# Patient Record
Sex: MALE | Race: OTHER | NOT HISPANIC OR LATINO
[De-identification: names, ages, dates, MRNs, and addresses within clinical notes are randomized per-mention and may not be internally consistent; named-entity substitution may affect disease eponyms.]

---

## 2018-09-05 ENCOUNTER — APPOINTMENT (OUTPATIENT)
Dept: OTOLARYNGOLOGY | Facility: CLINIC | Age: 73
End: 2018-09-05
Payer: MEDICARE

## 2018-09-05 VITALS
WEIGHT: 160 LBS | HEIGHT: 66 IN | SYSTOLIC BLOOD PRESSURE: 129 MMHG | BODY MASS INDEX: 25.71 KG/M2 | DIASTOLIC BLOOD PRESSURE: 80 MMHG | HEART RATE: 82 BPM

## 2018-09-05 DIAGNOSIS — E66.3 OVERWEIGHT: ICD-10-CM

## 2018-09-05 DIAGNOSIS — H92.02 OTALGIA, LEFT EAR: ICD-10-CM

## 2018-09-05 DIAGNOSIS — E11.9 TYPE 2 DIABETES MELLITUS W/OUT COMPLICATIONS: ICD-10-CM

## 2018-09-05 DIAGNOSIS — K29.80 DUODENITIS W/OUT BLEEDING: ICD-10-CM

## 2018-09-05 PROCEDURE — 92567 TYMPANOMETRY: CPT

## 2018-09-05 PROCEDURE — 92557 COMPREHENSIVE HEARING TEST: CPT

## 2018-09-05 PROCEDURE — 31575 DIAGNOSTIC LARYNGOSCOPY: CPT

## 2018-09-05 PROCEDURE — 99204 OFFICE O/P NEW MOD 45 MIN: CPT | Mod: 25

## 2021-07-08 ENCOUNTER — INPATIENT (INPATIENT)
Facility: HOSPITAL | Age: 76
LOS: 5 days | Discharge: HOME | DRG: 270 | End: 2021-07-14
Attending: STUDENT IN AN ORGANIZED HEALTH CARE EDUCATION/TRAINING PROGRAM | Admitting: INTERNAL MEDICINE
Payer: MEDICARE

## 2021-07-08 VITALS
TEMPERATURE: 98 F | DIASTOLIC BLOOD PRESSURE: 80 MMHG | HEIGHT: 66 IN | RESPIRATION RATE: 20 BRPM | SYSTOLIC BLOOD PRESSURE: 122 MMHG | HEART RATE: 73 BPM | OXYGEN SATURATION: 100 % | WEIGHT: 154.1 LBS

## 2021-07-08 DIAGNOSIS — R07.9 CHEST PAIN, UNSPECIFIED: ICD-10-CM

## 2021-07-08 LAB
A1C WITH ESTIMATED AVERAGE GLUCOSE RESULT: 7.2 % — HIGH (ref 4–5.6)
ALBUMIN SERPL ELPH-MCNC: 4.1 G/DL — SIGNIFICANT CHANGE UP (ref 3.3–5)
ALP SERPL-CCNC: 68 U/L — SIGNIFICANT CHANGE UP (ref 40–120)
ALT FLD-CCNC: 19 U/L — SIGNIFICANT CHANGE UP (ref 10–45)
ANION GAP SERPL CALC-SCNC: 18 MMOL/L — HIGH (ref 5–17)
APTT BLD: 29.8 SEC — SIGNIFICANT CHANGE UP (ref 27.5–35.5)
AST SERPL-CCNC: 23 U/L — SIGNIFICANT CHANGE UP (ref 10–40)
BASE EXCESS BLDMV CALC-SCNC: -1.7 MMOL/L — SIGNIFICANT CHANGE UP (ref -3–3)
BASE EXCESS BLDV CALC-SCNC: 1.4 MMOL/L — SIGNIFICANT CHANGE UP (ref -2–2)
BASOPHILS # BLD AUTO: 0.06 K/UL — SIGNIFICANT CHANGE UP (ref 0–0.2)
BASOPHILS NFR BLD AUTO: 0.4 % — SIGNIFICANT CHANGE UP (ref 0–2)
BILIRUB SERPL-MCNC: 0.7 MG/DL — SIGNIFICANT CHANGE UP (ref 0.2–1.2)
BUN SERPL-MCNC: 15 MG/DL — SIGNIFICANT CHANGE UP (ref 7–23)
CA-I SERPL-SCNC: 1.13 MMOL/L — SIGNIFICANT CHANGE UP (ref 1.12–1.3)
CALCIUM SERPL-MCNC: 9.2 MG/DL — SIGNIFICANT CHANGE UP (ref 8.4–10.5)
CHLORIDE BLDV-SCNC: 109 MMOL/L — HIGH (ref 96–108)
CHLORIDE SERPL-SCNC: 103 MMOL/L — SIGNIFICANT CHANGE UP (ref 96–108)
CHOLEST SERPL-MCNC: 121 MG/DL — SIGNIFICANT CHANGE UP
CK MB BLD-MCNC: 7.5 % — HIGH (ref 0–3.5)
CK MB CFR SERPL CALC: 289.4 NG/ML — HIGH (ref 0–6.7)
CK SERPL-CCNC: 3863 U/L — HIGH (ref 30–200)
CO2 BLDMV-SCNC: 25 MMOL/L — SIGNIFICANT CHANGE UP (ref 21–29)
CO2 BLDV-SCNC: 25 MMOL/L — SIGNIFICANT CHANGE UP (ref 22–30)
CO2 SERPL-SCNC: 18 MMOL/L — LOW (ref 22–31)
CREAT SERPL-MCNC: 0.89 MG/DL — SIGNIFICANT CHANGE UP (ref 0.5–1.3)
EOSINOPHIL # BLD AUTO: 0.18 K/UL — SIGNIFICANT CHANGE UP (ref 0–0.5)
EOSINOPHIL NFR BLD AUTO: 1.3 % — SIGNIFICANT CHANGE UP (ref 0–6)
ESTIMATED AVERAGE GLUCOSE: 160 MG/DL — HIGH (ref 68–114)
GAS PNL BLDA: SIGNIFICANT CHANGE UP
GAS PNL BLDMV: SIGNIFICANT CHANGE UP
GAS PNL BLDV: 138 MMOL/L — SIGNIFICANT CHANGE UP (ref 135–145)
GAS PNL BLDV: SIGNIFICANT CHANGE UP
GAS PNL BLDV: SIGNIFICANT CHANGE UP
GLUCOSE BLDC GLUCOMTR-MCNC: 151 MG/DL — HIGH (ref 70–99)
GLUCOSE BLDV-MCNC: 146 MG/DL — HIGH (ref 70–99)
GLUCOSE SERPL-MCNC: 154 MG/DL — HIGH (ref 70–99)
HCO3 BLDMV-SCNC: 24 MMOL/L — SIGNIFICANT CHANGE UP (ref 20–28)
HCO3 BLDV-SCNC: 24 MMOL/L — SIGNIFICANT CHANGE UP (ref 21–29)
HCT VFR BLD CALC: 44.9 % — SIGNIFICANT CHANGE UP (ref 39–50)
HCT VFR BLDA CALC: 46 % — SIGNIFICANT CHANGE UP (ref 39–50)
HDLC SERPL-MCNC: 46 MG/DL — SIGNIFICANT CHANGE UP
HGB BLD CALC-MCNC: 15 G/DL — SIGNIFICANT CHANGE UP (ref 13–17)
HGB BLD-MCNC: 14.5 G/DL — SIGNIFICANT CHANGE UP (ref 13–17)
HOROWITZ INDEX BLDMV+IHG-RTO: 21 — SIGNIFICANT CHANGE UP
IMM GRANULOCYTES NFR BLD AUTO: 0.4 % — SIGNIFICANT CHANGE UP (ref 0–1.5)
INR BLD: 1.01 RATIO — SIGNIFICANT CHANGE UP (ref 0.88–1.16)
LACTATE BLDV-MCNC: 4.3 MMOL/L — CRITICAL HIGH (ref 0.7–2)
LIPID PNL WITH DIRECT LDL SERPL: 69 MG/DL — SIGNIFICANT CHANGE UP
LYMPHOCYTES # BLD AUTO: 29.2 % — SIGNIFICANT CHANGE UP (ref 13–44)
LYMPHOCYTES # BLD AUTO: 3.93 K/UL — HIGH (ref 1–3.3)
MCHC RBC-ENTMCNC: 28.3 PG — SIGNIFICANT CHANGE UP (ref 27–34)
MCHC RBC-ENTMCNC: 32.3 GM/DL — SIGNIFICANT CHANGE UP (ref 32–36)
MCV RBC AUTO: 87.7 FL — SIGNIFICANT CHANGE UP (ref 80–100)
MONOCYTES # BLD AUTO: 0.66 K/UL — SIGNIFICANT CHANGE UP (ref 0–0.9)
MONOCYTES NFR BLD AUTO: 4.9 % — SIGNIFICANT CHANGE UP (ref 2–14)
NEUTROPHILS # BLD AUTO: 8.59 K/UL — HIGH (ref 1.8–7.4)
NEUTROPHILS NFR BLD AUTO: 63.8 % — SIGNIFICANT CHANGE UP (ref 43–77)
NON HDL CHOLESTEROL: 75 MG/DL — SIGNIFICANT CHANGE UP
NRBC # BLD: 0 /100 WBCS — SIGNIFICANT CHANGE UP (ref 0–0)
O2 CT VFR BLD CALC: 39 MMHG — SIGNIFICANT CHANGE UP (ref 30–65)
PCO2 BLDMV: 44 MMHG — SIGNIFICANT CHANGE UP (ref 30–65)
PCO2 BLDV: 32 MMHG — LOW (ref 35–50)
PH BLDMV: 7.34 — SIGNIFICANT CHANGE UP (ref 7.32–7.45)
PH BLDV: 7.48 — HIGH (ref 7.35–7.45)
PLATELET # BLD AUTO: 224 K/UL — SIGNIFICANT CHANGE UP (ref 150–400)
PO2 BLDV: 20 MMHG — LOW (ref 25–45)
POTASSIUM BLDV-SCNC: 3.5 MMOL/L — SIGNIFICANT CHANGE UP (ref 3.5–5.3)
POTASSIUM SERPL-MCNC: 3.7 MMOL/L — SIGNIFICANT CHANGE UP (ref 3.5–5.3)
POTASSIUM SERPL-SCNC: 3.7 MMOL/L — SIGNIFICANT CHANGE UP (ref 3.5–5.3)
PROT SERPL-MCNC: 7.4 G/DL — SIGNIFICANT CHANGE UP (ref 6–8.3)
PROTHROM AB SERPL-ACNC: 12.1 SEC — SIGNIFICANT CHANGE UP (ref 10.6–13.6)
RBC # BLD: 5.12 M/UL — SIGNIFICANT CHANGE UP (ref 4.2–5.8)
RBC # FLD: 13.3 % — SIGNIFICANT CHANGE UP (ref 10.3–14.5)
SAO2 % BLDMV: 66 % — SIGNIFICANT CHANGE UP (ref 60–90)
SAO2 % BLDV: 34 % — LOW (ref 67–88)
SARS-COV-2 RNA SPEC QL NAA+PROBE: SIGNIFICANT CHANGE UP
SODIUM SERPL-SCNC: 139 MMOL/L — SIGNIFICANT CHANGE UP (ref 135–145)
TRIGL SERPL-MCNC: 32 MG/DL — SIGNIFICANT CHANGE UP
TROPONIN T, HIGH SENSITIVITY RESULT: 22 NG/L — SIGNIFICANT CHANGE UP (ref 0–51)
TROPONIN T, HIGH SENSITIVITY RESULT: HIGH NG/L (ref 0–51)
TSH SERPL-MCNC: 2.83 UIU/ML — SIGNIFICANT CHANGE UP (ref 0.27–4.2)
WBC # BLD: 13.47 K/UL — HIGH (ref 3.8–10.5)
WBC # FLD AUTO: 13.47 K/UL — HIGH (ref 3.8–10.5)

## 2021-07-08 PROCEDURE — 99292 CRITICAL CARE ADDL 30 MIN: CPT

## 2021-07-08 PROCEDURE — 33967 INSERT I-AORT PERCUT DEVICE: CPT

## 2021-07-08 PROCEDURE — 99291 CRITICAL CARE FIRST HOUR: CPT | Mod: 25

## 2021-07-08 PROCEDURE — 71045 X-RAY EXAM CHEST 1 VIEW: CPT | Mod: 26

## 2021-07-08 PROCEDURE — 93010 ELECTROCARDIOGRAM REPORT: CPT | Mod: 76

## 2021-07-08 PROCEDURE — 93460 R&L HRT ART/VENTRICLE ANGIO: CPT | Mod: 26,59

## 2021-07-08 PROCEDURE — 93010 ELECTROCARDIOGRAM REPORT: CPT

## 2021-07-08 PROCEDURE — 92978 ENDOLUMINL IVUS OCT C 1ST: CPT | Mod: 26,LD

## 2021-07-08 PROCEDURE — 92941 PRQ TRLML REVSC TOT OCCL AMI: CPT | Mod: LD

## 2021-07-08 PROCEDURE — 93308 TTE F-UP OR LMTD: CPT | Mod: 26

## 2021-07-08 PROCEDURE — 93010 ELECTROCARDIOGRAM REPORT: CPT | Mod: 77

## 2021-07-08 PROCEDURE — 99291 CRITICAL CARE FIRST HOUR: CPT | Mod: CS

## 2021-07-08 RX ORDER — DEXTROSE 50 % IN WATER 50 %
12.5 SYRINGE (ML) INTRAVENOUS ONCE
Refills: 0 | Status: DISCONTINUED | OUTPATIENT
Start: 2021-07-08 | End: 2021-07-14

## 2021-07-08 RX ORDER — HEPARIN SODIUM 5000 [USP'U]/ML
4000 INJECTION INTRAVENOUS; SUBCUTANEOUS EVERY 6 HOURS
Refills: 0 | Status: DISCONTINUED | OUTPATIENT
Start: 2021-07-08 | End: 2021-07-08

## 2021-07-08 RX ORDER — DEXTROSE 50 % IN WATER 50 %
15 SYRINGE (ML) INTRAVENOUS ONCE
Refills: 0 | Status: DISCONTINUED | OUTPATIENT
Start: 2021-07-08 | End: 2021-07-14

## 2021-07-08 RX ORDER — CLOPIDOGREL BISULFATE 75 MG/1
75 TABLET, FILM COATED ORAL DAILY
Refills: 0 | Status: DISCONTINUED | OUTPATIENT
Start: 2021-07-09 | End: 2021-07-14

## 2021-07-08 RX ORDER — METOPROLOL TARTRATE 50 MG
25 TABLET ORAL ONCE
Refills: 0 | Status: DISCONTINUED | OUTPATIENT
Start: 2021-07-08 | End: 2021-07-08

## 2021-07-08 RX ORDER — FINASTERIDE 5 MG/1
5 TABLET, FILM COATED ORAL DAILY
Refills: 0 | Status: DISCONTINUED | OUTPATIENT
Start: 2021-07-08 | End: 2021-07-14

## 2021-07-08 RX ORDER — DEXTROSE 50 % IN WATER 50 %
25 SYRINGE (ML) INTRAVENOUS ONCE
Refills: 0 | Status: DISCONTINUED | OUTPATIENT
Start: 2021-07-08 | End: 2021-07-14

## 2021-07-08 RX ORDER — ASPIRIN/CALCIUM CARB/MAGNESIUM 324 MG
81 TABLET ORAL DAILY
Refills: 0 | Status: DISCONTINUED | OUTPATIENT
Start: 2021-07-08 | End: 2021-07-14

## 2021-07-08 RX ORDER — ATORVASTATIN CALCIUM 80 MG/1
80 TABLET, FILM COATED ORAL AT BEDTIME
Refills: 0 | Status: DISCONTINUED | OUTPATIENT
Start: 2021-07-08 | End: 2021-07-14

## 2021-07-08 RX ORDER — INSULIN LISPRO 100/ML
VIAL (ML) SUBCUTANEOUS AT BEDTIME
Refills: 0 | Status: DISCONTINUED | OUTPATIENT
Start: 2021-07-08 | End: 2021-07-14

## 2021-07-08 RX ORDER — NITROGLYCERIN 6.5 MG
0.4 CAPSULE, EXTENDED RELEASE ORAL ONCE
Refills: 0 | Status: COMPLETED | OUTPATIENT
Start: 2021-07-08 | End: 2021-07-08

## 2021-07-08 RX ORDER — HEPARIN SODIUM 5000 [USP'U]/ML
800 INJECTION INTRAVENOUS; SUBCUTANEOUS
Qty: 25000 | Refills: 0 | Status: DISCONTINUED | OUTPATIENT
Start: 2021-07-08 | End: 2021-07-09

## 2021-07-08 RX ORDER — LANOLIN ALCOHOL/MO/W.PET/CERES
5 CREAM (GRAM) TOPICAL AT BEDTIME
Refills: 0 | Status: DISCONTINUED | OUTPATIENT
Start: 2021-07-08 | End: 2021-07-14

## 2021-07-08 RX ORDER — CLOPIDOGREL BISULFATE 75 MG/1
600 TABLET, FILM COATED ORAL ONCE
Refills: 0 | Status: COMPLETED | OUTPATIENT
Start: 2021-07-08 | End: 2021-07-08

## 2021-07-08 RX ORDER — METOPROLOL TARTRATE 50 MG
12.5 TABLET ORAL EVERY 12 HOURS
Refills: 0 | Status: DISCONTINUED | OUTPATIENT
Start: 2021-07-08 | End: 2021-07-12

## 2021-07-08 RX ORDER — GLUCAGON INJECTION, SOLUTION 0.5 MG/.1ML
1 INJECTION, SOLUTION SUBCUTANEOUS ONCE
Refills: 0 | Status: DISCONTINUED | OUTPATIENT
Start: 2021-07-08 | End: 2021-07-14

## 2021-07-08 RX ORDER — HEPARIN SODIUM 5000 [USP'U]/ML
INJECTION INTRAVENOUS; SUBCUTANEOUS
Qty: 25000 | Refills: 0 | Status: DISCONTINUED | OUTPATIENT
Start: 2021-07-08 | End: 2021-07-08

## 2021-07-08 RX ORDER — ACETAMINOPHEN 500 MG
650 TABLET ORAL ONCE
Refills: 0 | Status: COMPLETED | OUTPATIENT
Start: 2021-07-08 | End: 2021-07-09

## 2021-07-08 RX ORDER — INSULIN GLARGINE 100 [IU]/ML
38 INJECTION, SOLUTION SUBCUTANEOUS AT BEDTIME
Refills: 0 | Status: DISCONTINUED | OUTPATIENT
Start: 2021-07-08 | End: 2021-07-14

## 2021-07-08 RX ORDER — CLOPIDOGREL BISULFATE 75 MG/1
300 TABLET, FILM COATED ORAL ONCE
Refills: 0 | Status: DISCONTINUED | OUTPATIENT
Start: 2021-07-08 | End: 2021-07-08

## 2021-07-08 RX ORDER — SODIUM CHLORIDE 9 MG/ML
1000 INJECTION, SOLUTION INTRAVENOUS
Refills: 0 | Status: DISCONTINUED | OUTPATIENT
Start: 2021-07-08 | End: 2021-07-14

## 2021-07-08 RX ORDER — INSULIN LISPRO 100/ML
VIAL (ML) SUBCUTANEOUS
Refills: 0 | Status: DISCONTINUED | OUTPATIENT
Start: 2021-07-08 | End: 2021-07-14

## 2021-07-08 RX ORDER — ASPIRIN/CALCIUM CARB/MAGNESIUM 324 MG
324 TABLET ORAL ONCE
Refills: 0 | Status: COMPLETED | OUTPATIENT
Start: 2021-07-08 | End: 2021-07-08

## 2021-07-08 RX ORDER — MORPHINE SULFATE 50 MG/1
4 CAPSULE, EXTENDED RELEASE ORAL ONCE
Refills: 0 | Status: DISCONTINUED | OUTPATIENT
Start: 2021-07-08 | End: 2021-07-08

## 2021-07-08 RX ORDER — CHLORHEXIDINE GLUCONATE 213 G/1000ML
1 SOLUTION TOPICAL DAILY
Refills: 0 | Status: DISCONTINUED | OUTPATIENT
Start: 2021-07-08 | End: 2021-07-09

## 2021-07-08 RX ORDER — HEPARIN SODIUM 5000 [USP'U]/ML
4000 INJECTION INTRAVENOUS; SUBCUTANEOUS ONCE
Refills: 0 | Status: COMPLETED | OUTPATIENT
Start: 2021-07-08 | End: 2021-07-08

## 2021-07-08 RX ADMIN — Medication 1: at 17:03

## 2021-07-08 RX ADMIN — INSULIN GLARGINE 38 UNIT(S): 100 INJECTION, SOLUTION SUBCUTANEOUS at 21:25

## 2021-07-08 RX ADMIN — Medication 30 MILLILITER(S): at 18:45

## 2021-07-08 RX ADMIN — Medication 0.4 MILLIGRAM(S): at 07:41

## 2021-07-08 RX ADMIN — ATORVASTATIN CALCIUM 80 MILLIGRAM(S): 80 TABLET, FILM COATED ORAL at 21:26

## 2021-07-08 RX ADMIN — FINASTERIDE 5 MILLIGRAM(S): 5 TABLET, FILM COATED ORAL at 18:45

## 2021-07-08 RX ADMIN — HEPARIN SODIUM 4000 UNIT(S): 5000 INJECTION INTRAVENOUS; SUBCUTANEOUS at 08:10

## 2021-07-08 RX ADMIN — HEPARIN SODIUM 800 UNIT(S)/HR: 5000 INJECTION INTRAVENOUS; SUBCUTANEOUS at 08:32

## 2021-07-08 RX ADMIN — HEPARIN SODIUM 8 UNIT(S)/HR: 5000 INJECTION INTRAVENOUS; SUBCUTANEOUS at 18:04

## 2021-07-08 RX ADMIN — Medication 324 MILLIGRAM(S): at 07:40

## 2021-07-08 RX ADMIN — Medication 5 MILLIGRAM(S): at 23:43

## 2021-07-08 RX ADMIN — MORPHINE SULFATE 4 MILLIGRAM(S): 50 CAPSULE, EXTENDED RELEASE ORAL at 08:13

## 2021-07-08 RX ADMIN — Medication 12.5 MILLIGRAM(S): at 18:44

## 2021-07-08 RX ADMIN — CHLORHEXIDINE GLUCONATE 1 APPLICATION(S): 213 SOLUTION TOPICAL at 13:50

## 2021-07-08 RX ADMIN — CLOPIDOGREL BISULFATE 600 MILLIGRAM(S): 75 TABLET, FILM COATED ORAL at 08:10

## 2021-07-08 NOTE — ED PROVIDER NOTE - CLINICAL SUMMARY MEDICAL DECISION MAKING FREE TEXT BOX
77yo M hx of DM, HLD here w/ active cp. VSS. EKG concerning for STEMI. No prior available. STEMI consult, labs, ASA. Pt will likely need emergent cath. Reassess.

## 2021-07-08 NOTE — ED PROVIDER NOTE - ATTENDING CONTRIBUTION TO CARE
Attending Statement (GATO Moreno MD):    HPI: 77y/o M with h/o DM, HLD, presenting with sudden onset chest pain, pressure like, initially a/w diaphoresis, non-radiating. No SOB. +nausea. Indicates pain located in substernal to left pectoral region, "inside" no back pain. No cough, no fever.      Review of Systems:  -General: no fever  -ENT: no congestion, no difficulty swallowing  -Pulmonary: no cough, no shortness of breath  -Cardiac: +chest pain, no palpitations  -Gastrointestinal: no abdominal pain, +nausea, no vomiting, and no diarrhea.  -Genitourinary: no blood or pain with urination  -Musculoskeletal: no back or neck pain  -Skin: no rashes  -Endocrine: +h/o diabetes   -Neurologic: No focal weakness or numbness    All else negative unless otherwise specified elsewhere in this note.    PSH/PMH as noted above    On Physical Exam:  General: appears uncomfortable due to pain  HEENT: airway patent  Neck: no neck tenderness, no nuchal rigidity  Cardiac: normal s1, s2; RRR; no MGR  Lungs: CTABL  Abdomen: soft nontender/nondistended  : no bladder tenderness or distension  Skin: intact, no rash  Extremities: no peripheral edema    MDM: Pt presenting with chest pain, concerning for ACS - obtaining CXR, and labs (CBC/CMP/Cardiac Enzymes). ECG with LBBB, borderline st elevations in anterior leads; POCUS shows minimal movement of marcel-septal walls; cardiology called for emergent cath consult, ecg questionable stemi equivalent; and patient given aspirin, NTG, plavix, heparin; per further d/w cardiology fellow plan for emergent cath.  Patient's description of chest pain, including lack of pleuritic nature, lack of respiratory symptoms, minimal risk factors and overall clinical picture are not consistent with a pulmonary embolism. The patient's clinical presentation, including type/description of pain (no radiation to back), stable vitals and overall clinical picture are not consistent with an acute aortic dissection. No cough, no fever - seems unlikely to be infectious in nature; screening covid test sent in accordance with hospital policy.

## 2021-07-08 NOTE — H&P ADULT - ATTENDING COMMENTS
Admitted with anterior wall STEMI s/p PCI complicated by hypotension requiring IABP placement  DAPT with ASA, Ticagrelor   Lipitor 80  Hemodynamics acceptable, chest pain free, SvO2 66% - start beta blocker  Check formal TTE  Trend cardiac enzymes until peak  O2 sats mid to high 90s on nasal cannula  Normal renal function  H/H acceptable  COVID negative, no antibiotics   Sugars controlled, check Hgb A1c   IABP and femoral [place] 7/8

## 2021-07-08 NOTE — H&P ADULT - NSICDXFAMILYHX_GEN_ALL_CORE_FT
FAMILY HISTORY:  Sibling  Still living? No  FH: myocardial infarction, Age at diagnosis: Age Unknown

## 2021-07-08 NOTE — PROGRESS NOTE ADULT - SUBJECTIVE AND OBJECTIVE BOX
YANNA TRONCOSO  MRN-[id number]  Patient is a 76y old  Male who presents with a chief complaint of Chest pain (08 Jul 2021 17:53)    HPI:  76y M PMH of BPH, T2DM, HLD, p/w chest pain x 2-3 days. Yesterday had episode of exertional cp when he was walking, improved w/ 30 min of rest, happened again when he walked another 5 minutes. This morning went to brush his teeth and watch tv, had onset of crushing substernal cp at 6:30am. The pain was intense and was present in his chest/abdominal area. Has diaphoresis, nausea, and numbness of hands and feet during episode. Patient had 1 episode of emesis right before PCI. Patient denied any SOB, dizziness, syncope. Patient denied any radiation to L arm, L jaw, back.  After arrival to ED, patient had 2 stents placed in prox LAD, and was found to have L bundle branch block  (08 Jul 2021 14:45)    Today:    Physical Exam:  Vital Signs Last 24 Hrs  T(C): 37.9 (08 Jul 2021 19:00), Max: 37.9 (08 Jul 2021 19:00)  T(F): 100.2 (08 Jul 2021 19:00), Max: 100.2 (08 Jul 2021 19:00)  HR: 74 (08 Jul 2021 21:00) (68 - 90)  BP: 82/50 (08 Jul 2021 10:00) (82/50 - 122/80)  BP(mean): 62 (08 Jul 2021 10:00) (62 - 62)  RR: 21 (08 Jul 2021 21:00) (15 - 29)  SpO2: 95% (08 Jul 2021 21:00) (93% - 100%)    PHYSICAL EXAM:  Constitutional: Patient laying in bed, NAD  Head: Atraumatic, normocephalic  Eyes: No scleral icterus. PERRLA. EOMI  ENMT: Moist mucous membrane. Uvula midline  Neck: Supple, No JVD  Respiratory: CTA B/L. No wheezes, rales or rhonchi   Cardiovascular: S1/S2. No murmurs, rubs, or gallops.  Gastrointestinal: Nondistended, BSx4, soft, nontender.  Extremities: Moves all extremities. Warm, no edema, nontender  Vascular: 2+ DP/PT pulses B/L   Neurological: A&Ox3. Follows commands. No focal deficits.   Skin: No rashes on exposed skin     ============================I/O===========================   I&O's Detail    08 Jul 2021 07:01  -  08 Jul 2021 22:26  --------------------------------------------------------  IN:    Heparin: 32 mL    IV PiggyBack: 30 mL    Oral Fluid: 240 mL  Total IN: 302 mL    OUT:    Voided (mL): 150 mL  Total OUT: 150 mL    Total NET: 152 mL  ============================ LABS =========================                        14.5   13.47 )-----------( 224      ( 08 Jul 2021 08:13 )             44.9     07-08    139  |  103  |  15  ----------------------------<  154<H>  3.7   |  18<L>  |  0.89    Ca    9.2      08 Jul 2021 08:13    TPro  7.4  /  Alb  4.1  /  TBili  0.7  /  DBili  x   /  AST  23  /  ALT  19  /  AlkPhos  68  07-08    LIVER FUNCTIONS - ( 08 Jul 2021 08:13 )  Alb: 4.1 g/dL / Pro: 7.4 g/dL / ALK PHOS: 68 U/L / ALT: 19 U/L / AST: 23 U/L / GGT: x           PT/INR - ( 08 Jul 2021 08:13 )   PT: 12.1 sec;   INR: 1.01 ratio      PTT - ( 08 Jul 2021 08:13 )  PTT:29.8 sec  ABG - ( 08 Jul 2021 11:00 )  pH, Arterial: 7.38  pH, Blood: x     /  pCO2: 36    /  pO2: 73    / HCO3: 21    / Base Excess: -3.0  /  SaO2: 94        ======================Micro/Rad/Cardio=================  Culture: Reviewed   CXR: Reviewed  Echo:Reviewed  ======================================================  PAST MEDICAL & SURGICAL HISTORY:  HLD (hyperlipidemia)  T2DM (type 2 diabetes mellitus)  BPH (benign prostatic hyperplasia)    Plan:  ====================== NEUROLOGY=====================  -A&O4  -No active issues    ==================== RESPIRATORY======================  -Satting well on RA  ====================CARDIOVASCULAR==================  #STEMI  -s/p DESx2 in prox LAD  -[place] and balloon placed in L femoral; will monitor for hematoma   -C/w DAPT  -Possible repeat PCI in L Circ  -Hep gtt for balloon pump  -Will hold off on ACE/ARB for potential repeat PCI. If needed, will give Hydral for afterload reduction   -Start Atorva 80mg qd  -Start Lopressor 12.5 BID    ===================HEMATOLOGIC/ONC ===================  -Hep gtt for AC    ===================== RENAL =========================  Renal  -Cr wnl   --Will hold off on ACE/ARB for potential repeat PCI  ==================== GASTROINTESTINAL===================  GI  -Consistent Carb diet   -Will give bowel regimen if needed    =======================    ENDOCRINE  =====================  Endo  -On Basaglar 38 units qhs and Metformin at home   -Will start Lantus 38 units qhs   -ISS  -A1C 7.2  ========================INFECTIOUS DISEASE================  -Mild leukocytosis, likely reactive. Afebrile  -Monitor off abx    Patient requires continuous monitoring with bedside rhythm monitoring, arterial line, pulse oximetry, ventilator monitoring and intermittent blood gas analysis.  Care plan discussed with ICU care team.  Patient remained critical; required more than usual post op care; I have spent 35 minutes providing non-routine post op care, revaluated multiple times during the day.

## 2021-07-08 NOTE — H&P ADULT - ASSESSMENT
76y M PMH of BPH, T2DM, HLD, p/w chest pain, found to have STEMI, now s/p PCI w/ FARTUN x2 in pLAD.     Neuro  -A&O4  -No active issues    Pulm  -Satting well on RA    CV  #STEMI  -s/p DESx2 in prox LAD  -[place] and balloon placed in L femoral; will monitor for hematoma   -C/w DAPT  -Possible repeat PCI in L Circ  -Hep gtt for balloon pump  -Will hold off on ACE/ARB for potential repeat PCI. If needed, will give Hydral for     GI  -Consistent Carb diet   -Will give bowel regimen if needed    Renal  -Cr wnl   --Will hold off on ACE/ARB for potential repeat PCI    Endo  -On Basaglar 38 units qhs and Metformin at home   -Will start Lantus 38 units qhs   -ISS  -A1C in AM    Heme  -Hep gtt for AC    ID  -Mild leukocytosis, likely reactive. Afebrile  -Monitor off abx 76y M PMH of BPH, T2DM, HLD, p/w chest pain, found to have STEMI, now s/p PCI w/ FARTUN x2 in pLAD.     Neuro  -A&O4  -No active issues    Pulm  -Satting well on RA    CV  #STEMI  -s/p DESx2 in prox LAD  -[place] and balloon placed in L femoral; will monitor for hematoma   -C/w DAPT  -Possible repeat PCI in L Circ  -Hep gtt for balloon pump  -Will hold off on ACE/ARB for potential repeat PCI. If needed, will give Hydral for afterload reduction   -Start Atorva 80mg qd    GI  -Consistent Carb diet   -Will give bowel regimen if needed    Renal  -Cr wnl   --Will hold off on ACE/ARB for potential repeat PCI    Endo  -On Basaglar 38 units qhs and Metformin at home   -Will start Lantus 38 units qhs   -ISS  -A1C 7.2    Heme  -Hep gtt for AC    ID  -Mild leukocytosis, likely reactive. Afebrile  -Monitor off abx 76y M PMH of BPH, T2DM, HLD, p/w chest pain, found to have STEMI, now s/p PCI w/ FARTUN x2 in pLAD.     Neuro  -A&O4  -No active issues    Pulm  -Satting well on RA    CV  #STEMI  -s/p DESx2 in prox LAD  -[place] and balloon placed in L femoral; will monitor for hematoma   -C/w DAPT  -Possible repeat PCI in L Circ  -Hep gtt for balloon pump  -Will hold off on ACE/ARB for potential repeat PCI. If needed, will give Hydral for afterload reduction   -Start Atorva 80mg qd  -Start Lopressor 12.5 BID    GI  -Consistent Carb diet   -Will give bowel regimen if needed    Renal  -Cr wnl   --Will hold off on ACE/ARB for potential repeat PCI    Endo  -On Basaglar 38 units qhs and Metformin at home   -Will start Lantus 38 units qhs   -ISS  -A1C 7.2    Heme  -Hep gtt for AC    ID  -Mild leukocytosis, likely reactive. Afebrile  -Monitor off abx

## 2021-07-08 NOTE — H&P ADULT - HISTORY OF PRESENT ILLNESS
76y M PMH of BPH, T2DM, HLD, p/wchest pain x 2-3 days. Yesterday had episode of exertional cp when he was walking, improved w/ 30 min of rest, happened again when he walked another 5 minutes. This morning went to brush his teeth and watch tv, had onset of crushing substernal cp at 6:30am. The pain was intense and was present in his chest/abdominal area. Patient denied any radiation to L arm, L jaw, back. Has diaphoresis and nausea. Patient denied any SOB, dizziness, syncope.     Patient had 2 stents placed in prox LAD, and was found to have L bundle branch block  76y M PMH of BPH, T2DM, HLD, p/w chest pain x 2-3 days. Yesterday had episode of exertional cp when he was walking, improved w/ 30 min of rest, happened again when he walked another 5 minutes. This morning went to brush his teeth and watch tv, had onset of crushing substernal cp at 6:30am. The pain was intense and was present in his chest/abdominal area. Has diaphoresis, nausea, and numbness of hands and feet during episode. Patient had 1 episode of emesis right before PCI. Patient denied any SOB, dizziness, syncope. Patient denied any radiation to L arm, L jaw, back.    After arrival to ED, patient had 2 stents placed in prox LAD, and was found to have L bundle branch block

## 2021-07-08 NOTE — ED ADULT NURSE NOTE - OBJECTIVE STATEMENT
76 y M pmhx of DM presents to the ED with CP all over chest and epigastric since yesterday. reports that it began when he was out for a walk and he had to sit down for 30 minutes and when he began to walk home the pain came back. Pt reports that this morning he broke out into a sweat. Pt visibly in a lot of pain. On assessment, A&Ox4. Denies lightheadedness, dizziness, headaches, numbness and tingling. Breathing spontaneously and unlabored on Room air. Denies cough, SOB. No Peripheral edema. Cap refill 2s. No JVD. Peripheral pulses strong and equal bilaterally. On cardiac monitor, NSR. Denies SOB and palpitations. Abdomen soft, nontender, nondistended, negative CVA tenderness, positive bowel sounds in all four quadrants. Pt is continent. Denies n/v/d, dysuria, melena and hematuria. IV placed 18g in b/l ACs. Labs drawn and sent. Pt safety maintained. Call bell within reach. Side rails in upward position. Pt awaiting dispo.    0745: Cards at bedside to evaluate pt. MD Moreno at bedside US pt.   0809: MD Etienne states okay for pt to have 4000 units of heparin and without coags.   0813: Pt transported to cath lab with RN, MD Etienne, EDT.

## 2021-07-08 NOTE — CHART NOTE - NSCHARTNOTEFT_GEN_A_CORE
Removal of Femoral Sheath    Pulses in the R lower extremity are palpable. The patient was placed in the supine position. The insertion site was identified and the sutures were removed per protocol.  The 6 [place] femoral sheath was then removed. Direct pressure was applied for  19 minutes.     Monitoring of the R groin and both lower extremities including neuro-vascular checks and vital signs every 15 minutes x 4, then every 30 minutes x 2, then every 1 hour was ordered.    Complications: None/Other    Peggy Cee MD  Cardiology Fellow - PGY 4  Text or Call: [phone]

## 2021-07-08 NOTE — H&P ADULT - NSICDXPASTMEDICALHX_GEN_ALL_CORE_FT
PAST MEDICAL HISTORY:  BPH (benign prostatic hyperplasia)     HLD (hyperlipidemia)     T2DM (type 2 diabetes mellitus)

## 2021-07-08 NOTE — ED PROVIDER NOTE - PROGRESS NOTE DETAILS
LAUREANO Stephens (Resident) - cards STEMI consult called. LAUREANO Stephens (Resident) - Pt roomed, has active cp. EKG w/ new LBBB, anterolateral st elevation w/ reciprocal depressions cards STEMI consult called. LAUREANO Stephens (Resident) - cards at bedside, nitro given, heparin, Plavix, morphine administered. Cath lab activated. POCUS by Dr. Moreno showing akinesis of anterior wall. LAUREANO Stephens (Resident) - pt physically out of ED to cath lab on monitor.

## 2021-07-08 NOTE — ED PROVIDER NOTE - OBJECTIVE STATEMENT
76y M hld, dm2 here w/ cp. Yesterday had episode of exertional cp when he was walking, improved w/ 30 min of rest, happened again when he walked another 5 minutes. This morning went to brush his teeth and watch tv, had onset of crushing substernal cp at 6:30am. Came to ED. Has diaphoresis and nausea, denies sob, abdominal pain or other complaints. 76y M hld, dm2 here w/ cp. Yesterday had episode of exertional cp when he was walking, improved w/ 30 min of rest, happened again when he walked another 5 minutes. This morning went to brush his teeth and watch tv, had onset of crushing substernal cp at 6:30am. Came to ED. Has diaphoresis and nausea, denies sob, abdominal pain or other complaints.  Wife Martell : [phone]

## 2021-07-08 NOTE — CONSULT NOTE ADULT - ASSESSMENT
76y M PMH of BPH, T2DM, HLD, p/w dyspnea/exertional chest pain x 2-3 days and chest pain in the AM today.     Plan  -s/p ASA 325mg, plavix 600, hep gtt w/ bolus, morphine 4mg  -ECG w/ LBBB, POCXUS w/ anterior wall hypokinesis; patient in apparent distress; discussed w/ interventionist, plan for emergent cath     WENDY Etienne MD  Cardiology Fellow  Text or Call: [phone]  For all New Consults and Questions:  [email or web address]   Login: [place]za 76y M PMH of BPH, T2DM, HLD, p/w dyspnea/exertional chest pain x 2-3 days and chest pain in the AM today.     Plan  -s/p ASA 325mg, plavix 600, hep gtt w/ bolus, morphine 4mg  -ECG w/ LBBB, POCUS w/ anterior wall hypokinesis; patient in apparent distress; discussed w/ interventionist, plan for emergent cath     WENDY Etienne MD  Cardiology Fellow  Text or Call: [phone]  For all New Consults and Questions:  [email or web address]   Login: [employer]

## 2021-07-08 NOTE — ED PROVIDER NOTE - PHYSICAL EXAMINATION
General: +in pain  HEENT: NCAT  Cardiac: RRR, 2+ peripheral pulses  Chest: CTA  Abdomen: soft, non-distended, bowel sounds present, no ttp, no rebound or guarding  Extremities: no peripheral edema, calf tenderness, or leg size discrepancies  Skin: no rashes  Neuro: AAOx4, motor sensory grossly intact  Psych: mood and affect appropriate

## 2021-07-08 NOTE — H&P ADULT - NSHPREVIEWOFSYSTEMS_GEN_ALL_CORE
REVIEW OF SYSTEMS:    CONSTITUTIONAL: No weakness, fevers or chills  EYES/ENT: No visual changes;  No epistaxis   RESPIRATORY: No cough, wheezing, hemoptysis; No shortness of breath  CARDIOVASCULAR: No chest pain or palpitations  GASTROINTESTINAL: No abdominal or epigastric pain. No nausea, vomiting, or hematemesis; No diarrhea or constipation. No melena or hematochezia.  GENITOURINARY: No dysuria, frequency or hematuria  NEUROLOGICAL: No dizziness or LOC. No numbness  PSYCHIATRIC: No depression or anxiety   MUSCULOSKELETAL: NO weakness  SKIN: No itching, rashes REVIEW OF SYSTEMS:    CONSTITUTIONAL: No weakness, fevers or chills  EYES/ENT: No visual changes;  No epistaxis   RESPIRATORY: No cough, wheezing, hemoptysis; No shortness of breath  CARDIOVASCULAR: +CP. No palpitations  GASTROINTESTINAL: +Abd pain, N/V. No diarrhea or constipation. No melena or hematochezia.  GENITOURINARY: No dysuria, frequency or hematuria  NEUROLOGICAL: No dizziness or LOC. No numbness  PSYCHIATRIC: No depression or anxiety     MUSCULOSKELETAL: No weakness  SKIN: No itching, rashes REVIEW OF SYSTEMS:    CONSTITUTIONAL: No weakness, fevers or chills  EYES/ENT: No visual changes;  No epistaxis   RESPIRATORY: No cough, wheezing, hemoptysis; No shortness of breath  CARDIOVASCULAR: +CP. No palpitations  GASTROINTESTINAL: +Abd pain, N/V. No diarrhea or constipation. No melena or hematochezia.  GENITOURINARY: No dysuria, frequency or hematuria  NEUROLOGICAL: No dizziness or LOC. No numbness  PSYCHIATRIC: No depression or anxiety   MUSCULOSKELETAL: No weakness  SKIN: No itching, rashes

## 2021-07-08 NOTE — H&P ADULT - NSHPPHYSICALEXAM_GEN_ALL_CORE
Vital Signs Last 24 Hrs  T(C): 37.2 (08 Jul 2021 12:00), Max: 37.2 (08 Jul 2021 12:00)  T(F): 99 (08 Jul 2021 12:00), Max: 99 (08 Jul 2021 12:00)  HR: 80 (08 Jul 2021 14:30) (68 - 90)  BP: 82/50 (08 Jul 2021 10:00) (82/50 - 122/80)  BP(mean): 62 (08 Jul 2021 10:00) (62 - 62)  RR: 23 (08 Jul 2021 14:30) (15 - 29)  SpO2: 96% (08 Jul 2021 14:30) (93% - 100%)  PHYSICAL EXAM:  GENERAL: NAD, well appearing   HEENT: PERRL, +EOMI  NECK: soft, Supple   CHEST/LUNG: Clear to auscultation bilaterally; No wheezing  HEART: S1S2+, Regular rate and rhythm; No murmurs, rubs, or gallops  ABDOMEN: Soft, Nontender, Nondistended; Bowel sounds present  EXTREMITIES: No edema b/l  SKIN: No rashes or lesions  NEURO: AAOX3, no focal deficits, no motor or sensory loss  PSYCH: normal mood Vital Signs Last 24 Hrs  T(C): 37.2 (08 Jul 2021 12:00), Max: 37.2 (08 Jul 2021 12:00)  T(F): 99 (08 Jul 2021 12:00), Max: 99 (08 Jul 2021 12:00)  HR: 80 (08 Jul 2021 14:30) (68 - 90)  BP: 82/50 (08 Jul 2021 10:00) (82/50 - 122/80)  BP(mean): 62 (08 Jul 2021 10:00) (62 - 62)  RR: 23 (08 Jul 2021 14:30) (15 - 29)  SpO2: 96% (08 Jul 2021 14:30) (93% - 100%)  PHYSICAL EXAM:  GENERAL: NAD, well appearing   HEENT: PERRL, +EOMI  NECK: soft, Supple   CHEST/LUNG: Clear to auscultation bilaterally; No wheezing  HEART: S1S2+, Regular rate and rhythm; No murmurs, rubs, or gallops  ABDOMEN: L femoral w/ no hematoma. R femoral w/ no blood after sheath removal. Soft, Nontender, Nondistended; Bowel sounds present  EXTREMITIES: 2+ pulses, warm ext. No edema b/l  SKIN: No rashes or lesions  NEURO: AAOX3, no focal deficits, no motor or sensory loss  PSYCH: normal mood

## 2021-07-08 NOTE — H&P ADULT - NSHPSOCIALHISTORY_GEN_ALL_CORE
No history of smoking or drug use   Social alcohol use   Independent in ADLs and ambulates w/o assistance

## 2021-07-09 LAB
ALBUMIN SERPL ELPH-MCNC: 3.7 G/DL — SIGNIFICANT CHANGE UP (ref 3.3–5)
ALP SERPL-CCNC: 62 U/L — SIGNIFICANT CHANGE UP (ref 40–120)
ALT FLD-CCNC: 82 U/L — HIGH (ref 10–45)
ANION GAP SERPL CALC-SCNC: 13 MMOL/L — SIGNIFICANT CHANGE UP (ref 5–17)
APTT BLD: 79.3 SEC — HIGH (ref 27.5–35.5)
APTT BLD: 96 SEC — HIGH (ref 27.5–35.5)
AST SERPL-CCNC: 402 U/L — HIGH (ref 10–40)
BASE EXCESS BLDMV CALC-SCNC: 0.2 MMOL/L — SIGNIFICANT CHANGE UP (ref -3–3)
BASOPHILS # BLD AUTO: 0.04 K/UL — SIGNIFICANT CHANGE UP (ref 0–0.2)
BASOPHILS NFR BLD AUTO: 0.3 % — SIGNIFICANT CHANGE UP (ref 0–2)
BILIRUB SERPL-MCNC: 0.4 MG/DL — SIGNIFICANT CHANGE UP (ref 0.2–1.2)
BUN SERPL-MCNC: 16 MG/DL — SIGNIFICANT CHANGE UP (ref 7–23)
CALCIUM SERPL-MCNC: 8.8 MG/DL — SIGNIFICANT CHANGE UP (ref 8.4–10.5)
CHLORIDE SERPL-SCNC: 103 MMOL/L — SIGNIFICANT CHANGE UP (ref 96–108)
CK MB BLD-MCNC: 5 % — HIGH (ref 0–3.5)
CK MB BLD-MCNC: 5.7 % — HIGH (ref 0–3.5)
CK MB CFR SERPL CALC: 148.2 NG/ML — HIGH (ref 0–6.7)
CK MB CFR SERPL CALC: 84.4 NG/ML — HIGH (ref 0–6.7)
CK SERPL-CCNC: 1693 U/L — HIGH (ref 30–200)
CK SERPL-CCNC: 2596 U/L — HIGH (ref 30–200)
CO2 BLDMV-SCNC: 25 MMOL/L — SIGNIFICANT CHANGE UP (ref 21–29)
CO2 SERPL-SCNC: 20 MMOL/L — LOW (ref 22–31)
COVID-19 NUCLEOCAPSID GAM AB INTERP: NEGATIVE — SIGNIFICANT CHANGE UP
COVID-19 NUCLEOCAPSID TOTAL GAM ANTIBODY RESULT: 0.1 INDEX — SIGNIFICANT CHANGE UP
COVID-19 SPIKE DOMAIN AB INTERP: POSITIVE
COVID-19 SPIKE DOMAIN AB INTERP: POSITIVE
COVID-19 SPIKE DOMAIN ANTIBODY RESULT: >250 U/ML — HIGH
COVID-19 SPIKE DOMAIN ANTIBODY RESULT: >250 U/ML — HIGH
CREAT SERPL-MCNC: 0.73 MG/DL — SIGNIFICANT CHANGE UP (ref 0.5–1.3)
EOSINOPHIL # BLD AUTO: 0.1 K/UL — SIGNIFICANT CHANGE UP (ref 0–0.5)
EOSINOPHIL NFR BLD AUTO: 0.8 % — SIGNIFICANT CHANGE UP (ref 0–6)
GAS PNL BLDMV: SIGNIFICANT CHANGE UP
GLUCOSE BLDC GLUCOMTR-MCNC: 144 MG/DL — HIGH (ref 70–99)
GLUCOSE BLDC GLUCOMTR-MCNC: 150 MG/DL — HIGH (ref 70–99)
GLUCOSE BLDC GLUCOMTR-MCNC: 158 MG/DL — HIGH (ref 70–99)
GLUCOSE BLDC GLUCOMTR-MCNC: 166 MG/DL — HIGH (ref 70–99)
GLUCOSE BLDC GLUCOMTR-MCNC: 92 MG/DL — SIGNIFICANT CHANGE UP (ref 70–99)
GLUCOSE SERPL-MCNC: 217 MG/DL — HIGH (ref 70–99)
HCO3 BLDMV-SCNC: 24 MMOL/L — SIGNIFICANT CHANGE UP (ref 20–28)
HCT VFR BLD CALC: 39.9 % — SIGNIFICANT CHANGE UP (ref 39–50)
HCV AB S/CO SERPL IA: 0.14 S/CO — SIGNIFICANT CHANGE UP (ref 0–0.99)
HCV AB SERPL-IMP: SIGNIFICANT CHANGE UP
HGB BLD-MCNC: 13 G/DL — SIGNIFICANT CHANGE UP (ref 13–17)
HOROWITZ INDEX BLDMV+IHG-RTO: 21 — SIGNIFICANT CHANGE UP
IMM GRANULOCYTES NFR BLD AUTO: 0.3 % — SIGNIFICANT CHANGE UP (ref 0–1.5)
INR BLD: 1.04 RATIO — SIGNIFICANT CHANGE UP (ref 0.88–1.16)
INR BLD: 1.05 RATIO — SIGNIFICANT CHANGE UP (ref 0.88–1.16)
LACTATE SERPL-SCNC: 1.9 MMOL/L — SIGNIFICANT CHANGE UP (ref 0.7–2)
LYMPHOCYTES # BLD AUTO: 2.48 K/UL — SIGNIFICANT CHANGE UP (ref 1–3.3)
LYMPHOCYTES # BLD AUTO: 20 % — SIGNIFICANT CHANGE UP (ref 13–44)
MAGNESIUM SERPL-MCNC: 1.9 MG/DL — SIGNIFICANT CHANGE UP (ref 1.6–2.6)
MCHC RBC-ENTMCNC: 28.4 PG — SIGNIFICANT CHANGE UP (ref 27–34)
MCHC RBC-ENTMCNC: 32.6 GM/DL — SIGNIFICANT CHANGE UP (ref 32–36)
MCV RBC AUTO: 87.1 FL — SIGNIFICANT CHANGE UP (ref 80–100)
MONOCYTES # BLD AUTO: 0.77 K/UL — SIGNIFICANT CHANGE UP (ref 0–0.9)
MONOCYTES NFR BLD AUTO: 6.2 % — SIGNIFICANT CHANGE UP (ref 2–14)
NEUTROPHILS # BLD AUTO: 8.95 K/UL — HIGH (ref 1.8–7.4)
NEUTROPHILS NFR BLD AUTO: 72.4 % — SIGNIFICANT CHANGE UP (ref 43–77)
NRBC # BLD: 0 /100 WBCS — SIGNIFICANT CHANGE UP (ref 0–0)
O2 CT VFR BLD CALC: 38 MMHG — SIGNIFICANT CHANGE UP (ref 30–65)
PCO2 BLDMV: 38 MMHG — SIGNIFICANT CHANGE UP (ref 30–65)
PH BLDMV: 7.42 — SIGNIFICANT CHANGE UP (ref 7.32–7.45)
PHOSPHATE SERPL-MCNC: 2.5 MG/DL — SIGNIFICANT CHANGE UP (ref 2.5–4.5)
PLATELET # BLD AUTO: 178 K/UL — SIGNIFICANT CHANGE UP (ref 150–400)
POTASSIUM SERPL-MCNC: 4.1 MMOL/L — SIGNIFICANT CHANGE UP (ref 3.5–5.3)
POTASSIUM SERPL-SCNC: 4.1 MMOL/L — SIGNIFICANT CHANGE UP (ref 3.5–5.3)
PROT SERPL-MCNC: 6.8 G/DL — SIGNIFICANT CHANGE UP (ref 6–8.3)
PROTHROM AB SERPL-ACNC: 12.5 SEC — SIGNIFICANT CHANGE UP (ref 10.6–13.6)
PROTHROM AB SERPL-ACNC: 12.6 SEC — SIGNIFICANT CHANGE UP (ref 10.6–13.6)
RBC # BLD: 4.58 M/UL — SIGNIFICANT CHANGE UP (ref 4.2–5.8)
RBC # FLD: 13.5 % — SIGNIFICANT CHANGE UP (ref 10.3–14.5)
SAO2 % BLDMV: 66 % — SIGNIFICANT CHANGE UP (ref 60–90)
SARS-COV-2 IGG+IGM SERPL QL IA: 0.1 INDEX — SIGNIFICANT CHANGE UP
SARS-COV-2 IGG+IGM SERPL QL IA: >250 U/ML — HIGH
SARS-COV-2 IGG+IGM SERPL QL IA: >250 U/ML — HIGH
SARS-COV-2 IGG+IGM SERPL QL IA: NEGATIVE — SIGNIFICANT CHANGE UP
SARS-COV-2 IGG+IGM SERPL QL IA: POSITIVE
SARS-COV-2 IGG+IGM SERPL QL IA: POSITIVE
SODIUM SERPL-SCNC: 136 MMOL/L — SIGNIFICANT CHANGE UP (ref 135–145)
TROPONIN T, HIGH SENSITIVITY RESULT: 8107 NG/L — HIGH (ref 0–51)
TROPONIN T, HIGH SENSITIVITY RESULT: HIGH NG/L (ref 0–51)
WBC # BLD: 12.38 K/UL — HIGH (ref 3.8–10.5)
WBC # FLD AUTO: 12.38 K/UL — HIGH (ref 3.8–10.5)

## 2021-07-09 PROCEDURE — 71045 X-RAY EXAM CHEST 1 VIEW: CPT | Mod: 26

## 2021-07-09 PROCEDURE — 93306 TTE W/DOPPLER COMPLETE: CPT | Mod: 26

## 2021-07-09 PROCEDURE — 71045 X-RAY EXAM CHEST 1 VIEW: CPT | Mod: 26,77

## 2021-07-09 PROCEDURE — 93010 ELECTROCARDIOGRAM REPORT: CPT

## 2021-07-09 PROCEDURE — 99291 CRITICAL CARE FIRST HOUR: CPT | Mod: 25

## 2021-07-09 RX ORDER — MAGNESIUM SULFATE 500 MG/ML
1 VIAL (ML) INJECTION ONCE
Refills: 0 | Status: COMPLETED | OUTPATIENT
Start: 2021-07-09 | End: 2021-07-09

## 2021-07-09 RX ORDER — SENNA PLUS 8.6 MG/1
2 TABLET ORAL AT BEDTIME
Refills: 0 | Status: DISCONTINUED | OUTPATIENT
Start: 2021-07-09 | End: 2021-07-14

## 2021-07-09 RX ORDER — SIMETHICONE 80 MG/1
80 TABLET, CHEWABLE ORAL DAILY
Refills: 0 | Status: DISCONTINUED | OUTPATIENT
Start: 2021-07-09 | End: 2021-07-14

## 2021-07-09 RX ORDER — FENTANYL CITRATE 50 UG/ML
25 INJECTION INTRAVENOUS ONCE
Refills: 0 | Status: DISCONTINUED | OUTPATIENT
Start: 2021-07-09 | End: 2021-07-09

## 2021-07-09 RX ORDER — POLYETHYLENE GLYCOL 3350 17 G/17G
17 POWDER, FOR SOLUTION ORAL
Refills: 0 | Status: DISCONTINUED | OUTPATIENT
Start: 2021-07-09 | End: 2021-07-14

## 2021-07-09 RX ADMIN — SENNA PLUS 2 TABLET(S): 8.6 TABLET ORAL at 20:36

## 2021-07-09 RX ADMIN — Medication 12.5 MILLIGRAM(S): at 09:16

## 2021-07-09 RX ADMIN — FINASTERIDE 5 MILLIGRAM(S): 5 TABLET, FILM COATED ORAL at 20:27

## 2021-07-09 RX ADMIN — Medication 650 MILLIGRAM(S): at 01:42

## 2021-07-09 RX ADMIN — Medication 81 MILLIGRAM(S): at 12:40

## 2021-07-09 RX ADMIN — INSULIN GLARGINE 38 UNIT(S): 100 INJECTION, SOLUTION SUBCUTANEOUS at 21:10

## 2021-07-09 RX ADMIN — Medication 1: at 12:41

## 2021-07-09 RX ADMIN — Medication 100 GRAM(S): at 01:43

## 2021-07-09 RX ADMIN — CLOPIDOGREL BISULFATE 75 MILLIGRAM(S): 75 TABLET, FILM COATED ORAL at 12:40

## 2021-07-09 RX ADMIN — SIMETHICONE 80 MILLIGRAM(S): 80 TABLET, CHEWABLE ORAL at 09:16

## 2021-07-09 RX ADMIN — FENTANYL CITRATE 25 MICROGRAM(S): 50 INJECTION INTRAVENOUS at 12:15

## 2021-07-09 RX ADMIN — Medication 12.5 MILLIGRAM(S): at 19:41

## 2021-07-09 RX ADMIN — Medication 5 MILLIGRAM(S): at 20:38

## 2021-07-09 RX ADMIN — FENTANYL CITRATE 25 MICROGRAM(S): 50 INJECTION INTRAVENOUS at 11:55

## 2021-07-09 RX ADMIN — Medication 650 MILLIGRAM(S): at 02:12

## 2021-07-09 RX ADMIN — ATORVASTATIN CALCIUM 80 MILLIGRAM(S): 80 TABLET, FILM COATED ORAL at 20:35

## 2021-07-09 NOTE — CHART NOTE - NSCHARTNOTEFT_GEN_A_CORE
This patient is a 77y M with a hx significant for       REVIEW OF SYSTEMS:    CONSTITUTIONAL: No weakness, fevers or chills  EYES: No visual changes; no sclera icterics, no pain or drainage  ENT:  No vertigo or throat pain   NECK: No pain or stiffness  RESPIRATORY: No cough, wheezing, hemoptysis; No shortness of breath  CARDIOVASCULAR: No chest pain or palpitations  GASTROINTESTINAL: No abdominal or epigastric pain. No nausea, vomiting, or hematemesis; No diarrhea or constipation. No melena or hematochezia.  GENITOURINARY: No dysuria, frequency or hematuria  NEUROLOGICAL: No numbness or weakness  SKIN: No itching, rashes  Psych: No anxiety or depression      LABS:                        13.0   12.38 )-----------( 178      ( 09 Jul 2021 00:17 )             39.9     07-09    136  |  103  |  16  ----------------------------<  217<H>  4.1   |  20<L>  |  0.73    Ca    8.8      09 Jul 2021 00:17  Phos  2.5     07-09  Mg     1.9     07-09    TPro  6.8  /  Alb  3.7  /  TBili  0.4  /  DBili  x   /  AST  402<H>  /  ALT  82<H>  /  AlkPhos  62  07-09    PT/INR - ( 09 Jul 2021 06:20 )   PT: 12.5 sec;   INR: 1.04 ratio         PTT - ( 09 Jul 2021 06:20 )  PTT:96.0 sec  CARDIAC MARKERS ( 09 Jul 2021 06:13 )  x     / x     / 1693 U/L / x     / 84.4 ng/mL  CARDIAC MARKERS ( 09 Jul 2021 00:17 )  x     / x     / 2596 U/L / x     / 148.2 ng/mL  CARDIAC MARKERS ( 08 Jul 2021 17:15 )  x     / x     / 3863 U/L / x     / 289.4 ng/mL      IMAGING        VITALS:   T(C): 36.8 (07-09-21 @ 19:39), Max: 37.8 (07-08-21 @ 23:00)  HR: 90 (07-09-21 @ 19:39) (62 - 90)  BP: 114/71 (07-09-21 @ 19:39) (103/65 - 118/77)  RR: 18 (07-09-21 @ 19:39) (12 - 22)  SpO2: 97% (07-09-21 @ 19:39) (91% - 100%)    GENERAL: NAD, lying in bed comfortably  HEAD:  Atraumatic, normocephalic  EYES: EOMI, PERRLA, conjunctiva and sclera clear  ENT: Moist mucous membranes  NECK: Supple, no JVD  HEART: Regular rate and rhythm, no murmurs, rubs, or gallops  LUNGS: Unlabored respirations.  Clear to auscultation bilaterally, no crackles, wheezing, or rhonchi  ABDOMEN: Soft, nontender, nondistended, +BS  EXTREMITIES: 2+ peripheral pulses bilaterally. No clubbing, cyanosis, or edema  NERVOUS SYSTEM:  A&Ox3, no focal deficits   SKIN: No rashes or lesions      ASSESSMENT        PLAN This patient is a 77y M with a hx significant for       REVIEW OF SYSTEMS:    CONSTITUTIONAL: No weakness, fevers or chills  EYES: No visual changes; no sclera icterics, no pain or drainage  ENT:  No vertigo or throat pain   NECK: No pain or stiffness  RESPIRATORY: No cough, wheezing, hemoptysis; No shortness of breath  CARDIOVASCULAR: No chest pain or palpitations  GASTROINTESTINAL: No abdominal or epigastric pain. No nausea, vomiting, or hematemesis; No diarrhea or constipation. No melena or hematochezia.  GENITOURINARY: No dysuria, frequency or hematuria  NEUROLOGICAL: No numbness or weakness  SKIN: No itching, rashes  Psych: No anxiety or depression      LABS:                        13.0   12.38 )-----------( 178      ( 09 Jul 2021 00:17 )             39.9     07-09    136  |  103  |  16  ----------------------------<  217<H>  4.1   |  20<L>  |  0.73    Ca    8.8      09 Jul 2021 00:17  Phos  2.5     07-09  Mg     1.9     07-09    TPro  6.8  /  Alb  3.7  /  TBili  0.4  /  DBili  x   /  AST  402<H>  /  ALT  82<H>  /  AlkPhos  62  07-09    PT/INR - ( 09 Jul 2021 06:20 )   PT: 12.5 sec;   INR: 1.04 ratio         PTT - ( 09 Jul 2021 06:20 )  PTT:96.0 sec  CARDIAC MARKERS ( 09 Jul 2021 06:13 )  x     / x     / 1693 U/L / x     / 84.4 ng/mL  CARDIAC MARKERS ( 09 Jul 2021 00:17 )  x     / x     / 2596 U/L / x     / 148.2 ng/mL  CARDIAC MARKERS ( 08 Jul 2021 17:15 )  x     / x     / 3863 U/L / x     / 289.4 ng/mL      IMAGING        VITALS:   T(C): 36.8 (07-09-21 @ 19:39), Max: 37.8 (07-08-21 @ 23:00)  HR: 90 (07-09-21 @ 19:39) (62 - 90)  BP: 114/71 (07-09-21 @ 19:39) (103/65 - 118/77)  RR: 18 (07-09-21 @ 19:39) (12 - 22)  SpO2: 97% (07-09-21 @ 19:39) (91% - 100%)    GENERAL: NAD, lying in bed comfortably  HEAD:  Atraumatic, normocephalic  EYES: EOMI, PERRLA, conjunctiva and sclera clear  ENT: Moist mucous membranes  NECK: Supple, no JVD  HEART: Regular rate and rhythm, no murmurs, rubs, or gallops  LUNGS: Unlabored respirations.  Clear to auscultation bilaterally, no crackles, wheezing, or rhonchi  ABDOMEN: Soft, nontender, nondistended, +BS  EXTREMITIES: 2+ peripheral pulses bilaterally. No clubbing, cyanosis, or edema  NERVOUS SYSTEM:  A&Ox3, no focal deficits   SKIN: No rashes or lesions      ASSESSMENT    This pt is a 76y M with a hx     PLAN This patient is a 76y M with a hx significant for       REVIEW OF SYSTEMS:    CONSTITUTIONAL: No weakness, fevers or chills  EYES: No visual changes; no sclera icterics, no pain or drainage  ENT:  No vertigo or throat pain   NECK: No pain or stiffness  RESPIRATORY: No cough, wheezing, hemoptysis; No shortness of breath  CARDIOVASCULAR: No chest pain or palpitations  GASTROINTESTINAL: No abdominal or epigastric pain. No nausea, vomiting, or hematemesis; No diarrhea or constipation. No melena or hematochezia.  GENITOURINARY: No dysuria, frequency or hematuria  NEUROLOGICAL: No numbness or weakness  SKIN: No itching, rashes  Psych: No anxiety or depression      LABS:                        13.0   12.38 )-----------( 178      ( 09 Jul 2021 00:17 )             39.9     07-09    136  |  103  |  16  ----------------------------<  217<H>  4.1   |  20<L>  |  0.73    Ca    8.8      09 Jul 2021 00:17  Phos  2.5     07-09  Mg     1.9     07-09    TPro  6.8  /  Alb  3.7  /  TBili  0.4  /  DBili  x   /  AST  402<H>  /  ALT  82<H>  /  AlkPhos  62  07-09    PT/INR - ( 09 Jul 2021 06:20 )   PT: 12.5 sec;   INR: 1.04 ratio         PTT - ( 09 Jul 2021 06:20 )  PTT:96.0 sec  CARDIAC MARKERS ( 09 Jul 2021 06:13 )  x     / x     / 1693 U/L / x     / 84.4 ng/mL  CARDIAC MARKERS ( 09 Jul 2021 00:17 )  x     / x     / 2596 U/L / x     / 148.2 ng/mL  CARDIAC MARKERS ( 08 Jul 2021 17:15 )  x     / x     / 3863 U/L / x     / 289.4 ng/mL      IMAGING        VITALS:   T(C): 36.8 (07-09-21 @ 19:39), Max: 37.8 (07-08-21 @ 23:00)  HR: 90 (07-09-21 @ 19:39) (62 - 90)  BP: 114/71 (07-09-21 @ 19:39) (103/65 - 118/77)  RR: 18 (07-09-21 @ 19:39) (12 - 22)  SpO2: 97% (07-09-21 @ 19:39) (91% - 100%)    GENERAL: NAD, lying in bed comfortably  HEAD:  Atraumatic, normocephalic  EYES: EOMI, PERRLA, conjunctiva and sclera clear  ENT: Moist mucous membranes  NECK: Supple, no JVD  HEART: Regular rate and rhythm, no murmurs, rubs, or gallops  LUNGS: Unlabored respirations.  Clear to auscultation bilaterally, no crackles, wheezing, or rhonchi  ABDOMEN: Soft, nontender, nondistended, +BS  EXTREMITIES: 2+ peripheral pulses bilaterally. No clubbing, cyanosis, or edema  NERVOUS SYSTEM:  A&Ox3, no focal deficits   SKIN: No rashes or lesions      ASSESSMENT    This pt is a 76y M with a hx significant for BPH, T2DM, HLD, who presented to the ED w/ chest pain found to have STEMI s/p PCI with x2 FARTUN in pLAD.    PLAN This patient is a 76y M with a hx significant for BPH, T2DM, HLD, who presented to the ED on 7/8 following 2 days of chest pain. The patient reports on Wednesday 7/7 he began experiencing intense stomach and chest pain during his walk that improved after rest and then restarted as he continued his walk. As he returned home and was at rest watching tv he had another episode of intense pain with sweating, nausea, and numbness in his extremities.     Upon arrival to [facility] he was found to have a STEMI and occlusion in the LCX as well as L bundle branch block. After CCU stay, he is now s/p 2 stents in prox LAD and balloon pump. The LCX lesion will continue to be managed medically and did not require intervention. He was started on Atorva 80, DAPT, and lopressor BID.  His cardiac enzymes continued to downtrend and he was medically optimized for the floors.      REVIEW OF SYSTEMS:    CONSTITUTIONAL: No weakness, fevers or chills  HEENT: No blurry vision or headache  NECK: No pain or stiffness  RESPIRATORY: No cough, wheezing, hemoptysis; No shortness of breath  CARDIOVASCULAR: No chest pain or palpitations  GASTROINTESTINAL: No abdominal pain. No nausea, vomiting  GENITOURINARY: No dysuria or urinary frequency  NEUROLOGICAL: No numbness or tingling  MSK: no weakness or myalgias  Psych: No anxiety or depression    ALLERGIES:   no known allergies    MEDICATIONS:      LABS:                        13.0   12.38 )-----------( 178      ( 09 Jul 2021 00:17 )             39.9     07-09    136  |  103  |  16  ----------------------------<  217<H>  4.1   |  20<L>  |  0.73    Ca    8.8      09 Jul 2021 00:17  Phos  2.5     07-09  Mg     1.9     07-09    TPro  6.8  /  Alb  3.7  /  TBili  0.4  /  DBili  x   /  AST  402<H>  /  ALT  82<H>  /  AlkPhos  62  07-09    PT/INR - ( 09 Jul 2021 06:20 )   PT: 12.5 sec;   INR: 1.04 ratio         PTT - ( 09 Jul 2021 06:20 )  PTT:96.0 sec  CARDIAC MARKERS ( 09 Jul 2021 06:13 )  x     / x     / 1693 U/L / x     / 84.4 ng/mL  CARDIAC MARKERS ( 09 Jul 2021 00:17 )  x     / x     / 2596 U/L / x     / 148.2 ng/mL  CARDIAC MARKERS ( 08 Jul 2021 17:15 )  x     / x     / 3863 U/L / x     / 289.4 ng/mL      IMAGING        VITALS:   T(C): 36.8 (07-09-21 @ 19:39), Max: 37.8 (07-08-21 @ 23:00)  HR: 90 (07-09-21 @ 19:39) (62 - 90)  BP: 114/71 (07-09-21 @ 19:39) (103/65 - 118/77)  RR: 18 (07-09-21 @ 19:39) (12 - 22)  SpO2: 97% (07-09-21 @ 19:39) (91% - 100%)    GENERAL: NAD, lying in bed comfortably  HEAD:  Atraumatic, normocephalic  EYES: EOMI, PERRLA, conjunctiva and sclera clear  ENT: Moist mucous membranes  NECK: Supple, no JVD  HEART: Regular rate and rhythm, no murmurs, rubs, or gallops  LUNGS: Unlabored respirations.  Clear to auscultation bilaterally, no crackles, wheezing, or rhonchi  ABDOMEN: Soft, nontender, nondistended, +BS  EXTREMITIES: 2+ peripheral pulses bilaterally. No clubbing, cyanosis, or edema  NERVOUS SYSTEM:  A&Ox3, no focal deficits   SKIN: No rashes or lesions      ASSESSMENT    This pt is a 76y M with a hx significant for BPH, T2DM, HLD, who presented to the ED w/ chest pain found to have STEMI s/p PCI with x2 FARTUN in pLAD.    PLAN This patient is a 76y M with a hx significant for BPH, T2DM, HLD, who presented to the ED on 7/8 following 2 days of chest pain. The patient reports on Wednesday 7/7 he began experiencing intense stomach and chest pain during his walk that improved after rest and then restarted as he continued his walk. As he returned home and was at rest watching tv he had another episode of intense pain with sweating, nausea, and numbness in his extremities.     Upon arrival to [facility] he was found to have a STEMI and occlusion in the LCX as well as L bundle branch block. After CCU stay, he is now s/p 2 stents in prox LAD and balloon pump. The LCX lesion will continue to be managed medically and did not require intervention. He was started on Atorva 80, DAPT, and lopressor BID.  His cardiac enzymes continued to downtrend and he was medically optimized for the floors.      REVIEW OF SYSTEMS:    CONSTITUTIONAL: No weakness, fevers or chills  HEENT: No blurry vision or headache  NECK: No pain or stiffness  RESPIRATORY: No cough, wheezing, hemoptysis; No shortness of breath  CARDIOVASCULAR: No chest pain or palpitations  GASTROINTESTINAL: No abdominal pain. No nausea, vomiting  GENITOURINARY: No dysuria or urinary frequency  NEUROLOGICAL: No numbness or tingling  MSK: no weakness or myalgias  Psych: No anxiety or depression    ALLERGIES:   no known allergies    MEDICATIONS:  MEDICATIONS  (STANDING):  aspirin enteric coated 81 milliGRAM(s) Oral daily  atorvastatin 80 milliGRAM(s) Oral at bedtime  clopidogrel Tablet 75 milliGRAM(s) Oral daily  dextrose 40% Gel 15 Gram(s) Oral once  dextrose 5%. 1000 milliLiter(s) (50 mL/Hr) IV Continuous <Continuous>  dextrose 5%. 1000 milliLiter(s) (100 mL/Hr) IV Continuous <Continuous>  dextrose 50% Injectable 25 Gram(s) IV Push once  dextrose 50% Injectable 12.5 Gram(s) IV Push once  dextrose 50% Injectable 25 Gram(s) IV Push once  finasteride 5 milliGRAM(s) Oral daily  glucagon  Injectable 1 milliGRAM(s) IntraMuscular once  insulin glargine Injectable (LANTUS) 38 Unit(s) SubCutaneous at bedtime  insulin lispro (ADMELOG) corrective regimen sliding scale   SubCutaneous three times a day before meals  insulin lispro (ADMELOG) corrective regimen sliding scale   SubCutaneous at bedtime  melatonin 5 milliGRAM(s) Oral at bedtime  metoprolol tartrate 12.5 milliGRAM(s) Oral every 12 hours  simethicone 80 milliGRAM(s) Chew daily    MEDICATIONS  (PRN):  polyethylene glycol 3350 17 Gram(s) Oral two times a day PRN Constipation  senna 2 Tablet(s) Oral at bedtime PRN Constipation      LABS:                        13.0   12.38 )-----------( 178      ( 09 Jul 2021 00:17 )             39.9     07-09    136  |  103  |  16  ----------------------------<  217<H>  4.1   |  20<L>  |  0.73    Ca    8.8      09 Jul 2021 00:17  Phos  2.5     07-09  Mg     1.9     07-09    TPro  6.8  /  Alb  3.7  /  TBili  0.4  /  DBili  x   /  AST  402<H>  /  ALT  82<H>  /  AlkPhos  62  07-09    PT/INR - ( 09 Jul 2021 06:20 )   PT: 12.5 sec;   INR: 1.04 ratio         PTT - ( 09 Jul 2021 06:20 )  PTT:96.0 sec  CARDIAC MARKERS ( 09 Jul 2021 06:13 )  x     / x     / 1693 U/L / x     / 84.4 ng/mL  CARDIAC MARKERS ( 09 Jul 2021 00:17 )  x     / x     / 2596 U/L / x     / 148.2 ng/mL  CARDIAC MARKERS ( 08 Jul 2021 17:15 )  x     / x     / 3863 U/L / x     / 289.4 ng/mL      IMAGING        VITALS:   T(C): 36.8 (07-09-21 @ 19:39), Max: 37.8 (07-08-21 @ 23:00)  HR: 90 (07-09-21 @ 19:39) (62 - 90)  BP: 114/71 (07-09-21 @ 19:39) (103/65 - 118/77)  RR: 18 (07-09-21 @ 19:39) (12 - 22)  SpO2: 97% (07-09-21 @ 19:39) (91% - 100%)    GENERAL: NAD, lying in bed comfortably  HEAD:  Atraumatic, normocephalic  EYES: EOMI, conjunctiva and sclera clear  ENT: Moist mucous membranes  HEART: Regular rate and rhythm, S1 and S2 appreciated  LUNGS: Unlabored respirations.  Clear to auscultation bilaterally, no crackles, wheezing, or rhonchi  ABDOMEN: Soft, nontender, nondistended, +BS  EXTREMITIES: 2+ peripheral pulses bilaterally. No clubbing or edema, no calf tenderness  NERVOUS SYSTEM:  A&Ox3, no focal deficits, upper and lower extremity strength 5/5 b/l  SKIN: No rashes or lesions      ASSESSMENT    This pt is a 76y M with a hx significant for BPH, T2DM, HLD, who presented to the ED w/ chest pain found to have STEMI s/p PCI with x2 FARTUN in pLAD.    PLAN This patient is a 76y M with a hx significant for BPH, T2DM, HLD, who presented to the ED on 7/8 following 2 days of chest pain. The patient reports on Wednesday 7/7 he began experiencing intense stomach and chest pain during his walk that improved after rest and then restarted as he continued his walk. As he returned home and was at rest watching tv he had another episode of intense pain with sweating, nausea, and numbness in his extremities.     Upon arrival to [facility] he was found to have a STEMI and occlusion in the LCX as well as L bundle branch block. After CCU stay, he is now s/p 2 stents in prox LAD and balloon pump. The LCX lesion will continue to be managed medically and did not require intervention. He was started on Atorva 80, DAPT, and lopressor BID.  His cardiac enzymes continued to downtrend and he was medically optimized for the floors.      REVIEW OF SYSTEMS:    CONSTITUTIONAL: No weakness, fevers or chills  HEENT: No blurry vision or headache  NECK: No pain or stiffness  RESPIRATORY: No cough, wheezing, hemoptysis; No shortness of breath  CARDIOVASCULAR: No chest pain or palpitations  GASTROINTESTINAL: No abdominal pain. No nausea, vomiting  GENITOURINARY: No dysuria or urinary frequency  NEUROLOGICAL: No numbness or tingling  MSK: no weakness or myalgias  Psych: No anxiety or depression    ALLERGIES:   no known allergies    MEDICATIONS:  MEDICATIONS  (STANDING):  aspirin enteric coated 81 milliGRAM(s) Oral daily  atorvastatin 80 milliGRAM(s) Oral at bedtime  clopidogrel Tablet 75 milliGRAM(s) Oral daily  dextrose 40% Gel 15 Gram(s) Oral once  dextrose 5%. 1000 milliLiter(s) (50 mL/Hr) IV Continuous <Continuous>  dextrose 5%. 1000 milliLiter(s) (100 mL/Hr) IV Continuous <Continuous>  dextrose 50% Injectable 25 Gram(s) IV Push once  dextrose 50% Injectable 12.5 Gram(s) IV Push once  dextrose 50% Injectable 25 Gram(s) IV Push once  finasteride 5 milliGRAM(s) Oral daily  glucagon  Injectable 1 milliGRAM(s) IntraMuscular once  insulin glargine Injectable (LANTUS) 38 Unit(s) SubCutaneous at bedtime  insulin lispro (ADMELOG) corrective regimen sliding scale   SubCutaneous three times a day before meals  insulin lispro (ADMELOG) corrective regimen sliding scale   SubCutaneous at bedtime  melatonin 5 milliGRAM(s) Oral at bedtime  metoprolol tartrate 12.5 milliGRAM(s) Oral every 12 hours  simethicone 80 milliGRAM(s) Chew daily    MEDICATIONS  (PRN):  polyethylene glycol 3350 17 Gram(s) Oral two times a day PRN Constipation  senna 2 Tablet(s) Oral at bedtime PRN Constipation      LABS:                        13.0   12.38 )-----------( 178      ( 09 Jul 2021 00:17 )             39.9     07-09    136  |  103  |  16  ----------------------------<  217<H>  4.1   |  20<L>  |  0.73    Ca    8.8      09 Jul 2021 00:17  Phos  2.5     07-09  Mg     1.9     07-09    TPro  6.8  /  Alb  3.7  /  TBili  0.4  /  DBili  x   /  AST  402<H>  /  ALT  82<H>  /  AlkPhos  62  07-09    PT/INR - ( 09 Jul 2021 06:20 )   PT: 12.5 sec;   INR: 1.04 ratio         PTT - ( 09 Jul 2021 06:20 )  PTT:96.0 sec  CARDIAC MARKERS ( 09 Jul 2021 06:13 )  x     / x     / 1693 U/L / x     / 84.4 ng/mL  CARDIAC MARKERS ( 09 Jul 2021 00:17 )  x     / x     / 2596 U/L / x     / 148.2 ng/mL  CARDIAC MARKERS ( 08 Jul 2021 17:15 )  x     / x     / 3863 U/L / x     / 289.4 ng/mL      IMAGING        VITALS:   T(C): 36.8 (07-09-21 @ 19:39), Max: 37.8 (07-08-21 @ 23:00)  HR: 90 (07-09-21 @ 19:39) (62 - 90)  BP: 114/71 (07-09-21 @ 19:39) (103/65 - 118/77)  RR: 18 (07-09-21 @ 19:39) (12 - 22)  SpO2: 97% (07-09-21 @ 19:39) (91% - 100%)    GENERAL: NAD, lying in bed comfortably  HEAD:  Atraumatic, normocephalic  EYES: EOMI, conjunctiva and sclera clear  ENT: Moist mucous membranes  HEART: Regular rate and rhythm, S1 and S2 appreciated  LUNGS: Unlabored respirations.  Clear to auscultation bilaterally, no crackles, wheezing, or rhonchi  ABDOMEN: Soft, nontender, nondistended, +BS  EXTREMITIES: 2+ peripheral pulses bilaterally. No clubbing or edema, no calf tenderness  NERVOUS SYSTEM:  A&Ox3, no focal deficits, upper and lower extremity strength 5/5 b/l  SKIN: No rashes or lesions      ASSESSMENT    This pt is a 76y M with a hx significant for BPH, T2DM, HLD, who presented to the ED w/ chest pain found to have STEMI s/p PCI with x2 FARTUN in pLAD.    PLAN    1. STEMI  - s/p DESx2 pLAD  - swan and balloon placed in left femoral, pump taken out -- will continue to monitor for hematoma  - c/w DAPT  - c/w atorvastatin 80mg qd  - c/w lopressor 12.5 BID  - waiting TTE, ordered  - consider statin/arb if BP can tolerate    2. T2DM  - A1C 7.2  - at home basaglar 38u qhs and metformin  - c/w lantus 38u qhs and ISS    3. Leukocytosis  - likely reactive given afebrile  - will continue to monitor off antibx    4. Prophylactic Measures  - diet: consistent carb  - simethicone ordered for gas, will give bowel regimen if needed  - on heparin gtt for DVT prophylaxis  - no plan for staging  - dispo: d/c on DAPT, Atorva 80, and BBlocker, consider ACE/ARB if tolerates and monitor L groin for hematoma This patient is a 76y M with a hx significant for BPH, T2DM, HLD, who presented to the ED on 7/8 following 2 days of chest pain. The patient reports on Wednesday 7/7 he began experiencing intense stomach and chest pain during his walk that improved after rest and then restarted as he continued his walk. As he returned home and was at rest watching tv he had another episode of intense pain with sweating, nausea, and numbness in his extremities.     Upon arrival to [facility] he was found to have a STEMI and occlusion in the LCX as well as L bundle branch block. He received ASA 325mg, plavix 600, hep gtt w/ bolus, morphine 4mg and sent for emergent catch. After CCU stay, he is now s/p 2 stents in prox LAD and balloon pump. The LCX lesion will continue to be managed medically and did not require intervention. He was started on Atorva 80, DAPT, and lopressor BID.  His cardiac enzymes continued to downtrend and he was medically optimized for the floors.      REVIEW OF SYSTEMS:    CONSTITUTIONAL: No weakness, fevers or chills  HEENT: No blurry vision or headache  NECK: No pain or stiffness  RESPIRATORY: No cough, wheezing, hemoptysis; No shortness of breath  CARDIOVASCULAR: No chest pain or palpitations  GASTROINTESTINAL: No abdominal pain. No nausea, vomiting  GENITOURINARY: No dysuria or urinary frequency  NEUROLOGICAL: No numbness or tingling  MSK: no weakness or myalgias  Psych: No anxiety or depression    ALLERGIES:   no known allergies    MEDICATIONS:  MEDICATIONS  (STANDING):  aspirin enteric coated 81 milliGRAM(s) Oral daily  atorvastatin 80 milliGRAM(s) Oral at bedtime  clopidogrel Tablet 75 milliGRAM(s) Oral daily  dextrose 40% Gel 15 Gram(s) Oral once  dextrose 5%. 1000 milliLiter(s) (50 mL/Hr) IV Continuous <Continuous>  dextrose 5%. 1000 milliLiter(s) (100 mL/Hr) IV Continuous <Continuous>  dextrose 50% Injectable 25 Gram(s) IV Push once  dextrose 50% Injectable 12.5 Gram(s) IV Push once  dextrose 50% Injectable 25 Gram(s) IV Push once  finasteride 5 milliGRAM(s) Oral daily  glucagon  Injectable 1 milliGRAM(s) IntraMuscular once  insulin glargine Injectable (LANTUS) 38 Unit(s) SubCutaneous at bedtime  insulin lispro (ADMELOG) corrective regimen sliding scale   SubCutaneous three times a day before meals  insulin lispro (ADMELOG) corrective regimen sliding scale   SubCutaneous at bedtime  melatonin 5 milliGRAM(s) Oral at bedtime  metoprolol tartrate 12.5 milliGRAM(s) Oral every 12 hours  simethicone 80 milliGRAM(s) Chew daily    MEDICATIONS  (PRN):  polyethylene glycol 3350 17 Gram(s) Oral two times a day PRN Constipation  senna 2 Tablet(s) Oral at bedtime PRN Constipation      LABS:                        13.0   12.38 )-----------( 178      ( 09 Jul 2021 00:17 )             39.9     07-09    136  |  103  |  16  ----------------------------<  217<H>  4.1   |  20<L>  |  0.73    Ca    8.8      09 Jul 2021 00:17  Phos  2.5     07-09  Mg     1.9     07-09    TPro  6.8  /  Alb  3.7  /  TBili  0.4  /  DBili  x   /  AST  402<H>  /  ALT  82<H>  /  AlkPhos  62  07-09    PT/INR - ( 09 Jul 2021 06:20 )   PT: 12.5 sec;   INR: 1.04 ratio         PTT - ( 09 Jul 2021 06:20 )  PTT:96.0 sec  CARDIAC MARKERS ( 09 Jul 2021 06:13 )  x     / x     / 1693 U/L / x     / 84.4 ng/mL  CARDIAC MARKERS ( 09 Jul 2021 00:17 )  x     / x     / 2596 U/L / x     / 148.2 ng/mL  CARDIAC MARKERS ( 08 Jul 2021 17:15 )  x     / x     / 3863 U/L / x     / 289.4 ng/mL      IMAGING        VITALS:   T(C): 36.8 (07-09-21 @ 19:39), Max: 37.8 (07-08-21 @ 23:00)  HR: 90 (07-09-21 @ 19:39) (62 - 90)  BP: 114/71 (07-09-21 @ 19:39) (103/65 - 118/77)  RR: 18 (07-09-21 @ 19:39) (12 - 22)  SpO2: 97% (07-09-21 @ 19:39) (91% - 100%)    GENERAL: NAD, lying in bed comfortably  HEAD:  Atraumatic, normocephalic  EYES: EOMI, conjunctiva and sclera clear  ENT: Moist mucous membranes  HEART: Regular rate and rhythm, S1 and S2 appreciated  LUNGS: Unlabored respirations.  Clear to auscultation bilaterally, no crackles, wheezing, or rhonchi  ABDOMEN: Soft, nontender, nondistended, +BS  EXTREMITIES: 2+ peripheral pulses bilaterally. No clubbing or edema, no calf tenderness  NERVOUS SYSTEM:  A&Ox3, no focal deficits, upper and lower extremity strength 5/5 b/l  SKIN: No rashes or lesions      ASSESSMENT    This pt is a 76y M with a hx significant for BPH, T2DM, HLD, who presented to the ED w/ chest pain found to have STEMI s/p PCI with x2 FARTUN in pLAD.    PLAN    1. STEMI  - s/p DESx2 pLAD  - swan and balloon placed in left femoral, pump taken out -- will continue to monitor for hematoma  - c/w DAPT  - c/w atorvastatin 80mg qd  - c/w lopressor 12.5 BID  - waiting TTE, ordered  - consider statin/arb if BP can tolerate    2. T2DM  - A1C 7.2  - at home basaglar 38u qhs and metformin  - c/w lantus 38u qhs and ISS    3. Leukocytosis  - likely reactive given afebrile  - will continue to monitor off antibx    4. Prophylactic Measures  - diet: consistent carb  - simethicone ordered for gas, will give bowel regimen if needed  - on heparin gtt for DVT prophylaxis  - no plan for staging  - dispo: d/c on DAPT, Atorva 80, and BBlocker, consider ACE/ARB if tolerates and monitor L groin for hematoma This patient is a 76y M with a hx significant for BPH, T2DM, HLD, who presented to the ED on 7/8 following 2 days of chest pain. The patient reports on Wednesday 7/7 he began experiencing intense stomach and chest pain during his walk that improved after rest and then restarted as he continued his walk. As he returned home and was at rest watching tv he had another episode of intense pain with sweating, nausea, and numbness in his extremities.     Upon arrival to [facility] he was found to have a STEMI and occlusion in the LCX as well as L bundle branch block. He received ASA 325mg, plavix 600, hep gtt w/ bolus, morphine 4mg and sent for emergent catch. After CCU stay, he is now s/p 2 stents in prox LAD and balloon pump. The LCX lesion will continue to be managed medically and did not require intervention. He was started on Atorva 80, DAPT, and lopressor BID.  His cardiac enzymes continued to downtrend and he was medically optimized for the floors.      REVIEW OF SYSTEMS:    CONSTITUTIONAL: No weakness, fevers or chills  HEENT: No blurry vision or headache  NECK: No pain or stiffness  RESPIRATORY: No cough, wheezing, hemoptysis; No shortness of breath  CARDIOVASCULAR: No chest pain or palpitations  GASTROINTESTINAL: No abdominal pain. No nausea, vomiting  GENITOURINARY: No dysuria or urinary frequency  NEUROLOGICAL: No numbness or tingling  MSK: no weakness or myalgias  Psych: No anxiety or depression    ALLERGIES:   no known allergies    MEDICATIONS:  MEDICATIONS  (STANDING):  aspirin enteric coated 81 milliGRAM(s) Oral daily  atorvastatin 80 milliGRAM(s) Oral at bedtime  clopidogrel Tablet 75 milliGRAM(s) Oral daily  dextrose 40% Gel 15 Gram(s) Oral once  dextrose 5%. 1000 milliLiter(s) (50 mL/Hr) IV Continuous <Continuous>  dextrose 5%. 1000 milliLiter(s) (100 mL/Hr) IV Continuous <Continuous>  dextrose 50% Injectable 25 Gram(s) IV Push once  dextrose 50% Injectable 12.5 Gram(s) IV Push once  dextrose 50% Injectable 25 Gram(s) IV Push once  finasteride 5 milliGRAM(s) Oral daily  glucagon  Injectable 1 milliGRAM(s) IntraMuscular once  insulin glargine Injectable (LANTUS) 38 Unit(s) SubCutaneous at bedtime  insulin lispro (ADMELOG) corrective regimen sliding scale   SubCutaneous three times a day before meals  insulin lispro (ADMELOG) corrective regimen sliding scale   SubCutaneous at bedtime  melatonin 5 milliGRAM(s) Oral at bedtime  metoprolol tartrate 12.5 milliGRAM(s) Oral every 12 hours  simethicone 80 milliGRAM(s) Chew daily    MEDICATIONS  (PRN):  polyethylene glycol 3350 17 Gram(s) Oral two times a day PRN Constipation  senna 2 Tablet(s) Oral at bedtime PRN Constipation      LABS:                        13.0   12.38 )-----------( 178      ( 09 Jul 2021 00:17 )             39.9     07-09    136  |  103  |  16  ----------------------------<  217<H>  4.1   |  20<L>  |  0.73    Ca    8.8      09 Jul 2021 00:17  Phos  2.5     07-09  Mg     1.9     07-09    TPro  6.8  /  Alb  3.7  /  TBili  0.4  /  DBili  x   /  AST  402<H>  /  ALT  82<H>  /  AlkPhos  62  07-09    PT/INR - ( 09 Jul 2021 06:20 )   PT: 12.5 sec;   INR: 1.04 ratio         PTT - ( 09 Jul 2021 06:20 )  PTT:96.0 sec  CARDIAC MARKERS ( 09 Jul 2021 06:13 )  x     / x     / 1693 U/L / x     / 84.4 ng/mL  CARDIAC MARKERS ( 09 Jul 2021 00:17 )  x     / x     / 2596 U/L / x     / 148.2 ng/mL  CARDIAC MARKERS ( 08 Jul 2021 17:15 )  x     / x     / 3863 U/L / x     / 289.4 ng/mL      IMAGING        VITALS:   T(C): 36.8 (07-09-21 @ 19:39), Max: 37.8 (07-08-21 @ 23:00)  HR: 90 (07-09-21 @ 19:39) (62 - 90)  BP: 114/71 (07-09-21 @ 19:39) (103/65 - 118/77)  RR: 18 (07-09-21 @ 19:39) (12 - 22)  SpO2: 97% (07-09-21 @ 19:39) (91% - 100%)    GENERAL: NAD, lying in bed comfortably  HEAD:  Atraumatic, normocephalic  EYES: EOMI, conjunctiva and sclera clear  ENT: Moist mucous membranes  HEART: Regular rate and rhythm, S1 and S2 appreciated  LUNGS: Unlabored respirations.  Clear to auscultation bilaterally, no crackles, wheezing, or rhonchi  ABDOMEN: Soft, nontender, nondistended, +BS  EXTREMITIES: 2+ peripheral pulses bilaterally. No clubbing or edema, no calf tenderness  NERVOUS SYSTEM:  A&Ox3, no focal deficits, upper and lower extremity strength 5/5 b/l  SKIN: No rashes or lesions      ASSESSMENT    This pt is a 76y M with a hx significant for BPH, T2DM, HLD, who presented to the ED w/ chest pain found to have STEMI s/p PCI with x2 FARTUN in pLAD.    PLAN    1. STEMI  - s/p DESx2 pLAD  - swan and balloon placed in left femoral, pump taken out -- will continue to monitor for hematoma  - c/w DAPT  - c/w atorvastatin 80mg qd  - c/w lopressor 12.5 BID  - TTE ordered, f/u with results  - consider statin/arb if BP can tolerate  - monitor strict Is/Os, consider diuresis if overloaded    2. T2DM  - A1C 7.2  - at home basaglar 38u qhs and metformin  - c/w lantus 38u qhs and ISS    3. Leukocytosis  - likely reactive given afebrile  - will continue to monitor off antibx    4. Elevated LFTs  - AST and ALT are elevated, f/u CMP in AM    5. Prophylactic Measures  - diet: consistent carb  - simethicone ordered for gas, will give bowel regimen if needed  - restart heparin gtt for DVT prophylaxis  - no plan for staging  - dispo: d/c on DAPT, Atorva 80, and BBlocker, consider ACE/ARB if tolerates and monitor L groin for hematoma This patient is a 76y M with a hx significant for BPH, T2DM, HLD, who presented to the ED on 7/8 following 2 days of chest pain. The patient reports on Wednesday 7/7 he began experiencing intense stomach and chest pain during his walk that improved after rest and then restarted as he continued his walk. As he returned home and was at rest watching tv he had another episode of intense pain with sweating, nausea, and numbness in his extremities.     Upon arrival to [facility] he was found to have a STEMI and occlusion in the LCX as well as L bundle branch block. He received ASA 325mg, plavix 600, hep gtt w/ bolus, morphine 4mg and sent for emergent catch. After CCU stay, he is now s/p 2 stents in prox LAD and balloon pump. The LCX lesion will continue to be managed medically and did not require intervention. He was started on Atorva 80, DAPT, and lopressor BID.  His cardiac enzymes continued to downtrend and he was medically optimized for the floors.      REVIEW OF SYSTEMS:    CONSTITUTIONAL: No weakness, fevers or chills  HEENT: No blurry vision or headache  NECK: No pain or stiffness  RESPIRATORY: No cough, wheezing, hemoptysis; No shortness of breath  CARDIOVASCULAR: No chest pain or palpitations  GASTROINTESTINAL: No abdominal pain. No nausea, vomiting  GENITOURINARY: No dysuria or urinary frequency  NEUROLOGICAL: No numbness or tingling  MSK: no weakness or myalgias  Psych: No anxiety or depression    ALLERGIES:   no known allergies    MEDICATIONS:  MEDICATIONS  (STANDING):  aspirin enteric coated 81 milliGRAM(s) Oral daily  atorvastatin 80 milliGRAM(s) Oral at bedtime  clopidogrel Tablet 75 milliGRAM(s) Oral daily  dextrose 40% Gel 15 Gram(s) Oral once  dextrose 5%. 1000 milliLiter(s) (50 mL/Hr) IV Continuous <Continuous>  dextrose 5%. 1000 milliLiter(s) (100 mL/Hr) IV Continuous <Continuous>  dextrose 50% Injectable 25 Gram(s) IV Push once  dextrose 50% Injectable 12.5 Gram(s) IV Push once  dextrose 50% Injectable 25 Gram(s) IV Push once  finasteride 5 milliGRAM(s) Oral daily  glucagon  Injectable 1 milliGRAM(s) IntraMuscular once  insulin glargine Injectable (LANTUS) 38 Unit(s) SubCutaneous at bedtime  insulin lispro (ADMELOG) corrective regimen sliding scale   SubCutaneous three times a day before meals  insulin lispro (ADMELOG) corrective regimen sliding scale   SubCutaneous at bedtime  melatonin 5 milliGRAM(s) Oral at bedtime  metoprolol tartrate 12.5 milliGRAM(s) Oral every 12 hours  simethicone 80 milliGRAM(s) Chew daily    MEDICATIONS  (PRN):  polyethylene glycol 3350 17 Gram(s) Oral two times a day PRN Constipation  senna 2 Tablet(s) Oral at bedtime PRN Constipation      LABS:                        13.0   12.38 )-----------( 178      ( 09 Jul 2021 00:17 )             39.9     07-09    136  |  103  |  16  ----------------------------<  217<H>  4.1   |  20<L>  |  0.73    Ca    8.8      09 Jul 2021 00:17  Phos  2.5     07-09  Mg     1.9     07-09    TPro  6.8  /  Alb  3.7  /  TBili  0.4  /  DBili  x   /  AST  402<H>  /  ALT  82<H>  /  AlkPhos  62  07-09    PT/INR - ( 09 Jul 2021 06:20 )   PT: 12.5 sec;   INR: 1.04 ratio         PTT - ( 09 Jul 2021 06:20 )  PTT:96.0 sec  CARDIAC MARKERS ( 09 Jul 2021 06:13 )  x     / x     / 1693 U/L / x     / 84.4 ng/mL  CARDIAC MARKERS ( 09 Jul 2021 00:17 )  x     / x     / 2596 U/L / x     / 148.2 ng/mL  CARDIAC MARKERS ( 08 Jul 2021 17:15 )  x     / x     / 3863 U/L / x     / 289.4 ng/mL      IMAGING        VITALS:   T(C): 36.8 (07-09-21 @ 19:39), Max: 37.8 (07-08-21 @ 23:00)  HR: 90 (07-09-21 @ 19:39) (62 - 90)  BP: 114/71 (07-09-21 @ 19:39) (103/65 - 118/77)  RR: 18 (07-09-21 @ 19:39) (12 - 22)  SpO2: 97% (07-09-21 @ 19:39) (91% - 100%)    GENERAL: NAD, lying in bed comfortably  HEAD:  Atraumatic, normocephalic  EYES: EOMI, conjunctiva and sclera clear  ENT: Moist mucous membranes  HEART: Regular rate and rhythm, S1 and S2 appreciated  LUNGS: Unlabored respirations.  Clear to auscultation bilaterally, no crackles, wheezing, or rhonchi  ABDOMEN: Soft, nontender, nondistended, +BS  EXTREMITIES: 2+ peripheral pulses bilaterally. No clubbing or edema, no calf tenderness  NERVOUS SYSTEM:  A&Ox3, no focal deficits, upper and lower extremity strength 5/5 b/l  SKIN: No rashes or lesions      ASSESSMENT    This pt is a 76y M with a hx significant for BPH, T2DM, HLD, who presented to the ED w/ chest pain found to have STEMI s/p PCI with x2 FARTUN in pLAD.    PLAN    1. STEMI  - s/p DESx2 pLAD  - swan and balloon placed in left femoral, pump taken out -- will continue to monitor for hematoma  - c/w DAPT  - c/w atorvastatin 80mg qd  - c/w lopressor 12.5 BID  - TTE ordered, f/u with results: grossly moderate segmental left ventricular  systolic dysfunction. The mid to distal septum and the apex  are akinetic.The anterior and inferior walls are poorly  visualized. The mid to distal anterior and mid to distal  inferior segments are likely abnormal. There is swirling of  intravenous Definity at the apex suggestive of low flow  state and limiting assessment of the apex. No definitive  left ventricular thrombus seen. Mild diastolic dysfunction (Stage I).  - consider statin/arb if BP can tolerate  - monitor strict Is/Os, consider diuresis if overloaded    2. T2DM  - A1C 7.2  - at home basaglar 38u qhs and metformin  - c/w lantus 38u qhs and ISS    3. Leukocytosis  - likely reactive given afebrile  - will continue to monitor off antibx    4. Elevated LFTs  - AST and ALT are elevated, f/u CMP in AM    5. Prophylactic Measures  - diet: consistent carb  - simethicone ordered for gas, will give bowel regimen if needed  - restart heparin gtt for DVT prophylaxis  - no plan for staging  - dispo: d/c on DAPT, Atorva 80, and BBlocker, consider ACE/ARB if tolerates and monitor L groin for hematoma This patient is a 76y M with a hx significant for BPH, T2DM, HLD, who presented to the ED on 7/8 following 2 days of chest pain. The patient reports on Wednesday 7/7 he began experiencing intense stomach and chest pain during his walk that improved after rest and then restarted as he continued his walk. As he returned home and was at rest watching tv he had another episode of intense pain with sweating, nausea, and numbness in his extremities.     Upon arrival to [facility] he was found to have a STEMI and occlusion in the LCX as well as L bundle branch block. He received ASA 325mg, plavix 600, hep gtt w/ bolus, morphine 4mg and sent for emergent catch. After CCU stay, he is now s/p 2 stents in prox LAD and balloon pump. The LCX lesion will continue to be managed medically and did not require intervention. He was started on Atorva 80, DAPT, and lopressor BID.  His cardiac enzymes continued to downtrend and he was medically optimized for the floors.      REVIEW OF SYSTEMS:    CONSTITUTIONAL: No weakness, fevers or chills  HEENT: No blurry vision or headache  NECK: No pain or stiffness  RESPIRATORY: No cough, wheezing, hemoptysis; No shortness of breath  CARDIOVASCULAR: No chest pain or palpitations  GASTROINTESTINAL: No abdominal pain. No nausea, vomiting  GENITOURINARY: No dysuria or urinary frequency  NEUROLOGICAL: No numbness or tingling  MSK: no weakness or myalgias  Psych: No anxiety or depression    ALLERGIES:   no known allergies    MEDICATIONS:  MEDICATIONS  (STANDING):  aspirin enteric coated 81 milliGRAM(s) Oral daily  atorvastatin 80 milliGRAM(s) Oral at bedtime  clopidogrel Tablet 75 milliGRAM(s) Oral daily  dextrose 40% Gel 15 Gram(s) Oral once  dextrose 5%. 1000 milliLiter(s) (50 mL/Hr) IV Continuous <Continuous>  dextrose 5%. 1000 milliLiter(s) (100 mL/Hr) IV Continuous <Continuous>  dextrose 50% Injectable 25 Gram(s) IV Push once  dextrose 50% Injectable 12.5 Gram(s) IV Push once  dextrose 50% Injectable 25 Gram(s) IV Push once  finasteride 5 milliGRAM(s) Oral daily  glucagon  Injectable 1 milliGRAM(s) IntraMuscular once  insulin glargine Injectable (LANTUS) 38 Unit(s) SubCutaneous at bedtime  insulin lispro (ADMELOG) corrective regimen sliding scale   SubCutaneous three times a day before meals  insulin lispro (ADMELOG) corrective regimen sliding scale   SubCutaneous at bedtime  melatonin 5 milliGRAM(s) Oral at bedtime  metoprolol tartrate 12.5 milliGRAM(s) Oral every 12 hours  simethicone 80 milliGRAM(s) Chew daily    MEDICATIONS  (PRN):  polyethylene glycol 3350 17 Gram(s) Oral two times a day PRN Constipation  senna 2 Tablet(s) Oral at bedtime PRN Constipation      LABS:                        13.0   12.38 )-----------( 178      ( 09 Jul 2021 00:17 )             39.9     07-09    136  |  103  |  16  ----------------------------<  217<H>  4.1   |  20<L>  |  0.73    Ca    8.8      09 Jul 2021 00:17  Phos  2.5     07-09  Mg     1.9     07-09    TPro  6.8  /  Alb  3.7  /  TBili  0.4  /  DBili  x   /  AST  402<H>  /  ALT  82<H>  /  AlkPhos  62  07-09    PT/INR - ( 09 Jul 2021 06:20 )   PT: 12.5 sec;   INR: 1.04 ratio         PTT - ( 09 Jul 2021 06:20 )  PTT:96.0 sec  CARDIAC MARKERS ( 09 Jul 2021 06:13 )  x     / x     / 1693 U/L / x     / 84.4 ng/mL  CARDIAC MARKERS ( 09 Jul 2021 00:17 )  x     / x     / 2596 U/L / x     / 148.2 ng/mL  CARDIAC MARKERS ( 08 Jul 2021 17:15 )  x     / x     / 3863 U/L / x     / 289.4 ng/mL      IMAGING        VITALS:   T(C): 36.8 (07-09-21 @ 19:39), Max: 37.8 (07-08-21 @ 23:00)  HR: 90 (07-09-21 @ 19:39) (62 - 90)  BP: 114/71 (07-09-21 @ 19:39) (103/65 - 118/77)  RR: 18 (07-09-21 @ 19:39) (12 - 22)  SpO2: 97% (07-09-21 @ 19:39) (91% - 100%)    GENERAL: NAD, lying in bed comfortably  HEAD:  Atraumatic, normocephalic  EYES: EOMI, conjunctiva and sclera clear  ENT: Moist mucous membranes  HEART: Regular rate and rhythm, S1 and S2 appreciated  LUNGS: Unlabored respirations.  Clear to auscultation bilaterally, no crackles, wheezing, or rhonchi  ABDOMEN: Soft, nontender, nondistended, +BS, no flank pain or bruising  GROIN: soft at site of cath entry, no bruising or tenderness appreciated  EXTREMITIES: 2+ peripheral pulses bilaterally. No clubbing or edema, no calf tenderness  NERVOUS SYSTEM:  A&Ox3, no focal deficits, upper and lower extremity strength 5/5 b/l  SKIN: No rashes or lesions      ASSESSMENT    This pt is a 76y M with a hx significant for BPH, T2DM, HLD, who presented to the ED w/ chest pain found to have STEMI s/p PCI with x2 FARTUN in pLAD.    PLAN    1. STEMI  - s/p DESx2 pLAD  - swan and balloon placed in left femoral, pump taken out -- will continue to monitor for hematoma  - c/w DAPT  - c/w atorvastatin 80mg qd  - c/w lopressor 12.5 BID  - TTE ordered, f/u with results: grossly moderate segmental left ventricular  systolic dysfunction. The mid to distal septum and the apex  are akinetic.The anterior and inferior walls are poorly  visualized. The mid to distal anterior and mid to distal  inferior segments are likely abnormal. There is swirling of  intravenous Definity at the apex suggestive of low flow  state and limiting assessment of the apex. No definitive  left ventricular thrombus seen. Mild diastolic dysfunction (Stage I).  - consider statin/arb if BP can tolerate  - monitor strict Is/Os, consider diuresis if overloaded    2. T2DM  - A1C 7.2  - at home basaglar 38u qhs and metformin  - c/w lantus 38u qhs and ISS    3. Leukocytosis  - likely reactive given afebrile  - will continue to monitor off antibx    4. Elevated LFTs  - AST and ALT are elevated, f/u CMP in AM    5. Prophylactic Measures  - diet: consistent carb  - simethicone ordered for gas, will give bowel regimen if needed  - restart heparin gtt for DVT prophylaxis  - no plan for staging  - dispo: d/c on DAPT, Atorva 80, and BBlocker, consider ACE/ARB if tolerates and monitor L groin for hematoma This patient is a 76y M with a hx significant for BPH, T2DM, HLD, who presented to the ED on 7/8 following 2 days of chest pain. The patient reports on Wednesday 7/7 he began experiencing intense stomach and chest pain during his walk that improved after rest and then restarted as he continued his walk. As he returned home and was at rest watching tv he had another episode of intense pain with sweating, nausea, and numbness in his extremities.     Upon arrival to [facility] he was found to have a STEMI and occlusion in the LCX as well as L bundle branch block. He received ASA 325mg, plavix 600, hep gtt w/ bolus, morphine 4mg and sent for emergent catch. After CCU stay, he is now s/p 2 stents in prox LAD and balloon pump. The LCX lesion will continue to be managed medically and did not require intervention. He was started on Atorva 80, DAPT, and lopressor BID.  His cardiac enzymes continued to downtrend and he was medically optimized for the floors.      REVIEW OF SYSTEMS:    CONSTITUTIONAL: No weakness, fevers or chills  HEENT: No blurry vision or headache  NECK: No pain or stiffness  RESPIRATORY: No cough, wheezing, hemoptysis; No shortness of breath  CARDIOVASCULAR: No chest pain or palpitations  GASTROINTESTINAL: No abdominal pain. No nausea, vomiting  GENITOURINARY: No dysuria or urinary frequency  NEUROLOGICAL: No numbness or tingling  MSK: no weakness or myalgias  Psych: No anxiety or depression    ALLERGIES:   no known allergies    MEDICATIONS:  MEDICATIONS  (STANDING):  aspirin enteric coated 81 milliGRAM(s) Oral daily  atorvastatin 80 milliGRAM(s) Oral at bedtime  clopidogrel Tablet 75 milliGRAM(s) Oral daily  dextrose 40% Gel 15 Gram(s) Oral once  dextrose 5%. 1000 milliLiter(s) (50 mL/Hr) IV Continuous <Continuous>  dextrose 5%. 1000 milliLiter(s) (100 mL/Hr) IV Continuous <Continuous>  dextrose 50% Injectable 25 Gram(s) IV Push once  dextrose 50% Injectable 12.5 Gram(s) IV Push once  dextrose 50% Injectable 25 Gram(s) IV Push once  finasteride 5 milliGRAM(s) Oral daily  glucagon  Injectable 1 milliGRAM(s) IntraMuscular once  insulin glargine Injectable (LANTUS) 38 Unit(s) SubCutaneous at bedtime  insulin lispro (ADMELOG) corrective regimen sliding scale   SubCutaneous three times a day before meals  insulin lispro (ADMELOG) corrective regimen sliding scale   SubCutaneous at bedtime  melatonin 5 milliGRAM(s) Oral at bedtime  metoprolol tartrate 12.5 milliGRAM(s) Oral every 12 hours  simethicone 80 milliGRAM(s) Chew daily    MEDICATIONS  (PRN):  polyethylene glycol 3350 17 Gram(s) Oral two times a day PRN Constipation  senna 2 Tablet(s) Oral at bedtime PRN Constipation      LABS:                        13.0   12.38 )-----------( 178      ( 09 Jul 2021 00:17 )             39.9     07-09    136  |  103  |  16  ----------------------------<  217<H>  4.1   |  20<L>  |  0.73    Ca    8.8      09 Jul 2021 00:17  Phos  2.5     07-09  Mg     1.9     07-09    TPro  6.8  /  Alb  3.7  /  TBili  0.4  /  DBili  x   /  AST  402<H>  /  ALT  82<H>  /  AlkPhos  62  07-09    PT/INR - ( 09 Jul 2021 06:20 )   PT: 12.5 sec;   INR: 1.04 ratio         PTT - ( 09 Jul 2021 06:20 )  PTT:96.0 sec  CARDIAC MARKERS ( 09 Jul 2021 06:13 )  x     / x     / 1693 U/L / x     / 84.4 ng/mL  CARDIAC MARKERS ( 09 Jul 2021 00:17 )  x     / x     / 2596 U/L / x     / 148.2 ng/mL  CARDIAC MARKERS ( 08 Jul 2021 17:15 )  x     / x     / 3863 U/L / x     / 289.4 ng/mL      IMAGING        VITALS:   T(C): 36.8 (07-09-21 @ 19:39), Max: 37.8 (07-08-21 @ 23:00)  HR: 90 (07-09-21 @ 19:39) (62 - 90)  BP: 114/71 (07-09-21 @ 19:39) (103/65 - 118/77)  RR: 18 (07-09-21 @ 19:39) (12 - 22)  SpO2: 97% (07-09-21 @ 19:39) (91% - 100%)    Physical Exam:   GENERAL: NAD, lying in bed comfortably  HEAD:  Atraumatic, normocephalic  EYES: EOMI, conjunctiva and sclera clear  ENT: Moist mucous membranes  HEART: Regular rate and rhythm, S1 and S2 appreciated  LUNGS: Unlabored respirations.  Clear to auscultation bilaterally, no crackles, wheezing, or rhonchi  ABDOMEN: Soft, nontender, nondistended, +BS, no flank pain or bruising  GROIN: soft at site of cath entry, no bruising or tenderness appreciated  EXTREMITIES: 2+ peripheral pulses bilaterally. No clubbing or edema, no calf tenderness  NERVOUS SYSTEM:  A&Ox3, no focal deficits, upper and lower extremity strength 5/5 b/l  SKIN: No rashes or lesions      ASSESSMENT    This pt is a 76y M with a hx significant for BPH, T2DM, HLD, who presented to the ED w/ chest pain found to have STEMI s/p PCI with x2 FARTUN in pLAD.    PLAN    1. STEMI  - s/p DESx2 pLAD  - swan and balloon placed in left femoral, pump taken out -- will continue to monitor for hematoma  - c/w DAPT  - c/w atorvastatin 80mg qd  - c/w lopressor 12.5 BID  - TTE ordered, f/u with results: grossly moderate segmental left ventricular  systolic dysfunction. The mid to distal septum and the apex  are akinetic.The anterior and inferior walls are poorly  visualized. The mid to distal anterior and mid to distal  inferior segments are likely abnormal. There is swirling of  intravenous Definity at the apex suggestive of low flow  state and limiting assessment of the apex. No definitive  left ventricular thrombus seen. Mild diastolic dysfunction (Stage I).  - consider statin/arb if BP can tolerate  - monitor strict Is/Os, consider diuresis if overloaded    2. T2DM  - A1C 7.2  - at home basaglar 38u qhs and metformin  - c/w lantus 38u qhs and ISS    3. Leukocytosis  - likely reactive given afebrile  - will continue to monitor off antibx    4. Elevated LFTs  - AST and ALT are elevated, f/u CMP in AM    5. Prophylactic Measures  - diet: consistent carb  - simethicone ordered for gas, will give bowel regimen if needed  - restart heparin gtt for DVT prophylaxis  - no plan for staging  - dispo: d/c on DAPT, Atorva 80, and BBlocker, consider ACE/ARB if tolerates and monitor L groin for hematoma      Attending Addendum:   This patient is a 77yo gentleman with PMH of hLd, T2DM who initially presented with exertional CP, diaphoresis and nausea on 7/8/2021, found to have new LBBB with STEMI. Cardiology team was consulted. The patient was treated with DAPT, heparin gtt, nitro, and morphine. He underwent PCI with 2 stents to proximal LAD, c/b hypotension s/p IABP. IABP was removed on 7/9/2021. He was started on statin, lopressor.   TTE found grossly moderate segmental LV systolic dysfunction, mid to  distal spetum and apex akinesis, and mid to distal anterior and mid to distal inferior segments likely abnormal. No definitive LV thrombus was seen.  Pt this morning denies any CP, palpitations, dyspnea, dizziness, lightheadedness.   On exam, pt is A&O x3, EOMI, MMM, lungs CTAB, cardiac exam RRR no murmurs,abd soft and nontender, 5/5 BUE and BLE strength, no pedal edema.  Labs ordered for this AM.    Agree with plan noted above.  - follow up final cardiac catheterization report  - c/w DAPT, statin, BB. If no plan for staged PCI, will add ACEI/ARB during daytime if BP tolerates. Titrate up and switch metoprolol tartrate to succinate if tolerates.   - Monitor volume status over following days for signs of heart failure.   - C/w lantus, insulin for T2DM management. Optimize glycemic control.  - Leukocytosis is mild, will continue to monitor off antibiotics.    Rach Maldonado [phone]

## 2021-07-09 NOTE — PROGRESS NOTE ADULT - ASSESSMENT
76y M PMH of BPH, T2DM, HLD, p/w chest pain, found to have STEMI, now s/p PCI w/ FARTUN x2 in pLAD.     Neuro  -A&O4  -No active issues    Pulm  -Satting well on RA    CV  #STEMI  -s/p DESx2 in prox LAD  -[place] and balloon placed in L femoral; will monitor for hematoma   -C/w DAPT  -Possible repeat PCI in L Circ  -Hep gtt for balloon pump  -Will hold off on ACE/ARB for potential repeat PCI. If needed, will give Hydral for afterload reduction   -Start Atorva 80mg qd  -Start Lopressor 12.5 BID    GI  -Consistent Carb diet   -Will give bowel regimen if needed    Renal  -Cr wnl   --Will hold off on ACE/ARB for potential repeat PCI    Endo  -On Basaglar 38 units qhs and Metformin at home   -Will start Lantus 38 units qhs   -ISS  -A1C 7.2    Heme  -Hep gtt for AC    ID  -Mild leukocytosis, likely reactive. Afebrile  -Monitor off abx 76y M PMH of BPH, T2DM, HLD, p/w chest pain, found to have STEMI, now s/p PCI w/ FARTUN x2 in pLAD.     Neuro  -A&O4  -No active issues    Pulm  -Satting well on RA    CV  #STEMI  -s/p DESx2 in prox LAD  -[place] and balloon placed in L femoral; will monitor for hematoma   -C/w DAPT  -No plan for staging   -Hep gtt for balloon pump  -Will consider ACE/ARB based on BP after balloon pump removal today  -C/w Atorva 80mg qd  -C/w Lopressor 12.5 BID  -TTE today    GI  -Consistent Carb diet   -Will give bowel regimen if needed  -Simethicone for gassy CP    Renal  -Cr wnl   --Will hold off on ACE/ARB for potential repeat PCI    Endo  -On Basaglar 38 units qhs and Metformin at home   -C/w Lantus 38 units qhs   -ISS  -A1C 7.2    Heme  -Hep gtt for AC    ID  -Mild leukocytosis, likely reactive. Afebrile  -Monitor off abx

## 2021-07-09 NOTE — CHART NOTE - NSCHARTNOTEFT_GEN_A_CORE
MAR Accept Note  Transfer to:  Telemetry  Accepting Attending Physician:  Dr. Laura Best  Assigned Room:  6TOW [id number]    Patient seen and examined.   Labs and data reviewed.   No findings precluding transfer of service.       HPI/CICU COURSE:   Please refer to CICU transfer note for full details. Briefly, this is a 77 yo M w/ hx of BPH, T2DM, HLD, p/w exertional chest pain x 2-3 days. On morning of presentation, had onset of crushing substernal cp at 6:30am. The pain was intense and was present in his chest/abdominal area, a/w diaphoresis, nausea, and numbness of hands and feet during episode. Patient had 1 episode of emesis right before PCI. Patient denied any SOB, dizziness, syncope. Patient denied any radiation to L arm, L jaw, back. Pt found to have STEMI and went for cath. Now s/p DESx2 in prox LAD. Also found to have occlusion in LCX to be medically managed. Balloon pump was placed and now removed. Patient started on Atorva 80, DAPT, B-blocker. No plans for staging. Hep gtt was stopped. Residual LCX lesion that will be medically managed. Cardiac enzymes downtrended. Patient is now medically optimized for transfer to floors.      FOR FOLLOW-UP:  [ ] Discharge on DAPT, Atorva 80, and B-blocker  [ ] Consider starting ACE/ARB if BP tolerates for post-MI GDMT  [ ] Monitor L groin site for hematoma as IABP was removed today    Marco Antonio Meyers, PGY-3  MAR [id number]

## 2021-07-09 NOTE — CHART NOTE - NSCHARTNOTEFT_GEN_A_CORE
CCU Transfer Note    Transfer from: CCU  Transfer to:  (  ) Medicine    (  ) Telemetry    (  ) RCU    (  ) Palliative    (  ) Stroke Unit    (  ) _______________  Accepting physician:    MEDICATIONS:  STANDING MEDICATIONS  aspirin enteric coated 81 milliGRAM(s) Oral daily  atorvastatin 80 milliGRAM(s) Oral at bedtime  chlorhexidine 2% Cloths 1 Application(s) Topical daily  clopidogrel Tablet 75 milliGRAM(s) Oral daily  dextrose 40% Gel 15 Gram(s) Oral once  dextrose 5%. 1000 milliLiter(s) IV Continuous <Continuous>  dextrose 5%. 1000 milliLiter(s) IV Continuous <Continuous>  dextrose 50% Injectable 25 Gram(s) IV Push once  dextrose 50% Injectable 12.5 Gram(s) IV Push once  dextrose 50% Injectable 25 Gram(s) IV Push once  finasteride 5 milliGRAM(s) Oral daily  glucagon  Injectable 1 milliGRAM(s) IntraMuscular once  insulin glargine Injectable (LANTUS) 38 Unit(s) SubCutaneous at bedtime  insulin lispro (ADMELOG) corrective regimen sliding scale   SubCutaneous three times a day before meals  insulin lispro (ADMELOG) corrective regimen sliding scale   SubCutaneous at bedtime  melatonin 5 milliGRAM(s) Oral at bedtime  metoprolol tartrate 12.5 milliGRAM(s) Oral every 12 hours  simethicone 80 milliGRAM(s) Chew daily    PRN MEDICATIONS      VITAL SIGNS: Last 24 Hours  T(C): 37.1 (09 Jul 2021 14:00), Max: 37.9 (08 Jul 2021 19:00)  T(F): 98.8 (09 Jul 2021 14:00), Max: 100.2 (08 Jul 2021 19:00)  HR: 76 (09 Jul 2021 14:00) (62 - 84)  BP: 103/65 (09 Jul 2021 14:00) (103/65 - 107/73)  BP(mean): 77 (09 Jul 2021 14:00) (77 - 85)  RR: 22 (09 Jul 2021 14:00) (12 - 27)  SpO2: 100% (09 Jul 2021 14:00) (91% - 100%)    LABS:                        13.0   12.38 )-----------( 178      ( 09 Jul 2021 00:17 )             39.9     07-09    136  |  103  |  16  ----------------------------<  217<H>  4.1   |  20<L>  |  0.73    Ca    8.8      09 Jul 2021 00:17  Phos  2.5     07-09  Mg     1.9     07-09    TPro  6.8  /  Alb  3.7  /  TBili  0.4  /  DBili  x   /  AST  402<H>  /  ALT  82<H>  /  AlkPhos  62  07-09    PT/INR - ( 09 Jul 2021 06:20 )   PT: 12.5 sec;   INR: 1.04 ratio         PTT - ( 09 Jul 2021 06:20 )  PTT:96.0 sec    ABG - ( 08 Jul 2021 11:00 )  pH, Arterial: 7.38  pH, Blood: x     /  pCO2: 36    /  pO2: 73    / HCO3: 21    / Base Excess: -3.0  /  SaO2: 94                  CARDIAC MARKERS ( 09 Jul 2021 06:13 )  x     / x     / 1693 U/L / x     / 84.4 ng/mL  CARDIAC MARKERS ( 09 Jul 2021 00:17 )  x     / x     / 2596 U/L / x     / 148.2 ng/mL  CARDIAC MARKERS ( 08 Jul 2021 17:15 )  x     / x     / 3863 U/L / x     / 289.4 ng/mL      RADIOLOGY:    CCU COURSE:    76y M PMH of BPH, T2DM, HLD, p/w chest pain x 2-3 days. Yesterday had episode of exertional cp when he was walking, improved w/ 30 min of rest, happened again when he walked another 5 minutes. This morning went to brush his teeth and watch tv, had onset of crushing substernal cp at 6:30am. The pain was intense and was present in his chest/abdominal area. Has diaphoresis, nausea, and numbness of hands and feet during episode. Patient had 1 episode of emesis right before PCI. Patient denied any SOB, dizziness, syncope. Patient denied any radiation to L arm, L jaw, back.    After arrival to ED, patient had 2 stents placed in prox LAD, found to have occlusion in LCX w/ no intervention at the time, and patient was found to have L bundle branch block. Balloon pump was placed. Patient started on Atorva 80, DAPT, B blocker. On 7/9, there were no plans for staging. Hep gtt was stopped. Residual LCX lesion that will be medically managed. Patient is now medically optimized for transfer to floors           ASSESSMENT & PLAN:     76y M PMH of BPH, T2DM, HLD, p/w chest pain, found to have STEMI, now s/p PCI w/ FARTUN x2 in pLAD.     Neuro  -A&O4  -No active issues    Pulm  -Satting well on RA    CV  #STEMI  -s/p DESx2 in prox LAD  -[place] and balloon placed in L femoral; will monitor for hematoma   -C/w DAPT  -No plan for staging   -Hep gtt for balloon pump  -Will consider ACE/ARB based on BP after balloon pump removal today  -C/w Atorva 80mg qd  -C/w Lopressor 12.5 BID  -TTE today    GI  -Consistent Carb diet   -Will give bowel regimen if needed  -Simethicone for gassy CP    Renal  -Cr wnl   --Will hold off on ACE/ARB for potential repeat PCI    Endo  -On Basaglar 38 units qhs and Metformin at home   -C/w Lantus 38 units qhs   -ISS  -A1C 7.2    Heme  -Hep gtt for AC    ID  -Mild leukocytosis, likely reactive. Afebrile  -Monitor off abx      For Follow-Up:    [] Discharge on DAPT, Atorva 80, B BLocker   [] Consider ACE/ARB if tolerates   [] F/u TTE CCU Transfer Note    Transfer from: CCU  Transfer to:  (  ) Medicine    (  ) Telemetry    (  ) RCU    (  ) Palliative    (  ) Stroke Unit    (  ) _______________  Accepting physician:    MEDICATIONS:  STANDING MEDICATIONS  aspirin enteric coated 81 milliGRAM(s) Oral daily  atorvastatin 80 milliGRAM(s) Oral at bedtime  chlorhexidine 2% Cloths 1 Application(s) Topical daily  clopidogrel Tablet 75 milliGRAM(s) Oral daily  dextrose 40% Gel 15 Gram(s) Oral once  dextrose 5%. 1000 milliLiter(s) IV Continuous <Continuous>  dextrose 5%. 1000 milliLiter(s) IV Continuous <Continuous>  dextrose 50% Injectable 25 Gram(s) IV Push once  dextrose 50% Injectable 12.5 Gram(s) IV Push once  dextrose 50% Injectable 25 Gram(s) IV Push once  finasteride 5 milliGRAM(s) Oral daily  glucagon  Injectable 1 milliGRAM(s) IntraMuscular once  insulin glargine Injectable (LANTUS) 38 Unit(s) SubCutaneous at bedtime  insulin lispro (ADMELOG) corrective regimen sliding scale   SubCutaneous three times a day before meals  insulin lispro (ADMELOG) corrective regimen sliding scale   SubCutaneous at bedtime  melatonin 5 milliGRAM(s) Oral at bedtime  metoprolol tartrate 12.5 milliGRAM(s) Oral every 12 hours  simethicone 80 milliGRAM(s) Chew daily    PRN MEDICATIONS      VITAL SIGNS: Last 24 Hours  T(C): 37.1 (09 Jul 2021 14:00), Max: 37.9 (08 Jul 2021 19:00)  T(F): 98.8 (09 Jul 2021 14:00), Max: 100.2 (08 Jul 2021 19:00)  HR: 76 (09 Jul 2021 14:00) (62 - 84)  BP: 103/65 (09 Jul 2021 14:00) (103/65 - 107/73)  BP(mean): 77 (09 Jul 2021 14:00) (77 - 85)  RR: 22 (09 Jul 2021 14:00) (12 - 27)  SpO2: 100% (09 Jul 2021 14:00) (91% - 100%)    LABS:                        13.0   12.38 )-----------( 178      ( 09 Jul 2021 00:17 )             39.9     07-09    136  |  103  |  16  ----------------------------<  217<H>  4.1   |  20<L>  |  0.73    Ca    8.8      09 Jul 2021 00:17  Phos  2.5     07-09  Mg     1.9     07-09    TPro  6.8  /  Alb  3.7  /  TBili  0.4  /  DBili  x   /  AST  402<H>  /  ALT  82<H>  /  AlkPhos  62  07-09    PT/INR - ( 09 Jul 2021 06:20 )   PT: 12.5 sec;   INR: 1.04 ratio         PTT - ( 09 Jul 2021 06:20 )  PTT:96.0 sec    ABG - ( 08 Jul 2021 11:00 )  pH, Arterial: 7.38  pH, Blood: x     /  pCO2: 36    /  pO2: 73    / HCO3: 21    / Base Excess: -3.0  /  SaO2: 94                  CARDIAC MARKERS ( 09 Jul 2021 06:13 )  x     / x     / 1693 U/L / x     / 84.4 ng/mL  CARDIAC MARKERS ( 09 Jul 2021 00:17 )  x     / x     / 2596 U/L / x     / 148.2 ng/mL  CARDIAC MARKERS ( 08 Jul 2021 17:15 )  x     / x     / 3863 U/L / x     / 289.4 ng/mL      RADIOLOGY:    CCU COURSE:    76y M PMH of BPH, T2DM, HLD, p/w chest pain x 2-3 days. Yesterday had episode of exertional cp when he was walking, improved w/ 30 min of rest, happened again when he walked another 5 minutes. This morning went to brush his teeth and watch tv, had onset of crushing substernal cp at 6:30am. The pain was intense and was present in his chest/abdominal area. Has diaphoresis, nausea, and numbness of hands and feet during episode. Patient had 1 episode of emesis right before PCI. Patient denied any SOB, dizziness, syncope. Patient denied any radiation to L arm, L jaw, back.    After arrival to ED, patient had 2 stents placed in prox LAD, found to have occlusion in LCX w/ no intervention at the time, and patient was found to have L bundle branch block. Balloon pump was placed. Patient started on Atorva 80, DAPT, B blocker. On 7/9, there were no plans for staging. Hep gtt was stopped. Residual LCX lesion that will be medically managed. Cardiac enzymes downtrended. Patient is now medically optimized for transfer to floors           ASSESSMENT & PLAN:     76y M PMH of BPH, T2DM, HLD, p/w chest pain, found to have STEMI, now s/p PCI w/ FARTUN x2 in pLAD.     Neuro  -A&O4  -No active issues    Pulm  -Satting well on RA    CV  #STEMI  -s/p DESx2 in prox LAD  -[place] and balloon placed in L femoral; will monitor for hematoma   -C/w DAPT  -No plan for staging   -Hep gtt for balloon pump  -Will consider ACE/ARB based on BP after balloon pump removal today  -C/w Atorva 80mg qd  -C/w Lopressor 12.5 BID  -TTE today    GI  -Consistent Carb diet   -Will give bowel regimen if needed  -Simethicone for gassy CP    Renal  -Cr wnl   --Will hold off on ACE/ARB for potential repeat PCI    Endo  -On Basaglar 38 units qhs and Metformin at home   -C/w Lantus 38 units qhs   -ISS  -A1C 7.2    Heme  -Hep gtt for AC    ID  -Mild leukocytosis, likely reactive. Afebrile  -Monitor off abx      For Follow-Up:    [] Discharge on DAPT, Atorva 80, B BLocker   [] Consider ACE/ARB if tolerates   [] F/u TTE CCU Transfer Note    Transfer from: CCU  Transfer to:  (  ) Medicine    (  ) Telemetry    (  ) RCU    (  ) Palliative    (  ) Stroke Unit    (  ) _______________  Accepting physician:    MEDICATIONS:  STANDING MEDICATIONS  aspirin enteric coated 81 milliGRAM(s) Oral daily  atorvastatin 80 milliGRAM(s) Oral at bedtime  chlorhexidine 2% Cloths 1 Application(s) Topical daily  clopidogrel Tablet 75 milliGRAM(s) Oral daily  dextrose 40% Gel 15 Gram(s) Oral once  dextrose 5%. 1000 milliLiter(s) IV Continuous <Continuous>  dextrose 5%. 1000 milliLiter(s) IV Continuous <Continuous>  dextrose 50% Injectable 25 Gram(s) IV Push once  dextrose 50% Injectable 12.5 Gram(s) IV Push once  dextrose 50% Injectable 25 Gram(s) IV Push once  finasteride 5 milliGRAM(s) Oral daily  glucagon  Injectable 1 milliGRAM(s) IntraMuscular once  insulin glargine Injectable (LANTUS) 38 Unit(s) SubCutaneous at bedtime  insulin lispro (ADMELOG) corrective regimen sliding scale   SubCutaneous three times a day before meals  insulin lispro (ADMELOG) corrective regimen sliding scale   SubCutaneous at bedtime  melatonin 5 milliGRAM(s) Oral at bedtime  metoprolol tartrate 12.5 milliGRAM(s) Oral every 12 hours  simethicone 80 milliGRAM(s) Chew daily    PRN MEDICATIONS      VITAL SIGNS: Last 24 Hours  T(C): 37.1 (09 Jul 2021 14:00), Max: 37.9 (08 Jul 2021 19:00)  T(F): 98.8 (09 Jul 2021 14:00), Max: 100.2 (08 Jul 2021 19:00)  HR: 76 (09 Jul 2021 14:00) (62 - 84)  BP: 103/65 (09 Jul 2021 14:00) (103/65 - 107/73)  BP(mean): 77 (09 Jul 2021 14:00) (77 - 85)  RR: 22 (09 Jul 2021 14:00) (12 - 27)  SpO2: 100% (09 Jul 2021 14:00) (91% - 100%)    LABS:                        13.0   12.38 )-----------( 178      ( 09 Jul 2021 00:17 )             39.9     07-09    136  |  103  |  16  ----------------------------<  217<H>  4.1   |  20<L>  |  0.73    Ca    8.8      09 Jul 2021 00:17  Phos  2.5     07-09  Mg     1.9     07-09    TPro  6.8  /  Alb  3.7  /  TBili  0.4  /  DBili  x   /  AST  402<H>  /  ALT  82<H>  /  AlkPhos  62  07-09    PT/INR - ( 09 Jul 2021 06:20 )   PT: 12.5 sec;   INR: 1.04 ratio         PTT - ( 09 Jul 2021 06:20 )  PTT:96.0 sec    ABG - ( 08 Jul 2021 11:00 )  pH, Arterial: 7.38  pH, Blood: x     /  pCO2: 36    /  pO2: 73    / HCO3: 21    / Base Excess: -3.0  /  SaO2: 94                  CARDIAC MARKERS ( 09 Jul 2021 06:13 )  x     / x     / 1693 U/L / x     / 84.4 ng/mL  CARDIAC MARKERS ( 09 Jul 2021 00:17 )  x     / x     / 2596 U/L / x     / 148.2 ng/mL  CARDIAC MARKERS ( 08 Jul 2021 17:15 )  x     / x     / 3863 U/L / x     / 289.4 ng/mL      RADIOLOGY:    CCU COURSE:    76y M PMH of BPH, T2DM, HLD, p/w chest pain x 2-3 days. Yesterday had episode of exertional cp when he was walking, improved w/ 30 min of rest, happened again when he walked another 5 minutes. This morning went to brush his teeth and watch tv, had onset of crushing substernal cp at 6:30am. The pain was intense and was present in his chest/abdominal area. Has diaphoresis, nausea, and numbness of hands and feet during episode. Patient had 1 episode of emesis right before PCI. Patient denied any SOB, dizziness, syncope. Patient denied any radiation to L arm, L jaw, back.    After arrival to ED, patient had 2 stents placed in prox LAD, found to have occlusion in LCX w/ no intervention at the time, and patient was found to have L bundle branch block. Balloon pump was placed. Patient started on Atorva 80, DAPT, B blocker. On 7/9, there were no plans for staging. Hep gtt was stopped. Residual LCX lesion that will be medically managed. Cardiac enzymes downtrended. Patient is now medically optimized for transfer to floors           ASSESSMENT & PLAN:     76y M PMH of BPH, T2DM, HLD, p/w chest pain, found to have STEMI, now s/p PCI w/ FARTUN x2 in pLAD.     Neuro  -A&O4  -No active issues    Pulm  -Satting well on RA    CV  #STEMI  -s/p DESx2 in prox LAD  -[place] and balloon placed in L femoral; will monitor for hematoma   -C/w DAPT  -No plan for staging   -balloon pump taken out  -Will consider ACE/ARB based on BP after balloon pump removal today  -C/w Atorva 80mg qd  -C/w Lopressor 12.5 BID  -TTE ordered    GI  -Consistent Carb diet   -Will give bowel regimen if needed  -Simethicone for gassy CP    Renal  -Cr wnl   - Will consider ACE/ARB if BP tolerates    Endo  -On Basaglar 38 units qhs and Metformin at home   -C/w Lantus 38 units qhs   -ISS  -A1C 7.2    Heme  -Hep gtt for AC    ID  -Mild leukocytosis, likely reactive. Afebrile  -Monitor off abx      For Follow-Up:    [] Discharge on DAPT, Atorva 80, B BLocker   [] Consider ACE/ARB if tolerates   [] F/u TTE CCU Transfer Note    Transfer from: CCU  Transfer to:  (  ) Medicine    (-) Telemetry    (  ) RCU    (  ) Palliative    (  ) Stroke Unit    (  ) _______________  Accepting physician: Dr. Best    MEDICATIONS:  STANDING MEDICATIONS  aspirin enteric coated 81 milliGRAM(s) Oral daily  atorvastatin 80 milliGRAM(s) Oral at bedtime  chlorhexidine 2% Cloths 1 Application(s) Topical daily  clopidogrel Tablet 75 milliGRAM(s) Oral daily  dextrose 40% Gel 15 Gram(s) Oral once  dextrose 5%. 1000 milliLiter(s) IV Continuous <Continuous>  dextrose 5%. 1000 milliLiter(s) IV Continuous <Continuous>  dextrose 50% Injectable 25 Gram(s) IV Push once  dextrose 50% Injectable 12.5 Gram(s) IV Push once  dextrose 50% Injectable 25 Gram(s) IV Push once  finasteride 5 milliGRAM(s) Oral daily  glucagon  Injectable 1 milliGRAM(s) IntraMuscular once  insulin glargine Injectable (LANTUS) 38 Unit(s) SubCutaneous at bedtime  insulin lispro (ADMELOG) corrective regimen sliding scale   SubCutaneous three times a day before meals  insulin lispro (ADMELOG) corrective regimen sliding scale   SubCutaneous at bedtime  melatonin 5 milliGRAM(s) Oral at bedtime  metoprolol tartrate 12.5 milliGRAM(s) Oral every 12 hours  simethicone 80 milliGRAM(s) Chew daily    PRN MEDICATIONS      VITAL SIGNS: Last 24 Hours  T(C): 37.1 (09 Jul 2021 14:00), Max: 37.9 (08 Jul 2021 19:00)  T(F): 98.8 (09 Jul 2021 14:00), Max: 100.2 (08 Jul 2021 19:00)  HR: 76 (09 Jul 2021 14:00) (62 - 84)  BP: 103/65 (09 Jul 2021 14:00) (103/65 - 107/73)  BP(mean): 77 (09 Jul 2021 14:00) (77 - 85)  RR: 22 (09 Jul 2021 14:00) (12 - 27)  SpO2: 100% (09 Jul 2021 14:00) (91% - 100%)    LABS:                        13.0   12.38 )-----------( 178      ( 09 Jul 2021 00:17 )             39.9     07-09    136  |  103  |  16  ----------------------------<  217<H>  4.1   |  20<L>  |  0.73    Ca    8.8      09 Jul 2021 00:17  Phos  2.5     07-09  Mg     1.9     07-09    TPro  6.8  /  Alb  3.7  /  TBili  0.4  /  DBili  x   /  AST  402<H>  /  ALT  82<H>  /  AlkPhos  62  07-09    PT/INR - ( 09 Jul 2021 06:20 )   PT: 12.5 sec;   INR: 1.04 ratio         PTT - ( 09 Jul 2021 06:20 )  PTT:96.0 sec    ABG - ( 08 Jul 2021 11:00 )  pH, Arterial: 7.38  pH, Blood: x     /  pCO2: 36    /  pO2: 73    / HCO3: 21    / Base Excess: -3.0  /  SaO2: 94                  CARDIAC MARKERS ( 09 Jul 2021 06:13 )  x     / x     / 1693 U/L / x     / 84.4 ng/mL  CARDIAC MARKERS ( 09 Jul 2021 00:17 )  x     / x     / 2596 U/L / x     / 148.2 ng/mL  CARDIAC MARKERS ( 08 Jul 2021 17:15 )  x     / x     / 3863 U/L / x     / 289.4 ng/mL      RADIOLOGY:    CCU COURSE:    76y M PMH of BPH, T2DM, HLD, p/w chest pain x 2-3 days. Yesterday had episode of exertional cp when he was walking, improved w/ 30 min of rest, happened again when he walked another 5 minutes. This morning went to brush his teeth and watch tv, had onset of crushing substernal cp at 6:30am. The pain was intense and was present in his chest/abdominal area. Has diaphoresis, nausea, and numbness of hands and feet during episode. Patient had 1 episode of emesis right before PCI. Patient denied any SOB, dizziness, syncope. Patient denied any radiation to L arm, L jaw, back.    After arrival to ED, patient had 2 stents placed in prox LAD, found to have occlusion in LCX w/ no intervention at the time, and patient was found to have L bundle branch block. Balloon pump was placed. Patient started on Atorva 80, DAPT, B blocker. On 7/9, there were no plans for staging. Hep gtt was stopped. Residual LCX lesion that will be medically managed. Cardiac enzymes downtrended. Patient is now medically optimized for transfer to floors           ASSESSMENT & PLAN:     76y M PMH of BPH, T2DM, HLD, p/w chest pain, found to have STEMI, now s/p PCI w/ FARTUN x2 in pLAD.     Neuro  -A&O4  -No active issues    Pulm  -Satting well on RA    CV  #STEMI  -s/p DESx2 in prox LAD  -[place] and balloon placed in L femoral; will monitor for hematoma   -C/w DAPT  -No plan for staging   -balloon pump taken out  -Will consider ACE/ARB based on BP after balloon pump removal today  -C/w Atorva 80mg qd  -C/w Lopressor 12.5 BID  -TTE ordered    GI  -Consistent Carb diet   -Will give bowel regimen if needed  -Simethicone for gassy CP    Renal  -Cr wnl   - Will consider ACE/ARB if BP tolerates    Endo  -On Basaglar 38 units qhs and Metformin at home   -C/w Lantus 38 units qhs   -ISS  -A1C 7.2    Heme  -Hep gtt for AC    ID  -Mild leukocytosis, likely reactive. Afebrile  -Monitor off abx      For Follow-Up:    [] Discharge on DAPT, Atorva 80, B BLocker   [] Consider ACE/ARB if tolerates CCU Transfer Note    Transfer from: CCU  Transfer to:  (  ) Medicine    (-) Telemetry    (  ) RCU    (  ) Palliative    (  ) Stroke Unit    (  ) _______________  Accepting physician: Dr. Best    MEDICATIONS:  STANDING MEDICATIONS  aspirin enteric coated 81 milliGRAM(s) Oral daily  atorvastatin 80 milliGRAM(s) Oral at bedtime  chlorhexidine 2% Cloths 1 Application(s) Topical daily  clopidogrel Tablet 75 milliGRAM(s) Oral daily  dextrose 40% Gel 15 Gram(s) Oral once  dextrose 5%. 1000 milliLiter(s) IV Continuous <Continuous>  dextrose 5%. 1000 milliLiter(s) IV Continuous <Continuous>  dextrose 50% Injectable 25 Gram(s) IV Push once  dextrose 50% Injectable 12.5 Gram(s) IV Push once  dextrose 50% Injectable 25 Gram(s) IV Push once  finasteride 5 milliGRAM(s) Oral daily  glucagon  Injectable 1 milliGRAM(s) IntraMuscular once  insulin glargine Injectable (LANTUS) 38 Unit(s) SubCutaneous at bedtime  insulin lispro (ADMELOG) corrective regimen sliding scale   SubCutaneous three times a day before meals  insulin lispro (ADMELOG) corrective regimen sliding scale   SubCutaneous at bedtime  melatonin 5 milliGRAM(s) Oral at bedtime  metoprolol tartrate 12.5 milliGRAM(s) Oral every 12 hours  simethicone 80 milliGRAM(s) Chew daily    PRN MEDICATIONS      VITAL SIGNS: Last 24 Hours  T(C): 37.1 (09 Jul 2021 14:00), Max: 37.9 (08 Jul 2021 19:00)  T(F): 98.8 (09 Jul 2021 14:00), Max: 100.2 (08 Jul 2021 19:00)  HR: 76 (09 Jul 2021 14:00) (62 - 84)  BP: 103/65 (09 Jul 2021 14:00) (103/65 - 107/73)  BP(mean): 77 (09 Jul 2021 14:00) (77 - 85)  RR: 22 (09 Jul 2021 14:00) (12 - 27)  SpO2: 100% (09 Jul 2021 14:00) (91% - 100%)    LABS:                        13.0   12.38 )-----------( 178      ( 09 Jul 2021 00:17 )             39.9     07-09    136  |  103  |  16  ----------------------------<  217<H>  4.1   |  20<L>  |  0.73    Ca    8.8      09 Jul 2021 00:17  Phos  2.5     07-09  Mg     1.9     07-09    TPro  6.8  /  Alb  3.7  /  TBili  0.4  /  DBili  x   /  AST  402<H>  /  ALT  82<H>  /  AlkPhos  62  07-09    PT/INR - ( 09 Jul 2021 06:20 )   PT: 12.5 sec;   INR: 1.04 ratio         PTT - ( 09 Jul 2021 06:20 )  PTT:96.0 sec    ABG - ( 08 Jul 2021 11:00 )  pH, Arterial: 7.38  pH, Blood: x     /  pCO2: 36    /  pO2: 73    / HCO3: 21    / Base Excess: -3.0  /  SaO2: 94                  CARDIAC MARKERS ( 09 Jul 2021 06:13 )  x     / x     / 1693 U/L / x     / 84.4 ng/mL  CARDIAC MARKERS ( 09 Jul 2021 00:17 )  x     / x     / 2596 U/L / x     / 148.2 ng/mL  CARDIAC MARKERS ( 08 Jul 2021 17:15 )  x     / x     / 3863 U/L / x     / 289.4 ng/mL      RADIOLOGY:    CCU COURSE:    76y M PMH of BPH, T2DM, HLD, p/w chest pain x 2-3 days. Yesterday had episode of exertional cp when he was walking, improved w/ 30 min of rest, happened again when he walked another 5 minutes. This morning went to brush his teeth and watch tv, had onset of crushing substernal cp at 6:30am. The pain was intense and was present in his chest/abdominal area. Has diaphoresis, nausea, and numbness of hands and feet during episode. Patient had 1 episode of emesis right before PCI. Patient denied any SOB, dizziness, syncope. Patient denied any radiation to L arm, L jaw, back.    After arrival to ED, patient had 2 stents placed in prox LAD, found to have occlusion in LCX w/ no intervention at the time, and patient was found to have L bundle branch block. Balloon pump was placed. Patient started on Atorva 80, DAPT, B blocker. On 7/9, there were no plans for staging. Hep gtt was stopped. Residual LCX lesion that will be medically managed. Cardiac enzymes downtrended. Patient is now medically optimized for transfer to floors           ASSESSMENT & PLAN:     76y M PMH of BPH, T2DM, HLD, p/w chest pain, found to have STEMI, now s/p PCI w/ FARTUN x2 in pLAD.     Neuro  -A&O4  -No active issues    Pulm  -Satting well on RA    CV  #STEMI  -s/p DESx2 in prox LAD  -[place] and balloon placed in L femoral; will monitor for hematoma   -C/w DAPT  -No plan for staging   -balloon pump taken out  -Will consider ACE/ARB based on BP after balloon pump removal today  -C/w Atorva 80mg qd  -C/w Lopressor 12.5 BID  -TTE ordered    GI  -Consistent Carb diet   -Will give bowel regimen if needed  -Simethicone for gassy CP    Renal  -Cr wnl   - Will consider ACE/ARB if BP tolerates    Endo  -On Basaglar 38 units qhs and Metformin at home   -C/w Lantus 38 units qhs   -ISS  -A1C 7.2    Heme  -Hep gtt for AC    ID  -Mild leukocytosis, likely reactive. Afebrile  -Monitor off abx      For Follow-Up:    [] Discharge on DAPT, Atorva 80, B BLocker   [] Consider ACE/ARB if tolerates  [] Monitor L groin, as IABP was removed CCU Transfer Note    Transfer from: CCU  Transfer to:  (  ) Medicine    (-) Telemetry    (  ) RCU    (  ) Palliative    (  ) Stroke Unit    (  ) _______________  Accepting physician: Dr. Estephania BABB/NP on [id number] given signout     MEDICATIONS:  STANDING MEDICATIONS  aspirin enteric coated 81 milliGRAM(s) Oral daily  atorvastatin 80 milliGRAM(s) Oral at bedtime  chlorhexidine 2% Cloths 1 Application(s) Topical daily  clopidogrel Tablet 75 milliGRAM(s) Oral daily  dextrose 40% Gel 15 Gram(s) Oral once  dextrose 5%. 1000 milliLiter(s) IV Continuous <Continuous>  dextrose 5%. 1000 milliLiter(s) IV Continuous <Continuous>  dextrose 50% Injectable 25 Gram(s) IV Push once  dextrose 50% Injectable 12.5 Gram(s) IV Push once  dextrose 50% Injectable 25 Gram(s) IV Push once  finasteride 5 milliGRAM(s) Oral daily  glucagon  Injectable 1 milliGRAM(s) IntraMuscular once  insulin glargine Injectable (LANTUS) 38 Unit(s) SubCutaneous at bedtime  insulin lispro (ADMELOG) corrective regimen sliding scale   SubCutaneous three times a day before meals  insulin lispro (ADMELOG) corrective regimen sliding scale   SubCutaneous at bedtime  melatonin 5 milliGRAM(s) Oral at bedtime  metoprolol tartrate 12.5 milliGRAM(s) Oral every 12 hours  simethicone 80 milliGRAM(s) Chew daily    PRN MEDICATIONS      VITAL SIGNS: Last 24 Hours  T(C): 37.1 (09 Jul 2021 14:00), Max: 37.9 (08 Jul 2021 19:00)  T(F): 98.8 (09 Jul 2021 14:00), Max: 100.2 (08 Jul 2021 19:00)  HR: 76 (09 Jul 2021 14:00) (62 - 84)  BP: 103/65 (09 Jul 2021 14:00) (103/65 - 107/73)  BP(mean): 77 (09 Jul 2021 14:00) (77 - 85)  RR: 22 (09 Jul 2021 14:00) (12 - 27)  SpO2: 100% (09 Jul 2021 14:00) (91% - 100%)    LABS:                        13.0   12.38 )-----------( 178      ( 09 Jul 2021 00:17 )             39.9     07-09    136  |  103  |  16  ----------------------------<  217<H>  4.1   |  20<L>  |  0.73    Ca    8.8      09 Jul 2021 00:17  Phos  2.5     07-09  Mg     1.9     07-09    TPro  6.8  /  Alb  3.7  /  TBili  0.4  /  DBili  x   /  AST  402<H>  /  ALT  82<H>  /  AlkPhos  62  07-09    PT/INR - ( 09 Jul 2021 06:20 )   PT: 12.5 sec;   INR: 1.04 ratio         PTT - ( 09 Jul 2021 06:20 )  PTT:96.0 sec    ABG - ( 08 Jul 2021 11:00 )  pH, Arterial: 7.38  pH, Blood: x     /  pCO2: 36    /  pO2: 73    / HCO3: 21    / Base Excess: -3.0  /  SaO2: 94                  CARDIAC MARKERS ( 09 Jul 2021 06:13 )  x     / x     / 1693 U/L / x     / 84.4 ng/mL  CARDIAC MARKERS ( 09 Jul 2021 00:17 )  x     / x     / 2596 U/L / x     / 148.2 ng/mL  CARDIAC MARKERS ( 08 Jul 2021 17:15 )  x     / x     / 3863 U/L / x     / 289.4 ng/mL      RADIOLOGY:    CCU COURSE:    76y M PMH of BPH, T2DM, HLD, p/w chest pain x 2-3 days. Yesterday had episode of exertional cp when he was walking, improved w/ 30 min of rest, happened again when he walked another 5 minutes. This morning went to brush his teeth and watch tv, had onset of crushing substernal cp at 6:30am. The pain was intense and was present in his chest/abdominal area. Has diaphoresis, nausea, and numbness of hands and feet during episode. Patient had 1 episode of emesis right before PCI. Patient denied any SOB, dizziness, syncope. Patient denied any radiation to L arm, L jaw, back.    After arrival to ED, patient had 2 stents placed in prox LAD, found to have occlusion in LCX w/ no intervention at the time, and patient was found to have L bundle branch block. Balloon pump was placed. Patient started on Atorva 80, DAPT, B blocker. On 7/9, there were no plans for staging. Hep gtt was stopped. Residual LCX lesion that will be medically managed. Cardiac enzymes downtrended. Patient is now medically optimized for transfer to floors           ASSESSMENT & PLAN:     76y M PMH of BPH, T2DM, HLD, p/w chest pain, found to have STEMI, now s/p PCI w/ FARTUN x2 in pLAD.     Neuro  -A&O4  -No active issues    Pulm  -Satting well on RA    CV  #STEMI  -s/p DESx2 in prox LAD  -[place] and balloon placed in L femoral; will monitor for hematoma   -C/w DAPT  -No plan for staging   -balloon pump taken out  -Will consider ACE/ARB based on BP after balloon pump removal today  -C/w Atorva 80mg qd  -C/w Lopressor 12.5 BID  -TTE ordered    GI  -Consistent Carb diet   -Will give bowel regimen if needed  -Simethicone for gassy CP    Renal  -Cr wnl   - Will consider ACE/ARB if BP tolerates    Endo  -On Basaglar 38 units qhs and Metformin at home   -C/w Lantus 38 units qhs   -ISS  -A1C 7.2    Heme  -Hep gtt for AC    ID  -Mild leukocytosis, likely reactive. Afebrile  -Monitor off abx      For Follow-Up:    [] Discharge on DAPT, Atorva 80, B BLocker   [] Consider ACE/ARB if tolerates  [] Monitor L groin, as IABP was removed

## 2021-07-09 NOTE — CHART NOTE - NSCHARTNOTEFT_GEN_A_CORE
PROCEDURE NOTE  REMOVAL OF INTRA AORTIC BALLOON PUMP    The IABP (intra-aortic balloon pump) was weaned according to protocol.  Hemodynamics remained stable.  Pulses in the left lower extremity are palpable/audible by doppler.  The patient was placed in the supine position.  The insertion site was identified and the sutures were removed.  The IABP was turned off and the balloon deflated.  The IABP was then removed.  Direct pressure was applied for 30 minutes.  Monitoring of the left groin and both lower extremities including neuro-vascular checks and vital signs every 15 minutes  x4, then every 30 minutes x 2, then every 1 hr x 4 was ordered. Patient tolerated procedure well. No evidence of hematoma and active bleeding noted.

## 2021-07-09 NOTE — PROGRESS NOTE ADULT - SUBJECTIVE AND OBJECTIVE BOX
Zafar Schneider, PGY2  CCU Service  Internal Medicine  Pager: [phone] (NS)    PATIENT: YANNA TRONCOSO, MRN: [id number]    CHIEF COMPLAINT: Patient is a 76y old  Male who presents with a chief complaint of Chest pain (2021 22:26)      INTERVAL HISTORY/OVERNIGHT EVENTS: No overnight events. At bedside, patient has been sleeping and eating well. Denies N/V/D/C. Last BM x1 regular yesterday. Denies abdominal pain. Denies chest pain or SOB, cough. Oriented to person, place, and time. Breathing comfortably on room air.    REVIEW OF SYSTEMS:    Constitutional:     [ ] negative [ ] fevers [ ] chills [ ] weight loss [ ] weight gain  HEENT:                  [ ] negative [ ] dry eyes [ ] eye irritation [ ] postnasal drip [ ] nasal congestion  CV:                         [ ] negative  [ ] chest pain [ ] orthopnea [ ] palpitations [ ] murmur  Resp:                     [ ] negative [ ] cough [ ] shortness of breath [ ] dyspnea [ ] wheezing [ ] sputum [ ] hemoptysis  GI:                          [ ] negative [ ] nausea [ ] vomiting [ ] diarrhea [ ] constipation [ ] abd pain [ ] dysphagia   :                        [ ] negative [ ] dysuria [ ] nocturia [ ] hematuria [ ] increased urinary frequency  Musculoskeletal: [ ] negative [ ] back pain [ ] myalgias [ ] arthralgias [ ] fracture  Skin:                       [ ] negative [ ] rash [ ] itch  Neurological:        [ ] negative [ ] headache [ ] dizziness [ ] syncope [ ] weakness [ ] numbness  Psychiatric:           [ ] negative [ ] anxiety [ ] depression  Endocrine:            [ ] negative [ ] diabetes [ ] thyroid problem  Heme/Lymph:      [ ] negative [ ] anemia [ ] bleeding problem  Allergic/Immune: [ ] negative [ ] itchy eyes [ ] nasal discharge [ ] hives [ ] angioedema    [ ] All other systems negative  [ ] Unable to assess ROS because ________.    MEDICATIONS:  MEDICATIONS  (STANDING):  aspirin enteric coated 81 milliGRAM(s) Oral daily  atorvastatin 80 milliGRAM(s) Oral at bedtime  chlorhexidine 2% Cloths 1 Application(s) Topical daily  clopidogrel Tablet 75 milliGRAM(s) Oral daily  dextrose 40% Gel 15 Gram(s) Oral once  dextrose 5%. 1000 milliLiter(s) (50 mL/Hr) IV Continuous <Continuous>  dextrose 5%. 1000 milliLiter(s) (100 mL/Hr) IV Continuous <Continuous>  dextrose 50% Injectable 25 Gram(s) IV Push once  dextrose 50% Injectable 12.5 Gram(s) IV Push once  dextrose 50% Injectable 25 Gram(s) IV Push once  finasteride 5 milliGRAM(s) Oral daily  glucagon  Injectable 1 milliGRAM(s) IntraMuscular once  heparin  Infusion 800 Unit(s)/Hr (8 mL/Hr) IV Continuous <Continuous>  insulin glargine Injectable (LANTUS) 38 Unit(s) SubCutaneous at bedtime  insulin lispro (ADMELOG) corrective regimen sliding scale   SubCutaneous three times a day before meals  insulin lispro (ADMELOG) corrective regimen sliding scale   SubCutaneous at bedtime  melatonin 5 milliGRAM(s) Oral at bedtime  metoprolol tartrate 12.5 milliGRAM(s) Oral every 12 hours  simethicone 80 milliGRAM(s) Chew daily    MEDICATIONS  (PRN):      ALLERGIES: Allergies    No Known Allergies    Intolerances        OBJECTIVE:  ICU Vital Signs Last 24 Hrs  T(C): 37.2 (2021 07:00), Max: 37.9 (2021 19:00)  T(F): 99 (2021 07:00), Max: 100.2 (2021 19:00)  HR: 68 (2021 07:00) (62 - 90)  BP: 82/50 (2021 10:00) (82/50 - 82/50)  BP(mean): 62 (2021 10:00) (62 - 62)  ABP: --  ABP(mean): --  RR: 17 (2021 07:00) (12 - 29)  SpO2: 91% (2021 07:00) (91% - 98%)      Adult Advanced Hemodynamics Last 24 Hrs  CVP(mm Hg): 2 (2021 07:00) (-1 - 13)  CVP(cm H2O): --  CO: --  CI: --  PA: 17/8 (2021 07:00) (12/5 - 29/)  PA(mean): 11 (2021 07:00) (8 - 183)  PCWP: --  SVR: --  SVRI: --  PVR: --  PVRI: --  CAPILLARY BLOOD GLUCOSE      POCT Blood Glucose.: 92 mg/dL (2021 08:28)  POCT Blood Glucose.: 151 mg/dL (2021 21:19)    CAPILLARY BLOOD GLUCOSE      POCT Blood Glucose.: 92 mg/dL (2021 08:28)    I&O's Summary    2021 07:01  -  2021 07:00  --------------------------------------------------------  IN: 556 mL / OUT: 650 mL / NET: -94 mL      Daily     Daily Weight in k.1 (2021 05:00)    PHYSICAL EXAMINATION:  General: Comfortable, no acute distress, cooperative with exam.  HEENT: PERRLA, EOMI, moist mucous membranes.  Respiratory: CTAB, normal respiratory effort, no coughing, wheezes, crackles, or rales.  CV: RRR, S1S2, no murmurs, rubs or gallops. No JVD. Distal pulses intact.  Abdominal: Soft, nontender, nondistended, no rebound or guarding, normal bowel sounds.  Neurology: AOx3, no focal neuro defects, VILLALOBOS x 4.  Extremities: No pitting edema, + Peripheral pulses.  Incisions:   Tubes:    LABS:  ABG - ( 2021 11:00 )  pH, Arterial: 7.38  pH, Blood: x     /  pCO2: 36    /  pO2: 73    / HCO3: 21    / Base Excess: -3.0  /  SaO2: 94                                      13.0   12.38 )-----------( 178      ( 2021 00:17 )             39.9     07-09    136  |  103  |  16  ----------------------------<  217<H>  4.1   |  20<L>  |  0.73    Ca    8.8      2021 00:17  Phos  2.5     07-09  Mg     1.9     07-09    TPro  6.8  /  Alb  3.7  /  TBili  0.4  /  DBili  x   /  AST  402<H>  /  ALT  82<H>  /  AlkPhos  62      LIVER FUNCTIONS - ( 2021 00:17 )  Alb: 3.7 g/dL / Pro: 6.8 g/dL / ALK PHOS: 62 U/L / ALT: 82 U/L / AST: 402 U/L / GGT: x           PT/INR - ( 2021 06:20 )   PT: 12.5 sec;   INR: 1.04 ratio         PTT - ( 2021 06:20 )  PTT:96.0 sec  CKMB Units: 84.4 ng/mL ( @ 06:13)  Creatine Kinase, Serum: 1693 U/L ( @ 06:13)  CKMB Units: 148.2 ng/mL ( @ 00:17)  Creatine Kinase, Serum: 2596 U/L ( @ 00:17)  Creatine Kinase, Serum: 3863 U/L ( @ 17:15)  CKMB Units: 289.4 ng/mL ( @ 17:15)    CARDIAC MARKERS ( 2021 06:13 )  x     / x     / 1693 U/L / x     / 84.4 ng/mL  CARDIAC MARKERS ( 2021 00:17 )  x     / x     / 2596 U/L / x     / 148.2 ng/mL  CARDIAC MARKERS ( 2021 17:15 )  x     / x     / 3863 U/L / x     / 289.4 ng/mL          TELEMETRY:     EKG:     IMAGING:  Zafar Schneider, PGY2  CCU Service  Internal Medicine  Pager: [phone] (NS)    PATIENT: YANNA TRONCOSO, MRN: [id number]    CHIEF COMPLAINT: Patient is a 76y old  Male who presents with a chief complaint of Chest pain (2021 22:26)      INTERVAL HISTORY/OVERNIGHT EVENTS: No overnight events. At bedside, patient has been sleeping and eating well. Denies N/V/D/C. Last BM x1 regular yesterday. Denies abdominal pain. Denies chest pain or SOB, cough. Oriented to person, place, and time. Breathing comfortably on room air.    REVIEW OF SYSTEMS:    Constitutional:     [ ] negative [ ] fevers [ ] chills [ ] weight loss [ ] weight gain  HEENT:                  [ ] negative [ ] dry eyes [ ] eye irritation [ ] postnasal drip [ ] nasal congestion  CV:                         [ ] negative  [-] chest pain [ ] orthopnea [ ] palpitations [ ] murmur  Resp:                     [ ] negative [ ] cough [ ] shortness of breath [ ] dyspnea [ ] wheezing [ ] sputum [ ] hemoptysis  GI:                          [ ] negative [ ] nausea [ ] vomiting [ ] diarrhea [ ] constipation [ ] abd pain [ ] dysphagia   :                        [ ] negative [ ] dysuria [ ] nocturia [ ] hematuria [ ] increased urinary frequency  Musculoskeletal: [ ] negative [ ] back pain [ ] myalgias [ ] arthralgias [ ] fracture  Skin:                       [ ] negative [ ] rash [ ] itch  Neurological:        [ ] negative [ ] headache [ ] dizziness [ ] syncope [ ] weakness [ ] numbness  Psychiatric:           [ ] negative [ ] anxiety [ ] depression  Endocrine:            [ ] negative [ ] diabetes [ ] thyroid problem  Heme/Lymph:      [ ] negative [ ] anemia [ ] bleeding problem  Allergic/Immune: [ ] negative [ ] itchy eyes [ ] nasal discharge [ ] hives [ ] angioedema    [-] All other systems negative  [ ] Unable to assess ROS because ________.    MEDICATIONS:  MEDICATIONS  (STANDING):  aspirin enteric coated 81 milliGRAM(s) Oral daily  atorvastatin 80 milliGRAM(s) Oral at bedtime  chlorhexidine 2% Cloths 1 Application(s) Topical daily  clopidogrel Tablet 75 milliGRAM(s) Oral daily  dextrose 40% Gel 15 Gram(s) Oral once  dextrose 5%. 1000 milliLiter(s) (50 mL/Hr) IV Continuous <Continuous>  dextrose 5%. 1000 milliLiter(s) (100 mL/Hr) IV Continuous <Continuous>  dextrose 50% Injectable 25 Gram(s) IV Push once  dextrose 50% Injectable 12.5 Gram(s) IV Push once  dextrose 50% Injectable 25 Gram(s) IV Push once  finasteride 5 milliGRAM(s) Oral daily  glucagon  Injectable 1 milliGRAM(s) IntraMuscular once  heparin  Infusion 800 Unit(s)/Hr (8 mL/Hr) IV Continuous <Continuous>  insulin glargine Injectable (LANTUS) 38 Unit(s) SubCutaneous at bedtime  insulin lispro (ADMELOG) corrective regimen sliding scale   SubCutaneous three times a day before meals  insulin lispro (ADMELOG) corrective regimen sliding scale   SubCutaneous at bedtime  melatonin 5 milliGRAM(s) Oral at bedtime  metoprolol tartrate 12.5 milliGRAM(s) Oral every 12 hours  simethicone 80 milliGRAM(s) Chew daily    MEDICATIONS  (PRN):      ALLERGIES: Allergies    No Known Allergies    Intolerances        OBJECTIVE:  ICU Vital Signs Last 24 Hrs  T(C): 37.2 (2021 07:00), Max: 37.9 (2021 19:00)  T(F): 99 (2021 07:00), Max: 100.2 (2021 19:00)  HR: 68 (2021 07:00) (62 - 90)  BP: 82/50 (2021 10:00) (82/50 - 82/50)  BP(mean): 62 (2021 10:00) (62 - 62)  ABP: --  ABP(mean): --  RR: 17 (2021 07:00) (12 - 29)  SpO2: 91% (2021 07:00) (91% - 98%)      Adult Advanced Hemodynamics Last 24 Hrs  CVP(mm Hg): 2 (2021 07:00) (-1 - 13)  CVP(cm H2O): --  CO: --  CI: --  PA: 17/8 (2021 07:00) (12/5 - 29/17)  PA(mean): 11 (2021 07:00) (8 - 183)  PCWP: --  SVR: --  SVRI: --  PVR: --  PVRI: --  CAPILLARY BLOOD GLUCOSE      POCT Blood Glucose.: 92 mg/dL (2021 08:28)  POCT Blood Glucose.: 151 mg/dL (2021 21:19)    CAPILLARY BLOOD GLUCOSE      POCT Blood Glucose.: 92 mg/dL (2021 08:28)    I&O's Summary    2021 07:01  -  2021 07:00  --------------------------------------------------------  IN: 556 mL / OUT: 650 mL / NET: -94 mL      Daily     Daily Weight in k.1 (2021 05:00)    PHYSICAL EXAMINATION:  GENERAL: NAD, well appearing   HEENT: PERRL, +EOMI  NECK: soft, Supple   CHEST/LUNG: Clear to auscultation bilaterally; No wheezing  HEART: S1S2+, Regular rate and rhythm; No murmurs, rubs, or gallops  ABDOMEN: L femoral w/ no hematoma. R femoral w/ no blood after sheath removal. Soft, Nontender, Nondistended; Bowel sounds present  EXTREMITIES: Pulses 1+ on L ext. No edema b/l  SKIN: No rashes or lesions  NEURO: AAOX3, no focal deficits, no motor or sensory loss  PSYCH: normal mood      LABS:  ABG - ( 2021 11:00 )  pH, Arterial: 7.38  pH, Blood: x     /  pCO2: 36    /  pO2: 73    / HCO3: 21    / Base Excess: -3.0  /  SaO2: 94                                      13.0   12.38 )-----------( 178      ( 2021 00:17 )             39.9     07-09    136  |  103  |  16  ----------------------------<  217<H>  4.1   |  20<L>  |  0.73    Ca    8.8      2021 00:17  Phos  2.5     07-09  Mg     1.9     07-09    TPro  6.8  /  Alb  3.7  /  TBili  0.4  /  DBili  x   /  AST  402<H>  /  ALT  82<H>  /  AlkPhos  62  -    LIVER FUNCTIONS - ( 2021 00:17 )  Alb: 3.7 g/dL / Pro: 6.8 g/dL / ALK PHOS: 62 U/L / ALT: 82 U/L / AST: 402 U/L / GGT: x           PT/INR - ( 2021 06:20 )   PT: 12.5 sec;   INR: 1.04 ratio         PTT - ( 2021 06:20 )  PTT:96.0 sec  CKMB Units: 84.4 ng/mL ( @ 06:13)  Creatine Kinase, Serum: 1693 U/L ( @ 06:13)  CKMB Units: 148.2 ng/mL ( @ 00:17)  Creatine Kinase, Serum: 2596 U/L ( @ 00:17)  Creatine Kinase, Serum: 3863 U/L ( @ 17:15)  CKMB Units: 289.4 ng/mL ( @ 17:15)    CARDIAC MARKERS ( 2021 06:13 )  x     / x     / 1693 U/L / x     / 84.4 ng/mL  CARDIAC MARKERS ( 2021 00:17 )  x     / x     / 2596 U/L / x     / 148.2 ng/mL  CARDIAC MARKERS ( 2021 17:15 )  x     / x     / 3863 U/L / x     / 289.4 ng/mL          TELEMETRY:     EKG:     IMAGING:

## 2021-07-10 DIAGNOSIS — Z29.9 ENCOUNTER FOR PROPHYLACTIC MEASURES, UNSPECIFIED: ICD-10-CM

## 2021-07-10 DIAGNOSIS — E78.5 HYPERLIPIDEMIA, UNSPECIFIED: ICD-10-CM

## 2021-07-10 DIAGNOSIS — I21.02 ST ELEVATION (STEMI) MYOCARDIAL INFARCTION INVOLVING LEFT ANTERIOR DESCENDING CORONARY ARTERY: ICD-10-CM

## 2021-07-10 DIAGNOSIS — E11.59 TYPE 2 DIABETES MELLITUS WITH OTHER CIRCULATORY COMPLICATIONS: ICD-10-CM

## 2021-07-10 DIAGNOSIS — N40.0 BENIGN PROSTATIC HYPERPLASIA WITHOUT LOWER URINARY TRACT SYMPTOMS: ICD-10-CM

## 2021-07-10 LAB
ALBUMIN SERPL ELPH-MCNC: 3.6 G/DL — SIGNIFICANT CHANGE UP (ref 3.3–5)
ALP SERPL-CCNC: 58 U/L — SIGNIFICANT CHANGE UP (ref 40–120)
ALT FLD-CCNC: 55 U/L — HIGH (ref 10–45)
ANION GAP SERPL CALC-SCNC: 12 MMOL/L — SIGNIFICANT CHANGE UP (ref 5–17)
APTT BLD: 27.5 SEC — SIGNIFICANT CHANGE UP (ref 27.5–35.5)
AST SERPL-CCNC: 163 U/L — HIGH (ref 10–40)
BASOPHILS # BLD AUTO: 0.04 K/UL — SIGNIFICANT CHANGE UP (ref 0–0.2)
BASOPHILS NFR BLD AUTO: 0.4 % — SIGNIFICANT CHANGE UP (ref 0–2)
BILIRUB SERPL-MCNC: 0.6 MG/DL — SIGNIFICANT CHANGE UP (ref 0.2–1.2)
BUN SERPL-MCNC: 13 MG/DL — SIGNIFICANT CHANGE UP (ref 7–23)
CALCIUM SERPL-MCNC: 9 MG/DL — SIGNIFICANT CHANGE UP (ref 8.4–10.5)
CHLORIDE SERPL-SCNC: 102 MMOL/L — SIGNIFICANT CHANGE UP (ref 96–108)
CHOLEST SERPL-MCNC: 119 MG/DL — SIGNIFICANT CHANGE UP
CO2 SERPL-SCNC: 21 MMOL/L — LOW (ref 22–31)
CREAT SERPL-MCNC: 0.86 MG/DL — SIGNIFICANT CHANGE UP (ref 0.5–1.3)
EOSINOPHIL # BLD AUTO: 0.26 K/UL — SIGNIFICANT CHANGE UP (ref 0–0.5)
EOSINOPHIL NFR BLD AUTO: 2.4 % — SIGNIFICANT CHANGE UP (ref 0–6)
GLUCOSE BLDC GLUCOMTR-MCNC: 124 MG/DL — HIGH (ref 70–99)
GLUCOSE BLDC GLUCOMTR-MCNC: 156 MG/DL — HIGH (ref 70–99)
GLUCOSE BLDC GLUCOMTR-MCNC: 158 MG/DL — HIGH (ref 70–99)
GLUCOSE BLDC GLUCOMTR-MCNC: 189 MG/DL — HIGH (ref 70–99)
GLUCOSE SERPL-MCNC: 162 MG/DL — HIGH (ref 70–99)
HCT VFR BLD CALC: 41.4 % — SIGNIFICANT CHANGE UP (ref 39–50)
HDLC SERPL-MCNC: 48 MG/DL — SIGNIFICANT CHANGE UP
HGB BLD-MCNC: 13.7 G/DL — SIGNIFICANT CHANGE UP (ref 13–17)
IMM GRANULOCYTES NFR BLD AUTO: 0.2 % — SIGNIFICANT CHANGE UP (ref 0–1.5)
LIPID PNL WITH DIRECT LDL SERPL: 57 MG/DL — SIGNIFICANT CHANGE UP
LYMPHOCYTES # BLD AUTO: 2.66 K/UL — SIGNIFICANT CHANGE UP (ref 1–3.3)
LYMPHOCYTES # BLD AUTO: 24.5 % — SIGNIFICANT CHANGE UP (ref 13–44)
MAGNESIUM SERPL-MCNC: 2 MG/DL — SIGNIFICANT CHANGE UP (ref 1.6–2.6)
MCHC RBC-ENTMCNC: 28.7 PG — SIGNIFICANT CHANGE UP (ref 27–34)
MCHC RBC-ENTMCNC: 33.1 GM/DL — SIGNIFICANT CHANGE UP (ref 32–36)
MCV RBC AUTO: 86.6 FL — SIGNIFICANT CHANGE UP (ref 80–100)
MONOCYTES # BLD AUTO: 0.86 K/UL — SIGNIFICANT CHANGE UP (ref 0–0.9)
MONOCYTES NFR BLD AUTO: 7.9 % — SIGNIFICANT CHANGE UP (ref 2–14)
NEUTROPHILS # BLD AUTO: 7 K/UL — SIGNIFICANT CHANGE UP (ref 1.8–7.4)
NEUTROPHILS NFR BLD AUTO: 64.6 % — SIGNIFICANT CHANGE UP (ref 43–77)
NON HDL CHOLESTEROL: 70 MG/DL — SIGNIFICANT CHANGE UP
NRBC # BLD: 0 /100 WBCS — SIGNIFICANT CHANGE UP (ref 0–0)
PHOSPHATE SERPL-MCNC: 2.9 MG/DL — SIGNIFICANT CHANGE UP (ref 2.5–4.5)
PLATELET # BLD AUTO: 157 K/UL — SIGNIFICANT CHANGE UP (ref 150–400)
POTASSIUM SERPL-MCNC: 3.9 MMOL/L — SIGNIFICANT CHANGE UP (ref 3.5–5.3)
POTASSIUM SERPL-SCNC: 3.9 MMOL/L — SIGNIFICANT CHANGE UP (ref 3.5–5.3)
PROT SERPL-MCNC: 6.8 G/DL — SIGNIFICANT CHANGE UP (ref 6–8.3)
RBC # BLD: 4.78 M/UL — SIGNIFICANT CHANGE UP (ref 4.2–5.8)
RBC # FLD: 13.4 % — SIGNIFICANT CHANGE UP (ref 10.3–14.5)
SODIUM SERPL-SCNC: 135 MMOL/L — SIGNIFICANT CHANGE UP (ref 135–145)
TRIGL SERPL-MCNC: 64 MG/DL — SIGNIFICANT CHANGE UP
WBC # BLD: 10.84 K/UL — HIGH (ref 3.8–10.5)
WBC # FLD AUTO: 10.84 K/UL — HIGH (ref 3.8–10.5)

## 2021-07-10 PROCEDURE — 99223 1ST HOSP IP/OBS HIGH 75: CPT | Mod: GC

## 2021-07-10 PROCEDURE — 12345: CPT | Mod: NC

## 2021-07-10 PROCEDURE — 99232 SBSQ HOSP IP/OBS MODERATE 35: CPT | Mod: GC

## 2021-07-10 RX ORDER — HEPARIN SODIUM 5000 [USP'U]/ML
4000 INJECTION INTRAVENOUS; SUBCUTANEOUS EVERY 6 HOURS
Refills: 0 | Status: DISCONTINUED | OUTPATIENT
Start: 2021-07-10 | End: 2021-07-10

## 2021-07-10 RX ORDER — ENOXAPARIN SODIUM 100 MG/ML
40 INJECTION SUBCUTANEOUS DAILY
Refills: 0 | Status: DISCONTINUED | OUTPATIENT
Start: 2021-07-10 | End: 2021-07-14

## 2021-07-10 RX ORDER — LOSARTAN POTASSIUM 100 MG/1
25 TABLET, FILM COATED ORAL DAILY
Refills: 0 | Status: DISCONTINUED | OUTPATIENT
Start: 2021-07-10 | End: 2021-07-14

## 2021-07-10 RX ORDER — HEPARIN SODIUM 5000 [USP'U]/ML
INJECTION INTRAVENOUS; SUBCUTANEOUS
Qty: 25000 | Refills: 0 | Status: DISCONTINUED | OUTPATIENT
Start: 2021-07-10 | End: 2021-07-10

## 2021-07-10 RX ADMIN — Medication 1: at 16:46

## 2021-07-10 RX ADMIN — Medication 81 MILLIGRAM(S): at 12:25

## 2021-07-10 RX ADMIN — Medication 12.5 MILLIGRAM(S): at 09:04

## 2021-07-10 RX ADMIN — FINASTERIDE 5 MILLIGRAM(S): 5 TABLET, FILM COATED ORAL at 12:25

## 2021-07-10 RX ADMIN — Medication 5 MILLIGRAM(S): at 20:17

## 2021-07-10 RX ADMIN — Medication 12.5 MILLIGRAM(S): at 20:16

## 2021-07-10 RX ADMIN — CLOPIDOGREL BISULFATE 75 MILLIGRAM(S): 75 TABLET, FILM COATED ORAL at 12:25

## 2021-07-10 RX ADMIN — Medication 1: at 08:14

## 2021-07-10 RX ADMIN — INSULIN GLARGINE 38 UNIT(S): 100 INJECTION, SOLUTION SUBCUTANEOUS at 21:15

## 2021-07-10 RX ADMIN — ENOXAPARIN SODIUM 40 MILLIGRAM(S): 100 INJECTION SUBCUTANEOUS at 12:25

## 2021-07-10 RX ADMIN — ATORVASTATIN CALCIUM 80 MILLIGRAM(S): 80 TABLET, FILM COATED ORAL at 20:17

## 2021-07-10 NOTE — PROGRESS NOTE ADULT - SUBJECTIVE AND OBJECTIVE BOX
[facility] Division of Hospital Medicine  Chandrakant Alonso MD  Pager (M-F, 1J-8K): [phone]  Other Times:  [phone]    Patient is a 76y old  Male who presents with a chief complaint of Chest pain (10 Jul 2021 08:23)      SUBJECTIVE / OVERNIGHT EVENTS: No acute events. Ambulating w/o cp or shortness of breath.  ADDITIONAL REVIEW OF SYSTEMS:  no fever, no bleeding    MEDICATIONS  (STANDING):  aspirin enteric coated 81 milliGRAM(s) Oral daily  atorvastatin 80 milliGRAM(s) Oral at bedtime  clopidogrel Tablet 75 milliGRAM(s) Oral daily  dextrose 40% Gel 15 Gram(s) Oral once  dextrose 5%. 1000 milliLiter(s) (50 mL/Hr) IV Continuous <Continuous>  dextrose 5%. 1000 milliLiter(s) (100 mL/Hr) IV Continuous <Continuous>  dextrose 50% Injectable 25 Gram(s) IV Push once  dextrose 50% Injectable 12.5 Gram(s) IV Push once  dextrose 50% Injectable 25 Gram(s) IV Push once  enoxaparin Injectable 40 milliGRAM(s) SubCutaneous daily  finasteride 5 milliGRAM(s) Oral daily  glucagon  Injectable 1 milliGRAM(s) IntraMuscular once  insulin glargine Injectable (LANTUS) 38 Unit(s) SubCutaneous at bedtime  insulin lispro (ADMELOG) corrective regimen sliding scale   SubCutaneous three times a day before meals  insulin lispro (ADMELOG) corrective regimen sliding scale   SubCutaneous at bedtime  losartan 25 milliGRAM(s) Oral daily  melatonin 5 milliGRAM(s) Oral at bedtime  metoprolol tartrate 12.5 milliGRAM(s) Oral every 12 hours  simethicone 80 milliGRAM(s) Chew daily    MEDICATIONS  (PRN):  polyethylene glycol 3350 17 Gram(s) Oral two times a day PRN Constipation  senna 2 Tablet(s) Oral at bedtime PRN Constipation      CAPILLARY BLOOD GLUCOSE      POCT Blood Glucose.: 124 mg/dL (10 Jul 2021 11:34)  POCT Blood Glucose.: 158 mg/dL (10 Jul 2021 07:19)  POCT Blood Glucose.: 144 mg/dL (09 Jul 2021 21:00)  POCT Blood Glucose.: 150 mg/dL (09 Jul 2021 17:02)    I&O's Summary    09 Jul 2021 07:01  -  10 Jul 2021 07:00  --------------------------------------------------------  IN: 530 mL / OUT: 850 mL / NET: -320 mL    10 Jul 2021 07:01  -  10 Jul 2021 15:26  --------------------------------------------------------  IN: 480 mL / OUT: 0 mL / NET: 480 mL        PHYSICAL EXAM:  Vital Signs Last 24 Hrs  T(C): 37.6 (10 Jul 2021 11:44), Max: 37.6 (09 Jul 2021 17:41)  T(F): 99.7 (10 Jul 2021 11:44), Max: 99.7 (10 Jul 2021 11:44)  HR: 70 (10 Jul 2021 11:44) (70 - 90)  BP: 104/67 (10 Jul 2021 11:44) (102/67 - 118/77)  BP(mean): --  RR: 18 (10 Jul 2021 11:44) (16 - 18)  SpO2: 96% (10 Jul 2021 11:44) (95% - 100%)  CONSTITUTIONAL: NAD, well-developed, well-groomed  EYES: EOMI; conjunctiva and sclera clear  ENMT: Moist oral mucosa, no pharyngeal injection or exudates  RESPIRATORY: Normal respiratory effort; lungs are clear to auscultation bilaterally  CARDIOVASCULAR: Regular rate and rhythm, normal S1 and S2, no murmur/rub/gallop; No lower extremity edema  ABDOMEN: Nontender to palpation, normoactive bowel sounds, no rebound/guarding; No hepatosplenomegaly  PSYCH: A+O to person, place, and time; affect appropriate  NEUROLOGY: moving all extremities; no gross sensory deficits   SKIN: No rashes; no palpable lesions    LABS:                        13.7   10.84 )-----------( 157      ( 10 Jul 2021 06:28 )             41.4     07-10    135  |  102  |  13  ----------------------------<  162<H>  3.9   |  21<L>  |  0.86    Ca    9.0      10 Jul 2021 06:28  Phos  2.9     07-10  Mg     2.0     07-10    TPro  6.8  /  Alb  3.6  /  TBili  0.6  /  DBili  x   /  AST  163<H>  /  ALT  55<H>  /  AlkPhos  58  07-10    PT/INR - ( 09 Jul 2021 06:20 )   PT: 12.5 sec;   INR: 1.04 ratio         PTT - ( 10 Jul 2021 06:28 )  PTT:27.5 sec  CARDIAC MARKERS ( 09 Jul 2021 06:13 )  x     / x     / 1693 U/L / x     / 84.4 ng/mL  CARDIAC MARKERS ( 09 Jul 2021 00:17 )  x     / x     / 2596 U/L / x     / 148.2 ng/mL  CARDIAC MARKERS ( 08 Jul 2021 17:15 )  x     / x     / 3863 U/L / x     / 289.4 ng/mL            RADIOLOGY & ADDITIONAL TESTS:  Results Reviewed:   Imaging Personally Reviewed:  Electrocardiogram Personally Reviewed:    COORDINATION OF CARE:  Care Discussed with Consultants/Other Providers [Y/N]:  Prior or Outpatient Records Reviewed [Y/N]:

## 2021-07-10 NOTE — PROGRESS NOTE ADULT - ASSESSMENT
76y M with a hx significant for BPH, T2DM, HLD, who presented to the ED w/ chest pain found to have STEMI s/p PCI with x2 FARTUN in pLAD.

## 2021-07-10 NOTE — PROGRESS NOTE ADULT - ASSESSMENT
Mr Jung is a76yo gentleman with PMH of HLD, DM2 who initially presented with exertional CP, diaphoresis and nausea on 7/8/2021, found to have new LBBB with AWSTEMI.  He underwent PCI with 2 stents to proximal LAD, c/b hypotension s/p IABP. IABP was removed on 7/9/2021. He was started on statin, lopressor.     TTE: very limited despite definity, grossly moderate dysfunctio, akinetic distal septum and apex, no thrombus  [facility] official report pending, s/p 2 FARTUN to pLAD    Problems  1. AWSTEMI  2. LBBB  3. Acute Systolic Heart Failure    Plan:  1. Will need repeat TTE (with definity) on 7/12 to re-evaluate anterior wall thrombus  2. Continue asa, plavix, statin, metoprolol Mr Jung is a76yo gentleman with PMH of HLD, DM2 who initially presented with exertional CP, diaphoresis and nausea on 7/8/2021, found to have new LBBB with AWSTEMI.  He underwent PCI with 2 stents to proximal LAD, c/b hypotension s/p IABP. IABP was removed on 7/9/2021. He was started on statin, lopressor.     TTE: very limited despite definity, grossly moderate dysfunctio, akinetic distal septum and apex, no thrombus  [facility] official report pending, s/p 2 FARTUN to pLAD    Problems  1. AWSTEMI  2. LBBB  3. Acute Systolic Heart Failure    Plan:  1. Will need repeat TTE (with definity) on 7/12 to re-evaluate anterior wall thrombus  2. Continue asa, plavix, statin, metoprolol  3. Would add on losartan 25 qd today, if remains stable tomorrow would add spironolactone 25 daily Mr Jung is a76yo gentleman with PMH of HLD, DM2 who initially presented with exertional CP, diaphoresis and nausea on 7/8/2021, found to have new LBBB with AWSTEMI.  He underwent PCI with 2 stents to proximal LAD, c/b hypotension s/p IABP. IABP was removed on 7/9/2021. He was started on statin, lopressor.     TTE: very limited despite definity, grossly moderate dysfunctio, akinetic distal septum and apex, no thrombus  [facility] official report pending, s/p 2 FARTUN to pLAD    Problems  1. AWSTEMI  2. LBBB  3. Acute Systolic Heart Failure    Plan:  1. Will need repeat TTE (with definity) on 7/12 to re-evaluate anterior wall thrombus  2. Continue asa, plavix, statin, metoprolol  3. Would add on losartan 25 qd today, if remains stable tomorrow would add spironolactone 25 daily  4. Would be a good candidate for SGLT2i in the future

## 2021-07-10 NOTE — PROGRESS NOTE ADULT - ATTENDING COMMENTS
Admitted with anterior wall STEMI s/p PCI complicated by hypotension requiring IABP placement  DAPT with ASA, Ticagrelor; Lipitor 80  Residual LCX lesion that will be medically managed  Hemodynamics acceptable, chest pain free, SvO2 66% - tolerating beta blocker, wean IABP  Check formal TTE  O2 sats mid to high 90s on nasal cannula - wean to room air  Normal renal function  H/H acceptable  Afebrile, no antibiotics   Sugars controlled on Lantus  IABP and femoral [place] 7/8 - remove both
Mr. Jung reports feeling well today without any significant symptoms.  He presented with ACS s/p PCI/stents to LAD.  Continue medical management as noted above.

## 2021-07-10 NOTE — PROGRESS NOTE ADULT - SUBJECTIVE AND OBJECTIVE BOX
All Cardiology service information can be found 24/7 on [email or web address], password: [email or web address]    No events overnight    Meds:  aspirin enteric coated 81 milliGRAM(s) Oral daily  atorvastatin 80 milliGRAM(s) Oral at bedtime  clopidogrel Tablet 75 milliGRAM(s) Oral daily  dextrose 40% Gel 15 Gram(s) Oral once  dextrose 5%. 1000 milliLiter(s) IV Continuous <Continuous>  dextrose 5%. 1000 milliLiter(s) IV Continuous <Continuous>  dextrose 50% Injectable 25 Gram(s) IV Push once  dextrose 50% Injectable 12.5 Gram(s) IV Push once  dextrose 50% Injectable 25 Gram(s) IV Push once  enoxaparin Injectable 40 milliGRAM(s) SubCutaneous daily  finasteride 5 milliGRAM(s) Oral daily  glucagon  Injectable 1 milliGRAM(s) IntraMuscular once  insulin glargine Injectable (LANTUS) 38 Unit(s) SubCutaneous at bedtime  insulin lispro (ADMELOG) corrective regimen sliding scale   SubCutaneous three times a day before meals  insulin lispro (ADMELOG) corrective regimen sliding scale   SubCutaneous at bedtime  melatonin 5 milliGRAM(s) Oral at bedtime  metoprolol tartrate 12.5 milliGRAM(s) Oral every 12 hours  simethicone 80 milliGRAM(s) Chew daily      T(C): 36.8 (07-10-21 @ 04:00), Max: 37.6 (07-09-21 @ 17:41)  HR: 78 (07-10-21 @ 04:00) (66 - 90)  BP: 102/67 (07-10-21 @ 04:00) (102/67 - 118/77)  RR: 18 (07-10-21 @ 04:00) (16 - 22)  SpO2: 95% (07-10-21 @ 04:00) (95% - 100%)    07-09-21 @ 07:01  -  07-10-21 @ 07:00  --------------------------------------------------------  IN: 530 mL / OUT: 850 mL / NET: -320 mL        Exam:  Appearance: No Acute Distress  HEENT: No JVD  Cardiovascular: Normal S1 S2, No murmurs/rubs/gallops  Respiratory: Clear to auscultation bilaterally  Gastrointestinal: Soft, Non-tender	  Skin: No cyanosis	  Neurologic: Non-focal    07-10    135  |  102  |  13  ----------------------------<  162<H>  3.9   |  21<L>  |  0.86    Ca    9.0      10 Jul 2021 06:28  Phos  2.9     07-10  Mg     2.0     07-10    TPro  6.8  /  Alb  3.6  /  TBili  0.6  /  DBili  x   /  AST  163<H>  /  ALT  55<H>  /  AlkPhos  58  07-10                          13.7   10.84 )-----------( 157      ( 10 Jul 2021 06:28 )             41.4     PT/INR - ( 09 Jul 2021 06:20 )   PT: 12.5 sec;   INR: 1.04 ratio         PTT - ( 10 Jul 2021 06:28 )  PTT:27.5 sec

## 2021-07-11 LAB
ALBUMIN SERPL ELPH-MCNC: 3.5 G/DL — SIGNIFICANT CHANGE UP (ref 3.3–5)
ALP SERPL-CCNC: 62 U/L — SIGNIFICANT CHANGE UP (ref 40–120)
ALT FLD-CCNC: 50 U/L — HIGH (ref 10–45)
ANION GAP SERPL CALC-SCNC: 13 MMOL/L — SIGNIFICANT CHANGE UP (ref 5–17)
AST SERPL-CCNC: 91 U/L — HIGH (ref 10–40)
BILIRUB SERPL-MCNC: 0.6 MG/DL — SIGNIFICANT CHANGE UP (ref 0.2–1.2)
BUN SERPL-MCNC: 13 MG/DL — SIGNIFICANT CHANGE UP (ref 7–23)
CALCIUM SERPL-MCNC: 9 MG/DL — SIGNIFICANT CHANGE UP (ref 8.4–10.5)
CHLORIDE SERPL-SCNC: 104 MMOL/L — SIGNIFICANT CHANGE UP (ref 96–108)
CO2 SERPL-SCNC: 23 MMOL/L — SIGNIFICANT CHANGE UP (ref 22–31)
CREAT SERPL-MCNC: 0.88 MG/DL — SIGNIFICANT CHANGE UP (ref 0.5–1.3)
GLUCOSE BLDC GLUCOMTR-MCNC: 128 MG/DL — HIGH (ref 70–99)
GLUCOSE BLDC GLUCOMTR-MCNC: 150 MG/DL — HIGH (ref 70–99)
GLUCOSE BLDC GLUCOMTR-MCNC: 161 MG/DL — HIGH (ref 70–99)
GLUCOSE BLDC GLUCOMTR-MCNC: 171 MG/DL — HIGH (ref 70–99)
GLUCOSE SERPL-MCNC: 136 MG/DL — HIGH (ref 70–99)
HCT VFR BLD CALC: 40.8 % — SIGNIFICANT CHANGE UP (ref 39–50)
HGB BLD-MCNC: 13.3 G/DL — SIGNIFICANT CHANGE UP (ref 13–17)
MCHC RBC-ENTMCNC: 28.3 PG — SIGNIFICANT CHANGE UP (ref 27–34)
MCHC RBC-ENTMCNC: 32.6 GM/DL — SIGNIFICANT CHANGE UP (ref 32–36)
MCV RBC AUTO: 86.8 FL — SIGNIFICANT CHANGE UP (ref 80–100)
NRBC # BLD: 0 /100 WBCS — SIGNIFICANT CHANGE UP (ref 0–0)
PLATELET # BLD AUTO: 171 K/UL — SIGNIFICANT CHANGE UP (ref 150–400)
POTASSIUM SERPL-MCNC: 3.8 MMOL/L — SIGNIFICANT CHANGE UP (ref 3.5–5.3)
POTASSIUM SERPL-SCNC: 3.8 MMOL/L — SIGNIFICANT CHANGE UP (ref 3.5–5.3)
PROT SERPL-MCNC: 6.9 G/DL — SIGNIFICANT CHANGE UP (ref 6–8.3)
RBC # BLD: 4.7 M/UL — SIGNIFICANT CHANGE UP (ref 4.2–5.8)
RBC # FLD: 13.5 % — SIGNIFICANT CHANGE UP (ref 10.3–14.5)
SODIUM SERPL-SCNC: 140 MMOL/L — SIGNIFICANT CHANGE UP (ref 135–145)
WBC # BLD: 9.71 K/UL — SIGNIFICANT CHANGE UP (ref 3.8–10.5)
WBC # FLD AUTO: 9.71 K/UL — SIGNIFICANT CHANGE UP (ref 3.8–10.5)

## 2021-07-11 PROCEDURE — 99233 SBSQ HOSP IP/OBS HIGH 50: CPT

## 2021-07-11 RX ORDER — SPIRONOLACTONE 25 MG/1
25 TABLET, FILM COATED ORAL DAILY
Refills: 0 | Status: DISCONTINUED | OUTPATIENT
Start: 2021-07-11 | End: 2021-07-14

## 2021-07-11 RX ADMIN — INSULIN GLARGINE 38 UNIT(S): 100 INJECTION, SOLUTION SUBCUTANEOUS at 22:17

## 2021-07-11 RX ADMIN — ENOXAPARIN SODIUM 40 MILLIGRAM(S): 100 INJECTION SUBCUTANEOUS at 11:54

## 2021-07-11 RX ADMIN — SENNA PLUS 2 TABLET(S): 8.6 TABLET ORAL at 22:33

## 2021-07-11 RX ADMIN — Medication 1: at 11:54

## 2021-07-11 RX ADMIN — Medication 12.5 MILLIGRAM(S): at 22:18

## 2021-07-11 RX ADMIN — ATORVASTATIN CALCIUM 80 MILLIGRAM(S): 80 TABLET, FILM COATED ORAL at 22:18

## 2021-07-11 RX ADMIN — Medication 12.5 MILLIGRAM(S): at 08:59

## 2021-07-11 RX ADMIN — CLOPIDOGREL BISULFATE 75 MILLIGRAM(S): 75 TABLET, FILM COATED ORAL at 11:54

## 2021-07-11 RX ADMIN — LOSARTAN POTASSIUM 25 MILLIGRAM(S): 100 TABLET, FILM COATED ORAL at 05:22

## 2021-07-11 RX ADMIN — FINASTERIDE 5 MILLIGRAM(S): 5 TABLET, FILM COATED ORAL at 11:53

## 2021-07-11 RX ADMIN — Medication 81 MILLIGRAM(S): at 11:53

## 2021-07-11 RX ADMIN — Medication 5 MILLIGRAM(S): at 22:18

## 2021-07-11 NOTE — PROGRESS NOTE ADULT - SUBJECTIVE AND OBJECTIVE BOX
[facility] Division of Hospital Medicine  Chandrakant Alonso MD  Pager (M-F, 5S-3P): [phone]  Other Times:  [phone]    Patient is a 76y old  Male who presents with a chief complaint of Chest pain (10 Jul 2021 15:26)      SUBJECTIVE / OVERNIGHT EVENTS: No acute events, feels well.  ADDITIONAL REVIEW OF SYSTEMS: no chest pain    MEDICATIONS  (STANDING):  aspirin enteric coated 81 milliGRAM(s) Oral daily  atorvastatin 80 milliGRAM(s) Oral at bedtime  clopidogrel Tablet 75 milliGRAM(s) Oral daily  dextrose 40% Gel 15 Gram(s) Oral once  dextrose 5%. 1000 milliLiter(s) (50 mL/Hr) IV Continuous <Continuous>  dextrose 5%. 1000 milliLiter(s) (100 mL/Hr) IV Continuous <Continuous>  dextrose 50% Injectable 25 Gram(s) IV Push once  dextrose 50% Injectable 12.5 Gram(s) IV Push once  dextrose 50% Injectable 25 Gram(s) IV Push once  enoxaparin Injectable 40 milliGRAM(s) SubCutaneous daily  finasteride 5 milliGRAM(s) Oral daily  glucagon  Injectable 1 milliGRAM(s) IntraMuscular once  insulin glargine Injectable (LANTUS) 38 Unit(s) SubCutaneous at bedtime  insulin lispro (ADMELOG) corrective regimen sliding scale   SubCutaneous three times a day before meals  insulin lispro (ADMELOG) corrective regimen sliding scale   SubCutaneous at bedtime  losartan 25 milliGRAM(s) Oral daily  melatonin 5 milliGRAM(s) Oral at bedtime  metoprolol tartrate 12.5 milliGRAM(s) Oral every 12 hours  simethicone 80 milliGRAM(s) Chew daily  spironolactone 25 milliGRAM(s) Oral daily    MEDICATIONS  (PRN):  polyethylene glycol 3350 17 Gram(s) Oral two times a day PRN Constipation  senna 2 Tablet(s) Oral at bedtime PRN Constipation      CAPILLARY BLOOD GLUCOSE      POCT Blood Glucose.: 171 mg/dL (11 Jul 2021 11:34)  POCT Blood Glucose.: 150 mg/dL (11 Jul 2021 07:29)  POCT Blood Glucose.: 189 mg/dL (10 Jul 2021 21:08)  POCT Blood Glucose.: 156 mg/dL (10 Jul 2021 16:19)    I&O's Summary    10 Jul 2021 07:01  -  11 Jul 2021 07:00  --------------------------------------------------------  IN: 720 mL / OUT: 400 mL / NET: 320 mL    11 Jul 2021 07:01  -  11 Jul 2021 15:47  --------------------------------------------------------  IN: 240 mL / OUT: 0 mL / NET: 240 mL        PHYSICAL EXAM:  Vital Signs Last 24 Hrs  T(C): 36.9 (11 Jul 2021 11:44), Max: 37.3 (10 Jul 2021 20:00)  T(F): 98.4 (11 Jul 2021 11:44), Max: 99.2 (10 Jul 2021 20:00)  HR: 72 (11 Jul 2021 11:44) (72 - 88)  BP: 105/70 (11 Jul 2021 11:44) (105/70 - 122/77)  BP(mean): --  RR: 18 (11 Jul 2021 11:44) (18 - 18)  SpO2: 96% (11 Jul 2021 11:44) (96% - 97%)  CONSTITUTIONAL: NAD, well-developed, well-groomed  EYES: EOMI; conjunctiva and sclera clear  ENMT: Moist oral mucosa, no pharyngeal injection or exudates  RESPIRATORY: Normal respiratory effort; lungs are clear to auscultation bilaterally  CARDIOVASCULAR: Regular rate and rhythm, normal S1 and S2, no murmur/rub/gallop; No lower extremity edema  ABDOMEN: Nontender to palpation, normoactive bowel sounds, no rebound/guarding; No hepatosplenomegaly  PSYCH: A+O to person, place, and time; affect appropriate  NEUROLOGY: moving all extremities; no gross sensory deficits   SKIN: No rashes; no palpable lesions    LABS:                        13.3   9.71  )-----------( 171      ( 11 Jul 2021 07:01 )             40.8     07-11    140  |  104  |  13  ----------------------------<  136<H>  3.8   |  23  |  0.88    Ca    9.0      11 Jul 2021 07:00  Phos  2.9     07-10  Mg     2.0     07-10    TPro  6.9  /  Alb  3.5  /  TBili  0.6  /  DBili  x   /  AST  91<H>  /  ALT  50<H>  /  AlkPhos  62  07-11    PTT - ( 10 Jul 2021 06:28 )  PTT:27.5 sec            RADIOLOGY & ADDITIONAL TESTS:  Results Reviewed:   Imaging Personally Reviewed:  Electrocardiogram Personally Reviewed:    COORDINATION OF CARE:  Care Discussed with Consultants/Other Providers [Y/N]:  Prior or Outpatient Records Reviewed [Y/N]:

## 2021-07-12 ENCOUNTER — TRANSCRIPTION ENCOUNTER (OUTPATIENT)
Age: 76
End: 2021-07-12

## 2021-07-12 LAB
GLUCOSE BLDC GLUCOMTR-MCNC: 139 MG/DL — HIGH (ref 70–99)
GLUCOSE BLDC GLUCOMTR-MCNC: 151 MG/DL — HIGH (ref 70–99)
GLUCOSE BLDC GLUCOMTR-MCNC: 224 MG/DL — HIGH (ref 70–99)
GLUCOSE BLDC GLUCOMTR-MCNC: 255 MG/DL — HIGH (ref 70–99)

## 2021-07-12 PROCEDURE — 93306 TTE W/DOPPLER COMPLETE: CPT | Mod: 26

## 2021-07-12 PROCEDURE — 99233 SBSQ HOSP IP/OBS HIGH 50: CPT

## 2021-07-12 PROCEDURE — 99233 SBSQ HOSP IP/OBS HIGH 50: CPT | Mod: GC

## 2021-07-12 RX ORDER — METOPROLOL TARTRATE 50 MG
25 TABLET ORAL DAILY
Refills: 0 | Status: DISCONTINUED | OUTPATIENT
Start: 2021-07-12 | End: 2021-07-14

## 2021-07-12 RX ADMIN — ENOXAPARIN SODIUM 40 MILLIGRAM(S): 100 INJECTION SUBCUTANEOUS at 11:57

## 2021-07-12 RX ADMIN — ATORVASTATIN CALCIUM 80 MILLIGRAM(S): 80 TABLET, FILM COATED ORAL at 21:58

## 2021-07-12 RX ADMIN — Medication 5 MILLIGRAM(S): at 21:58

## 2021-07-12 RX ADMIN — Medication 12.5 MILLIGRAM(S): at 09:10

## 2021-07-12 RX ADMIN — Medication 3: at 11:52

## 2021-07-12 RX ADMIN — CLOPIDOGREL BISULFATE 75 MILLIGRAM(S): 75 TABLET, FILM COATED ORAL at 11:57

## 2021-07-12 RX ADMIN — FINASTERIDE 5 MILLIGRAM(S): 5 TABLET, FILM COATED ORAL at 11:57

## 2021-07-12 RX ADMIN — SPIRONOLACTONE 25 MILLIGRAM(S): 25 TABLET, FILM COATED ORAL at 05:22

## 2021-07-12 RX ADMIN — INSULIN GLARGINE 38 UNIT(S): 100 INJECTION, SOLUTION SUBCUTANEOUS at 21:58

## 2021-07-12 RX ADMIN — SIMETHICONE 80 MILLIGRAM(S): 80 TABLET, CHEWABLE ORAL at 11:57

## 2021-07-12 RX ADMIN — Medication 0: at 16:39

## 2021-07-12 RX ADMIN — Medication 81 MILLIGRAM(S): at 11:57

## 2021-07-12 RX ADMIN — Medication 1: at 07:45

## 2021-07-12 RX ADMIN — LOSARTAN POTASSIUM 25 MILLIGRAM(S): 100 TABLET, FILM COATED ORAL at 05:22

## 2021-07-12 NOTE — PROGRESS NOTE ADULT - ASSESSMENT
76y M with a hx significant for BPH, T2DM, HLD, who presented to the ED w/ chest pain found to have STEMI s/p PCI with x2 FARTUN in pLAD, pending 72hr repeat TTE to rule out LV thrombus

## 2021-07-12 NOTE — DISCHARGE NOTE PROVIDER - NSDCMRMEDTOKEN_GEN_ALL_CORE_FT
finasteride 5 mg oral tablet: 1 tab(s) orally once a day  metFORMIN 850 mg oral tablet: 1 tab(s) orally 2 times a day  pravastatin 20 mg oral tablet: 1 tab(s) orally once a day  Tougovindo SoloStar 300 units/mL subcutaneous solution: 38 unit(s) subcutaneous once a day (at bedtime)   aspirin 81 mg oral delayed release tablet: 1 tab(s) orally once a day  atorvastatin 80 mg oral tablet: 1 tab(s) orally once a day (at bedtime)  clopidogrel 75 mg oral tablet: 1 tab(s) orally once a day  finasteride 5 mg oral tablet: 1 tab(s) orally once a day  losartan 25 mg oral tablet: 1 tab(s) orally once a day  metFORMIN 850 mg oral tablet: 1 tab(s) orally 2 times a day  Metoprolol Succinate ER 50 mg oral tablet, extended release: 1 tab(s) orally once a day  spironolactone 25 mg oral tablet: 1 tab(s) orally once a day  Toujeo SoloStar 300 units/mL subcutaneous solution: 38 unit(s) subcutaneous once a day (at bedtime)

## 2021-07-12 NOTE — PROGRESS NOTE ADULT - ASSESSMENT
Mr Jung is a76yo gentleman with PMH of HLD, DM2 who initially presented with exertional CP, diaphoresis and nausea on 7/8/2021, found to have new LBBB with AWSTEMI.  He underwent PCI with 2 stents to proximal LAD, c/b hypotension s/p IABP. IABP was removed on 7/9/2021. He was started on statin, lopressor.     TTE: very limited despite definity, grossly moderate dysfunction, akinetic distal septum and apex, no thrombus  [facility] official report pending, s/p 2 FARTUN to pLAD    Problems  1. AWSTEMI  2. LBBB  3. Acute Systolic Heart Failure    Plan:  1. Will need repeat TTE (with definity) on 7/12 to re-evaluate anterior wall thrombus  2. Continue asa, plavix, statin, losartan 25 qd, spironolactone 25 qd  3. Change BB to toprol 25mg qd  4. Would be a good candidate for SGLT2i in the future    WENDY Etienne MD  Cardiology Fellow  Text or Call: [phone]  For all New Consults and Questions:  [email or web address]   Login: [employer] Mr. Jung is a 75 yo man with PMH of HLD & DM2.    He initially presented with exertional CP, diaphoresis and nausea on 7/8/2021, and was found to have new LBBB & AW-STEMI.  He underwent PCI with 2 stents to proximal LAD, c/b hypotension s/p IABP. IABP was removed on 7/9/2021. He was started on statin, Lopressor.     TTE: very limited visualization despite Definity, grossly moderate dysfunction, akinetic distal septum and apex, no thrombus  [facility] official report pending, s/p 2 FARTUN to pLAD        Problems  1. AWSTEMI  2. LBBB  3. Acute Systolic Heart Failure    Plan:  1. Will need repeat TTE (with Definity) on 7/12 to re-evaluate for anterior wall thrombus  2. Continue asa, Plavix, statin, losartan 25 qd, spironolactone 25 qd  3. Change BB to Toprol LX 25 mg qd  4. Would be a good candidate for SGLT2i in the future      WENDY Etienne MD  Cardiology Fellow  Text or Call: [phone]    Plan discussed with cardiology fellow; patient seen and examined.       I was physically present for the key portions of the evaluation and management (E/M) service provided.    I agree with the above history, physical, and plan which I have reviewed and edited where appropriate.   Ronald Cano M.D.  Cardiology Attending, Consult Service    For Cardiology consults and questions, all Cardiology service information can be found 24/7 on [email or web address], password: [employer]

## 2021-07-12 NOTE — CHART NOTE - NSCHARTNOTEFT_GEN_A_CORE
Followed up below echo results from 7/12 with cardiology fellow, Dr. Peggy Cee.    Echo results: "1. Endocardial visualization enhanced with intravenous  injection of Ultrasonic Enhancing Agent (Definity).  There  is a false tendon in the LV cavity (normal variant).  The  mid to distal anteroseptum, distal inferoseptum, and apex  are severely hypokinetic to akinetic.  There is no obvious  evidence of left ventricular thrombus; however, incomplete  opacification of the tip of the apical wall may be  suggestive of a small, mural apical thrombus. Consider  cardiac MRI or CTA if clinically indicated."    As per cardiology fellow, will hold off on ordering CTA or MRI. Hold off on anticoagulation for now. Cardiology attending to reread echo in AM. Will continue to monitor signs and symptoms. Continue to monitor on telemetry. Will endorse to day team.    -Priyanka Ashby PA-C  #[id number]

## 2021-07-12 NOTE — DISCHARGE NOTE PROVIDER - NSFOLLOWUPCLINICS_GEN_ALL_ED_FT
[facility]  Cardiology  [address] [place] [id number]  Phone: [phone]  Fax:

## 2021-07-12 NOTE — DISCHARGE NOTE PROVIDER - NSDCCPCAREPLAN_GEN_ALL_CORE_FT
PRINCIPAL DISCHARGE DIAGNOSIS  Diagnosis: STEMI (ST elevation myocardial infarction)  Assessment and Plan of Treatment: Follow up with cardiologist in 1 week   Take all medications as prescribed  Post cardiac angiography, 2 stents placed  Call your doctor if you have unusual chest pain, pressure, or discomfort, shortness of breath, nausea, vomiting, burping, heartburn, tingling upper body parts, sweating, cold, clammy sking, racing heartbeat  Call 911 if you think you are having a heart attack  Take all cardiac medications as prescribed - notify your doctor if you have any side effects  Follow cardiac diet - avoid fatty & fried foods, don't eat too much red meat, eat lots of fruits & vegetables, dairy products should be low fat  Lose weight if you are overweight  Become more active with walking, gardening, or any other activity that gets you to move        SECONDARY DISCHARGE DIAGNOSES  Diagnosis: S/P coronary artery stent placement  Assessment and Plan of Treatment: Angioplasty or coronary stenting are procedures to open up narrowed or blocked coronary arteries in the heart.  A stent is a tiny metal tube that helps to prop open an artery in the heart muscle.    Your doctor will instruct you when you can drive or resume usual physical activities  You MUST take aspirin & another agent Plavix, Brilinta) to help prevent clots inside the stent.  It is VERY important to take these medications as directed unless your cardiologist says it is OK to stop.  If another physican advises you to stop them, call cardiologist to discuss this advise since there is a risk of a heart attack or even death stopping these medications earlier than they should be.  Do NOT take more than 81 mg aspirin with Brilinta  The most common problems after coronary stenting is bleeding, bruising, & soreness at the tube insertion site - you can use tylenol for discomfort if not contraindicated  Call your doctor if you have chest pain, fever, pain, swelling, or redness where the tube went in       PRINCIPAL DISCHARGE DIAGNOSIS  Diagnosis: STEMI (ST elevation myocardial infarction)  Assessment and Plan of Treatment: Follow up with cardiologist in 1 week   Take all medications as prescribed  Post cardiac angiography, 2 stents placed  Call your doctor if you have unusual chest pain, pressure, or discomfort, shortness of breath, nausea, vomiting, burping, heartburn, tingling upper body parts, sweating, cold, clammy sking, racing heartbeat  Call 911 if you think you are having a heart attack  Take all cardiac medications as prescribed - notify your doctor if you have any side effects  Follow cardiac diet - avoid fatty & fried foods, don't eat too much red meat, eat lots of fruits & vegetables, dairy products should be low fat  Lose weight if you are overweight  Become more active with walking, gardening, or any other activity that gets you to move        SECONDARY DISCHARGE DIAGNOSES  Diagnosis: S/P coronary artery stent placement  Assessment and Plan of Treatment: Angioplasty or coronary stenting are procedures to open up narrowed or blocked coronary arteries in the heart.  A stent is a tiny metal tube that helps to prop open an artery in the heart muscle.    Your doctor will instruct you when you can drive or resume usual physical activities  You MUST take aspirin & another agent Plavix, Brilinta) to help prevent clots inside the stent.  It is VERY important to take these medications as directed unless your cardiologist says it is OK to stop.  If another physican advises you to stop them, call cardiologist to discuss this advise since there is a risk of a heart attack or even death stopping these medications earlier than they should be.  Do NOT take more than 81 mg aspirin with Brilinta  The most common problems after coronary stenting is bleeding, bruising, & soreness at the tube insertion site - you can use tylenol for discomfort if not contraindicated  Call your doctor if you have chest pain, fever, pain, swelling, or redness where the tube went in      Diagnosis: Type 2 diabetes mellitus  Assessment and Plan of Treatment: HgA1C this admission was 7.2.  Make sure you get your HgA1c checked every three months.  If you take oral diabetes medications, check your blood glucose two times a day.  If you take insulin, check your blood glucose before meals and at bedtime.  It's important not to skip any meals.  Keep a log of your blood glucose results and always take it with you to your doctor appointments.  Keep a list of your current medications including injectables and over the counter medications and bring this medication list with you to all your doctor appointments.  If you have not seen your ophthalmologist this year call for appointment.  Check your feet daily for redness, sores, or openings. Do not self treat. If no improvement in two days call your primary care physician for an appointment.  Low blood sugar (hypoglycemia) is a blood sugar below 70mg/dl. Check your blood sugar if you feel signs/symptoms of hypoglycemia. If your blood sugar is below 70 take 15 grams of carbohydrates (ex 4 oz of apple juice, 3-4 glucose tablets, or 4-6 oz of regular soda) wait 15 minutes and repeat blood sugar to make sure it comes up above 70.  If your blood sugar is above 70 and you are due for a meal, have a meal.  If you are not due for a meal have a snack.  This snack helps keeps your blood sugar at a safe range.       PRINCIPAL DISCHARGE DIAGNOSIS  Diagnosis: STEMI (ST elevation myocardial infarction)  Assessment and Plan of Treatment: You have an appointment with Dr Wright on 7/20/21 at 9:45 am.  Take all medications as prescribed  Post cardiac angiography, 2 stents placed  Call your doctor if you have unusual chest pain, pressure, or discomfort, shortness of breath, nausea, vomiting, burping, heartburn, tingling upper body parts, sweating, cold, clammy sking, racing heartbeat  Call 911 if you think you are having a heart attack  Take all cardiac medications as prescribed - notify your doctor if you have any side effects  Follow cardiac diet - avoid fatty & fried foods, don't eat too much red meat, eat lots of fruits & vegetables, dairy products should be low fat  Lose weight if you are overweight  Become more active with walking, gardening, or any other activity that gets you to move        SECONDARY DISCHARGE DIAGNOSES  Diagnosis: S/P coronary artery stent placement  Assessment and Plan of Treatment: Angioplasty or coronary stenting are procedures to open up narrowed or blocked coronary arteries in the heart.  A stent is a tiny metal tube that helps to prop open an artery in the heart muscle.    Your doctor will instruct you when you can drive or resume usual physical activities  You MUST take aspirin & another agent Plavix, Brilinta) to help prevent clots inside the stent.  It is VERY important to take these medications as directed unless your cardiologist says it is OK to stop.  If another physican advises you to stop them, call cardiologist to discuss this advise since there is a risk of a heart attack or even death stopping these medications earlier than they should be.  Do NOT take more than 81 mg aspirin with Brilinta  The most common problems after coronary stenting is bleeding, bruising, & soreness at the tube insertion site - you can use tylenol for discomfort if not contraindicated  Call your doctor if you have chest pain, fever, pain, swelling, or redness where the tube went in      Diagnosis: Type 2 diabetes mellitus  Assessment and Plan of Treatment: HgA1C this admission was 7.2.  Make sure you get your HgA1c checked every three months.  If you take oral diabetes medications, check your blood glucose two times a day.  If you take insulin, check your blood glucose before meals and at bedtime.  It's important not to skip any meals.  Keep a log of your blood glucose results and always take it with you to your doctor appointments.  Keep a list of your current medications including injectables and over the counter medications and bring this medication list with you to all your doctor appointments.  If you have not seen your ophthalmologist this year call for appointment.  Check your feet daily for redness, sores, or openings. Do not self treat. If no improvement in two days call your primary care physician for an appointment.  Low blood sugar (hypoglycemia) is a blood sugar below 70mg/dl. Check your blood sugar if you feel signs/symptoms of hypoglycemia. If your blood sugar is below 70 take 15 grams of carbohydrates (ex 4 oz of apple juice, 3-4 glucose tablets, or 4-6 oz of regular soda) wait 15 minutes and repeat blood sugar to make sure it comes up above 70.  If your blood sugar is above 70 and you are due for a meal, have a meal.  If you are not due for a meal have a snack.  This snack helps keeps your blood sugar at a safe range.

## 2021-07-12 NOTE — PROGRESS NOTE ADULT - SUBJECTIVE AND OBJECTIVE BOX
Patient is a 76y old  Male who presents with a chief complaint of Chest pain (12 Jul 2021 10:16)      SUBJECTIVE / OVERNIGHT EVENTS:  no acute events overnight, vss, afebrile  pt feels well this morning - "new life!@!!"  waiting for repeat TTE to rule out LV thrombus    tele reviewed: sinus 60-80s    ROS:  14 point ROS negative in detail except stated as above    MEDICATIONS  (STANDING):  aspirin enteric coated 81 milliGRAM(s) Oral daily  atorvastatin 80 milliGRAM(s) Oral at bedtime  clopidogrel Tablet 75 milliGRAM(s) Oral daily  dextrose 40% Gel 15 Gram(s) Oral once  dextrose 5%. 1000 milliLiter(s) (50 mL/Hr) IV Continuous <Continuous>  dextrose 5%. 1000 milliLiter(s) (100 mL/Hr) IV Continuous <Continuous>  dextrose 50% Injectable 25 Gram(s) IV Push once  dextrose 50% Injectable 12.5 Gram(s) IV Push once  dextrose 50% Injectable 25 Gram(s) IV Push once  enoxaparin Injectable 40 milliGRAM(s) SubCutaneous daily  finasteride 5 milliGRAM(s) Oral daily  glucagon  Injectable 1 milliGRAM(s) IntraMuscular once  insulin glargine Injectable (LANTUS) 38 Unit(s) SubCutaneous at bedtime  insulin lispro (ADMELOG) corrective regimen sliding scale   SubCutaneous three times a day before meals  insulin lispro (ADMELOG) corrective regimen sliding scale   SubCutaneous at bedtime  losartan 25 milliGRAM(s) Oral daily  melatonin 5 milliGRAM(s) Oral at bedtime  metoprolol tartrate 12.5 milliGRAM(s) Oral every 12 hours  simethicone 80 milliGRAM(s) Chew daily  spironolactone 25 milliGRAM(s) Oral daily    MEDICATIONS  (PRN):  polyethylene glycol 3350 17 Gram(s) Oral two times a day PRN Constipation  senna 2 Tablet(s) Oral at bedtime PRN Constipation      CAPILLARY BLOOD GLUCOSE      POCT Blood Glucose.: 255 mg/dL (12 Jul 2021 11:36)  POCT Blood Glucose.: 151 mg/dL (12 Jul 2021 07:39)  POCT Blood Glucose.: 161 mg/dL (11 Jul 2021 21:26)  POCT Blood Glucose.: 128 mg/dL (11 Jul 2021 16:30)    I&O's Summary    11 Jul 2021 07:01  -  12 Jul 2021 07:00  --------------------------------------------------------  IN: 540 mL / OUT: 400 mL / NET: 140 mL    12 Jul 2021 07:01  -  12 Jul 2021 12:13  --------------------------------------------------------  IN: 350 mL / OUT: 300 mL / NET: 50 mL        PHYSICAL EXAM:  Vital Signs Last 24 Hrs  T(C): 36.8 (12 Jul 2021 04:00), Max: 37.3 (11 Jul 2021 20:28)  T(F): 98.2 (12 Jul 2021 04:00), Max: 99.1 (11 Jul 2021 20:28)  HR: 73 (12 Jul 2021 04:00) (73 - 81)  BP: 103/67 (12 Jul 2021 04:00) (103/67 - 108/70)  BP(mean): --  RR: 17 (12 Jul 2021 04:00) (17 - 18)  SpO2: 97% (12 Jul 2021 04:00) (96% - 97%)    GENERAL: NAD, well-developed  HEAD:  Atraumatic, Normocephalic  EYES: EOMI, PERRLA, conjunctiva and sclera clear  NECK: Supple, No JVD  CHEST/LUNG: Clear to auscultation bilaterally; No wheeze  HEART: Regular rate and rhythm; No murmurs, rubs, or gallops  ABDOMEN: Soft, Nontender, Nondistended; Bowel sounds present  EXTREMITIES:  2+ Peripheral Pulses, No clubbing, cyanosis, or edema  NEUROLOGY: AAOx3; non-focal  SKIN: No rashes or lesions    LABS:  personally reviewed                        13.3   9.71  )-----------( 171      ( 11 Jul 2021 07:01 )             40.8     07-11    140  |  104  |  13  ----------------------------<  136<H>  3.8   |  23  |  0.88    Ca    9.0      11 Jul 2021 07:00    TPro  6.9  /  Alb  3.5  /  TBili  0.6  /  DBili  x   /  AST  91<H>  /  ALT  50<H>  /  AlkPhos  62  07-11              RADIOLOGY & ADDITIONAL TESTS:    Imaging Personally Reviewed:    Consultant(s) Notes Reviewed:  cardiology    Care Discussed with Consultants/Other Providers: Dr. Etienne (Cards)

## 2021-07-12 NOTE — DISCHARGE NOTE PROVIDER - NSDCFUADDAPPT_GEN_ALL_CORE_FT
Please follow up with your cardiologist in 1 week from discharge.  You have an appointment with Dr Wright on 7/20/21 at 9:45 am.

## 2021-07-12 NOTE — DISCHARGE NOTE PROVIDER - CARE PROVIDER_API CALL
ALDO WYMAN  Internal Medicine  [address] [place] [address]  Phone: [phone]  Fax: [phone]  Follow Up Time:

## 2021-07-12 NOTE — CHART NOTE - NSCHARTNOTEFT_GEN_A_CORE
Pt a/w AWSTEMI, s/p 2 stent to pLAD, Repeat echo to r/o LV thrombus done today with results below. d/w with cardiology fellow Dr. Cee. s/o to night shift to f/u recs.    1. Endocardial visualization enhanced with intravenous  injection of Ultrasonic Enhancing Agent (Definity).  There  is a false tendon in the LV cavity (normal variant).  The  mid to distal anteroseptum, distal inferoseptum, and apex  are severely hypokinetic to akinetic.  There is no obvious  evidence of left ventricular thrombus; however, incomplete  opacification of the tip of the apical wall may be  suggestive of a small, mural apical thrombus. Consider  cardiac MRI or CTA if clinically indicated.    Gifty Singh NP  [id number]

## 2021-07-12 NOTE — DISCHARGE NOTE NURSING/CASE MANAGEMENT/SOCIAL WORK - PATIENT PORTAL LINK FT
You can access the FollowMyHealth Patient Portal offered by [facility] by registering at the following website: [email or web address]. By joining [facility]’s FollowMyHealth portal, you will also be able to view your health information using other applications (apps) compatible with our system.

## 2021-07-12 NOTE — DISCHARGE NOTE PROVIDER - HOSPITAL COURSE
75 yo man with PMH of HLD & DM2 presented with exertional CP, diaphoresis and nausea on 7/8/2021, and was found to have new LBBB & AW-STEMI.  He underwent PCI with 2 stents to proximal LAD, c/b hypotension s/p IABP. IABP was removed on 7/9/2021. Started ASA, Plavix, statin, losartan, spironolactone, and Toprol. TTE: very limited visualization despite Definity, grossly moderate dysfunction, akinetic distal septum and apex, no thrombus  Repeat echo________________________________________________________               76 year old male PMH of HLD & DM2 presented with exertional CP, diaphoresis and nausea on 7/8/2021, and was found to have new LBBB & AW-STEMI.  He underwent PCI with 2 stents to proximal LAD, c/b hypotension s/p IABP. IABP was removed on 7/9/2021. Started ASA, Plavix, statin, losartan, spironolactone, and Toprol. TTE: very limited visualization despite Definity, grossly moderate dysfunction, akinetic distal septum and apex, no thrombus. Repeat echocardiogram showed there was no obvious evidence of left ventricular thrombus; however, incomplete opacification of the tip of the apical wall may be suggestive of a small, mural apical thrombus. Patient underwent a cardiac MRI was negative for thrombus. Patient stable for discharge home.  Follow up with PMD and cardiology clinic.

## 2021-07-12 NOTE — PROGRESS NOTE ADULT - SUBJECTIVE AND OBJECTIVE BOX
INTERVAL EVENTS: No o/n events. Denies CP, dyspnea, palpitations, presyncope, syncope, f/c/n/v.     REVIEW OF SYSTEMS:  Constitutional:     [X] negative [ ] fevers [ ] chills [ ] weight loss [ ] weight gain  HEENT:                  [X] negative [ ] dry eyes [ ] eye irritation [ ] postnasal drip [ ] nasal congestion  CV:                         [X] negative  [ ] chest pain [ ] orthopnea [ ] palpitations [ ] murmur  Resp:                     [X] negative [ ] cough [ ] shortness of breath [ ] wheezing [ ] sputum [ ] hemoptysis  GI:                          [X] negative [ ] nausea [ ] vomiting [ ] diarrhea [ ] constipation [ ] abd pain [ ] dysphagia   :                        [X] negative [ ] dysuria [ ] nocturia [ ] hematuria [ ] increased urinary frequency  MSK:                      [X] negative [ ] back pain [ ] myalgias [ ] arthralgias [ ] fracture  Skin:                       [X] negative [ ] rash [ ] itch  Neuro:                   [X] negative [ ] headache [ ] dizziness [ ] syncope [ ] weakness [ ] numbness  Psych:                    [X] negative [ ] anxiety [ ] depression  Endo:                     [X] negative [ ] diabetes [ ] thyroid problem  Heme/Lymph:      [X] negative [ ] anemia [ ] bleeding problem  Allergic/Immune: [X] negative [ ] itchy eyes [ ] nasal discharge [ ] hives [ ] angioedema    [X] All other systems negative or otherwise described above.  [ ] Unable to assess ROS because ________.    PAST MEDICAL & SURGICAL HISTORY:  HLD (hyperlipidemia)    T2DM (type 2 diabetes mellitus)    BPH (benign prostatic hyperplasia)      MEDICATIONS  (STANDING):  aspirin enteric coated 81 milliGRAM(s) Oral daily  atorvastatin 80 milliGRAM(s) Oral at bedtime  clopidogrel Tablet 75 milliGRAM(s) Oral daily  dextrose 40% Gel 15 Gram(s) Oral once  dextrose 5%. 1000 milliLiter(s) (50 mL/Hr) IV Continuous <Continuous>  dextrose 5%. 1000 milliLiter(s) (100 mL/Hr) IV Continuous <Continuous>  dextrose 50% Injectable 25 Gram(s) IV Push once  dextrose 50% Injectable 12.5 Gram(s) IV Push once  dextrose 50% Injectable 25 Gram(s) IV Push once  enoxaparin Injectable 40 milliGRAM(s) SubCutaneous daily  finasteride 5 milliGRAM(s) Oral daily  glucagon  Injectable 1 milliGRAM(s) IntraMuscular once  insulin glargine Injectable (LANTUS) 38 Unit(s) SubCutaneous at bedtime  insulin lispro (ADMELOG) corrective regimen sliding scale   SubCutaneous three times a day before meals  insulin lispro (ADMELOG) corrective regimen sliding scale   SubCutaneous at bedtime  losartan 25 milliGRAM(s) Oral daily  melatonin 5 milliGRAM(s) Oral at bedtime  metoprolol tartrate 12.5 milliGRAM(s) Oral every 12 hours  simethicone 80 milliGRAM(s) Chew daily  spironolactone 25 milliGRAM(s) Oral daily    MEDICATIONS  (PRN):  polyethylene glycol 3350 17 Gram(s) Oral two times a day PRN Constipation  senna 2 Tablet(s) Oral at bedtime PRN Constipation    ICU Vital Signs Last 24 Hrs  T(C): 36.8 (12 Jul 2021 04:00), Max: 37.3 (11 Jul 2021 20:28)  T(F): 98.2 (12 Jul 2021 04:00), Max: 99.1 (11 Jul 2021 20:28)  HR: 73 (12 Jul 2021 04:00) (72 - 81)  BP: 103/67 (12 Jul 2021 04:00) (103/67 - 108/70)  BP(mean): --  ABP: --  ABP(mean): --  RR: 17 (12 Jul 2021 04:00) (17 - 18)  SpO2: 97% (12 Jul 2021 04:00) (96% - 97%)    Daily     Daily     PHYSICAL EXAM:  GEN: Awake, alert. NAD.   HEENT: NCAT, PERRL, EOMI. Mucosa moist. No JVD.  RESP: CTA b/l  CV: RRR. Normal S1/S2. No m/r/g.  ABD: Soft. NT/ND. BS+  EXT: Warm. No edema, clubbing, or cyanosis.   NEURO: AAOx3. No focal deficits.     LABS:                        13.3   9.71  )-----------( 171      ( 11 Jul 2021 07:01 )             40.8     07-11    140  |  104  |  13  ----------------------------<  136<H>  3.8   |  23  |  0.88    Ca    9.0      11 Jul 2021 07:00    TPro  6.9  /  Alb  3.5  /  TBili  0.6  /  DBili  x   /  AST  91<H>  /  ALT  50<H>  /  AlkPhos  62  07-11            I&O's Summary    11 Jul 2021 07:01  -  12 Jul 2021 07:00  --------------------------------------------------------  IN: 540 mL / OUT: 400 mL / NET: 140 mL      BNP    RADIOLOGY & ADDITIONAL STUDIES:    TELEMETRY: reviewed    EKG: reviewed    < from: TTE with Doppler (w/Cont) (07.09.21 @ 12:18) >  ------------------------------------------------------------------------  Conclusions:  Technically difficultstudy.  1. Normal mitral valve. Minimal mitral regurgitation.  2. Aortic valve not well visualized; appears to be a  calcified trileaflet valve with normal opening. No aortic  valve regurgitation seen.  3. Endocardial visualization enhanced with intravenous  injection of Ultrasonic Enhancing Agent (Definity). Left  ventricle suboptimally visualized despite intravenous  Definity; grossly moderate segmental left ventricular  systolic dysfunction. The mid to distal septum and the apex  are akinetic.The anterior and inferior walls are poorly  visualized. The mid to distal anterior and mid to distal  inferior segments are likely abnormal. There is swirling of  intravenous Definity at the apex suggestive of low flow  state and limiting assessment of the apex. No definitive  left ventricular thrombus seen.  4. Mild diastolic dysfunction (Stage I).  5. The right ventricle is not well visualized; grossly  normal right ventricular size and systolic function.  *** No previous Echo exam.  ------------------------------------------------------------------------  Confirmed on  7/9/2021 - 14:06:54 by Juan Jose Cox M.D.  ------------------------------------------------------------------------    < end of copied text >         INTERVAL EVENTS: No o/n events. Denies CP, dyspnea, palpitations, presyncope, syncope, f/c/n/v.       REVIEW OF SYSTEMS:  Constitutional:     [X] negative [ ] fevers [ ] chills [ ] weight loss [ ] weight gain  HEENT:                  [X] negative [ ] dry eyes [ ] eye irritation [ ] postnasal drip [ ] nasal congestion  CV:                         [X] negative  [ ] chest pain [ ] orthopnea [ ] palpitations [ ] murmur  Resp:                     [X] negative [ ] cough [ ] shortness of breath [ ] wheezing [ ] sputum [ ] hemoptysis  GI:                          [X] negative [ ] nausea [ ] vomiting [ ] diarrhea [ ] constipation [ ] abd pain [ ] dysphagia   :                        [X] negative [ ] dysuria [ ] nocturia [ ] hematuria [ ] increased urinary frequency  MSK:                      [X] negative [ ] back pain [ ] myalgias [ ] arthralgias [ ] fracture  Skin:                       [X] negative [ ] rash [ ] itch  Neuro:                   [X] negative [ ] headache [ ] dizziness [ ] syncope [ ] weakness [ ] numbness  Psych:                    [X] negative [ ] anxiety [ ] depression  Endo:                     [X] negative [ ] diabetes [ ] thyroid problem  Heme/Lymph:      [X] negative [ ] anemia [ ] bleeding problem  Allergic/Immune: [X] negative [ ] itchy eyes [ ] nasal discharge [ ] hives [ ] angioedema  [X] All other systems negative or otherwise described above.      PAST MEDICAL & SURGICAL HISTORY:  HLD (hyperlipidemia)  T2DM (type 2 diabetes mellitus)  BPH (benign prostatic hyperplasia)      MEDICATIONS  (STANDING):  aspirin enteric coated 81 milliGRAM(s) Oral daily  atorvastatin 80 milliGRAM(s) Oral at bedtime  clopidogrel Tablet 75 milliGRAM(s) Oral daily  dextrose 40% Gel 15 Gram(s) Oral once  dextrose 5%. 1000 milliLiter(s) (50 mL/Hr) IV Continuous <Continuous>  dextrose 5%. 1000 milliLiter(s) (100 mL/Hr) IV Continuous <Continuous>  dextrose 50% Injectable 25 Gram(s) IV Push once  dextrose 50% Injectable 12.5 Gram(s) IV Push once  dextrose 50% Injectable 25 Gram(s) IV Push once  enoxaparin Injectable 40 milliGRAM(s) SubCutaneous daily  finasteride 5 milliGRAM(s) Oral daily  glucagon  Injectable 1 milliGRAM(s) IntraMuscular once  insulin glargine Injectable (LANTUS) 38 Unit(s) SubCutaneous at bedtime  insulin lispro (ADMELOG) corrective regimen sliding scale   SubCutaneous three times a day before meals  insulin lispro (ADMELOG) corrective regimen sliding scale   SubCutaneous at bedtime  losartan 25 milliGRAM(s) Oral daily  melatonin 5 milliGRAM(s) Oral at bedtime  metoprolol tartrate 12.5 milliGRAM(s) Oral every 12 hours  simethicone 80 milliGRAM(s) Chew daily  spironolactone 25 milliGRAM(s) Oral daily      ICU Vital Signs Last 24 Hrs  T(C): 36.8 (12 Jul 2021 04:00), Max: 37.3 (11 Jul 2021 20:28)  T(F): 98.2 (12 Jul 2021 04:00), Max: 99.1 (11 Jul 2021 20:28)  HR: 73 (12 Jul 2021 04:00) (72 - 81)  BP: 103/67 (12 Jul 2021 04:00) (103/67 - 108/70)  RR: 17 (12 Jul 2021 04:00) (17 - 18)  SpO2: 97% (12 Jul 2021 04:00) (96% - 97%)      PHYSICAL EXAM:  GEN: Awake, alert. NAD.   HEENT: NCAT, PERRL, EOMI. Mucosa moist. No JVD.  RESP: CTA b/l  CV: RRR. Normal S1/S2. No m/r/g.  ABD: Soft. NT/ND. BS+  EXT: Warm. No edema, clubbing, or cyanosis.   NEURO: AAOx3. No focal deficits.       LABS:                      13.3   9.71  )-----------( 171      ( 11 Jul 2021 07:01 )             40.8     07-11  140  |  104  |  13  ----------------------------<  136<H>  3.8   |  23  |  0.88    Ca    9.0      11 Jul 2021 07:00    TPro  6.9  /  Alb  3.5  /  TBili  0.6  /  DBili  x   /  AST  91<H>  /  ALT  50<H>  /  AlkPhos  62  07-11      I&O's Summary  11 Jul 2021 07:01  -  12 Jul 2021 07:00  --------------------------------------------------------  IN: 540 mL / OUT: 400 mL / NET: 140 mL      TELEMETRY:  NSR      TTE with Doppler (w/Cont) (07.09.21 @ 12:18)   ------------------------------------------------------------------------  Conclusions:  Technically difficult study.  1. Normal mitral valve. Minimal mitral regurgitation.  2. Aortic valve not well visualized; appears to be a calcified trileaflet valve with normal opening. No aortic valve regurgitation seen.  3. Endocardial visualization enhanced with intravenous injection of Ultrasonic Enhancing Agent (Definity). Left ventricle suboptimally visualized despite intravenous Definity; grossly moderate segmental left ventricular systolic dysfunction. The mid to distal septum and the apex are akinetic. The anterior and inferior walls are poorly visualized. The mid to distal anterior and mid to distal inferior segments are likely abnormal. There is swirling of intravenous Definity at the apex suggestive of low flow state and limiting assessment of the apex. No definitive left ventricular thrombus seen.  4. Mild diastolic dysfunction (Stage I).  5. The right ventricle is not well visualized; grossly normal right ventricular size and systolic function.  *** No previous Echo exam.  ------------------------------------------------------------------------  Confirmed on  7/9/2021 - 14:06:54 by Juan Jose Cox M.D.  ------------------------------------------------------------------------

## 2021-07-13 LAB
GLUCOSE BLDC GLUCOMTR-MCNC: 132 MG/DL — HIGH (ref 70–99)
GLUCOSE BLDC GLUCOMTR-MCNC: 149 MG/DL — HIGH (ref 70–99)
GLUCOSE BLDC GLUCOMTR-MCNC: 176 MG/DL — HIGH (ref 70–99)
GLUCOSE BLDC GLUCOMTR-MCNC: 249 MG/DL — HIGH (ref 70–99)

## 2021-07-13 PROCEDURE — 99232 SBSQ HOSP IP/OBS MODERATE 35: CPT | Mod: GC

## 2021-07-13 PROCEDURE — 99233 SBSQ HOSP IP/OBS HIGH 50: CPT

## 2021-07-13 RX ADMIN — Medication 2: at 11:54

## 2021-07-13 RX ADMIN — ATORVASTATIN CALCIUM 80 MILLIGRAM(S): 80 TABLET, FILM COATED ORAL at 21:34

## 2021-07-13 RX ADMIN — Medication 81 MILLIGRAM(S): at 11:53

## 2021-07-13 RX ADMIN — Medication 5 MILLIGRAM(S): at 21:34

## 2021-07-13 RX ADMIN — Medication 0: at 07:38

## 2021-07-13 RX ADMIN — LOSARTAN POTASSIUM 25 MILLIGRAM(S): 100 TABLET, FILM COATED ORAL at 05:56

## 2021-07-13 RX ADMIN — Medication 0: at 16:26

## 2021-07-13 RX ADMIN — INSULIN GLARGINE 38 UNIT(S): 100 INJECTION, SOLUTION SUBCUTANEOUS at 21:34

## 2021-07-13 RX ADMIN — ENOXAPARIN SODIUM 40 MILLIGRAM(S): 100 INJECTION SUBCUTANEOUS at 11:52

## 2021-07-13 RX ADMIN — CLOPIDOGREL BISULFATE 75 MILLIGRAM(S): 75 TABLET, FILM COATED ORAL at 11:53

## 2021-07-13 RX ADMIN — Medication 25 MILLIGRAM(S): at 05:56

## 2021-07-13 RX ADMIN — SPIRONOLACTONE 25 MILLIGRAM(S): 25 TABLET, FILM COATED ORAL at 05:56

## 2021-07-13 RX ADMIN — SIMETHICONE 80 MILLIGRAM(S): 80 TABLET, CHEWABLE ORAL at 11:53

## 2021-07-13 RX ADMIN — FINASTERIDE 5 MILLIGRAM(S): 5 TABLET, FILM COATED ORAL at 11:53

## 2021-07-13 NOTE — CHART NOTE - NSCHARTNOTEFT_GEN_A_CORE
Cardiology consult called for concern of apical thrombus on TTE.     Would discuss with daytime attending re possibility of apical thrombus and will call back with further recs.    Hold off CTA or MRI overnight.

## 2021-07-13 NOTE — PROGRESS NOTE ADULT - SUBJECTIVE AND OBJECTIVE BOX
Patient is a 76y old  Male who presents with a chief complaint of Chest pain (12 Jul 2021 17:34)      SUBJECTIVE / OVERNIGHT EVENTS:  no acute events overnight, vss, afebrile  pt feels well - disappointed that he needs to stay for MRI  but agreeable to MRI    tele reviewed: sinus 70-100s, PVCs    ROS:  14 point ROS negative in detail except stated as above    MEDICATIONS  (STANDING):  aspirin enteric coated 81 milliGRAM(s) Oral daily  atorvastatin 80 milliGRAM(s) Oral at bedtime  clopidogrel Tablet 75 milliGRAM(s) Oral daily  dextrose 40% Gel 15 Gram(s) Oral once  dextrose 5%. 1000 milliLiter(s) (50 mL/Hr) IV Continuous <Continuous>  dextrose 5%. 1000 milliLiter(s) (100 mL/Hr) IV Continuous <Continuous>  dextrose 50% Injectable 25 Gram(s) IV Push once  dextrose 50% Injectable 12.5 Gram(s) IV Push once  dextrose 50% Injectable 25 Gram(s) IV Push once  enoxaparin Injectable 40 milliGRAM(s) SubCutaneous daily  finasteride 5 milliGRAM(s) Oral daily  glucagon  Injectable 1 milliGRAM(s) IntraMuscular once  insulin glargine Injectable (LANTUS) 38 Unit(s) SubCutaneous at bedtime  insulin lispro (ADMELOG) corrective regimen sliding scale   SubCutaneous three times a day before meals  insulin lispro (ADMELOG) corrective regimen sliding scale   SubCutaneous at bedtime  LORazepam     Tablet 1 milliGRAM(s) Oral once  losartan 25 milliGRAM(s) Oral daily  melatonin 5 milliGRAM(s) Oral at bedtime  metoprolol succinate ER 25 milliGRAM(s) Oral daily  simethicone 80 milliGRAM(s) Chew daily  spironolactone 25 milliGRAM(s) Oral daily    MEDICATIONS  (PRN):  polyethylene glycol 3350 17 Gram(s) Oral two times a day PRN Constipation  senna 2 Tablet(s) Oral at bedtime PRN Constipation      CAPILLARY BLOOD GLUCOSE      POCT Blood Glucose.: 249 mg/dL (13 Jul 2021 11:31)  POCT Blood Glucose.: 132 mg/dL (13 Jul 2021 07:23)  POCT Blood Glucose.: 224 mg/dL (12 Jul 2021 21:04)  POCT Blood Glucose.: 139 mg/dL (12 Jul 2021 16:35)    I&O's Summary    12 Jul 2021 07:01  -  13 Jul 2021 07:00  --------------------------------------------------------  IN: 1010 mL / OUT: 550 mL / NET: 460 mL    13 Jul 2021 07:01  -  13 Jul 2021 12:54  --------------------------------------------------------  IN: 600 mL / OUT: 0 mL / NET: 600 mL        PHYSICAL EXAM:  Vital Signs Last 24 Hrs  T(C): 36.6 (13 Jul 2021 11:49), Max: 37.2 (13 Jul 2021 04:11)  T(F): 97.9 (13 Jul 2021 11:49), Max: 99 (13 Jul 2021 04:11)  HR: 82 (13 Jul 2021 11:49) (78 - 82)  BP: 109/70 (13 Jul 2021 11:49) (107/70 - 114/75)  BP(mean): --  RR: 18 (13 Jul 2021 11:49) (18 - 18)  SpO2: 98% (13 Jul 2021 11:49) (97% - 98%)    GENERAL: NAD, well-developed  HEAD:  Atraumatic, Normocephalic  EYES: EOMI, PERRLA, conjunctiva and sclera clear  NECK: Supple, No JVD  CHEST/LUNG: Clear to auscultation bilaterally; No wheeze  HEART: Regular rate and rhythm; No murmurs, rubs, or gallops  ABDOMEN: Soft, Nontender, Nondistended; Bowel sounds present  EXTREMITIES:  2+ Peripheral Pulses, No clubbing, cyanosis, or edema  NEUROLOGY: AAOx3; non-focal  SKIN: No rashes or lesions    LABS:                    RADIOLOGY & ADDITIONAL TESTS:    Imaging Personally Reviewed:  -TTE  < from: TTE with Doppler (w/Cont) (07.12.21 @ 10:14) >  Dimensions:    Normal Values:  LA:            2.0 - 4.0 cm  Ao:            2.0 - 3.8 cm  SEPTUM:        0.6 - 1.2 cm  PWT:           0.6 - 1.1 cm  LVIDd:         3.0 - 5.6 cm  LVIDs:         1.8 - 4.0 cm  EF (Visual Estimate): 45 %  ------------------------------------------------------------------------  Observations:  Aortic Valve/Aorta: Normal trileaflet aortic valve.  Normal aorticroot.  Left Ventricle: Endocardial visualization enhanced with  intravenous injection of Ultrasonic Enhancing Agent  (Definity).  There is a false tendon in the LV cavity  (normal variant).  The mid to distal anteroseptum, distal  inferoseptum, and apex are severely hypokinetic to  akinetic.  There is no obvious evidence of left ventricular  thrombus; however, incomplete opacification of the tip of  the apical wall may be suggestive of a small, mural apical  thrombus. Consider cardiac MRI or CTA if clinically  indicated.  Normal left ventricular internal dimensions and  wall thicknesses.  Pericardium/Pleura: Normal pericardium with trace  pericardial effusion.  Hemodynamic: Estimated right atrial pressure is 8 mm Hg.  ------------------------------------------------------------------------  Conclusions:  1. Endocardial visualization enhanced with intravenous  injection of Ultrasonic Enhancing Agent (Definity).  There  is a false tendon in the LV cavity (normal variant).  The  mid to distal anteroseptum, distal inferoseptum, and apex  are severely hypokinetic to akinetic.  There is no obvious  evidence of left ventricular thrombus; however, incomplete  opacification of the tip of the apical wall may be  suggestive of a small, mural apical thrombus. Consider  cardiac MRI or CTA if clinically indicated.    Consultant(s) Notes Reviewed:  cards    Care Discussed with Consultants/Other Providers: Dr. Etienne (cards)

## 2021-07-13 NOTE — PROGRESS NOTE ADULT - ASSESSMENT
Mr. Jung is a 77 yo man with PMH of HLD & DM2.    He initially presented with exertional CP, diaphoresis and nausea on 7/8/2021, and was found to have new LBBB & AW-STEMI.  He underwent PCI with 2 stents to proximal LAD, c/b hypotension s/p IABP. IABP was removed on 7/9/2021. He was started on statin, Lopressor.     Problems  1. AWSTEMI  2. LBBB  3. Acute Systolic Heart Failure    Plan:  1. obtain cMRI  2. Continue asa, Plavix, statin, losartan 25 qd, spironolactone 25 qd  3. c/w Toprol XL 25 mg qd  4. Would be a good candidate for SGLT2i in the future      WENDY Etienne MD  Cardiology Fellow  Text or Call: [phone] Mr. Jung is a 77 yo man with PMH of HLD & DM2.    Presented with exertional CP, diaphoresis and nausea on 7/8/2021 and was found to have new LBBB & AW-STEMI.  He underwent PCI with 2 stents to proximal LAD, c/b hypotension s/p IABP. IABP was removed on 7/9/2021. He was started on statin, Lopressor.       Problems  1. AWSTEMI  2. LBBB  3. Acute Systolic Heart Failure    Plan:  1. obtain cMRI to further evaluate for LV thrombus  2. Continue ASA, Plavix, statin, losartan 25 qd, spironolactone 25 qd  3. c/w Toprol XL 25 mg qd  4. Would be a good candidate for SGLT2i in the future      WENDY Etienne MD  Cardiology Fellow  Text or Call: [phone]    Plan discussed with cardiology fellow; patient seen and examined.       I was physically present for the key portions of the evaluation and management (E/M) service provided.    I agree with the above history, physical, and plan which I have reviewed and edited where appropriate.   Ronald Cano M.D.  Cardiology Attending, Consult Service    For Cardiology consults and questions, all Cardiology service information can be found 24/7 on [email or web address], password: [email or web address]

## 2021-07-13 NOTE — PROGRESS NOTE ADULT - SUBJECTIVE AND OBJECTIVE BOX
INTERVAL EVENTS: No o/n events. Denies CP, dyspnea, palpitations, presyncope, syncope, f/c/n/v.     REVIEW OF SYSTEMS:  Constitutional:     [X] negative [ ] fevers [ ] chills [ ] weight loss [ ] weight gain  HEENT:                  [X] negative [ ] dry eyes [ ] eye irritation [ ] postnasal drip [ ] nasal congestion  CV:                         [X] negative  [ ] chest pain [ ] orthopnea [ ] palpitations [ ] murmur  Resp:                     [X] negative [ ] cough [ ] shortness of breath [ ] wheezing [ ] sputum [ ] hemoptysis  GI:                          [X] negative [ ] nausea [ ] vomiting [ ] diarrhea [ ] constipation [ ] abd pain [ ] dysphagia   :                        [X] negative [ ] dysuria [ ] nocturia [ ] hematuria [ ] increased urinary frequency  MSK:                      [X] negative [ ] back pain [ ] myalgias [ ] arthralgias [ ] fracture  Skin:                       [X] negative [ ] rash [ ] itch  Neuro:                   [X] negative [ ] headache [ ] dizziness [ ] syncope [ ] weakness [ ] numbness  Psych:                    [X] negative [ ] anxiety [ ] depression  Endo:                     [X] negative [ ] diabetes [ ] thyroid problem  Heme/Lymph:      [X] negative [ ] anemia [ ] bleeding problem  Allergic/Immune: [X] negative [ ] itchy eyes [ ] nasal discharge [ ] hives [ ] angioedema    [X] All other systems negative or otherwise described above.  [ ] Unable to assess ROS because ________.    PAST MEDICAL & SURGICAL HISTORY:  HLD (hyperlipidemia)    T2DM (type 2 diabetes mellitus)    BPH (benign prostatic hyperplasia)      MEDICATIONS  (STANDING):  aspirin enteric coated 81 milliGRAM(s) Oral daily  atorvastatin 80 milliGRAM(s) Oral at bedtime  clopidogrel Tablet 75 milliGRAM(s) Oral daily  dextrose 40% Gel 15 Gram(s) Oral once  dextrose 5%. 1000 milliLiter(s) (50 mL/Hr) IV Continuous <Continuous>  dextrose 5%. 1000 milliLiter(s) (100 mL/Hr) IV Continuous <Continuous>  dextrose 50% Injectable 25 Gram(s) IV Push once  dextrose 50% Injectable 12.5 Gram(s) IV Push once  dextrose 50% Injectable 25 Gram(s) IV Push once  enoxaparin Injectable 40 milliGRAM(s) SubCutaneous daily  finasteride 5 milliGRAM(s) Oral daily  glucagon  Injectable 1 milliGRAM(s) IntraMuscular once  insulin glargine Injectable (LANTUS) 38 Unit(s) SubCutaneous at bedtime  insulin lispro (ADMELOG) corrective regimen sliding scale   SubCutaneous three times a day before meals  insulin lispro (ADMELOG) corrective regimen sliding scale   SubCutaneous at bedtime  losartan 25 milliGRAM(s) Oral daily  melatonin 5 milliGRAM(s) Oral at bedtime  metoprolol succinate ER 25 milliGRAM(s) Oral daily  simethicone 80 milliGRAM(s) Chew daily  spironolactone 25 milliGRAM(s) Oral daily    MEDICATIONS  (PRN):  polyethylene glycol 3350 17 Gram(s) Oral two times a day PRN Constipation  senna 2 Tablet(s) Oral at bedtime PRN Constipation    ICU Vital Signs Last 24 Hrs  T(C): 37.2 (13 Jul 2021 04:11), Max: 37.2 (13 Jul 2021 04:11)  T(F): 99 (13 Jul 2021 04:11), Max: 99 (13 Jul 2021 04:11)  HR: 78 (13 Jul 2021 04:11) (78 - 82)  BP: 114/75 (13 Jul 2021 04:11) (102/71 - 114/75)  BP(mean): --  ABP: --  ABP(mean): --  RR: 18 (13 Jul 2021 04:11) (18 - 18)  SpO2: 97% (13 Jul 2021 04:11) (96% - 98%)    Daily     Daily     PHYSICAL EXAM:  GEN: Awake, alert. NAD.   HEENT: NCAT, PERRL, EOMI. Mucosa moist. No JVD.  RESP: CTA b/l  CV: RRR. Normal S1/S2. No m/r/g.  ABD: Soft. NT/ND. BS+  EXT: Warm. No edema, clubbing, or cyanosis.   NEURO: AAOx3. No focal deficits.     LABS:                  I&O's Summary    12 Jul 2021 07:01  -  13 Jul 2021 07:00  --------------------------------------------------------  IN: 1010 mL / OUT: 550 mL / NET: 460 mL      BNP    RADIOLOGY & ADDITIONAL STUDIES:    TELEMETRY: reviewed    EKG: reviewed    < from: TTE with Doppler (w/Cont) (07.12.21 @ 10:14) >  Conclusions:  1. Endocardial visualization enhanced with intravenous  injection of Ultrasonic Enhancing Agent (Definity).  There  is a false tendon in the LV cavity (normal variant).  The  mid to distal anteroseptum, distal inferoseptum, and apex  are severely hypokinetic to akinetic.  There is no obvious  evidence of left ventricular thrombus; however, incomplete  opacification of the tip of the apical wall may be  suggestive of a small, mural apical thrombus. Consider  cardiac MRI or CTA if clinically indicated.  ------------------------------------------------------------------------  Confirmed on  7/12/2021 - 18:21:22 by Wander Pritchard M.D.    < end of copied text >       INTERVAL EVENTS: No o/n events. Denies CP, dyspnea, palpitations, presyncope, syncope, f/c/n/v.     REVIEW OF SYSTEMS:  Constitutional:     [X] negative [ ] fevers [ ] chills [ ] weight loss [ ] weight gain  HEENT:                  [X] negative [ ] dry eyes [ ] eye irritation [ ] postnasal drip [ ] nasal congestion  CV:                         [X] negative  [ ] chest pain [ ] orthopnea [ ] palpitations [ ] murmur  Resp:                     [X] negative [ ] cough [ ] shortness of breath [ ] wheezing [ ] sputum [ ] hemoptysis  GI:                          [X] negative [ ] nausea [ ] vomiting [ ] diarrhea [ ] constipation [ ] abd pain [ ] dysphagia   :                        [X] negative [ ] dysuria [ ] nocturia [ ] hematuria [ ] increased urinary frequency  MSK:                      [X] negative [ ] back pain [ ] myalgias [ ] arthralgias [ ] fracture  Skin:                       [X] negative [ ] rash [ ] itch  Neuro:                   [X] negative [ ] headache [ ] dizziness [ ] syncope [ ] weakness [ ] numbness  Psych:                    [X] negative [ ] anxiety [ ] depression  Endo:                     [X] negative [ ] diabetes [ ] thyroid problem  Heme/Lymph:      [X] negative [ ] anemia [ ] bleeding problem  Allergic/Immune: [X] negative [ ] itchy eyes [ ] nasal discharge [ ] hives [ ] angioedema  [X] All other systems negative or otherwise described above.      PAST MEDICAL & SURGICAL HISTORY:  HLD (hyperlipidemia)  T2DM (type 2 diabetes mellitus)  BPH (benign prostatic hyperplasia)      MEDICATIONS  (STANDING):  aspirin enteric coated 81 milliGRAM(s) Oral daily  atorvastatin 80 milliGRAM(s) Oral at bedtime  clopidogrel Tablet 75 milliGRAM(s) Oral daily  dextrose 40% Gel 15 Gram(s) Oral once  dextrose 5%. 1000 milliLiter(s) (50 mL/Hr) IV Continuous <Continuous>  dextrose 5%. 1000 milliLiter(s) (100 mL/Hr) IV Continuous <Continuous>  dextrose 50% Injectable 25 Gram(s) IV Push once  dextrose 50% Injectable 12.5 Gram(s) IV Push once  dextrose 50% Injectable 25 Gram(s) IV Push once  enoxaparin Injectable 40 milliGRAM(s) SubCutaneous daily  finasteride 5 milliGRAM(s) Oral daily  glucagon  Injectable 1 milliGRAM(s) IntraMuscular once  insulin glargine Injectable (LANTUS) 38 Unit(s) SubCutaneous at bedtime  insulin lispro (ADMELOG) corrective regimen sliding scale   SubCutaneous three times a day before meals  insulin lispro (ADMELOG) corrective regimen sliding scale   SubCutaneous at bedtime  losartan 25 milliGRAM(s) Oral daily  melatonin 5 milliGRAM(s) Oral at bedtime  metoprolol succinate ER 25 milliGRAM(s) Oral daily  simethicone 80 milliGRAM(s) Chew daily  spironolactone 25 milliGRAM(s) Oral daily    MEDICATIONS  (PRN):  polyethylene glycol 3350 17 Gram(s) Oral two times a day PRN Constipation  senna 2 Tablet(s) Oral at bedtime PRN Constipation    ICU Vital Signs Last 24 Hrs  T(C): 37.2 (13 Jul 2021 04:11), Max: 37.2 (13 Jul 2021 04:11)  T(F): 99 (13 Jul 2021 04:11), Max: 99 (13 Jul 2021 04:11)  HR: 78 (13 Jul 2021 04:11) (78 - 82)  BP: 114/75 (13 Jul 2021 04:11) (102/71 - 114/75)  RR: 18 (13 Jul 2021 04:11) (18 - 18)  SpO2: 97% (13 Jul 2021 04:11) (96% - 98%)    PHYSICAL EXAM:  GEN: Awake, alert. NAD.   HEENT: NCAT, PERRL, EOMI. Mucosa moist. No JVD.  RESP: CTA b/l  CV: RRR. Normal S1/S2. No m/r/g.  ABD: Soft. NT/ND. BS+  EXT: Warm. No edema, clubbing, or cyanosis.   NEURO: AAOx3. No focal deficits.       I&O's Summary  12 Jul 2021 07:01  -  13 Jul 2021 07:00  --------------------------------------------------------  IN: 1010 mL / OUT: 550 mL / NET: 460 mL      TELEMETRY:  NSR 70s - 80s      TTE with Doppler (w/Cont) (07.12.21 @ 10:14)   Conclusions:  1. Endocardial visualization enhanced with intravenous injection of Ultrasonic Enhancing Agent (Definity).  There is a false tendon in the LV cavity (normal variant).  The mid to distal marcel-septum, distal infero-septum, and apex are severely hypokinetic to akinetic.  There is no obvious evidence of left ventricular thrombus; however, incomplete opacification of the tip of the apical wall may be suggestive of a small, mural apical thrombus. Consider cardiac MRI or CTA if clinically indicated.  ------------------------------------------------------------------------  Confirmed on  7/12/2021 - 18:21:22 by Wander Pritchard M.D.

## 2021-07-13 NOTE — PROGRESS NOTE ADULT - ASSESSMENT
76y M with a hx significant for BPH, T2DM, HLD, who presented to the ED w/ chest pain found to have STEMI s/p PCI with x2 FARTUN in pLAD, pending 72hr repeat TTE to rule out LV thrombus but indeterminate, so waiting for cardiac MRI

## 2021-07-13 NOTE — CHART NOTE - NSCHARTNOTESELECT_GEN_ALL_CORE
IABP removal/Event Note
Transfer Note
sheath removal/Event Note
Echo results/Event Note
Event Note
Event Note
MAR Accept Note
Transfer Note

## 2021-07-14 ENCOUNTER — TRANSCRIPTION ENCOUNTER (OUTPATIENT)
Age: 76
End: 2021-07-14

## 2021-07-14 VITALS
RESPIRATION RATE: 19 BRPM | OXYGEN SATURATION: 98 % | DIASTOLIC BLOOD PRESSURE: 79 MMHG | HEART RATE: 83 BPM | SYSTOLIC BLOOD PRESSURE: 115 MMHG | TEMPERATURE: 97 F

## 2021-07-14 PROBLEM — E11.9 TYPE 2 DIABETES MELLITUS WITHOUT COMPLICATIONS: Chronic | Status: ACTIVE | Noted: 2021-07-08

## 2021-07-14 PROBLEM — E78.5 HYPERLIPIDEMIA, UNSPECIFIED: Chronic | Status: ACTIVE | Noted: 2021-07-08

## 2021-07-14 PROBLEM — N40.0 BENIGN PROSTATIC HYPERPLASIA WITHOUT LOWER URINARY TRACT SYMPTOMS: Chronic | Status: ACTIVE | Noted: 2021-07-08

## 2021-07-14 LAB
GLUCOSE BLDC GLUCOMTR-MCNC: 116 MG/DL — HIGH (ref 70–99)
GLUCOSE BLDC GLUCOMTR-MCNC: 207 MG/DL — HIGH (ref 70–99)

## 2021-07-14 PROCEDURE — 99285 EMERGENCY DEPT VISIT HI MDM: CPT

## 2021-07-14 PROCEDURE — 83036 HEMOGLOBIN GLYCOSYLATED A1C: CPT

## 2021-07-14 PROCEDURE — 75561 CARDIAC MRI FOR MORPH W/DYE: CPT | Mod: 26

## 2021-07-14 PROCEDURE — 93005 ELECTROCARDIOGRAM TRACING: CPT

## 2021-07-14 PROCEDURE — 93308 TTE F-UP OR LMTD: CPT

## 2021-07-14 PROCEDURE — 99239 HOSP IP/OBS DSCHRG MGMT >30: CPT

## 2021-07-14 PROCEDURE — 84443 ASSAY THYROID STIM HORMONE: CPT

## 2021-07-14 PROCEDURE — 86901 BLOOD TYPING SEROLOGIC RH(D): CPT

## 2021-07-14 PROCEDURE — 85014 HEMATOCRIT: CPT

## 2021-07-14 PROCEDURE — 83735 ASSAY OF MAGNESIUM: CPT

## 2021-07-14 PROCEDURE — 82553 CREATINE MB FRACTION: CPT

## 2021-07-14 PROCEDURE — U0003: CPT

## 2021-07-14 PROCEDURE — C8929: CPT

## 2021-07-14 PROCEDURE — 82947 ASSAY GLUCOSE BLOOD QUANT: CPT

## 2021-07-14 PROCEDURE — 86769 SARS-COV-2 COVID-19 ANTIBODY: CPT

## 2021-07-14 PROCEDURE — C1751: CPT

## 2021-07-14 PROCEDURE — C1725: CPT

## 2021-07-14 PROCEDURE — 82550 ASSAY OF CK (CPK): CPT

## 2021-07-14 PROCEDURE — A9585: CPT

## 2021-07-14 PROCEDURE — 33967 INSERT I-AORT PERCUT DEVICE: CPT

## 2021-07-14 PROCEDURE — 85018 HEMOGLOBIN: CPT

## 2021-07-14 PROCEDURE — 86900 BLOOD TYPING SEROLOGIC ABO: CPT

## 2021-07-14 PROCEDURE — U0005: CPT

## 2021-07-14 PROCEDURE — 85025 COMPLETE CBC W/AUTO DIFF WBC: CPT

## 2021-07-14 PROCEDURE — C1887: CPT

## 2021-07-14 PROCEDURE — 83605 ASSAY OF LACTIC ACID: CPT

## 2021-07-14 PROCEDURE — C1874: CPT

## 2021-07-14 PROCEDURE — C1894: CPT

## 2021-07-14 PROCEDURE — 82330 ASSAY OF CALCIUM: CPT

## 2021-07-14 PROCEDURE — 80061 LIPID PANEL: CPT

## 2021-07-14 PROCEDURE — 71045 X-RAY EXAM CHEST 1 VIEW: CPT

## 2021-07-14 PROCEDURE — 85027 COMPLETE CBC AUTOMATED: CPT

## 2021-07-14 PROCEDURE — 82435 ASSAY OF BLOOD CHLORIDE: CPT

## 2021-07-14 PROCEDURE — 84100 ASSAY OF PHOSPHORUS: CPT

## 2021-07-14 PROCEDURE — 75561 CARDIAC MRI FOR MORPH W/DYE: CPT

## 2021-07-14 PROCEDURE — 80053 COMPREHEN METABOLIC PANEL: CPT

## 2021-07-14 PROCEDURE — 84132 ASSAY OF SERUM POTASSIUM: CPT

## 2021-07-14 PROCEDURE — 84484 ASSAY OF TROPONIN QUANT: CPT

## 2021-07-14 PROCEDURE — 82803 BLOOD GASES ANY COMBINATION: CPT

## 2021-07-14 PROCEDURE — 84295 ASSAY OF SERUM SODIUM: CPT

## 2021-07-14 PROCEDURE — C1889: CPT

## 2021-07-14 PROCEDURE — C1753: CPT

## 2021-07-14 PROCEDURE — 86850 RBC ANTIBODY SCREEN: CPT

## 2021-07-14 PROCEDURE — 92978 ENDOLUMINL IVUS OCT C 1ST: CPT | Mod: LD

## 2021-07-14 PROCEDURE — 86803 HEPATITIS C AB TEST: CPT

## 2021-07-14 PROCEDURE — 82962 GLUCOSE BLOOD TEST: CPT

## 2021-07-14 PROCEDURE — 85610 PROTHROMBIN TIME: CPT

## 2021-07-14 PROCEDURE — 85730 THROMBOPLASTIN TIME PARTIAL: CPT

## 2021-07-14 PROCEDURE — 93460 R&L HRT ART/VENTRICLE ANGIO: CPT | Mod: 59

## 2021-07-14 PROCEDURE — C1769: CPT

## 2021-07-14 PROCEDURE — C9606: CPT | Mod: LD

## 2021-07-14 RX ORDER — METOPROLOL TARTRATE 50 MG
1 TABLET ORAL
Qty: 30 | Refills: 0 | DISCHARGE
Start: 2021-07-14 | End: 2021-08-12

## 2021-07-14 RX ORDER — SPIRONOLACTONE 25 MG/1
1 TABLET, FILM COATED ORAL
Qty: 30 | Refills: 0
Start: 2021-07-14 | End: 2021-08-12

## 2021-07-14 RX ORDER — METOPROLOL TARTRATE 50 MG
1 TABLET ORAL
Qty: 30 | Refills: 0
Start: 2021-07-14 | End: 2021-08-12

## 2021-07-14 RX ORDER — ASPIRIN/CALCIUM CARB/MAGNESIUM 324 MG
1 TABLET ORAL
Qty: 30 | Refills: 0 | DISCHARGE
Start: 2021-07-14 | End: 2021-08-12

## 2021-07-14 RX ORDER — CLOPIDOGREL BISULFATE 75 MG/1
1 TABLET, FILM COATED ORAL
Qty: 30 | Refills: 0
Start: 2021-07-14 | End: 2021-08-12

## 2021-07-14 RX ORDER — ASPIRIN/CALCIUM CARB/MAGNESIUM 324 MG
1 TABLET ORAL
Qty: 30 | Refills: 0
Start: 2021-07-14 | End: 2021-08-12

## 2021-07-14 RX ORDER — ATORVASTATIN CALCIUM 80 MG/1
1 TABLET, FILM COATED ORAL
Qty: 30 | Refills: 0 | DISCHARGE
Start: 2021-07-14 | End: 2021-08-12

## 2021-07-14 RX ORDER — LOSARTAN POTASSIUM 100 MG/1
1 TABLET, FILM COATED ORAL
Qty: 30 | Refills: 0 | DISCHARGE
Start: 2021-07-14 | End: 2021-08-12

## 2021-07-14 RX ORDER — ATORVASTATIN CALCIUM 80 MG/1
1 TABLET, FILM COATED ORAL
Qty: 30 | Refills: 0
Start: 2021-07-14 | End: 2021-08-12

## 2021-07-14 RX ORDER — LOSARTAN POTASSIUM 100 MG/1
1 TABLET, FILM COATED ORAL
Qty: 30 | Refills: 0
Start: 2021-07-14 | End: 2021-08-12

## 2021-07-14 RX ADMIN — Medication 1 MILLIGRAM(S): at 09:42

## 2021-07-14 RX ADMIN — SIMETHICONE 80 MILLIGRAM(S): 80 TABLET, CHEWABLE ORAL at 11:54

## 2021-07-14 RX ADMIN — Medication 25 MILLIGRAM(S): at 05:42

## 2021-07-14 RX ADMIN — LOSARTAN POTASSIUM 25 MILLIGRAM(S): 100 TABLET, FILM COATED ORAL at 05:46

## 2021-07-14 RX ADMIN — SPIRONOLACTONE 25 MILLIGRAM(S): 25 TABLET, FILM COATED ORAL at 05:42

## 2021-07-14 RX ADMIN — CLOPIDOGREL BISULFATE 75 MILLIGRAM(S): 75 TABLET, FILM COATED ORAL at 11:54

## 2021-07-14 RX ADMIN — Medication 81 MILLIGRAM(S): at 11:55

## 2021-07-14 RX ADMIN — FINASTERIDE 5 MILLIGRAM(S): 5 TABLET, FILM COATED ORAL at 11:54

## 2021-07-14 RX ADMIN — Medication 2: at 11:55

## 2021-07-14 RX ADMIN — ENOXAPARIN SODIUM 40 MILLIGRAM(S): 100 INJECTION SUBCUTANEOUS at 11:55

## 2021-07-14 NOTE — PROGRESS NOTE ADULT - PROBLEM SELECTOR PROBLEM 1
ST elevation myocardial infarction involving left anterior descending (LAD) coronary artery

## 2021-07-14 NOTE — PROGRESS NOTE ADULT - SUBJECTIVE AND OBJECTIVE BOX
Patient is a 76y old  Male who presents with a chief complaint of Chest pain (13 Jul 2021 12:53)      SUBJECTIVE / OVERNIGHT EVENTS:  no acute events overnight, vss, afebrile  pt feels well, waiting for cMRI  has no complaints    tele reviewed: sinus 70-100s    ROS:  14 point ROS negative in detail except stated as above    MEDICATIONS  (STANDING):  aspirin enteric coated 81 milliGRAM(s) Oral daily  atorvastatin 80 milliGRAM(s) Oral at bedtime  clopidogrel Tablet 75 milliGRAM(s) Oral daily  dextrose 40% Gel 15 Gram(s) Oral once  dextrose 5%. 1000 milliLiter(s) (50 mL/Hr) IV Continuous <Continuous>  dextrose 5%. 1000 milliLiter(s) (100 mL/Hr) IV Continuous <Continuous>  dextrose 50% Injectable 25 Gram(s) IV Push once  dextrose 50% Injectable 12.5 Gram(s) IV Push once  dextrose 50% Injectable 25 Gram(s) IV Push once  enoxaparin Injectable 40 milliGRAM(s) SubCutaneous daily  finasteride 5 milliGRAM(s) Oral daily  glucagon  Injectable 1 milliGRAM(s) IntraMuscular once  insulin glargine Injectable (LANTUS) 38 Unit(s) SubCutaneous at bedtime  insulin lispro (ADMELOG) corrective regimen sliding scale   SubCutaneous three times a day before meals  insulin lispro (ADMELOG) corrective regimen sliding scale   SubCutaneous at bedtime  losartan 25 milliGRAM(s) Oral daily  melatonin 5 milliGRAM(s) Oral at bedtime  metoprolol succinate ER 25 milliGRAM(s) Oral daily  simethicone 80 milliGRAM(s) Chew daily  spironolactone 25 milliGRAM(s) Oral daily    MEDICATIONS  (PRN):  polyethylene glycol 3350 17 Gram(s) Oral two times a day PRN Constipation  senna 2 Tablet(s) Oral at bedtime PRN Constipation      CAPILLARY BLOOD GLUCOSE      POCT Blood Glucose.: 116 mg/dL (14 Jul 2021 07:26)  POCT Blood Glucose.: 176 mg/dL (13 Jul 2021 21:22)  POCT Blood Glucose.: 149 mg/dL (13 Jul 2021 16:25)  POCT Blood Glucose.: 249 mg/dL (13 Jul 2021 11:31)    I&O's Summary    13 Jul 2021 07:01  -  14 Jul 2021 07:00  --------------------------------------------------------  IN: 950 mL / OUT: 500 mL / NET: 450 mL        PHYSICAL EXAM:  Vital Signs Last 24 Hrs  T(C): 37.1 (14 Jul 2021 09:43), Max: 37.1 (14 Jul 2021 09:43)  T(F): 98.7 (14 Jul 2021 09:43), Max: 98.7 (14 Jul 2021 09:43)  HR: 91 (14 Jul 2021 09:43) (77 - 91)  BP: 116/72 (14 Jul 2021 09:43) (103/65 - 116/72)  BP(mean): --  RR: 18 (14 Jul 2021 09:43) (17 - 18)  SpO2: 99% (14 Jul 2021 09:43) (98% - 99%)    GENERAL: NAD, well-developed  HEAD:  Atraumatic, Normocephalic  EYES: EOMI, PERRLA, conjunctiva and sclera clear  NECK: Supple, No JVD  CHEST/LUNG: Clear to auscultation bilaterally; No wheeze  HEART: Regular rate and rhythm; No murmurs, rubs, or gallops  ABDOMEN: Soft, Nontender, Nondistended; Bowel sounds present  EXTREMITIES:  2+ Peripheral Pulses, No clubbing, cyanosis, or edema  NEUROLOGY: AAOx3; non-focal  SKIN: No rashes or lesions    LABS:                    RADIOLOGY & ADDITIONAL TESTS:    Imaging Personally Reviewed:    Consultant(s) Notes Reviewed:  cards    Care Discussed with Consultants/Other Providers: Dr. Etienne ([facility] [place])

## 2021-07-14 NOTE — PROGRESS NOTE ADULT - PROVIDER SPECIALTY LIST ADULT
Hospitalist
CCU
Cardiology
Cardiology
TAZ
Cardiology
Hospitalist

## 2021-07-14 NOTE — PROGRESS NOTE ADULT - PROBLEM SELECTOR PLAN 2
-c/w lantus 38u daily  -insulin sliding scale

## 2021-07-14 NOTE — PROGRESS NOTE ADULT - PROBLEM SELECTOR PROBLEM 2
Type 2 diabetes mellitus with other circulatory complication, with long-term current use of insulin

## 2021-07-14 NOTE — PROGRESS NOTE ADULT - PROBLEM SELECTOR PROBLEM 4
Benign prostatic hyperplasia, unspecified whether lower urinary tract symptoms present

## 2021-07-14 NOTE — PROGRESS NOTE ADULT - PROBLEM SELECTOR PLAN 1
-s/p FARTUN x2 to LAD  -c/w ASA and plavix daily  -c/w atorva 80 daily  -switch lopressor to 25mg daily succinate upon discharge  -continue losartan 25mg daily  -pending repeat TTE w/ definity to r/o thrombus
-s/p FARTUN x2 to LAD  -c/w ASA and plavix daily  -c/w atorva 80 daily  -metop 12.5 bid, uptitrate as tolerated  -add losartan 25 today per cardiology  -will need repeat TTE w/ definity to r/o thrombus
-s/p FARTUN x2 to LAD  -c/w ASA and plavix daily  -c/w atorva 80 daily  -metop 12.5 bid, uptitrate as tolerated  -add losartan 25 today per cardiology  -will need repeat TTE w/ definity to r/o thrombus
-s/p FARTUN x2 to LAD  -c/w ASA and plavix daily  -c/w atorvastatin 80 daily  -switch lopressor to 25mg daily succinate upon discharge  -continue losartan 25mg daily  -repeat TTE w/ definity indeterminate for apical thrombus  -pending cardiac MRI
-s/p FARTUN x2 to LAD  -c/w ASA and plavix daily  -c/w atorvastatin 80 daily  -switch lopressor to 25mg daily succinate upon discharge  -continue losartan 25mg daily  -repeat TTE w/ definity indeterminate for apical thrombus  -pending cardiac MRI

## 2021-07-14 NOTE — PROGRESS NOTE ADULT - PROBLEM SELECTOR PLAN 5
DVT ppx: lovenox sq  Dispo; no skilled PT needs    Above plans discussed with NP Beryl Dominique MD  Division of Hospital Medicine  Cell: [phone]  Office: [phone]
-lovenox
-lovenox
DVT ppx: lovenox sq  Dispo; no skilled PT needs    Above plans discussed with NP Beryl Dominique MD  Division of Hospital Medicine  Cell: [phone]  Office: [phone]
DVT ppx: lovenox sq  Dispo; no skilled PT needs    Above plans discussed with NP Gifty Dominique MD  Division of Hospital Medicine  Cell: [phone]  Office: [phone]

## 2021-07-19 ENCOUNTER — NON-APPOINTMENT (OUTPATIENT)
Age: 76
End: 2021-07-19

## 2021-07-19 RX ORDER — ASPIRIN ENTERIC COATED TABLETS 81 MG 81 MG/1
81 TABLET, DELAYED RELEASE ORAL
Refills: 6 | Status: ACTIVE | COMMUNITY

## 2021-07-19 RX ORDER — EMPAGLIFLOZIN 25 MG/1
TABLET, FILM COATED ORAL
Refills: 0 | Status: DISCONTINUED | COMMUNITY
End: 2021-07-19

## 2021-07-19 RX ORDER — VITAMIN B COMPLEX
TABLET ORAL
Refills: 0 | Status: DISCONTINUED | COMMUNITY
End: 2021-07-19

## 2021-07-19 RX ORDER — INSULIN DEGLUDEC INJECTION 200 U/ML
INJECTION, SOLUTION SUBCUTANEOUS
Refills: 0 | Status: DISCONTINUED | COMMUNITY
End: 2021-07-19

## 2021-07-19 RX ORDER — ATORVASTATIN CALCIUM 80 MG/1
80 TABLET, FILM COATED ORAL
Qty: 90 | Refills: 0 | Status: ACTIVE | COMMUNITY

## 2021-07-19 RX ORDER — PRAVASTATIN SODIUM 80 MG/1
TABLET ORAL
Refills: 0 | Status: DISCONTINUED | COMMUNITY
End: 2021-07-19

## 2021-07-19 RX ORDER — FINASTERIDE 5 MG/1
5 TABLET, FILM COATED ORAL DAILY
Qty: 90 | Refills: 3 | Status: ACTIVE | COMMUNITY

## 2021-07-20 ENCOUNTER — NON-APPOINTMENT (OUTPATIENT)
Age: 76
End: 2021-07-20

## 2021-07-20 ENCOUNTER — APPOINTMENT (OUTPATIENT)
Dept: CARDIOLOGY | Facility: CLINIC | Age: 76
End: 2021-07-20
Payer: MEDICARE

## 2021-07-20 VITALS
OXYGEN SATURATION: 99 % | SYSTOLIC BLOOD PRESSURE: 113 MMHG | HEIGHT: 66 IN | HEART RATE: 65 BPM | BODY MASS INDEX: 24.11 KG/M2 | DIASTOLIC BLOOD PRESSURE: 75 MMHG | WEIGHT: 150 LBS

## 2021-07-20 DIAGNOSIS — R07.9 CHEST PAIN, UNSPECIFIED: ICD-10-CM

## 2021-07-20 PROCEDURE — 93000 ELECTROCARDIOGRAM COMPLETE: CPT

## 2021-07-20 PROCEDURE — 99214 OFFICE O/P EST MOD 30 MIN: CPT

## 2021-07-21 PROBLEM — R07.9 CHEST PAIN: Status: ACTIVE | Noted: 2021-07-19

## 2021-07-21 NOTE — DISCUSSION/SUMMARY
[___ Month(s)] : in [unfilled] month(s) [FreeTextEntry1] : The patient is a 76-year-old gentleman hyperlipidemia s/p AWMI who is doing well. \par #1 CV- no further chest pain, ECG evolving, continue DAPT for LAD stent\par #2 Lipids- continue atorvastatin\par #3 Htn- continue losartan, toprol and spironolactone\par #4 General- He may start going for his daily walks again. NO heavy lifting.

## 2021-08-12 ENCOUNTER — NON-APPOINTMENT (OUTPATIENT)
Age: 76
End: 2021-08-12

## 2021-08-13 ENCOUNTER — EMERGENCY (EMERGENCY)
Facility: HOSPITAL | Age: 76
LOS: 1 days | Discharge: HOME | End: 2021-08-13
Attending: EMERGENCY MEDICINE
Payer: MEDICARE

## 2021-08-13 VITALS
DIASTOLIC BLOOD PRESSURE: 94 MMHG | HEIGHT: 66 IN | WEIGHT: 160.06 LBS | HEART RATE: 77 BPM | SYSTOLIC BLOOD PRESSURE: 152 MMHG | OXYGEN SATURATION: 98 % | TEMPERATURE: 98 F | RESPIRATION RATE: 19 BRPM

## 2021-08-13 VITALS
DIASTOLIC BLOOD PRESSURE: 77 MMHG | SYSTOLIC BLOOD PRESSURE: 119 MMHG | OXYGEN SATURATION: 100 % | RESPIRATION RATE: 20 BRPM | TEMPERATURE: 98 F | HEART RATE: 64 BPM

## 2021-08-13 LAB
ALBUMIN SERPL ELPH-MCNC: 3.9 G/DL — SIGNIFICANT CHANGE UP (ref 3.3–5)
ALP SERPL-CCNC: 117 U/L — SIGNIFICANT CHANGE UP (ref 40–120)
ALT FLD-CCNC: 32 U/L — SIGNIFICANT CHANGE UP (ref 10–45)
ANION GAP SERPL CALC-SCNC: 12 MMOL/L — SIGNIFICANT CHANGE UP (ref 5–17)
AST SERPL-CCNC: 25 U/L — SIGNIFICANT CHANGE UP (ref 10–40)
BASE EXCESS BLDV CALC-SCNC: 2.7 MMOL/L — HIGH (ref -2–2)
BASOPHILS # BLD AUTO: 0.05 K/UL — SIGNIFICANT CHANGE UP (ref 0–0.2)
BASOPHILS NFR BLD AUTO: 0.6 % — SIGNIFICANT CHANGE UP (ref 0–2)
BILIRUB SERPL-MCNC: 0.3 MG/DL — SIGNIFICANT CHANGE UP (ref 0.2–1.2)
BUN SERPL-MCNC: 19 MG/DL — SIGNIFICANT CHANGE UP (ref 7–23)
CA-I SERPL-SCNC: 1.18 MMOL/L — SIGNIFICANT CHANGE UP (ref 1.12–1.3)
CALCIUM SERPL-MCNC: 9.6 MG/DL — SIGNIFICANT CHANGE UP (ref 8.4–10.5)
CHLORIDE BLDV-SCNC: 101 MMOL/L — SIGNIFICANT CHANGE UP (ref 96–108)
CHLORIDE SERPL-SCNC: 100 MMOL/L — SIGNIFICANT CHANGE UP (ref 96–108)
CO2 BLDV-SCNC: 30 MMOL/L — SIGNIFICANT CHANGE UP (ref 22–30)
CO2 SERPL-SCNC: 24 MMOL/L — SIGNIFICANT CHANGE UP (ref 22–31)
CREAT SERPL-MCNC: 0.83 MG/DL — SIGNIFICANT CHANGE UP (ref 0.5–1.3)
EOSINOPHIL # BLD AUTO: 0.37 K/UL — SIGNIFICANT CHANGE UP (ref 0–0.5)
EOSINOPHIL NFR BLD AUTO: 4.4 % — SIGNIFICANT CHANGE UP (ref 0–6)
GAS PNL BLDV: 134 MMOL/L — LOW (ref 135–145)
GAS PNL BLDV: SIGNIFICANT CHANGE UP
GLUCOSE BLDV-MCNC: 229 MG/DL — HIGH (ref 70–99)
GLUCOSE SERPL-MCNC: 230 MG/DL — HIGH (ref 70–99)
HCO3 BLDV-SCNC: 29 MMOL/L — SIGNIFICANT CHANGE UP (ref 21–29)
HCT VFR BLD CALC: 42.3 % — SIGNIFICANT CHANGE UP (ref 39–50)
HCT VFR BLDA CALC: 44 % — SIGNIFICANT CHANGE UP (ref 39–50)
HGB BLD CALC-MCNC: 14.3 G/DL — SIGNIFICANT CHANGE UP (ref 13–17)
HGB BLD-MCNC: 13.6 G/DL — SIGNIFICANT CHANGE UP (ref 13–17)
IMM GRANULOCYTES NFR BLD AUTO: 0.1 % — SIGNIFICANT CHANGE UP (ref 0–1.5)
LACTATE BLDV-MCNC: 1.5 MMOL/L — SIGNIFICANT CHANGE UP (ref 0.7–2)
LIDOCAIN IGE QN: 47 U/L — SIGNIFICANT CHANGE UP (ref 7–60)
LYMPHOCYTES # BLD AUTO: 2.99 K/UL — SIGNIFICANT CHANGE UP (ref 1–3.3)
LYMPHOCYTES # BLD AUTO: 35.9 % — SIGNIFICANT CHANGE UP (ref 13–44)
MAGNESIUM SERPL-MCNC: 1.8 MG/DL — SIGNIFICANT CHANGE UP (ref 1.6–2.6)
MCHC RBC-ENTMCNC: 28.2 PG — SIGNIFICANT CHANGE UP (ref 27–34)
MCHC RBC-ENTMCNC: 32.2 GM/DL — SIGNIFICANT CHANGE UP (ref 32–36)
MCV RBC AUTO: 87.6 FL — SIGNIFICANT CHANGE UP (ref 80–100)
MONOCYTES # BLD AUTO: 0.62 K/UL — SIGNIFICANT CHANGE UP (ref 0–0.9)
MONOCYTES NFR BLD AUTO: 7.4 % — SIGNIFICANT CHANGE UP (ref 2–14)
NEUTROPHILS # BLD AUTO: 4.29 K/UL — SIGNIFICANT CHANGE UP (ref 1.8–7.4)
NEUTROPHILS NFR BLD AUTO: 51.6 % — SIGNIFICANT CHANGE UP (ref 43–77)
NRBC # BLD: 0 /100 WBCS — SIGNIFICANT CHANGE UP (ref 0–0)
NT-PROBNP SERPL-SCNC: 1669 PG/ML — HIGH (ref 0–300)
PCO2 BLDV: 52 MMHG — HIGH (ref 35–50)
PH BLDV: 7.36 — SIGNIFICANT CHANGE UP (ref 7.35–7.45)
PLATELET # BLD AUTO: 185 K/UL — SIGNIFICANT CHANGE UP (ref 150–400)
PO2 BLDV: 32 MMHG — SIGNIFICANT CHANGE UP (ref 25–45)
POTASSIUM BLDV-SCNC: 4.7 MMOL/L — SIGNIFICANT CHANGE UP (ref 3.5–5.3)
POTASSIUM SERPL-MCNC: 4.4 MMOL/L — SIGNIFICANT CHANGE UP (ref 3.5–5.3)
POTASSIUM SERPL-SCNC: 4.4 MMOL/L — SIGNIFICANT CHANGE UP (ref 3.5–5.3)
PROT SERPL-MCNC: 7.3 G/DL — SIGNIFICANT CHANGE UP (ref 6–8.3)
RBC # BLD: 4.83 M/UL — SIGNIFICANT CHANGE UP (ref 4.2–5.8)
RBC # FLD: 13.2 % — SIGNIFICANT CHANGE UP (ref 10.3–14.5)
SAO2 % BLDV: 51 % — LOW (ref 67–88)
SODIUM SERPL-SCNC: 136 MMOL/L — SIGNIFICANT CHANGE UP (ref 135–145)
TROPONIN T, HIGH SENSITIVITY RESULT: 19 NG/L — SIGNIFICANT CHANGE UP (ref 0–51)
TROPONIN T, HIGH SENSITIVITY RESULT: 20 NG/L — SIGNIFICANT CHANGE UP (ref 0–51)
WBC # BLD: 8.33 K/UL — SIGNIFICANT CHANGE UP (ref 3.8–10.5)
WBC # FLD AUTO: 8.33 K/UL — SIGNIFICANT CHANGE UP (ref 3.8–10.5)

## 2021-08-13 PROCEDURE — 84295 ASSAY OF SERUM SODIUM: CPT

## 2021-08-13 PROCEDURE — 83735 ASSAY OF MAGNESIUM: CPT

## 2021-08-13 PROCEDURE — 85014 HEMATOCRIT: CPT

## 2021-08-13 PROCEDURE — 99284 EMERGENCY DEPT VISIT MOD MDM: CPT | Mod: 25

## 2021-08-13 PROCEDURE — 82435 ASSAY OF BLOOD CHLORIDE: CPT

## 2021-08-13 PROCEDURE — 80053 COMPREHEN METABOLIC PANEL: CPT

## 2021-08-13 PROCEDURE — 71046 X-RAY EXAM CHEST 2 VIEWS: CPT | Mod: 26

## 2021-08-13 PROCEDURE — 93010 ELECTROCARDIOGRAM REPORT: CPT

## 2021-08-13 PROCEDURE — 83605 ASSAY OF LACTIC ACID: CPT

## 2021-08-13 PROCEDURE — 83690 ASSAY OF LIPASE: CPT

## 2021-08-13 PROCEDURE — 84484 ASSAY OF TROPONIN QUANT: CPT

## 2021-08-13 PROCEDURE — 93005 ELECTROCARDIOGRAM TRACING: CPT

## 2021-08-13 PROCEDURE — 82803 BLOOD GASES ANY COMBINATION: CPT

## 2021-08-13 PROCEDURE — 71046 X-RAY EXAM CHEST 2 VIEWS: CPT

## 2021-08-13 PROCEDURE — 85025 COMPLETE CBC W/AUTO DIFF WBC: CPT

## 2021-08-13 PROCEDURE — 82947 ASSAY GLUCOSE BLOOD QUANT: CPT

## 2021-08-13 PROCEDURE — 99284 EMERGENCY DEPT VISIT MOD MDM: CPT | Mod: CS

## 2021-08-13 PROCEDURE — 84132 ASSAY OF SERUM POTASSIUM: CPT

## 2021-08-13 PROCEDURE — 82330 ASSAY OF CALCIUM: CPT

## 2021-08-13 PROCEDURE — 83880 ASSAY OF NATRIURETIC PEPTIDE: CPT

## 2021-08-13 PROCEDURE — 85018 HEMOGLOBIN: CPT

## 2021-08-13 RX ORDER — ASPIRIN/CALCIUM CARB/MAGNESIUM 324 MG
162 TABLET ORAL ONCE
Refills: 0 | Status: COMPLETED | OUTPATIENT
Start: 2021-08-13 | End: 2021-08-13

## 2021-08-13 RX ADMIN — Medication 162 MILLIGRAM(S): at 21:32

## 2021-08-13 NOTE — ED PROVIDER NOTE - CLINICAL SUMMARY MEDICAL DECISION MAKING FREE TEXT BOX
Hansel: Patient episodes of waking up gasping for air.  symptoms occur at specific geni when laying flat after lunch on couch.  not at night wehne sleeping. has not had to increase number of pillows. well appearing. has close f/u with cards. will get labs, ekg, cardiac enzymes, cxr, reassess

## 2021-08-13 NOTE — ED PROVIDER NOTE - OBJECTIVE STATEMENT
75 y/o male hx CAD, DMII, HTN, HLD presenting to the ED for evaluation of waking gasping for air. states symptoms occur at only a very specific time when laying flat after lunch on the couch. he feels well and then as he is falling asleep he wakes breathless. symptoms do not occur at night when trying to sleep. no increase in number of pillows needed to sleep. no chest pain or sob with exertion. no LE edema. no fever or chills. tried to see PMD but out of town so went to  who referred him here.

## 2021-08-13 NOTE — ED PROVIDER NOTE - PROGRESS NOTE DETAILS
Patient feels well. no complaints here. will d/c home. has f/u outpatient. serial troponin with no delta.

## 2021-08-13 NOTE — ED PROVIDER NOTE - PATIENT PORTAL LINK FT
You can access the FollowMyHealth Patient Portal offered by [facility] by registering at the following website: [email or web address]. By joining [facility]’s FollowMyHealth portal, you will also be able to view your health information using other applications (apps) compatible with our system.

## 2021-08-13 NOTE — ED ADULT NURSE NOTE - OBJECTIVE STATEMENT
76 y male presents with back discomfort and SOB when laying down to go to bed. Hx of STEMI in July- denies CP, SOB, lightheadedness, dizziness, N/V/D. A&Ox3. Breathing comfortably in bed- no distress. Abdomen soft nondistended. safety maintained- bed locked in lowest position. placed on cardiac monitor- sinus rhythm in 60s noted.

## 2021-08-13 NOTE — ED PROVIDER NOTE - MUSCULOSKELETAL, MLM
Spine appears normal, grossly moving all 4 extremitites Spine appears normal, grossly moving all 4 extremities

## 2021-08-26 ENCOUNTER — APPOINTMENT (OUTPATIENT)
Dept: CARDIOLOGY | Facility: CLINIC | Age: 76
End: 2021-08-26
Payer: MEDICARE

## 2021-08-26 ENCOUNTER — NON-APPOINTMENT (OUTPATIENT)
Age: 76
End: 2021-08-26

## 2021-08-26 VITALS
HEIGHT: 66 IN | OXYGEN SATURATION: 99 % | DIASTOLIC BLOOD PRESSURE: 74 MMHG | SYSTOLIC BLOOD PRESSURE: 112 MMHG | WEIGHT: 150 LBS | BODY MASS INDEX: 24.11 KG/M2 | HEART RATE: 78 BPM

## 2021-08-26 PROCEDURE — 99214 OFFICE O/P EST MOD 30 MIN: CPT

## 2021-08-26 PROCEDURE — 93000 ELECTROCARDIOGRAM COMPLETE: CPT

## 2021-08-26 RX ORDER — FUROSEMIDE 40 MG/1
40 TABLET ORAL DAILY
Qty: 30 | Refills: 3 | Status: DISCONTINUED | COMMUNITY
End: 2021-08-26

## 2021-08-26 RX ORDER — LOSARTAN POTASSIUM 25 MG/1
25 TABLET, FILM COATED ORAL DAILY
Qty: 90 | Refills: 1 | Status: DISCONTINUED | COMMUNITY
End: 2021-08-26

## 2021-08-27 NOTE — CARDIOLOGY SUMMARY
[de-identified] : 8/26/21 sinus rhythm AWMI
[de-identified] : 56 yr old female s/p CT PNS (WNL), strobe and FEES (+GERD).  Results of all reviewed w patient.  \par Still c/o lots of mucous and PND despite antihistamines.

## 2021-08-27 NOTE — HISTORY OF PRESENT ILLNESS
[FreeTextEntry1] : Sew gets short of breath when laying down. No chest pain, dizziness or palpitations.

## 2021-08-27 NOTE — DISCUSSION/SUMMARY
[___ Week(s)] : in [unfilled] week(s) [FreeTextEntry1] : The patient is a 76-year-old gentleman hyperlipidemia s/p AWMI with mild heart failure exacerbation. \par #1 CV- no further chest pain, ECG evolving, continue DAPT for LAD stent\par #2 HFrEF- change losartan to entresto 24/26 after 36 hours and BMP/BNP two weeks, start lasix 40mg for one week and then prn\par #3 Lipids- continue atorvastatin\par #4 Htn- continue entresto, toprol and spironolactone\par #4 General- He may start going for his daily walks again. NO heavy lifting and no jogging.

## 2021-09-09 ENCOUNTER — APPOINTMENT (OUTPATIENT)
Dept: CARDIOLOGY | Facility: CLINIC | Age: 76
End: 2021-09-09

## 2021-09-09 NOTE — HISTORY OF PRESENT ILLNESS
[Type II Diabetes] : Type II Diabetes [Yes ___ How many times per day?] : Yes, [unfilled] times per day [Is Patient aware of signs and symptoms of hypoglycemia and hyperglycemia?] : patient is aware of signs and symptoms of hypoglycemia and hyperglycemia [Does Patient follow with other health care specialists for comprehensive diabetes care?] : Does Patient follow with other health care specialists for comprehensive diabetes care? Yes [Continuance of physician/healthcare provider(s) follow-up for diabetes] : Continuance of physician/healthcare provider(s) follow-up for diabetes [Individualized exercise program as developed at cardiac rehabilitation] : individualized exercise program as developed at cardiac rehabilitation [Overview of diabetes and cardiovascular disease] : overview of diabetes and cardiovascular disease [Hypoglycemia and Hyperglycemia: sign and symptoms] : Hypoglycemia and Hyperglycemia: sign and symptoms [Medications to treat diabetes] : medications to treat diabetes [Provide [facility] Diabetes [facility] brochure or website link to patient] : Provide [facility] brochure or website link to patient [Yes, continue with Diabetes plan] : Yes, continue with Diabetes plan [Height: ___] : Height: [unfilled] [Weight: ___ lbs] : Weight: [unfilled] lbs [Does patient monitor weight at home?] : Does patient monitor weight at home? Yes [Frequency Checked: ____] : Frequency checked: [unfilled] [Recent history of weight gain or weight loss?] : Recent history of weight gain or weight loss? yes [Patient will weigh self daily] : patient will weigh self daily [Weight gain and heart failure implications] : weight gain and heart failure implications [None] : none [Myocardial infraction] : myocardial infraction [PCI/Stent] : PCI/stent [Moderate] : moderate [Cardiac ] : Cardiac  [___ Days per week] : [unfilled] days per week [>= 31 minutes per session] : >= 31 minutes per session [Target RPE: ___] : Target RPE: [unfilled] [Treadmill] : treadmill [Recumbent bike] : recumbent bike [Upright exercise bike] : upright exercise bike [BioStep] : BioStep [Upper body ergometer] : upper body ergometer [Stair Climber] : stair climber [Walking] : walking [Assess in ___ weeks] : assess in [unfilled] weeks [8 - 15 repetitions] : 8 - 15 repetitions [1 - 3 sets] : 1 - 3 sets [40] : 40 [50] : 50 [60] : 60 [% 1 RM] : % 1 RM [Stretching/ROM exercises and balance exercises daily] : Stretching/ROM exercises and balance exercises daily [Will assess for musculoskeletal and other restrictions] : will assess for musculoskeletal and other restrictions [Perform aerobic activity for ___ consecutive minutes] : perform aerobic activity for [unfilled] consecutive minutes [To start resistance training] : to start resistance training [To return to my previous hobbies and activities] : to return to my previous hobbies and activities  [Exercise Benefits/Guidelines education] : exercise benefits/guidelines education [Individualized Exercise Rx] : individualized exercise Rx [Signs/Symptoms to report] : signs/symptoms to report [Welcome packet provided] : welcome packet provided [Yes, per exercise prescription, policy] : Yes, per exercise prescription, policy [Breathing exercises] : breathing exercises [Visual imagery] : visual imagery [Discuss overview of emotional health supportive modalities] : Discuss overview of emotional health supportive modalities [Meditation] : meditation [Action] : Stage of change: Action [Heart Failure] : Heart Failure [Other: ___] : [unfilled] [Atorvastatin] : atorvastatin [Is Patient adherent with medication?] : patient is adherent with medication [Patient will include healthy diet pattern approaches to healthy eating] : Patient will include healthy diet pattern approaches to healthy eating [MyPlate.gov] : MyPlate.gov [Teach back utilized] : teach back utilized [Yes, continue with nutrition plan] : Yes, continue with nutrition plan [Constipation] : constipation [Diarrhea] : diarrhea [] : yes [Sugar] : sugar [Sodium] : sodium [FreeTextEntry2] : 90s; was 65 early this am; pt awoken and felt hungry->ate [FreeTextEntry3] : Pt reports healthy weight; will again start weighing himself daily [FreeTextEntry1] : Dr. Wright  [FreeTextEntry5] : Dr. 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 DASH

## 2021-09-09 NOTE — REASON FOR VISIT
[Home] : at home, [unfilled] , at the time of the visit. [Medical Office: ([facility])___] : at the medical office located in  [Verbal consent obtained from patient] : the patient, [unfilled] [Initial Visit] : an initial visit [FreeTextEntry1] : Initial ITP to be finalized at 1st in-facility session; pt s/p AWSTEMI, s/p LAD stent x 2

## 2021-09-23 ENCOUNTER — APPOINTMENT (OUTPATIENT)
Dept: CARDIOLOGY | Facility: CLINIC | Age: 76
End: 2021-09-23
Payer: MEDICARE

## 2021-09-23 ENCOUNTER — NON-APPOINTMENT (OUTPATIENT)
Age: 76
End: 2021-09-23

## 2021-09-23 VITALS
OXYGEN SATURATION: 99 % | SYSTOLIC BLOOD PRESSURE: 109 MMHG | HEART RATE: 71 BPM | DIASTOLIC BLOOD PRESSURE: 66 MMHG | BODY MASS INDEX: 23.89 KG/M2 | WEIGHT: 148 LBS

## 2021-09-23 PROCEDURE — 93000 ELECTROCARDIOGRAM COMPLETE: CPT

## 2021-09-23 PROCEDURE — 99214 OFFICE O/P EST MOD 30 MIN: CPT

## 2021-09-24 NOTE — DISCUSSION/SUMMARY
[FreeTextEntry1] : The patient is a 76-year-old gentleman hyperlipidemia s/p AWMI with mild heart failure exacerbation who is euvolemic. \par #1 CV- no further chest pain, ECG evolving, continue DAPT for LAD stent\par #2 HFrEF- continue entresto 24/26, lasix twice a week, ECHO at three months on MMT with next visit\par #3 Lipids- continue atorvastatin\par #4 Htn- continue entresto, toprol and spironolactone\par #4 General- Continue daily walks.  NO heavy lifting and no jogging.

## 2021-09-24 NOTE — HISTORY OF PRESENT ILLNESS
[FreeTextEntry1] : Angel is feeling well. His wife is worried about low blood pressure. No lightheadedness, dizziness, palpitations or shortness of breath. He started to walk a little.

## 2021-10-14 ENCOUNTER — APPOINTMENT (OUTPATIENT)
Dept: CV DIAGNOSITCS | Facility: HOSPITAL | Age: 76
End: 2021-10-14

## 2021-10-14 ENCOUNTER — APPOINTMENT (OUTPATIENT)
Dept: CARDIOLOGY | Facility: CLINIC | Age: 76
End: 2021-10-14
Payer: MEDICARE

## 2021-10-14 ENCOUNTER — OUTPATIENT (OUTPATIENT)
Dept: OUTPATIENT SERVICES | Facility: HOSPITAL | Age: 76
LOS: 1 days | End: 2021-10-14
Payer: MEDICARE

## 2021-10-14 ENCOUNTER — NON-APPOINTMENT (OUTPATIENT)
Age: 76
End: 2021-10-14

## 2021-10-14 VITALS
BODY MASS INDEX: 23.78 KG/M2 | DIASTOLIC BLOOD PRESSURE: 72 MMHG | WEIGHT: 148 LBS | HEIGHT: 66 IN | HEART RATE: 64 BPM | OXYGEN SATURATION: 98 % | SYSTOLIC BLOOD PRESSURE: 110 MMHG

## 2021-10-14 DIAGNOSIS — I50.20 UNSPECIFIED SYSTOLIC (CONGESTIVE) HEART FAILURE: ICD-10-CM

## 2021-10-14 PROCEDURE — C8929: CPT

## 2021-10-14 PROCEDURE — 99214 OFFICE O/P EST MOD 30 MIN: CPT

## 2021-10-14 PROCEDURE — 93306 TTE W/DOPPLER COMPLETE: CPT | Mod: 26

## 2021-10-14 PROCEDURE — 93000 ELECTROCARDIOGRAM COMPLETE: CPT

## 2021-10-16 NOTE — DISCUSSION/SUMMARY
[FreeTextEntry1] : The patient is a 76-year-old gentleman hyperlipidemia s/p AWMI, HFrEF who is improving.\par #1 CV- no further chest pain, ECG evolving, continue DAPT for LAD stent\par #2 HFrEF- continue entresto 24/26, lasix twice a week, ECHO today with improvement in LV function and no evidence of thrombus with definity.\par #3 Lipids- continue atorvastatin\par #4 Htn- continue entresto, toprol and spironolactone\par #4 General- Continue daily walks and may increase pace.  NO heavy lifting and no jogging. Can go for dental cleaning.

## 2021-10-16 NOTE — HISTORY OF PRESENT ILLNESS
[FreeTextEntry1] : Sew has been walking more and feels more energetic. No CP, palpitations, dizziness or SOB. Anxious about ECHO results.

## 2021-10-16 NOTE — REVIEW OF SYSTEMS
[Negative] : Heme/Lymph [SOB] : no shortness of breath [Dyspnea on exertion] : not dyspnea during exertion [Chest Discomfort] : no chest discomfort [Leg Claudication] : no intermittent leg claudication [Lower Ext Edema] : no extremity edema [Palpitations] : no palpitations [Orthopnea] : no orthopnea

## 2021-10-25 ENCOUNTER — APPOINTMENT (OUTPATIENT)
Dept: CARDIOLOGY | Facility: CLINIC | Age: 76
End: 2021-10-25
Payer: MEDICARE

## 2021-10-25 PROCEDURE — 93798 PHYS/QHP OP CAR RHAB W/ECG: CPT

## 2021-10-25 NOTE — HISTORY OF PRESENT ILLNESS
[Type II Diabetes] : Type II Diabetes [Yes ___ How many times per day?] : Yes, [unfilled] times per day [Is Patient aware of signs and symptoms of hypoglycemia and hyperglycemia?] : patient is aware of signs and symptoms of hypoglycemia and hyperglycemia [Does Patient follow with other health care specialists for comprehensive diabetes care?] : Does Patient follow with other health care specialists for comprehensive diabetes care? Yes [Continuance of physician/healthcare provider(s) follow-up for diabetes] : Continuance of physician/healthcare provider(s) follow-up for diabetes [Individualized exercise program as developed at cardiac rehabilitation] : individualized exercise program as developed at cardiac rehabilitation [Overview of diabetes and cardiovascular disease] : overview of diabetes and cardiovascular disease [Hypoglycemia and Hyperglycemia: sign and symptoms] : Hypoglycemia and Hyperglycemia: sign and symptoms [Medications to treat diabetes] : medications to treat diabetes [Provide [facility] Diabetes Management brochure or website link to patient] : Provide [facility] [employer] Diabetes Management brochure or website link to patient [Yes, continue with Diabetes plan] : Yes, continue with Diabetes plan [FreeTextEntry2] : 100 as per patient this am [FreeTextEntry4] : today's pre-exercise blood glucose 141; post exercise 184 [Height: ___] : Height: [unfilled] [Weight: ___ lbs] : Weight: [unfilled] lbs [Body Mass Index (BMI): ___] : BMI: [unfilled] [Does patient monitor weight at home?] : Does patient monitor weight at home? Yes [Frequency Checked: ____] : Frequency checked: [unfilled] [Recent history of weight gain or weight loss?] : Recent history of weight gain or weight loss? yes [Patient will weigh self daily] : patient will weigh self daily [Weight gain and heart failure implications] : weight gain and heart failure implications [Yes, continue with Weight Management plan] : Yes, continue with weight management plan [FreeTextEntry3] : Pt reports healthy weight; will again start weighing himself daily [None] : none [Myocardial infraction] : myocardial infraction [PCI/Stent] : PCI/stent [Moderate] : moderate [Age] : age [HDL] : HDL [Hypertension] : hypertension [Overweight/Obesity] : overweight/obesity [Angina with exercise] : no angina with exercise [Fall Risk] : no fall risk [BP: ____ mmHg] : BP: [unfilled] mmHg [HR: ____ bpm] : BP: [unfilled] bpm [O2sat: ____ %] : O2sat: [unfilled] % [RPE: ____] : RPE: [unfilled]  [METS: ____] : METS: [unfilled]  [Cardiac ] : Cardiac  [___ Days per week] : [unfilled] days per week [>= 31 minutes per session] : >= 31 minutes per session [Target RPE: ___] : Target RPE: [unfilled] [Treadmill] : treadmill [Recumbent bike] : recumbent bike [Upright exercise bike] : upright exercise bike [BioStep] : BioStep [Upper body ergometer] : upper body ergometer [Stair Climber] : stair climber [Walking] : walking [Assess in ___ weeks] : assess in [unfilled] weeks [8 - 15 repetitions] : 8 - 15 repetitions [1 - 3 sets] : 1 - 3 sets [40] : 40 [50] : 50 [60] : 60 [% 1 RM] : % 1 RM [Stretching/ROM exercises and balance exercises daily] : Stretching/ROM exercises and balance exercises daily [Will assess for musculoskeletal and other restrictions] : will assess for musculoskeletal and other restrictions [Perform aerobic activity for ___ consecutive minutes] : perform aerobic activity for [unfilled] consecutive minutes [To start resistance training] : to start resistance training [To return to my previous hobbies and activities] : to return to my previous hobbies and activities  [Equipment Orientation/Safety] : equipment orientation/safety [Exercise Benefits/Guidelines education] : exercise benefits/guidelines education [Individualized Exercise Rx] : individualized exercise Rx [Signs/Symptoms to report] : signs/symptoms to report [Hemodynamic responses] : hemodynamic responses [Home exercise] : home exercise [Patient verbalizes understanding of exercise education all questions answered] : Patient verbalizes understanding of exercise education all questions answered [Return demonstration] : return demonstration [Welcome packet provided] : welcome packet provided [Yes, per exercise prescription, policy] : Yes, per exercise prescription, policy [FreeTextEntry1] : Dr. Wright  [FreeTextEntry5] : Dr. Keanu Farooq [de-identified] : Patient invited to attend weekly virtual cardiac rehab session  [de-identified] : has stationary bike and elliptical at home, has not used lately [PHQ-9: ___] : PHQ-9: [unfilled] [Feelings Score: ___] : Feelings Score: [unfilled] [Physical Fitness Score: ____] : Physical Fitness Score: [unfilled] [Daily Activities Score: ___] : Daily Activities Score: [unfilled] [Social Activities Score: ___] : Social Activities Score: [unfilled] [Pain Score: ___] : Pain Score: [unfilled] [Overall Health Score: ___] : Overall Health Score: [unfilled] [Change in Health Score: ___] : Change in Health Score: [unfilled] [Quality of Life Score: ___] : Quality of Life Score: [unfilled] [Social Support Score: ___] : Social Support Score: [unfilled] [Has the patient given CR team permission to speak with the emergency  about the patient?] : Has the patient given CR team permission to speak with the emergency  about the patient? Yes [Does patient have advance directive?] : Does patient have advance directive? Yes [Does patient see mental health provider?] : Does patient see mental health provider? 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 [Action] : Stage of change: Action [Rate your plate score: ____] : Rate your plate score: [unfilled] [Heart Failure] : Heart Failure [Other: ___] : [unfilled] [Constipation] : constipation [Diarrhea] : diarrhea [] : yes [Sugar] : sugar [Sodium] : sodium [Atorvastatin] : atorvastatin [Is Patient adherent with medication?] : patient is adherent with medication [Patient will include healthy diet pattern approaches to healthy eating] : Patient will include healthy diet pattern approaches to healthy eating [MyPlate.gov] : MyPlate.gov [Teach back utilized] : teach back utilized [Yes, continue with nutrition plan] : Yes, continue with nutrition plan [FreeTextEntry6] : he reports he eats overall healthfully; salt restriction is a challenge, but he is using Mrs. DASH [FreeTextEntry7] : Patient states that he will aim to decrease salt in his diet; use Mrs. Goodwin; take away the salt shaker

## 2021-10-25 NOTE — REASON FOR VISIT
[Initial Visit] : an initial visit [FreeTextEntry1] : 1st in-facility session; pt s/p AWSTEMI, s/p LAD stent x 2

## 2021-10-27 ENCOUNTER — APPOINTMENT (OUTPATIENT)
Dept: CARDIOLOGY | Facility: CLINIC | Age: 76
End: 2021-10-27
Payer: MEDICARE

## 2021-10-27 PROCEDURE — 93798 PHYS/QHP OP CAR RHAB W/ECG: CPT

## 2021-11-01 ENCOUNTER — APPOINTMENT (OUTPATIENT)
Dept: CARDIOLOGY | Facility: CLINIC | Age: 76
End: 2021-11-01
Payer: MEDICARE

## 2021-11-01 PROCEDURE — 93798 PHYS/QHP OP CAR RHAB W/ECG: CPT

## 2021-11-03 ENCOUNTER — APPOINTMENT (OUTPATIENT)
Dept: CARDIOLOGY | Facility: CLINIC | Age: 76
End: 2021-11-03
Payer: MEDICARE

## 2021-11-03 PROCEDURE — 93798 PHYS/QHP OP CAR RHAB W/ECG: CPT

## 2021-11-08 ENCOUNTER — APPOINTMENT (OUTPATIENT)
Dept: CARDIOLOGY | Facility: CLINIC | Age: 76
End: 2021-11-08
Payer: MEDICARE

## 2021-11-08 PROCEDURE — 93798 PHYS/QHP OP CAR RHAB W/ECG: CPT

## 2021-11-10 ENCOUNTER — APPOINTMENT (OUTPATIENT)
Dept: CARDIOLOGY | Facility: CLINIC | Age: 76
End: 2021-11-10
Payer: MEDICARE

## 2021-11-10 PROCEDURE — 93798 PHYS/QHP OP CAR RHAB W/ECG: CPT

## 2021-11-15 ENCOUNTER — APPOINTMENT (OUTPATIENT)
Dept: CARDIOLOGY | Facility: CLINIC | Age: 76
End: 2021-11-15
Payer: MEDICARE

## 2021-11-15 PROCEDURE — 93798 PHYS/QHP OP CAR RHAB W/ECG: CPT

## 2021-11-17 ENCOUNTER — APPOINTMENT (OUTPATIENT)
Dept: CARDIOLOGY | Facility: CLINIC | Age: 76
End: 2021-11-17
Payer: MEDICARE

## 2021-11-17 PROCEDURE — 93798 PHYS/QHP OP CAR RHAB W/ECG: CPT

## 2021-11-22 ENCOUNTER — APPOINTMENT (OUTPATIENT)
Dept: CARDIOLOGY | Facility: CLINIC | Age: 76
End: 2021-11-22
Payer: MEDICARE

## 2021-11-22 PROCEDURE — 93798 PHYS/QHP OP CAR RHAB W/ECG: CPT

## 2021-11-24 ENCOUNTER — NON-APPOINTMENT (OUTPATIENT)
Age: 76
End: 2021-11-24

## 2021-11-24 ENCOUNTER — APPOINTMENT (OUTPATIENT)
Dept: CARDIOLOGY | Facility: CLINIC | Age: 76
End: 2021-11-24
Payer: MEDICARE

## 2021-11-24 PROCEDURE — 93798 PHYS/QHP OP CAR RHAB W/ECG: CPT

## 2021-11-29 ENCOUNTER — APPOINTMENT (OUTPATIENT)
Dept: CARDIOLOGY | Facility: CLINIC | Age: 76
End: 2021-11-29
Payer: MEDICARE

## 2021-11-29 PROCEDURE — 93798 PHYS/QHP OP CAR RHAB W/ECG: CPT

## 2021-12-01 ENCOUNTER — APPOINTMENT (OUTPATIENT)
Dept: CARDIOLOGY | Facility: CLINIC | Age: 76
End: 2021-12-01
Payer: MEDICARE

## 2021-12-01 PROCEDURE — 93798 PHYS/QHP OP CAR RHAB W/ECG: CPT

## 2021-12-06 ENCOUNTER — APPOINTMENT (OUTPATIENT)
Dept: CARDIOLOGY | Facility: CLINIC | Age: 76
End: 2021-12-06
Payer: MEDICARE

## 2021-12-06 PROCEDURE — 93798 PHYS/QHP OP CAR RHAB W/ECG: CPT

## 2021-12-08 ENCOUNTER — APPOINTMENT (OUTPATIENT)
Dept: CARDIOLOGY | Facility: CLINIC | Age: 76
End: 2021-12-08
Payer: MEDICARE

## 2021-12-08 PROCEDURE — 93798 PHYS/QHP OP CAR RHAB W/ECG: CPT

## 2021-12-13 ENCOUNTER — APPOINTMENT (OUTPATIENT)
Dept: CARDIOLOGY | Facility: CLINIC | Age: 76
End: 2021-12-13
Payer: MEDICARE

## 2021-12-13 PROCEDURE — 93798 PHYS/QHP OP CAR RHAB W/ECG: CPT

## 2021-12-15 ENCOUNTER — APPOINTMENT (OUTPATIENT)
Dept: CARDIOLOGY | Facility: CLINIC | Age: 76
End: 2021-12-15
Payer: MEDICARE

## 2021-12-15 PROCEDURE — 93798 PHYS/QHP OP CAR RHAB W/ECG: CPT

## 2021-12-20 ENCOUNTER — APPOINTMENT (OUTPATIENT)
Dept: CARDIOLOGY | Facility: CLINIC | Age: 76
End: 2021-12-20
Payer: MEDICARE

## 2021-12-20 PROCEDURE — 93798 PHYS/QHP OP CAR RHAB W/ECG: CPT

## 2021-12-22 ENCOUNTER — APPOINTMENT (OUTPATIENT)
Dept: CARDIOLOGY | Facility: CLINIC | Age: 76
End: 2021-12-22
Payer: MEDICARE

## 2021-12-22 PROCEDURE — 93798 PHYS/QHP OP CAR RHAB W/ECG: CPT

## 2021-12-24 ENCOUNTER — NON-APPOINTMENT (OUTPATIENT)
Age: 76
End: 2021-12-24

## 2021-12-27 ENCOUNTER — APPOINTMENT (OUTPATIENT)
Dept: CARDIOLOGY | Facility: CLINIC | Age: 76
End: 2021-12-27
Payer: MEDICARE

## 2021-12-27 PROCEDURE — 93798 PHYS/QHP OP CAR RHAB W/ECG: CPT

## 2021-12-29 ENCOUNTER — APPOINTMENT (OUTPATIENT)
Dept: CARDIOLOGY | Facility: CLINIC | Age: 76
End: 2021-12-29
Payer: MEDICARE

## 2021-12-29 PROCEDURE — 93798 PHYS/QHP OP CAR RHAB W/ECG: CPT

## 2022-01-03 ENCOUNTER — APPOINTMENT (OUTPATIENT)
Dept: CARDIOLOGY | Facility: CLINIC | Age: 77
End: 2022-01-03
Payer: MEDICARE

## 2022-01-03 PROCEDURE — 93798 PHYS/QHP OP CAR RHAB W/ECG: CPT

## 2022-01-05 ENCOUNTER — APPOINTMENT (OUTPATIENT)
Dept: CARDIOLOGY | Facility: CLINIC | Age: 77
End: 2022-01-05
Payer: MEDICARE

## 2022-01-05 PROCEDURE — 93798 PHYS/QHP OP CAR RHAB W/ECG: CPT

## 2022-01-10 ENCOUNTER — APPOINTMENT (OUTPATIENT)
Dept: CARDIOLOGY | Facility: CLINIC | Age: 77
End: 2022-01-10
Payer: MEDICARE

## 2022-01-10 PROCEDURE — 93798 PHYS/QHP OP CAR RHAB W/ECG: CPT

## 2022-01-12 ENCOUNTER — APPOINTMENT (OUTPATIENT)
Dept: CARDIOLOGY | Facility: CLINIC | Age: 77
End: 2022-01-12
Payer: MEDICARE

## 2022-01-12 PROCEDURE — 93798 PHYS/QHP OP CAR RHAB W/ECG: CPT

## 2022-01-12 NOTE — HISTORY OF PRESENT ILLNESS
[Type II Diabetes] : Type II Diabetes [Yes ___ How many times per day?] : Yes, [unfilled] times per day [Is Patient aware of signs and symptoms of hypoglycemia and hyperglycemia?] : patient is aware of signs and symptoms of hypoglycemia and hyperglycemia [Does Patient follow with other health care specialists for comprehensive diabetes care?] : Does Patient follow with other health care specialists for comprehensive diabetes care? Yes [Medication Adherence] : medication adherence [Individualized exercise program as developed at cardiac rehabilitation] : Individualized exercise program as developed at cardiac rehabilitation [Continuance of physician/healthcare provider(s) follow-up for diabetes] : Continuance of physician/healthcare provider(s) follow-up for diabetes [Overview of diabetes and cardiovascular disease] : overview of diabetes and cardiovascular disease [Hypoglycemia and Hyperglycemia: sign and symptoms] : Hypoglycemia and Hyperglycemia: sign and symptoms [Role of lifestyle behaviors in diabetes management] : role of lifestyle behaviors in diabetes management [Medications to treat diabetes] : medications to treat diabetes [FreeTextEntry2] : 100 as per patient this am [FreeTextEntry3] : FBS 80s-120 [FreeTextEntry4] : Patient's blood glucose pre/post exercise stable x 3 sessions (on Metformin only for diabetes); moving forward, will check PRN [Height: ___] : Height: [unfilled] [Weight: ___ lbs] : Weight: [unfilled] lbs [Body Mass Index (BMI): ___] : BMI: [unfilled] [Does patient monitor weight at home?] : Does patient monitor weight at home? Yes [Frequency Checked: ____] : Frequency checked: [unfilled] [Recent history of weight gain or weight loss?] : Recent history of weight gain or weight loss? yes [Patient will weigh self daily] : patient will weigh self daily [Exercise and diet] : exercise and diet [Weight gain and heart failure implications] : weight gain and heart failure implications [None] : none [Myocardial infraction] : myocardial infraction [PCI/Stent] : PCI/stent [Age] : age [Hypertension] : hypertension [Overweight/Obesity] : overweight/obesity [Angina with exercise] : no angina with exercise [Fall Risk] : no fall risk [BP: ____ mmHg] : BP: [unfilled] mmHg [HR: ____ bpm] : BP: [unfilled] bpm [O2sat: ____ %] : O2sat: [unfilled] % [RPE: ____] : RPE: [unfilled]  [METS: ____] : METS: [unfilled]  [___ Days per week] : [unfilled] days per week [>= 31 minutes per session] : >= 31 minutes per session [Target RPE: ___] : Target RPE: [unfilled] [Treadmill] : treadmill [Recumbent bike] : recumbent bike [Upright exercise bike] : upright exercise bike [BioStep] : BioStep [Upper body ergometer] : upper body ergometer [Stair Climber] : stair climber [Walking] : walking [Assess in ___ weeks] : assess in [unfilled] weeks [8 - 15 repetitions] : 8 - 15 repetitions [1 - 3 sets] : 1 - 3 sets [40] : 40 [50] : 50 [60] : 60 [% 1 RM] : % 1 RM [Stretching/ROM exercises and balance exercises daily] : Stretching/ROM exercises and balance exercises daily [Will assess for musculoskeletal and other restrictions] : will assess for musculoskeletal and other restrictions [Perform aerobic activity for ___ consecutive minutes] : perform aerobic activity for [unfilled] consecutive minutes [To start resistance training] : to start resistance training [To return to my previous hobbies and activities] : to return to my previous hobbies and activities  [Exercise Benefits/Guidelines education] : exercise benefits/guidelines education [Hemodynamic responses] : hemodynamic responses [Home exercise] : home exercise [Patient verbalizes understanding of exercise education all questions answered] : Patient verbalizes understanding of exercise education all questions answered [Teach back utilized] : teach back utilized [Return demonstration] : return demonstration [No progression, Specify: ___] : No progression, [unfilled] [FreeTextEntry1] : Dr. Wright  [FreeTextEntry5] : Dr. Keanu Fraooq [de-identified] : Pt. tolerated increased levels on all modalities.  \par Pt. plans to continue exercising 3 times per week at home on is stationary bike and elliptical.\par Discharge instruction reviewed, pt. verbalized understanding.  [PHQ-9: ___] : PHQ-9: [unfilled] [[facility]: ___] : [facility] total: [unfilled] [Feelings Score: ___] : Feelings Score: [unfilled] [Physical Fitness Score: ____] : Physical Fitness Score: [unfilled] [Daily Activities Score: ___] : Daily Activities Score: [unfilled] [Social Activities Score: ___] : Social Activities Score: [unfilled] [Pain Score: ___] : Pain Score: [unfilled] [Overall Health Score: ___] : Overall Health Score: [unfilled] [Change in Health Score: ___] : Change in Health Score: [unfilled] [Quality of Life Score: ___] : Quality of Life Score: [unfilled] [Social Support Score: ___] : Social Support Score: [unfilled] [Has the patient given CR team permission to speak with the emergency  about the patient?] : Has the patient given CR team permission to speak with the emergency  about the patient? Yes [Does patient have advance directive?] : Does patient have advance directive? Yes [Does patient see mental health provider?] : Does patient see mental health provider? No [Patient will include healthy emotional coping practices in everyday life, such as: ____] : Patient will include healthy emotional coping practices in everyday life, such as [unfilled] [Breathing exercises] : breathing exercises [Visual imagery] : visual imagery [Self-reports of improved psychosocial well-being] : Self-reports of improved psychosocial well-being [Discuss overview of emotional health supportive modalities] : Discuss overview of emotional health supportive modalities [Mindfulness] : mindfulness [Meditation] : meditation [FreeTextEntry6] : Pt reports none, Pt is a retired ; used to do relaxation/meditation exercises [FreeTextEntry9] : Pt. reports feeling much better since attending CR, states CR helped him  gained confidence.\par Stated he enjoyed attending CR each week.  \par  [Maintenance] : Stage of change: Maintenance [Rate your plate score: ____] : Rate your plate score: [unfilled] [Heart Failure] : Heart Failure [Other: ___] : [unfilled] [Constipation] : constipation [Diarrhea] : diarrhea [] : yes [Sugar] : sugar [Sodium] : sodium [Atorvastatin] : atorvastatin [Is Patient adherent with medication?] : patient is adherent with medication [Significant other] : significant other [Patient will include healthy diet pattern approaches to healthy eating] : Patient will include healthy diet pattern approaches to healthy eating [MyPlate.gov] : MyPlate.gov [CareNotes written material provided] : CareNotes written material provided [Discharge instructions as per guidelines] : discharge instructions as per guidelines [No, Specify: ___] : No, [unfilled] [Met] : met [FreeTextEntry8] : Reports eating heart healthy.\par Pt. will continue to adhere to a low sodium diet. [FreeTextEntry7] : Patient states that he will continue to aim to decrease salt in his diet; use Mrs. Dash; take away the salt shaker

## 2022-01-20 ENCOUNTER — NON-APPOINTMENT (OUTPATIENT)
Age: 77
End: 2022-01-20

## 2022-01-20 ENCOUNTER — APPOINTMENT (OUTPATIENT)
Dept: CARDIOLOGY | Facility: CLINIC | Age: 77
End: 2022-01-20
Payer: MEDICARE

## 2022-01-20 VITALS
HEART RATE: 67 BPM | DIASTOLIC BLOOD PRESSURE: 73 MMHG | BODY MASS INDEX: 24.05 KG/M2 | SYSTOLIC BLOOD PRESSURE: 120 MMHG | WEIGHT: 149 LBS

## 2022-01-20 PROCEDURE — 93000 ELECTROCARDIOGRAM COMPLETE: CPT

## 2022-01-20 PROCEDURE — 99214 OFFICE O/P EST MOD 30 MIN: CPT

## 2022-01-20 RX ORDER — FUROSEMIDE 40 MG/1
40 TABLET ORAL DAILY
Qty: 30 | Refills: 1 | Status: DISCONTINUED | COMMUNITY
Start: 2021-08-26 | End: 2022-01-20

## 2022-01-20 RX ORDER — SACUBITRIL AND VALSARTAN 24; 26 MG/1; MG/1
24-26 TABLET, FILM COATED ORAL TWICE DAILY
Qty: 180 | Refills: 3 | Status: DISCONTINUED | COMMUNITY
End: 2022-01-20

## 2022-01-20 RX ORDER — LOSARTAN POTASSIUM 25 MG/1
25 TABLET, FILM COATED ORAL DAILY
Qty: 90 | Refills: 1 | Status: ACTIVE | COMMUNITY
Start: 2022-01-20 | End: 1900-01-01

## 2022-01-20 RX ORDER — SPIRONOLACTONE 25 MG/1
25 TABLET ORAL DAILY
Qty: 30 | Refills: 3 | Status: DISCONTINUED | COMMUNITY
End: 2022-01-20

## 2022-01-21 NOTE — HISTORY OF PRESENT ILLNESS
[FreeTextEntry1] : Angel continues to walk when weather allows. Occasional fleeting feeling once a month in his midsternal area that is sharp and unrelated to activity. No palpitations, lightheadedness or dizziness. He cannot afford the entresto but does not qualify for assistance.

## 2022-01-21 NOTE — REVIEW OF SYSTEMS
[Negative] : Heme/Lymph [SOB] : no shortness of breath [Dyspnea on exertion] : not dyspnea during exertion [Chest Discomfort] : no chest discomfort [Lower Ext Edema] : no extremity edema [Leg Claudication] : no intermittent leg claudication [Palpitations] : no palpitations [Orthopnea] : no orthopnea

## 2022-01-21 NOTE — DISCUSSION/SUMMARY
[FreeTextEntry1] : The patient is a 76-year-old gentleman hyperlipidemia s/p AWMI, HFrEF who is doing well. \par #1 CV- no further chest pain, ECG evolving, continue DAPT for LAD stent\par #2 HFrEF- off lasix and spironolactone, cannot afford entresto and does not qualify for assistance\par #3 Lipids- continue atorvastatin\par #4 Htn- continue losartan and titrate as tolerated\par #4 General- Continue daily walks and may increase pace.

## 2022-02-24 NOTE — ED PROVIDER NOTE - QRS
134 SSKI Counseling:  I discussed with the patient the risks of SSKI including but not limited to thyroid abnormalities, metallic taste, GI upset, fever, headache, acne, arthralgias, paraesthesias, lymphadenopathy, easy bleeding, arrhythmias, and allergic reaction.

## 2022-05-26 ENCOUNTER — NON-APPOINTMENT (OUTPATIENT)
Age: 77
End: 2022-05-26

## 2022-05-26 ENCOUNTER — APPOINTMENT (OUTPATIENT)
Dept: CARDIOLOGY | Facility: CLINIC | Age: 77
End: 2022-05-26
Payer: MEDICARE

## 2022-05-26 VITALS
WEIGHT: 153 LBS | BODY MASS INDEX: 24.7 KG/M2 | DIASTOLIC BLOOD PRESSURE: 70 MMHG | HEART RATE: 56 BPM | SYSTOLIC BLOOD PRESSURE: 120 MMHG

## 2022-05-26 PROCEDURE — 93000 ELECTROCARDIOGRAM COMPLETE: CPT

## 2022-05-26 PROCEDURE — 99214 OFFICE O/P EST MOD 30 MIN: CPT

## 2022-05-26 NOTE — DISCUSSION/SUMMARY
[FreeTextEntry1] : The patient is a 77-year-old gentleman hyperlipidemia s/p AWMI, HFrEF with atypical chest pain.\par #1 CV- no further chest pain, ECG evolving, continue DAPT for LAD stent, neg nuclear?\par #2 HFrEF- cannot afford entresto and does not qualify for assistance, c/w losartan and metoprolol\par #3 Lipids- continue atorvastatin\par #4 Htn- continue losartan and titrate as tolerated\par #5 DM- on metformin and toujeo\par #6 BPH- on finasteride\par #7 General- Continue daily walks and may increase pace.

## 2022-05-26 NOTE — HISTORY OF PRESENT ILLNESS
[FreeTextEntry1] : Angel had an episode of sharp chest pain radiating to the right chest. He went to PCP and nuclear stress was done. No further chest pain, palpitations or SOB. He walks daily fo rat least one hour.

## 2022-11-29 ENCOUNTER — APPOINTMENT (OUTPATIENT)
Dept: CARDIOLOGY | Facility: CLINIC | Age: 77
End: 2022-11-29

## 2022-11-29 ENCOUNTER — NON-APPOINTMENT (OUTPATIENT)
Age: 77
End: 2022-11-29

## 2022-11-29 VITALS
WEIGHT: 150 LBS | HEART RATE: 62 BPM | SYSTOLIC BLOOD PRESSURE: 123 MMHG | HEIGHT: 66 IN | DIASTOLIC BLOOD PRESSURE: 75 MMHG | OXYGEN SATURATION: 100 % | BODY MASS INDEX: 24.11 KG/M2

## 2022-11-29 DIAGNOSIS — K21.9 GASTRO-ESOPHAGEAL REFLUX DISEASE W/OUT ESOPHAGITIS: ICD-10-CM

## 2022-11-29 PROCEDURE — 93000 ELECTROCARDIOGRAM COMPLETE: CPT

## 2022-11-29 PROCEDURE — 99214 OFFICE O/P EST MOD 30 MIN: CPT

## 2022-11-29 RX ORDER — NIRMATRELVIR AND RITONAVIR 300-100 MG
20 X 150 MG & KIT ORAL
Qty: 30 | Refills: 0 | Status: DISCONTINUED | COMMUNITY
Start: 2022-09-26

## 2022-11-29 RX ORDER — INSULIN GLARGINE 300 U/ML
300 INJECTION, SOLUTION SUBCUTANEOUS
Qty: 6 | Refills: 0 | Status: DISCONTINUED | COMMUNITY
Start: 2022-08-30

## 2022-11-29 RX ORDER — METFORMIN HYDROCHLORIDE 850 MG/1
850 TABLET, COATED ORAL DAILY
Refills: 3 | Status: ACTIVE | COMMUNITY

## 2022-11-29 RX ORDER — INSULIN GLARGINE 300 U/ML
300 INJECTION, SOLUTION SUBCUTANEOUS
Refills: 0 | Status: ACTIVE | COMMUNITY

## 2022-11-29 RX ORDER — BLOOD SUGAR DIAGNOSTIC
STRIP MISCELLANEOUS
Qty: 200 | Refills: 0 | Status: DISCONTINUED | COMMUNITY
Start: 2022-04-06

## 2022-11-29 NOTE — REASON FOR VISIT
[Symptom and Test Evaluation] : symptom and test evaluation [Coronary Artery Disease] : coronary artery disease [Spouse] : spouse

## 2022-11-29 NOTE — HISTORY OF PRESENT ILLNESS
[FreeTextEntry1] : Angel does walk for exercise regularly. No CP, palpitations or SOB. Asking why on current meds. REcently added farxiga. Recent labs unavailable

## 2022-11-29 NOTE — DISCUSSION/SUMMARY
[FreeTextEntry1] : The patient is a 77-year-old gentleman CAD, HLD, HFrEF, DM whose glucose is still elevated\par #1 CV- asymptomatic, continue DAPT for LAD sten until glucose under better control\par #2 HFrEF- cannot afford entresto and does not qualify for assistance, c/w losartan and metoprolol\par #3 Lipids- continue atorvastatin and may decrease pending recent lab results\par #4 Htn- continue losartan \par #5 DM- on metformin and toujeo and now farxiga added, will fax recent labs\par #6 BPH- on finasteride\par #7 General- Continue daily walks and  increase pace.   [EKG obtained to assist in diagnosis and management of assessed problem(s)] : EKG obtained to assist in diagnosis and management of assessed problem(s)

## 2023-02-14 NOTE — ED ADULT TRIAGE NOTE - LOCATION:
Pt involved in plan of care and communicating needs throughout shift. C2AD4 mini HyperCVAD. Afebrile throughout shift; Zosyn administered as ordered. 1 u RBCs transfused today. IV KCl and IV/PO Mag replaced. Continuous fluids maintained. 40 mg IV lasix given this afternoon; external catheter placed with output as charted. Up to bathroom with assistance; no c/o pain. Bilary drain care completed with output as charted. Tolerating diet, voiding without difficulty. Accuchecks ACHS; SSI administered as ordered. All VSS; no acute events so far this shift.  Pt remaining free from falls or injury throughout shift; bed locked and in lowest position; call light within reach.  Pt instructed to call for assistance as needed.  Q1H rounding done on pt.      Right arm;

## 2023-05-22 NOTE — CONSULT NOTE ADULT - SUBJECTIVE AND OBJECTIVE BOX
Registration Time: 07:14  Initial EC:18  Called by ED: 07:51  Saw patient at bedside: 07:57  Called Cath Attendin:00  Activated cath lab: 08:15  Time of device (balloon or MCS): n/a  Delays: none    Patient seen and evaluated at bedside    Chief Complaint: STEMI    HPI:  76y M PMH of BPH, T2DM, HLD, p/w dyspnea/exertional chest pain x 2-3 days and chest pain in the AM today. Yesterday had episode of exertional cp when he was walking, improved w/ 30 min of rest, happened again when he walked another 5 minutes. This morning, while watching TV, had onset of crushing substernal cp at 6:30am. The pain was intense and was present in his chest/abdominal area. Has diaphoresis, nausea, and numbness of hands and feet during episode.     PMHx:   HLD (hyperlipidemia)    T2DM (type 2 diabetes mellitus)    BPH (benign prostatic hyperplasia)      PSHx:     FAMILY HISTORY:  FH: myocardial infarction (Sibling)      Home Medications:  Home Medications:  finasteride 5 mg oral tablet: 1 tab(s) orally once a day (2021 14:45)  metFORMIN 850 mg oral tablet: 1 tab(s) orally 2 times a day (2021 14:45)  pravastatin 20 mg oral tablet: 1 tab(s) orally once a day (2021 14:45)  Toujeo SoloStar 300 units/mL subcutaneous solution: 38 unit(s) subcutaneous once a day (at bedtime) (2021 14:45)    Current Meds:   aspirin enteric coated 81 milliGRAM(s) Oral daily  atorvastatin 80 milliGRAM(s) Oral at bedtime  chlorhexidine 2% Cloths 1 Application(s) Topical daily  dextrose 40% Gel 15 Gram(s) Oral once  dextrose 5%. 1000 milliLiter(s) IV Continuous <Continuous>  dextrose 5%. 1000 milliLiter(s) IV Continuous <Continuous>  dextrose 50% Injectable 25 Gram(s) IV Push once  dextrose 50% Injectable 12.5 Gram(s) IV Push once  dextrose 50% Injectable 25 Gram(s) IV Push once  finasteride 5 milliGRAM(s) Oral daily  glucagon  Injectable 1 milliGRAM(s) IntraMuscular once  heparin  Infusion 800 Unit(s)/Hr IV Continuous <Continuous>  insulin glargine Injectable (LANTUS) 38 Unit(s) SubCutaneous at bedtime  insulin lispro (ADMELOG) corrective regimen sliding scale   SubCutaneous three times a day before meals  insulin lispro (ADMELOG) corrective regimen sliding scale   SubCutaneous at bedtime  metoprolol tartrate 12.5 milliGRAM(s) Oral every 12 hours    Allergies:  No Known Allergies    Social History  Smoking History: denies  Alcohol Use: denies  Drug Use: denies    REVIEW OF SYSTEMS:  Constitutional:     [X] negative [ ] fevers [ ] chills [ ] weight loss [ ] weight gain  HEENT:                  [X] negative [ ] dry eyes [ ] eye irritation [ ] postnasal drip [ ] nasal congestion  CV:                         [ ] negative  [X] chest pain [ ] orthopnea [ ] palpitations [ ] murmur  Resp:                     [ ] negative [ ] cough [X] shortness of breath [ ] dyspnea [ ] wheezing [ ] sputum [ ] hemoptysis  GI:                          [ ] negative [X] nausea [X] vomiting [ ] diarrhea [ ] constipation [ ] abd pain [ ] dysphagia   :                        [X] negative [ ] dysuria [ ] nocturia [ ] hematuria [ ] increased urinary frequency  MSK:                      [X] negative [ ] back pain [ ] myalgias [ ] arthralgias [ ] fracture  Skin:                       [X] negative [ ] rash [ ] itch  Neuro:                   [X] negative [ ] headache [ ] dizziness [ ] syncope [ ] weakness [ ] numbness  Psych:                    [X] negative [ ] anxiety [ ] depression  Endo:                     [X] negative [ ] diabetes [ ] thyroid problem  Heme/Lymph:      [X] negative [ ] anemia [ ] bleeding problem  Allergic/Immune: [X] negative [ ] itchy eyes [ ] nasal discharge [ ] hives [ ] angioedema    [X] All other systems negative or otherwise described above.  [ ] Unable to assess ROS because ________.    ICU Vital Signs Last 24 Hrs  T(C): 37.6 (2021 16:00), Max: 37.6 (2021 16:00)  T(F): 99.7 (2021 16:00), Max: 99.7 (2021 16:00)  HR: 82 (2021 16:00) (68 - 90)  BP: 82/50 (2021 10:00) (82/50 - 122/80)  BP(mean): 62 (2021 10:00) (62 - 62)  ABP: --  ABP(mean): --  RR: 21 (2021 16:00) (15 - 29)  SpO2: 96% (2021 16:00) (93% - 100%)      Physical Exam:  GENERAL: in apparent distress, well-developed  HEAD:  Atraumatic, Normocephalic  ENT: EOMI, conjunctiva and sclera clear, Neck supple, No JVD, moist mucosa  CHEST/LUNG: Clear to auscultation bilaterally  BACK: No spinal tenderness  HEART: Regular rate and rhythm; No murmurs, rubs, or gallops; radial and DP 2+ b/l  ABDOMEN: Soft, Nontender, Nondistended  EXTREMITIES:  No clubbing, cyanosis, or edema  PSYCH: Nl behavior, nl affect  NEUROLOGY: AAOx3, non-focal  SKIN: Normal color, No rashes or lesions  LINES: no central lines present    Cardiovascular Diagnostic Testing    ECG: Personally reviewed    Echo: bedside POCUS w/ anterior hypokinesis    Stress Testing: none    Cath: none    Imaging: reviewed    Labs: Personally reviewed                        14.5   13.47 )-----------( 224      ( 2021 08:13 )             44.9     07-08    139  |  103  |  15  ----------------------------<  154<H>  3.7   |  18<L>  |  0.89    Ca    9.2      2021 08:13    TPro  7.4  /  Alb  4.1  /  TBili  0.7  /  DBili  x   /  AST  23  /  ALT  19  /  AlkPhos  68  07-08    PT/INR - ( 2021 08:13 )   PT: 12.1 sec;   INR: 1.01 ratio         PTT - ( 2021 08:13 )  PTT:29.8 sec        Total Cholesterol: 121  LDL: --  HDL: 46  T             Show Applicator Variable?: Yes Detail Level: Detailed Consent: The patient's consent was obtained including but not limited to risks of pain, swelling, and crusting. Duration Of Freeze Thaw-Cycle (Seconds): 0 Post-Care Instructions: Pt advised to call if not healed with normal skin in 1 month. Render Post-Care Instructions In Note?: no Number Of Freeze-Thaw Cycles: 2 freeze-thaw cycles

## 2023-05-30 ENCOUNTER — APPOINTMENT (OUTPATIENT)
Dept: CARDIOLOGY | Facility: CLINIC | Age: 78
End: 2023-05-30
Payer: MEDICARE

## 2023-05-30 ENCOUNTER — NON-APPOINTMENT (OUTPATIENT)
Age: 78
End: 2023-05-30

## 2023-05-30 VITALS
SYSTOLIC BLOOD PRESSURE: 127 MMHG | HEART RATE: 59 BPM | BODY MASS INDEX: 24.21 KG/M2 | DIASTOLIC BLOOD PRESSURE: 73 MMHG | WEIGHT: 150 LBS | OXYGEN SATURATION: 99 %

## 2023-05-30 DIAGNOSIS — I25.10 ATHEROSCLEROTIC HEART DISEASE OF NATIVE CORONARY ARTERY W/OUT ANGINA PECTORIS: ICD-10-CM

## 2023-05-30 PROCEDURE — 93000 ELECTROCARDIOGRAM COMPLETE: CPT

## 2023-05-30 PROCEDURE — 99214 OFFICE O/P EST MOD 30 MIN: CPT

## 2023-05-30 RX ORDER — CLOPIDOGREL BISULFATE 75 MG/1
75 TABLET, FILM COATED ORAL DAILY
Qty: 1 | Refills: 1 | Status: DISCONTINUED | COMMUNITY
End: 2023-05-30

## 2023-05-30 NOTE — HISTORY OF PRESENT ILLNESS
[FreeTextEntry1] : Angel has been walking for exercise. No CP, palpitations or SOB. Saturday ate at 9AM and was going to party at 2:30 no food in between. When he got there was dizzy. Felt better after eating.

## 2023-05-30 NOTE — DISCUSSION/SUMMARY
[FreeTextEntry1] : The patient is a 77-year-old gentleman CAD, HLD, HFrEF, DM with episode of near syncope. \par #1 CV- asymptomatic, c/w aspirin alone\par #2 HFrEF- cannot afford entresto and does not qualify for assistance, c/w losartan and metoprolol\par #3 HLD- c/w atorvastatin and may decrease pending recent lab results\par #4 HTN- c/w losartan \par #5 DM- on metformin, toujeo and farxiga but glucose elevated, ask if can increase farxiga to 10mg, episode related to hypoglycemia not cardiac etiology\par #6 BPH- on finasteride\par #7 General- Continue daily walks and  increase pace.   [EKG obtained to assist in diagnosis and management of assessed problem(s)] : EKG obtained to assist in diagnosis and management of assessed problem(s)

## 2023-07-08 NOTE — ED ADULT TRIAGE NOTE - DATE OF LAST VACCINATION
10-Mar-2021
activities including direct patient care, counseling and/or coordinating care, reviewing notes/lab data/imaging, and discussion with multidisciplinary team (excluding time spent on resident teaching)

## 2023-10-22 ENCOUNTER — NON-APPOINTMENT (OUTPATIENT)
Age: 78
End: 2023-10-22

## 2023-10-23 ENCOUNTER — NON-APPOINTMENT (OUTPATIENT)
Age: 78
End: 2023-10-23

## 2023-10-23 ENCOUNTER — APPOINTMENT (OUTPATIENT)
Dept: OPHTHALMOLOGY | Facility: CLINIC | Age: 78
End: 2023-10-23
Payer: MEDICARE

## 2023-10-23 PROCEDURE — 76512 OPH US DX B-SCAN: CPT | Mod: LT

## 2023-10-23 PROCEDURE — 92004 COMPRE OPH EXAM NEW PT 1/>: CPT

## 2023-10-24 RX ORDER — CYCLOPENTOLATE HYDROCHLORIDE 10 MG/ML
1 SOLUTION/ DROPS OPHTHALMIC
Refills: 0 | Status: COMPLETED | OUTPATIENT
Start: 2023-10-26 | End: 2023-10-26

## 2023-10-24 RX ORDER — PHENYLEPHRINE HCL 2.5 %
1 DROPS OPHTHALMIC (EYE)
Refills: 0 | Status: COMPLETED | OUTPATIENT
Start: 2023-10-26 | End: 2023-10-26

## 2023-10-24 RX ORDER — TROPICAMIDE 1 %
1 DROPS OPHTHALMIC (EYE)
Refills: 0 | Status: COMPLETED | OUTPATIENT
Start: 2023-10-26 | End: 2023-10-26

## 2023-10-24 RX ORDER — OFLOXACIN 0.3 %
1 DROPS OPHTHALMIC (EYE)
Refills: 0 | Status: COMPLETED | OUTPATIENT
Start: 2023-10-26 | End: 2023-10-26

## 2023-10-26 ENCOUNTER — NON-APPOINTMENT (OUTPATIENT)
Age: 78
End: 2023-10-26

## 2023-10-26 ENCOUNTER — APPOINTMENT (OUTPATIENT)
Dept: OPHTHALMOLOGY | Facility: AMBULATORY SURGERY CENTER | Age: 78
End: 2023-10-26

## 2023-10-26 ENCOUNTER — APPOINTMENT (OUTPATIENT)
Dept: OPHTHALMOLOGY | Facility: CLINIC | Age: 78
End: 2023-10-26

## 2023-10-26 ENCOUNTER — OUTPATIENT (OUTPATIENT)
Dept: OUTPATIENT SERVICES | Facility: HOSPITAL | Age: 78
LOS: 1 days | Discharge: HOME | End: 2023-10-26
Payer: MEDICARE

## 2023-10-26 VITALS
DIASTOLIC BLOOD PRESSURE: 65 MMHG | SYSTOLIC BLOOD PRESSURE: 129 MMHG | RESPIRATION RATE: 16 BRPM | HEART RATE: 68 BPM | TEMPERATURE: 97 F | OXYGEN SATURATION: 95 %

## 2023-10-26 VITALS
HEIGHT: 66 IN | DIASTOLIC BLOOD PRESSURE: 74 MMHG | HEART RATE: 68 BPM | SYSTOLIC BLOOD PRESSURE: 138 MMHG | TEMPERATURE: 98 F | WEIGHT: 137.79 LBS | RESPIRATION RATE: 16 BRPM | OXYGEN SATURATION: 99 %

## 2023-10-26 DIAGNOSIS — Z98.890 OTHER SPECIFIED POSTPROCEDURAL STATES: Chronic | ICD-10-CM

## 2023-10-26 LAB
GLUCOSE BLDC GLUCOMTR-MCNC: 130 MG/DL — HIGH (ref 70–99)
GLUCOSE BLDC GLUCOMTR-MCNC: 130 MG/DL — HIGH (ref 70–99)

## 2023-10-26 PROCEDURE — 66825 REPOSITION INTRAOCULAR LENS: CPT | Mod: 80,LT

## 2023-10-26 PROCEDURE — 67108 REPAIR DETACHED RETINA: CPT | Mod: LT

## 2023-10-26 DEVICE — LASER PROBE 25G CONSTELLATION: Type: IMPLANTABLE DEVICE | Status: FUNCTIONAL

## 2023-10-26 RX ORDER — SODIUM CHLORIDE 9 MG/ML
1000 INJECTION, SOLUTION INTRAVENOUS
Refills: 0 | Status: DISCONTINUED | OUTPATIENT
Start: 2023-10-26 | End: 2023-10-26

## 2023-10-26 RX ORDER — ACETAMINOPHEN 500 MG
650 TABLET ORAL ONCE
Refills: 0 | Status: DISCONTINUED | OUTPATIENT
Start: 2023-10-26 | End: 2023-10-26

## 2023-10-26 RX ADMIN — CYCLOPENTOLATE HYDROCHLORIDE 1 DROP(S): 10 SOLUTION/ DROPS OPHTHALMIC at 13:41

## 2023-10-26 RX ADMIN — Medication 1 DROP(S): at 13:41

## 2023-10-26 RX ADMIN — Medication 1 DROP(S): at 13:54

## 2023-10-26 RX ADMIN — Medication 1 DROP(S): at 14:06

## 2023-10-26 RX ADMIN — CYCLOPENTOLATE HYDROCHLORIDE 1 DROP(S): 10 SOLUTION/ DROPS OPHTHALMIC at 14:05

## 2023-10-26 RX ADMIN — CYCLOPENTOLATE HYDROCHLORIDE 1 DROP(S): 10 SOLUTION/ DROPS OPHTHALMIC at 13:55

## 2023-10-26 RX ADMIN — Medication 1 DROP(S): at 13:42

## 2023-10-26 RX ADMIN — Medication 1 DROP(S): at 14:05

## 2023-10-26 RX ADMIN — Medication 1 DROP(S): at 13:55

## 2023-10-26 NOTE — PRE-ANESTHESIA EVALUATION ADULT - NSANTHDISPORD_GEN_ALL_CORE
DISCHARGE SUMMARY  DATEDA; 07/29/2017 09:54        Primary Diagnosis: chronic pancreatitis  Additional Diagnoses: Afib with RVR  Consultants: Cardiology  Discharge Medications:   New, discontinued, or changed meds: Norco, cipro, reglan and protonix  See medication reconciliation form for complete list    Follow-up Plan: RTO on 8/3  Patient Instructions: given to pt.  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        
PACU

## 2023-10-26 NOTE — PACU DISCHARGE NOTE - NAUSEA/VOMITING:
None Thalidomide Counseling: I discussed with the patient the risks of thalidomide including but not limited to birth defects, anxiety, weakness, chest pain, dizziness, cough and severe allergy.

## 2023-10-26 NOTE — OPERATIVE REPORT - OPERATIVE RPOSRT DETAILS
PATIENT NAME: YANNA TRONCOSO    ATTENDING: Sabrina Fonseca MD    PREOPERATIVE DIAGNOSIS(ES):  Retinal tear, Vitreous Hemorrhage, left eye     POSTOPERATIVE DIAGNOSIS(ES):  Retinal tear, Vitreous Hemorrhage, left eye     PROCEDURE: Pars plana vitrectomy - all of the left eye     INDICATIONS:       The patient is a 78y year old Male with a history of a vitreous hemorrhage in the operative eye. After a detailed review of the risks, benefits and alternatives of the procedure, including but not limited to bleeding, infection, inflammation, retinal detachment, loss of vision, loss of eye, double vision, unclear visual potential, need for further surgery, and systemic risks of anesthesia and surgery, informed consent was obtained and the patient elected to proceed with the surgery.      PROCEDURE:       On the day of surgery, after clearance by medicine and anesthesia, the patient was brought to the operating room in stable condition. After verifying the correct surgical site, the patient was placed in the supine position. Intravenous sedation was provided by the anesthesia team.  After a brief time-out, a 1:1 mixture of 2% lidocaine and 0.75% Marcaine was injected in a retrobulbar fashion behind the operative eye. The patient was then prepped and draped in the usual sterile fashion.  Eyelashes were draped out of the operative field and a stainless steel eyelid speculum was placed in the operative eye.  A time-out was performed verifying correct patient, procedure, site, positioning and special equipment prior to starting the case.      A 25-gauge microcannula was placed in the inferotemporal quadrant in a beveled fashion 3.5 mm posterior to the limbus. The infusion cannula was cleared of air, inserted into the microcannula, confirmed to be in the correct position within the vitreous cavity by direct visualization through the dilated pupil with the light pipe and then turned on under direct visualization. Two additional microcannulas were placed superonasally and superotemporally in a similar fashion. The vitrectomy hand piece and light were then inserted into the eye and the anterior vitreous was cleared under direct visualization.       Then, under indirect wide field visualization, a core and peripheral vitrectomy was performed. The posterior hyaloid was confirmed to be . The vitreous and associated hemorrhage was excised 360 degrees to the posterior vitreous base and the inciting retinal breaks was noted.  The flap of the tear was removed using the vitrectomy handpiece and endophotocoagulation was placed around the break.  360 degree scleral indentation was performed and no additional holes, tears, or detachments were identified.       An air-fluid exchange was performed and the retina remained flat.  The microcannulas were removed from the eye. All sclerotomies remained airtight and the eye remained at a physiologic pressure by palpation.     The eyelid speculum was removed from the eye. Betadine was cleaned from around the eye. A topical steroid/antibiotic cream was placed on the eye, followed by a patch and a clear plastic shield. The patient tolerated the procedure well and left the operating room in stable condition.          PATIENT NAME: YANNA TRONCOSO    ATTENDING: Sabrina Fonseca MD    PREOPERATIVE DIAGNOSIS(ES):  Retinal tear, Vitreous Hemorrhage, left eye     POSTOPERATIVE DIAGNOSIS(ES):  Retinal tear, Vitreous Hemorrhage, retinal tears, retained lens material, retinal detachment left eye     PROCEDURE: Pars plana vitrectomy, lensectomy endolaser, gas, IOL repositioning - all of the left eye     INDICATIONS:       The patient is a 78y year old Male with a history of a vitreous hemorrhage in the operative eye. After a detailed review of the risks, benefits and alternatives of the procedure, including but not limited to bleeding, infection, inflammation, retinal detachment, loss of vision, loss of eye, double vision, unclear visual potential, need for further surgery, and systemic risks of anesthesia and surgery, informed consent was obtained and the patient elected to proceed with the surgery.      PROCEDURE:       On the day of surgery, after clearance by medicine and anesthesia, the patient was brought to the operating room in stable condition. After verifying the correct surgical site, the patient was placed in the supine position. Intravenous sedation was provided by the anesthesia team.  After a brief time-out, a 1:1 mixture of 2% lidocaine and 0.75% Marcaine was injected in a retrobulbar fashion behind the operative eye. The patient was then prepped and draped in the usual sterile fashion.  Eyelashes were draped out of the operative field and a stainless steel eyelid speculum was placed in the operative eye.  A time-out was performed verifying correct patient, procedure, site, positioning and special equipment prior to starting the case.      A 25-gauge microcannula was placed in the inferotemporal quadrant in a beveled fashion 3.5 mm posterior to the limbus. The infusion cannula was cleared of air, inserted into the microcannula, confirmed to be in the correct position within the vitreous cavity by direct visualization through the dilated pupil with the light pipe and then turned on under direct visualization. Two additional microcannulas were placed superonasally and superotemporally in a similar fashion. The vitrectomy hand piece and light were then inserted into the eye and the anterior vitreous was cleared under direct visualization.  The patient was noted to have a dense vitreous hemorrhage and retained lens material in the AC and posteriorly.     Then, under indirect wide field visualization, a core and peripheral vitrectomy was performed. The posterior hyaloid was confirmed to be . The vitreous and associated hemorrhage was excised 360 degrees to the posterior vitreous base and a nubmer of retinal breaks werer noted, including a shallow, localized detachment superotemporally.  The flap of the tears were removed using the vitrectomy handpiece and endophotocoagulation was placed around the break.  360 degree scleral indentation was performed and no additional holes, tears, or detachments were identified.      The anterior chamber was cleared of lens material and the IOL was shifted horizontally given the lack of iris superiorly.     An air-fluid exchange was performed and 20% SF6 gas was drawn up by the attending physician. A complete air-gas exchange was performed.  The microcannulas were removed from the eye and sutured closed with 8-0 vicryl. All sclerotomies remained airtight and the eye remained at a physiologic pressure by palpation.     The eyelid speculum was removed from the eye. Betadine was cleaned from around the eye. A topical steroid/antibiotic cream was placed on the eye, followed by a patch and a clear plastic shield. The patient tolerated the procedure well and left the operating room in stable condition.

## 2023-10-27 ENCOUNTER — NON-APPOINTMENT (OUTPATIENT)
Age: 78
End: 2023-10-27

## 2023-10-27 ENCOUNTER — APPOINTMENT (OUTPATIENT)
Dept: OPHTHALMOLOGY | Facility: CLINIC | Age: 78
End: 2023-10-27
Payer: MEDICARE

## 2023-10-27 PROCEDURE — 99024 POSTOP FOLLOW-UP VISIT: CPT

## 2023-10-30 ENCOUNTER — APPOINTMENT (OUTPATIENT)
Dept: OPHTHALMOLOGY | Facility: CLINIC | Age: 78
End: 2023-10-30
Payer: MEDICARE

## 2023-10-30 ENCOUNTER — NON-APPOINTMENT (OUTPATIENT)
Age: 78
End: 2023-10-30

## 2023-10-30 PROCEDURE — 99024 POSTOP FOLLOW-UP VISIT: CPT

## 2023-11-02 ENCOUNTER — APPOINTMENT (OUTPATIENT)
Dept: OPHTHALMOLOGY | Facility: CLINIC | Age: 78
End: 2023-11-02
Payer: MEDICARE

## 2023-11-02 ENCOUNTER — NON-APPOINTMENT (OUTPATIENT)
Age: 78
End: 2023-11-02

## 2023-11-02 PROCEDURE — 99024 POSTOP FOLLOW-UP VISIT: CPT

## 2023-11-10 ENCOUNTER — NON-APPOINTMENT (OUTPATIENT)
Age: 78
End: 2023-11-10

## 2023-11-10 ENCOUNTER — APPOINTMENT (OUTPATIENT)
Dept: OPHTHALMOLOGY | Facility: CLINIC | Age: 78
End: 2023-11-10
Payer: MEDICARE

## 2023-11-10 PROCEDURE — 99024 POSTOP FOLLOW-UP VISIT: CPT

## 2023-11-17 ENCOUNTER — APPOINTMENT (OUTPATIENT)
Dept: OPHTHALMOLOGY | Facility: CLINIC | Age: 78
End: 2023-11-17
Payer: MEDICARE

## 2023-11-17 ENCOUNTER — NON-APPOINTMENT (OUTPATIENT)
Age: 78
End: 2023-11-17

## 2023-11-17 PROCEDURE — 99024 POSTOP FOLLOW-UP VISIT: CPT

## 2023-11-21 ENCOUNTER — APPOINTMENT (OUTPATIENT)
Dept: CARDIOLOGY | Facility: CLINIC | Age: 78
End: 2023-11-21
Payer: MEDICARE

## 2023-11-21 ENCOUNTER — NON-APPOINTMENT (OUTPATIENT)
Age: 78
End: 2023-11-21

## 2023-11-21 VITALS
BODY MASS INDEX: 22.82 KG/M2 | HEART RATE: 60 BPM | WEIGHT: 142 LBS | HEIGHT: 66 IN | SYSTOLIC BLOOD PRESSURE: 127 MMHG | OXYGEN SATURATION: 100 % | DIASTOLIC BLOOD PRESSURE: 75 MMHG

## 2023-11-21 DIAGNOSIS — I25.10 ATHEROSCLEROTIC HEART DISEASE OF NATIVE CORONARY ARTERY W/OUT ANGINA PECTORIS: ICD-10-CM

## 2023-11-21 DIAGNOSIS — E11.9 TYPE 2 DIABETES MELLITUS W/OUT COMPLICATIONS: ICD-10-CM

## 2023-11-21 DIAGNOSIS — E78.5 HYPERLIPIDEMIA, UNSPECIFIED: ICD-10-CM

## 2023-11-21 DIAGNOSIS — I50.20 UNSPECIFIED SYSTOLIC (CONGESTIVE) HEART FAILURE: ICD-10-CM

## 2023-11-21 PROCEDURE — 93000 ELECTROCARDIOGRAM COMPLETE: CPT

## 2023-11-21 PROCEDURE — 99214 OFFICE O/P EST MOD 30 MIN: CPT

## 2023-11-21 RX ORDER — METOPROLOL SUCCINATE 25 MG/1
25 TABLET, EXTENDED RELEASE ORAL DAILY
Qty: 45 | Refills: 3 | Status: ACTIVE | COMMUNITY

## 2023-11-21 RX ORDER — DAPAGLIFLOZIN 10 MG/1
10 TABLET, FILM COATED ORAL DAILY
Qty: 90 | Refills: 3 | Status: ACTIVE | COMMUNITY
Start: 2022-05-31

## 2023-12-01 ENCOUNTER — NON-APPOINTMENT (OUTPATIENT)
Age: 78
End: 2023-12-01

## 2023-12-01 ENCOUNTER — APPOINTMENT (OUTPATIENT)
Dept: OPHTHALMOLOGY | Facility: CLINIC | Age: 78
End: 2023-12-01
Payer: MEDICARE

## 2023-12-01 PROCEDURE — 92134 CPTRZ OPH DX IMG PST SGM RTA: CPT

## 2023-12-01 PROCEDURE — 99024 POSTOP FOLLOW-UP VISIT: CPT

## 2024-01-04 ENCOUNTER — APPOINTMENT (OUTPATIENT)
Dept: OPHTHALMOLOGY | Facility: CLINIC | Age: 79
End: 2024-01-04
Payer: MEDICARE

## 2024-01-04 ENCOUNTER — NON-APPOINTMENT (OUTPATIENT)
Age: 79
End: 2024-01-04

## 2024-01-04 PROCEDURE — 92285 EXTERNAL OCULAR PHOTOGRAPHY: CPT

## 2024-01-04 PROCEDURE — 99024 POSTOP FOLLOW-UP VISIT: CPT

## 2024-01-04 PROCEDURE — 92133 CPTRZD OPH DX IMG PST SGM ON: CPT

## 2024-02-22 NOTE — DISCHARGE NOTE PROVIDER - NSDCQMACEB_CARD_A_CORE
Discharge instructions given and explained to patient and family with verbalized understanding. VSS.  Denies N/V, tolerating PO fluids. Rates pain level as tolerable. IV removed. Pt left floor via W/C, stable for transport, responsible person available for transport home.   
Yes

## 2024-03-12 ENCOUNTER — APPOINTMENT (OUTPATIENT)
Dept: OPHTHALMOLOGY | Facility: CLINIC | Age: 79
End: 2024-03-12
Payer: MEDICARE

## 2024-03-12 ENCOUNTER — NON-APPOINTMENT (OUTPATIENT)
Age: 79
End: 2024-03-12

## 2024-03-12 PROCEDURE — 92250 FUNDUS PHOTOGRAPHY W/I&R: CPT

## 2024-03-12 PROCEDURE — 92012 INTRM OPH EXAM EST PATIENT: CPT

## 2024-04-10 NOTE — HISTORY OF PRESENT ILLNESS
[FreeTextEntry1] : Angel is a 76-year-old gentleman DM, hyperlipidemia with AWMI. Cath prox LAD stents, IABP, cardiac MRI neg for thrombus. He had mild chest pain after eating on Sunday. he had started to have symptoms the day before on his usual 40 minute walk. No no symptoms. Prophylactic measure

## 2024-04-16 ENCOUNTER — EMERGENCY (EMERGENCY)
Facility: HOSPITAL | Age: 79
LOS: 0 days | Discharge: ANOTHER HOSPITAL | End: 2024-04-16
Attending: STUDENT IN AN ORGANIZED HEALTH CARE EDUCATION/TRAINING PROGRAM
Payer: MEDICARE

## 2024-04-16 ENCOUNTER — INPATIENT (INPATIENT)
Facility: HOSPITAL | Age: 79
LOS: 8 days | Discharge: NURSING, REHAB OR LONG-TERM CARE | DRG: 66 | End: 2024-04-25
Attending: PSYCHIATRY & NEUROLOGY | Admitting: PSYCHIATRY & NEUROLOGY
Payer: MEDICARE

## 2024-04-16 ENCOUNTER — APPOINTMENT (OUTPATIENT)
Dept: NEUROSURGERY | Facility: HOSPITAL | Age: 79
End: 2024-04-16

## 2024-04-16 VITALS
SYSTOLIC BLOOD PRESSURE: 112 MMHG | RESPIRATION RATE: 19 BRPM | HEART RATE: 86 BPM | DIASTOLIC BLOOD PRESSURE: 74 MMHG | TEMPERATURE: 98 F | OXYGEN SATURATION: 95 %

## 2024-04-16 VITALS
OXYGEN SATURATION: 100 % | DIASTOLIC BLOOD PRESSURE: 91 MMHG | TEMPERATURE: 98 F | WEIGHT: 155.87 LBS | RESPIRATION RATE: 14 BRPM | HEIGHT: 67 IN | HEART RATE: 65 BPM | SYSTOLIC BLOOD PRESSURE: 138 MMHG

## 2024-04-16 VITALS
RESPIRATION RATE: 19 BRPM | DIASTOLIC BLOOD PRESSURE: 76 MMHG | OXYGEN SATURATION: 100 % | SYSTOLIC BLOOD PRESSURE: 123 MMHG | HEART RATE: 81 BPM

## 2024-04-16 DIAGNOSIS — I63.9 CEREBRAL INFARCTION, UNSPECIFIED: ICD-10-CM

## 2024-04-16 DIAGNOSIS — R41.82 ALTERED MENTAL STATUS, UNSPECIFIED: ICD-10-CM

## 2024-04-16 DIAGNOSIS — E78.00 PURE HYPERCHOLESTEROLEMIA, UNSPECIFIED: ICD-10-CM

## 2024-04-16 DIAGNOSIS — Z98.890 OTHER SPECIFIED POSTPROCEDURAL STATES: Chronic | ICD-10-CM

## 2024-04-16 DIAGNOSIS — I25.10 ATHEROSCLEROTIC HEART DISEASE OF NATIVE CORONARY ARTERY WITHOUT ANGINA PECTORIS: ICD-10-CM

## 2024-04-16 DIAGNOSIS — S50.812A ABRASION OF LEFT FOREARM, INITIAL ENCOUNTER: ICD-10-CM

## 2024-04-16 DIAGNOSIS — I63.511 CEREBRAL INFARCTION DUE TO UNSPECIFIED OCCLUSION OR STENOSIS OF RIGHT MIDDLE CEREBRAL ARTERY: ICD-10-CM
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
## 2024-04-16 DIAGNOSIS — I10 ESSENTIAL (PRIMARY) HYPERTENSION: ICD-10-CM

## 2024-04-16 DIAGNOSIS — Z95.5 PRESENCE OF CORONARY ANGIOPLASTY IMPLANT AND GRAFT: Chronic | ICD-10-CM

## 2024-04-16 DIAGNOSIS — E11.9 TYPE 2 DIABETES MELLITUS WITHOUT COMPLICATIONS: ICD-10-CM
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
## 2024-04-16 DIAGNOSIS — R29.810 FACIAL WEAKNESS: ICD-10-CM

## 2024-04-16 DIAGNOSIS — S80.02XA CONTUSION OF LEFT KNEE, INITIAL ENCOUNTER: ICD-10-CM

## 2024-04-16 DIAGNOSIS — Z95.5 PRESENCE OF CORONARY ANGIOPLASTY IMPLANT AND GRAFT: ICD-10-CM

## 2024-04-16 DIAGNOSIS — R47.1 DYSARTHRIA AND ANARTHRIA: ICD-10-CM

## 2024-04-16 DIAGNOSIS — I44.4 LEFT ANTERIOR FASCICULAR BLOCK: ICD-10-CM

## 2024-04-16 DIAGNOSIS — S20.229A CONTUSION OF UNSPECIFIED BACK WALL OF THORAX, INITIAL ENCOUNTER: ICD-10-CM

## 2024-04-16 LAB
ALBUMIN SERPL ELPH-MCNC: 2.3 G/DL — LOW (ref 3.3–5)
ALBUMIN SERPL ELPH-MCNC: 3.1 G/DL — LOW (ref 3.3–5)
ALP SERPL-CCNC: 62 U/L — SIGNIFICANT CHANGE UP (ref 40–120)
ALP SERPL-CCNC: 97 U/L — SIGNIFICANT CHANGE UP (ref 40–120)
ALT FLD-CCNC: 19 U/L — SIGNIFICANT CHANGE UP (ref 10–45)
ALT FLD-CCNC: 40 U/L — SIGNIFICANT CHANGE UP (ref 12–78)
ANION GAP SERPL CALC-SCNC: 13 MMOL/L — SIGNIFICANT CHANGE UP (ref 5–17)
ANION GAP SERPL CALC-SCNC: 6 MMOL/L — SIGNIFICANT CHANGE UP (ref 5–17)
ANION GAP SERPL CALC-SCNC: 9 MMOL/L — SIGNIFICANT CHANGE UP (ref 5–17)
APTT BLD: 24.5 SEC — SIGNIFICANT CHANGE UP (ref 24.5–35.6)
APTT BLD: 25.1 SEC — SIGNIFICANT CHANGE UP (ref 24.5–35.6)
AST SERPL-CCNC: 21 U/L — SIGNIFICANT CHANGE UP (ref 10–40)
AST SERPL-CCNC: 33 U/L — SIGNIFICANT CHANGE UP (ref 15–37)
BASE EXCESS BLDV CALC-SCNC: 0.3 MMOL/L — SIGNIFICANT CHANGE UP (ref -2–3)
BASOPHILS # BLD AUTO: 0.02 K/UL — SIGNIFICANT CHANGE UP (ref 0–0.2)
BASOPHILS # BLD AUTO: 0.03 K/UL — SIGNIFICANT CHANGE UP (ref 0–0.2)
BASOPHILS # BLD AUTO: 0.04 K/UL — SIGNIFICANT CHANGE UP (ref 0–0.2)
BASOPHILS NFR BLD AUTO: 0.1 % — SIGNIFICANT CHANGE UP (ref 0–2)
BASOPHILS NFR BLD AUTO: 0.2 % — SIGNIFICANT CHANGE UP (ref 0–2)
BASOPHILS NFR BLD AUTO: 0.3 % — SIGNIFICANT CHANGE UP (ref 0–2)
BILIRUB SERPL-MCNC: 0.3 MG/DL — SIGNIFICANT CHANGE UP (ref 0.2–1.2)
BILIRUB SERPL-MCNC: 0.6 MG/DL — SIGNIFICANT CHANGE UP (ref 0.2–1.2)
BLD GP AB SCN SERPL QL: NEGATIVE — SIGNIFICANT CHANGE UP
BUN SERPL-MCNC: 13 MG/DL — SIGNIFICANT CHANGE UP (ref 7–23)
BUN SERPL-MCNC: 16 MG/DL — SIGNIFICANT CHANGE UP (ref 7–23)
BUN SERPL-MCNC: 22 MG/DL — SIGNIFICANT CHANGE UP (ref 7–23)
CA-I SERPL-SCNC: 1.22 MMOL/L — SIGNIFICANT CHANGE UP (ref 1.15–1.33)
CALCIUM SERPL-MCNC: 5.9 MG/DL — CRITICAL LOW (ref 8.4–10.5)
CALCIUM SERPL-MCNC: 7.9 MG/DL — LOW (ref 8.4–10.5)
CALCIUM SERPL-MCNC: 8.8 MG/DL — SIGNIFICANT CHANGE UP (ref 8.5–10.1)
CHLORIDE BLDV-SCNC: 101 MMOL/L — SIGNIFICANT CHANGE UP (ref 96–108)
CHLORIDE SERPL-SCNC: 105 MMOL/L — SIGNIFICANT CHANGE UP (ref 96–108)
CHLORIDE SERPL-SCNC: 107 MMOL/L — SIGNIFICANT CHANGE UP (ref 96–108)
CHLORIDE SERPL-SCNC: 116 MMOL/L — HIGH (ref 96–108)
CO2 BLDV-SCNC: 27 MMOL/L — HIGH (ref 22–26)
CO2 SERPL-SCNC: 14 MMOL/L — LOW (ref 22–31)
CO2 SERPL-SCNC: 17 MMOL/L — LOW (ref 22–31)
CO2 SERPL-SCNC: 23 MMOL/L — SIGNIFICANT CHANGE UP (ref 22–31)
CREAT SERPL-MCNC: 0.5 MG/DL — SIGNIFICANT CHANGE UP (ref 0.5–1.3)
CREAT SERPL-MCNC: 0.77 MG/DL — SIGNIFICANT CHANGE UP (ref 0.5–1.3)
CREAT SERPL-MCNC: 0.89 MG/DL — SIGNIFICANT CHANGE UP (ref 0.5–1.3)
EGFR: 104 ML/MIN/1.73M2 — SIGNIFICANT CHANGE UP
EGFR: 88 ML/MIN/1.73M2 — SIGNIFICANT CHANGE UP
EGFR: 92 ML/MIN/1.73M2 — SIGNIFICANT CHANGE UP
EOSINOPHIL # BLD AUTO: 0.02 K/UL — SIGNIFICANT CHANGE UP (ref 0–0.5)
EOSINOPHIL # BLD AUTO: 0.08 K/UL — SIGNIFICANT CHANGE UP (ref 0–0.5)
EOSINOPHIL # BLD AUTO: 0.34 K/UL — SIGNIFICANT CHANGE UP (ref 0–0.5)
EOSINOPHIL NFR BLD AUTO: 0.1 % — SIGNIFICANT CHANGE UP (ref 0–6)
EOSINOPHIL NFR BLD AUTO: 0.6 % — SIGNIFICANT CHANGE UP (ref 0–6)
EOSINOPHIL NFR BLD AUTO: 2.7 % — SIGNIFICANT CHANGE UP (ref 0–6)
GAS PNL BLDV: 131 MMOL/L — LOW (ref 136–145)
GAS PNL BLDV: SIGNIFICANT CHANGE UP
GLUCOSE BLDV-MCNC: 244 MG/DL — HIGH (ref 70–99)
GLUCOSE SERPL-MCNC: 148 MG/DL — HIGH (ref 70–99)
GLUCOSE SERPL-MCNC: 149 MG/DL — HIGH (ref 70–99)
GLUCOSE SERPL-MCNC: 290 MG/DL — HIGH (ref 70–99)
HCO3 BLDV-SCNC: 26 MMOL/L — SIGNIFICANT CHANGE UP (ref 22–29)
HCT VFR BLD CALC: 32.4 % — LOW (ref 39–50)
HCT VFR BLD CALC: 37.1 % — LOW (ref 39–50)
HCT VFR BLD CALC: 40.5 % — SIGNIFICANT CHANGE UP (ref 39–50)
HCT VFR BLDA CALC: 40 % — SIGNIFICANT CHANGE UP (ref 39–51)
HGB BLD CALC-MCNC: 13.2 G/DL — SIGNIFICANT CHANGE UP (ref 12.6–17.4)
HGB BLD-MCNC: 10.3 G/DL — LOW (ref 13–17)
HGB BLD-MCNC: 11.7 G/DL — LOW (ref 13–17)
HGB BLD-MCNC: 13.4 G/DL — SIGNIFICANT CHANGE UP (ref 13–17)
IMM GRANULOCYTES NFR BLD AUTO: 0.5 % — SIGNIFICANT CHANGE UP (ref 0–0.9)
IMM GRANULOCYTES NFR BLD AUTO: 0.6 % — SIGNIFICANT CHANGE UP (ref 0–0.9)
IMM GRANULOCYTES NFR BLD AUTO: 0.7 % — SIGNIFICANT CHANGE UP (ref 0–0.9)
INR BLD: 0.89 RATIO — SIGNIFICANT CHANGE UP (ref 0.85–1.18)
INR BLD: 1.06 RATIO — SIGNIFICANT CHANGE UP (ref 0.85–1.18)
LACTATE BLDV-MCNC: 1.5 MMOL/L — SIGNIFICANT CHANGE UP (ref 0.5–2)
LYMPHOCYTES # BLD AUTO: 1.34 K/UL — SIGNIFICANT CHANGE UP (ref 1–3.3)
LYMPHOCYTES # BLD AUTO: 1.58 K/UL — SIGNIFICANT CHANGE UP (ref 1–3.3)
LYMPHOCYTES # BLD AUTO: 11.5 % — LOW (ref 13–44)
LYMPHOCYTES # BLD AUTO: 17.3 % — SIGNIFICANT CHANGE UP (ref 13–44)
LYMPHOCYTES # BLD AUTO: 2.15 K/UL — SIGNIFICANT CHANGE UP (ref 1–3.3)
LYMPHOCYTES # BLD AUTO: 9.8 % — LOW (ref 13–44)
MAGNESIUM SERPL-MCNC: 1.6 MG/DL — SIGNIFICANT CHANGE UP (ref 1.6–2.6)
MCHC RBC-ENTMCNC: 29.1 PG — SIGNIFICANT CHANGE UP (ref 27–34)
MCHC RBC-ENTMCNC: 29.8 PG — SIGNIFICANT CHANGE UP (ref 27–34)
MCHC RBC-ENTMCNC: 30 PG — SIGNIFICANT CHANGE UP (ref 27–34)
MCHC RBC-ENTMCNC: 31.5 GM/DL — LOW (ref 32–36)
MCHC RBC-ENTMCNC: 31.8 GM/DL — LOW (ref 32–36)
MCHC RBC-ENTMCNC: 33.1 G/DL — SIGNIFICANT CHANGE UP (ref 32–36)
MCV RBC AUTO: 90.2 FL — SIGNIFICANT CHANGE UP (ref 80–100)
MCV RBC AUTO: 91.5 FL — SIGNIFICANT CHANGE UP (ref 80–100)
MCV RBC AUTO: 95.1 FL — SIGNIFICANT CHANGE UP (ref 80–100)
MONOCYTES # BLD AUTO: 0.59 K/UL — SIGNIFICANT CHANGE UP (ref 0–0.9)
MONOCYTES # BLD AUTO: 0.78 K/UL — SIGNIFICANT CHANGE UP (ref 0–0.9)
MONOCYTES # BLD AUTO: 0.91 K/UL — HIGH (ref 0–0.9)
MONOCYTES NFR BLD AUTO: 4.3 % — SIGNIFICANT CHANGE UP (ref 2–14)
MONOCYTES NFR BLD AUTO: 6.3 % — SIGNIFICANT CHANGE UP (ref 2–14)
MONOCYTES NFR BLD AUTO: 6.7 % — SIGNIFICANT CHANGE UP (ref 2–14)
NEUTROPHILS # BLD AUTO: 11.08 K/UL — HIGH (ref 1.8–7.4)
NEUTROPHILS # BLD AUTO: 11.51 K/UL — HIGH (ref 1.8–7.4)
NEUTROPHILS # BLD AUTO: 9.08 K/UL — HIGH (ref 1.8–7.4)
NEUTROPHILS NFR BLD AUTO: 72.8 % — SIGNIFICANT CHANGE UP (ref 43–77)
NEUTROPHILS NFR BLD AUTO: 81.1 % — HIGH (ref 43–77)
NEUTROPHILS NFR BLD AUTO: 84.4 % — HIGH (ref 43–77)
NRBC # BLD: 0 /100 WBCS — SIGNIFICANT CHANGE UP (ref 0–0)
PCO2 BLDV: 45 MMHG — SIGNIFICANT CHANGE UP (ref 42–55)
PH BLDV: 7.37 — SIGNIFICANT CHANGE UP (ref 7.32–7.43)
PHOSPHATE SERPL-MCNC: 4.7 MG/DL — HIGH (ref 2.5–4.5)
PLATELET # BLD AUTO: 132 K/UL — LOW (ref 150–400)
PLATELET # BLD AUTO: 150 K/UL — SIGNIFICANT CHANGE UP (ref 150–400)
PLATELET # BLD AUTO: 158 K/UL — SIGNIFICANT CHANGE UP (ref 150–400)
PO2 BLDV: 29 MMHG — SIGNIFICANT CHANGE UP (ref 25–45)
POTASSIUM BLDV-SCNC: 4.8 MMOL/L — SIGNIFICANT CHANGE UP (ref 3.5–5.1)
POTASSIUM SERPL-MCNC: 3.2 MMOL/L — LOW (ref 3.5–5.3)
POTASSIUM SERPL-MCNC: 4.6 MMOL/L — SIGNIFICANT CHANGE UP (ref 3.5–5.3)
POTASSIUM SERPL-MCNC: 4.7 MMOL/L — SIGNIFICANT CHANGE UP (ref 3.5–5.3)
POTASSIUM SERPL-SCNC: 3.2 MMOL/L — LOW (ref 3.5–5.3)
POTASSIUM SERPL-SCNC: 4.6 MMOL/L — SIGNIFICANT CHANGE UP (ref 3.5–5.3)
POTASSIUM SERPL-SCNC: 4.7 MMOL/L — SIGNIFICANT CHANGE UP (ref 3.5–5.3)
PROT SERPL-MCNC: 4.5 G/DL — LOW (ref 6–8.3)
PROT SERPL-MCNC: 7 GM/DL — SIGNIFICANT CHANGE UP (ref 6–8.3)
PROTHROM AB SERPL-ACNC: 10.6 SEC — SIGNIFICANT CHANGE UP (ref 9.5–13)
PROTHROM AB SERPL-ACNC: 11.1 SEC — SIGNIFICANT CHANGE UP (ref 9.5–13)
RBC # BLD: 3.54 M/UL — LOW (ref 4.2–5.8)
RBC # BLD: 3.9 M/UL — LOW (ref 4.2–5.8)
RBC # BLD: 4.49 M/UL — SIGNIFICANT CHANGE UP (ref 4.2–5.8)
RBC # FLD: 13.1 % — SIGNIFICANT CHANGE UP (ref 10.3–14.5)
RBC # FLD: 13.2 % — SIGNIFICANT CHANGE UP (ref 10.3–14.5)
RBC # FLD: 13.2 % — SIGNIFICANT CHANGE UP (ref 10.3–14.5)
RH IG SCN BLD-IMP: POSITIVE — SIGNIFICANT CHANGE UP
SAO2 % BLDV: 43.9 % — LOW (ref 67–88)
SODIUM SERPL-SCNC: 135 MMOL/L — SIGNIFICANT CHANGE UP (ref 135–145)
SODIUM SERPL-SCNC: 136 MMOL/L — SIGNIFICANT CHANGE UP (ref 135–145)
SODIUM SERPL-SCNC: 139 MMOL/L — SIGNIFICANT CHANGE UP (ref 135–145)
TROPONIN I, HIGH SENSITIVITY RESULT: 9.1 NG/L — SIGNIFICANT CHANGE UP
TROPONIN T, HIGH SENSITIVITY RESULT: 11 NG/L — SIGNIFICANT CHANGE UP (ref 0–51)
WBC # BLD: 12.46 K/UL — HIGH (ref 3.8–10.5)
WBC # BLD: 13.64 K/UL — HIGH (ref 3.8–10.5)
WBC # BLD: 13.68 K/UL — HIGH (ref 3.8–10.5)
WBC # FLD AUTO: 12.46 K/UL — HIGH (ref 3.8–10.5)
WBC # FLD AUTO: 13.64 K/UL — HIGH (ref 3.8–10.5)
WBC # FLD AUTO: 13.68 K/UL — HIGH (ref 3.8–10.5)

## 2024-04-16 PROCEDURE — 99291 CRITICAL CARE FIRST HOUR: CPT | Mod: FS

## 2024-04-16 PROCEDURE — 71045 X-RAY EXAM CHEST 1 VIEW: CPT | Mod: 26

## 2024-04-16 PROCEDURE — 70450 CT HEAD/BRAIN W/O DYE: CPT | Mod: 26,59,77,MC

## 2024-04-16 PROCEDURE — 70450 CT HEAD/BRAIN W/O DYE: CPT | Mod: 26,59,MC

## 2024-04-16 PROCEDURE — 76377 3D RENDER W/INTRP POSTPROCES: CPT | Mod: 26

## 2024-04-16 PROCEDURE — 0042T: CPT | Mod: MC

## 2024-04-16 PROCEDURE — 70498 CT ANGIOGRAPHY NECK: CPT | Mod: 26,MC

## 2024-04-16 PROCEDURE — 70496 CT ANGIOGRAPHY HEAD: CPT | Mod: 26,MC

## 2024-04-16 PROCEDURE — 99291 CRITICAL CARE FIRST HOUR: CPT

## 2024-04-16 PROCEDURE — 99292 CRITICAL CARE ADDL 30 MIN: CPT

## 2024-04-16 PROCEDURE — 93010 ELECTROCARDIOGRAM REPORT: CPT

## 2024-04-16 PROCEDURE — 61645 PERQ ART M-THROMBECT &/NFS: CPT | Mod: RT

## 2024-04-16 PROCEDURE — 70450 CT HEAD/BRAIN W/O DYE: CPT | Mod: 26

## 2024-04-16 RX ORDER — ACETAMINOPHEN 500 MG
1000 TABLET ORAL ONCE
Refills: 0 | Status: COMPLETED | OUTPATIENT
Start: 2024-04-16 | End: 2024-04-16

## 2024-04-16 RX ORDER — FINASTERIDE 5 MG/1
5 TABLET, FILM COATED ORAL DAILY
Refills: 0 | Status: DISCONTINUED | OUTPATIENT
Start: 2024-04-16 | End: 2024-04-16

## 2024-04-16 RX ORDER — CHLORHEXIDINE GLUCONATE 213 G/1000ML
1 SOLUTION TOPICAL DAILY
Refills: 0 | Status: DISCONTINUED | OUTPATIENT
Start: 2024-04-16 | End: 2024-04-25

## 2024-04-16 RX ORDER — METOPROLOL TARTRATE 50 MG
25 TABLET ORAL
Refills: 0 | Status: DISCONTINUED | OUTPATIENT
Start: 2024-04-16 | End: 2024-04-16

## 2024-04-16 RX ORDER — SODIUM CHLORIDE 9 MG/ML
1000 INJECTION INTRAMUSCULAR; INTRAVENOUS; SUBCUTANEOUS
Refills: 0 | Status: DISCONTINUED | OUTPATIENT
Start: 2024-04-16 | End: 2024-04-17

## 2024-04-16 RX ORDER — POTASSIUM CHLORIDE 20 MEQ
10 PACKET (EA) ORAL
Refills: 0 | Status: DISCONTINUED | OUTPATIENT
Start: 2024-04-16 | End: 2024-04-16

## 2024-04-16 RX ORDER — SODIUM CHLORIDE 9 MG/ML
10 INJECTION INTRAMUSCULAR; INTRAVENOUS; SUBCUTANEOUS ONCE
Refills: 0 | Status: COMPLETED | OUTPATIENT
Start: 2024-04-16 | End: 2024-04-16

## 2024-04-16 RX ORDER — SENNA PLUS 8.6 MG/1
2 TABLET ORAL AT BEDTIME
Refills: 0 | Status: DISCONTINUED | OUTPATIENT
Start: 2024-04-16 | End: 2024-04-17

## 2024-04-16 RX ORDER — PHENYLEPHRINE HYDROCHLORIDE 10 MG/ML
0.2 INJECTION INTRAVENOUS
Qty: 40 | Refills: 0 | Status: DISCONTINUED | OUTPATIENT
Start: 2024-04-16 | End: 2024-04-17

## 2024-04-16 RX ORDER — INSULIN LISPRO 100/ML
VIAL (ML) SUBCUTANEOUS
Refills: 0 | Status: DISCONTINUED | OUTPATIENT
Start: 2024-04-16 | End: 2024-04-17

## 2024-04-16 RX ORDER — ATORVASTATIN CALCIUM 80 MG/1
80 TABLET, FILM COATED ORAL AT BEDTIME
Refills: 0 | Status: DISCONTINUED | OUTPATIENT
Start: 2024-04-16 | End: 2024-04-17

## 2024-04-16 RX ORDER — POLYETHYLENE GLYCOL 3350 17 G/17G
17 POWDER, FOR SOLUTION ORAL DAILY
Refills: 0 | Status: DISCONTINUED | OUTPATIENT
Start: 2024-04-16 | End: 2024-04-17

## 2024-04-16 RX ORDER — MAGNESIUM SULFATE 500 MG/ML
2 VIAL (ML) INJECTION ONCE
Refills: 0 | Status: COMPLETED | OUTPATIENT
Start: 2024-04-16 | End: 2024-04-17

## 2024-04-16 RX ORDER — TENECTEPLASE 50 MG
18 KIT INTRAVENOUS ONCE
Refills: 0 | Status: COMPLETED | OUTPATIENT
Start: 2024-04-16 | End: 2024-04-16

## 2024-04-16 RX ADMIN — Medication 400 MILLIGRAM(S): at 17:48

## 2024-04-16 RX ADMIN — Medication 1000 MILLIGRAM(S): at 22:30

## 2024-04-16 RX ADMIN — SODIUM CHLORIDE 10 MILLILITER(S): 9 INJECTION INTRAMUSCULAR; INTRAVENOUS; SUBCUTANEOUS at 17:07

## 2024-04-16 RX ADMIN — TENECTEPLASE 2592 MILLIGRAM(S): KIT at 17:07

## 2024-04-16 RX ADMIN — Medication 400 MILLIGRAM(S): at 22:00

## 2024-04-16 NOTE — CHART NOTE - NSCHARTNOTEFT_GEN_A_CORE
Interventional Neuro Radiology  Pre-Procedure Note PA-C    This is a 79 hand dominant male            Allergies: No Known Allergies      PAST MEDICAL & SURGICAL HISTORY:  HLD (hyperlipidemia)      T2DM (type 2 diabetes mellitus)      BPH (benign prostatic hyperplasia)      H/O eye surgery      History of cardiac cath  times 2          Social History:     FAMILY HISTORY:  FH: myocardial infarction (Sibling)        Current Medications:     Labs:                         11.7   13.64 )-----------( 132      ( 16 Apr 2024 19:05 )             37.1       04-16    139  |  116<H>  |  13  ----------------------------<  148<H>  3.2<L>   |  14<L>  |  0.50    Ca    5.9<LL>      16 Apr 2024 19:05    TPro  4.5<L>  /  Alb  2.3<L>  /  TBili  0.3  /  DBili  x   /  AST  21  /  ALT  19  /  AlkPhos  62  04-16          Blood Bank:         Assessment/Plan:   This is a 79 year old right hand dominant male with a right MCA infarct, transported to Neuro-IR for selective cerebral angiography and mechanical thrombectomy. Procedure, goals, risks, benefits and alternatives were discussed with patient and patient's family. All questions were answered. Risks include but are not limited to stroke, vessel injury, hemorrhage, and or right groin hematoma. Patient demonstrates understanding of all risks involved with this procedure and wishes to continue. Appropriate consent was obtained from patient and consent is in the patient's chart. IR team notified at 7:25pm. Interventional Neuro Radiology  Pre-Procedure Note PA-C    This is a 79 hand dominant male            Allergies: No Known Allergies      PAST MEDICAL & SURGICAL HISTORY:  HLD (hyperlipidemia)      T2DM (type 2 diabetes mellitus)      BPH (benign prostatic hyperplasia)      H/O eye surgery      History of cardiac cath  times 2          Social History:     FAMILY HISTORY:  FH: myocardial infarction (Sibling)        Current Medications:     Labs:                         11.7   13.64 )-----------( 132      ( 16 Apr 2024 19:05 )             37.1       04-16    139  |  116<H>  |  13  ----------------------------<  148<H>  3.2<L>   |  14<L>  |  0.50    Ca    5.9<LL>      16 Apr 2024 19:05    TPro  4.5<L>  /  Alb  2.3<L>  /  TBili  0.3  /  DBili  x   /  AST  21  /  ALT  19  /  AlkPhos  62  04-16            Assessment/Plan:   This is a 79 year old right hand dominant male with a right MCA infarct, transported to Neuro-IR for selective cerebral angiography and mechanical thrombectomy. Procedure, goals, risks, benefits and alternatives were discussed with patient's wife. All questions were answered. Risks include but are not limited to stroke, vessel injury, hemorrhage, and or right groin hematoma. Patient's wife demonstrates understanding of all risks involved with this procedure and wishes to continue. Appropriate consent was obtained from patient's wife and consent is in the patient's chart. IR team notified at 7:25pm. Neurology resident was contacted, and will document H + P. Interventional Neuro Radiology  Pre-Procedure Note PA-C    This is a 79 hand dominant male with right MCA infarct, transfer to Neuro IR, for a catheter cerebral angiogram and mechanical thrombectomy.       Allergies: No Known Allergies  PMHX: Hyperlipidemia, Type 2 diabetes mellitus, Benign prostatic hyperplasia)  PSHX: eye surgery, cardiac cath  Social History:    FAMILY HISTORY:  Current Medications:     Labs:                         11.7   13.64 )-----------( 132      ( 16 Apr 2024 19:05 )             37.1       04-16    139  |  116<H>  |  13  ----------------------------<  148<H>  3.2<L>   |  14<L>  |  0.50    Ca    5.9<LL>      16 Apr 2024 19:05    TPro  4.5<L>  /  Alb  2.3<L>  /  TBili  0.3  /  DBili  x   /  AST  21  /  ALT  19  /  AlkPhos  62  04-16        Assessment/Plan:   This is a 79 year old right hand dominant male with a right MCA infarct, transported to Neuro-IR for selective cerebral angiography and mechanical thrombectomy. Procedure, goals, risks, benefits and alternatives were discussed with patient's wife. All questions were answered. Risks include but are not limited to stroke, vessel injury, hemorrhage, and or right groin hematoma. Patient's wife demonstrates understanding of all risks involved with this procedure and wishes to continue. Appropriate consent was obtained from patient's wife and consent is in the patient's chart. IR team notified at 7:25pm. Neurology resident was contacted, and will document H + P. Interventional Neuro Radiology  Pre-Procedure Note PA-C    This is a 79 right hand dominant male last known well 1:30am, given Tenecteplase, with a right MCA infarct, transfer to Neuro IR, for a catheter cerebral angiogram and mechanical thrombectomy.     Allergies: No Known Allergies  PMHX: Hyperlipidemia, Type 2 diabetes mellitus, Benign prostatic hyperplasia   PSHX: eye surgery, cardiac cath  Social History:    FAMILY HISTORY:  Current Medications:     Labs:                         11.7   13.64 )-----------( 132      ( 16 Apr 2024 19:05 )             37.1       04-16    139  |  116<H>  |  13  ----------------------------<  148<H>  3.2<L>   |  14<L>  |  0.50    Ca    5.9<LL>      16 Apr 2024 19:05    TPro  4.5<L>  /  Alb  2.3<L>  /  TBili  0.3  /  DBili  x   /  AST  21  /  ALT  19  /  AlkPhos  62  04-16        Assessment/Plan:   This is a 79 year old right hand dominant male with a right MCA infarct, transported to Neuro-IR for selective cerebral angiography and mechanical thrombectomy. Procedure, goals, risks, benefits and alternatives were discussed with patient's wife. All questions were answered. Risks include but are not limited to stroke, vessel injury, hemorrhage, and or right groin hematoma. Patient's wife demonstrates understanding of all risks involved with this procedure and wishes to continue. Appropriate consent was obtained from patient's wife and consent is in the patient's chart. IR team notified at 7:25pm. Neurology resident was contacted, and will document H + P.

## 2024-04-16 NOTE — CHART NOTE - NSCHARTNOTEFT_GEN_A_CORE
Interventional Neuro- Radiology   Procedure Note PA-C    Procedure: Catheter Cerebral Angiogram and mechanical thrombectomy woth one pass   Pre- Procedure Diagnosis: Right MCA infarct   Post- Procedure Diagnosis: TICI 2B resultant     : Dr Sg Pate  Fellow:     Dr Elvis Max  Physician Assistant: Judy Shen PA-C    Nurse:                   Dany Arreaga RN   Radiologic Tech:   Chanel Barker LRT, You Wu LRT   Anesthesiologist:  Dr Jagjit Arguello   Sheath:                 8 Telugu endophys sheath   ACT:                      132        I/Os: EBL less than 10cc  IV fluids:     cc  Urine output     cc    Contrast Visi          Milrinone 5mg        Vitals: /76        HR 72     Spo2 100%          Preliminary Report:  Using a 5 Telugu short sheath to the right groin under general anesthesia via left vertebral artery, left internal carotid artery, left external carotid artery, right vertebral artery, right internal carotid artery, right external carotid artery a selective cerebral angiography was performed and demonstrated                       Official note to follow.  Patient tolerated procedure well, hemodynamically stable, no change in neurological status compared to baseline. Results discussed with neuro ICU team, patient and patient's family. Right groin sheath was removed, manual compression held to hemostasis for 20 minutes, no active bleeding, no hematoma, quick clot and safeguard balloon dressing applied at Interventional Neuro- Radiology   Procedure Note PA-C    Procedure: Catheter Cerebral Angiogram and mechanical thrombectomy with one pass   Pre- Procedure Diagnosis: Right MCA infarct   Post- Procedure Diagnosis: TICI 2B resultant     : Dr Sg Pate  Fellow:     Dr Elvis Max  Physician Assistant: Judy Shen PA-C    Nurse:                   Dany Arreaga RN   Radiologic Tech:   Chanel Barker LRT, You Wu LRT   Anesthesiologist:  Dr Jagjit Arguello   Sheath:                 8 Mauritanian endophys sheath   ACT:                     132      I/Os: EBL less than 10cc  IV fluids: 500cc  Urine 700cc   Contrast Visi  36cc        Milrinone 5mg  right ICA    Vitals: /76        HR 72     Spo2 100%          Preliminary Report:  Using a 8 Mauritanian endophys sheath to the right groin under general anesthesia via right internal carotid artery a catheter cerebral angiogram and mechanical thrombectomy was performed, one pass with a TICI 2B resultant. Official note to follow.  Patient tolerated procedure well, hemodynamically stable, no change in neurological status compared to baseline. Results discussed with neuro ICU team, patient and patient's family. Right groin sheath was removed, a CELT device and manual compression held to hemostasis for 20 minutes, by Fellow. No active bleeding, no hematoma, quick clot and safeguard balloon dressing applied at 09:15pm. Post NIHSS was Interventional Neuro- Radiology   Procedure Note PA-C    Procedure: Catheter Cerebral Angiogram and mechanical thrombectomy with one pass   Pre- Procedure Diagnosis: Right MCA infarct   Post- Procedure Diagnosis: TICI 2B resultant     : Dr Sg Pate  Fellow:     Dr Elvis Max  Physician Assistant: Judy Shen PA-C    Nurse:                   Dany Arreaga RN   Radiologic Tech:   Chanel Barker LRT, You Wu LRT   Anesthesiologist:  Dr Jagjit Arguello   Sheath:                 8 Bahamian endophys sheath   ACT:                     132      I/Os: EBL less than 10cc  IV fluids: 500cc  Urine 700cc   Contrast Visi  36cc        Milrinone 5mg  right ICA    Vitals: /76        HR 72     Spo2 100%          Preliminary Report:  Using a 8 Bahamian endophys sheath to the right groin under general anesthesia via right internal carotid artery a catheter cerebral angiogram and mechanical thrombectomy was performed, one pass with a TICI 2B resultant. Official note to follow.  Patient tolerated procedure well, hemodynamically stable, no change in neurological status compared to baseline. Results discussed with neuro ICU team, patient and patient's family. Right groin sheath was removed, a CELT device and manual compression held to hemostasis for 20 minutes, by Fellow. No active bleeding, no hematoma, quick clot and safeguard balloon dressing applied at 09:15pm. Post procedure NIHSS was 16.

## 2024-04-16 NOTE — PROGRESS NOTE ADULT - ASSESSMENT
ASSESSMENT/PLAN: R M2 thrombectomy s/p TNK 4/16 at 16:57 s/p MT TICI 2B    NEURO:  Q1hr stroke checks  CTH in am  MRI  stroke core measures  s/s  ?fell down stairs prior to admission, C collar now, CT cspine/trauma scan/consult   Activity: [] mobilize as tolerated [x] Bedrest [x] PT [x] OT [] PMNR    PULM:  2L NC   O2sat>92%    CV:  MAP>65, SBP<140  phenylephrine prn for now 1.2  EKG  TTE  med rec with family unclear if on both ASA/plavix, need to hold for now anyway  f/u lipid panel, atorvastatin 80 for now  ?spironolactone, metoprolol hold both as on pressors     RENAL:  Fluids: IVF    GI:  Diet: dysphagia when able s/s eval   GI prophylaxis [x] not indicated [] PPI [] other:  Bowel regimen [] colace [x] senna [x] other: miralax     ENDO:   Goal euglycemia (-180)  f/u A1C  CHELSEA  long acting insulin at home, hold for now as NPO   TFTs f/u     HEME/ONC:  VTE prophylaxis: [x] SCDs [] chemoprophylaxis [x] hold chemoprophylaxis due to: TNK, fresh post op [] high risk of DVT/PE on admission due to:    ID: monitor for fevers    MISC: ?fell down stairs prior to admission, ? 8-10 stairs per VS ED report, will f/u family, CT CAP, cspine, trauma surgery, c collar    SOCIAL/FAMILY:  [x] awaiting [] updated at bedside [] family meeting    CODE STATUS:  [x] Full Code [] DNR [] DNI [] Palliative/Comfort Care    DISPOSITION:  [x] ICU [] Stroke Unit [] Floor [] EMU [] RCU [] PCU    [x] Patient is at high risk of neurologic deterioration/death due to: acute stroke, hemorrhagic conversion       Contact: [phone]

## 2024-04-16 NOTE — ED PROVIDER NOTE - CLINICAL SUMMARY MEDICAL DECISION MAKING FREE TEXT BOX
pt transferred for neurointervention in the setting of proven acute R MCA stroke s/p tenecteplase at [facility] VS with worsening NIHSS scale of 7 at VS now 16. repeat CTH for eval hemorrhagic conversion and proceed to vascular intervention if no bleeding. pulses strong, easily arousable. q1h neuro checks, aspiration and seizure precautions and neuro ICU admission. pt transferred for neurointervention in the setting of proven acute R MCA stroke s/p tenecteplase at [facility] VS with worsening NIHSS scale of 7 at VS now 16. repeat CTH for eval hemorrhagic conversion and proceed to vascular intervention if no bleeding. pulses strong, easily arousable. q1h neuro checks, aspiration and seizure precautions and neuro ICU admission.  Attending Carmela Alamo: 81-year-old male with multiple medical issues including history of diabetes, hypertension, coronary artery disease transferred outside hospital concern for sudden onset left-sided weakness.  Family noticed that upon the patient on the floor.  Went to outside hospital and was concern for stroke with MCA occlusion.  Patient was given tenecteplase and transferred to [place].  Upon arrival code stroke called.  Upon evaluation patient with an NIH of 11.  Patient is protecting his airway.  Discussed with stroke team upon arrival and plan for repeat CT head and then likely admit for IR thrombectomy.  Will monitor patient's airway, check neuroexams and admit to ICU.

## 2024-04-16 NOTE — ED ADULT NURSE NOTE - NSFALLHARMRISKINTERV_ED_ALL_ED

## 2024-04-16 NOTE — H&P ADULT - NSHPPHYSICALEXAM_GEN_ALL_CORE
Neurological Exam:  Mental Status: Orientated to self, date and place.  Speech dysarthric.  Cranial Nerves:  EOMI, There is right sided facial droop. Patient has a surgical pupil on the left. There is absent response to threat on the left side. There is right gaze preference but able to cross midline.     Motor:   Tone: normal.                  Strength:     there is drift on the left side UE progressively to bed but lower extremity mild drift. no drift on the right      FTN intact on the right.    Tremor: No resting, postural or action tremor.  No myoclonus.  Sensation: intact to light touch  Gait: ALYSSA

## 2024-04-16 NOTE — H&P ADULT - NSHPLABSRESULTS_GEN_ALL_CORE
.  LABS:                         11.7   13.64 )-----------( 132      ( 16 Apr 2024 19:05 )             37.1     04-16    139  |  116<H>  |  13  ----------------------------<  148<H>  3.2<L>   |  14<L>  |  0.50    Ca    5.9<LL>      16 Apr 2024 19:05    TPro  4.5<L>  /  Alb  2.3<L>  /  TBili  0.3  /  DBili  x   /  AST  21  /  ALT  19  /  AlkPhos  62  04-16    PT/INR - ( 16 Apr 2024 19:29 )   PT: 11.1 sec;   INR: 1.06 ratio         PTT - ( 16 Apr 2024 19:29 )  PTT:24.5 sec  Urinalysis Basic - ( 16 Apr 2024 19:05 )    Color: x / Appearance: x / SG: x / pH: x  Gluc: 148 mg/dL / Ketone: x  / Bili: x / Urobili: x   Blood: x / Protein: x / Nitrite: x   Leuk Esterase: x / RBC: x / WBC x   Sq Epi: x / Non Sq Epi: x / Bacteria: x            RADIOLOGY, EKG & ADDITIONAL TESTS:    < from: CT Brain Stroke Protocol (04.16.24 @ 19:14) >      IMPRESSION:    New cytotoxic edema in the right frontal, parietal, and temporal lobes,   as well as within the right insula consistent with an acute right MCA   territory infarct. No hipolito hemorrhagic transformation.      < end of copied text >

## 2024-04-16 NOTE — ED ADULT NURSE NOTE - OBJECTIVE STATEMENT
Patient BIBA s/p fall at home, noted with altered mental status. As per family patient alert and oriented at baseline and independent. Patient awake and alert to self and time, AAOX2. Left sided facial drooping, slurred speech, left sided drift haresh BUE and BLE, code stroke called on arrival, patient with fingerstick of 134. Patient transported to CT scan on cardiac monitoring, 2/2 side rails elevated.

## 2024-04-16 NOTE — PHARMACOTHERAPY INTERVENTION NOTE - COMMENTS
Performed brief medication reconciliation for code stroke. Home medication list updated in outpatient medication review. Medications verified with patient's medication vials at bedside during code stroke.     Home Medications:  ·	aspirin 81 mg oral tablet: 1 tab(s) orally once a day   ·	atorvastatin 80 mg oral tablet: 1 tab(s) orally once a day   ·	finasteride 5 mg oral tablet: 1 tab(s) orally once a day   ·	losartan 25 mg oral tablet: 1 tab(s) orally once a day   ·	metFORMIN 850 mg oral tablet: 1 tab(s) orally 2 times a day   
Code stroke called for patient at 1617. LKN today 4/16 ~1430 per patient's wife. Patient has no contraindications to tenecteplase. Confirmed with provider CTH non con showed no head bleed. Telestroke was consulted.    Pharmacist mixed tenecteplase at bedside and verified with MD Fallon. Patient weight 70.7 kg --> 0.25 mg/kg = 18 mg.    Tenecteplase 3.6 ml (18 mg) was drawn up for administration by pharmacist and administered by MD Fallon at 1707.     Tenecteplase not given within 30 min as patient fell down 8-10 steps of stairs prior to arrival and was on meloxicam the previous 5 days for back pain (ongoing for ~1 month per patient's wife). Need for rule out of any bleeding due to trauma and consult with telestroke prior to tenecteplase administration.     Patient to be transferred to [facility] for acute MCA stroke, NIHSS 7-8. Patient to be evaluated for possible thrombectomy once transferred.

## 2024-04-16 NOTE — ED PROVIDER NOTE - ATTENDING CONTRIBUTION TO CARE
Attending MD Carmela Alamo:  I personally have seen and examined this patient.  Resident note reviewed and agree on plan of care and except where noted.  See HPI, PE, and MDM for details.

## 2024-04-16 NOTE — ED PROVIDER NOTE - OBJECTIVE STATEMENT
80 y/o male with dm, htn, cad with stents brought in by EMS for AMS today. Last known normal was 130PM as per EMS pt went to [facility] with family. Family heard a thud in the next room and noted pt on the floor and altered. Pt is asa and not any other blood thinners. 78 y/o male with dm, htn, cad with stents brought in by EMS for AMS today. Last known normal was 130PM as per EMS pt went to [facility] with family. Family heard a thud in the next room and noted pt fell down the stairs and altered. Pt is asa and not any other blood thinners.

## 2024-04-16 NOTE — ED PROVIDER NOTE - CARE PLAN
1 Principal Discharge DX:	AMS (altered mental status)   Principal Discharge DX:	Acute ischemic right MCA stroke

## 2024-04-16 NOTE — ED ADULT NURSE NOTE - NSFALLRISKINTERV_ED_ALL_ED
Assistance OOB with selected safe patient handling equipment if applicable/Assistance with ambulation/Communicate fall risk and risk factors to all staff, patient, and family/Monitor gait and stability/Provide visual cue: yellow wristband, yellow gown, etc/Reinforce activity limits and safety measures with patient and family/Call bell, personal items and telephone in reach/Instruct patient to call for assistance before getting out of bed/chair/stretcher/Non-slip footwear applied when patient is off stretcher/Continental to call system/Physically safe environment - no spills, clutter or unnecessary equipment/Purposeful Proactive Rounding/Room/bathroom lighting operational, light cord in reach

## 2024-04-16 NOTE — ED PROVIDER NOTE - CLINICAL SUMMARY MEDICAL DECISION MAKING FREE TEXT BOX
78 y/o male with dm, cad wit stent, htn, hgih chol here with ams today. Last known normal 130pm. NIH 4.   Stroke activated.   fs 145  Labs and ct and cta head/neck ordered. 78 y/o male with dm, cad wit stent, htn, hgih chol here with ams today. Last known normal 130pm. NIH 4.   Stroke activated.   fs 145  Labs and ct and cta head/neck ordered.  CTA head shows large vessel occlusion in the right MCA. Tele stroke called and spoke with Dr Cortez and recommend Tenecteplase.   Tenecteplase given 78 y/o male with dm, cad wit stent, htn, hgih chol here with ams today. Last known normal 130pm. NIH 4.   Stroke activated.   fs 145  Labs and ct and cta head/neck ordered.  CTA head shows large vessel occlusion in the right MCA. Tele stroke called and spoke with Dr Cortez and recommend Tenecteplase.   Tenecteplase given  Discussed with tele stroke Dr Cortez who saw pt and recommend pt to transfer to [facility] ED under Dr Libman

## 2024-04-16 NOTE — H&P ADULT - NSHPADDITIONALINFOADULT_GEN_ALL_CORE
82 yo M w/ a PMH of DM HTN CAD with stenting at home on ASA and Plavix? (pending med reconsolidation), p/w  acute L-weakness, then fall? down 9 steps, LKN 1330pm 4/16/24/  with OSH NIHSS 9 (2 complete hemianopsia, 2 L-FD, 1 neglect, 1 LUE 1 LLE), negative CTH for bleed, received TNK, had CTA showing R-M2 occlusion, transferred to [facility] for MT, TICI 2B, NIHSS 11 post MT.  mRS0,   CT CAP and spine trauma survey showed T6 L6 spinal fracture, with L-hip hematoma and upper back hematoma. With bladder wall thickness and pancreatic cyst (need outpt f/u).     4/17 on rounding, per family EF 45-55%, no complain of palpitation, heart arrythmia, no recent unintentional weight loss (except a while ago for a period time post cataract surgery), rCTH, concerning R-sylvian fissure blood vs contrast, improved. On exam, R-gaze preference, R-FD, LUE 0-1.5, LLE drift, L-neglect, L-ambika sensory severe reduced.      Impression:  # R-MCA syndrome   -with motor, sensory, neglect, gaze preference, Left hemianopsia, and dysarthria. s/p TNK and MT  -MOA: ESUS  -Workup: LDL 50 A1C8.2  -Med: Now no AC AP due to still in 24 hr window of TNK; on 2% hypertonic for edema (Na 140-150). c/w home atorvastatin 80mg qHS.NG for feed and med.   - Plan for MRI, TTE, Loop, pending NM/OT/speech rec   -Code: full; DVTppx: SCD; Dispo: pTBD      Lebron Mackey MD PhD  Vascular neurology fellow

## 2024-04-16 NOTE — ED ADULT NURSE REASSESSMENT NOTE - NS ED NURSE REASSESS COMMENT FT1
[facility] EMS present at bedside for patient transfer to [place]. Family present at bedside, cardiac monitoring in progress. truck number [id number].

## 2024-04-16 NOTE — ED ADULT TRIAGE NOTE - CHIEF COMPLAINT QUOTE
BIBA from home for unwitnessed fall, as per emt, family heard a thud sound and was found on the floor in the basement. patient noted with slurred speech, right facial droop, right sided gaze. no blood thinner noted, last seen normal 1330

## 2024-04-16 NOTE — ED PROVIDER NOTE - NSICDXPASTMEDICALHX_GEN_ALL_CORE_FT
PAST MEDICAL HISTORY:  CAD (coronary artery disease)     Diabetes mellitus     High cholesterol     Hypertension

## 2024-04-16 NOTE — ED ADULT NURSE NOTE - OBJECTIVE STATEMENT
Pt is a 79 yr old male with pmh of cva, htn, hld coming as a transfer from [facility] for stroke. PT LKW was 1430 on 4/16- pt arrived to the ED at [place] around 1630- and TNK was given at 1707 for a right MCA occlusion. PT has left sided weakness- left arm drift and minimal leg movement. PT at this time is lethargic but awakens to voice commands. PT follows commands. Pt is a 79 yr old male with pmh of cva, htn, hld coming as a transfer from [facility] for stroke. PT LKW was 1430 on 4/16- pt had a fall at home around 1530 and patient's wife states he was altered after the fall- pt arrived to the ED at [place] around 1630- and TNK was given at 1707 for a right MCA occlusion. PT has left sided weakness- left arm drift and minimal leg movement. PT at this time is lethargic but awakens to voice commands. PT follows commands.

## 2024-04-16 NOTE — H&P ADULT - HISTORY OF PRESENT ILLNESS
81 years old man with a PMH of DM HTN CAD with stenting presenting for sudden onset left sided weakness. Family heard a thud and found patient on the floor and proceeded with hospitalization. Patient was evaluated at [facility] for stroke and was found to have right MCA stenosis  81 years old man with a PMH of DM HTN CAD with stenting presenting for sudden onset left sided weakness. Family heard a thud and found patient on the floor and proceeded with hospitalization. Patient was evaluated at [facility] for stroke and was found to have right MCA occlusion. Patient given tenecteplase and was transferred to [facility] for thrombectomy. Upon initial evaluation patient ws found to have NIHSS of 9 and subsequently at initial evaluation patient was not very well cooperative which contributed to a high NIHSS score of 19. After imaging and re-evaluation patient was re-evaluated and was found to have NIHSS of 11 (2 for gaze preference right, 2 for lack of response to threat left, 2 for drift in LUE and 1 for drift in LLE, 1 for neglect, 1 for dysarthria and 2 for left face). After thrombectomy patient was noted to have NIHSS of 11. Of note patient was on aspirin at home only and did not skip the dose. Last seen well was just before the incident at 1:30 pm.  81 years old man with a PMH of DM HTN CAD with stenting presenting for sudden onset left sided weakness. Family heard a thud and found patient on the floor and proceeded with hospitalization. He fell off the stairs. Patient was evaluated at [facility] for stroke and was found to have right MCA occlusion. Patient given tenecteplase and was transferred to [facility] for thrombectomy. Upon initial evaluation patient ws found to have NIHSS of 9 and subsequently at initial evaluation patient was not very well cooperative which contributed to a high NIHSS score of 19. After imaging and re-evaluation patient was re-evaluated and was found to have NIHSS of 11 (2 for gaze preference right, 2 for lack of response to threat left, 2 for drift in LUE and 1 for drift in LLE, 1 for neglect, 1 for dysarthria and 2 for left face). After thrombectomy patient was noted to have NIHSS of 11. Of note patient was on aspirin at home only and did not skip the dose. Last seen well was just before the incident at 1:30 pm.

## 2024-04-16 NOTE — H&P ADULT - ATTENDING COMMENTS
I reviewed available diagnostic studies, and reviewed images personally. I agree with resident & fellow 's history, exam, orders placed, and plan of care. Thirty minutes of critical care time was spent in caring for this patient who has concern of sudden and clinically significant deterioration requiring a high level of physician preparedness, management of brain supporting interventions, and frequent physician assessments. Medical issues needing to be addressed include: Cerebral ischemia w/ R MCA occlusion s/p MT and also tenecteplase, fall from top of the stairwell, motor & sensory neglect, gaze preference, Left hemianopsia, and dysarthria. F up CTH with some diffuse hyperdensity, possible contrast vs blood. F up MRI. Trauma eval showing acute vetebral fractures, hip hematoma w/ active bleeding - management per trauma team. BP and potential swelling management per NSCU. ESUS at this time, f up tele monitor. Small cystic lesion on ct abd - nonemergent MRI recommended by radiology. Service provided on 4/17/2024.

## 2024-04-16 NOTE — PROGRESS NOTE ADULT - SUBJECTIVE AND OBJECTIVE BOX
HOSPITAL COURSE: 78yo man LKW 1:30-2pm today, found down by family, ?fell down stairs and family heard a thud, brought to [facility] ED, where NIHSS reportedly 7, given TNK 16:57, found to have R M2 occlusion, transferred to [facility] for MT, TICI2B, one pass.     PMH DM, HTN, CAD s/p stents on ASA per report, unclear also on plavix     Admission Scores  GCS: 15  HH:   MF:   NIHSS:  16 at [facility] ED  RASS:    CAM-ICU:   ICH:    Allergies    No Known Allergies    Intolerances    REVIEW OF SYSTEMS: [ ] Unable to Assess due to neurologic exam   [ x] All ROS addressed below are non-contributory, except: neck pain, back pain, groin pain, denies abd pain/chest pain  Neuro: [ ] Headache [ ] Back pain [ ] Numbness [ ] Weakness [ ] Ataxia [ ] Dizziness [ ] Aphasia [ ] Dysarthria [ ] Visual disturbance  Resp: [ ] Shortness of breath/dyspnea, [ ] Orthopnea [ ] Cough  CV: [ ] Chest pain [ ] Palpitation [ ] Lightheadedness [ ] Syncope  Renal: [ ] Thirst [ ] Edema  GI: [ ] Nausea [ ] Emesis [ ] Abdominal pain [ ] Constipation [ ] Diarrhea  Hem: [ ] Hematemesis [ ] bright red blood per rectum  ID: [ ] Fever [ ] Chills [ ] Dysuria  ENT: [ ] Rhinorrhea      DEVICES:   [ ] Restraints [ ] ET tube [ ] central line [ x] arterial line L radial [x ] talbert [ ] NGT/OGT [ ] EVD [ ] LD [ ] PARI/HMV [ ] Trach [ ] PEG [ ] Chest Tube     VITALS:   Vital Signs Last 24 Hrs  T(C): 36.8 (16 Apr 2024 20:03), Max: 36.8 (16 Apr 2024 19:15)  T(F): 98.2 (16 Apr 2024 19:15), Max: 98.2 (16 Apr 2024 19:15)  HR: 84 (16 Apr 2024 22:00) (79 - 86)  BP: 111/74 (16 Apr 2024 20:03) (111/74 - 112/74)  BP(mean): --  RR: 20 (16 Apr 2024 22:00) (14 - 20)  SpO2: 97% (16 Apr 2024 22:00) (95% - 99%)    Parameters below as of 16 Apr 2024 21:45  Patient On (Oxygen Delivery Method): nasal cannula  O2 Flow (L/min): 2    CAPILLARY BLOOD GLUCOSE  232 (16 Apr 2024 19:01)      POCT Blood Glucose.: 232 mg/dL (16 Apr 2024 18:53)    I&O's Summary      LABS:                        11.7   13.64 )-----------( 132      ( 16 Apr 2024 19:05 )             37.1     04-16    139  |  116<H>  |  13  ----------------------------<  148<H>  3.2<L>   |  14<L>  |  0.50          phenylephrine 1.4  NS 70    IVF FLUIDS/MEDICATIONS:   MEDICATIONS  (STANDING):  acetaminophen   IVPB .. 1000 milliGRAM(s) IV Intermittent once  atorvastatin 80 milliGRAM(s) Oral at bedtime  chlorhexidine 4% Liquid 1 Application(s) Topical daily  polyethylene glycol 3350 17 Gram(s) Oral daily  senna 2 Tablet(s) Oral at bedtime  sodium chloride 0.9%. 1000 milliLiter(s) (70 mL/Hr) IV Continuous <Continuous>    EXAMINATION:  PHYSICAL EXAM:    Constitutional: No Acute Distress     Neurological: awake, alert, L pupil surgical large/irregular, R 3mmR, able to say name but severely dysarthric, did not know where he is, but knows month/year with choices, L facial, LUE/LLE WD, RUE/RLE distally FC full strength    Pulmonary: Clear to Auscultation, No rales, No rhonchi, No wheezes     Cardiovascular: S1, S2, Regular rate and rhythm     Gastrointestinal: Soft, Non-tender, Non-distended     Extremities: No calf tenderness     Incision:

## 2024-04-16 NOTE — ED ADULT NURSE NOTE - CHIEF COMPLAINT QUOTE
code stroke  transfer from [facility], given TNK Patient had an uncomplicated  followed by an uncomplicated postpartum course.  EBL: 200. Hct: 34.0. On post partum day 2, patient was discharged home in stable condition, voiding spontaneously and with normal vital signs

## 2024-04-16 NOTE — H&P ADULT - ASSESSMENT
81 years old man with a PMH of DM HTN CAD with stenting presenting for sudden onset left sided weakness. Family heard a thud and brought patient to ED. Patient received tenecteplase at [facility] and was transferred for thrombectomy. Last NIHSS before thrombectomy 11 for left sided drift     NIHSS 11  mRS 0  LKW 1:30 pm    [facility]  MT performed TICI 2B    impression: left sided weakness facial weakness dysarthria left neglect right gaze preference and left hemianopsia likely due to right MCA stroke mechanism could be ESUS at this point    plan:  [] admit patient to NSCU post thrombectomy  [] MRI brain without contrast   [] TTE   [] Implantable loop recorder  [] Lipid panel, HbA1c  [] CBC, CMP, coagulation panel, troponin  [] Atorvastatin 80mg (titrate to LDL < 70)   [] hold AC/AP until repeat CT brain is performed 24 hours after tenecteplase  [] NPO unless passes dysphagia screen; swallow eval if fails  []Keep BP <180/105, notify provider if out of range  [] Check every 30 minutes for the next 6 hours; hourly until 24 hours from end of infusion.   [] Minimize arterial and venous punctures  [] Telemonitoring  [] Neurological checks and vital signs per unit protocol  [] BGM goals 140-180  [] PT/OT; S/S evaluation  [] SCD    81 years old man with a PMH of DM HTN CAD with stenting presenting for sudden onset left sided weakness. Family heard a thud and brought patient to ED. Patient received tenecteplase at [facility] and was transferred for thrombectomy. Last NIHSS before thrombectomy 11 for left sided drift     NIHSS 11  mRS 0  LKW 1:30 pm    [facility]  MT performed TICI 2B    impression: left sided weakness facial weakness dysarthria left neglect right gaze preference and left hemianopsia likely due to right MCA stroke mechanism could be ESUS at this point    plan:  [] admit patient to NSCU post thrombectomy  [] MRI brain without contrast   [] TTE   [] Implantable loop recorder  [] Lipid panel, HbA1c  [] CBC, CMP, coagulation panel, troponin  [] Atorvastatin 80mg (titrate to LDL < 70)   [] hold AC/AP until repeat CT brain is performed 24 hours after tenecteplase  [] NPO unless passes dysphagia screen; swallow eval if fails  []Keep BP <180/105, notify provider if out of range  [] Check every 30 minutes for the next 6 hours; hourly until 24 hours from end of infusion.   [] Minimize arterial and venous punctures  [] Telemonitoring  [] Neurological checks and vital signs per unit protocol  [] BGM goals 140-180  [] PT/OT; S/S evaluation  [] SCD    81 years old man with a PMH of DM HTN CAD with stenting presenting for sudden onset left sided weakness. Family heard a thud and brought patient to ED. Patient received tenecteplase at [facility] and was transferred for thrombectomy. Last NIHSS before thrombectomy 11 for left sided drift     NIHSS 11  mRS 0  LKW 1:30 pm    [facility]  MT performed TICI 2B    impression: left sided weakness facial weakness dysarthria left neglect right gaze preference and left hemianopsia likely due to right MCA stroke mechanism could be ESUS at this point    plan:    [] admit patient to NSCU post thrombectomy  [] repeat CT brain in 24 hours  [] MRI brain without contrast   [] TTE   [] Implantable loop recorder  [] Lipid panel, HbA1c  [] CBC, CMP, coagulation panel, troponin  [] Atorvastatin 80mg (titrate to LDL < 70)   [] hold AC/AP until repeat CT brain is performed 24 hours after tenecteplase  [] NPO unless passes dysphagia screen; swallow eval if fails  []Keep BP <180/105, notify provider if out of range  [] Check every 30 minutes for the next 6 hours; hourly until 24 hours from end of infusion.   [] Minimize arterial and venous punctures  [] Telemonitoring  [] Neurological checks and vital signs per unit protocol  [] BGM goals 140-180  [] PT/OT; S/S evaluation  [] SCD

## 2024-04-16 NOTE — ED PROVIDER NOTE - PHYSICAL EXAMINATION
General: ill appearing  HEENT: NCAT, PERRL  Cardiac: RRR, no murmurs, 2+ peripheral pulses  Resp: CTAB  Neuro: AO to person and time, 0/5 motor L UE, 1/5 motor L LE NIHSS 16 General: ill appearing  HEENT: NCAT, PERRL  Cardiac: RRR, no murmurs, 2+ peripheral pulses  Resp: CTAB  Neuro: AO to person and time, 0/5 motor L UE, 1/5 motor L LE NIHSS 16  Attending Carmela Alamo: Gen: NAD, heent: atrauamtic, facial droop present, op pink,, neck; nttp, no nuchal rigidity, chest: nttp, no crepitus, cv: rrr, lungs: ctab, abd: soft, nontender, nondistended, no peritoneal signs,, no guarding, ext: wwp, neg homans, skin: no rash, neuro: awake dysarthria present, left sided weakness, right gaze preference

## 2024-04-16 NOTE — ED ADULT TRIAGE NOTE - MODE OF ARRIVAL
Ambulance EMS Doxycycline Counseling:  Patient counseled regarding possible photosensitivity and increased risk for sunburn.  Patient instructed to avoid sunlight, if possible.  When exposed to sunlight, patients should wear protective clothing, sunglasses, and sunscreen.  The patient was instructed to call the office immediately if the following severe adverse effects occur:  hearing changes, easy bruising/bleeding, severe headache, or vision changes.  The patient verbalized understanding of the proper use and possible adverse effects of doxycycline.  All of the patient's questions and concerns were addressed.

## 2024-04-16 NOTE — CHART NOTE - NSCHARTNOTEFT_GEN_A_CORE
CAPRINI SCORE [CLOT]    AGE RELATED RISK FACTORS                                                       MOBILITY RELATED FACTORS  [ ] Age 41-60 years                                            (1 Point)                  [ x] Bed rest                                                        (1 Point)  [ ] Age: 61-74 years                                           (2 Points)                 [ ] Plaster cast                                                   (2 Points)  [x ] Age= 75 years                                              (3 Points)                 [ ] Bed bound for more than 72 hours                 (2 Points)    DISEASE RELATED RISK FACTORS                                               GENDER SPECIFIC FACTORS  [ ] Edema in the lower extremities                       (1 Point)                  [ ] Pregnancy                                                     (1 Point)  [ ] Varicose veins                                               (1 Point)                  [ ] Post-partum < 6 weeks                                   (1 Point)             [ ] BMI > 25 Kg/m2                                            (1 Point)                  [ ] Hormonal therapy  or oral contraception          (1 Point)                 [ ] Sepsis (in the previous month)                        (1 Point)                  [ ] History of pregnancy complications                 (1 point)  [ ] Pneumonia or serious lung disease                                               [ ] Unexplained or recurrent                     (1 Point)           (in the previous month)                               (1 Point)  [ ] Abnormal pulmonary function test                     (1 Point)                 SURGERY RELATED RISK FACTORS  [ ] Acute myocardial infarction                              (1 Point)                 [ ]  Section                                             (1 Point)  [ ] Congestive heart failure (in the previous month)  (1 Point)               [ ] Minor surgery                                                  (1 Point)   [ ] Inflammatory bowel disease                             (1 Point)                 [ ] Arthroscopic surgery                                        (2 Points)  [ ] Central venous access                                      (2 Points)                [ ] General surgery lasting more than 45 minutes   (2 Points)       [ x] Stroke (in the previous month)                          (5 Points)               [ ] Elective arthroplasty                                         (5 Points)                                                                                                                                               HEMATOLOGY RELATED FACTORS                                                 TRAUMA RELATED RISK FACTORS  [ ] Prior episodes of VTE                                     (3 Points)                [ ] Fracture of the hip, pelvis, or leg                       (5 Points)  [ ] Positive family history for VTE                         (3 Points)                 [ ] Acute spinal cord injury (in the previous month)  (5 Points)  [ ] Prothrombin 73360 A                                     (3 Points)                 [ ] Paralysis  (less than 1 month)                             (5 Points)  [ ] Factor V Leiden                                             (3 Points)                  [ ] Multiple Trauma within 1 month                        (5 Points)  [ ] Lupus anticoagulants                                     (3 Points)                                                           [ ] Anticardiolipin antibodies                               (3 Points)                                                       [ ] High homocysteine in the blood                      (3 Points)                                             [ ] Other congenital or acquired thrombophilia      (3 Points)                                                [ ] Heparin induced thrombocytopenia                  (3 Points)                                          Total Score [   9       ]   chemical vte ppx held given POD 0 mechanical thrombectomy of R M2    Caprini Score 0 - 2:  Low Risk, No VTE Prophylaxis required for most patients, encourage ambulation  Caprini Score 3 - 6:  At Risk, pharmacologic VTE prophylaxis is indicated for most patients (in the absence of a contraindication)  Caprini Score Greater than or = 7:  High Risk, pharmacologic VTE prophylaxis is indicated for most patients (in the absence of a contraindication)

## 2024-04-16 NOTE — ED PROVIDER NOTE - PROGRESS NOTE DETAILS
Valery Jeffries (Rodriguez) PGY3 no bleed on CTH. accepted to neuro ICU under Dr. Libman for neuro intervention.

## 2024-04-16 NOTE — ED PROVIDER NOTE - PHYSICAL EXAMINATION
GEN: Awake, alert, interactive, NAD.  HEAD AND NECK: NC/AT. Airway patent. Neck supple.   EYES:  Clear b/l. EOMI.   ENT: Moist mucus membranes.   CARDIAC: Regular rate, regular rhythm. No evident pedal edema.    RESP/CHEST: Normal respiratory effort with no use of accessory muscles or retractions. Clear throughout on auscultation.  ABD: soft, non-distended, non-tender. No rebound, no guarding.   BACK: No midline spinal TTP. No CVAT.   EXTREMITIES: LUE: abrasion to the left forearm, (+) hematoma to the left knee, (+) FROM of the left upper and lower extremity. Moving all extremities with no apparent deformities.   SKIN: Warm, dry, intact normal color. No rash.   NEURO: AOx2, left facial droop, dysmetric on the left side. (+) slurred speech noted. Motor strength and sensation intact and equal.   PSYCH: Appropriate mood and affect. GEN: Awake, alert, interactive, NAD.  HEAD AND NECK: NC/AT. Airway patent. Neck supple.   EYES:  Clear b/l. EOMI. (+) pt with history left eye surgery due cataracts  ENT: Moist mucus membranes.   CARDIAC: Regular rate, regular rhythm. No evident pedal edema.    RESP/CHEST: Normal respiratory effort with no use of accessory muscles or retractions. Clear throughout on auscultation.  ABD: soft, non-distended, non-tender. No rebound, no guarding.   BACK: No midline spinal TTP. No CVAT.   EXTREMITIES: LUE: abrasion to the left forearm, (+) hematoma to the left knee, (+) FROM of the left upper and lower extremity. Moving all extremities with no apparent deformities.   SKIN: Warm, dry, intact normal color. No rash.   NEURO: AOx2, left facial droop, dysmetric on the left side. (+) slurred speech noted. Motor strength and sensation intact and equal.   PSYCH: Appropriate mood and affect. GEN: Awake, alert, interactive, NAD.  HEAD AND NECK: NC/AT. Airway patent. Neck supple.   EYES:  Clear b/l. EOMI. (+) pt with history left eye surgery due cataracts.   ENT: Moist mucus membranes.   CARDIAC: Regular rate, regular rhythm. No evident pedal edema.    RESP/CHEST: Normal respiratory effort with no use of accessory muscles or retractions. Clear throughout on auscultation.  ABD: soft, non-distended, non-tender. No rebound, no guarding.   BACK: No CVAT. (+) bruising to the upper back. (+) mild diffuse tenderness to palpation.   EXTREMITIES: LUE: abrasion to the left forearm, (+) hematoma to the left knee, (+) FROM of the left upper and lower extremity. Moving all extremities with no apparent deformities.   SKIN: Warm, dry, intact normal color. No rash.   NEURO: AOx2, left facial droop, dysmetric on the left side. (+) slurred speech noted. Motor strength and sensation intact and equal.   PSYCH: Appropriate mood and affect.

## 2024-04-16 NOTE — ED PROVIDER NOTE - CRITICAL CARE ATTENDING CONTRIBUTION TO CARE
Upon my evaluation, this patient had a high probability of imminent or life-threatening deterioration due to stroke requiring thrombolytics, which required my direct attention, intervention, and personal management.  The patient has a  medical condition that impairs one or more vital organ systems.  Frequent personal assessment and adjustment of medical interventions was performed.      I have personally provided 45 minutes of critical care time exclusive of time spent on separately billable procedures. Time includes review of laboratory data, radiology results, discussion with consultants, patient and family; monitoring for potential decompensation, as well as time spent retrieving data and reviewing the chart and documenting the visit. Interventions were performed as documented above.    I performed a history and physical exam of the patient and discussed their management with the LINA. I have reviewed the LINA note and agree with the documented findings and plan of care, except as noted. This was a shared visit with an LINA. I reviewed and verified the documentation and independently performed my own history/exam/medical decision making. My medical decision making and observations are found above. Please refer to any progress notes for updates on clinical course.    Unable to get hx from patient due to critical medical condition.   78 y/o M hx of DM, HTN, CAD w/ stents presents by EMS for altered mental status. per EMS, states last known well was 1:30 PM after wife heard thud and patient was found on ground next to stairs. patient altered per ems compared to his baseline. not on blood thinners aside from aspirin per ems. no family initially on arrival to ER. code stroke was activated given AMS and patient w/ L sided deficits and facial droop on our initial eval.   wife and family arrived during our discussion w/ telestroke while waiting for CT imaging reads and states that patient fell down. states they went to [facility] earlier during the day and patient was talking and walking fine without any issues. states he is altered from his baseline.   NIHSS 7 on arrival -dysarthria, L sided neglect and L sided facial droop.   discussion regarding tnk administration given w/ wife. patient did have L cataract procedure done aprox 5-7 months ago and after lengthy discussion, does not appear to have any absolute contraindications for tnk. risks/benefits discussed. risks including potential of bleeding/hemorrhagic conversation discussed w/ wife. tnk administered after consultation w/ telestroke team who agreed. video telestroke eval was performed w/ dr. andreev who recommended patient for thrombectomy evaluation and transfer to [facility]. patient wife and family updated extensively. wife provided consent for transfer to [facility].   LVO seen on CTA.   patient w/ ecchymosis over back, no significant midline tenderness down C/T/L spine appreciated after patient was log rolled, xray chest was performed - no PTX/hemothroax appreciated. stable vitals here in ER, normotensive, not tachycardic and not hypoxic. patient can benefit from formal trauma consult/eval at [facility]. patient requires emergent level 1 transfer for stroke , thrombectomy candidate. Upon my evaluation, this patient had a high probability of imminent or life-threatening deterioration due to stroke requiring thrombolytics, which required my direct attention, intervention, and personal management.  The patient has a  medical condition that impairs one or more vital organ systems.  Frequent personal assessment and adjustment of medical interventions was performed.      I have personally provided 45 minutes of critical care time exclusive of time spent on separately billable procedures. Time includes review of laboratory data, radiology results, discussion with consultants, patient and family; monitoring for potential decompensation, as well as time spent retrieving data and reviewing the chart and documenting the visit. Interventions were performed as documented above.    I performed a history and physical exam of the patient and discussed their management with the LINA. I have reviewed the LINA note and agree with the documented findings and plan of care, except as noted. This was a shared visit with an LINA. I reviewed and verified the documentation and independently performed my own history/exam/medical decision making. My medical decision making and observations are found above. Please refer to any progress notes for updates on clinical course.    Unable to get hx from patient due to critical medical condition.   80 y/o M hx of DM, HTN, CAD w/ stents presents by EMS for altered mental status. per EMS, states last known well was 1:30 PM after wife heard thud and patient was found on ground next to stairs. patient altered per ems compared to his baseline. not on blood thinners aside from aspirin per ems. no family initially on arrival to ER. code stroke was activated given AMS and patient w/ L sided deficits and facial droop on our initial eval.   wife and family arrived during our discussion w/ telestroke while waiting for CT imaging reads and states that patient fell down. states they went to [facility] earlier during the day and patient was talking and walking fine without any issues. states he is altered from his baseline.   NIHSS 7 on arrival -dysarthria, L sided neglect and L sided facial droop.   discussion regarding tnk administration given w/ wife.  wife is adamant that last known well is 2:30PM as she was with patient - regardless, patient is within tnk window. patient did have L cataract procedure done aprox 5-7 months ago and after lengthy discussion, does not appear to have any absolute contraindications for tnk. risks/benefits discussed. risks including potential of bleeding/hemorrhagic conversation discussed w/ wife. tnk administered after consultation w/ telestroke team who agreed. video telestroke eval was performed w/ dr. monaco who recommended patient for thrombectomy evaluation and transfer to [facility]. patient wife and family updated extensively. wife provided consent for transfer to [facility].   LVO seen on CTA.   patient w/ ecchymosis over back, no significant midline tenderness down C/T/L spine appreciated after patient was log rolled, xray chest was performed - no PTX/hemothorax appreciated. stable vitals here in ER, normotensive, not tachycardic and not hypoxic. patient can benefit from formal trauma consult/eval at [facility]. patient requires emergent level 1 transfer for stroke , thrombectomy candidate.

## 2024-04-16 NOTE — ED PROVIDER NOTE - OBJECTIVE STATEMENT
Attending Carmela Alamo: 79-year-old male with history of diabetes, high blood pressure, coronary artery disease with prior stents presenting for altered mental status.  Last known normal was approximately 1:30 PM.  Patient went shopping with family and then family noticed that he became altered and fell down the stairs.  Patient not reportedly on any blood thinners.  Patient was a code stroke at outside hospital head CT imaging concerning for LVOT given tenecteplase and transferred to [facility] for further evaluation. Attending Carmela Alamo: 79-year-old male with history of diabetes, high blood pressure, coronary artery disease with prior stents presenting for altered mental status.  Last known normal was approximately 1:30 PM.  Patient went shopping with family and then family noticed that he became altered and fell down the stairs.  Patient not reportedly on any blood thinners.  Patient was a code stroke at outside hospital head CT imaging concerning for LVOT given tenecteplase and transferred to [place] for further evaluation.  VS MRN: [id number]

## 2024-04-17 ENCOUNTER — RESULT REVIEW (OUTPATIENT)
Age: 79
End: 2024-04-17

## 2024-04-17 ENCOUNTER — TRANSCRIPTION ENCOUNTER (OUTPATIENT)
Age: 79
End: 2024-04-17

## 2024-04-17 LAB
A1C WITH ESTIMATED AVERAGE GLUCOSE RESULT: 8.2 % — HIGH (ref 4–5.6)
ACANTHOCYTES BLD QL SMEAR: SLIGHT — SIGNIFICANT CHANGE UP
ADD ON TEST-SPECIMEN IN LAB: SIGNIFICANT CHANGE UP
ANION GAP SERPL CALC-SCNC: 10 MMOL/L — SIGNIFICANT CHANGE UP (ref 5–17)
ANION GAP SERPL CALC-SCNC: 10 MMOL/L — SIGNIFICANT CHANGE UP (ref 5–17)
ANION GAP SERPL CALC-SCNC: 11 MMOL/L — SIGNIFICANT CHANGE UP (ref 5–17)
ANION GAP SERPL CALC-SCNC: 8 MMOL/L — SIGNIFICANT CHANGE UP (ref 5–17)
ANISOCYTOSIS BLD QL: SLIGHT — SIGNIFICANT CHANGE UP
B-OH-BUTYR SERPL-SCNC: 0.1 MMOL/L — SIGNIFICANT CHANGE UP
B-OH-BUTYR SERPL-SCNC: 1.5 MMOL/L — HIGH
BASOPHILS # BLD AUTO: 0 K/UL — SIGNIFICANT CHANGE UP (ref 0–0.2)
BASOPHILS # BLD AUTO: 0.02 K/UL — SIGNIFICANT CHANGE UP (ref 0–0.2)
BASOPHILS # BLD AUTO: 0.02 K/UL — SIGNIFICANT CHANGE UP (ref 0–0.2)
BASOPHILS NFR BLD AUTO: 0 % — SIGNIFICANT CHANGE UP (ref 0–2)
BASOPHILS NFR BLD AUTO: 0.2 % — SIGNIFICANT CHANGE UP (ref 0–2)
BASOPHILS NFR BLD AUTO: 0.3 % — SIGNIFICANT CHANGE UP (ref 0–2)
BUN SERPL-MCNC: 14 MG/DL — SIGNIFICANT CHANGE UP (ref 7–23)
BUN SERPL-MCNC: 17 MG/DL — SIGNIFICANT CHANGE UP (ref 7–23)
BUN SERPL-MCNC: 17 MG/DL — SIGNIFICANT CHANGE UP (ref 7–23)
BUN SERPL-MCNC: 18 MG/DL — SIGNIFICANT CHANGE UP (ref 7–23)
CALCIUM SERPL-MCNC: 7.6 MG/DL — LOW (ref 8.4–10.5)
CALCIUM SERPL-MCNC: 7.7 MG/DL — LOW (ref 8.4–10.5)
CALCIUM SERPL-MCNC: 7.9 MG/DL — LOW (ref 8.4–10.5)
CALCIUM SERPL-MCNC: 7.9 MG/DL — LOW (ref 8.4–10.5)
CHLORIDE SERPL-SCNC: 108 MMOL/L — SIGNIFICANT CHANGE UP (ref 96–108)
CHLORIDE SERPL-SCNC: 109 MMOL/L — HIGH (ref 96–108)
CHLORIDE SERPL-SCNC: 110 MMOL/L — HIGH (ref 96–108)
CHLORIDE SERPL-SCNC: 110 MMOL/L — HIGH (ref 96–108)
CHOLEST SERPL-MCNC: 103 MG/DL — SIGNIFICANT CHANGE UP
CO2 SERPL-SCNC: 18 MMOL/L — LOW (ref 22–31)
CO2 SERPL-SCNC: 18 MMOL/L — LOW (ref 22–31)
CO2 SERPL-SCNC: 19 MMOL/L — LOW (ref 22–31)
CO2 SERPL-SCNC: 23 MMOL/L — SIGNIFICANT CHANGE UP (ref 22–31)
CREAT SERPL-MCNC: 0.76 MG/DL — SIGNIFICANT CHANGE UP (ref 0.5–1.3)
CREAT SERPL-MCNC: 0.78 MG/DL — SIGNIFICANT CHANGE UP (ref 0.5–1.3)
CREAT SERPL-MCNC: 0.8 MG/DL — SIGNIFICANT CHANGE UP (ref 0.5–1.3)
CREAT SERPL-MCNC: 0.8 MG/DL — SIGNIFICANT CHANGE UP (ref 0.5–1.3)
EGFR: 91 ML/MIN/1.73M2 — SIGNIFICANT CHANGE UP
EGFR: 92 ML/MIN/1.73M2 — SIGNIFICANT CHANGE UP
ELLIPTOCYTES BLD QL SMEAR: SLIGHT — SIGNIFICANT CHANGE UP
EOSINOPHIL # BLD AUTO: 0 K/UL — SIGNIFICANT CHANGE UP (ref 0–0.5)
EOSINOPHIL # BLD AUTO: 0.03 K/UL — SIGNIFICANT CHANGE UP (ref 0–0.5)
EOSINOPHIL # BLD AUTO: 0.08 K/UL — SIGNIFICANT CHANGE UP (ref 0–0.5)
EOSINOPHIL NFR BLD AUTO: 0 % — SIGNIFICANT CHANGE UP (ref 0–6)
EOSINOPHIL NFR BLD AUTO: 0.3 % — SIGNIFICANT CHANGE UP (ref 0–6)
EOSINOPHIL NFR BLD AUTO: 1 % — SIGNIFICANT CHANGE UP (ref 0–6)
ESTIMATED AVERAGE GLUCOSE: 189 MG/DL — HIGH (ref 68–114)
GAS PNL BLDA: SIGNIFICANT CHANGE UP
GLUCOSE BLDC GLUCOMTR-MCNC: 142 MG/DL — HIGH (ref 70–99)
GLUCOSE BLDC GLUCOMTR-MCNC: 164 MG/DL — HIGH (ref 70–99)
GLUCOSE BLDC GLUCOMTR-MCNC: 182 MG/DL — HIGH (ref 70–99)
GLUCOSE BLDC GLUCOMTR-MCNC: 190 MG/DL — HIGH (ref 70–99)
GLUCOSE BLDC GLUCOMTR-MCNC: 209 MG/DL — HIGH (ref 70–99)
GLUCOSE BLDC GLUCOMTR-MCNC: 261 MG/DL — HIGH (ref 70–99)
GLUCOSE SERPL-MCNC: 136 MG/DL — HIGH (ref 70–99)
GLUCOSE SERPL-MCNC: 174 MG/DL — HIGH (ref 70–99)
GLUCOSE SERPL-MCNC: 187 MG/DL — HIGH (ref 70–99)
GLUCOSE SERPL-MCNC: 199 MG/DL — HIGH (ref 70–99)
HCT VFR BLD CALC: 22.9 % — LOW (ref 39–50)
HCT VFR BLD CALC: 24.7 % — LOW (ref 39–50)
HCT VFR BLD CALC: 26.7 % — LOW (ref 39–50)
HDLC SERPL-MCNC: 41 MG/DL — SIGNIFICANT CHANGE UP
HGB BLD-MCNC: 7.7 G/DL — LOW (ref 13–17)
HGB BLD-MCNC: 8.1 G/DL — LOW (ref 13–17)
HGB BLD-MCNC: 8.8 G/DL — LOW (ref 13–17)
IMM GRANULOCYTES NFR BLD AUTO: 0.3 % — SIGNIFICANT CHANGE UP (ref 0–0.9)
IMM GRANULOCYTES NFR BLD AUTO: 0.4 % — SIGNIFICANT CHANGE UP (ref 0–0.9)
LACTATE SERPL-SCNC: 2.2 MMOL/L — HIGH (ref 0.5–2)
LIPID PNL WITH DIRECT LDL SERPL: 50 MG/DL — SIGNIFICANT CHANGE UP
LYMPHOCYTES # BLD AUTO: 0.85 K/UL — LOW (ref 1–3.3)
LYMPHOCYTES # BLD AUTO: 1.83 K/UL — SIGNIFICANT CHANGE UP (ref 1–3.3)
LYMPHOCYTES # BLD AUTO: 10.7 % — LOW (ref 13–44)
LYMPHOCYTES # BLD AUTO: 17.5 % — SIGNIFICANT CHANGE UP (ref 13–44)
LYMPHOCYTES # BLD AUTO: 2.19 K/UL — SIGNIFICANT CHANGE UP (ref 1–3.3)
LYMPHOCYTES # BLD AUTO: 27.7 % — SIGNIFICANT CHANGE UP (ref 13–44)
MACROCYTES BLD QL: SLIGHT — SIGNIFICANT CHANGE UP
MAGNESIUM SERPL-MCNC: 2.1 MG/DL — SIGNIFICANT CHANGE UP (ref 1.6–2.6)
MAGNESIUM SERPL-MCNC: 2.2 MG/DL — SIGNIFICANT CHANGE UP (ref 1.6–2.6)
MAGNESIUM SERPL-MCNC: 2.3 MG/DL — SIGNIFICANT CHANGE UP (ref 1.6–2.6)
MAGNESIUM SERPL-MCNC: 2.5 MG/DL — SIGNIFICANT CHANGE UP (ref 1.6–2.6)
MANUAL SMEAR VERIFICATION: SIGNIFICANT CHANGE UP
MCHC RBC-ENTMCNC: 28.4 PG — SIGNIFICANT CHANGE UP (ref 27–34)
MCHC RBC-ENTMCNC: 29.6 PG — SIGNIFICANT CHANGE UP (ref 27–34)
MCHC RBC-ENTMCNC: 30 PG — SIGNIFICANT CHANGE UP (ref 27–34)
MCHC RBC-ENTMCNC: 32.8 GM/DL — SIGNIFICANT CHANGE UP (ref 32–36)
MCHC RBC-ENTMCNC: 33 GM/DL — SIGNIFICANT CHANGE UP (ref 32–36)
MCHC RBC-ENTMCNC: 33.6 GM/DL — SIGNIFICANT CHANGE UP (ref 32–36)
MCV RBC AUTO: 86.7 FL — SIGNIFICANT CHANGE UP (ref 80–100)
MCV RBC AUTO: 89.1 FL — SIGNIFICANT CHANGE UP (ref 80–100)
MCV RBC AUTO: 89.9 FL — SIGNIFICANT CHANGE UP (ref 80–100)
MONOCYTES # BLD AUTO: 0.43 K/UL — SIGNIFICANT CHANGE UP (ref 0–0.9)
MONOCYTES # BLD AUTO: 0.81 K/UL — SIGNIFICANT CHANGE UP (ref 0–0.9)
MONOCYTES # BLD AUTO: 0.87 K/UL — SIGNIFICANT CHANGE UP (ref 0–0.9)
MONOCYTES NFR BLD AUTO: 10.3 % — SIGNIFICANT CHANGE UP (ref 2–14)
MONOCYTES NFR BLD AUTO: 5.4 % — SIGNIFICANT CHANGE UP (ref 2–14)
MONOCYTES NFR BLD AUTO: 8.3 % — SIGNIFICANT CHANGE UP (ref 2–14)
MRSA PCR RESULT.: SIGNIFICANT CHANGE UP
NEUTROPHILS # BLD AUTO: 4.77 K/UL — SIGNIFICANT CHANGE UP (ref 1.8–7.4)
NEUTROPHILS # BLD AUTO: 6.63 K/UL — SIGNIFICANT CHANGE UP (ref 1.8–7.4)
NEUTROPHILS # BLD AUTO: 7.66 K/UL — HIGH (ref 1.8–7.4)
NEUTROPHILS NFR BLD AUTO: 60.3 % — SIGNIFICANT CHANGE UP (ref 43–77)
NEUTROPHILS NFR BLD AUTO: 73.4 % — SIGNIFICANT CHANGE UP (ref 43–77)
NEUTROPHILS NFR BLD AUTO: 83.9 % — HIGH (ref 43–77)
NON HDL CHOLESTEROL: 62 MG/DL — SIGNIFICANT CHANGE UP
NRBC # BLD: 0 /100 WBCS — SIGNIFICANT CHANGE UP (ref 0–0)
NRBC # BLD: 0 /100 WBCS — SIGNIFICANT CHANGE UP (ref 0–0)
OVALOCYTES BLD QL SMEAR: SLIGHT — SIGNIFICANT CHANGE UP
PHOSPHATE SERPL-MCNC: 2.4 MG/DL — LOW (ref 2.5–4.5)
PHOSPHATE SERPL-MCNC: 2.6 MG/DL — SIGNIFICANT CHANGE UP (ref 2.5–4.5)
PHOSPHATE SERPL-MCNC: 2.9 MG/DL — SIGNIFICANT CHANGE UP (ref 2.5–4.5)
PHOSPHATE SERPL-MCNC: 3.2 MG/DL — SIGNIFICANT CHANGE UP (ref 2.5–4.5)
PLAT MORPH BLD: NORMAL — SIGNIFICANT CHANGE UP
PLATELET # BLD AUTO: 103 K/UL — LOW (ref 150–400)
PLATELET # BLD AUTO: 113 K/UL — LOW (ref 150–400)
PLATELET # BLD AUTO: 123 K/UL — LOW (ref 150–400)
POIKILOCYTOSIS BLD QL AUTO: SLIGHT — SIGNIFICANT CHANGE UP
POTASSIUM SERPL-MCNC: 3.7 MMOL/L — SIGNIFICANT CHANGE UP (ref 3.5–5.3)
POTASSIUM SERPL-MCNC: 4.1 MMOL/L — SIGNIFICANT CHANGE UP (ref 3.5–5.3)
POTASSIUM SERPL-MCNC: 4.3 MMOL/L — SIGNIFICANT CHANGE UP (ref 3.5–5.3)
POTASSIUM SERPL-MCNC: 4.4 MMOL/L — SIGNIFICANT CHANGE UP (ref 3.5–5.3)
POTASSIUM SERPL-SCNC: 3.7 MMOL/L — SIGNIFICANT CHANGE UP (ref 3.5–5.3)
POTASSIUM SERPL-SCNC: 4.1 MMOL/L — SIGNIFICANT CHANGE UP (ref 3.5–5.3)
POTASSIUM SERPL-SCNC: 4.3 MMOL/L — SIGNIFICANT CHANGE UP (ref 3.5–5.3)
POTASSIUM SERPL-SCNC: 4.4 MMOL/L — SIGNIFICANT CHANGE UP (ref 3.5–5.3)
RBC # BLD: 2.57 M/UL — LOW (ref 4.2–5.8)
RBC # BLD: 2.85 M/UL — LOW (ref 4.2–5.8)
RBC # BLD: 2.97 M/UL — LOW (ref 4.2–5.8)
RBC # FLD: 13.3 % — SIGNIFICANT CHANGE UP (ref 10.3–14.5)
RBC # FLD: 13.5 % — SIGNIFICANT CHANGE UP (ref 10.3–14.5)
RBC # FLD: 14.7 % — HIGH (ref 10.3–14.5)
RBC BLD AUTO: ABNORMAL
S AUREUS DNA NOSE QL NAA+PROBE: SIGNIFICANT CHANGE UP
SODIUM SERPL-SCNC: 137 MMOL/L — SIGNIFICANT CHANGE UP (ref 135–145)
SODIUM SERPL-SCNC: 138 MMOL/L — SIGNIFICANT CHANGE UP (ref 135–145)
SODIUM SERPL-SCNC: 139 MMOL/L — SIGNIFICANT CHANGE UP (ref 135–145)
SODIUM SERPL-SCNC: 140 MMOL/L — SIGNIFICANT CHANGE UP (ref 135–145)
TRIGL SERPL-MCNC: 52 MG/DL — SIGNIFICANT CHANGE UP
TSH SERPL-MCNC: 3.15 UIU/ML — SIGNIFICANT CHANGE UP (ref 0.27–4.2)
WBC # BLD: 10.44 K/UL — SIGNIFICANT CHANGE UP (ref 3.8–10.5)
WBC # BLD: 7.9 K/UL — SIGNIFICANT CHANGE UP (ref 3.8–10.5)
WBC # BLD: 7.9 K/UL — SIGNIFICANT CHANGE UP (ref 3.8–10.5)
WBC # FLD AUTO: 10.44 K/UL — SIGNIFICANT CHANGE UP (ref 3.8–10.5)
WBC # FLD AUTO: 7.9 K/UL — SIGNIFICANT CHANGE UP (ref 3.8–10.5)
WBC # FLD AUTO: 7.9 K/UL — SIGNIFICANT CHANGE UP (ref 3.8–10.5)

## 2024-04-17 PROCEDURE — 71260 CT THORAX DX C+: CPT | Mod: 26

## 2024-04-17 PROCEDURE — 71045 X-RAY EXAM CHEST 1 VIEW: CPT | Mod: 26

## 2024-04-17 PROCEDURE — 72129 CT CHEST SPINE W/DYE: CPT | Mod: 26

## 2024-04-17 PROCEDURE — 93970 EXTREMITY STUDY: CPT | Mod: 26

## 2024-04-17 PROCEDURE — 73590 X-RAY EXAM OF LOWER LEG: CPT | Mod: 26,50

## 2024-04-17 PROCEDURE — 99291 CRITICAL CARE FIRST HOUR: CPT

## 2024-04-17 PROCEDURE — 99232 SBSQ HOSP IP/OBS MODERATE 35: CPT

## 2024-04-17 PROCEDURE — 73090 X-RAY EXAM OF FOREARM: CPT | Mod: 26,50

## 2024-04-17 PROCEDURE — 74177 CT ABD & PELVIS W/CONTRAST: CPT | Mod: 26

## 2024-04-17 PROCEDURE — 70551 MRI BRAIN STEM W/O DYE: CPT | Mod: 26

## 2024-04-17 PROCEDURE — 73070 X-RAY EXAM OF ELBOW: CPT | Mod: 26,50

## 2024-04-17 PROCEDURE — 93306 TTE W/DOPPLER COMPLETE: CPT | Mod: 26

## 2024-04-17 PROCEDURE — 70450 CT HEAD/BRAIN W/O DYE: CPT | Mod: 26,77

## 2024-04-17 PROCEDURE — 72141 MRI NECK SPINE W/O DYE: CPT | Mod: 26

## 2024-04-17 PROCEDURE — 73110 X-RAY EXAM OF WRIST: CPT | Mod: 26,50

## 2024-04-17 PROCEDURE — 72125 CT NECK SPINE W/O DYE: CPT | Mod: 26

## 2024-04-17 PROCEDURE — 70450 CT HEAD/BRAIN W/O DYE: CPT | Mod: 26,76

## 2024-04-17 PROCEDURE — 72132 CT LUMBAR SPINE W/DYE: CPT | Mod: 26

## 2024-04-17 PROCEDURE — 73552 X-RAY EXAM OF FEMUR 2/>: CPT | Mod: 26,50

## 2024-04-17 PROCEDURE — 93010 ELECTROCARDIOGRAM REPORT: CPT

## 2024-04-17 PROCEDURE — 73560 X-RAY EXAM OF KNEE 1 OR 2: CPT | Mod: 26,50

## 2024-04-17 RX ORDER — SODIUM CHLORIDE 9 MG/ML
1000 INJECTION, SOLUTION INTRAVENOUS
Refills: 0 | Status: DISCONTINUED | OUTPATIENT
Start: 2024-04-17 | End: 2024-04-17

## 2024-04-17 RX ORDER — ACETAMINOPHEN 500 MG
1000 TABLET ORAL ONCE
Refills: 0 | Status: COMPLETED | OUTPATIENT
Start: 2024-04-17 | End: 2024-04-17

## 2024-04-17 RX ORDER — SODIUM CHLORIDE 5 G/100ML
1000 INJECTION, SOLUTION INTRAVENOUS
Refills: 0 | Status: DISCONTINUED | OUTPATIENT
Start: 2024-04-17 | End: 2024-04-17

## 2024-04-17 RX ORDER — METFORMIN HYDROCHLORIDE 850 MG/1
1 TABLET ORAL
Refills: 0 | DISCHARGE

## 2024-04-17 RX ORDER — POTASSIUM CHLORIDE 20 MEQ
40 PACKET (EA) ORAL ONCE
Refills: 0 | Status: DISCONTINUED | OUTPATIENT
Start: 2024-04-17 | End: 2024-04-18

## 2024-04-17 RX ORDER — DEXTROSE 50 % IN WATER 50 %
12.5 SYRINGE (ML) INTRAVENOUS ONCE
Refills: 0 | Status: DISCONTINUED | OUTPATIENT
Start: 2024-04-17 | End: 2024-04-17

## 2024-04-17 RX ORDER — INSULIN GLARGINE 100 [IU]/ML
38 INJECTION, SOLUTION SUBCUTANEOUS
Refills: 0 | DISCHARGE

## 2024-04-17 RX ORDER — DEXTROSE 50 % IN WATER 50 %
25 SYRINGE (ML) INTRAVENOUS
Refills: 0 | Status: DISCONTINUED | OUTPATIENT
Start: 2024-04-17 | End: 2024-04-17

## 2024-04-17 RX ORDER — INSULIN LISPRO 100/ML
VIAL (ML) SUBCUTANEOUS
Refills: 0 | Status: DISCONTINUED | OUTPATIENT
Start: 2024-04-17 | End: 2024-04-17

## 2024-04-17 RX ORDER — TRAMADOL HYDROCHLORIDE 50 MG/1
25 TABLET ORAL EVERY 4 HOURS
Refills: 0 | Status: DISCONTINUED | OUTPATIENT
Start: 2024-04-17 | End: 2024-04-17

## 2024-04-17 RX ORDER — INSULIN LISPRO 100/ML
6 VIAL (ML) SUBCUTANEOUS ONCE
Refills: 0 | Status: COMPLETED | OUTPATIENT
Start: 2024-04-17 | End: 2024-04-17

## 2024-04-17 RX ORDER — POTASSIUM PHOSPHATE, MONOBASIC POTASSIUM PHOSPHATE, DIBASIC 236; 224 MG/ML; MG/ML
30 INJECTION, SOLUTION INTRAVENOUS ONCE
Refills: 0 | Status: COMPLETED | OUTPATIENT
Start: 2024-04-17 | End: 2024-04-18

## 2024-04-17 RX ORDER — LIDOCAINE 4 G/100G
1 CREAM TOPICAL DAILY
Refills: 0 | Status: DISCONTINUED | OUTPATIENT
Start: 2024-04-17 | End: 2024-04-25

## 2024-04-17 RX ORDER — DEXTROSE 50 % IN WATER 50 %
50 SYRINGE (ML) INTRAVENOUS
Refills: 0 | Status: DISCONTINUED | OUTPATIENT
Start: 2024-04-17 | End: 2024-04-17

## 2024-04-17 RX ORDER — ONDANSETRON 8 MG/1
4 TABLET, FILM COATED ORAL ONCE
Refills: 0 | Status: COMPLETED | OUTPATIENT
Start: 2024-04-17 | End: 2024-04-17

## 2024-04-17 RX ORDER — DEXTROSE 50 % IN WATER 50 %
15 SYRINGE (ML) INTRAVENOUS ONCE
Refills: 0 | Status: DISCONTINUED | OUTPATIENT
Start: 2024-04-17 | End: 2024-04-17

## 2024-04-17 RX ORDER — INSULIN LISPRO 100/ML
4 VIAL (ML) SUBCUTANEOUS ONCE
Refills: 0 | Status: COMPLETED | OUTPATIENT
Start: 2024-04-17 | End: 2024-04-17

## 2024-04-17 RX ORDER — PANTOPRAZOLE SODIUM 20 MG/1
40 TABLET, DELAYED RELEASE ORAL DAILY
Refills: 0 | Status: DISCONTINUED | OUTPATIENT
Start: 2024-04-17 | End: 2024-04-25

## 2024-04-17 RX ORDER — ATORVASTATIN CALCIUM 80 MG/1
80 TABLET, FILM COATED ORAL AT BEDTIME
Refills: 0 | Status: DISCONTINUED | OUTPATIENT
Start: 2024-04-17 | End: 2024-04-23

## 2024-04-17 RX ORDER — INSULIN HUMAN 100 [IU]/ML
3 INJECTION, SOLUTION SUBCUTANEOUS ONCE
Refills: 0 | Status: DISCONTINUED | OUTPATIENT
Start: 2024-04-17 | End: 2024-04-17

## 2024-04-17 RX ORDER — SENNA PLUS 8.6 MG/1
2 TABLET ORAL AT BEDTIME
Refills: 0 | Status: DISCONTINUED | OUTPATIENT
Start: 2024-04-17 | End: 2024-04-23

## 2024-04-17 RX ORDER — MAGNESIUM SULFATE 500 MG/ML
2 VIAL (ML) INJECTION ONCE
Refills: 0 | Status: COMPLETED | OUTPATIENT
Start: 2024-04-17 | End: 2024-04-17

## 2024-04-17 RX ORDER — METHOCARBAMOL 500 MG/1
500 TABLET, FILM COATED ORAL EVERY 6 HOURS
Refills: 0 | Status: DISCONTINUED | OUTPATIENT
Start: 2024-04-17 | End: 2024-04-23

## 2024-04-17 RX ORDER — ACETAMINOPHEN 500 MG
1000 TABLET ORAL EVERY 6 HOURS
Refills: 0 | Status: COMPLETED | OUTPATIENT
Start: 2024-04-17 | End: 2024-04-17

## 2024-04-17 RX ORDER — INSULIN HUMAN 100 [IU]/ML
3 INJECTION, SOLUTION SUBCUTANEOUS
Qty: 100 | Refills: 0 | Status: DISCONTINUED | OUTPATIENT
Start: 2024-04-17 | End: 2024-04-17

## 2024-04-17 RX ORDER — SODIUM CHLORIDE 5 G/100ML
1000 INJECTION, SOLUTION INTRAVENOUS
Refills: 0 | Status: ACTIVE | OUTPATIENT
Start: 2024-04-17 | End: 2025-03-16

## 2024-04-17 RX ORDER — GLUCAGON INJECTION, SOLUTION 0.5 MG/.1ML
1 INJECTION, SOLUTION SUBCUTANEOUS ONCE
Refills: 0 | Status: DISCONTINUED | OUTPATIENT
Start: 2024-04-17 | End: 2024-04-17

## 2024-04-17 RX ORDER — HUMAN INSULIN 100 [IU]/ML
5 INJECTION, SUSPENSION SUBCUTANEOUS EVERY 6 HOURS
Refills: 0 | Status: DISCONTINUED | OUTPATIENT
Start: 2024-04-17 | End: 2024-04-20

## 2024-04-17 RX ORDER — ALPRAZOLAM 0.25 MG
0.5 TABLET ORAL ONCE
Refills: 0 | Status: DISCONTINUED | OUTPATIENT
Start: 2024-04-17 | End: 2024-04-17

## 2024-04-17 RX ORDER — TIMOLOL 0.5 %
1 DROPS OPHTHALMIC (EYE)
Refills: 0 | Status: DISCONTINUED | OUTPATIENT
Start: 2024-04-17 | End: 2024-04-25

## 2024-04-17 RX ORDER — SODIUM CHLORIDE 9 MG/ML
1500 INJECTION INTRAMUSCULAR; INTRAVENOUS; SUBCUTANEOUS ONCE
Refills: 0 | Status: COMPLETED | OUTPATIENT
Start: 2024-04-17 | End: 2024-04-17

## 2024-04-17 RX ORDER — INSULIN LISPRO 100/ML
VIAL (ML) SUBCUTANEOUS EVERY 6 HOURS
Refills: 0 | Status: DISCONTINUED | OUTPATIENT
Start: 2024-04-17 | End: 2024-04-23

## 2024-04-17 RX ORDER — OXYCODONE HYDROCHLORIDE 5 MG/1
5 TABLET ORAL EVERY 4 HOURS
Refills: 0 | Status: DISCONTINUED | OUTPATIENT
Start: 2024-04-17 | End: 2024-04-17

## 2024-04-17 RX ORDER — DEXTROSE 50 % IN WATER 50 %
25 SYRINGE (ML) INTRAVENOUS ONCE
Refills: 0 | Status: DISCONTINUED | OUTPATIENT
Start: 2024-04-17 | End: 2024-04-17

## 2024-04-17 RX ORDER — INFLUENZA VIRUS VACCINE 15; 15; 15; 15 UG/.5ML; UG/.5ML; UG/.5ML; UG/.5ML
0.7 SUSPENSION INTRAMUSCULAR ONCE
Refills: 0 | Status: DISCONTINUED | OUTPATIENT
Start: 2024-04-17 | End: 2024-04-25

## 2024-04-17 RX ORDER — PHENYLEPHRINE HYDROCHLORIDE 10 MG/ML
0.2 INJECTION INTRAVENOUS
Qty: 40 | Refills: 0 | Status: DISCONTINUED | OUTPATIENT
Start: 2024-04-17 | End: 2024-04-18

## 2024-04-17 RX ORDER — FINASTERIDE 5 MG/1
5 TABLET, FILM COATED ORAL DAILY
Refills: 0 | Status: DISCONTINUED | OUTPATIENT
Start: 2024-04-17 | End: 2024-04-23

## 2024-04-17 RX ORDER — HUMAN INSULIN 100 [IU]/ML
5 INJECTION, SUSPENSION SUBCUTANEOUS EVERY 6 HOURS
Refills: 0 | Status: DISCONTINUED | OUTPATIENT
Start: 2024-04-17 | End: 2024-04-17

## 2024-04-17 RX ORDER — DEXTROSE 10 % IN WATER 10 %
125 INTRAVENOUS SOLUTION INTRAVENOUS ONCE
Refills: 0 | Status: DISCONTINUED | OUTPATIENT
Start: 2024-04-17 | End: 2024-04-17

## 2024-04-17 RX ORDER — POLYETHYLENE GLYCOL 3350 17 G/17G
17 POWDER, FOR SOLUTION ORAL DAILY
Refills: 0 | Status: DISCONTINUED | OUTPATIENT
Start: 2024-04-17 | End: 2024-04-18

## 2024-04-17 RX ADMIN — SODIUM CHLORIDE 75 MILLILITER(S): 5 INJECTION, SOLUTION INTRAVENOUS at 15:51

## 2024-04-17 RX ADMIN — Medication 1000 MILLIGRAM(S): at 23:00

## 2024-04-17 RX ADMIN — HUMAN INSULIN 5 UNIT(S): 100 INJECTION, SUSPENSION SUBCUTANEOUS at 05:44

## 2024-04-17 RX ADMIN — HUMAN INSULIN 5 UNIT(S): 100 INJECTION, SUSPENSION SUBCUTANEOUS at 11:10

## 2024-04-17 RX ADMIN — HUMAN INSULIN 5 UNIT(S): 100 INJECTION, SUSPENSION SUBCUTANEOUS at 23:50

## 2024-04-17 RX ADMIN — METHOCARBAMOL 500 MILLIGRAM(S): 500 TABLET, FILM COATED ORAL at 16:53

## 2024-04-17 RX ADMIN — SENNA PLUS 2 TABLET(S): 8.6 TABLET ORAL at 22:14

## 2024-04-17 RX ADMIN — FINASTERIDE 5 MILLIGRAM(S): 5 TABLET, FILM COATED ORAL at 12:56

## 2024-04-17 RX ADMIN — Medication 6 UNIT(S): at 01:41

## 2024-04-17 RX ADMIN — CHLORHEXIDINE GLUCONATE 1 APPLICATION(S): 213 SOLUTION TOPICAL at 22:24

## 2024-04-17 RX ADMIN — Medication 400 MILLIGRAM(S): at 22:30

## 2024-04-17 RX ADMIN — TRAMADOL HYDROCHLORIDE 25 MILLIGRAM(S): 50 TABLET ORAL at 14:52

## 2024-04-17 RX ADMIN — HUMAN INSULIN 5 UNIT(S): 100 INJECTION, SUSPENSION SUBCUTANEOUS at 16:53

## 2024-04-17 RX ADMIN — Medication 2: at 05:14

## 2024-04-17 RX ADMIN — Medication 400 MILLIGRAM(S): at 03:30

## 2024-04-17 RX ADMIN — Medication 2: at 16:54

## 2024-04-17 RX ADMIN — SODIUM CHLORIDE 50 MILLILITER(S): 5 INJECTION, SOLUTION INTRAVENOUS at 07:52

## 2024-04-17 RX ADMIN — PANTOPRAZOLE SODIUM 40 MILLIGRAM(S): 20 TABLET, DELAYED RELEASE ORAL at 12:51

## 2024-04-17 RX ADMIN — ONDANSETRON 4 MILLIGRAM(S): 8 TABLET, FILM COATED ORAL at 04:00

## 2024-04-17 RX ADMIN — TRAMADOL HYDROCHLORIDE 25 MILLIGRAM(S): 50 TABLET ORAL at 15:22

## 2024-04-17 RX ADMIN — Medication 1000 MILLIGRAM(S): at 04:00

## 2024-04-17 RX ADMIN — PHENYLEPHRINE HYDROCHLORIDE 5.32 MICROGRAM(S)/KG/MIN: 10 INJECTION INTRAVENOUS at 19:31

## 2024-04-17 RX ADMIN — Medication 4 UNIT(S): at 03:11

## 2024-04-17 RX ADMIN — SODIUM CHLORIDE 75 MILLILITER(S): 5 INJECTION, SOLUTION INTRAVENOUS at 19:32

## 2024-04-17 RX ADMIN — Medication 1 DROP(S): at 22:14

## 2024-04-17 RX ADMIN — SODIUM CHLORIDE 70 MILLILITER(S): 9 INJECTION INTRAMUSCULAR; INTRAVENOUS; SUBCUTANEOUS at 05:45

## 2024-04-17 RX ADMIN — LIDOCAINE 1 PATCH: 4 CREAM TOPICAL at 16:27

## 2024-04-17 RX ADMIN — Medication 1000 MILLIGRAM(S): at 11:27

## 2024-04-17 RX ADMIN — LIDOCAINE 1 PATCH: 4 CREAM TOPICAL at 19:47

## 2024-04-17 RX ADMIN — Medication 0.5 MILLIGRAM(S): at 20:07

## 2024-04-17 RX ADMIN — Medication 27 GRAM(S): at 01:26

## 2024-04-17 RX ADMIN — SODIUM CHLORIDE 750 MILLILITER(S): 9 INJECTION INTRAMUSCULAR; INTRAVENOUS; SUBCUTANEOUS at 01:43

## 2024-04-17 RX ADMIN — PHENYLEPHRINE HYDROCHLORIDE 5.32 MICROGRAM(S)/KG/MIN: 10 INJECTION INTRAVENOUS at 05:45

## 2024-04-17 RX ADMIN — Medication 2: at 23:50

## 2024-04-17 RX ADMIN — Medication 400 MILLIGRAM(S): at 11:12

## 2024-04-17 RX ADMIN — PHENYLEPHRINE HYDROCHLORIDE 5.32 MICROGRAM(S)/KG/MIN: 10 INJECTION INTRAVENOUS at 07:40

## 2024-04-17 RX ADMIN — ATORVASTATIN CALCIUM 80 MILLIGRAM(S): 80 TABLET, FILM COATED ORAL at 22:13

## 2024-04-17 NOTE — DISCHARGE NOTE NURSING/CASE MANAGEMENT/SOCIAL WORK - PATIENT PORTAL LINK FT
You can access the FollowMyHealth Patient Portal offered by [facility] by registering at the following website: [email or web address]. By joining [employer]’s FollowMyHealth portal, you will also be able to view your health information using other applications (apps) compatible with our system.

## 2024-04-17 NOTE — PROVIDER CONTACT NOTE (OTHER) - ASSESSMENT
No drift on right upper and lower extremity and no movement on left upper extremity and drift on left lower extremity. Pupils Left is 7mm irregular and fixed and round is 3mm round and brisk. Patient is disoriented to situation. No drift on right upper and lower extremity and no movement on left upper extremity and drift on left lower extremity. Pupils Left is 7mm irregular and fixed and right pupil is 3mm round and brisk. Patient is disoriented to situation.

## 2024-04-17 NOTE — PHYSICAL THERAPY INITIAL EVALUATION ADULT - ADDITIONAL COMMENTS
per son: Pt lives in a private house with spouse, +steps to enter, R hand dominant. wears glasses for distance, drives per son: Pt lives in a private house with spouse, +steps to enter, R hand dominant. wears glasses for distance, drives    4/22/23: cleared by orthopedics to have TLSO donned with pt sitting at edge of bed

## 2024-04-17 NOTE — SWALLOW BEDSIDE ASSESSMENT ADULT - ADDITIONAL RECOMMENDATIONS
Consider speech language assessment upon receipt of MD order and therapy post d/c; SLP to f/u1-2 x's week on inpt basis.

## 2024-04-17 NOTE — SWALLOW BEDSIDE ASSESSMENT ADULT - COMMENTS
4/16/24 Per IR: 1:30am, given Tenecteplase, with a right MCA infarct, transfer to Neuro IR, for a catheter cerebral angiogram and mechanical thrombectomy. 4/16/24 Per IR: Pt w/ right MCA infarct, transfer to Neuro IR, for a catheter cerebral angiogram and mechanical thrombectomy. After thrombectomy patient was noted to have NIHSS of 11.   CT Head: IMPRESSION: An evolving acute right MCA distribution infarct is again noted in the right posterior frontal, parietal, temporal, and insular regions. The mild edema and mass effect associated with the infarct appears similar compared to the head CT study performed earlier the same day. There has been a substantial interval decrease of the gyriform areas of increased density involving the infarct most likely reflecting evolution of   contrast staining from the angiogram procedure. Small areas of cortical petechial hemorrhagic transformation can't be entirely excluded, as this   study is substantially limited by motion. Continued serial imaging follow-up over time is recommended to monitor for stability.  CT Abd/Pel: IMPRESSION: No CT evidence of acute traumatic sequelae within the chest or abdomen. Left lateral hip subcutaneous hematoma with evidence of active bleeding. *Nonspecific thickened appearance to the upper esophagus. Follow-up upper endoscopy is recommended.* Subcentimeter cystic lesion within the pancreatic head. Nonemergent MRI may be obtained for further characterization, if no contraindications exist. Urinary bladder wall thickening despite underdistention. 4/16/24 Per IR: Pt w/ right MCA infarct, transfer to Neuro IR, for a catheter cerebral angiogram and mechanical thrombectomy. After thrombectomy patient was noted to have NIHSS of 11.   CT Head: IMPRESSION: An evolving acute right MCA distribution infarct is again noted in the right posterior frontal, parietal, temporal, and insular regions. The mild edema and mass effect associated with the infarct appears similar compared to the head CT study performed earlier the same day. There has been a substantial interval decrease of the gyriform areas of increased density involving the infarct most likely reflecting evolution of   contrast staining from the angiogram procedure. Small areas of cortical petechial hemorrhagic transformation can't be entirely excluded, as this   study is substantially limited by motion. Continued serial imaging follow-up over time is recommended to monitor for stability.  4/17/24:   CT Abd/Pel: IMPRESSION: No CT evidence of acute traumatic sequelae within the chest or abdomen. Left lateral hip subcutaneous hematoma with evidence of active bleeding. *Nonspecific thickened appearance to the upper esophagus. Follow-up upper endoscopy is recommended.* Subcentimeter cystic lesion within the pancreatic head. Nonemergent MRI may be obtained for further characterization, if no contraindications exist. Urinary bladder wall thickening despite underdistention.  CT Spine: IMPRESSION: Acute fracture involving the anterior T6 vertebral body extending into a right anterolateral T5-T6 osteophyte. No significant vertebral body height loss or bony retropulsion into the spinal canal. More sensitive evaluation with MRI may be obtained for further evaluation, if no   contraindications exist.

## 2024-04-17 NOTE — PATIENT PROFILE ADULT - CENTRAL VENOUS CATHETER/PICC LINE
Ifrah Schroeder (:  1963) is a 63 y.o. female,Established patient, here for evaluation of the following chief complaint(s):  Diabetes and Hypertension         ASSESSMENT/PLAN:      Diagnosis Orders   1. Type 2 diabetes mellitus without complication, without long-term current use of insulin (HCC)  atorvastatin (LIPITOR) 20 MG tablet    dapagliflozin (FARXIGA) 10 MG tablet    Dulaglutide (TRULICITY) 1.5 RD/1.7NJ SOPN    metFORMIN (GLUCOPHAGE-XR) 500 MG extended release tablet    POCT glycosylated hemoglobin (Hb A1C)    POCT Glucose    Comprehensive Metabolic Panel, Fasting    Lipid, Fasting    Microalbumin / Creatinine Urine Ratio   2. Essential hypertension  valsartan-hydroCHLOROthiazide (DIOVAN-HCT) 320-12.5 MG per tablet    amLODIPine (NORVASC) 10 MG tablet       See me 13 weeks after starting the supplement  Do the fasting labs soon  Be sure to get the mammogram arranged    Subjective   SUBJECTIVE/OBJECTIVE:  HPI  1. She brought along a supplement for the DM that she was interested in. The mineral portion looked fine on the ingredients but the herbal part I said I could not comment on  2. She does not check the CS. We reviewed the A1c  3. There right hip is bone on bone and is painful  Review of Systems   Constitutional: Negative for fatigue. HENT: Negative for sinus pressure. Eyes: Negative for visual disturbance. Respiratory: Negative for shortness of breath. Cardiovascular: Negative for chest pain. Gastrointestinal: Negative for constipation. Genitourinary: Negative. Musculoskeletal: Positive for arthralgias and myalgias. Skin: Negative for rash. Neurological: Negative for headaches. The patient's medications, allergies, past medical problems, surgical, social, and family histories were reviewed and updated as needed. Objective   Physical Exam             An electronic signature was used to authenticate this note.     --Flora Farah MD
no

## 2024-04-17 NOTE — PHYSICAL THERAPY INITIAL EVALUATION ADULT - PHYSICAL ASSIST/NONPHYSICAL ASSIST: SIT/STAND, REHAB EVAL
Ismael Hinkle,    I had the opportunity to review your recent labs and a summary of your labs reads as follows:    Your complete blood counts show no sign of anemia, normal white blood cell count and platelets.  Your comprehensive metabolic panel showed normal renal function, normal liver function, and stable elevated fasting blood glucose indicating no evidence of diabetes mellitus.  Your A1c also shows stable average blood glucose  Your fasting lipid panel show  - normal HDL (good) cholesterol -as your goal is greater than 40  - low LDL (bad) cholesterol as your goal is less than 70  - normal triglyceride levels  Your PSA level is also stable indicating no evidence of prostate cancer      Sincerely,  Ric Villela MD
and 1 person to support LUE/verbal cues/nonverbal cues (demo/gestures)/1 person assist

## 2024-04-17 NOTE — SWALLOW BEDSIDE ASSESSMENT ADULT - SWALLOW EVAL: DIAGNOSIS
Pt presents with dysarthria and oral preparatory/oral stage dysphagia with suspected pharyngeal stage deficits. Formation, control and transfer of the bolus are impaired. Suspect a-p spillage with all textures. Delayed pharyngeal swallow is suspected. Multiple swallows may be indicative of pharyngeal residue. In addition, oral awareness is reduced and Pt intermittently speaking with bolus within the oral cavity. Dysphagia appears to be superimposed upon by underlying cognitive deficits. Pt is an 80 y/o male admitted s/p fall who presents with dysarthria and oral preparatory/oral stage dysphagia with suspected pharyngeal stage deficits. Formation, control and transfer of the bolus are impaired. Suspect a-p spillage with all textures. Delayed pharyngeal swallow is suspected. Multiple swallows may be indicative of pharyngeal residue. In addition, oral awareness is reduced and Pt intermittently speaking with bolus within the oral cavity. Dysphagia appears to be superimposed upon by underlying cognitive deficits.

## 2024-04-17 NOTE — PHARMACOTHERAPY INTERVENTION NOTE - COMMENTS
Spoke with patient at bedside and contacted [phone] [place] [facility] and confirmed home medication list. Patient confirmed [facility]    Home Medications:  aspirin 81 mg oral delayed release tablet: 1 tab(s) orally once a day (17 Apr 2024 11:13)  atorvastatin 80 mg oral tablet: 1 tab(s) orally once a day (at bedtime) (17 Apr 2024 11:13)  finasteride 5 mg oral tablet: 1 tab(s) orally once a day (17 Apr 2024 11:13)  losartan 25 mg oral tablet: 1 tab(s) orally once a day (17 Apr 2024 11:13)  metFORMIN 850 mg oral tablet: 1 tab(s) orally once a day in AM (17 Apr 2024 11:14)  metFORMIN 850 mg oral tablet: 0.5 tab(s) orally once a day in PM (17 Apr 2024 11:14)  metoprolol succinate 25 mg oral tablet, extended release: 1 tab(s) orally once a day (17 Apr 2024 11:13)  omeprazole 40 mg oral delayed release capsule: 1 cap(s) orally once a day (17 Apr 2024 11:15)  repaglinide 1 mg oral tablet: 1 tab(s) orally 3 times a day with meals (17 Apr 2024 11:15)  timolol maleate 0.5% ophthalmic solution: 1 drop(s) in the left eye 2 times a day (17 Apr 2024 11:16)  Toujeo Max SoloStar 300 units/mL subcutaneous solution: 26 unit(s) subcutaneous once a day at night after dinner (17 Apr 2024 11:17)

## 2024-04-17 NOTE — PHYSICAL THERAPY INITIAL EVALUATION ADULT - GENERAL OBSERVATIONS, REHAB EVAL
received supine with head of bed elevated +ngt, +O2 via nc and receiving resp treatment, +c-collar, +bilateral sequential compression devices, +LUE elevated, +joan

## 2024-04-17 NOTE — CHART NOTE - NSCHARTNOTEFT_GEN_A_CORE
Patient seen and examined at bedside  CT scans from overnight completed  Patient currently on 0.1 of damari   AVSS  Swelling noted in left thigh and kneww    Noted injuries:   evolving R MCA stroke  L lateral hip subcutaneous hematoma with possible r/o extravasation, however small in size  T6 vertebral body fx    Incidental:  cystic pancreatic head mass      - recommend q8 CBC (H/H 8 from 10, however slightly dilutional)  - trauma surgery to wrap the LLE with ACE wrap for compression and to help tamponade bleeding in L subcutaneous tissue  - monitor vitals  - ortho spine consult for t6 vertebral fracture  - f/u XR of b/l UE and b/l LE  - tertiary to be completed on 4/18, incidental findings of cystic pancreatic head mass to be provided to patient/family    Trauma Surgery, [id number]      < from: CT Head No Cont (04.17.24 @ 09:35) >    An evolving acute right MCA distribution infarct is again noted in the   right posterior frontal, parietal, temporal, and insular regions. The   mild edema and mass effect associated with the infarct appears similar   compared to the head CT study performed earlier the same day. There has   been a substantial interval decrease of the gyriform areas of increased   density involving the infarct most likely reflecting evolution of   contrast staining from the angiogram procedure. Small areas of cortical   petechial hemorrhagic transformation can't be entirely excluded, as this   study is substantially limited by motion. Continued serial imaging  follow-up over time is recommended to monitor for stability.    < end of copied text >    < from: CT Lumbar Spine Reform w/ IV Cont (04.17.24 @ 02:04) >    Acute fracture involving the anterior T6 vertebral body extending into a   right anterolateral T5-T6 osteophyte. No significant vertebral body   height loss or bony retropulsion into the spinal canal. More sensitive   evaluation with MRI may be obtained for further evaluation, if no   contraindications exist.    < end of copied text >  ???< from: CT Abdomen and Pelvis w/ IV Cont (04.17.24 @ 02:04) >    IMPRESSION:  No CT evidence of acute traumatic sequelae within the chest or abdomen.    Left lateral hip subcutaneous hematoma with evidence of active bleeding.    Nonspecific thickened appearance to the upper esophagus. Follow-up upper   endoscopy is recommended.    Subcentimeter cystic lesion within the pancreatic head. Nonemergent MRI   may be obtained for further characterization, if no contraindications   exist.    Urinary bladder wall thickening despite underdistention.    < end of copied text >

## 2024-04-17 NOTE — PROVIDER CONTACT NOTE (OTHER) - ASSESSMENT
Pt neurologically intact A&Ox3, PERRL. Pt is on Renato gtt and titrated as per order to keep -140, as per order. Pt's Renato requirements are decreasing, pt is hemodynamically stable.

## 2024-04-17 NOTE — CONSULT NOTE ADULT - SUBJECTIVE AND OBJECTIVE BOX
81 year old man with a PMH of DM, HTN, CAD with stenting presenting for sudden onset left sided weakness. LNW 14:30. NIHSS 9 on my assessment     CTH with no acute hemorrhage or stroke.   CTA with right inferior M2 occlusion.   CTP with deficits in the right M2 area.     MRS 0.     No contraindications for tenecteplase.

## 2024-04-17 NOTE — PHYSICAL THERAPY INITIAL EVALUATION ADULT - PRECAUTIONS/LIMITATIONS, REHAB EVAL
fall precautions/spinal precautions c-collar at all times/fall precautions/spinal precautions c-collar at all times, TLSO when OOB/fall precautions/spinal precautions

## 2024-04-17 NOTE — PHYSICAL THERAPY INITIAL EVALUATION ADULT - PERTINENT HX OF CURRENT PROBLEM, REHAB EVAL
PMHx: DM, HTN, CAD with stenting, on ASA. presenting for sudden onset L sided weakness. Family heard a thud and found patient on the floor and proceeded with hospitalization. He fell off the stairs. LKW 13:30. Patient was evaluated at [facility] for stroke and was found to have right MCA occlusion. Pt s/p tenecteplase at 16:57. pt transferred to [facility] for thrombectomy. Upon initial evaluation patient ws found to have NIHSS of 9 and subsequently at initial evaluation pt was not very well cooperative which contributed to a high NIHSS score of 19. After imaging and re-evaluation pt was found to have NIHSS of 11. pt s/p thrombectomy with TICI 2B resultant on 4/16.  CT spine: Acute fracture involving the anterior T6 vertebral body extending into a right anterolateral T5-T6 osteophyte. No significant vertebral body height loss or bony retropulsion into the spinal canal.    CT C/A/P: No CT evidence of acute traumatic sequelae within the chest or abdomen. L lateral hip subcutaneous hematoma with evidence of active bleeding. Nonspecific thickened appearance to the upper esophagus. Subcentimeter cystic lesion within the pancreatic head. Urinary bladder wall thickening despite underdistention.    CTH: New cytotoxic edema in the right frontal, parietal, and temporal lobes, as well as within the right insula consistent with an acute right MCA territory infarct. No hipolito hemorrhagic transformation. No midline shift. PMHx: DM, HTN, CAD with stenting, on ASA. presenting for sudden onset L sided weakness. Family heard a thud and found patient on the floor and proceeded with hospitalization. He fell off the stairs. LKW 13:30. Patient was evaluated at [facility] for stroke and was found to have right MCA occlusion. Pt s/p tenecteplase at 16:57. pt transferred to [facility] for thrombectomy. Upon initial evaluation patient ws found to have NIHSS of 9 and subsequently at initial evaluation pt was not very well cooperative which contributed to a high NIHSS score of 19. After imaging and re-evaluation pt was found to have NIHSS of 11. pt s/p thrombectomy with TICI 2B resultant on 4/16.  CT spine: Acute fracture involving the anterior T6 vertebral body extending into a right anterolateral T5-T6 osteophyte. No significant vertebral body height loss or bony retropulsion into the spinal canal. +hypotensive during blood transfusion 4/18 (transfusion reaction?), spiked fever     CT C/A/P: No CT evidence of acute traumatic sequelae within the chest or abdomen. L lateral hip subcutaneous hematoma with evidence of active bleeding. Nonspecific thickened appearance to the upper esophagus. Subcentimeter cystic lesion within the pancreatic head. Urinary bladder wall thickening despite underdistention.  CTH: New cytotoxic edema in the R frontal, parietal, and temporal lobes, as well as within the R insula consistent with an acute R MCA territory infarct. No hipolito hemorrhagic transformation. No midline shift.  MR brain: Redemonstrated large acute R MCA territory infarct with extensive cortical petechial hemorrhage. No gross hematoma formation.  MR c-spine: Suspected acute anterior longitudinal ligament injury and/or anterior corner avulsion fractures at C4-C5 & C5-C6. Prevertebral edema extending from C1 through the upper cervical levels. Suspected R>L posterior paraspinal muscular strain &/or ligamentous injury.

## 2024-04-17 NOTE — SWALLOW BEDSIDE ASSESSMENT ADULT - SLP PERTINENT HISTORY OF CURRENT PROBLEM
81 years old man with a PMH of DM, HTN, CAD with stenting who presented for sudden onset left sided weakness. Family heard a thud and found patient on the floor and proceeded with hospitalization. He fell off the stairs. Patient was evaluated at [facility] for stroke and was found to have right MCA occlusion. Patient given tenecteplase and was transferred to [facility] for thrombectomy. Upon initial evaluation patient ws found to have NIHSS of 9 and subsequently at initial evaluation patient was not very well cooperative which contributed to a high NIHSS score of 19. After imaging and re-evaluation patient was re-evaluated and was found to have NIHSS of 11 (2 for gaze preference right, 2 for lack of response to threat left, 2 for drift in LUE and 1 for drift in LLE, 1 for neglect, 1 for dysarthria and 2 for left face).

## 2024-04-17 NOTE — PHYSICAL THERAPY INITIAL EVALUATION ADULT - NSPTDISCHREC_GEN_A_CORE
anticipated, need for complete functional assessment/Acute Inpatient Rehab 4/22/23/Acute Inpatient Rehab

## 2024-04-17 NOTE — PATIENT PROFILE ADULT - STATED REASON FOR ADMISSION
C= \"Have you ever felt that you should Cut down on your drinking? \"  No  A= \"Have people Annoyed you by criticizing your drinking? \"  No  G= \"Have you ever felt bad or Guilty about your drinking? \"  No  E= \"Have you ever had a drink as an Eye-opener first thing in the morning to steady your nerves or to help a hangover? \"  No      Deferred []      Reason for deferring:     *If yes to two or more: probable alcohol abuse. *
Stroke

## 2024-04-17 NOTE — PROGRESS NOTE ADULT - SUBJECTIVE AND OBJECTIVE BOX
NSICU NIGHT PROGRESS NOTE     Please see neurosurgery resident H&P for HPI.     12h events   MRI brain with expected completed R MCA territory stroke with hemorrhagic transformation   exam changed before MRI with not withdrawing LUE as expected with stroke evolution     Exam   alert awake, oriented on self place and year, L pupil midriasis fixed surgical, R reactive to light, L facial, gaze midline, RUE/RLE AG, LUE no movement to noxious 0/5, LLE AG     VITALS:   Vital Signs Last 24 Hrs  T(C): 37.8 (17 Apr 2024 19:00), Max: 37.8 (17 Apr 2024 19:00)  T(F): 100.1 (17 Apr 2024 19:00), Max: 100.1 (17 Apr 2024 19:00)  HR: 94 (17 Apr 2024 22:30) (66 - 105)  BP: --  BP(mean): --  RR: 17 (17 Apr 2024 22:30) (11 - 33)  SpO2: 97% (17 Apr 2024 22:30) (91% - 100%)    Parameters below as of 17 Apr 2024 19:00  Patient On (Oxygen Delivery Method): room air      CAPILLARY BLOOD GLUCOSE  261 (17 Apr 2024 01:19)      POCT Blood Glucose.: 182 mg/dL (17 Apr 2024 16:51)  POCT Blood Glucose.: 142 mg/dL (17 Apr 2024 11:07)  POCT Blood Glucose.: 190 mg/dL (17 Apr 2024 05:06)  POCT Blood Glucose.: 209 mg/dL (17 Apr 2024 03:04)  POCT Blood Glucose.: 261 mg/dL (17 Apr 2024 01:19)    I&O's Summary    16 Apr 2024 07:01  -  17 Apr 2024 07:00  --------------------------------------------------------  IN: 1016.7 mL / OUT: 945 mL / NET: 71.7 mL    17 Apr 2024 07:01  -  17 Apr 2024 22:46  --------------------------------------------------------  IN: 1274.8 mL / OUT: 790 mL / NET: 484.8 mL        Respiratory:    ABG - ( 17 Apr 2024 09:15 )  pH, Arterial: 7.35  pH, Blood: x     /  pCO2: 37    /  pO2: 87    / HCO3: 20    / Base Excess: -4.7  /  SaO2: 97.4                LABS:                        8.1    7.90  )-----------( 103      ( 17 Apr 2024 20:08 )             24.7     04-17    138  |  110<H>  |  17  ----------------------------<  187<H>  4.3   |  18<L>  |  0.80        MEDICATION LEVELS:     IVF FLUIDS/MEDICATIONS:   MEDICATIONS  (STANDING):  acetaminophen   IVPB .. 1000 milliGRAM(s) IV Intermittent once  atorvastatin 80 milliGRAM(s) Oral at bedtime  chlorhexidine 4% Liquid 1 Application(s) Topical daily  finasteride 5 milliGRAM(s) Oral daily  influenza  Vaccine (HIGH DOSE) 0.7 milliLiter(s) IntraMuscular once  insulin lispro (ADMELOG) corrective regimen sliding scale   SubCutaneous every 6 hours  insulin NPH human recombinant 5 Unit(s) SubCutaneous every 6 hours  lidocaine   4% Patch 1 Patch Transdermal daily  pantoprazole  Injectable 40 milliGRAM(s) IV Push daily  phenylephrine    Infusion 0.2 MICROgram(s)/kG/Min (5.32 mL/Hr) IV Continuous <Continuous>  polyethylene glycol 3350 17 Gram(s) Oral daily  senna 2 Tablet(s) Oral at bedtime  sodium chloride 2% + sodium acetate 50:50 1000 milliLiter(s) (75 mL/Hr) IV Continuous <Continuous>  timolol 0.5% Solution 1 Drop(s) Left EYE two times a day    MEDICATIONS  (PRN):  methocarbamol 500 milliGRAM(s) Oral every 6 hours PRN musle spasm

## 2024-04-17 NOTE — CHART NOTE - NSCHARTNOTEFT_GEN_A_CORE
***Vascular Neurology Follow-up Chart Note***    Patient seen today with attending on rounds. Please also refer to attending addendum on initial H&P dated 4/16.    Exam stable except: __    Impression:  left sided weakness facial weakness dysarthria left neglect right gaze preference and left hemianopsia likely due to right MCA stroke mechanism pending further workup but currently ESUS    Plan:    [] ANTITHROMBOTIC THERAPY: Holding s/p thrombectomy. TBD.  [] MRI  [x] Vessel imaging - R M2 occlusion  [] TTE                   [x] A1C (8.2), LDL (50)  [x] Atorvastatin 80  []  Physical therapy/OT/Speech Language (pending final recs)    SBP goal: SBP <140    Case & Plan discussed with patient and family. All questions answered. Plan discussed with primary team       CORE MEASURES:         Admission NIHSS: 11     ICH score: N/A     Hunt and Bautista Score: N/A       Tenecteplase: [x] YES [] NO     Statin Therapy: [x] YES [] NO     Smoking [] YES [x] NO     Afib [] YES [x] NO     Stroke Education [x] YES [] NO    Dysphagia screen : [x] Passed [] Failed- S&S pending [] Pending  (Ensure medications are ordered via appropriate route)    DVT ppx: Venodynes [] Heparin s.c [] LMWH [x] Not on chemical DVT ppx due to: s/p thrombectomy ***Vascular Neurology Follow-up Chart Note***    Patient seen today with attending on rounds. Please also refer to attending addendum on initial H&P dated 4/16.    Exam stable    Impression:  left sided weakness facial weakness dysarthria left neglect right gaze preference and left hemianopsia likely due to right MCA stroke mechanism pending further workup but currently ESUS    Plan:    [] ANTITHROMBOTIC THERAPY: Holding s/p thrombectomy. TBD.  [] MRI  [x] Vessel imaging - R M2 occlusion  [] TTE                   [x] A1C (8.2), LDL (50)  [x] Atorvastatin 80  []  Physical therapy/OT/Speech Language (pending final recs)    SBP goal: SBP <140    Case & Plan discussed with patient and family. All questions answered. Plan discussed with primary team       CORE MEASURES:         Admission NIHSS: 11     ICH score: N/A     Hunt and Bautista Score: N/A       Tenecteplase: [x] YES [] NO     Statin Therapy: [x] YES [] NO     Smoking [] YES [x] NO     Afib [] YES [x] NO     Stroke Education [x] YES [] NO    Dysphagia screen : [x] Passed [] Failed- S&S pending [] Pending  (Ensure medications are ordered via appropriate route)    DVT ppx: Venodynes [] Heparin s.c [] LMWH [x] Not on chemical DVT ppx due to: s/p thrombectomy

## 2024-04-17 NOTE — PROGRESS NOTE ADULT - SUBJECTIVE AND OBJECTIVE BOX
Patient seen and examined at bedside.    --Anticoagulation--    T(C): 36.4 (04-17-24 @ 03:00), Max: 36.8 (04-16-24 @ 19:15)  HR: 79 (04-17-24 @ 04:45) (72 - 92)  BP: 111/74 (04-16-24 @ 20:03) (111/74 - 112/74)  RR: 15 (04-17-24 @ 04:45) (14 - 33)  SpO2: 97% (04-17-24 @ 04:45) (95% - 100%)  Wt(kg): --    Exam: AOx2, dysarthric, L facial, L side wds, no drift on R FC, R side stroke, R groin soft, dp palp Patient seen and examined at bedside.    --Anticoagulation--    T(C): 36.4 (04-17-24 @ 03:00), Max: 36.8 (04-16-24 @ 19:15)  HR: 79 (04-17-24 @ 04:45) (72 - 92)  BP: 111/74 (04-16-24 @ 20:03) (111/74 - 112/74)  RR: 15 (04-17-24 @ 04:45) (14 - 33)  SpO2: 97% (04-17-24 @ 04:45) (95% - 100%)  Wt(kg): --    Exam: AOx2, dysarthric, L facial, L side wds, no drift on R FC, R side strong, R groin soft, dp palp

## 2024-04-17 NOTE — PATIENT PROFILE ADULT - FALL HARM RISK - HARM RISK INTERVENTIONS
Assistance with ambulation/Assistance OOB with selected safe patient handling equipment/Communicate Risk of Fall with Harm to all staff/Discuss with provider need for PT consult/Monitor gait and stability/Reinforce activity limits and safety measures with patient and family/Tailored Fall Risk Interventions/Visual Cue: Yellow wristband and red socks/Bed in lowest position, wheels locked, appropriate side rails in place/Call bell, personal items and telephone in reach/Instruct patient to call for assistance before getting out of bed or chair/Non-slip footwear when patient is out of bed/[employer] to call system/Physically safe environment - no spills, clutter or unnecessary equipment/Purposeful Proactive Rounding/Room/bathroom lighting operational, light cord in reach

## 2024-04-17 NOTE — PHYSICAL THERAPY INITIAL EVALUATION ADULT - LEVEL OF INDEPENDENCE: SUPINE/SIT, REHAB EVAL
via log rolling onto L side/moderate assist (50% patients effort)/maximum assist (25% patients effort)

## 2024-04-17 NOTE — PROGRESS NOTE ADULT - ASSESSMENT
ASSESSMENT       Stroke etiology: cardioembolic     PLAN     N  #AIS   MRI with R MCA AIS with hemorrhagic transformation   Aspirin 81mg daily   Atorvastatin 80mg daily   A1c 8.9   LDL 50  #Pain: Tylenol and oxycodone  #Activity: PT OT    CV   #CAD stent asa 81mg  #HTN   #SBP 90-140mmHg post MT   #TTE wnl   #Tele     P   #RA   Incentive spirometry     R   #Strict I+Os   #2% 75cc/h for vasogenic edema     GI   #Diet: tube glucerna   #Senna miralax  Last BM PTA     ID  afebrile    E  Goal euglycemia (-180)  #DM2   ISS, NPH     H  #DVT ppx:   SCDs  Chemoppx held for now, hemorrhagic transformation   LED   #Hb goal >8 for pmhx cardiac     #CODE STATUS: FULL CODE     #DISPOSITION: ICU    #CRITICAL

## 2024-04-17 NOTE — CONSULT NOTE ADULT - SUBJECTIVE AND OBJECTIVE BOX
TRAUMA SERVICE (Acute Care Surgery / [facility] - #9039) - CONSULT NOTE  --------------------------------------------------------------------------------------------    TRAUMA ACTIVATION LEVEL: Consult    MECHANISM OF INJURY:      [] Blunt  	[] MVC	[x] Fall	[] Pedestrian Struck	[] Motorcycle accident      [] Penetrating  	[] Gun Shot Wound 		[] Stab Wound    GCS: 	E: 4	V: 5	M: 6      HPI:   Patient is a 79y old  Male who presents with a chief complaint of   HPI:  81 years old man with a PMH of DM HTN CAD with stenting presenting for sudden onset left sided weakness. Family heard a thud and found patient on the floor and proceeded with hospitalization. He fell off the stairs. Patient was evaluated at [facility] for stroke and was found to have right MCA occlusion. Patient given tenecteplase and was transferred to [facility] for thrombectomy. Upon initial evaluation patient ws found to have NIHSS of 9 and subsequently at initial evaluation patient was not very well cooperative which contributed to a high NIHSS score of 19. After imaging and re-evaluation patient was re-evaluated and was found to have NIHSS of 11 (2 for gaze preference right, 2 for lack of response to threat left, 2 for drift in LUE and 1 for drift in LLE, 1 for neglect, 1 for dysarthria and 2 for left face). After thrombectomy patient was noted to have NIHSS of 11. Of note patient was on aspirin at home only and did not skip the dose. Last seen well was just before the incident at 1:30 pm.  (16 Apr 2024 22:09)      Primary Survey:   A - airway intact  B - bilateral breath sounds and good chest rise  C - initial BP: 111/74 (04-16-24 @ 20:03) , HR: 91 (04-16-24 @ 23:45) , palpable pulses in all extremities  D - GCS 15 on arrival  Exposure obtained      Secondary Survey: ***  General: NAD  HEENT: Normocephalic, atraumatic, EOMI, PEERLA.  Neck: Soft, midline trachea, C-collar in place  Chest: No chest wall tenderness.   Cardiac: S1, S2, RRR  Respiratory: Bilateral breath sounds, clear and equal bilaterally  Abdomen: Soft, non-distended, non-tender, no rebound, no guarding, no masses palpated  Pelvis: Stable, non-tender, no ecchymosis  Ext: palp radial b/l UE, b/l DP palp in Lower Extrem, motor and sensory grossly intact in all 4 extremities  Back: no TTP, no palpable runoff/stepoff/deformity  Rectal: No hipolito blood, LEONARDO with good tone    Patient denies fevers/chills, denies lightheadedness/dizziness, denies SOB/chest pain, denies nausea/vomiting, denies constipation/diarrhea.  ***    ROS: 10-system review is otherwise negative except HPI above.      PAST MEDICAL & SURGICAL HISTORY:  HLD (hyperlipidemia)      T2DM (type 2 diabetes mellitus)      BPH (benign prostatic hyperplasia)      H/O eye surgery      History of cardiac cath  times 2        FAMILY HISTORY:  FH: myocardial infarction (Sibling)      [] Family history not pertinent as reviewed with the patient and family    SOCIAL HISTORY:  ***    ALLERGIES: No Known Allergies      HOME MEDICATIONS: ***    CURRENT MEDICATIONS  MEDICATIONS (STANDING): acetaminophen   IVPB .. 1000 milliGRAM(s) IV Intermittent once  atorvastatin 80 milliGRAM(s) Oral at bedtime  insulin lispro (ADMELOG) corrective regimen sliding scale   SubCutaneous Before meals and at bedtime  magnesium sulfate  IVPB 2 Gram(s) IV Intermittent once  phenylephrine    Infusion 0.2 MICROgram(s)/kG/Min IV Continuous <Continuous>  polyethylene glycol 3350 17 Gram(s) Oral daily  senna 2 Tablet(s) Oral at bedtime  sodium chloride 0.9%. 1000 milliLiter(s) IV Continuous <Continuous>    MEDICATIONS (PRN):  --------------------------------------------------------------------------------------------      LABS  CBC (04-16 @ 23:00)                              10.3<L>                         13.68<H>  )----------------(  150        81.1<H>% Neutrophils, 11.5<L>% Lymphocytes, ANC: 11.08<H>                              32.4<L>  CBC (04-16 @ 19:05)                              11.7<L>                         13.64<H>  )----------------(  132<L>     84.4<H>% Neutrophils, 9.8<L>% Lymphocytes, ANC: 11.51<H>                              37.1<L>    BMP (04-16 @ 22:49)             135     |  105     |  22    		Ca++ --      Ca 7.9<L>             ---------------------------------( 290<H>		Mg 1.6                4.6     |  17<L>   |  0.77  			Ph 4.7<H>  BMP (04-16 @ 19:05)             139     |  116<H>  |  13    		Ca++ --      Ca 5.9<LL>             ---------------------------------( 148<H>		Mg --                 3.2<L>  |  14<L>   |  0.50  			Ph --        LFTs (04-16 @ 19:05)      TPro 4.5<L> / Alb 2.3<L> / TBili 0.3 / DBili -- / AST 21 / ALT 19 / AlkPhos 62    Coags (04-16 @ 19:29)  aPTT 24.5 / INR 1.06 / PT 11.1        VBG (04-16 @ 19:15)     7.37 / 45 / 29 / 26 / 0.3 / 43.9<L>%     Lactate: 1.5    --------------------------------------------------------------------------------------------    MICROBIOLOGY  Urinalysis (04-16 @ 22:49):     Color:  / Appearance:  / SG:  / pH:  / Gluc: 290<H> / Ketones:  / Bili:  / Urobili:  / Protein : / Nitrites:  / Leuk.Est:  / RBC:  / WBC:  / Sq Epi:  / Non Sq Epi:  / Bacteria          --------------------------------------------------------------------------------------------    IMAGING  ***    --------------------------------------------------------------------------------------------     TRAUMA SERVICE (Acute Care Surgery / [facility] - #04302) - CONSULT NOTE  --------------------------------------------------------------------------------------------    TRAUMA ACTIVATION LEVEL: Consult    MECHANISM OF INJURY:      [] Blunt  	[] MVC	[x] Fall	[] Pedestrian Struck	[] Motorcycle accident      [] Penetrating  	[] Gun Shot Wound 		[] Stab Wound    GCS: 	E: 4	V: 5	M: 6      HPI:   Mr. Jung is an 79 year old man with PMH of HTN, DMII, CAD s/p stents (on ASA) who presents after mechanical fall aroudn 130p, likely 2/2 acute R MCA stroke. He was in his usual state of health at home chatting with friends and moving throughout the house unencumbered when his wife heard a thud and found him at the bottom of a 10-stair staircase (noncarpeted, tile floor). She attended to him immediately and he was awake but confused, there was blood on the floor but no obvious injuries to the patient. Last dose ASA this AM.  Patient was evaluated at [facility] for stroke and was found to have right MCA occlusion for which he was given tenecteplase and was transferred to [facility] for thrombectomy. He is now s/p cerebral angio with mechanical thrombectomy.    Primary Survey:   A - airway intact  B - bilateral breath sounds and good chest rise  C - initial BP: 111/74 (04-16-24 @ 20:03) , HR: 91 (04-16-24 @ 23:45) , palpable pulses in all extremities  D - GCS 15 with dense L hemiplegia  Exposure obtained    Secondary Survey:   General: NAD  HEENT: Ecchymosis to superior aspect of head, EOMI, L eye with cataract.  Neck: Soft, midline trachea, C-collar in place, unable to assess cervical tenderness- patient confused  Chest: No chest wall tenderness.   Cardiac: S1, S2, RRR  Respiratory: Bilateral breath sounds, clear and equal bilaterally  Abdomen: Soft, non-distended, non-tender, no rebound, no guarding, no masses palpated  Pelvis: Stable, non-tender, no ecchymosis  Ext: palp radial b/l UE, b/l DP palp in Lower Extrem, motor and sensory grossly intact on R side. L hemiplegia consistent with MCA stroke but patient improving lsightly, able to wiggle L toes  - superficial abrasions to the R wrist, LUE, L wrist, small bruise of the R elbow, small hematoma of the L lateral thigh and L knee.   Back: no TTP, no palpable runoff/stepoff/deformity, Left upper back eccymosis      ROS: 10-system review is otherwise negative except HPI above.      PAST MEDICAL & SURGICAL HISTORY:  HLD (hyperlipidemia)  T2DM (type 2 diabetes mellitus)  BPH (benign prostatic hyperplasia)  H/O eye surgery  History of cardiac cath  times 2    FAMILY HISTORY:  FH: myocardial infarction (Sibling)      [] Family history not pertinent as reviewed with the patient and family    -------------------------------------------------------------------------------------------      LABS  CBC (04-16 @ 23:00)                              10.3<L>                         13.68<H>  )----------------(  150        81.1<H>% Neutrophils, 11.5<L>% Lymphocytes, ANC: 11.08<H>                              32.4<L>  CBC (04-16 @ 19:05)                              11.7<L>                         13.64<H>  )----------------(  132<L>     84.4<H>% Neutrophils, 9.8<L>% Lymphocytes, ANC: 11.51<H>                              37.1<L>    BMP (04-16 @ 22:49)             135     |  105     |  22    		Ca++ --      Ca 7.9<L>             ---------------------------------( 290<H>		Mg 1.6                4.6     |  17<L>   |  0.77  			Ph 4.7<H>  BMP (04-16 @ 19:05)             139     |  116<H>  |  13    		Ca++ --      Ca 5.9<LL>             ---------------------------------( 148<H>		Mg --                 3.2<L>  |  14<L>   |  0.50  			Ph --        LFTs (04-16 @ 19:05)      TPro 4.5<L> / Alb 2.3<L> / TBili 0.3 / DBili -- / AST 21 / ALT 19 / AlkPhos 62    Coags (04-16 @ 19:29)  aPTT 24.5 / INR 1.06 / PT 11.1        VBG (04-16 @ 19:15)     7.37 / 45 / 29 / 26 / 0.3 / 43.9<L>%     Lactate: 1.5    --------------------------------------------------------------------------------------------    MICROBIOLOGY  Urinalysis (04-16 @ 22:49):     Color:  / Appearance:  / SG:  / pH:  / Gluc: 290<H> / Ketones:  / Bili:  / Urobili:  / Protein : / Nitrites:  / Leuk.Est:  / RBC:  / WBC:  / Sq Epi:  / Non Sq Epi:  / Bacteria          --------------------------------------------------------------------------------------------    IMAGING  < from: CT Brain Stroke Protocol (04.16.24 @ 19:14) >  FINDINGS:    Contrast noted within the vessels.    New cytotoxic edema in the right frontal, parietal, and temporal lobes,   as well as within the right insula consistent with an acute right MCA   territory infarct. No hipolito hemorrhagic transformation.    No hydrocephalus, midline shift, or effacement of the basal cisterns.    The visualized paranasal sinuses and mastoid air cells are clear.    IMPRESSION:    New cytotoxic edema in the right frontal, parietal, and temporal lobes,   as well as within the right insula consistent with an acute right MCA   territory infarct. No hipolito hemorrhagic transformation.    No midline shift.      Findings were discussed by Dr. Smith with MD Gilmar on 4/16/2024 7:19   PM with read back confirmation.    --- End of Report ---      < end of copied text >      --------------------------------------------------------------------------------------------

## 2024-04-17 NOTE — CHART NOTE - NSCHARTNOTEFT_GEN_A_CORE
CT C spine reviewed, pending final read, no fx/malalignments appreciated  Given some ttp can still wear C collar when OOB PRN

## 2024-04-17 NOTE — PROGRESS NOTE ADULT - SUBJECTIVE AND OBJECTIVE BOX
HOSPITAL COURSE: 78yo man LKW 1:30-2pm today, found down by family, ?fell down stairs and family heard a thud, brought to [facility] ED, where NIHSS reportedly 7, given TNK 16:57, found to have R M2 occlusion, transferred to [facility] for MT, TICI2B, one pass.     PMH DM, HTN, CAD s/p stents on ASA per report, unclear also on plavix     Admission Scores  GCS: 15  HH:   MF:   NIHSS:  16 at [facility] ED  RASS:    CAM-ICU:   ICH:    Allergies    No Known Allergies    Intolerances    REVIEW OF SYSTEMS: [ ] Unable to Assess due to neurologic exam   [ x] All ROS addressed below are non-contributory, except: neck pain, back pain, groin pain, denies abd pain/chest pain  Neuro: [ ] Headache [ ] Back pain [ ] Numbness [ ] Weakness [ ] Ataxia [ ] Dizziness [ ] Aphasia [ ] Dysarthria [ ] Visual disturbance  Resp: [ ] Shortness of breath/dyspnea, [ ] Orthopnea [ ] Cough  CV: [ ] Chest pain [ ] Palpitation [ ] Lightheadedness [ ] Syncope  Renal: [ ] Thirst [ ] Edema  GI: [ ] Nausea [ ] Emesis [ ] Abdominal pain [ ] Constipation [ ] Diarrhea  Hem: [ ] Hematemesis [ ] bright red blood per rectum  ID: [ ] Fever [ ] Chills [ ] Dysuria  ENT: [ ] Rhinorrhea      DEVICES:   [ ] Restraints [ ] ET tube [ ] central line [ x] arterial line L radial [x ] talbert [ ] NGT/OGT [ ] EVD [ ] LD [ ] PARI/HMV [ ] Trach [ ] PEG [ ] Chest Tube     VITALS:   Vital Signs Last 24 Hrs  T(C): 36.8 (16 Apr 2024 20:03), Max: 36.8 (16 Apr 2024 19:15)  T(F): 98.2 (16 Apr 2024 19:15), Max: 98.2 (16 Apr 2024 19:15)  HR: 84 (16 Apr 2024 22:00) (79 - 86)  BP: 111/74 (16 Apr 2024 20:03) (111/74 - 112/74)  BP(mean): --  RR: 20 (16 Apr 2024 22:00) (14 - 20)  SpO2: 97% (16 Apr 2024 22:00) (95% - 99%)    Parameters below as of 16 Apr 2024 21:45  Patient On (Oxygen Delivery Method): nasal cannula  O2 Flow (L/min): 2    CAPILLARY BLOOD GLUCOSE  232 (16 Apr 2024 19:01)      POCT Blood Glucose.: 232 mg/dL (16 Apr 2024 18:53)    I&O's Summary      LABS:                        11.7   13.64 )-----------( 132      ( 16 Apr 2024 19:05 )             37.1     04-16    139  |  116<H>  |  13  ----------------------------<  148<H>  3.2<L>   |  14<L>  |  0.50          phenylephrine 1.4  NS 70    IVF FLUIDS/MEDICATIONS:   MEDICATIONS  (STANDING):  acetaminophen   IVPB .. 1000 milliGRAM(s) IV Intermittent once  atorvastatin 80 milliGRAM(s) Oral at bedtime  chlorhexidine 4% Liquid 1 Application(s) Topical daily  polyethylene glycol 3350 17 Gram(s) Oral daily  senna 2 Tablet(s) Oral at bedtime  sodium chloride 0.9%. 1000 milliLiter(s) (70 mL/Hr) IV Continuous <Continuous>    EXAMINATION:  PHYSICAL EXAM:    Constitutional: No Acute Distress     Neurological: awake, alert, L pupil surgical large/irregular, R 3mmR, able to say name but severely dysarthric, did not know where he is, but knows month/year with choices, L facial, LUE/LLE WD, RUE/RLE distally FC full strength    Pulmonary: Clear to Auscultation, No rales, No rhonchi, No wheezes     Cardiovascular: S1, S2, Regular rate and rhythm     Gastrointestinal: Soft, Non-tender, Non-distended     Extremities: No calf tenderness     Incision:

## 2024-04-17 NOTE — PROGRESS NOTE ADULT - ASSESSMENT
ASSESSMENT/PLAN: R M2 thrombectomy s/p TNK 4/16 at 16:57 s/p MT TICI 2B    NEURO:  Q1hr stroke checks  CTH in am  MRI  stroke core measures  s/s  ?fell down stairs prior to admission, C collar now, CT cspine/trauma scan/consult   Activity: [] mobilize as tolerated [x] Bedrest [x] PT [x] OT [] PMNR    PULM:  2L NC   O2sat>92%    CV:  MAP>65, SBP<140  phenylephrine prn for now 1.2  EKG  TTE  med rec with family unclear if on both ASA/plavix, need to hold for now anyway  f/u lipid panel, atorvastatin 80 for now  ?spironolactone, metoprolol hold both as on pressors     RENAL:  Fluids: IVF    GI:  Diet: dysphagia when able s/s eval   GI prophylaxis [x] not indicated [] PPI [] other:  Bowel regimen [] colace [x] senna [x] other: miralax     ENDO:   Goal euglycemia (-180)  f/u A1C  CHELSEA  long acting insulin at home, hold for now as NPO   TFTs f/u     HEME/ONC:  VTE prophylaxis: [x] SCDs [] chemoprophylaxis [x] hold chemoprophylaxis due to: TNK, fresh post op [] high risk of DVT/PE on admission due to:    ID: monitor for fevers    MISC: ?fell down stairs prior to admission, ? 8-10 stairs per VS ED report, will f/u family, CT CAP, cspine, trauma surgery, c collar    SOCIAL/FAMILY:  [x] awaiting [] updated at bedside [] family meeting    CODE STATUS:  [x] Full Code [] DNR [] DNI [] Palliative/Comfort Care    DISPOSITION:  [x] ICU [] Stroke Unit [] Floor [] EMU [] RCU [] PCU    [x] Patient is at high risk of neurologic deterioration/death due to: acute stroke, hemorrhagic conversion       Contact: [phone] ASSESSMENT/PLAN: R M2 thrombectomy s/p TNK 4/16 at 16:57 s/p MT TICI 2B    NEURO:  Q1hr stroke checks  CTH: negative for heme, unremarkable  MRI w/wo at some points   stroke code measures   rCTh at 17:00   ASA 81 and DVT ppx once 24 hours stability is performed and unremarkable ( with caution given that he has a hematoma on the right hip )  Activity: [] mobilize as tolerated [x] Bedrest [x] PT [x] OT [] PMNR    PULM:  RA  O2sat>92%    CV:  MAP>65, SBP<140  phenylephrine prn for now 0.5  EKG  TTE  Med rec to be performed   f/u lipid panel, atorvastatin 80 for now  ?spironolactone, metoprolol hold both as on pressors  Losartan? ASA?    RENAL:  Fluids: IVF 2% HTS  Na goals 140-150 for cytotoxic edema   BMP Q 12 ( noon )    GI:  Diet: dysphagia screen failed, NG tube glucerna  F/u SLPO  GI prophylaxis [x] not indicated [] PPI [] other:  Bowel regimen [] colace [x] senna [x] other: miralax     ENDO:   Goal euglycemia (-180)  f/u A1C  NPH 5 Q6]last glucose 199 on BMP, will adjust NPH dose based on levels on the next BMP at noon    HEME/ONC:  VTE prophylaxis: [x] SCDs [] chemoprophylaxis [x] hold chemoprophylaxis due to: TNK, fresh post op [] high risk of DVT/PE on admission due to:  DVT ppx: on hold given active hematoma bleed  Hg > 8 given the CAD PMHx. transfuse < 8    ID: monitor for fevers    SOCIAL/FAMILY:  [x] awaiting [] updated at bedside [] family meeting    CODE STATUS:  [x] Full Code [] DNR [] DNI [] Palliative/Comfort Care    DISPOSITION:  [x] ICU [] Stroke Unit [] Floor [] EMU [] RCU [] PCU    [x] Patient is at high risk of neurologic deterioration/death due to: acute stroke, hemorrhagic conversion       Contact: [phone]

## 2024-04-17 NOTE — SWALLOW BEDSIDE ASSESSMENT ADULT - SLP GENERAL OBSERVATIONS
Pt awake, alert and oriented to self,  although decreased insight and attention to PO trials noted. Pt requesting to drink water with daughter at b/s. Oral care provided; speech intelligibility is poor; +dysarthria; +hypophonia; +Left sided facial weakness. Pt cued to open his eyes as he frequently had eyes closed. Pt provided with exercises to improve oral stages of swallow upon completion of assessment, per Pt request.

## 2024-04-18 LAB
ALBUMIN SERPL ELPH-MCNC: 2.6 G/DL — LOW (ref 3.3–5)
ALP SERPL-CCNC: 62 U/L — SIGNIFICANT CHANGE UP (ref 40–120)
ALT FLD-CCNC: 22 U/L — SIGNIFICANT CHANGE UP (ref 10–45)
ANION GAP SERPL CALC-SCNC: 7 MMOL/L — SIGNIFICANT CHANGE UP (ref 5–17)
ANION GAP SERPL CALC-SCNC: 8 MMOL/L — SIGNIFICANT CHANGE UP (ref 5–17)
ANION GAP SERPL CALC-SCNC: 8 MMOL/L — SIGNIFICANT CHANGE UP (ref 5–17)
ANION GAP SERPL CALC-SCNC: 9 MMOL/L — SIGNIFICANT CHANGE UP (ref 5–17)
APPEARANCE UR: CLEAR — SIGNIFICANT CHANGE UP
APTT BLD: 25.1 SEC — SIGNIFICANT CHANGE UP (ref 24.5–35.6)
AST SERPL-CCNC: 30 U/L — SIGNIFICANT CHANGE UP (ref 10–40)
BACTERIA # UR AUTO: NEGATIVE /HPF — SIGNIFICANT CHANGE UP
BASOPHILS # BLD AUTO: 0.01 K/UL — SIGNIFICANT CHANGE UP (ref 0–0.2)
BASOPHILS # BLD AUTO: 0.02 K/UL — SIGNIFICANT CHANGE UP (ref 0–0.2)
BASOPHILS # BLD AUTO: 0.02 K/UL — SIGNIFICANT CHANGE UP (ref 0–0.2)
BASOPHILS NFR BLD AUTO: 0.1 % — SIGNIFICANT CHANGE UP (ref 0–2)
BASOPHILS NFR BLD AUTO: 0.3 % — SIGNIFICANT CHANGE UP (ref 0–2)
BASOPHILS NFR BLD AUTO: 0.3 % — SIGNIFICANT CHANGE UP (ref 0–2)
BILIRUB DIRECT SERPL-MCNC: 0.2 MG/DL — SIGNIFICANT CHANGE UP (ref 0–0.3)
BILIRUB INDIRECT FLD-MCNC: 0.7 MG/DL — SIGNIFICANT CHANGE UP (ref 0.2–1)
BILIRUB SERPL-MCNC: 0.5 MG/DL — SIGNIFICANT CHANGE UP (ref 0.2–1.2)
BILIRUB SERPL-MCNC: 0.9 MG/DL — SIGNIFICANT CHANGE UP (ref 0.2–1.2)
BILIRUB UR-MCNC: NEGATIVE — SIGNIFICANT CHANGE UP
BLD GP AB SCN SERPL QL: NEGATIVE — SIGNIFICANT CHANGE UP
BUN SERPL-MCNC: 13 MG/DL — SIGNIFICANT CHANGE UP (ref 7–23)
BUN SERPL-MCNC: 14 MG/DL — SIGNIFICANT CHANGE UP (ref 7–23)
CALCIUM SERPL-MCNC: 7.5 MG/DL — LOW (ref 8.4–10.5)
CALCIUM SERPL-MCNC: 7.5 MG/DL — LOW (ref 8.4–10.5)
CALCIUM SERPL-MCNC: 7.6 MG/DL — LOW (ref 8.4–10.5)
CALCIUM SERPL-MCNC: 7.8 MG/DL — LOW (ref 8.4–10.5)
CAST: 2 /LPF — SIGNIFICANT CHANGE UP (ref 0–4)
CHLORIDE SERPL-SCNC: 111 MMOL/L — HIGH (ref 96–108)
CHLORIDE SERPL-SCNC: 111 MMOL/L — HIGH (ref 96–108)
CHLORIDE SERPL-SCNC: 114 MMOL/L — HIGH (ref 96–108)
CHLORIDE SERPL-SCNC: 115 MMOL/L — HIGH (ref 96–108)
CO2 SERPL-SCNC: 22 MMOL/L — SIGNIFICANT CHANGE UP (ref 22–31)
CO2 SERPL-SCNC: 23 MMOL/L — SIGNIFICANT CHANGE UP (ref 22–31)
CO2 SERPL-SCNC: 23 MMOL/L — SIGNIFICANT CHANGE UP (ref 22–31)
CO2 SERPL-SCNC: 24 MMOL/L — SIGNIFICANT CHANGE UP (ref 22–31)
COLOR SPEC: YELLOW — SIGNIFICANT CHANGE UP
CREAT SERPL-MCNC: 0.73 MG/DL — SIGNIFICANT CHANGE UP (ref 0.5–1.3)
CREAT SERPL-MCNC: 0.74 MG/DL — SIGNIFICANT CHANGE UP (ref 0.5–1.3)
CREAT SERPL-MCNC: 0.75 MG/DL — SIGNIFICANT CHANGE UP (ref 0.5–1.3)
CREAT SERPL-MCNC: 0.79 MG/DL — SIGNIFICANT CHANGE UP (ref 0.5–1.3)
D DIMER BLD IA.RAPID-MCNC: 2860 NG/ML DDU — HIGH
DIFF PNL FLD: ABNORMAL
EGFR: 91 ML/MIN/1.73M2 — SIGNIFICANT CHANGE UP
EGFR: 92 ML/MIN/1.73M2 — SIGNIFICANT CHANGE UP
EGFR: 93 ML/MIN/1.73M2 — SIGNIFICANT CHANGE UP
EGFR: 93 ML/MIN/1.73M2 — SIGNIFICANT CHANGE UP
EOSINOPHIL # BLD AUTO: 0.03 K/UL — SIGNIFICANT CHANGE UP (ref 0–0.5)
EOSINOPHIL # BLD AUTO: 0.03 K/UL — SIGNIFICANT CHANGE UP (ref 0–0.5)
EOSINOPHIL # BLD AUTO: 0.23 K/UL — SIGNIFICANT CHANGE UP (ref 0–0.5)
EOSINOPHIL NFR BLD AUTO: 0.4 % — SIGNIFICANT CHANGE UP (ref 0–6)
EOSINOPHIL NFR BLD AUTO: 0.4 % — SIGNIFICANT CHANGE UP (ref 0–6)
EOSINOPHIL NFR BLD AUTO: 3 % — SIGNIFICANT CHANGE UP (ref 0–6)
FIBRINOGEN PPP-MCNC: 252 MG/DL — SIGNIFICANT CHANGE UP (ref 200–445)
GAS PNL BLDA: SIGNIFICANT CHANGE UP
GAS PNL BLDA: SIGNIFICANT CHANGE UP
GLUCOSE BLDC GLUCOMTR-MCNC: 125 MG/DL — HIGH (ref 70–99)
GLUCOSE BLDC GLUCOMTR-MCNC: 143 MG/DL — HIGH (ref 70–99)
GLUCOSE BLDC GLUCOMTR-MCNC: 158 MG/DL — HIGH (ref 70–99)
GLUCOSE BLDC GLUCOMTR-MCNC: 166 MG/DL — HIGH (ref 70–99)
GLUCOSE BLDC GLUCOMTR-MCNC: 176 MG/DL — HIGH (ref 70–99)
GLUCOSE SERPL-MCNC: 175 MG/DL — HIGH (ref 70–99)
GLUCOSE SERPL-MCNC: 180 MG/DL — HIGH (ref 70–99)
GLUCOSE SERPL-MCNC: 182 MG/DL — HIGH (ref 70–99)
GLUCOSE SERPL-MCNC: 193 MG/DL — HIGH (ref 70–99)
GLUCOSE UR QL: 100 MG/DL
HAPTOGLOB SERPL-MCNC: 72 MG/DL — SIGNIFICANT CHANGE UP (ref 34–200)
HAPTOGLOB SERPL-MCNC: 78 MG/DL — SIGNIFICANT CHANGE UP (ref 34–200)
HCT VFR BLD CALC: 23.8 % — LOW (ref 39–50)
HCT VFR BLD CALC: 25.5 % — LOW (ref 39–50)
HCT VFR BLD CALC: 26.7 % — LOW (ref 39–50)
HCT VFR BLD CALC: 28.4 % — LOW (ref 39–50)
HCT VFR BLD CALC: 28.5 % — LOW (ref 39–50)
HGB BLD-MCNC: 7.7 G/DL — LOW (ref 13–17)
HGB BLD-MCNC: 8.5 G/DL — LOW (ref 13–17)
HGB BLD-MCNC: 8.8 G/DL — LOW (ref 13–17)
HGB BLD-MCNC: 9.2 G/DL — LOW (ref 13–17)
HGB BLD-MCNC: 9.2 G/DL — LOW (ref 13–17)
IMM GRANULOCYTES NFR BLD AUTO: 0.3 % — SIGNIFICANT CHANGE UP (ref 0–0.9)
IMM GRANULOCYTES NFR BLD AUTO: 0.3 % — SIGNIFICANT CHANGE UP (ref 0–0.9)
IMM GRANULOCYTES NFR BLD AUTO: 0.4 % — SIGNIFICANT CHANGE UP (ref 0–0.9)
INR BLD: 1.01 RATIO — SIGNIFICANT CHANGE UP (ref 0.85–1.18)
KETONES UR-MCNC: 15 MG/DL
LDH SERPL L TO P-CCNC: 178 U/L — SIGNIFICANT CHANGE UP (ref 50–242)
LDH SERPL L TO P-CCNC: 186 U/L — SIGNIFICANT CHANGE UP (ref 50–242)
LDH SERPL L TO P-CCNC: 246 U/L — HIGH (ref 50–242)
LEUKOCYTE ESTERASE UR-ACNC: NEGATIVE — SIGNIFICANT CHANGE UP
LYMPHOCYTES # BLD AUTO: 1.13 K/UL — SIGNIFICANT CHANGE UP (ref 1–3.3)
LYMPHOCYTES # BLD AUTO: 1.3 K/UL — SIGNIFICANT CHANGE UP (ref 1–3.3)
LYMPHOCYTES # BLD AUTO: 1.97 K/UL — SIGNIFICANT CHANGE UP (ref 1–3.3)
LYMPHOCYTES # BLD AUTO: 16.9 % — SIGNIFICANT CHANGE UP (ref 13–44)
LYMPHOCYTES # BLD AUTO: 19.2 % — SIGNIFICANT CHANGE UP (ref 13–44)
LYMPHOCYTES # BLD AUTO: 26.1 % — SIGNIFICANT CHANGE UP (ref 13–44)
MAGNESIUM SERPL-MCNC: 1.8 MG/DL — SIGNIFICANT CHANGE UP (ref 1.6–2.6)
MAGNESIUM SERPL-MCNC: 1.8 MG/DL — SIGNIFICANT CHANGE UP (ref 1.6–2.6)
MAGNESIUM SERPL-MCNC: 2 MG/DL — SIGNIFICANT CHANGE UP (ref 1.6–2.6)
MAGNESIUM SERPL-MCNC: 2.1 MG/DL — SIGNIFICANT CHANGE UP (ref 1.6–2.6)
MCHC RBC-ENTMCNC: 28.1 PG — SIGNIFICANT CHANGE UP (ref 27–34)
MCHC RBC-ENTMCNC: 28.2 PG — SIGNIFICANT CHANGE UP (ref 27–34)
MCHC RBC-ENTMCNC: 28.4 PG — SIGNIFICANT CHANGE UP (ref 27–34)
MCHC RBC-ENTMCNC: 28.5 PG — SIGNIFICANT CHANGE UP (ref 27–34)
MCHC RBC-ENTMCNC: 28.8 PG — SIGNIFICANT CHANGE UP (ref 27–34)
MCHC RBC-ENTMCNC: 32.3 GM/DL — SIGNIFICANT CHANGE UP (ref 32–36)
MCHC RBC-ENTMCNC: 32.4 GM/DL — SIGNIFICANT CHANGE UP (ref 32–36)
MCHC RBC-ENTMCNC: 32.4 GM/DL — SIGNIFICANT CHANGE UP (ref 32–36)
MCHC RBC-ENTMCNC: 33 GM/DL — SIGNIFICANT CHANGE UP (ref 32–36)
MCHC RBC-ENTMCNC: 33.3 GM/DL — SIGNIFICANT CHANGE UP (ref 32–36)
MCV RBC AUTO: 86.4 FL — SIGNIFICANT CHANGE UP (ref 80–100)
MCV RBC AUTO: 86.4 FL — SIGNIFICANT CHANGE UP (ref 80–100)
MCV RBC AUTO: 87.1 FL — SIGNIFICANT CHANGE UP (ref 80–100)
MCV RBC AUTO: 87.2 FL — SIGNIFICANT CHANGE UP (ref 80–100)
MCV RBC AUTO: 87.8 FL — SIGNIFICANT CHANGE UP (ref 80–100)
MONOCYTES # BLD AUTO: 0.35 K/UL — SIGNIFICANT CHANGE UP (ref 0–0.9)
MONOCYTES # BLD AUTO: 0.41 K/UL — SIGNIFICANT CHANGE UP (ref 0–0.9)
MONOCYTES # BLD AUTO: 0.79 K/UL — SIGNIFICANT CHANGE UP (ref 0–0.9)
MONOCYTES NFR BLD AUTO: 10.5 % — SIGNIFICANT CHANGE UP (ref 2–14)
MONOCYTES NFR BLD AUTO: 5.2 % — SIGNIFICANT CHANGE UP (ref 2–14)
MONOCYTES NFR BLD AUTO: 6.1 % — SIGNIFICANT CHANGE UP (ref 2–14)
NEUTROPHILS # BLD AUTO: 4.51 K/UL — SIGNIFICANT CHANGE UP (ref 1.8–7.4)
NEUTROPHILS # BLD AUTO: 5.05 K/UL — SIGNIFICANT CHANGE UP (ref 1.8–7.4)
NEUTROPHILS # BLD AUTO: 5.06 K/UL — SIGNIFICANT CHANGE UP (ref 1.8–7.4)
NEUTROPHILS NFR BLD AUTO: 59.7 % — SIGNIFICANT CHANGE UP (ref 43–77)
NEUTROPHILS NFR BLD AUTO: 74.8 % — SIGNIFICANT CHANGE UP (ref 43–77)
NEUTROPHILS NFR BLD AUTO: 76 % — SIGNIFICANT CHANGE UP (ref 43–77)
NITRITE UR-MCNC: NEGATIVE — SIGNIFICANT CHANGE UP
NRBC # BLD: 0 /100 WBCS — SIGNIFICANT CHANGE UP (ref 0–0)
PH UR: 8.5 (ref 5–8)
PHOSPHATE SERPL-MCNC: 1.9 MG/DL — LOW (ref 2.5–4.5)
PHOSPHATE SERPL-MCNC: 2.2 MG/DL — LOW (ref 2.5–4.5)
PHOSPHATE SERPL-MCNC: 2.5 MG/DL — SIGNIFICANT CHANGE UP (ref 2.5–4.5)
PHOSPHATE SERPL-MCNC: 3 MG/DL — SIGNIFICANT CHANGE UP (ref 2.5–4.5)
PLATELET # BLD AUTO: 132 K/UL — LOW (ref 150–400)
PLATELET # BLD AUTO: 137 K/UL — LOW (ref 150–400)
PLATELET # BLD AUTO: 143 K/UL — LOW (ref 150–400)
PLATELET # BLD AUTO: 84 K/UL — LOW (ref 150–400)
PLATELET # BLD AUTO: 97 K/UL — LOW (ref 150–400)
POTASSIUM SERPL-MCNC: 4.1 MMOL/L — SIGNIFICANT CHANGE UP (ref 3.5–5.3)
POTASSIUM SERPL-MCNC: 4.1 MMOL/L — SIGNIFICANT CHANGE UP (ref 3.5–5.3)
POTASSIUM SERPL-MCNC: 4.3 MMOL/L — SIGNIFICANT CHANGE UP (ref 3.5–5.3)
POTASSIUM SERPL-MCNC: 4.8 MMOL/L — SIGNIFICANT CHANGE UP (ref 3.5–5.3)
POTASSIUM SERPL-SCNC: 4.1 MMOL/L — SIGNIFICANT CHANGE UP (ref 3.5–5.3)
POTASSIUM SERPL-SCNC: 4.1 MMOL/L — SIGNIFICANT CHANGE UP (ref 3.5–5.3)
POTASSIUM SERPL-SCNC: 4.3 MMOL/L — SIGNIFICANT CHANGE UP (ref 3.5–5.3)
POTASSIUM SERPL-SCNC: 4.8 MMOL/L — SIGNIFICANT CHANGE UP (ref 3.5–5.3)
PROT SERPL-MCNC: 4.9 G/DL — LOW (ref 6–8.3)
PROT UR-MCNC: SIGNIFICANT CHANGE UP MG/DL
PROTHROM AB SERPL-ACNC: 11.1 SEC — SIGNIFICANT CHANGE UP (ref 9.5–13)
RBC # BLD: 2.71 M/UL — LOW (ref 4.2–5.8)
RBC # BLD: 2.95 M/UL — LOW (ref 4.2–5.8)
RBC # BLD: 3.09 M/UL — LOW (ref 4.2–5.8)
RBC # BLD: 3.26 M/UL — LOW (ref 4.2–5.8)
RBC # BLD: 3.27 M/UL — LOW (ref 4.2–5.8)
RBC # FLD: 15.2 % — HIGH (ref 10.3–14.5)
RBC # FLD: 15.4 % — HIGH (ref 10.3–14.5)
RBC # FLD: 15.7 % — HIGH (ref 10.3–14.5)
RBC # FLD: 15.8 % — HIGH (ref 10.3–14.5)
RBC # FLD: 15.8 % — HIGH (ref 10.3–14.5)
RBC CASTS # UR COMP ASSIST: 82 /HPF — HIGH (ref 0–4)
RH IG SCN BLD-IMP: POSITIVE — SIGNIFICANT CHANGE UP
SODIUM SERPL-SCNC: 141 MMOL/L — SIGNIFICANT CHANGE UP (ref 135–145)
SODIUM SERPL-SCNC: 142 MMOL/L — SIGNIFICANT CHANGE UP (ref 135–145)
SODIUM SERPL-SCNC: 145 MMOL/L — SIGNIFICANT CHANGE UP (ref 135–145)
SODIUM SERPL-SCNC: 147 MMOL/L — HIGH (ref 135–145)
SP GR SPEC: 1.02 — SIGNIFICANT CHANGE UP (ref 1–1.03)
SQUAMOUS # UR AUTO: 0 /HPF — SIGNIFICANT CHANGE UP (ref 0–5)
TROPONIN T, HIGH SENSITIVITY RESULT: 16 NG/L — SIGNIFICANT CHANGE UP (ref 0–51)
UROBILINOGEN FLD QL: 1 MG/DL — SIGNIFICANT CHANGE UP (ref 0.2–1)
WBC # BLD: 6.35 K/UL — SIGNIFICANT CHANGE UP (ref 3.8–10.5)
WBC # BLD: 6.67 K/UL — SIGNIFICANT CHANGE UP (ref 3.8–10.5)
WBC # BLD: 6.76 K/UL — SIGNIFICANT CHANGE UP (ref 3.8–10.5)
WBC # BLD: 7.55 K/UL — SIGNIFICANT CHANGE UP (ref 3.8–10.5)
WBC # BLD: 7.87 K/UL — SIGNIFICANT CHANGE UP (ref 3.8–10.5)
WBC # FLD AUTO: 6.35 K/UL — SIGNIFICANT CHANGE UP (ref 3.8–10.5)
WBC # FLD AUTO: 6.67 K/UL — SIGNIFICANT CHANGE UP (ref 3.8–10.5)
WBC # FLD AUTO: 6.76 K/UL — SIGNIFICANT CHANGE UP (ref 3.8–10.5)
WBC # FLD AUTO: 7.55 K/UL — SIGNIFICANT CHANGE UP (ref 3.8–10.5)
WBC # FLD AUTO: 7.87 K/UL — SIGNIFICANT CHANGE UP (ref 3.8–10.5)
WBC UR QL: 2 /HPF — SIGNIFICANT CHANGE UP (ref 0–5)

## 2024-04-18 PROCEDURE — 99292 CRITICAL CARE ADDL 30 MIN: CPT

## 2024-04-18 PROCEDURE — 70450 CT HEAD/BRAIN W/O DYE: CPT | Mod: 26,77

## 2024-04-18 PROCEDURE — 99291 CRITICAL CARE FIRST HOUR: CPT

## 2024-04-18 PROCEDURE — 99291 CRITICAL CARE FIRST HOUR: CPT | Mod: FS

## 2024-04-18 PROCEDURE — 99222 1ST HOSP IP/OBS MODERATE 55: CPT | Mod: FS

## 2024-04-18 PROCEDURE — 70450 CT HEAD/BRAIN W/O DYE: CPT | Mod: 26

## 2024-04-18 PROCEDURE — 71045 X-RAY EXAM CHEST 1 VIEW: CPT | Mod: 26

## 2024-04-18 PROCEDURE — 86078 PHYS BLOOD BANK SERV REACTJ: CPT

## 2024-04-18 RX ORDER — POLYETHYLENE GLYCOL 3350 17 G/17G
17 POWDER, FOR SOLUTION ORAL
Refills: 0 | Status: DISCONTINUED | OUTPATIENT
Start: 2024-04-18 | End: 2024-04-23

## 2024-04-18 RX ORDER — ATORVASTATIN CALCIUM 80 MG/1
0 TABLET, FILM COATED ORAL
Qty: 0 | Refills: 0 | DISCHARGE

## 2024-04-18 RX ORDER — SODIUM CHLORIDE 9 MG/ML
500 INJECTION INTRAMUSCULAR; INTRAVENOUS; SUBCUTANEOUS ONCE
Refills: 0 | Status: COMPLETED | OUTPATIENT
Start: 2024-04-18 | End: 2024-04-18

## 2024-04-18 RX ORDER — SODIUM CHLORIDE 5 G/100ML
1000 INJECTION, SOLUTION INTRAVENOUS
Refills: 0 | Status: DISCONTINUED | OUTPATIENT
Start: 2024-04-18 | End: 2024-04-19

## 2024-04-18 RX ORDER — MAGNESIUM SULFATE 500 MG/ML
1 VIAL (ML) INJECTION ONCE
Refills: 0 | Status: COMPLETED | OUTPATIENT
Start: 2024-04-18 | End: 2024-04-18

## 2024-04-18 RX ORDER — PIPERACILLIN AND TAZOBACTAM 4; .5 G/20ML; G/20ML
3.38 INJECTION, POWDER, LYOPHILIZED, FOR SOLUTION INTRAVENOUS ONCE
Refills: 0 | Status: DISCONTINUED | OUTPATIENT
Start: 2024-04-18 | End: 2024-04-18

## 2024-04-18 RX ORDER — SODIUM CHLORIDE 5 G/100ML
1000 INJECTION, SOLUTION INTRAVENOUS
Refills: 0 | Status: DISCONTINUED | OUTPATIENT
Start: 2024-04-18 | End: 2024-04-18

## 2024-04-18 RX ORDER — METOPROLOL TARTRATE 50 MG
1 TABLET ORAL
Refills: 0 | DISCHARGE

## 2024-04-18 RX ORDER — ACETAMINOPHEN 500 MG
1000 TABLET ORAL ONCE
Refills: 0 | Status: COMPLETED | OUTPATIENT
Start: 2024-04-18 | End: 2024-04-18

## 2024-04-18 RX ORDER — NOREPINEPHRINE BITARTRATE/D5W 8 MG/250ML
0.05 PLASTIC BAG, INJECTION (ML) INTRAVENOUS
Qty: 8 | Refills: 0 | Status: DISCONTINUED | OUTPATIENT
Start: 2024-04-18 | End: 2024-04-18

## 2024-04-18 RX ORDER — PHENYLEPHRINE HYDROCHLORIDE 10 MG/ML
0.1 INJECTION INTRAVENOUS
Qty: 40 | Refills: 0 | Status: DISCONTINUED | OUTPATIENT
Start: 2024-04-18 | End: 2024-04-19

## 2024-04-18 RX ORDER — ONDANSETRON 8 MG/1
4 TABLET, FILM COATED ORAL ONCE
Refills: 0 | Status: DISCONTINUED | OUTPATIENT
Start: 2024-04-18 | End: 2024-04-21

## 2024-04-18 RX ORDER — PIPERACILLIN AND TAZOBACTAM 4; .5 G/20ML; G/20ML
3.38 INJECTION, POWDER, LYOPHILIZED, FOR SOLUTION INTRAVENOUS EVERY 8 HOURS
Refills: 0 | Status: DISCONTINUED | OUTPATIENT
Start: 2024-04-19 | End: 2024-04-25

## 2024-04-18 RX ORDER — SODIUM,POTASSIUM PHOSPHATES 278-250MG
2 POWDER IN PACKET (EA) ORAL ONCE
Refills: 0 | Status: COMPLETED | OUTPATIENT
Start: 2024-04-18 | End: 2024-04-18

## 2024-04-18 RX ORDER — PIPERACILLIN AND TAZOBACTAM 4; .5 G/20ML; G/20ML
3.38 INJECTION, POWDER, LYOPHILIZED, FOR SOLUTION INTRAVENOUS ONCE
Refills: 0 | Status: COMPLETED | OUTPATIENT
Start: 2024-04-18 | End: 2024-04-18

## 2024-04-18 RX ORDER — LOSARTAN POTASSIUM 100 MG/1
0 TABLET, FILM COATED ORAL
Qty: 0 | Refills: 0 | DISCHARGE

## 2024-04-18 RX ORDER — MAGNESIUM SULFATE 500 MG/ML
1 VIAL (ML) INJECTION ONCE
Refills: 0 | Status: DISCONTINUED | OUTPATIENT
Start: 2024-04-18 | End: 2024-04-18

## 2024-04-18 RX ORDER — POTASSIUM CHLORIDE 20 MEQ
40 PACKET (EA) ORAL ONCE
Refills: 0 | Status: COMPLETED | OUTPATIENT
Start: 2024-04-18 | End: 2024-04-18

## 2024-04-18 RX ORDER — ONDANSETRON 8 MG/1
4 TABLET, FILM COATED ORAL ONCE
Refills: 0 | Status: COMPLETED | OUTPATIENT
Start: 2024-04-18 | End: 2024-04-18

## 2024-04-18 RX ORDER — METFORMIN HYDROCHLORIDE 850 MG/1
0 TABLET ORAL
Qty: 0 | Refills: 0 | DISCHARGE

## 2024-04-18 RX ORDER — SODIUM CHLORIDE 9 MG/ML
30 INJECTION INTRAMUSCULAR; INTRAVENOUS; SUBCUTANEOUS ONCE
Refills: 0 | Status: COMPLETED | OUTPATIENT
Start: 2024-04-18 | End: 2024-04-18

## 2024-04-18 RX ORDER — METOPROLOL TARTRATE 50 MG
0 TABLET ORAL
Qty: 0 | Refills: 0 | DISCHARGE

## 2024-04-18 RX ORDER — FINASTERIDE 5 MG/1
0 TABLET, FILM COATED ORAL
Qty: 0 | Refills: 0 | DISCHARGE

## 2024-04-18 RX ORDER — SODIUM CHLORIDE 9 MG/ML
250 INJECTION INTRAMUSCULAR; INTRAVENOUS; SUBCUTANEOUS ONCE
Refills: 0 | Status: COMPLETED | OUTPATIENT
Start: 2024-04-18 | End: 2024-04-18

## 2024-04-18 RX ORDER — SODIUM CHLORIDE 9 MG/ML
1000 INJECTION, SOLUTION INTRAVENOUS ONCE
Refills: 0 | Status: COMPLETED | OUTPATIENT
Start: 2024-04-18 | End: 2024-04-18

## 2024-04-18 RX ADMIN — Medication 2: at 05:15

## 2024-04-18 RX ADMIN — SODIUM CHLORIDE 30 MILLILITER(S): 9 INJECTION INTRAMUSCULAR; INTRAVENOUS; SUBCUTANEOUS at 13:40

## 2024-04-18 RX ADMIN — Medication 1 DROP(S): at 05:19

## 2024-04-18 RX ADMIN — LIDOCAINE 1 PATCH: 4 CREAM TOPICAL at 03:40

## 2024-04-18 RX ADMIN — SODIUM CHLORIDE 75 MILLILITER(S): 5 INJECTION, SOLUTION INTRAVENOUS at 07:14

## 2024-04-18 RX ADMIN — SODIUM CHLORIDE 75 MILLILITER(S): 5 INJECTION, SOLUTION INTRAVENOUS at 02:03

## 2024-04-18 RX ADMIN — HUMAN INSULIN 5 UNIT(S): 100 INJECTION, SUSPENSION SUBCUTANEOUS at 05:16

## 2024-04-18 RX ADMIN — SENNA PLUS 2 TABLET(S): 8.6 TABLET ORAL at 21:30

## 2024-04-18 RX ADMIN — SODIUM CHLORIDE 1000 MILLILITER(S): 9 INJECTION INTRAMUSCULAR; INTRAVENOUS; SUBCUTANEOUS at 10:40

## 2024-04-18 RX ADMIN — SODIUM CHLORIDE 100 MILLILITER(S): 5 INJECTION, SOLUTION INTRAVENOUS at 15:00

## 2024-04-18 RX ADMIN — PHENYLEPHRINE HYDROCHLORIDE 5.32 MICROGRAM(S)/KG/MIN: 10 INJECTION INTRAVENOUS at 02:39

## 2024-04-18 RX ADMIN — Medication 102 GRAM(S): at 21:36

## 2024-04-18 RX ADMIN — LIDOCAINE 1 PATCH: 4 CREAM TOPICAL at 23:51

## 2024-04-18 RX ADMIN — Medication 40 MILLIEQUIVALENT(S): at 05:15

## 2024-04-18 RX ADMIN — ATORVASTATIN CALCIUM 80 MILLIGRAM(S): 80 TABLET, FILM COATED ORAL at 21:30

## 2024-04-18 RX ADMIN — PIPERACILLIN AND TAZOBACTAM 25 GRAM(S): 4; .5 INJECTION, POWDER, LYOPHILIZED, FOR SOLUTION INTRAVENOUS at 21:30

## 2024-04-18 RX ADMIN — Medication 1 DROP(S): at 18:19

## 2024-04-18 RX ADMIN — PHENYLEPHRINE HYDROCHLORIDE 2.66 MICROGRAM(S)/KG/MIN: 10 INJECTION INTRAVENOUS at 10:58

## 2024-04-18 RX ADMIN — ONDANSETRON 4 MILLIGRAM(S): 8 TABLET, FILM COATED ORAL at 09:50

## 2024-04-18 RX ADMIN — METHOCARBAMOL 500 MILLIGRAM(S): 500 TABLET, FILM COATED ORAL at 06:39

## 2024-04-18 RX ADMIN — LIDOCAINE 1 PATCH: 4 CREAM TOPICAL at 19:00

## 2024-04-18 RX ADMIN — POTASSIUM PHOSPHATE, MONOBASIC POTASSIUM PHOSPHATE, DIBASIC 83.33 MILLIMOLE(S): 236; 224 INJECTION, SOLUTION INTRAVENOUS at 01:52

## 2024-04-18 RX ADMIN — Medication 400 MILLIGRAM(S): at 15:00

## 2024-04-18 RX ADMIN — SODIUM CHLORIDE 1000 MILLILITER(S): 9 INJECTION, SOLUTION INTRAVENOUS at 10:48

## 2024-04-18 RX ADMIN — Medication 2 PACKET(S): at 21:31

## 2024-04-18 RX ADMIN — POLYETHYLENE GLYCOL 3350 17 GRAM(S): 17 POWDER, FOR SOLUTION ORAL at 18:21

## 2024-04-18 RX ADMIN — PIPERACILLIN AND TAZOBACTAM 200 GRAM(S): 4; .5 INJECTION, POWDER, LYOPHILIZED, FOR SOLUTION INTRAVENOUS at 17:00

## 2024-04-18 RX ADMIN — Medication 2: at 18:18

## 2024-04-18 RX ADMIN — Medication 1000 MILLIGRAM(S): at 15:30

## 2024-04-18 NOTE — DIETITIAN INITIAL EVALUATION ADULT - ADD RECOMMEND
1. Monitor GI tolerance. RD to remain available to adjust EN formulary, volume/rate PRN.   2. Defer advancement of diet to medical team. If PO diet warranted, consider Consistent Carbohydrate diet. Defer consistency to medical team, SLP.   3. Antihyperglycemic regimen deferred to team.   4. Recommend multivitamin (if no medication contraindications) to aid in prevention of micronutrient deficiencies; if feeds plan to be held > 24 hrs.   5. Monitor wt trends/labs/skin integrity/hydration status/bowel regularity.

## 2024-04-18 NOTE — PROGRESS NOTE ADULT - ASSESSMENT
ASSESSMENT/PLAN: R M2 thrombectomy s/p TNK 4/16 at 16:57 s/p MT TICI 2B    NEURO:  Q1hr stroke checks  CTH: negative for heme, unremarkable  MRI w/wo at some points   stroke code measures   rCTh at 17:00   ASA 81 and DVT ppx once 24 hours stability is performed and unremarkable ( with caution given that he has a hematoma on the right hip )  Activity: [] mobilize as tolerated [x] Bedrest [x] PT [x] OT [] PMNR    PULM:  RA  O2sat>92%    CV:  MAP>65, SBP<140  phenylephrine prn for now 0.5  EKG  TTE  Med rec to be performed   f/u lipid panel, atorvastatin 80 for now  ?spironolactone, metoprolol hold both as on pressors  Losartan? ASA?    RENAL:  Fluids: IVF 2% HTS  Na goals 140-150 for cytotoxic edema   BMP Q 12 ( noon )    GI:  Diet: dysphagia screen failed, NG tube glucerna  F/u SLPO  GI prophylaxis [x] not indicated [] PPI [] other:  Bowel regimen [] colace [x] senna [x] other: miralax     ENDO:   Goal euglycemia (-180)  f/u A1C  NPH 5 Q6]last glucose 199 on BMP, will adjust NPH dose based on levels on the next BMP at noon    HEME/ONC:  VTE prophylaxis: [x] SCDs [] chemoprophylaxis [x] hold chemoprophylaxis due to: TNK, fresh post op [] high risk of DVT/PE on admission due to:  DVT ppx: on hold given active hematoma bleed  Hg > 8 given the CAD PMHx. transfuse < 8    ID: monitor for fevers    SOCIAL/FAMILY:  [x] awaiting [] updated at bedside [] family meeting    CODE STATUS:  [x] Full Code [] DNR [] DNI [] Palliative/Comfort Care    DISPOSITION:  [x] ICU [] Stroke Unit [] Floor [] EMU [] RCU [] PCU    [x] Patient is at high risk of neurologic deterioration/death due to: acute stroke, hemorrhagic conversion       Contact: [phone] ASSESSMENT/PLAN: R M2 thrombectomy s/p TNK 4/16 at 16:57 s/p MT TICI 2B    NEURO:  NQ2hr stroke checks, VS Q1  CTH: negative for heme, cytotoxic edema  ASA 81 and DVT ppx once 24 hours stability is performed and unremarkable ( with caution given that he has a hematoma on the right hip )  Vertebral compression fracture: Ortho on board ( covering spine )  MRI c -sp[ine, unremarkable for fracture pending official read and will remove neck colar   Activity: [] mobilize as tolerated [x] Bedrest [x] PT [x] OT [] PMNR    PULM:  RA  O2sat>92%    CV:  MAP>65, systolic   phenylephrine prn for now 0.5 ( off since 7 am on 04/17 )  BP HTN meds on hold 2/2 hypotension ( resolving )    RENAL:  Fluids: IVF 2% HTS 75 ccs/hour   Na goals 140-150 for cytotoxic edema   BMP Q 12     GI:  Diet: dysphagia screen failed, NG tube glucerna  F/u SLP, FEEST study today   GI prophylaxis [] not indicated [x] PPI [] other:  Bowel regimen [] colace [x] senna [x] other: miralax   LBM: PTA    ENDO:   Goal euglycemia (-180)  f/u A1C  NPH 5 Q6]last glucose 199 on BMP, will adjust NPH dose based on levels on the next BMP at noon    HEME/ONC:  VTE prophylaxis: [x] SCDs [] chemoprophylaxis [x] hold chemoprophylaxis due to: TNK, fresh post op [] high risk of DVT/PE on admission due to:  DVT ppx: on hold given active hematoma bleed  Hg > 8 given the CAD PMHx. transfuse < 8  1 unit of P:T now + 1 unit of PRBC  F/u CBC     ID: monitor for fevers    SOCIAL/FAMILY:  [x] awaiting [] updated at bedside [] family meeting    CODE STATUS:  [x] Full Code [] DNR [] DNI [] Palliative/Comfort Care    DISPOSITION:  [x] ICU [] Stroke Unit [] Floor [] EMU [] RCU [] PCU    [x] Patient is at high risk of neurologic deterioration/death due to: acute stroke, hemorrhagic conversion       Contact: [phone]

## 2024-04-18 NOTE — CHART NOTE - NSCHARTNOTEFT_GEN_A_CORE
TERTIARY TRAUMA SURVEY  ------------------------------------------------------------------------------------------    Date/Time: 04-18-24 @ 11:38  Admit date:   Trauma activation: none  Admit GCS:  15     HPI:  79 year old man with PMH of HTN, DMII, CAD s/p stents (on ASA) who presents after mechanical fall aroudn 130p, likely 2/2 acute R MCA stroke. He was in his usual state of health at home chatting with friends and moving throughout the house unencumbered when his wife heard a thud and found him at the bottom of a 10-stair staircase (noncarpeted, tile floor). She attended to him immediately and he was awake but confused, there was blood on the floor but no obvious injuries to the patient. Last dose ASA this AM.  Patient was evaluated at [facility] for stroke and was found to have right MCA occlusion for which he was given tenecteplase and was transferred to [facility] for thrombectomy. He is now s/p cerebral angio with mechanical thrombectomy. Workup found small left hip hematoma and incidental finding of pancreas lesion.    INTERVAL EVENTS:   Upper and Lower extremity xrays with findings significant for "Lateral left hip and distal medial left thigh soft tissue swelling with subcutaneous stranding compatible with posttraumatic soft tissue contusion/hemorrhagic infiltration." Compression dressing placed overnight. Compartments remain soft.  T6 vertebral body fracture --> ORTHO SPINE CONSULT PENDING  Increased mass effect with in new leftward midline shift of 3 mm and some compression of the right lateral ventricle. > NEUROSURGERY FOLLOWING    PAST MEDICAL & SURGICAL HISTORY:  HLD (hyperlipidemia)  T2DM (type 2 diabetes mellitus)  BPH (benign prostatic hyperplasia)  Diabetes mellitus  Hypertension  High cholesterol  CAD (coronary artery disease)  H/O eye surgery  History of cardiac cath times 2  H/O heart artery stent      [] No significant past history as reviewed with the patient and family    FAMILY HISTORY:  FH: myocardial infarction (Sibling)      [] Family history not pertinent as reviewed with the patient and family    SOCIAL HISTORY: ***    ALLERGIES: No Known Allergies      HOME MEDICATIONS: ***    CURRENT MEDICATIONS:   MEDICATIONS (STANDING): atorvastatin 80 milliGRAM(s) Oral at bedtime  finasteride 5 milliGRAM(s) Oral daily  influenza  Vaccine (HIGH DOSE) 0.7 milliLiter(s) IntraMuscular once  insulin lispro (ADMELOG) corrective regimen sliding scale   SubCutaneous every 6 hours  insulin NPH human recombinant 5 Unit(s) SubCutaneous every 6 hours  norepinephrine Infusion 0.05 MICROgram(s)/kG/Min IV Continuous <Continuous>  pantoprazole  Injectable 40 milliGRAM(s) IV Push daily  phenylephrine    Infusion 0.1 MICROgram(s)/kG/Min IV Continuous <Continuous>  polyethylene glycol 3350 17 Gram(s) Oral two times a day  senna 2 Tablet(s) Oral at bedtime  sodium chloride 2% + sodium acetate 50:50 1000 milliLiter(s) IV Continuous <Continuous>    MEDICATIONS (PRN):methocarbamol 500 milliGRAM(s) Oral every 6 hours PRN musle spasm    ------------------------------------------------------------------------------------------    VITAL SIGNS  T(C): 36.9 (04-18-24 @ 07:00), Max: 37.8 (04-17-24 @ 19:00)  HR: 88 (04-18-24 @ 11:15) (76 - 105)  BP: --  RR: 19 (04-18-24 @ 11:15) (12 - 24)  SpO2: 100% (04-18-24 @ 11:15) (92% - 100%)  CAPILLARY BLOOD GLUCOSE      POCT Blood Glucose.: 176 mg/dL (18 Apr 2024 10:15)  POCT Blood Glucose.: 158 mg/dL (18 Apr 2024 05:14)  POCT Blood Glucose.: 164 mg/dL (17 Apr 2024 23:49)  POCT Blood Glucose.: 182 mg/dL (17 Apr 2024 16:51)      INS/OUTS:    04-17 @ 07:01  -  04-18 @ 07:00  --------------------------------------------------------  IN:    Enteral Tube Flush: 240 mL    Glucerna: 1030 mL    IV PiggyBack: 100 mL    IV PiggyBack: 416.5 mL    Phenylephrine: 77.5 mL    Phenylephrine: 18.9 mL    PRBCs (Packed Red Blood Cells): 300 mL    sodium chloride 2% + sodium acetate 50:50: 1675 mL  Total IN: 3857.9 mL    OUT:    Indwelling Catheter - Urethral (mL): 1685 mL  Total OUT: 1685 mL    Total NET: 2172.9 mL      04-18 @ 07:01  -  04-18 @ 11:38  --------------------------------------------------------  IN:    Glucerna: 140 mL    Lactated Ringers Bolus: 1000 mL    Phenylephrine: 39.3 mL    Sodium Chloride 0.9% Bolus: 750 mL    sodium chloride 2% + sodium acetate 50:50: 300 mL  Total IN: 2229.3 mL    OUT:    Indwelling Catheter - Urethral (mL): 125 mL    Phenylephrine: 0 mL  Total OUT: 125 mL    Total NET: 2104.3 mL        Drug Dosing Weight  Height (cm): 153.9 (16 Apr 2024 21:45)  Weight (kg): 70.9 (16 Apr 2024 21:45)  BMI (kg/m2): 29.9 (16 Apr 2024 21:45)  BSA (m2): 1.69 (16 Apr 2024 21:45)    PHYSICAL EXAM:   General: NAD  HEENT: Ecchymosis to superior aspect of head, EOMI, L eye with cataract.  Neck: Soft, midline trachea, C-collar in place, unable to assess cervical tenderness- patient confused  Chest: No chest wall tenderness.   Cardiac: S1, S2, RRR  Respiratory: Bilateral breath sounds, clear and equal bilaterally  Abdomen: Soft, non-distended, non-tender, no rebound, no guarding, no masses palpated  Pelvis: Stable, non-tender, no ecchymosis  Ext: palp radial b/l UE, b/l DP palp in Lower Extrem, motor and sensory grossly intact on R side. L hemiplegia consistent with MCA stroke  - superficial abrasions to the R wrist, LUE, L wrist, small bruise of the R elbow, small hematoma of the L lateral thigh and L knee.   Back: no TTP, no palpable runoff/stepoff/deformity, Left upper back ecchymosis    ------------------------------------------------------------------------------------------    LABS  CBC (04-18 @ 10:41)                              9.2<L>                         6.35    )----------------(  143<L>     --    % Neutrophils, --    % Lymphocytes, ANC: --                                  28.5<L>  CBC (04-18 @ 06:03)                              8.5<L>                         7.55    )----------------(  84<L>      59.7  % Neutrophils, 26.1  % Lymphocytes, ANC: 4.51                                25.5<L>    BMP (04-18 @ 10:41)             141     |  111<H>  |  14    		Ca++ --      Ca 7.8<L>             ---------------------------------( 193<H>		Mg 2.0                4.3     |  23      |  0.73  			Ph 2.2<L>  BMP (04-18 @ 06:04)             142     |  111<H>  |  13    		Ca++ --      Ca 7.6<L>             ---------------------------------( 180<H>		Mg 2.1                4.8     |  23      |  0.75  			Ph 3.0       LFTs (04-18 @ 10:41)      TPro 4.9<L> / Alb 2.6<L> / TBili 0.5 / DBili -- / AST 30 / ALT 22 / AlkPhos 62    Coags (04-18 @ 07:00)  aPTT 25.1 / INR 1.01 / PT 11.1      ABG (04-18 @ 10:07)     7.42 / 37 / 152<H> / 24 / -0.3 / 99.1<H>%     Lactate:    ABG (04-17 @ 09:15)     7.35 / 37 / 87 / 20<L> / -4.7<L> / 97.4%     Lactate:          MICROBIOLOGY  Urinalysis (04-18 @ 10:41):     Color:  / Appearance:  / SG:  / pH:  / Gluc: 193<H> / Ketones:  / Bili:  / Urobili:  / Protein : / Nitrites:  / Leuk.Est:  / RBC:  / WBC:  / Sq Epi:  / Non Sq Epi:  / Bacteria          ------------------------------------------------------------------------------------------    RADIOLOGICAL FINDINGS REVIEW:    < from: CT Head No Cont (04.18.24 @ 09:50) >  IMPRESSION:  Interval demarcation of right frontoparietal and temporal acute infarction. No hipolito hemorrhagic transformation.  Increased mass effect with in new leftward midline shift of 3 mm and some compression of the right lateral ventricle.  Basal cisterns still visualized. No hydrocephalus.    < from: MR Cervical Spine No Cont (04.17.24 @ 21:56) >  MR brain:     -Redemonstrated large acute right MCA territory infarct with extensive cortical petechial hemorrhage. No gross hematoma formation.  MR cervical spine:     -Suspected acute anterior longitudinal ligament injury and/or anterior corner avulsion fractures at C4-C5 and C5-C6. Prevertebral edema extending from C1 through the upper cervical levels.     -Suspected right greater than left posterior paraspinal muscular strain and/or ligamentous injury.    < from: CT Head No Cont (04.17.24 @ 17:20) >  FINDINGS: There is redemonstration of an evolving acute/subacute right-sided MCA distribution infarction. No gross hemorrhagic transformation is seen. Underlying petechial hemorrhagic transformation cannot be excluded. Sulcal effacement and mass effect are again noted.   There is no significant shift of the midline structures or herniation.  There is no hydrocephalus.  The paranasal sinuses and tympanomastoid cavities are clear. The calvarium appears intact. The orbits appear unremarkable apart from bilateral cataract removal.  IMPRESSION: Stable follow-up CT exam when compared with earlier today.      < from: [facility] Duplex Lower Ext Vein Scan, Bilat (04.17.24 @ 16:08) >  IMPRESSION:  No evidence of deep venous thrombosis RIGHT lower extremity.    < from: Xray Femur 2 Views, Bilat (04.17.24 @ 14:05) >  IMPRESSION:  Lateral left hip and distal medial left thigh soft tissue swelling with subcutaneous stranding compatible with posttraumatic soft tissue contusion/hemorrhagic infiltration.  No fractures or dislocations in above imaged anatomic regions.  Preserved bilateral hip joint spaces. Slightly narrowed left medial tibiofemoral joint space. Preserved ankle remaining joint spaces. Suspected knee joint effusions.  Generalized osteopenia otherwise no discrete lytic or blastic lesions.  SCDs surround bilateral calves.    < from: Xray Forearm, Bilateral (04.17.24 @ 14:04) >  < from: Xray Elbow AP + Lateral, Bilateral (04.17.24 @ 14:04) >  IMPRESSION:  Chronic ossicle adjacent to left ulnar styloid process. Otherwise no dislocations or acute appearing fractures in above imaged anatomic regions.  Preserved joint spaces and no joint margin erosions. No discrete lytic or blastic lesions.    < from: CT Head No Cont (04.17.24 @ 09:35) >  IMPRESSION:  An evolving acute right MCA distribution infarct is again noted in the right posterior frontal, parietal, temporal, and insular regions. The mild edema and mass effect associated with the infarct appears similar compared to the head CT study performed earlier the same day. There has been a substantial interval decrease of the gyriform areas of increased density involving the infarct most likely reflecting evolution of contrast staining from the angiogram procedure. Small areas of cortical petechial hemorrhagic transformation can't be entirely excluded, as this study is substantially limited by motion. Continued serial imaging follow-up over time is recommended to monitor for stability.    < from: Xray Chest 1 View- PORTABLE-Urgent (Xray Chest 1 View- PORTABLE-Urgent .) (04.17.24 @ 06:52) >  FINDINGS:  Enteric tube with tip overlying distal stomach/duodenal bulb.  The heart is not accurately assessed in this AP projection.  No focal consolidation.  There is no pneumothorax or pleural effusion.  No acute bony abnormality.  IMPRESSION:  No focal consolidation.    < from: CT Lumbar Spine Reform w/ IV Cont (04.17.24 @ 02:04) >  IMPRESSION:  Acute fracture involving the anterior T6 vertebral body extending into a right anterolateral T5-T6 osteophyte. No significant vertebral body height loss or bony retropulsion into the spinal canal. More sensitive evaluation with MRI may be obtained for further evaluation, if no contraindications exist.    < from: CT Abdomen and Pelvis w/ IV Cont (04.17.24 @ 02:04) >  IMPRESSION:  No CT evidence of acute traumatic sequelae within the chest or abdomen.  Left lateral hip subcutaneous hematoma with evidence of active bleeding.  Nonspecific thickened appearance to the upper esophagus. Follow-up upper endoscopy is recommended.  Subcentimeter cystic lesion within the pancreatic head. Nonemergent MRI may be obtained for further characterization, if no contraindications exist.  Urinary bladder wall thickening despite underdistention.      < from: CT Cervical Spine No Cont (04.17.24 @ 00:48) >  IMPRESSION:  No CT evidence of cervical spine fracture, traumatic malalignment or suspicious osseous lesion. Subcutaneous edema and/or subcutaneous hematoma is partially visualized in the upper back.  Small amount of secretions in the pharynx.    < from: CT Brain Stroke Protocol (04.16.24 @ 19:14) >  FINDINGS:  Contrast noted within the vessels.  New cytotoxic edema in the right frontal, parietal, and temporal lobes, as well as within the right insula consistent with an acute right MCA territory infarct. No hipolito hemorrhagic transformation.  No hydrocephalus, midline shift, or effacement of the basal cisterns.  The visualized paranasal sinuses and mastoid air cells are clear.  IMPRESSION:  New cytotoxic edema in the right frontal, parietal, and temporal lobes, as well as within the right insula consistent with an acute right MCA territory infarct. No hipolito hemorrhagic transformation. No midline shift.    < from: Xray Chest 1 View-PORTABLE IMMEDIATE (Xray Chest 1 View-PORTABLE IMMEDIATE .) (04.16.24 @ 17:24) >  There is a shallow inspiration with low lung volumes. The visualized lungs are clear. No infiltrates are seen. There is no pneumothorax. No pleural fluid is seen. No hilar or mediastinal widening is detected. The cardiac silhouette may be magnified by technique. There is a left coronary artery stent. There is no pulmonary edema. There are degenerative changes of the thoracic spine.    IMPRESSION:  1. Shallow inspiration with low lung volumes showing no acute cardiopulmonary abnormalities.  2. Left coronary artery stent without pulmonary edema.  3. Degenerative changes of the thoracic spine.      < from: CT Brain Perfusion Maps Stroke (04.16.24 @ 16:40) >  IMPRESSION:      1.   Right carotid system:  No hemodynamically significant stenosis.      2.   Left carotid system:  No hemodynamically significant stenosis.      3.   Intracranial circulation:  large vessel occlusion at the origin of the RIGHT M2 segment, posterior division.      4.   Brain:  No significant lesion identified.      5. Perfusion: Core infarct of 96 ml within the posterior RIGHT MCA Territory. Based on the perfusion mismatch, there is a predicted volume of 24 mL of critically hypoperfused tissue at risk.  < end of copied text >    List Injuries Identified to Date:   Acute right MCA stroke      List Operative and Interventional Radiological Procedures:         INTERPRETATION:  79M on ASA s/p mechanical fall around 130p 4/16 likely 2/2 acute R MCA stroke, given tenecteplase at [facility], transferred to [facility], now s/p cerebral angio with mechanical thrombectomy. Workup found:    1) small left hip hematoma, stable  2) Incidental finding of pancreas lesion, will provide report to family with instruction to followup with PCP  3) T6 vertebral body fracture --> ORTHO SPINE CONSULT PENDING (primary team reports ortho following, documentation pending)  4) Increased mass effect with in new leftward midline shift of 3 mm and some compression of the right lateral ventricle> NEUROSURGERY FOLLOWING TERTIARY TRAUMA SURVEY  ------------------------------------------------------------------------------------------    Date/Time: 04-18-24 @ 11:38  Admit date: 4.16.2024  Trauma activation: none  Admit GCS reported:  15     HPI:  79 year old man with PMH of HTN, DMII, CAD s/p stents (on ASA) who presents after mechanical fall aroudn 130p, likely 2/2 acute R MCA stroke. He was in his usual state of health at home chatting with friends and moving throughout the house unencumbered when his wife heard a thud and found him at the bottom of a 10-stair staircase (noncarpeted, tile floor). She attended to him immediately and he was awake but confused, there was blood on the floor but no obvious injuries to the patient. Last dose ASA this AM.  Patient was evaluated at [facility] for stroke and was found to have right MCA occlusion for which he was given tenecteplase and was transferred to [facility] for thrombectomy. He is now s/p cerebral angio with mechanical thrombectomy. Workup found small left hip hematoma and incidental finding of pancreas lesion.    INTERVAL EVENTS:   Upper and Lower extremity xrays with findings significant for "Lateral left hip and distal medial left thigh soft tissue swelling with subcutaneous stranding compatible with posttraumatic soft tissue contusion/hemorrhagic infiltration." Compression dressing placed overnight. Compartments remain soft.  T6 vertebral body fracture --> ORTHO SPINE CONSULT PENDING  Increased mass effect with in new leftward midline shift of 3 mm and some compression of the right lateral ventricle. > NEUROSURGERY FOLLOWING    PAST MEDICAL & SURGICAL HISTORY:  HLD (hyperlipidemia)  T2DM (type 2 diabetes mellitus)  BPH (benign prostatic hyperplasia)  Diabetes mellitus  Hypertension  High cholesterol  CAD (coronary artery disease)  H/O eye surgery  History of cardiac cath times 2  H/O heart artery stent      [] No significant past history as reviewed with the patient and family    FAMILY HISTORY:  FH: myocardial infarction (Sibling)      [] Family history not pertinent as reviewed with the patient and family    SOCIAL HISTORY: ***    ALLERGIES: No Known Allergies      HOME MEDICATIONS: ***    CURRENT MEDICATIONS:   MEDICATIONS (STANDING): atorvastatin 80 milliGRAM(s) Oral at bedtime  finasteride 5 milliGRAM(s) Oral daily  influenza  Vaccine (HIGH DOSE) 0.7 milliLiter(s) IntraMuscular once  insulin lispro (ADMELOG) corrective regimen sliding scale   SubCutaneous every 6 hours  insulin NPH human recombinant 5 Unit(s) SubCutaneous every 6 hours  norepinephrine Infusion 0.05 MICROgram(s)/kG/Min IV Continuous <Continuous>  pantoprazole  Injectable 40 milliGRAM(s) IV Push daily  phenylephrine    Infusion 0.1 MICROgram(s)/kG/Min IV Continuous <Continuous>  polyethylene glycol 3350 17 Gram(s) Oral two times a day  senna 2 Tablet(s) Oral at bedtime  sodium chloride 2% + sodium acetate 50:50 1000 milliLiter(s) IV Continuous <Continuous>    MEDICATIONS (PRN):methocarbamol 500 milliGRAM(s) Oral every 6 hours PRN musle spasm    ------------------------------------------------------------------------------------------    VITAL SIGNS  T(C): 36.9 (04-18-24 @ 07:00), Max: 37.8 (04-17-24 @ 19:00)  HR: 88 (04-18-24 @ 11:15) (76 - 105)  BP: --  RR: 19 (04-18-24 @ 11:15) (12 - 24)  SpO2: 100% (04-18-24 @ 11:15) (92% - 100%)  CAPILLARY BLOOD GLUCOSE      POCT Blood Glucose.: 176 mg/dL (18 Apr 2024 10:15)  POCT Blood Glucose.: 158 mg/dL (18 Apr 2024 05:14)  POCT Blood Glucose.: 164 mg/dL (17 Apr 2024 23:49)  POCT Blood Glucose.: 182 mg/dL (17 Apr 2024 16:51)      INS/OUTS:    04-17 @ 07:01  -  04-18 @ 07:00  --------------------------------------------------------  IN:    Enteral Tube Flush: 240 mL    Glucerna: 1030 mL    IV PiggyBack: 100 mL    IV PiggyBack: 416.5 mL    Phenylephrine: 77.5 mL    Phenylephrine: 18.9 mL    PRBCs (Packed Red Blood Cells): 300 mL    sodium chloride 2% + sodium acetate 50:50: 1675 mL  Total IN: 3857.9 mL    OUT:    Indwelling Catheter - Urethral (mL): 1685 mL  Total OUT: 1685 mL    Total NET: 2172.9 mL      04-18 @ 07:01  -  04-18 @ 11:38  --------------------------------------------------------  IN:    Glucerna: 140 mL    Lactated Ringers Bolus: 1000 mL    Phenylephrine: 39.3 mL    Sodium Chloride 0.9% Bolus: 750 mL    sodium chloride 2% + sodium acetate 50:50: 300 mL  Total IN: 2229.3 mL    OUT:    Indwelling Catheter - Urethral (mL): 125 mL    Phenylephrine: 0 mL  Total OUT: 125 mL    Total NET: 2104.3 mL        Drug Dosing Weight  Height (cm): 153.9 (16 Apr 2024 21:45)  Weight (kg): 70.9 (16 Apr 2024 21:45)  BMI (kg/m2): 29.9 (16 Apr 2024 21:45)  BSA (m2): 1.69 (16 Apr 2024 21:45)    PHYSICAL EXAM:   General: NAD  HEENT: Ecchymosis to superior aspect of head  Neck: Soft, midline trachea, lidoderm patch on Lower posterior neck  Chest: No chest wall tenderness.   Cardiac: S1, S2, RRR  Respiratory: no increased work of breathing   Abdomen: Soft, non-distended, non-tender, no rebound, no guarding, no masses palpated  Pelvis: Stable, Left Lateral hip with ecchymosis, some induration, but largely soft, TTP  Ext: b/l hand edema, right DP palp, Left DP signal, able to wiggle toes b/l, able to squeez right hand, no LUE motor (L hemiplegia consistent with MCA stroke)  - superficial abrasions to the R wrist, LUE, L wrist, small bruise of the R elbow, hematoma of the L lateral thigh (extension from hip) and L knee.   Back: Left upper back ecchymosis      ------------------------------------------------------------------------------------------    LABS  CBC (04-18 @ 10:41)                              9.2<L>                         6.35    )----------------(  143<L>     --    % Neutrophils, --    % Lymphocytes, ANC: --                                  28.5<L>  CBC (04-18 @ 06:03)                              8.5<L>                         7.55    )----------------(  84<L>      59.7  % Neutrophils, 26.1  % Lymphocytes, ANC: 4.51                                25.5<L>    BMP (04-18 @ 10:41)             141     |  111<H>  |  14    		Ca++ --      Ca 7.8<L>             ---------------------------------( 193<H>		Mg 2.0                4.3     |  23      |  0.73  			Ph 2.2<L>  BMP (04-18 @ 06:04)             142     |  111<H>  |  13    		Ca++ --      Ca 7.6<L>             ---------------------------------( 180<H>		Mg 2.1                4.8     |  23      |  0.75  			Ph 3.0       LFTs (04-18 @ 10:41)      TPro 4.9<L> / Alb 2.6<L> / TBili 0.5 / DBili -- / AST 30 / ALT 22 / AlkPhos 62    Coags (04-18 @ 07:00)  aPTT 25.1 / INR 1.01 / PT 11.1      ABG (04-18 @ 10:07)     7.42 / 37 / 152<H> / 24 / -0.3 / 99.1<H>%     Lactate:    ABG (04-17 @ 09:15)     7.35 / 37 / 87 / 20<L> / -4.7<L> / 97.4%     Lactate:          MICROBIOLOGY  Urinalysis (04-18 @ 10:41):     Color:  / Appearance:  / SG:  / pH:  / Gluc: 193<H> / Ketones:  / Bili:  / Urobili:  / Protein : / Nitrites:  / Leuk.Est:  / RBC:  / WBC:  / Sq Epi:  / Non Sq Epi:  / Bacteria          ------------------------------------------------------------------------------------------    RADIOLOGICAL FINDINGS REVIEW:    < from: CT Head No Cont (04.18.24 @ 09:50) >  IMPRESSION:  Interval demarcation of right frontoparietal and temporal acute infarction. No hipolito hemorrhagic transformation.  Increased mass effect with in new leftward midline shift of 3 mm and some compression of the right lateral ventricle.  Basal cisterns still visualized. No hydrocephalus.    < from: MR Cervical Spine No Cont (04.17.24 @ 21:56) >  MR brain:     -Redemonstrated large acute right MCA territory infarct with extensive cortical petechial hemorrhage. No gross hematoma formation.  MR cervical spine:     -Suspected acute anterior longitudinal ligament injury and/or anterior corner avulsion fractures at C4-C5 and C5-C6. Prevertebral edema extending from C1 through the upper cervical levels.     -Suspected right greater than left posterior paraspinal muscular strain and/or ligamentous injury.    < from: CT Head No Cont (04.17.24 @ 17:20) >  FINDINGS: There is redemonstration of an evolving acute/subacute right-sided MCA distribution infarction. No gross hemorrhagic transformation is seen. Underlying petechial hemorrhagic transformation cannot be excluded. Sulcal effacement and mass effect are again noted.   There is no significant shift of the midline structures or herniation.  There is no hydrocephalus.  The paranasal sinuses and tympanomastoid cavities are clear. The calvarium appears intact. The orbits appear unremarkable apart from bilateral cataract removal.  IMPRESSION: Stable follow-up CT exam when compared with earlier today.      < from: VA Duplex Lower Ext Vein Scan, Bilat (04.17.24 @ 16:08) >  IMPRESSION:  No evidence of deep venous thrombosis RIGHT lower extremity.    < from: Xray Femur 2 Views, Bilat (04.17.24 @ 14:05) >  IMPRESSION:  Lateral left hip and distal medial left thigh soft tissue swelling with subcutaneous stranding compatible with posttraumatic soft tissue contusion/hemorrhagic infiltration.  No fractures or dislocations in above imaged anatomic regions.  Preserved bilateral hip joint spaces. Slightly narrowed left medial tibiofemoral joint space. Preserved ankle remaining joint spaces. Suspected knee joint effusions.  Generalized osteopenia otherwise no discrete lytic or blastic lesions.  SCDs surround bilateral calves.    < from: Xray Forearm, Bilateral (04.17.24 @ 14:04) >  < from: Xray Elbow AP + Lateral, Bilateral (04.17.24 @ 14:04) >  IMPRESSION:  Chronic ossicle adjacent to left ulnar styloid process. Otherwise no dislocations or acute appearing fractures in above imaged anatomic regions.  Preserved joint spaces and no joint margin erosions. No discrete lytic or blastic lesions.    < from: CT Head No Cont (04.17.24 @ 09:35) >  IMPRESSION:  An evolving acute right MCA distribution infarct is again noted in the right posterior frontal, parietal, temporal, and insular regions. The mild edema and mass effect associated with the infarct appears similar compared to the head CT study performed earlier the same day. There has been a substantial interval decrease of the gyriform areas of increased density involving the infarct most likely reflecting evolution of contrast staining from the angiogram procedure. Small areas of cortical petechial hemorrhagic transformation can't be entirely excluded, as this study is substantially limited by motion. Continued serial imaging follow-up over time is recommended to monitor for stability.    < from: Xray Chest 1 View- PORTABLE-Urgent (Xray Chest 1 View- PORTABLE-Urgent .) (04.17.24 @ 06:52) >  FINDINGS:  Enteric tube with tip overlying distal stomach/duodenal bulb.  The heart is not accurately assessed in this AP projection.  No focal consolidation.  There is no pneumothorax or pleural effusion.  No acute bony abnormality.  IMPRESSION:  No focal consolidation.    < from: CT Lumbar Spine Reform w/ IV Cont (04.17.24 @ 02:04) >  IMPRESSION:  Acute fracture involving the anterior T6 vertebral body extending into a right anterolateral T5-T6 osteophyte. No significant vertebral body height loss or bony retropulsion into the spinal canal. More sensitive evaluation with MRI may be obtained for further evaluation, if no contraindications exist.    < from: CT Abdomen and Pelvis w/ IV Cont (04.17.24 @ 02:04) >  IMPRESSION:  No CT evidence of acute traumatic sequelae within the chest or abdomen.  Left lateral hip subcutaneous hematoma with evidence of active bleeding.  Nonspecific thickened appearance to the upper esophagus. Follow-up upper endoscopy is recommended.  Subcentimeter cystic lesion within the pancreatic head. Nonemergent MRI may be obtained for further characterization, if no contraindications exist.  Urinary bladder wall thickening despite underdistention.      < from: CT Cervical Spine No Cont (04.17.24 @ 00:48) >  IMPRESSION:  No CT evidence of cervical spine fracture, traumatic malalignment or suspicious osseous lesion. Subcutaneous edema and/or subcutaneous hematoma is partially visualized in the upper back.  Small amount of secretions in the pharynx.    < from: CT Brain Stroke Protocol (04.16.24 @ 19:14) >  FINDINGS:  Contrast noted within the vessels.  New cytotoxic edema in the right frontal, parietal, and temporal lobes, as well as within the right insula consistent with an acute right MCA territory infarct. No hipolito hemorrhagic transformation.  No hydrocephalus, midline shift, or effacement of the basal cisterns.  The visualized paranasal sinuses and mastoid air cells are clear.  IMPRESSION:  New cytotoxic edema in the right frontal, parietal, and temporal lobes, as well as within the right insula consistent with an acute right MCA territory infarct. No hipolito hemorrhagic transformation. No midline shift.    < from: Xray Chest 1 View-PORTABLE IMMEDIATE (Xray Chest 1 View-PORTABLE IMMEDIATE .) (04.16.24 @ 17:24) >  There is a shallow inspiration with low lung volumes. The visualized lungs are clear. No infiltrates are seen. There is no pneumothorax. No pleural fluid is seen. No hilar or mediastinal widening is detected. The cardiac silhouette may be magnified by technique. There is a left coronary artery stent. There is no pulmonary edema. There are degenerative changes of the thoracic spine.    IMPRESSION:  1. Shallow inspiration with low lung volumes showing no acute cardiopulmonary abnormalities.  2. Left coronary artery stent without pulmonary edema.  3. Degenerative changes of the thoracic spine.      < from: CT Brain Perfusion Maps Stroke (04.16.24 @ 16:40) >  IMPRESSION:      1.   Right carotid system:  No hemodynamically significant stenosis.      2.   Left carotid system:  No hemodynamically significant stenosis.      3.   Intracranial circulation:  large vessel occlusion at the origin of the RIGHT M2 segment, posterior division.      4.   Brain:  No significant lesion identified.      5. Perfusion: Core infarct of 96 ml within the posterior RIGHT MCA Territory. Based on the perfusion mismatch, there is a predicted volume of 24 mL of critically hypoperfused tissue at risk.  < end of copied text >    List Injuries Identified to Date:   Acute right MCA stroke      List Operative and Interventional Radiological Procedures:         INTERPRETATION:  79M on ASA s/p mechanical fall around 130p 4/16 likely 2/2 acute R MCA stroke, given tenecteplase at [facility], transferred to [facility], now s/p cerebral angio with mechanical thrombectomy. Workup found:    1) small left hip hematoma, stable  2) Incidental finding of pancreas lesion, report provided to patient daughter with instruction to followup with PCP  3) T6 vertebral body fracture --> ORTHO SPINE CONSULT PENDING (primary team reports ortho following, documentation pending)  4) Increased mass effect with in new leftward midline shift of 3 mm and some compression of the right lateral ventricle> NEUROSURGERY FOLLOWING TERTIARY TRAUMA SURVEY  ------------------------------------------------------------------------------------------    Date/Time: 04-18-24 @ 11:38  Admit date: 4.16.2024  Trauma activation: none  Admit GCS reported:  15     HPI:  79 year old man with PMH of HTN, DMII, CAD s/p stents (on ASA) who presents after mechanical fall aroudn 130p, likely 2/2 acute R MCA stroke. He was in his usual state of health at home chatting with friends and moving throughout the house unencumbered when his wife heard a thud and found him at the bottom of a 10-stair staircase (noncarpeted, tile floor). She attended to him immediately and he was awake but confused, there was blood on the floor but no obvious injuries to the patient. Last dose ASA this AM.  Patient was evaluated at [facility] for stroke and was found to have right MCA occlusion for which he was given tenecteplase and was transferred to [facility] for thrombectomy. He is now s/p cerebral angio with mechanical thrombectomy. Workup found small left hip hematoma and incidental finding of pancreas lesion.    INTERVAL EVENTS:   Upper and Lower extremity xrays with findings significant for "Lateral left hip and distal medial left thigh soft tissue swelling with subcutaneous stranding compatible with posttraumatic soft tissue contusion/hemorrhagic infiltration." Compression dressing placed overnight. Compartments remain soft.  T6 vertebral body fracture --> ORTHO SPINE CONSULT PENDING  Increased mass effect with in new leftward midline shift of 3 mm and some compression of the right lateral ventricle. > NEUROSURGERY FOLLOWING    PAST MEDICAL & SURGICAL HISTORY:  HLD (hyperlipidemia)  T2DM (type 2 diabetes mellitus)  BPH (benign prostatic hyperplasia)  Diabetes mellitus  Hypertension  High cholesterol  CAD (coronary artery disease)  H/O eye surgery  History of cardiac cath times 2  H/O heart artery stent      [] No significant past history as reviewed with the patient and family    FAMILY HISTORY:  FH: myocardial infarction (Sibling)      [] Family history not pertinent as reviewed with the patient and family    SOCIAL HISTORY: ***    ALLERGIES: No Known Allergies      HOME MEDICATIONS: ***    CURRENT MEDICATIONS:   MEDICATIONS (STANDING): atorvastatin 80 milliGRAM(s) Oral at bedtime  finasteride 5 milliGRAM(s) Oral daily  influenza  Vaccine (HIGH DOSE) 0.7 milliLiter(s) IntraMuscular once  insulin lispro (ADMELOG) corrective regimen sliding scale   SubCutaneous every 6 hours  insulin NPH human recombinant 5 Unit(s) SubCutaneous every 6 hours  norepinephrine Infusion 0.05 MICROgram(s)/kG/Min IV Continuous <Continuous>  pantoprazole  Injectable 40 milliGRAM(s) IV Push daily  phenylephrine    Infusion 0.1 MICROgram(s)/kG/Min IV Continuous <Continuous>  polyethylene glycol 3350 17 Gram(s) Oral two times a day  senna 2 Tablet(s) Oral at bedtime  sodium chloride 2% + sodium acetate 50:50 1000 milliLiter(s) IV Continuous <Continuous>    MEDICATIONS (PRN):methocarbamol 500 milliGRAM(s) Oral every 6 hours PRN musle spasm    ------------------------------------------------------------------------------------------    VITAL SIGNS  T(C): 36.9 (04-18-24 @ 07:00), Max: 37.8 (04-17-24 @ 19:00)  HR: 88 (04-18-24 @ 11:15) (76 - 105)  BP: --  RR: 19 (04-18-24 @ 11:15) (12 - 24)  SpO2: 100% (04-18-24 @ 11:15) (92% - 100%)  CAPILLARY BLOOD GLUCOSE      POCT Blood Glucose.: 176 mg/dL (18 Apr 2024 10:15)  POCT Blood Glucose.: 158 mg/dL (18 Apr 2024 05:14)  POCT Blood Glucose.: 164 mg/dL (17 Apr 2024 23:49)  POCT Blood Glucose.: 182 mg/dL (17 Apr 2024 16:51)      INS/OUTS:    04-17 @ 07:01  -  04-18 @ 07:00  --------------------------------------------------------  IN:    Enteral Tube Flush: 240 mL    Glucerna: 1030 mL    IV PiggyBack: 100 mL    IV PiggyBack: 416.5 mL    Phenylephrine: 77.5 mL    Phenylephrine: 18.9 mL    PRBCs (Packed Red Blood Cells): 300 mL    sodium chloride 2% + sodium acetate 50:50: 1675 mL  Total IN: 3857.9 mL    OUT:    Indwelling Catheter - Urethral (mL): 1685 mL  Total OUT: 1685 mL    Total NET: 2172.9 mL      04-18 @ 07:01  -  04-18 @ 11:38  --------------------------------------------------------  IN:    Glucerna: 140 mL    Lactated Ringers Bolus: 1000 mL    Phenylephrine: 39.3 mL    Sodium Chloride 0.9% Bolus: 750 mL    sodium chloride 2% + sodium acetate 50:50: 300 mL  Total IN: 2229.3 mL    OUT:    Indwelling Catheter - Urethral (mL): 125 mL    Phenylephrine: 0 mL  Total OUT: 125 mL    Total NET: 2104.3 mL        Drug Dosing Weight  Height (cm): 153.9 (16 Apr 2024 21:45)  Weight (kg): 70.9 (16 Apr 2024 21:45)  BMI (kg/m2): 29.9 (16 Apr 2024 21:45)  BSA (m2): 1.69 (16 Apr 2024 21:45)    PHYSICAL EXAM:   General: NAD  HEENT: Ecchymosis to superior aspect of head  Neck: Soft, midline trachea, lidoderm patch on Lower posterior neck  Chest: No chest wall tenderness.   Cardiac: S1, S2, RRR  Respiratory: no increased work of breathing   Abdomen: Soft, non-distended, non-tender, no rebound, no guarding, no masses palpated  Pelvis: Stable, Left Lateral hip with ecchymosis, some induration, but largely soft, TTP  Ext: b/l hand edema, right DP palp, Left DP signal, able to wiggle toes b/l, able to squeez right hand, no LUE motor (L hemiplegia consistent with MCA stroke)  - superficial abrasions to the R wrist, LUE, L wrist, small bruise of the R elbow, hematoma of the L lateral thigh (extension from hip) and L knee.   Back: Left upper back ecchymosis      ------------------------------------------------------------------------------------------    LABS  CBC (04-18 @ 10:41)                              9.2<L>                         6.35    )----------------(  143<L>     --    % Neutrophils, --    % Lymphocytes, ANC: --                                  28.5<L>  CBC (04-18 @ 06:03)                              8.5<L>                         7.55    )----------------(  84<L>      59.7  % Neutrophils, 26.1  % Lymphocytes, ANC: 4.51                                25.5<L>    BMP (04-18 @ 10:41)             141     |  111<H>  |  14    		Ca++ --      Ca 7.8<L>             ---------------------------------( 193<H>		Mg 2.0                4.3     |  23      |  0.73  			Ph 2.2<L>  BMP (04-18 @ 06:04)             142     |  111<H>  |  13    		Ca++ --      Ca 7.6<L>             ---------------------------------( 180<H>		Mg 2.1                4.8     |  23      |  0.75  			Ph 3.0       LFTs (04-18 @ 10:41)      TPro 4.9<L> / Alb 2.6<L> / TBili 0.5 / DBili -- / AST 30 / ALT 22 / AlkPhos 62    Coags (04-18 @ 07:00)  aPTT 25.1 / INR 1.01 / PT 11.1      ABG (04-18 @ 10:07)     7.42 / 37 / 152<H> / 24 / -0.3 / 99.1<H>%     Lactate:    ABG (04-17 @ 09:15)     7.35 / 37 / 87 / 20<L> / -4.7<L> / 97.4%     Lactate:          MICROBIOLOGY  Urinalysis (04-18 @ 10:41):     Color:  / Appearance:  / SG:  / pH:  / Gluc: 193<H> / Ketones:  / Bili:  / Urobili:  / Protein : / Nitrites:  / Leuk.Est:  / RBC:  / WBC:  / Sq Epi:  / Non Sq Epi:  / Bacteria          ------------------------------------------------------------------------------------------    RADIOLOGICAL FINDINGS REVIEW:    < from: CT Head No Cont (04.18.24 @ 09:50) >  IMPRESSION:  Interval demarcation of right frontoparietal and temporal acute infarction. No hipolito hemorrhagic transformation.  Increased mass effect with in new leftward midline shift of 3 mm and some compression of the right lateral ventricle.  Basal cisterns still visualized. No hydrocephalus.    < from: MR Cervical Spine No Cont (04.17.24 @ 21:56) >  MR brain:     -Redemonstrated large acute right MCA territory infarct with extensive cortical petechial hemorrhage. No gross hematoma formation.  MR cervical spine:     -Suspected acute anterior longitudinal ligament injury and/or anterior corner avulsion fractures at C4-C5 and C5-C6. Prevertebral edema extending from C1 through the upper cervical levels.     -Suspected right greater than left posterior paraspinal muscular strain and/or ligamentous injury.    < from: CT Head No Cont (04.17.24 @ 17:20) >  FINDINGS: There is redemonstration of an evolving acute/subacute right-sided MCA distribution infarction. No gross hemorrhagic transformation is seen. Underlying petechial hemorrhagic transformation cannot be excluded. Sulcal effacement and mass effect are again noted.   There is no significant shift of the midline structures or herniation.  There is no hydrocephalus.  The paranasal sinuses and tympanomastoid cavities are clear. The calvarium appears intact. The orbits appear unremarkable apart from bilateral cataract removal.  IMPRESSION: Stable follow-up CT exam when compared with earlier today.      < from: VA Duplex Lower Ext Vein Scan, Bilat (04.17.24 @ 16:08) >  IMPRESSION:  No evidence of deep venous thrombosis RIGHT lower extremity.    < from: Xray Femur 2 Views, Bilat (04.17.24 @ 14:05) >  IMPRESSION:  Lateral left hip and distal medial left thigh soft tissue swelling with subcutaneous stranding compatible with posttraumatic soft tissue contusion/hemorrhagic infiltration.  No fractures or dislocations in above imaged anatomic regions.  Preserved bilateral hip joint spaces. Slightly narrowed left medial tibiofemoral joint space. Preserved ankle remaining joint spaces. Suspected knee joint effusions.  Generalized osteopenia otherwise no discrete lytic or blastic lesions.  SCDs surround bilateral calves.    < from: Xray Forearm, Bilateral (04.17.24 @ 14:04) >  < from: Xray Elbow AP + Lateral, Bilateral (04.17.24 @ 14:04) >  IMPRESSION:  Chronic ossicle adjacent to left ulnar styloid process. Otherwise no dislocations or acute appearing fractures in above imaged anatomic regions.  Preserved joint spaces and no joint margin erosions. No discrete lytic or blastic lesions.    < from: CT Head No Cont (04.17.24 @ 09:35) >  IMPRESSION:  An evolving acute right MCA distribution infarct is again noted in the right posterior frontal, parietal, temporal, and insular regions. The mild edema and mass effect associated with the infarct appears similar compared to the head CT study performed earlier the same day. There has been a substantial interval decrease of the gyriform areas of increased density involving the infarct most likely reflecting evolution of contrast staining from the angiogram procedure. Small areas of cortical petechial hemorrhagic transformation can't be entirely excluded, as this study is substantially limited by motion. Continued serial imaging follow-up over time is recommended to monitor for stability.    < from: Xray Chest 1 View- PORTABLE-Urgent (Xray Chest 1 View- PORTABLE-Urgent .) (04.17.24 @ 06:52) >  FINDINGS:  Enteric tube with tip overlying distal stomach/duodenal bulb.  The heart is not accurately assessed in this AP projection.  No focal consolidation.  There is no pneumothorax or pleural effusion.  No acute bony abnormality.  IMPRESSION:  No focal consolidation.    < from: CT Lumbar Spine Reform w/ IV Cont (04.17.24 @ 02:04) >  IMPRESSION:  Acute fracture involving the anterior T6 vertebral body extending into a right anterolateral T5-T6 osteophyte. No significant vertebral body height loss or bony retropulsion into the spinal canal. More sensitive evaluation with MRI may be obtained for further evaluation, if no contraindications exist.    < from: CT Abdomen and Pelvis w/ IV Cont (04.17.24 @ 02:04) >  IMPRESSION:  No CT evidence of acute traumatic sequelae within the chest or abdomen.  Left lateral hip subcutaneous hematoma with evidence of active bleeding.  Nonspecific thickened appearance to the upper esophagus. Follow-up upper endoscopy is recommended.  Subcentimeter cystic lesion within the pancreatic head. Nonemergent MRI may be obtained for further characterization, if no contraindications exist.  Urinary bladder wall thickening despite underdistention.      < from: CT Cervical Spine No Cont (04.17.24 @ 00:48) >  IMPRESSION:  No CT evidence of cervical spine fracture, traumatic malalignment or suspicious osseous lesion. Subcutaneous edema and/or subcutaneous hematoma is partially visualized in the upper back.  Small amount of secretions in the pharynx.    < from: CT Brain Stroke Protocol (04.16.24 @ 19:14) >  FINDINGS:  Contrast noted within the vessels.  New cytotoxic edema in the right frontal, parietal, and temporal lobes, as well as within the right insula consistent with an acute right MCA territory infarct. No hipolito hemorrhagic transformation.  No hydrocephalus, midline shift, or effacement of the basal cisterns.  The visualized paranasal sinuses and mastoid air cells are clear.  IMPRESSION:  New cytotoxic edema in the right frontal, parietal, and temporal lobes, as well as within the right insula consistent with an acute right MCA territory infarct. No hipolito hemorrhagic transformation. No midline shift.    < from: Xray Chest 1 View-PORTABLE IMMEDIATE (Xray Chest 1 View-PORTABLE IMMEDIATE .) (04.16.24 @ 17:24) >  There is a shallow inspiration with low lung volumes. The visualized lungs are clear. No infiltrates are seen. There is no pneumothorax. No pleural fluid is seen. No hilar or mediastinal widening is detected. The cardiac silhouette may be magnified by technique. There is a left coronary artery stent. There is no pulmonary edema. There are degenerative changes of the thoracic spine.    IMPRESSION:  1. Shallow inspiration with low lung volumes showing no acute cardiopulmonary abnormalities.  2. Left coronary artery stent without pulmonary edema.  3. Degenerative changes of the thoracic spine.      < from: CT Brain Perfusion Maps Stroke (04.16.24 @ 16:40) >  IMPRESSION:      1.   Right carotid system:  No hemodynamically significant stenosis.      2.   Left carotid system:  No hemodynamically significant stenosis.      3.   Intracranial circulation:  large vessel occlusion at the origin of the RIGHT M2 segment, posterior division.      4.   Brain:  No significant lesion identified.      5. Perfusion: Core infarct of 96 ml within the posterior RIGHT MCA Territory. Based on the perfusion mismatch, there is a predicted volume of 24 mL of critically hypoperfused tissue at risk.  < end of copied text >    List Injuries Identified to Date:   > Acute right MCA stroke  > T6 vertebral body fracture   > Left hip hematoma and Left Knee Hematoma    List Operative and Interventional Radiological Procedures:    s/p cerebral angio with mechanical thrombectomy        INTERPRETATION:  79M on ASA s/p mechanical fall around 130p 4/16 likely 2/2 acute R MCA stroke, given tenecteplase at [facility], transferred to [facility], now s/p cerebral angio with mechanical thrombectomy. Workup found:    1) Increased mass effect with in new leftward midline shift of 3 mm and some compression of the right lateral ventricle> NEUROSURGERY FOLLOWING  2) T6 vertebral body fracture --> ORTHO SPINE CONSULT PENDING (primary team reports ortho following, documentation pending)  3) Incidental finding of pancreas lesion, report provided to patient daughter with instruction to followup with PCP  4) Left hip hematoma and Left Knee Hematoma, stable. Patient transfused RBC this morning with appropriate response. No intervention indicated at this time.   - Please contact Trauma Surgery team with any questions, concerns.       ACS/Trauma Surgery  [id number]

## 2024-04-18 NOTE — DIETITIAN INITIAL EVALUATION ADULT - PERTINENT MEDS FT
MEDICATIONS  (STANDING):  atorvastatin 80 milliGRAM(s) Oral at bedtime  chlorhexidine 4% Liquid 1 Application(s) Topical daily  finasteride 5 milliGRAM(s) Oral daily  influenza  Vaccine (HIGH DOSE) 0.7 milliLiter(s) IntraMuscular once  insulin lispro (ADMELOG) corrective regimen sliding scale   SubCutaneous every 6 hours  insulin NPH human recombinant 5 Unit(s) SubCutaneous every 6 hours  lidocaine   4% Patch 1 Patch Transdermal daily  pantoprazole  Injectable 40 milliGRAM(s) IV Push daily  phenylephrine    Infusion 0.2 MICROgram(s)/kG/Min (5.32 mL/Hr) IV Continuous <Continuous>  polyethylene glycol 3350 17 Gram(s) Oral daily  senna 2 Tablet(s) Oral at bedtime  sodium chloride 2% + sodium acetate 50:50 1000 milliLiter(s) (75 mL/Hr) IV Continuous <Continuous>  timolol 0.5% Solution 1 Drop(s) Left EYE two times a day    MEDICATIONS  (PRN):  methocarbamol 500 milliGRAM(s) Oral every 6 hours PRN musle spasm

## 2024-04-18 NOTE — PROVIDER CONTACT NOTE (OTHER) - SITUATION
Upon 19:00 assessment patient has no movement on LUE
Patient complaining of abdominal pain
Pt's Hgb 7.7

## 2024-04-18 NOTE — DIETITIAN INITIAL EVALUATION ADULT - OTHER INFO
Dosing wt (4/16): 156 lbs  [facility] (10/27/23[facility] 137 lbs; (5/30/23): 150 lbs Dosing wt (4/16): 156 lbs  Wt trend (per [facility]): (10/27/23): 137 lbs; (5/30/23): 150 lbs  Per spouse, UBW ~145 lbs. Endorsed wt loss in 8/2023 (previously weighed ~160 lbs) prior to cataract surgery. Will continue to monitor/trend.

## 2024-04-18 NOTE — CONSULT NOTE ADULT - SUBJECTIVE AND OBJECTIVE BOX
Consult Note: Orthopedic Surgery Spine Service    Patient is a 79M community-ambulator without assistive devices who presented to [facility] ED with a chief complaint of sudden-onset left-sided weakness coinciding with a fall down the stairs. Patient was seen at OSH where MCA infract was identified and transferred to [facility] for thrombectomy. Patient fatigued and with difficulty participating in question-directed interview, however wife present at bedside to provide collateral information. Patient Right-sided hemiparesis, dneis any pain, weakness, numbness or tingling in the Right upper or lower extremity. Denies fevers, chills, headaches, chest pain, or shortness of breath. Further review of systems limited by patient fatigue.     VITAL SIGNS:  T(C): 36.9 (04-18-24 @ 07:00), Max: 37.8 (04-17-24 @ 19:00)  HR: 88 (04-18-24 @ 11:15) (76 - 105)  BP: --  RR: 19 (04-18-24 @ 11:15) (12 - 24)  SpO2: 100% (04-18-24 @ 11:15) (92% - 100%)      LABS:                        9.2    6.35  )-----------( 143      ( 18 Apr 2024 10:41 )             28.5     04-18    141  |  111<H>  |  14  ----------------------------<  193<H>  4.3   |  23  |  0.73    Ca    7.8<L>      18 Apr 2024 10:41  Phos  2.2     04-18  Mg     2.0     04-18    TPro  4.9<L>  /  Alb  2.6<L>  /  TBili  0.5  /  DBili  x   /  AST  30  /  ALT  22  /  AlkPhos  62  04-18    PT/INR - ( 18 Apr 2024 07:00 )   PT: 11.1 sec;   INR: 1.01 ratio      PTT - ( 18 Apr 2024 07:00 )  PTT:25.1 sec        PHYSICAL EXAM:    PHYSICAL EXAM  GEN: NAD, AAOx3    SPINE:  Skin intact  TTP over the bony prominences and paraspinal musculature of the cervical // thoracic // lumbar // spine.   Non-TTP over the bony prominences or paraspinal musculature of the cervical // thoracic // lumbar // sacral spine.   No bony step-offs // + bony step-off @ ????  Grossly moving all extremities.   SILT throughout all extremities.   + Radial pulses bilaterally.   + DP/PT pulses bilaterally.   No saddle anesthesia.   + //- Radha-anal sensation in all four quadrants; + // - Rectal tone.       MOTOR EXAM:                          Elbow Flex (C5)     Wrist Ext (C6)     Elbow Ext (C7)      Finger Flex (C8)    Finger Abduction (T1)  RIGHT                 5/5                         5/5                         5/5                          5/5                              5/5  LEFT                    0/5                         0/5                         0/5                          0/5                              0/5                           Hip Flex (L2)      Knee Ext (L3)      Ank Dorsiflex (L4)     Hallux Ext (L5)     Ank PlantarFlex (S1)  RIGHT               5/5                      5/5                          5/5                            5/5                           5/5  LEFT                  0/5                      0/5                          0/5                            0/5                           0/5      SENSORY EXAM:                        C5      C6      C7      C8       T1          RIGHT          2         2        2         2         2          (0=absent, 1=impaired, 2=normal, NT=not testable)  LEFT             0         0        0         0         0          (0=absent, 1=impaired, 2=normal, NT=not testable)                        L2        L3       L4      L5       S1          RIGHT        2          2         2        2        2           (0=absent, 1=impaired, 2=normal, NT=not testable)  LEFT           0          0         0        0        0           (0=absent, 1=impaired, 2=normal, NT=not testable)    Negative Duke's sign bilaterally.   Negative Babinski bilaterally.        SECONDARY SURVEY:  Secondary Survey:   RLE/LLE/RUE/LUE: No TTP over bony prominences, SILT, palpable pulses, full/painless range of motion, compartments soft. Right elbow brusiing noted   Spine: No bony tenderness. No palpable stepoffs.      IMAGING:    < from: MR Cervical Spine No Cont (04.17.24 @ 21:56) >  Suspected acute anterior longitudinal ligament injury and/or anterior   corner avulsion fractures at C4-C5 and C5-C6. Prevertebral edema   extending from C1 through the upper cervical levels.  -Suspected right greater than left posterior paraspinal muscular strain   and/or ligamentous injury.  < end of copied text >    < from: CT Lumbar Spine Reform w/ IV Cont (04.17.24 @ 02:04) >  Acute fracture involving the anterior T6 vertebral body extending into a   right anterolateral T5-T6 osteophyte. No significant vertebral body   height loss or bony retropulsion into the spinal canal. More sensitive   evaluation with MRI may be obtained for further evaluation, if no   contraindications exist.  < end of copied text >      ASSESSMENT:  Patient is a 79y Male s/p Right MCA infarct with Left hemiparesis, with MR C Spine evidence of  C4-C5 and C5-C6 acute anterior longitudinal ligament injury and/or anterior corner avulsion fractures and CT L Spine evidence of an acute T6 vertebral body extending into a Right anterolateral T5-T6 osteophyte.      PLAN:  - Will discuss imaging findings as above with attending surgeon.   - Pain control.   - Cervical collar.   - DVT Ppx: Per Primary Team  - No acute orthopaedic surgical intervention indicated at this time; will re-evaluate upon discussion of imaging with attending surgeon.   - Will discuss with attending and advise if plan changes.           Consult Note: Orthopedic Surgery Spine Service    Patient is a 79M community-ambulator without assistive devices who presented to [facility] ED with a chief complaint of sudden-onset left-sided weakness coinciding with a fall down the stairs. Patient was seen at OSH where MCA infract was identified and transferred to [facility] for thrombectomy. Patient fatigued and with difficulty participating in question-directed interview, however wife present at bedside to provide collateral information. Patient Left-sided hemiparesis, dneis any pain, weakness, numbness or tingling in the Right upper or lower extremity. Denies fevers, chills, headaches, chest pain, or shortness of breath. Further review of systems limited by patient fatigue.     VITAL SIGNS:  T(C): 36.9 (04-18-24 @ 07:00), Max: 37.8 (04-17-24 @ 19:00)  HR: 88 (04-18-24 @ 11:15) (76 - 105)  BP: --  RR: 19 (04-18-24 @ 11:15) (12 - 24)  SpO2: 100% (04-18-24 @ 11:15) (92% - 100%)      LABS:                        9.2    6.35  )-----------( 143      ( 18 Apr 2024 10:41 )             28.5     04-18    141  |  111<H>  |  14  ----------------------------<  193<H>  4.3   |  23  |  0.73    Ca    7.8<L>      18 Apr 2024 10:41  Phos  2.2     04-18  Mg     2.0     04-18    TPro  4.9<L>  /  Alb  2.6<L>  /  TBili  0.5  /  DBili  x   /  AST  30  /  ALT  22  /  AlkPhos  62  04-18    PT/INR - ( 18 Apr 2024 07:00 )   PT: 11.1 sec;   INR: 1.01 ratio      PTT - ( 18 Apr 2024 07:00 )  PTT:25.1 sec        PHYSICAL EXAM:    PHYSICAL EXAM  GEN: NAD, AAOx3    SPINE:  Skin intact  TTP over the bony prominences and paraspinal musculature of the cervical // thoracic // lumbar // spine.   Non-TTP over the bony prominences or paraspinal musculature of the cervical // thoracic // lumbar // sacral spine.   No bony step-offs // + bony step-off @ ????  Grossly moving all extremities.   SILT throughout all extremities.   + Radial pulses bilaterally.   + DP/PT pulses bilaterally.   No saddle anesthesia.   + //- Radha-anal sensation in all four quadrants; + // - Rectal tone.       MOTOR EXAM:                          Elbow Flex (C5)     Wrist Ext (C6)     Elbow Ext (C7)      Finger Flex (C8)    Finger Abduction (T1)  RIGHT                 5/5                         5/5                         5/5                          5/5                              5/5  LEFT                    0/5                         0/5                         0/5                          0/5                              0/5                           Hip Flex (L2)      Knee Ext (L3)      Ank Dorsiflex (L4)     Hallux Ext (L5)     Ank PlantarFlex (S1)  RIGHT               5/5                      5/5                          5/5                            5/5                           5/5  LEFT                  0/5                      0/5                          0/5                            0/5                           0/5      SENSORY EXAM:                        C5      C6      C7      C8       T1          RIGHT          2         2        2         2         2          (0=absent, 1=impaired, 2=normal, NT=not testable)  LEFT             0         0        0         0         0          (0=absent, 1=impaired, 2=normal, NT=not testable)                        L2        L3       L4      L5       S1          RIGHT        2          2         2        2        2           (0=absent, 1=impaired, 2=normal, NT=not testable)  LEFT           0          0         0        0        0           (0=absent, 1=impaired, 2=normal, NT=not testable)    Negative Duke's sign bilaterally.   Negative Babinski bilaterally.        SECONDARY SURVEY:  Secondary Survey:   RLE/LLE/RUE/LUE: No TTP over bony prominences, SILT, palpable pulses, full/painless range of motion, compartments soft. Right elbow brusiing noted   Spine: No bony tenderness. No palpable stepoffs.      IMAGING:    < from: MR Cervical Spine No Cont (04.17.24 @ 21:56) >  Suspected acute anterior longitudinal ligament injury and/or anterior   corner avulsion fractures at C4-C5 and C5-C6. Prevertebral edema   extending from C1 through the upper cervical levels.  -Suspected right greater than left posterior paraspinal muscular strain   and/or ligamentous injury.  < end of copied text >    < from: CT Lumbar Spine Reform w/ IV Cont (04.17.24 @ 02:04) >  Acute fracture involving the anterior T6 vertebral body extending into a   right anterolateral T5-T6 osteophyte. No significant vertebral body   height loss or bony retropulsion into the spinal canal. More sensitive   evaluation with MRI may be obtained for further evaluation, if no   contraindications exist.  < end of copied text >      ASSESSMENT:  Patient is a 79y Male s/p Right MCA infarct with Left hemiparesis, with MR C Spine evidence of  C4-C5 and C5-C6 acute anterior longitudinal ligament injury and/or anterior corner avulsion fractures and CT L Spine evidence of an acute T6 vertebral body extending into a Right anterolateral T5-T6 osteophyte.      PLAN:  - Will discuss imaging findings as above with attending surgeon.   - Pain control.   - Cervical collar.   - DVT Ppx: Per Primary Team  - No acute orthopaedic surgical intervention indicated at this time; will re-evaluate upon discussion of imaging with attending surgeon.   - Will discuss with attending and advise if plan changes.           Consult Note: Orthopedic Surgery Spine Service    Patient is a 79M community-ambulator without assistive devices who presented to [facility] ED with a chief complaint of sudden-onset left-sided weakness coinciding with a fall down the stairs. Patient was seen at OSH where MCA infract was identified and transferred to [facility] for thrombectomy. Patient fatigued and with difficulty participating in question-directed interview, however wife present at bedside to provide collateral information. Patient Left-sided hemiparesis, dneis any pain, weakness, numbness or tingling in the Right upper or lower extremity. Denies fevers, chills, headaches, chest pain, or shortness of breath. Further review of systems limited by patient fatigue.     VITAL SIGNS:  T(C): 36.9 (04-18-24 @ 07:00), Max: 37.8 (04-17-24 @ 19:00)  HR: 88 (04-18-24 @ 11:15) (76 - 105)  BP: --  RR: 19 (04-18-24 @ 11:15) (12 - 24)  SpO2: 100% (04-18-24 @ 11:15) (92% - 100%)      LABS:                        9.2    6.35  )-----------( 143      ( 18 Apr 2024 10:41 )             28.5     04-18    141  |  111<H>  |  14  ----------------------------<  193<H>  4.3   |  23  |  0.73    Ca    7.8<L>      18 Apr 2024 10:41  Phos  2.2     04-18  Mg     2.0     04-18    TPro  4.9<L>  /  Alb  2.6<L>  /  TBili  0.5  /  DBili  x   /  AST  30  /  ALT  22  /  AlkPhos  62  04-18    PT/INR - ( 18 Apr 2024 07:00 )   PT: 11.1 sec;   INR: 1.01 ratio      PTT - ( 18 Apr 2024 07:00 )  PTT:25.1 sec        PHYSICAL EXAM:    PHYSICAL EXAM  GEN: NAD, AAOx3    SPINE:  Skin intact  TTP over the bony prominences and paraspinal musculature of the cervical // thoracic // lumbar // spine.   Non-TTP over the bony prominences or paraspinal musculature of the cervical // thoracic // lumbar // sacral spine.   No bony step-offs // + bony step-off @ ????  Grossly moving all extremities.   SILT throughout all extremities.   + Radial pulses bilaterally.   + DP/PT pulses bilaterally.   No saddle anesthesia.   + //- Radha-anal sensation in all four quadrants; + // - Rectal tone.       MOTOR EXAM:                          Elbow Flex (C5)     Wrist Ext (C6)     Elbow Ext (C7)      Finger Flex (C8)    Finger Abduction (T1)  RIGHT                 5/5                         5/5                         5/5                          5/5                              5/5  LEFT                    0/5                         0/5                         0/5                          0/5                              0/5                           Hip Flex (L2)      Knee Ext (L3)      Ank Dorsiflex (L4)     Hallux Ext (L5)     Ank PlantarFlex (S1)  RIGHT               5/5                      5/5                          5/5                            5/5                           5/5  LEFT                  0/5                      0/5                          0/5                            0/5                           0/5      SENSORY EXAM:                        C5      C6      C7      C8       T1          RIGHT          2         2        2         2         2          (0=absent, 1=impaired, 2=normal, NT=not testable)  LEFT             0         0        0         0         0          (0=absent, 1=impaired, 2=normal, NT=not testable)                        L2        L3       L4      L5       S1          RIGHT        2          2         2        2        2           (0=absent, 1=impaired, 2=normal, NT=not testable)  LEFT           0          0         0        0        0           (0=absent, 1=impaired, 2=normal, NT=not testable)    Negative Duke's sign bilaterally.   Negative Babinski bilaterally.        SECONDARY SURVEY:  Secondary Survey:   RLE/LLE/RUE/LUE: No TTP over bony prominences, SILT, palpable pulses, full/painless range of motion, compartments soft. Right elbow brusiing noted   Spine: No bony tenderness. No palpable stepoffs.      IMAGING:    < from: MR Cervical Spine No Cont (04.17.24 @ 21:56) >  Suspected acute anterior longitudinal ligament injury and/or anterior   corner avulsion fractures at C4-C5 and C5-C6. Prevertebral edema   extending from C1 through the upper cervical levels.  -Suspected right greater than left posterior paraspinal muscular strain   and/or ligamentous injury.  < end of copied text >    < from: CT Lumbar Spine Reform w/ IV Cont (04.17.24 @ 02:04) >  Acute fracture involving the anterior T6 vertebral body extending into a   right anterolateral T5-T6 osteophyte. No significant vertebral body   height loss or bony retropulsion into the spinal canal. More sensitive   evaluation with MRI may be obtained for further evaluation, if no   contraindications exist.  < end of copied text >      ASSESSMENT:  Patient is a 79y Male s/p Right MCA infarct with Left hemiparesis, with MR C Spine evidence of  C4-C5 and C5-C6 acute anterior longitudinal ligament injury and/or anterior corner avulsion fractures and CT L Spine evidence of an acute T6 vertebral body extending into a Right anterolateral T5-T6 osteophyte.      PLAN:  - Will discuss imaging findings as above with attending surgeon.   - Pain control.   - Cervical collar.   - DVT Ppx: Per Primary Team  - No acute orthopaedic surgical intervention indicated at this time; will re-evaluate upon discussion of imaging with attending surgeon.   - Will discuss with attending and advise if plan changes.  - ADDENDUM: Recommend MR T Spine without contrast for further evaluation when feasible.

## 2024-04-18 NOTE — DIETITIAN INITIAL EVALUATION ADULT - ORAL INTAKE PTA/DIET HISTORY
-Per discussion with spouse at bedside, pt with excellent appetite and PO intake prior to admission. Denies food allergies or intolerance to chewing/swallowing at baseline. Reports pt took daily Centrum multivitamin. Denies nausea/vomiting/constipation/diarrhea.   -History of DM noted. A1c 8.2%. Per spouse, pt checked BG pre and post-prandial. Per H&P via home medication list, pt with history of taking Metformin, Toujeo Solostar 38u once daily.

## 2024-04-18 NOTE — PROVIDER CONTACT NOTE (OTHER) - RECOMMENDATIONS
Abdominal CT scan
1 unit of PRBC will be administered as per order, as per HOLLEY Ramos time of completion re-check CBC 3 hours after, next CBC due at 7:41 P.M.
HOLLEY Stephen and MD Skaggs to bedside for assessment

## 2024-04-18 NOTE — CHART NOTE - NSCHARTNOTEFT_GEN_A_CORE
Rn notified patient to be more lethargic Called to bedside as RN notified patient to be more lethargic, not following commands and not moving left lower extremity, previously able to get it antigravity. At bedside, pt more lethargic requiring extreme noxious to respond w/sternal rub, groaning not responding to orientation questions appropriately, previously Ox3. Dr. Orozco notified and called to bedside and Neurosurg MD Loredo made aware. Given increased lethargic and worsening cytoxic edema on AM scan, 23.4% administered was given. PT tolerated well, vitals maintained stable, pt became immediately more responsive s/p IVP.  Plan to get repeat portable CTH stat.

## 2024-04-18 NOTE — PROGRESS NOTE ADULT - SUBJECTIVE AND OBJECTIVE BOX
HOSPITAL COURSE: 78yo man PMH HTN, DM, CAD s/p stents on ASA LKW 1:30-2pm today, found down by family, ?fell down stairs and family heard a thud, brought to [facility] ED, where NIHSS reportedly 7, given TNK 16:57, found to have R M2 occlusion, transferred to [facility] for MT, TICI2B, one pass.     Admission Scores  GCS: 15  HH:   MF:   NIHSS:  16 at [facility] ED  RASS:    CAM-ICU:   ICH:    04/18: per report patient became hypotensive during blood transfusion (transfusion reaction?), spiked fever s/p infectious workup, and received 23.4% 30cc for AMS    Allergies    No Known Allergies    Intolerances    REVIEW OF SYSTEMS: [ ] Unable to Assess due to neurologic exam   [ x] All ROS addressed below are non-contributory, except: N/A  Neuro: [ ] Headache [ ] Back pain [ ] Numbness [ ] Weakness [ ] Ataxia [ ] Dizziness [ ] Aphasia [ ] Dysarthria [ ] Visual disturbance  Resp: [ ] Shortness of breath/dyspnea, [ ] Orthopnea [ ] Cough  CV: [ ] Chest pain [ ] Palpitation [ ] Lightheadedness [ ] Syncope  Renal: [ ] Thirst [ ] Edema  GI: [ ] Nausea [ ] Emesis [ ] Abdominal pain [ ] Constipation [ ] Diarrhea  Hem: [ ] Hematemesis [ ] bright red blood per rectum  ID: [ ] Fever [ ] Chills [ ] Dysuria  ENT: [ ] Rhinorrhea      DEVICES:   [ ] Restraints [ ] ET tube [ ] central line [ x] arterial line L radial [x ] talbert [ ] NGT/OGT [ ] EVD [ ] LD [ ] PARI/HMV [ ] Trach [ ] PEG [ ] Chest Tube     ICU Vital Signs Last 24 Hrs  T(C): 38.5 (18 Apr 2024 15:00), Max: 38.5 (18 Apr 2024 15:00)  T(F): 101.3 (18 Apr 2024 15:00), Max: 101.3 (18 Apr 2024 15:00)  HR: 77 (18 Apr 2024 15:30) (75 - 105)  BP: --  BP(mean): --  ABP: 122/46 (18 Apr 2024 15:30) (71/34 - 146/61)  ABP(mean): 70 (18 Apr 2024 15:30) (50 - 93)  RR: 18 (18 Apr 2024 15:30) (12 - 24)  SpO2: 98% (18 Apr 2024 15:30) (92% - 100%)    O2 Parameters below as of 18 Apr 2024 07:00  Patient On (Oxygen Delivery Method): room air    I&O's Summary    17 Apr 2024 07:01  -  18 Apr 2024 07:00  --------------------------------------------------------  IN: 3857.9 mL / OUT: 1685 mL / NET: 2172.9 mL    18 Apr 2024 07:01  -  18 Apr 2024 19:13  --------------------------------------------------------  IN: 2966.6 mL / OUT: 625 mL / NET: 2341.6 mL      MEDICATIONS  (STANDING):  atorvastatin 80 milliGRAM(s) Oral at bedtime  chlorhexidine 4% Liquid 1 Application(s) Topical daily  finasteride 5 milliGRAM(s) Oral daily  influenza  Vaccine (HIGH DOSE) 0.7 milliLiter(s) IntraMuscular once  insulin lispro (ADMELOG) corrective regimen sliding scale   SubCutaneous every 6 hours  insulin NPH human recombinant 5 Unit(s) SubCutaneous every 6 hours  lidocaine   4% Patch 1 Patch Transdermal daily  ondansetron   Disintegrating Tablet 4 milliGRAM(s) Oral once  pantoprazole  Injectable 40 milliGRAM(s) IV Push daily  phenylephrine    Infusion 0.1 MICROgram(s)/kG/Min (2.66 mL/Hr) IV Continuous <Continuous>  piperacillin/tazobactam IVPB.- 3.375 Gram(s) IV Intermittent once  polyethylene glycol 3350 17 Gram(s) Oral two times a day  senna 2 Tablet(s) Oral at bedtime  sodium chloride 3% + sodium acetate 50:50 1000 milliLiter(s) (100 mL/Hr) IV Continuous <Continuous>  timolol 0.5% Solution 1 Drop(s) Left EYE two times a day    MEDICATIONS  (PRN):  methocarbamol 500 milliGRAM(s) Oral every 6 hours PRN musle spasm                          8.8    6.76  )-----------( 137      ( 18 Apr 2024 19:00 )             26.7   04-18    145  |  114<H>  |  13  ----------------------------<  175<H>  4.1   |  22  |  0.74    Ca    7.5<L>      18 Apr 2024 14:53  Phos  1.9     04-18  Mg     1.8     04-18    TPro  4.9<L>  /  Alb  2.6<L>  /  TBili  0.5  /  DBili  x   /  AST  30  /  ALT  22  /  AlkPhos  62  04-18    PT/INR - ( 18 Apr 2024 07:00 )   PT: 11.1 sec;   INR: 1.01 ratio         PTT - ( 18 Apr 2024 07:00 )  PTT:25.1 sec    ABG - ( 18 Apr 2024 18:50 )  pH, Arterial: 7.46  pH, Blood: x     /  pCO2: 32    /  pO2: 160   / HCO3: 23    / Base Excess: -0.7  /  SaO2: 98.3        EXAMINATION:  PHYSICAL EXAM:    Constitutional: No Acute Distress     Neurological: lethargic but rousable Ox3, L pupil surgical large/irregular, R 3mmR, able to say name but severely dysarthric, FC, L facial, R side 5, LUE WD, LLE 3/5    Pulmonary: diminished in bases BL    Cardiovascular: S1, S2, Regular rate and rhythm     Gastrointestinal: Soft, Non-tender, Non-distended     Extremities: No calf tenderness

## 2024-04-18 NOTE — PROVIDER CONTACT NOTE (OTHER) - BACKGROUND
DR DE LEON SPOKE TO PT ABOUT NEEDING A HEART CATH. PT STATED THAT HE DIDN'T WANT A CATH. DR DE LEON AND DR FRANK EDUCATED PT ON RISKS OF NOT HAVING A CATH DONE. PT STATED THAT HE 
UNDERSTOOD BUT STILL REFUSED.
THIS RN TALKED TO E-ICU, UPDATED ON PATIENT STATUS, NOTIFIED OF BRADYCARDIA.
Pt level 1 trauma, s/p fall
R M2 occlusion
Patient admitted s/p thrombectomy for R MCA occlusion

## 2024-04-18 NOTE — PROGRESS NOTE ADULT - ASSESSMENT
ASSESSMENT/PLAN: R M2 thrombectomy s/p TNK 4/16 at 16:57 s/p MT TICI 2B    ---N---  #Acute stroke s/p R M@ MT  - 2hr stroke checks, VS Q1  - MR with large acute right MCA territory infarct with extensive cortical petechial hemorrhage  - Start ASA 81 and DVT ppx once 24 hours stability is performed and unremarkable ( with caution given that he has a hematoma on the right hip )  - 3% @ 100cc/hr for NA goal 140-150 in setting of cytotoxic edema     #Vertebral compression fracture  - Ortho on board ( covering spine )  - MRI c -spine, Suspected acute anterior longitudinal ligament injury and/or anterior corner avulsion fractures at C4-C5 and C5-C6. Prevertebral edema extending from C1 through the upper cervical levels.  - c-collar cleared    ---P---  - on @L NC (Fi02 28%)  - ABG w/ Pa02 160, PF ratio 571  - monitor airway given lethargic mental status  - POCUS with BL trace pleural effusions, A line predominance with trace basilar B-lining, and non-mobile air bronchogram, PNA vs atelectasis?    -CV-  #Hypontesion  - SBP , MAP >65   - unclear etiology, septic vs volume  - on damari @ 0.4, f/u CBC, attempt to down titrate damari as permitting     #Hx of CAD s/p stenting  - will resume ASA when safe  - TTE w/ EF ~ 65%, entire septum, mid and apical inferior wall, and apex are abnormal, and mild (grade 1) left ventricular diastolic dysfunction, with normal filling pressure  - hold home beta-blocker, resume when off vasopressors     ---GI---  - failed dysphagia screen  - FEEST tomorrow   - NPO with tube feeds, Glucerna 1.2 @ goal  - bowel regimen miralax/senna  - PPI     ------  - 3%@100cc/hr for cytotoxic edema  - f/u BMP  - I&O  - NA goal 140-150    ---E---  #DM  - a1c 8.2  - target euglycemia 120-180  - NPH 5q6 w/ ISS, will titrate based on coverage needs    ---H---  #Downtrending Hgb  - s/p 2 units PRBC (2nd stopped for possbile transfusion reaction)  - f/u CBC  - CT CAP w/ IV contrast negative for acute intrathoracic bleeding pathology s/p fall  - transfusion goal of 8 given CAD    #Transfusion Reaction  - 2nd transfusion stopped given acute onsent hypotension requiring vasopressor support, febrile later in day  - unlikely TRALI given minimal pulmonary congestion, normal PF ratios on minimal supplemental 02  - per report patient with diffuse maculopapular truncal rash during episode, self limiting with no use of anti-histamines or epi  - f/u hemolytic labs     #VTE prophylaxis  -BL SCDs, hold chemoprophylaxis at this time given hematoma    ---ID---  #Fever  - transfusion reaction vs infectious etiology  - UA grossly unremarkable  - POCUS with BL trace pleural effusions, A line predominance with trace basilar B-lining, and non-mobile air bronchogram, PNA vs atelectasis?  - f/u BCx x2  - CT CAP w/ no evidence of acute infectious abdominal pathology  - Empiric zosyn until blood cultures result       SOCIAL/FAMILY:  [x] awaiting [] updated at bedside [] family meeting    CODE STATUS:  [x] Full Code [] DNR [] DNI [] Palliative/Comfort Care    DISPOSITION:  [x] ICU [] Stroke Unit [] Floor [] EMU [] RCU [] PCU    [x] Patient is at high risk of neurologic deterioration/death due to: acute stroke, hemorrhagic conversion    ASSESSMENT/PLAN: R M2 thrombectomy s/p TNK 4/16 at 16:57 s/p MT TICI 2B    ---N---  #Acute stroke s/p R M@ MT  - 2hr stroke checks, VS Q1  - MR with large acute right MCA territory infarct with extensive cortical petechial hemorrhage  - Start ASA 81 and DVT ppx once 24 hours stability is performed and unremarkable (with caution given that he has a hematoma on the right hip)  - 3% @ 100cc/hr for NA goal 140-150 in setting of cytotoxic edema     #Vertebral compression fracture  - Ortho on board (covering spine)  - MRI c -spine, Suspected acute anterior longitudinal ligament injury and/or anterior corner avulsion fractures at C4-C5 and C5-C6. Prevertebral edema extending from C1 through the upper cervical levels.  - c-collar cleared    ---P---  - on 2L NC (Fi02 28%)  - ABG w/ Pa02 160, PF ratio 571  - monitor airway given mental status  - POCUS with BL trace pleural effusions, A line predominance with trace basilar B-lining, and non-mobile air bronchogram, PNA vs atelectasis?    -CV-  #Hypontesion  - SBP , MAP >65   - unclear etiology, septic vs volume depletion  - on damari @ 0.4, CBC w/ Hgb @ goal (>8)    #Hx of CAD s/p stenting  - will resume ASA when safe  - TTE w/ EF ~ 65%, entire septum, mid and apical inferior wall, and apex are abnormal, and mild (grade 1) left ventricular diastolic dysfunction, with normal filling pressure  - hold home beta-blocker, resume GDMT once off vasopressors    ---GI---  - failed dysphagia screen  - FEEST tomorrow   - NPO given airway concerns  - bowel regimen miralax/senna  - PPI     ------  - 3%@100cc/hr for cytotoxic edema  - f/u BMP  - I&O  - NA goal 140-150    ---E---  #DM  - a1c 8.2  - target euglycemia 120-180  - NPH 5q6 w/ ISS, will titrate based on coverage needs    ---H---  #Downtrending Hgb  - s/p 2 units PRBC (2nd stopped for transfusion reaction)  - post transfusion CBC w/ Hgb >8  - CT CAP w/ IV contrast negative for acute intrathoracic bleeding pathology s/p fall, R thigh hematoma  - transfusion goal of 8 given CAD    #Transfusion Reaction  - 2nd transfusion stopped given acute onsent hypotension requiring vasopressor support, febrile later in day  - unlikely TRALI given minimal pulmonary congestion, normal PF ratios on minimal supplemental 02  - per report patient with diffuse maculopapular truncal rash during episode, self limiting with no use of anti-histamines or epi  - f/u hemolytic labs     #VTE prophylaxis  -BL SCDs, hold chemoprophylaxis at this time given hematoma    ---ID---  #Fever  - transfusion reaction vs infectious etiology  - UA grossly unremarkable  - POCUS with BL trace pleural effusions, A line predominance with trace basilar B-lining, and non-mobile air bronchogram, PNA vs atelectasis?  - f/u BCx x2  - CT CAP w/ no evidence of acute infectious abdominal pathology  - Empiric zosyn until blood cultures result       SOCIAL/FAMILY:  [x] awaiting [] updated at bedside [] family meeting    CODE STATUS:  [x] Full Code [] DNR [] DNI [] Palliative/Comfort Care    DISPOSITION:  [x] ICU [] Stroke Unit [] Floor [] EMU [] RCU [] PCU    [x] Patient is at high risk of neurologic deterioration/death due to: acute stroke, hemorrhagic conversion    ASSESSMENT/PLAN: R M2 thrombectomy s/p TNK 4/16 at 16:57 s/p MT TICI 2B    ---N---  #Acute stroke s/p R M@ MT  - 2hr stroke checks, VS Q1  - MR with large acute right MCA territory infarct with extensive cortical petechial hemorrhage  - Start ASA 81 and DVT ppx once 24 hours stability is performed and unremarkable (with caution given that he has a hematoma on the right hip)  - 3% @ 100cc/hr for NA goal 140-150 in setting of cytotoxic edema    #Vertebral compression fracture  - Ortho on board (covering spine)  - MRI c -spine, Suspected acute anterior longitudinal ligament injury and/or anterior corner avulsion fractures at C4-C5 and C5-C6. Prevertebral edema extending from C1 through the upper cervical levels.  - c-collar cleared, no acute intervention     ---P---  - on 2L NC (Fi02 28%)  - ABG w/ Pa02 160, PF ratio 571  - monitor airway given mental status  - POCUS with BL trace pleural effusions, A line predominance with trace basilar B-lining, and non-mobile air bronchogram, PNA vs atelectasis?    -CV-  #Hypontension  - SBP goal , MAP >65   - unclear etiology, septic vs intravascular volume depletion  - on damari @ 0.4, CBC w/ Hgb @ goal (>8)    #Hx of CAD s/p stenting  - will resume ASA when safe  - TTE w/ EF ~ 65%, entire septum, mid and apical inferior wall, and apex are abnormal, and mild (grade 1) left ventricular diastolic dysfunction, with normal filling pressure  - hold home beta-blocker, resume GDMT once off vasopressors    ---GI---  - failed dysphagia screen  - FEEST tomorrow   - NPO given airway concerns  - bowel regimen miralax/senna  - PPI     ------  - 3%@100cc/hr for cytotoxic edema  - f/u BMP  - I&O  - NA goal 140-150    ---E---  #DM  - a1c 8.2  - target euglycemia 120-180  - NPH 5q6 w/ ISS, will titrate based on coverage needs    ---H---  #Downtrending Hgb  - s/p 2 units PRBC (2nd stopped for transfusion reaction)  - post transfusion CBC w/ Hgb >8  - CT CAP w/ IV contrast negative for acute intrathoracic bleeding pathology s/p fall, R hip hematoma  - transfusion goal of 8 given hx of CAD    #Transfusion Reaction  - 2nd transfusion stopped given acute onset hypotension requiring vasopressor support, febrile later in day  - unlikely TRALI/TACO given minimal pulmonary congestion, normal PF ratios on minimal supplemental 02  - per report patient with diffuse maculopapular truncal rash during episode, self limiting with no use of anti-histamines or epi  - hemolytic labs w/o evidence of acute hemolytic transfusion reaction    #VTE prophylaxis  -BL SCDs, hold chemoprophylaxis at this time given hematoma R hip    ---ID---  #Fever  - transfusion reaction vs infectious etiology  - UA grossly unremarkable  - POCUS with BL trace pleural effusions, A line predominance with trace basilar B-lining, and non-mobile air bronchogram, PNA vs atelectasis?  - f/u BCx x2  - CT CAP w/ no evidence of acute infectious abdominal pathology  - Empiric zosyn until blood cultures result       SOCIAL/FAMILY:  [x] awaiting [] updated at bedside [] family meeting    CODE STATUS:  [x] Full Code [] DNR [] DNI [] Palliative/Comfort Care    DISPOSITION:  [x] ICU [] Stroke Unit [] Floor [] EMU [] RCU [] PCU    [x] Patient is at high risk of neurologic deterioration/death due to: acute stroke, hemorrhagic conversion

## 2024-04-18 NOTE — DIETITIAN INITIAL EVALUATION ADULT - FACTORS AFF FOOD INTAKE
Pt continues on EN feeds via NGT: Glucerna 1.2 at 70ml/hr x 24 hrs. Initiated 4/17. Goal rate met 4/18. S/P swallow evaluation 4/17; recommended NPO with non-oral means of nutrition/hydration.

## 2024-04-18 NOTE — PROGRESS NOTE ADULT - ASSESSMENT
81 years old man with a PMH of DM HTN CAD with stenting presenting for sudden onset left sided weakness. Family heard a thud and brought patient to ED. Patient received tenecteplase at [facility] and was transferred for thrombectomy.  NIHSS 11  mRS 0  LKW 1:30 pm  tenecteplase given at [facility] at 17:07  MT performed TICI 2B    Current NIHSS: 17    impression: L deficits including hemiparesis, left facial droop, dysarthria, neglect, LHH 2/2 right MCA infarct iso right M2 occlusion, s/p tenecteplase and endovascular therapy, TICI 2B. Mechanism ESUS.     Recommendations:  [x] MRI brain without contrast   [x] TTE   [] q1 neurochecks   [] Spine findings per spine service   [] Implantable loop recorder  [x] LDL 50, HbA1c 8.2   [x] Atorvastatin 80mg (titrate to LDL < 70)   [x] hold AC/AP given hemorrhagic conversion   [x] Diet NPO with Tube Feed   [] PT/OT; S/S evaluation  [x] SCD    Seen and d/w Dr. Terri Hernandez

## 2024-04-18 NOTE — DIETITIAN INITIAL EVALUATION ADULT - NS FNS DIET ORDER
Diet, NPO with Tube Feed:   Tube Feeding Modality: Nasogastric  Glucerna 1.2 Billy (GLUCERNARTH)  Total Volume for 24 Hours (mL): 1680  Continuous  Starting Tube Feed Rate {mL per Hour}: 20  Increase Tube Feed Rate by (mL): 10     Every 2 hours  Until Goal Tube Feed Rate (mL per Hour): 70  Tube Feed Duration (in Hours): 24  Tube Feed Start Time: 10:00 (04-17-24 @ 09:40)

## 2024-04-18 NOTE — PROGRESS NOTE ADULT - SUBJECTIVE AND OBJECTIVE BOX
*************************************  NEUROLOGY PROGRESS NOTE  **************************************    YANNA TRONCOSO  Male  [id number]    Subjective: Patient seen and examined at bedside with Neurology team and attending     Interval History:  -required PRBC's and platelet transfusion   -on damari   -worsening mental status   -MRI brain done showing large R MCA infarct with hemorrhagic conversion         VITAL SIGNS:  Vital Signs Last 24 Hrs  T(C): 36.9 (18 Apr 2024 07:00), Max: 37.8 (17 Apr 2024 19:00)  T(F): 98.5 (18 Apr 2024 07:00), Max: 100.1 (17 Apr 2024 19:00)  HR: 88 (18 Apr 2024 11:15) (76 - 105)  BP: --  BP(mean): --  RR: 19 (18 Apr 2024 11:15) (12 - 24)  SpO2: 100% (18 Apr 2024 11:15) (92% - 100%)    Parameters below as of 18 Apr 2024 07:00  Patient On (Oxygen Delivery Method): room air    Neurologic Exam:  Mental status - Eyes closed, minimally opens eyes to vocal, tactile, noxious stimuli. Minimal verbal output. Follows simple commands.     Cranial nerves - L surgical pupil, right pupil round and reactive to light, EOMI, left facial palsy, hearing intact b/l, palate with symmetric elevation, tongue midline.     Motor - Normal bulk and tone throughout.   Strength testing  RUE:5/5   RLE:5/5  LUE:0/5   LLE:2/5     Sensation - Light touch intact throughout    DTR's -  did not assess given focused neurological exam     Coordination - ALYSSA    Gait and station - ALYSSA given c/f safety       LABS:    CBC                       9.2    6.35  )-----------( 143      ( 18 Apr 2024 10:41 )             28.5     Chem 04-18    141  |  111<H>  |  14  ----------------------------<  193<H>  4.3   |  23  |  0.73    Ca    7.8<L>      18 Apr 2024 10:41  Phos  2.2     04-18  Mg     2.0     04-18    TPro  4.9<L>  /  Alb  2.6<L>  /  TBili  0.5  /  DBili  x   /  AST  30  /  ALT  22  /  AlkPhos  62  04-18    LFTs LIVER FUNCTIONS - ( 18 Apr 2024 10:41 )  Alb: 2.6 g/dL / Pro: 4.9 g/dL / ALK PHOS: 62 U/L / ALT: 22 U/L / AST: 30 U/L / GGT: x           Coagulopathy PT/INR - ( 18 Apr 2024 07:00 )   PT: 11.1 sec;   INR: 1.01 ratio         PTT - ( 18 Apr 2024 07:00 )  PTT:25.1 sec  Lipid Panel 04-16 Chol 103 LDL -- HDL 41 Trig 52  A1c   Cardiac enzymes     U/A Urinalysis Basic - ( 18 Apr 2024 10:41 )    Color: x / Appearance: x / SG: x / pH: x  Gluc: 193 mg/dL / Ketone: x  / Bili: x / Urobili: x   Blood: x / Protein: x / Nitrite: x   Leuk Esterase: x / RBC: x / WBC x   Sq Epi: x / Non Sq Epi: x / Bacteria: x      CSF  Immunological  Other      RADIOLOGY & ADDITIONAL STUDIES:           *************************************  NEUROLOGY PROGRESS NOTE  **************************************    YANNA TRONCOSO  Male  MRN-[id number]    Subjective: Patient seen and examined at bedside with Neurology team and attending     Interval History:  -required PRBC's and platelet transfusion   -on levophed and phenylephrine    -worsening mental status   -MRI brain done showing large R MCA infarct with hemorrhagic conversion         VITAL SIGNS:  Vital Signs Last 24 Hrs  T(C): 36.9 (18 Apr 2024 07:00), Max: 37.8 (17 Apr 2024 19:00)  T(F): 98.5 (18 Apr 2024 07:00), Max: 100.1 (17 Apr 2024 19:00)  HR: 88 (18 Apr 2024 11:15) (76 - 105)  BP: --  BP(mean): --  RR: 19 (18 Apr 2024 11:15) (12 - 24)  SpO2: 100% (18 Apr 2024 11:15) (92% - 100%)    Parameters below as of 18 Apr 2024 07:00  Patient On (Oxygen Delivery Method): room air    Neurologic Exam:  Mental status - Eyes closed, minimally opens eyes to vocal, tactile, noxious stimuli. Minimal verbal output. Dysarthric. Follows simple commands.     Cranial nerves - L surgical pupil, right pupil round and reactive to light, EOMI, absent blink to threat on the left, left facial palsy, hearing intact b/l, palate with symmetric elevation, tongue midline.     Motor - Normal bulk and tone throughout.   Strength testing  RUE:antigravity   RLE:antigravity   LUE: no movement   LLE:moves in the plane of the bed.     Sensation - Light touch intact throughout    DTR's -  did not assess given focused neurological exam     Coordination - ALYSSA    Gait and station - ALYSSA given c/f safety       LABS:    CBC                       9.2    6.35  )-----------( 143      ( 18 Apr 2024 10:41 )             28.5     Chem 04-18    141  |  111<H>  |  14  ----------------------------<  193<H>  4.3   |  23  |  0.73    Ca    7.8<L>      18 Apr 2024 10:41  Phos  2.2     04-18  Mg     2.0     04-18    TPro  4.9<L>  /  Alb  2.6<L>  /  TBili  0.5  /  DBili  x   /  AST  30  /  ALT  22  /  AlkPhos  62  04-18    LFTs LIVER FUNCTIONS - ( 18 Apr 2024 10:41 )  Alb: 2.6 g/dL / Pro: 4.9 g/dL / ALK PHOS: 62 U/L / ALT: 22 U/L / AST: 30 U/L / GGT: x           Coagulopathy PT/INR - ( 18 Apr 2024 07:00 )   PT: 11.1 sec;   INR: 1.01 ratio         PTT - ( 18 Apr 2024 07:00 )  PTT:25.1 sec  Lipid Panel 04-16 Chol 103 LDL -- HDL 41 Trig 52  A1c   Cardiac enzymes     U/A Urinalysis Basic - ( 18 Apr 2024 10:41 )    Color: x / Appearance: x / SG: x / pH: x  Gluc: 193 mg/dL / Ketone: x  / Bili: x / Urobili: x   Blood: x / Protein: x / Nitrite: x   Leuk Esterase: x / RBC: x / WBC x   Sq Epi: x / Non Sq Epi: x / Bacteria: x      CSF  Immunological  Other      RADIOLOGY & ADDITIONAL STUDIES:      < from: MR Cervical Spine No Cont (04.17.24 @ 21:56) >  MR brain:  -Redemonstrated large acute right MCA territory infarct with extensive   cortical petechial hemorrhage. No gross hematoma formation.    MR cervical spine:  -Suspected acute anterior longitudinal ligament injury and/or anterior   corner avulsion fractures at C4-C5 and C5-C6. Prevertebral edema   extending from C1 through the upper cervical levels.    -Suspected right greater than left posterior paraspinal muscular strain   and/or ligamentous injury.    < end of copied text >      < from: TTE W or WO Ultrasound Enhancing Agent (04.17.24 @ 06:43) >   1. Left ventricular cavity is normal in size. Left ventricular wall thickness is normal. Left ventricular systolic function is normal with an ejection fraction of 65 % by Carney's method of disks. Regional wall motion abnormalities present.   2. Entire septum, mid and apical inferior wall, and apex are abnormal.   3. There is no evidence of a left ventricular thrombus.   4. There is mild (grade 1) left ventricular diastolic dysfunction, with normal filling pressure.   5. Normal right ventricular cavity size, with normal wall thickness, and normal systolic function.   6. Normal left and right atrial size.   7. No significant valvular disease.   8. Pulmonary artery systolic pressure could not be estimated.   9. No pericardial effusion seen.  10. Compared to the transthoracic echocardiogram performed on 10/14/2021, there have been no significant interval changes.    < end of copied text >

## 2024-04-18 NOTE — DIETITIAN INITIAL EVALUATION ADULT - PERTINENT LABORATORY DATA
04-18    142  |  111<H>  |  13  ----------------------------<  180<H>  4.8   |  23  |  0.75    Ca    7.6<L>      18 Apr 2024 06:04  Phos  3.0     04-18  Mg     2.1     04-18    TPro  4.5<L>  /  Alb  2.3<L>  /  TBili  0.3  /  DBili  x   /  AST  21  /  ALT  19  /  AlkPhos  62  04-16  POCT Blood Glucose.: 158 mg/dL (04-18-24 @ 05:14)  A1C with Estimated Average Glucose Result: 8.2 % (04-16-24 @ 22:59)

## 2024-04-18 NOTE — DIETITIAN INITIAL EVALUATION ADULT - REASON INDICATOR FOR ASSESSMENT
Nutrition Assessment warranted for length of stay.  Information obtained from: RN, comprehensive chart review, interdisciplinary medical rounds  Nutrition Assessment warranted for length of stay.  Information obtained from: RN, comprehensive chart review, interdisciplinary medical rounds, spouse

## 2024-04-18 NOTE — PROVIDER CONTACT NOTE (OTHER) - ACTION/TREATMENT ORDERED:
As per HOLLEY Stephen and MD Skaggs at bedside, no interventions at this time.
1 unit of PRBC will be administered as per order, as per HOLLEY Ramos time of completion re-check CBC 3 hours after, next CBC due at 7:41 P.M.
ICU team and MD Orozco at bedside, no further intervention at this time

## 2024-04-18 NOTE — PROVIDER CONTACT NOTE (OTHER) - ASSESSMENT
Patient Erich has complaints of cramping abdominal pain, rated 7/10, only when coughing. Pt does not complain of pain when resting, only when coughing. Pt points to pain in upper abdominal quadrants. Patient sounds gurgling, with wet respiratory sounds. RN suctioned pt orally with yanker and

## 2024-04-18 NOTE — PROGRESS NOTE ADULT - ASSESSMENT
On Renato for SBP   xfused 1u pRBC some response, additional 1u given cardiac hx  FEEST in AM   repeat CTH in AM  Spine per ortho On Renato for SBP   xfused 1u pRBC some response, additional 1u given cardiac hx  FEEST in AM   repeat CTH in AM  Spine per ortho  Na 145-155

## 2024-04-18 NOTE — CONSULT NOTE ADULT - TIME BILLING
Please note that over 55 minutes of time was spent in care of this patient including  - pre visit preparation  - In person visit  - post visit documentation  - review of imaging  - discussion with colleagues
Agree with above

## 2024-04-18 NOTE — PROGRESS NOTE ADULT - SUBJECTIVE AND OBJECTIVE BOX
Patient seen and examined at bedside.    --Anticoagulation--    T(C): 37.7 (04-17-24 @ 23:00), Max: 37.8 (04-17-24 @ 19:00)  HR: 91 (04-18-24 @ 01:15) (66 - 105)  BP: --  RR: 16 (04-18-24 @ 01:15) (11 - 24)  SpO2: 96% (04-18-24 @ 01:15) (91% - 100%)  Wt(kg): --    Exam: AOx3, PERRL, EOMI, L facial, no drift on R, LUE wds, LLE wds AG, R side strong

## 2024-04-18 NOTE — DIETITIAN INITIAL EVALUATION ADULT - REASON FOR ADMISSION
Pt is a 80 yo M found down by family. Found to have a R M2 occlusion. Transferred to [facility] for MT, TICI2B, one pass. CTH negative for heme. PMH: DM, HTN, CAD s/p stents on ASA.

## 2024-04-18 NOTE — DIETITIAN INITIAL EVALUATION ADULT - ETIOLOGY
altered neurological function s/p right MCA occlusion increased demand for nutrient in the setting of brain injury with trauma

## 2024-04-18 NOTE — DIETITIAN INITIAL EVALUATION ADULT - ENTERAL
If EN feeds to continue, may consider volume restricted formula in setting of sodium goals. Consider Glucerna 1.5 at GOAL rate 50ml/hr x 24 hrs. Will provide (based on dosing wt 70.9kg): 1200ml, 1800kcal (25.4kcal/kg) and 99g protein (1.4g protein/kg).

## 2024-04-18 NOTE — CHART NOTE - NSCHARTNOTEFT_GEN_A_CORE
Incidental findings are findings on history, physical examination, lab work, and imaging that are not directly related to the patient's chief complaint and cause for hospital admission.     1. The incidental findings for this patient are (please list):  - pancreatic liver lesion 0.7cm    2. Were these findings explained to the patient and/or their family (in the event that either the patient requests communication with their family or the patient is unable to understand or remember the findings and plan)? Finding discussed with patient daughter.    3. Was a copy of the lab work/ imaging report given to the patient and/or their family? YES    4. Was the patient asked whether they can follow up with their PMD regarding this finding? Yes, daughter reports patient will followup with PCP.    5. Was the finding documented on the discharge summary? Not at this time. Patient remains admitted.

## 2024-04-18 NOTE — PROGRESS NOTE ADULT - SUBJECTIVE AND OBJECTIVE BOX
HOSPITAL COURSE: 78yo man LKW 1:30-2pm today, found down by family, ?fell down stairs and family heard a thud, brought to [facility] ED, where NIHSS reportedly 7, given TNK 16:57, found to have R M2 occlusion, transferred to [facility] for MT, TICI2B, one pass.     PMH DM, HTN, CAD s/p stents on ASA per report, unclear also on plavix     Admission Scores  GCS: 15  HH:   MF:   NIHSS:  16 at [facility] ED  RASS:    CAM-ICU:   ICH:    Allergies    No Known Allergies    Intolerances    REVIEW OF SYSTEMS: [ ] Unable to Assess due to neurologic exam   [ x] All ROS addressed below are non-contributory, except: neck pain, back pain, groin pain, denies abd pain/chest pain  Neuro: [ ] Headache [ ] Back pain [ ] Numbness [ ] Weakness [ ] Ataxia [ ] Dizziness [ ] Aphasia [ ] Dysarthria [ ] Visual disturbance  Resp: [ ] Shortness of breath/dyspnea, [ ] Orthopnea [ ] Cough  CV: [ ] Chest pain [ ] Palpitation [ ] Lightheadedness [ ] Syncope  Renal: [ ] Thirst [ ] Edema  GI: [ ] Nausea [ ] Emesis [ ] Abdominal pain [ ] Constipation [ ] Diarrhea  Hem: [ ] Hematemesis [ ] bright red blood per rectum  ID: [ ] Fever [ ] Chills [ ] Dysuria  ENT: [ ] Rhinorrhea      DEVICES:   [ ] Restraints [ ] ET tube [ ] central line [ x] arterial line L radial [x ] talbert [ ] NGT/OGT [ ] EVD [ ] LD [ ] PARI/HMV [ ] Trach [ ] PEG [ ] Chest Tube     VITALS:   Vital Signs Last 24 Hrs  T(C): 36.8 (16 Apr 2024 20:03), Max: 36.8 (16 Apr 2024 19:15)  T(F): 98.2 (16 Apr 2024 19:15), Max: 98.2 (16 Apr 2024 19:15)  HR: 84 (16 Apr 2024 22:00) (79 - 86)  BP: 111/74 (16 Apr 2024 20:03) (111/74 - 112/74)  BP(mean): --  RR: 20 (16 Apr 2024 22:00) (14 - 20)  SpO2: 97% (16 Apr 2024 22:00) (95% - 99%)    Parameters below as of 16 Apr 2024 21:45  Patient On (Oxygen Delivery Method): nasal cannula  O2 Flow (L/min): 2    CAPILLARY BLOOD GLUCOSE  232 (16 Apr 2024 19:01)      POCT Blood Glucose.: 232 mg/dL (16 Apr 2024 18:53)    I&O's Summary      LABS:                        11.7   13.64 )-----------( 132      ( 16 Apr 2024 19:05 )             37.1     04-16    139  |  116<H>  |  13  ----------------------------<  148<H>  3.2<L>   |  14<L>  |  0.50          phenylephrine 1.4  NS 70    IVF FLUIDS/MEDICATIONS:   MEDICATIONS  (STANDING):  acetaminophen   IVPB .. 1000 milliGRAM(s) IV Intermittent once  atorvastatin 80 milliGRAM(s) Oral at bedtime  chlorhexidine 4% Liquid 1 Application(s) Topical daily  polyethylene glycol 3350 17 Gram(s) Oral daily  senna 2 Tablet(s) Oral at bedtime  sodium chloride 0.9%. 1000 milliLiter(s) (70 mL/Hr) IV Continuous <Continuous>    EXAMINATION:  PHYSICAL EXAM:    Constitutional: No Acute Distress     Neurological: awake, alert, L pupil surgical large/irregular, R 3mmR, able to say name but severely dysarthric, did not know where he is, but knows month/year with choices, L facial, LUE/LLE WD, RUE/RLE distally FC full strength    Pulmonary: Clear to Auscultation, No rales, No rhonchi, No wheezes     Cardiovascular: S1, S2, Regular rate and rhythm     Gastrointestinal: Soft, Non-tender, Non-distended     Extremities: No calf tenderness     Incision:    HOSPITAL COURSE: 78yo man LKW 1:30-2pm today, found down by family, ?fell down stairs and family heard a thud, brought to [facility] ED, where NIHSS reportedly 7, given TNK 16:57, found to have R M2 occlusion, transferred to [facility] for MT, TICI2B, one pass.     PMH DM, HTN, CAD s/p stents on ASA per report, unclear also on plavix     Admission Scores  GCS: 15  HH:   MF:   NIHSS:  16 at [facility] ED  RASS:    CAM-ICU:   ICH:    04/17: patient CT abd and pe concerning for an active bleed from the left him hematoma. Trauma on board, binder placed, patient s/p 1 u of PRBCs.   04/18: Patient off pressor requirement. Goal to maintain Hg > 8.    Allergies    No Known Allergies    Intolerances    REVIEW OF SYSTEMS: [ ] Unable to Assess due to neurologic exam   [ x] All ROS addressed below are non-contributory, except: neck pain, back pain, groin pain, denies abd pain/chest pain  Neuro: [ ] Headache [ ] Back pain [ ] Numbness [ ] Weakness [ ] Ataxia [ ] Dizziness [ ] Aphasia [ ] Dysarthria [ ] Visual disturbance  Resp: [ ] Shortness of breath/dyspnea, [ ] Orthopnea [ ] Cough  CV: [ ] Chest pain [ ] Palpitation [ ] Lightheadedness [ ] Syncope  Renal: [ ] Thirst [ ] Edema  GI: [ ] Nausea [ ] Emesis [ ] Abdominal pain [ ] Constipation [ ] Diarrhea  Hem: [ ] Hematemesis [ ] bright red blood per rectum  ID: [ ] Fever [ ] Chills [ ] Dysuria  ENT: [ ] Rhinorrhea      DEVICES:   [ ] Restraints [ ] ET tube [ ] central line [ x] arterial line L radial [x ] talbert [ ] NGT/OGT [ ] EVD [ ] LD [ ] PARI/HMV [ ] Trach [ ] PEG [ ] Chest Tube     ICU Vital Signs Last 24 Hrs  T(C): 36.9 (18 Apr 2024 07:00), Max: 37.8 (17 Apr 2024 19:00)  T(F): 98.5 (18 Apr 2024 07:00), Max: 100.1 (17 Apr 2024 19:00)  HR: 76 (18 Apr 2024 08:00) (76 - 105)  BP: --  BP(mean): --  ABP: 127/48 (18 Apr 2024 08:00) (97/37 - 140/67)  ABP(mean): 76 (18 Apr 2024 08:00) (57 - 93)  RR: 16 (18 Apr 2024 08:00) (12 - 20)  SpO2: 96% (18 Apr 2024 08:00) (93% - 100%)    O2 Parameters below as of 18 Apr 2024 07:00  Patient On (Oxygen Delivery Method): room air                            8.5    7.55  )-----------( 84       ( 18 Apr 2024 06:03 )             25.5   04-18    142  |  111<H>  |  13  ----------------------------<  180<H>  4.8   |  23  |  0.75    Ca    7.6<L>      18 Apr 2024 06:04  Phos  3.0     04-18  Mg     2.1     04-18    TPro  4.5<L>  /  Alb  2.3<L>  /  TBili  0.3  /  DBili  x   /  AST  21  /  ALT  19  /  AlkPhos  62  04-16          phenylephrine 1.4  NS 70    IVF FLUIDS/MEDICATIONS:   MEDICATIONS  (STANDING):  acetaminophen   IVPB .. 1000 milliGRAM(s) IV Intermittent once  atorvastatin 80 milliGRAM(s) Oral at bedtime  chlorhexidine 4% Liquid 1 Application(s) Topical daily  polyethylene glycol 3350 17 Gram(s) Oral daily  senna 2 Tablet(s) Oral at bedtime  sodium chloride 0.9%. 1000 milliLiter(s) (70 mL/Hr) IV Continuous <Continuous>    EXAMINATION:  PHYSICAL EXAM:    Constitutional: No Acute Distress     Neurological: awake, alert, L pupil surgical large/irregular, R 3mmR, able to say name but severely dysarthric, did not know where he is, but knows month/year with choices, L facial, LUE/LLE WD, RUE/RLE distally FC full strength    Pulmonary: Clear to Auscultation, No rales, No rhonchi, No wheezes     Cardiovascular: S1, S2, Regular rate and rhythm     Gastrointestinal: Soft, Non-tender, Non-distended     Extremities: No calf tenderness     Incision:

## 2024-04-18 NOTE — DIETITIAN INITIAL EVALUATION ADULT - SIGNS/SYMPTOMS
s/p acute right MCA occlusion s/p MT; s/p fall with acute fracture s/p swallow evaluation with recommendations to continue NPO via non-oral meals of nutrition

## 2024-04-18 NOTE — CHART NOTE - NSCHARTNOTEFT_GEN_A_CORE
:  Pinky Nino    DATE/TIME: 04/18/24, 2:30am    INDICATION:    [x] Shock    [] Acute Hypoxic Respiratory failure    [ ] Other:       PROCEDURE:    [x] LIMITED ECHO    [x] LIMITED CHEST    [ x] LIMITED ABDOMINAL    [ ] OTHER      FINDINGS:  Cardiac:   LV: Grossly Normal LV Function.   RV: Normal RV size.   Pericardium: No pericardial effusion.      Pulmonary: R anterior B lines. No pleural Effusion     IVC not visible with gaz artifact     No acute retroperitoneal hematoma.    Normal kidney sizes    INTERPRETATION:  Normal GDE  Normal aeration pattern of the lung with R anterior B lines.   IVC indeterminate   No acute retroperitoneal hematoma.    Images stored on PACS/QPATH

## 2024-04-18 NOTE — CONSULT NOTE ADULT - ATTENDING COMMENTS
79 year old male dealing with sequela from CVA. He has fx's involving his cervical and thoracic spine. For now will continue to observe as he recovers from CVA. No acute o/s intervention at this point. We will continue to follow.

## 2024-04-18 NOTE — PROVIDER CONTACT NOTE (OTHER) - REASON
Pt's Hgb 7.7
Patient persistent complaint of abdominal pain
Upon 19:00 assessment patient has no movement on LUE

## 2024-04-18 NOTE — DIETITIAN INITIAL EVALUATION ADULT - NSFNSGIIOFT_GEN_A_CORE
-Height incorrectly documented in electronic medical record. Per [facility] MARY 66".   -A1c 8.2%. Hx of DM noted. Continues on NPH 5u q 6 hrs and insulin lispro sliding scale to aid in management of BG>   -Sodium goal 140-150 in setting of cytotoxic edema. Prescribed NaCl 2% with Na Acetate 50:50, infusing at 75ml/hr.   -No documented BM's recorded thus far. Prescribed Miralax, Senna; IV Protonix.  -Height incorrectly documented in electronic medical record. Per La[facility] HERNANDEZ 66". Confirmed by spouse.   -A1c 8.2%. Hx of DM noted. Continues on NPH 5u q 6 hrs and insulin lispro sliding scale to aid in management of BG>   -Sodium goal 140-150 in setting of cytotoxic edema. Prescribed NaCl 2% with Na Acetate 50:50, infusing at 75ml/hr.   -No documented BM's recorded thus far. Prescribed Miralax, Senna; IV Protonix.

## 2024-04-19 LAB
ANION GAP SERPL CALC-SCNC: 8 MMOL/L — SIGNIFICANT CHANGE UP (ref 5–17)
ANION GAP SERPL CALC-SCNC: 8 MMOL/L — SIGNIFICANT CHANGE UP (ref 5–17)
ANION GAP SERPL CALC-SCNC: 9 MMOL/L — SIGNIFICANT CHANGE UP (ref 5–17)
BASOPHILS # BLD AUTO: 0 K/UL — SIGNIFICANT CHANGE UP (ref 0–0.2)
BASOPHILS # BLD AUTO: 0.01 K/UL — SIGNIFICANT CHANGE UP (ref 0–0.2)
BASOPHILS # BLD AUTO: 0.01 K/UL — SIGNIFICANT CHANGE UP (ref 0–0.2)
BASOPHILS NFR BLD AUTO: 0 % — SIGNIFICANT CHANGE UP (ref 0–2)
BASOPHILS NFR BLD AUTO: 0.2 % — SIGNIFICANT CHANGE UP (ref 0–2)
BASOPHILS NFR BLD AUTO: 0.2 % — SIGNIFICANT CHANGE UP (ref 0–2)
BUN SERPL-MCNC: 13 MG/DL — SIGNIFICANT CHANGE UP (ref 7–23)
BUN SERPL-MCNC: 16 MG/DL — SIGNIFICANT CHANGE UP (ref 7–23)
BUN SERPL-MCNC: 17 MG/DL — SIGNIFICANT CHANGE UP (ref 7–23)
CALCIUM SERPL-MCNC: 7.5 MG/DL — LOW (ref 8.4–10.5)
CALCIUM SERPL-MCNC: 7.6 MG/DL — LOW (ref 8.4–10.5)
CALCIUM SERPL-MCNC: 8.6 MG/DL — SIGNIFICANT CHANGE UP (ref 8.4–10.5)
CHLORIDE SERPL-SCNC: 117 MMOL/L — HIGH (ref 96–108)
CHLORIDE SERPL-SCNC: 122 MMOL/L — HIGH (ref 96–108)
CHLORIDE SERPL-SCNC: 122 MMOL/L — HIGH (ref 96–108)
CO2 SERPL-SCNC: 23 MMOL/L — SIGNIFICANT CHANGE UP (ref 22–31)
CO2 SERPL-SCNC: 23 MMOL/L — SIGNIFICANT CHANGE UP (ref 22–31)
CO2 SERPL-SCNC: 24 MMOL/L — SIGNIFICANT CHANGE UP (ref 22–31)
CREAT SERPL-MCNC: 0.79 MG/DL — SIGNIFICANT CHANGE UP (ref 0.5–1.3)
CREAT SERPL-MCNC: 0.88 MG/DL — SIGNIFICANT CHANGE UP (ref 0.5–1.3)
CREAT SERPL-MCNC: 0.88 MG/DL — SIGNIFICANT CHANGE UP (ref 0.5–1.3)
EGFR: 88 ML/MIN/1.73M2 — SIGNIFICANT CHANGE UP
EGFR: 88 ML/MIN/1.73M2 — SIGNIFICANT CHANGE UP
EGFR: 91 ML/MIN/1.73M2 — SIGNIFICANT CHANGE UP
EOSINOPHIL # BLD AUTO: 0.02 K/UL — SIGNIFICANT CHANGE UP (ref 0–0.5)
EOSINOPHIL # BLD AUTO: 0.03 K/UL — SIGNIFICANT CHANGE UP (ref 0–0.5)
EOSINOPHIL # BLD AUTO: 0.05 K/UL — SIGNIFICANT CHANGE UP (ref 0–0.5)
EOSINOPHIL NFR BLD AUTO: 0.4 % — SIGNIFICANT CHANGE UP (ref 0–6)
EOSINOPHIL NFR BLD AUTO: 0.5 % — SIGNIFICANT CHANGE UP (ref 0–6)
EOSINOPHIL NFR BLD AUTO: 1 % — SIGNIFICANT CHANGE UP (ref 0–6)
GLUCOSE BLDC GLUCOMTR-MCNC: 141 MG/DL — HIGH (ref 70–99)
GLUCOSE BLDC GLUCOMTR-MCNC: 163 MG/DL — HIGH (ref 70–99)
GLUCOSE BLDC GLUCOMTR-MCNC: 179 MG/DL — HIGH (ref 70–99)
GLUCOSE BLDC GLUCOMTR-MCNC: 206 MG/DL — HIGH (ref 70–99)
GLUCOSE BLDC GLUCOMTR-MCNC: 211 MG/DL — HIGH (ref 70–99)
GLUCOSE SERPL-MCNC: 172 MG/DL — HIGH (ref 70–99)
GLUCOSE SERPL-MCNC: 174 MG/DL — HIGH (ref 70–99)
GLUCOSE SERPL-MCNC: 225 MG/DL — HIGH (ref 70–99)
GRAM STN FLD: ABNORMAL
HAPTOGLOB SERPL-MCNC: 66 MG/DL — SIGNIFICANT CHANGE UP (ref 34–200)
HCT VFR BLD CALC: 24.1 % — LOW (ref 39–50)
HCT VFR BLD CALC: 24.1 % — LOW (ref 39–50)
HCT VFR BLD CALC: 25.5 % — LOW (ref 39–50)
HGB BLD-MCNC: 7.9 G/DL — LOW (ref 13–17)
HGB BLD-MCNC: 8 G/DL — LOW (ref 13–17)
HGB BLD-MCNC: 8.1 G/DL — LOW (ref 13–17)
IMM GRANULOCYTES NFR BLD AUTO: 0.3 % — SIGNIFICANT CHANGE UP (ref 0–0.9)
IMM GRANULOCYTES NFR BLD AUTO: 0.4 % — SIGNIFICANT CHANGE UP (ref 0–0.9)
IMM GRANULOCYTES NFR BLD AUTO: 0.4 % — SIGNIFICANT CHANGE UP (ref 0–0.9)
LYMPHOCYTES # BLD AUTO: 1.17 K/UL — SIGNIFICANT CHANGE UP (ref 1–3.3)
LYMPHOCYTES # BLD AUTO: 1.22 K/UL — SIGNIFICANT CHANGE UP (ref 1–3.3)
LYMPHOCYTES # BLD AUTO: 1.31 K/UL — SIGNIFICANT CHANGE UP (ref 1–3.3)
LYMPHOCYTES # BLD AUTO: 21 % — SIGNIFICANT CHANGE UP (ref 13–44)
LYMPHOCYTES # BLD AUTO: 22.7 % — SIGNIFICANT CHANGE UP (ref 13–44)
LYMPHOCYTES # BLD AUTO: 22.7 % — SIGNIFICANT CHANGE UP (ref 13–44)
MAGNESIUM SERPL-MCNC: 2.1 MG/DL — SIGNIFICANT CHANGE UP (ref 1.6–2.6)
MAGNESIUM SERPL-MCNC: 2.2 MG/DL — SIGNIFICANT CHANGE UP (ref 1.6–2.6)
MAGNESIUM SERPL-MCNC: 2.3 MG/DL — SIGNIFICANT CHANGE UP (ref 1.6–2.6)
MCHC RBC-ENTMCNC: 28.6 PG — SIGNIFICANT CHANGE UP (ref 27–34)
MCHC RBC-ENTMCNC: 29 PG — SIGNIFICANT CHANGE UP (ref 27–34)
MCHC RBC-ENTMCNC: 29.2 PG — SIGNIFICANT CHANGE UP (ref 27–34)
MCHC RBC-ENTMCNC: 31.4 GM/DL — LOW (ref 32–36)
MCHC RBC-ENTMCNC: 32.8 GM/DL — SIGNIFICANT CHANGE UP (ref 32–36)
MCHC RBC-ENTMCNC: 33.6 GM/DL — SIGNIFICANT CHANGE UP (ref 32–36)
MCV RBC AUTO: 86.4 FL — SIGNIFICANT CHANGE UP (ref 80–100)
MCV RBC AUTO: 88.9 FL — SIGNIFICANT CHANGE UP (ref 80–100)
MCV RBC AUTO: 91.1 FL — SIGNIFICANT CHANGE UP (ref 80–100)
MONOCYTES # BLD AUTO: 0.27 K/UL — SIGNIFICANT CHANGE UP (ref 0–0.9)
MONOCYTES # BLD AUTO: 0.3 K/UL — SIGNIFICANT CHANGE UP (ref 0–0.9)
MONOCYTES # BLD AUTO: 0.45 K/UL — SIGNIFICANT CHANGE UP (ref 0–0.9)
MONOCYTES NFR BLD AUTO: 5.2 % — SIGNIFICANT CHANGE UP (ref 2–14)
MONOCYTES NFR BLD AUTO: 5.6 % — SIGNIFICANT CHANGE UP (ref 2–14)
MONOCYTES NFR BLD AUTO: 7.2 % — SIGNIFICANT CHANGE UP (ref 2–14)
NEUTROPHILS # BLD AUTO: 3.64 K/UL — SIGNIFICANT CHANGE UP (ref 1.8–7.4)
NEUTROPHILS # BLD AUTO: 3.82 K/UL — SIGNIFICANT CHANGE UP (ref 1.8–7.4)
NEUTROPHILS # BLD AUTO: 4.42 K/UL — SIGNIFICANT CHANGE UP (ref 1.8–7.4)
NEUTROPHILS NFR BLD AUTO: 70.5 % — SIGNIFICANT CHANGE UP (ref 43–77)
NEUTROPHILS NFR BLD AUTO: 70.8 % — SIGNIFICANT CHANGE UP (ref 43–77)
NEUTROPHILS NFR BLD AUTO: 70.9 % — SIGNIFICANT CHANGE UP (ref 43–77)
NRBC # BLD: 0 /100 WBCS — SIGNIFICANT CHANGE UP (ref 0–0)
PHOSPHATE SERPL-MCNC: 2.3 MG/DL — LOW (ref 2.5–4.5)
PHOSPHATE SERPL-MCNC: 2.3 MG/DL — LOW (ref 2.5–4.5)
PHOSPHATE SERPL-MCNC: 2.6 MG/DL — SIGNIFICANT CHANGE UP (ref 2.5–4.5)
PLATELET # BLD AUTO: 107 K/UL — LOW (ref 150–400)
PLATELET # BLD AUTO: 107 K/UL — LOW (ref 150–400)
PLATELET # BLD AUTO: 109 K/UL — LOW (ref 150–400)
POTASSIUM SERPL-MCNC: 3.6 MMOL/L — SIGNIFICANT CHANGE UP (ref 3.5–5.3)
POTASSIUM SERPL-MCNC: 3.8 MMOL/L — SIGNIFICANT CHANGE UP (ref 3.5–5.3)
POTASSIUM SERPL-MCNC: 3.9 MMOL/L — SIGNIFICANT CHANGE UP (ref 3.5–5.3)
POTASSIUM SERPL-SCNC: 3.6 MMOL/L — SIGNIFICANT CHANGE UP (ref 3.5–5.3)
POTASSIUM SERPL-SCNC: 3.8 MMOL/L — SIGNIFICANT CHANGE UP (ref 3.5–5.3)
POTASSIUM SERPL-SCNC: 3.9 MMOL/L — SIGNIFICANT CHANGE UP (ref 3.5–5.3)
RBC # BLD: 2.71 M/UL — LOW (ref 4.2–5.8)
RBC # BLD: 2.79 M/UL — LOW (ref 4.2–5.8)
RBC # BLD: 2.8 M/UL — LOW (ref 4.2–5.8)
RBC # FLD: 15.6 % — HIGH (ref 10.3–14.5)
RBC # FLD: 15.8 % — HIGH (ref 10.3–14.5)
RBC # FLD: 15.9 % — HIGH (ref 10.3–14.5)
SODIUM SERPL-SCNC: 148 MMOL/L — HIGH (ref 135–145)
SODIUM SERPL-SCNC: 154 MMOL/L — HIGH (ref 135–145)
SODIUM SERPL-SCNC: 154 MMOL/L — HIGH (ref 135–145)
SPECIMEN SOURCE: SIGNIFICANT CHANGE UP
WBC # BLD: 5.16 K/UL — SIGNIFICANT CHANGE UP (ref 3.8–10.5)
WBC # BLD: 5.38 K/UL — SIGNIFICANT CHANGE UP (ref 3.8–10.5)
WBC # BLD: 6.24 K/UL — SIGNIFICANT CHANGE UP (ref 3.8–10.5)
WBC # FLD AUTO: 5.16 K/UL — SIGNIFICANT CHANGE UP (ref 3.8–10.5)
WBC # FLD AUTO: 5.38 K/UL — SIGNIFICANT CHANGE UP (ref 3.8–10.5)
WBC # FLD AUTO: 6.24 K/UL — SIGNIFICANT CHANGE UP (ref 3.8–10.5)

## 2024-04-19 PROCEDURE — 70450 CT HEAD/BRAIN W/O DYE: CPT | Mod: 26

## 2024-04-19 PROCEDURE — 99291 CRITICAL CARE FIRST HOUR: CPT

## 2024-04-19 RX ORDER — POTASSIUM CHLORIDE 20 MEQ
20 PACKET (EA) ORAL ONCE
Refills: 0 | Status: COMPLETED | OUTPATIENT
Start: 2024-04-19 | End: 2024-04-20

## 2024-04-19 RX ORDER — POTASSIUM PHOSPHATE, MONOBASIC POTASSIUM PHOSPHATE, DIBASIC 236; 224 MG/ML; MG/ML
30 INJECTION, SOLUTION INTRAVENOUS ONCE
Refills: 0 | Status: COMPLETED | OUTPATIENT
Start: 2024-04-19 | End: 2024-04-20

## 2024-04-19 RX ORDER — SODIUM CHLORIDE 9 MG/ML
1000 INJECTION, SOLUTION INTRAVENOUS ONCE
Refills: 0 | Status: COMPLETED | OUTPATIENT
Start: 2024-04-19 | End: 2024-04-19

## 2024-04-19 RX ORDER — POTASSIUM CHLORIDE 20 MEQ
20 PACKET (EA) ORAL ONCE
Refills: 0 | Status: COMPLETED | OUTPATIENT
Start: 2024-04-19 | End: 2024-04-19

## 2024-04-19 RX ORDER — LANOLIN ALCOHOL/MO/W.PET/CERES
5 CREAM (GRAM) TOPICAL AT BEDTIME
Refills: 0 | Status: DISCONTINUED | OUTPATIENT
Start: 2024-04-19 | End: 2024-04-23

## 2024-04-19 RX ORDER — SODIUM,POTASSIUM PHOSPHATES 278-250MG
2 POWDER IN PACKET (EA) ORAL ONCE
Refills: 0 | Status: COMPLETED | OUTPATIENT
Start: 2024-04-19 | End: 2024-04-19

## 2024-04-19 RX ORDER — POTASSIUM CHLORIDE 20 MEQ
20 PACKET (EA) ORAL ONCE
Refills: 0 | Status: DISCONTINUED | OUTPATIENT
Start: 2024-04-19 | End: 2024-04-19

## 2024-04-19 RX ORDER — POTASSIUM CHLORIDE 20 MEQ
40 PACKET (EA) ORAL ONCE
Refills: 0 | Status: COMPLETED | OUTPATIENT
Start: 2024-04-19 | End: 2024-04-19

## 2024-04-19 RX ORDER — CALCIUM GLUCONATE 100 MG/ML
2 VIAL (ML) INTRAVENOUS ONCE
Refills: 0 | Status: COMPLETED | OUTPATIENT
Start: 2024-04-19 | End: 2024-04-19

## 2024-04-19 RX ADMIN — PIPERACILLIN AND TAZOBACTAM 25 GRAM(S): 4; .5 INJECTION, POWDER, LYOPHILIZED, FOR SOLUTION INTRAVENOUS at 13:23

## 2024-04-19 RX ADMIN — ATORVASTATIN CALCIUM 80 MILLIGRAM(S): 80 TABLET, FILM COATED ORAL at 21:27

## 2024-04-19 RX ADMIN — Medication 200 GRAM(S): at 14:07

## 2024-04-19 RX ADMIN — PIPERACILLIN AND TAZOBACTAM 25 GRAM(S): 4; .5 INJECTION, POWDER, LYOPHILIZED, FOR SOLUTION INTRAVENOUS at 21:27

## 2024-04-19 RX ADMIN — Medication 4: at 17:56

## 2024-04-19 RX ADMIN — Medication 1 DROP(S): at 17:57

## 2024-04-19 RX ADMIN — PANTOPRAZOLE SODIUM 40 MILLIGRAM(S): 20 TABLET, DELAYED RELEASE ORAL at 13:25

## 2024-04-19 RX ADMIN — LIDOCAINE 1 PATCH: 4 CREAM TOPICAL at 19:00

## 2024-04-19 RX ADMIN — LIDOCAINE 1 PATCH: 4 CREAM TOPICAL at 13:24

## 2024-04-19 RX ADMIN — Medication 2: at 05:46

## 2024-04-19 RX ADMIN — Medication 1 DROP(S): at 05:46

## 2024-04-19 RX ADMIN — SODIUM CHLORIDE 50 MILLILITER(S): 5 INJECTION, SOLUTION INTRAVENOUS at 07:49

## 2024-04-19 RX ADMIN — HUMAN INSULIN 5 UNIT(S): 100 INJECTION, SUSPENSION SUBCUTANEOUS at 13:54

## 2024-04-19 RX ADMIN — CHLORHEXIDINE GLUCONATE 1 APPLICATION(S): 213 SOLUTION TOPICAL at 13:35

## 2024-04-19 RX ADMIN — Medication 5 MILLIGRAM(S): at 21:27

## 2024-04-19 RX ADMIN — Medication 2: at 13:55

## 2024-04-19 RX ADMIN — Medication 40 MILLIEQUIVALENT(S): at 05:45

## 2024-04-19 RX ADMIN — Medication 2 PACKET(S): at 05:45

## 2024-04-19 RX ADMIN — Medication 20 MILLIEQUIVALENT(S): at 17:57

## 2024-04-19 RX ADMIN — POLYETHYLENE GLYCOL 3350 17 GRAM(S): 17 POWDER, FOR SOLUTION ORAL at 05:45

## 2024-04-19 RX ADMIN — PIPERACILLIN AND TAZOBACTAM 25 GRAM(S): 4; .5 INJECTION, POWDER, LYOPHILIZED, FOR SOLUTION INTRAVENOUS at 05:45

## 2024-04-19 RX ADMIN — POLYETHYLENE GLYCOL 3350 17 GRAM(S): 17 POWDER, FOR SOLUTION ORAL at 17:57

## 2024-04-19 RX ADMIN — HUMAN INSULIN 5 UNIT(S): 100 INJECTION, SUSPENSION SUBCUTANEOUS at 17:56

## 2024-04-19 RX ADMIN — FINASTERIDE 5 MILLIGRAM(S): 5 TABLET, FILM COATED ORAL at 13:33

## 2024-04-19 NOTE — PROGRESS NOTE ADULT - SUBJECTIVE AND OBJECTIVE BOX
NSICU NIGHT PROGRESS NOTE     Please see neurosurgery resident H&P for HPI.     12h events   Hb7.9, check next in 12h    Exam   sleepy arousable to voice, oriented on self place and year, L pupil surgical large/irregular, R 3mmR, able to say name but severely dysarthric, FC, L facial, R side 5, LUE WD, LLE 3/5  Pulmonary: diminished in bases BL    VITALS:   Vital Signs Last 24 Hrs  T(C): 37.4 (19 Apr 2024 19:00), Max: 37.4 (19 Apr 2024 19:00)  T(F): 99.3 (19 Apr 2024 19:00), Max: 99.3 (19 Apr 2024 19:00)  HR: 70 (19 Apr 2024 22:00) (54 - 79)  BP: --  BP(mean): --  RR: 18 (19 Apr 2024 22:00) (13 - 20)  SpO2: 98% (19 Apr 2024 22:00) (93% - 100%)    Parameters below as of 19 Apr 2024 19:00  Patient On (Oxygen Delivery Method): room air      CAPILLARY BLOOD GLUCOSE      POCT Blood Glucose.: 206 mg/dL (19 Apr 2024 17:53)  POCT Blood Glucose.: 211 mg/dL (19 Apr 2024 17:52)  POCT Blood Glucose.: 179 mg/dL (19 Apr 2024 13:47)  POCT Blood Glucose.: 163 mg/dL (19 Apr 2024 05:40)  POCT Blood Glucose.: 125 mg/dL (18 Apr 2024 23:30)    I&O's Summary    18 Apr 2024 07:01  -  19 Apr 2024 07:00  --------------------------------------------------------  IN: 5197 mL / OUT: 1925 mL / NET: 3272 mL    19 Apr 2024 07:01  -  19 Apr 2024 23:17  --------------------------------------------------------  IN: 1140 mL / OUT: 900 mL / NET: 240 mL        Respiratory:    ABG - ( 18 Apr 2024 18:50 )  pH, Arterial: 7.46  pH, Blood: x     /  pCO2: 32    /  pO2: 160   / HCO3: 23    / Base Excess: -0.7  /  SaO2: 98.3                LABS:                        7.9    6.24  )-----------( 109      ( 19 Apr 2024 20:08 )             24.1     04-19    154<H>  |  122<H>  |  17  ----------------------------<  172<H>  3.9   |  24  |  0.88        MEDICATION LEVELS:     IVF FLUIDS/MEDICATIONS:   MEDICATIONS  (STANDING):  atorvastatin 80 milliGRAM(s) Oral at bedtime  chlorhexidine 4% Liquid 1 Application(s) Topical daily  finasteride 5 milliGRAM(s) Oral daily  influenza  Vaccine (HIGH DOSE) 0.7 milliLiter(s) IntraMuscular once  insulin lispro (ADMELOG) corrective regimen sliding scale   SubCutaneous every 6 hours  insulin NPH human recombinant 5 Unit(s) SubCutaneous every 6 hours  lidocaine   4% Patch 1 Patch Transdermal daily  melatonin 5 milliGRAM(s) Oral at bedtime  ondansetron   Disintegrating Tablet 4 milliGRAM(s) Oral once  pantoprazole  Injectable 40 milliGRAM(s) IV Push daily  piperacillin/tazobactam IVPB.. 3.375 Gram(s) IV Intermittent every 8 hours  polyethylene glycol 3350 17 Gram(s) Oral two times a day  potassium chloride   Solution 20 milliEquivalent(s) Oral once  potassium phosphate IVPB 30 milliMole(s) IV Intermittent once  senna 2 Tablet(s) Oral at bedtime  timolol 0.5% Solution 1 Drop(s) Left EYE two times a day    MEDICATIONS  (PRN):  methocarbamol 500 milliGRAM(s) Oral every 6 hours PRN musle spasm

## 2024-04-19 NOTE — SWALLOW FEES ASSESSMENT ADULT - PRELIMINARY ENDOSCOPIC EXAMINATIONS
Left sided facial/labial weakness; +lingual weakness; hypophonia, +dysarthria. Pt awake alert and oriented 2-3; perseveratively requesting to eat/drink with daughter at b/s; +cognitive linguistic deficits with decreased insight to deficits. VSS. Pt c/o stomach discomfort prior and following FEES; Nsg aware. Of note, NGT feeds have been on hold this am. Left sided facial/labial weakness; +lingual weakness; hypophonia, +dysarthria. Pt awake alert and oriented 2-3; perseveratively requesting to eat/drink with daughter at b/s; +cognitive linguistic deficits with decreased insight to deficits. VSS. Pt c/o stomach discomfort prior and following FEES; Nsg aware. Of note, NGT feeds have been on hold this am. Frothy secretions within pharynx decreased following ice chip trial. +NGT/Baseline pooling of secretions

## 2024-04-19 NOTE — SWALLOW FEES ASSESSMENT ADULT - DIAGNOSTIC IMPRESSIONS
Pt presents with significant oropharyngeal dysphagia. Pt is an 82 y/o male, large acute right MCA territory infarct with extensive cortical petechial hemorrhage s/p thrombectomy (TICI 2B), who presents with significant oropharyngeal dysphagia primarily characterized by laryngeal penetration on most conservative textures. In addition, Pt presents with hypophonia and dysarthria. Orientation to bolus, oral awareness, oral grading, formation, control and transfer of bolus are impaired. Disorders include: reduced labial and lingual strength/ROM/Rate of motion, reduced BOT to posterior pharyngeal wall contact, delay in trigger of the swallow reflex, reduced laryngeal closure, reduced pharyngeal contractility, and reduced supraglottic sensation. Repeat swallows and cued cough effectively cleared material from laryngeal vestibule however Pt is not a candidate for independent carryover of strategies given underlying cognitive linguistic deficits.

## 2024-04-19 NOTE — PROGRESS NOTE ADULT - SUBJECTIVE AND OBJECTIVE BOX
Patient seen and examined at bedside.    --Anticoagulation--    T(C): 37.2 (04-18-24 @ 19:00), Max: 38.5 (04-18-24 @ 15:00)  HR: 73 (04-18-24 @ 19:45) (68 - 94)  BP: --  RR: 15 (04-18-24 @ 19:45) (12 - 24)  SpO2: 100% (04-18-24 @ 19:45) (92% - 100%)  Wt(kg): --    Exam: AOx3, PERRL, R gaze but can cross midline, L facial, LUE trace wds LLE briefly 3/5 R side strong, R groin soft/intact mild ecchymosis

## 2024-04-19 NOTE — SWALLOW FEES ASSESSMENT ADULT - COMMENTS
4/16/24 Per IR: Pt w/ right MCA infarct, transfer to Neuro IR, for a catheter cerebral angiogram and mechanical thrombectomy. After thrombectomy patient was noted to have NIHSS of 11. CT Head: IMPRESSION: An evolving acute right MCA distribution infarct is again noted in the right posterior frontal, parietal, temporal, and insular regions. The mild edema and mass effect associated with the infarct appears similar compared to the head CT study performed earlier the same day. There has been a substantial interval decrease of the gyriform areas of increased density involving the infarct most likely reflecting evolution of   contrast staining from the angiogram procedure. Small areas of cortical petechial hemorrhagic transformation can't be entirely excluded, as this   study is substantially limited by motion. Continued serial imaging follow-up over time is recommended to monitor for stability.  4/17/24: CT Abd/Pel: IMPRESSION: No CT evidence of acute traumatic sequelae within the chest or abdomen. Left lateral hip subcutaneous hematoma with evidence of active bleeding. *Nonspecific thickened appearance to the upper esophagus. Follow-up upper endoscopy is recommended.* Subcentimeter cystic lesion within the pancreatic head. Nonemergent MRI may be obtained for further characterization, if no contraindications exist. Urinary bladder wall thickening despite underdistention.  CT Spine: IMPRESSION: Acute fracture involving the anterior T6 vertebral body extending into a right anterolateral T5-T6 osteophyte. No significant vertebral body height loss or bony retropulsion into the spinal canal. More sensitive evaluation with MRI may be obtained for further evaluation, if no   contraindications exist. 4/17/24  Pt with increased lethargy and worsening cytoxic edema; Pt also c/o abdominal pain; Pt with wet respiratory sounds. RN suctioned pt orally Hx cont: 4/17/24 Initial swallowing assessment recommended NPO, with non-oral nutrition/hydration/medications and FEES. 4/18/24 FEES deferred given Pt's medical status. 4/19/24 CT Head: IMPRESSION: No significant change in acute right middle cerebral artery infarct with petechial hemorrhage from 4/18/2024. Per Nsx: Downtrending Hgb; - s/p 2 units PRBC (2nd stopped for transfusion reaction); - post transfusion CBC w/ Hgb >8; - CT CAP w/ IV contrast negative for acute intrathoracic bleeding pathology s/p fall, R hip hematoma; - transfusion goal of 8 given hx of CAD; - F/u CBC today at 3 pm. Pt cleared for FEES this am per d/w GENET Valentine and MD in rounds this am. Hx cont: 4/17/24 Initial swallowing assessment recommended NPO, with non-oral nutrition/hydration/medications and FEES. 4/18/24 FEES deferred given Pt's medical status; per report patient became hypotensive during blood transfusion (transfusion reaction?), spiked fever  4/19/24 CT Head: IMPRESSION: No significant change in acute right middle cerebral artery infarct with petechial hemorrhage from 4/18/2024. Per Nsx: Downtrending Hgb; - s/p 2 units PRBC (2nd stopped for transfusion reaction); - post transfusion CBC w/ Hgb >8; - CT CAP w/ IV contrast negative for acute intrathoracic bleeding pathology s/p fall, R hip hematoma; - transfusion goal of 8 given hx of CAD; - F/u CBC today at 3 pm.  *Pt cleared for FEES this am per d/w GENET Valentine and MD in rounds this am.

## 2024-04-19 NOTE — SWALLOW FEES ASSESSMENT ADULT - LARYNGEAL PENETRATION AFTER SWALLOW - SILENT
Noted over Left AEF on moderately thickened liquid tspn wash after puree tspn; of note, Laryngeal penetration not evident on tspn of moderately thickened liquids presented isolation./Mild Primarily evident over Left AEF on moderately thickened liquid tspn wash after puree tspn; of note, Laryngeal penetration not evident on tspn of moderately thickened liquids presented isolation./Mild

## 2024-04-19 NOTE — PROGRESS NOTE ADULT - ASSESSMENT
ASSESSMENT/PLAN: R M2 thrombectomy s/p TNK 4/16 at 16:57 s/p MT TICI 2B    ---N---  #Acute stroke s/p R M@ MT  - 2hr stroke checks, VS Q1  - MR with large acute right MCA territory infarct with extensive cortical petechial hemorrhage  - Start ASA 81 and DVT ppx once 24 hours stability is performed and unremarkable (with caution given that he has a hematoma on the right hip)  - 3% @ 100cc/hr for NA goal 140-150 in setting of cytotoxic edema    #Vertebral compression fracture  - Ortho on board (covering spine)  - MRI c -spine, Suspected acute anterior longitudinal ligament injury and/or anterior corner avulsion fractures at C4-C5 and C5-C6. Prevertebral edema extending from C1 through the upper cervical levels.  - c-collar cleared, no acute intervention     ---P---  - on 2L NC (Fi02 28%)  - ABG w/ Pa02 160, PF ratio 571  - monitor airway given mental status  - POCUS with BL trace pleural effusions, A line predominance with trace basilar B-lining, and non-mobile air bronchogram, PNA vs atelectasis?    -CV-  #Hypontension  - SBP goal , MAP >65   - unclear etiology, septic vs intravascular volume depletion  - on damari @ 0.4, CBC w/ Hgb @ goal (>8)    #Hx of CAD s/p stenting  - will resume ASA when safe  - TTE w/ EF ~ 65%, entire septum, mid and apical inferior wall, and apex are abnormal, and mild (grade 1) left ventricular diastolic dysfunction, with normal filling pressure  - hold home beta-blocker, resume GDMT once off vasopressors    ---GI---  - failed dysphagia screen  - FEEST tomorrow   - NPO given airway concerns  - bowel regimen miralax/senna  - PPI     ------  - 3%@100cc/hr for cytotoxic edema  - f/u BMP  - I&O  - NA goal 140-150    ---E---  #DM  - a1c 8.2  - target euglycemia 120-180  - NPH 5q6 w/ ISS, will titrate based on coverage needs    ---H---  #Downtrending Hgb  - s/p 2 units PRBC (2nd stopped for transfusion reaction)  - post transfusion CBC w/ Hgb >8  - CT CAP w/ IV contrast negative for acute intrathoracic bleeding pathology s/p fall, R hip hematoma  - transfusion goal of 8 given hx of CAD    #Transfusion Reaction  - 2nd transfusion stopped given acute onset hypotension requiring vasopressor support, febrile later in day  - unlikely TRALI/TACO given minimal pulmonary congestion, normal PF ratios on minimal supplemental 02  - per report patient with diffuse maculopapular truncal rash during episode, self limiting with no use of anti-histamines or epi  - hemolytic labs w/o evidence of acute hemolytic transfusion reaction    #VTE prophylaxis  -BL SCDs, hold chemoprophylaxis at this time given hematoma R hip    ---ID---  #Fever  - transfusion reaction vs infectious etiology  - UA grossly unremarkable  - POCUS with BL trace pleural effusions, A line predominance with trace basilar B-lining, and non-mobile air bronchogram, PNA vs atelectasis?  - f/u BCx x2  - CT CAP w/ no evidence of acute infectious abdominal pathology  - Empiric zosyn until blood cultures result       SOCIAL/FAMILY:  [x] awaiting [] updated at bedside [] family meeting    CODE STATUS:  [x] Full Code [] DNR [] DNI [] Palliative/Comfort Care    DISPOSITION:  [x] ICU [] Stroke Unit [] Floor [] EMU [] RCU [] PCU    [x] Patient is at high risk of neurologic deterioration/death due to: acute stroke, hemorrhagic conversion    ASSESSMENT/PLAN: R M2 thrombectomy s/p TNK 4/16 at 16:57 s/p MT TICI 2B    Neuro  #Acute stroke s/p R M@ MT  - 2hr stroke checks, VS Q1  - MR with large acute right MCA territory infarct with extensive cortical petechial hemorrhage  - Hold ASA 81 and DVT ppx once 24 hours stability is performed and unremarkable (with caution given that he has a hematoma on the right hip)  - 3% @ 100cc/hr for NA goal 140-150 in setting of cytotoxic edema    #Vertebral compression fracture ( thoracic T6 )  - Ortho on board (covering spine)  - MRI c -spine, Suspected acute anterior longitudinal ligament injury and/or anterior corner avulsion fractures at C4-C5 and C5-C6. Prevertebral edema extending from C1 through the upper cervical levels.  - c-collar to be placed  - f//u ortho reccs    Pulm   - RA  - sat > 92%  - ABG w/ Pa02 160, PF ratio 571  - monitor airway given mental status  - POCUS with BL trace pleural effusions, A line predominance with trace basilar B-lining, and non-mobile air bronchogram consistent with atelectasis     -CV-  #Hypontension  - SBP goal , MAP >65   - unclear etiology, septic vs intravascular volume depletion  - on damari @ 0.4, CBC w/ Hgb @ goal (>8)    #Hx of CAD s/p stenting  - will resume ASA when safe  - TTE w/ EF ~ 65%, entire septum, mid and apical inferior wall, and apex are abnormal, and mild (grade 1) left ventricular diastolic dysfunction, with normal filling pressure  - hold home beta-blocker, resume GDMT once off vasopressors    ---GI---  - failed dysphagia screen  - FEEST today  - NG tube feeds: Glucerna 1.2  - bowel regimen miralax/senna  - PPI   - last BM: PTA    Renal  - 3%@50cc/hr for cytotoxic edema  - f/u BMP Q6  - I&O  - NA goal 140-150    Endo  #DM  - a1c 8.2  - target euglycemia 120-180  - NPH 5q6 w/ ISS, will titrate based on coverage needs    Hematology  #Downtrending Hgb  - s/p 2 units PRBC (2nd stopped for transfusion reaction)  - post transfusion CBC w/ Hgb >8  - CT CAP w/ IV contrast negative for acute intrathoracic bleeding pathology s/p fall, R hip hematoma  - transfusion goal of 8 given hx of CAD  - F/u CBC today at 3 pm  - If cbc stable will start chemo ppx.    #Transfusion Reaction  - 2nd transfusion stopped given acute onset hypotension requiring vasopressor support, febrile later in day  - unlikely TRALI/TACO given minimal pulmonary congestion, normal PF ratios on minimal supplemental 02  - per report patient with diffuse maculopapular truncal rash during episode, self limiting with no use of anti-histamines or epi  - hemolytic labs w/o evidence of acute hemolytic transfusion reaction    #VTE prophylaxis  -BL SCDs, hold chemoprophylaxis at this time given hematoma R hip    ---ID---  #Fever  - transfusion reaction vs infectious etiology  - UA grossly unremarkable  - POCUS with BL trace pleural effusions, A line predominance with trace basilar B-lining, and non-mobile air bronchogram, PNA vs atelectasis?  - f/u BCx x2  - CT CAP w/ no evidence of acute infectious abdominal pathology  - Empiric zosyn until blood cultures result  - CXR: negative for any consolidation        SOCIAL/FAMILY:  [x] awaiting [] updated at bedside [] family meeting    CODE STATUS:  [x] Full Code [] DNR [] DNI [] Palliative/Comfort Care    DISPOSITION:  [x] ICU [] Stroke Unit [] Floor [] EMU [] RCU [] PCU    [x] Patient is at high risk of neurologic deterioration/death due to: acute stroke, hemorrhagic conversion

## 2024-04-19 NOTE — SWALLOW FEES ASSESSMENT ADULT - ORAL PHASE COMMENTS
Poor orientation to bolus, oral awareness, oral grading, formation, control and transfer of bolus. Anterior and A-P spillage noted on tspn trials. Poor orientation to bolus, oral awareness, oral grading, formation, control and transfer of bolus. Anterior and A-P spillage noted on tspn trials. +Oral residue decreased with repeat swallow

## 2024-04-19 NOTE — PROGRESS NOTE ADULT - ASSESSMENT
ASSESSMENT/PLAN: R M2 thrombectomy s/p TNK 4/16 at 16:57 s/p MT TICI 2B    ---N---  #Acute stroke s/p R M@ MT  - MR with large acute right MCA territory infarct with extensive cortical petechial hemorrhage  - Start ASA 81 and DVT ppx once 24 hours stability is performed and unremarkable (with caution given that he has a hematoma on the right hip)  - stop 3%     #Vertebral compression fracture  - Ortho on board (covering spine)  - MRI c -spine, Suspected acute anterior longitudinal ligament injury and/or anterior corner avulsion fractures at C4-C5 and C5-C6. Prevertebral edema extending from C1 through the upper cervical levels.  - c-collar cleared, no acute intervention     -CV-  #Hypovolemic shock   - SBP goal , MAP >65     #Hx of CAD s/p stenting  - held asa   - TTE w/ EF ~ 65%, entire septum, mid and apical inferior wall, and apex are abnormal, and mild (grade 1) left ventricular diastolic dysfunction, with normal filling pressure  - hold home beta-blocker, resume GDMT once off vasopressors      ---P---  - on 2L NC (Fi02 28%)  - ABG w/ Pa02 160, PF ratio 571  - monitor airway given mental status  - POCUS with BL trace pleural effusions, A line predominance with trace basilar B-lining, and non-mobile air bronchogram, PNA vs atelectasis?      ---GI---  - FEEST failed  - ngtube   - bowel regimen miralax/senna  - PPI     ------  - I&O  - NA goal 140-150    ---E---  #DM  - a1c 8.2  - target euglycemia 120-180  - NPH 5q6 w/ ISS, will titrate based on coverage needs    ---H---  #Downtrending Hgb  - s/p 2 units PRBC (2nd stopped for transfusion reaction)  - post transfusion CBC w/ Hgb >8  - CT CAP w/ IV contrast negative for acute intrathoracic bleeding pathology s/p fall, R hip hematoma  - transfusion goal of 8 given hx of CAD    #Transfusion Reaction  - 2nd transfusion stopped given acute onset hypotension requiring vasopressor support, febrile later in day  - unlikely TRALI/TACO given minimal pulmonary congestion, normal PF ratios on minimal supplemental 02  - per report patient with diffuse maculopapular truncal rash during episode, self limiting with no use of anti-histamines or epi  - hemolytic labs w/o evidence of acute hemolytic transfusion reaction    #VTE prophylaxis  -BL SCDs, hold chemoprophylaxis at this time given hematoma R hip    ---ID---  #Fever  - transfusion reaction vs infectious etiology  - UA grossly unremarkable  - POCUS with BL trace pleural effusions, A line predominance with trace basilar B-lining, and non-mobile air bronchogram, PNA vs atelectasis?  - f/u BCx x2  - CT CAP w/ no evidence of acute infectious abdominal pathology  - Empiric zosyn until blood cultures result     full code     dispo nsicu

## 2024-04-19 NOTE — PROGRESS NOTE ADULT - SUBJECTIVE AND OBJECTIVE BOX
HOSPITAL COURSE: 80yo man PMH HTN, DM, CAD s/p stents on ASA LKW 1:30-2pm today, found down by family, ?fell down stairs and family heard a thud, brought to [facility] ED, where NIHSS reportedly 7, given TNK 16:57, found to have R M2 occlusion, transferred to [facility] for MT, TICI2B, one pass.     Admission Scores  GCS: 15  HH:   MF:   NIHSS:  16 at [facility] ED  RASS:    CAM-ICU:   ICH:    04/18: per report patient became hypotensive during blood transfusion (transfusion reaction?), spiked fever s/p infectious workup, and received 23.4% 30cc for AMS    Allergies    No Known Allergies    Intolerances    REVIEW OF SYSTEMS: [ ] Unable to Assess due to neurologic exam   [ x] All ROS addressed below are non-contributory, except: N/A  Neuro: [ ] Headache [ ] Back pain [ ] Numbness [ ] Weakness [ ] Ataxia [ ] Dizziness [ ] Aphasia [ ] Dysarthria [ ] Visual disturbance  Resp: [ ] Shortness of breath/dyspnea, [ ] Orthopnea [ ] Cough  CV: [ ] Chest pain [ ] Palpitation [ ] Lightheadedness [ ] Syncope  Renal: [ ] Thirst [ ] Edema  GI: [ ] Nausea [ ] Emesis [ ] Abdominal pain [ ] Constipation [ ] Diarrhea  Hem: [ ] Hematemesis [ ] bright red blood per rectum  ID: [ ] Fever [ ] Chills [ ] Dysuria  ENT: [ ] Rhinorrhea      DEVICES:   [ ] Restraints [ ] ET tube [ ] central line [ x] arterial line L radial [x ] talbert [ ] NGT/OGT [ ] EVD [ ] LD [ ] PARI/HMV [ ] Trach [ ] PEG [ ] Chest Tube     ICU Vital Signs Last 24 Hrs  T(C): 38.5 (18 Apr 2024 15:00), Max: 38.5 (18 Apr 2024 15:00)  T(F): 101.3 (18 Apr 2024 15:00), Max: 101.3 (18 Apr 2024 15:00)  HR: 77 (18 Apr 2024 15:30) (75 - 105)  BP: --  BP(mean): --  ABP: 122/46 (18 Apr 2024 15:30) (71/34 - 146/61)  ABP(mean): 70 (18 Apr 2024 15:30) (50 - 93)  RR: 18 (18 Apr 2024 15:30) (12 - 24)  SpO2: 98% (18 Apr 2024 15:30) (92% - 100%)    O2 Parameters below as of 18 Apr 2024 07:00  Patient On (Oxygen Delivery Method): room air    I&O's Summary    17 Apr 2024 07:01  -  18 Apr 2024 07:00  --------------------------------------------------------  IN: 3857.9 mL / OUT: 1685 mL / NET: 2172.9 mL    18 Apr 2024 07:01  -  18 Apr 2024 19:13  --------------------------------------------------------  IN: 2966.6 mL / OUT: 625 mL / NET: 2341.6 mL      MEDICATIONS  (STANDING):  atorvastatin 80 milliGRAM(s) Oral at bedtime  chlorhexidine 4% Liquid 1 Application(s) Topical daily  finasteride 5 milliGRAM(s) Oral daily  influenza  Vaccine (HIGH DOSE) 0.7 milliLiter(s) IntraMuscular once  insulin lispro (ADMELOG) corrective regimen sliding scale   SubCutaneous every 6 hours  insulin NPH human recombinant 5 Unit(s) SubCutaneous every 6 hours  lidocaine   4% Patch 1 Patch Transdermal daily  ondansetron   Disintegrating Tablet 4 milliGRAM(s) Oral once  pantoprazole  Injectable 40 milliGRAM(s) IV Push daily  phenylephrine    Infusion 0.1 MICROgram(s)/kG/Min (2.66 mL/Hr) IV Continuous <Continuous>  piperacillin/tazobactam IVPB.- 3.375 Gram(s) IV Intermittent once  polyethylene glycol 3350 17 Gram(s) Oral two times a day  senna 2 Tablet(s) Oral at bedtime  sodium chloride 3% + sodium acetate 50:50 1000 milliLiter(s) (100 mL/Hr) IV Continuous <Continuous>  timolol 0.5% Solution 1 Drop(s) Left EYE two times a day    MEDICATIONS  (PRN):  methocarbamol 500 milliGRAM(s) Oral every 6 hours PRN musle spasm                          8.8    6.76  )-----------( 137      ( 18 Apr 2024 19:00 )             26.7   04-18    145  |  114<H>  |  13  ----------------------------<  175<H>  4.1   |  22  |  0.74    Ca    7.5<L>      18 Apr 2024 14:53  Phos  1.9     04-18  Mg     1.8     04-18    TPro  4.9<L>  /  Alb  2.6<L>  /  TBili  0.5  /  DBili  x   /  AST  30  /  ALT  22  /  AlkPhos  62  04-18    PT/INR - ( 18 Apr 2024 07:00 )   PT: 11.1 sec;   INR: 1.01 ratio         PTT - ( 18 Apr 2024 07:00 )  PTT:25.1 sec    ABG - ( 18 Apr 2024 18:50 )  pH, Arterial: 7.46  pH, Blood: x     /  pCO2: 32    /  pO2: 160   / HCO3: 23    / Base Excess: -0.7  /  SaO2: 98.3        EXAMINATION:  PHYSICAL EXAM:    Constitutional: No Acute Distress     Neurological: lethargic but rousable Ox3, L pupil surgical large/irregular, R 3mmR, able to say name but severely dysarthric, FC, L facial, R side 5, LUE WD, LLE 3/5    Pulmonary: diminished in bases BL    Cardiovascular: S1, S2, Regular rate and rhythm     Gastrointestinal: Soft, Non-tender, Non-distended     Extremities: No calf tenderness    HOSPITAL COURSE: 80yo man PMH HTN, DM, CAD s/p stents on ASA LKW 1:30-2pm today, found down by family, ?fell down stairs and family heard a thud, brought to [facility] ED, where NIHSS reportedly 7, given TNK 16:57, found to have R M2 occlusion, transferred to [facility] for MT, TICI2B, one pass.     Admission Scores  GCS: 15  HH:   MF:   NIHSS:  16 at [facility] ED  RASS:    CAM-ICU:   ICH:    04/18: per report patient became hypotensive during blood transfusion (transfusion reaction?), spiked fever s/p infectious workup, and received 23.4% 30cc for AMS  04/19: no overnight events. Continue to monitor Hg    Allergies    No Known Allergies    Intolerances    REVIEW OF SYSTEMS: [ ] Unable to Assess due to neurologic exam   [ x] All ROS addressed below are non-contributory, except: N/A  Neuro: [ ] Headache [ ] Back pain [ ] Numbness [ ] Weakness [ ] Ataxia [ ] Dizziness [ ] Aphasia [ ] Dysarthria [ ] Visual disturbance  Resp: [ ] Shortness of breath/dyspnea, [ ] Orthopnea [ ] Cough  CV: [ ] Chest pain [ ] Palpitation [ ] Lightheadedness [ ] Syncope  Renal: [ ] Thirst [ ] Edema  GI: [ ] Nausea [ ] Emesis [ ] Abdominal pain [ ] Constipation [ ] Diarrhea  Hem: [ ] Hematemesis [ ] bright red blood per rectum  ID: [ ] Fever [ ] Chills [ ] Dysuria  ENT: [ ] Rhinorrhea      DEVICES:   [ ] Restraints [ ] ET tube [ ] central line [ x] arterial line L radial [x ] talbert [ ] NGT/OGT [ ] EVD [ ] LD [ ] PARI/HMV [ ] Trach [ ] PEG [ ] Chest Tube     ICU Vital Signs Last 24 Hrs  T(C): 38.5 (18 Apr 2024 15:00), Max: 38.5 (18 Apr 2024 15:00)  T(F): 101.3 (18 Apr 2024 15:00), Max: 101.3 (18 Apr 2024 15:00)  HR: 77 (18 Apr 2024 15:30) (75 - 105)  BP: --  BP(mean): --  ABP: 122/46 (18 Apr 2024 15:30) (71/34 - 146/61)  ABP(mean): 70 (18 Apr 2024 15:30) (50 - 93)  RR: 18 (18 Apr 2024 15:30) (12 - 24)  SpO2: 98% (18 Apr 2024 15:30) (92% - 100%)    O2 Parameters below as of 18 Apr 2024 07:00  Patient On (Oxygen Delivery Method): room air    I&O's Summary    17 Apr 2024 07:01  -  18 Apr 2024 07:00  --------------------------------------------------------  IN: 3857.9 mL / OUT: 1685 mL / NET: 2172.9 mL    18 Apr 2024 07:01  -  18 Apr 2024 19:13  --------------------------------------------------------  IN: 2966.6 mL / OUT: 625 mL / NET: 2341.6 mL      MEDICATIONS  (STANDING):  atorvastatin 80 milliGRAM(s) Oral at bedtime  chlorhexidine 4% Liquid 1 Application(s) Topical daily  finasteride 5 milliGRAM(s) Oral daily  influenza  Vaccine (HIGH DOSE) 0.7 milliLiter(s) IntraMuscular once  insulin lispro (ADMELOG) corrective regimen sliding scale   SubCutaneous every 6 hours  insulin NPH human recombinant 5 Unit(s) SubCutaneous every 6 hours  lidocaine   4% Patch 1 Patch Transdermal daily  ondansetron   Disintegrating Tablet 4 milliGRAM(s) Oral once  pantoprazole  Injectable 40 milliGRAM(s) IV Push daily  phenylephrine    Infusion 0.1 MICROgram(s)/kG/Min (2.66 mL/Hr) IV Continuous <Continuous>  piperacillin/tazobactam IVPB.- 3.375 Gram(s) IV Intermittent once  polyethylene glycol 3350 17 Gram(s) Oral two times a day  senna 2 Tablet(s) Oral at bedtime  sodium chloride 3% + sodium acetate 50:50 1000 milliLiter(s) (100 mL/Hr) IV Continuous <Continuous>  timolol 0.5% Solution 1 Drop(s) Left EYE two times a day    MEDICATIONS  (PRN):  methocarbamol 500 milliGRAM(s) Oral every 6 hours PRN musle spasm                          8.8    6.76  )-----------( 137      ( 18 Apr 2024 19:00 )             26.7   04-18    145  |  114<H>  |  13  ----------------------------<  175<H>  4.1   |  22  |  0.74    Ca    7.5<L>      18 Apr 2024 14:53  Phos  1.9     04-18  Mg     1.8     04-18    TPro  4.9<L>  /  Alb  2.6<L>  /  TBili  0.5  /  DBili  x   /  AST  30  /  ALT  22  /  AlkPhos  62  04-18    PT/INR - ( 18 Apr 2024 07:00 )   PT: 11.1 sec;   INR: 1.01 ratio         PTT - ( 18 Apr 2024 07:00 )  PTT:25.1 sec    ABG - ( 18 Apr 2024 18:50 )  pH, Arterial: 7.46  pH, Blood: x     /  pCO2: 32    /  pO2: 160   / HCO3: 23    / Base Excess: -0.7  /  SaO2: 98.3        EXAMINATION:  PHYSICAL EXAM:    Constitutional: No Acute Distress     Neurological: lethargic but rousable Ox3, L pupil surgical large/irregular, R 3mmR, able to say name but severely dysarthric, FC, L facial, R side 5, LUE WD, LLE 3/5    Pulmonary: diminished in bases BL    Cardiovascular: S1, S2, Regular rate and rhythm     Gastrointestinal: Soft, Non-tender, Non-distended     Extremities: No calf tenderness

## 2024-04-19 NOTE — SWALLOW FEES ASSESSMENT ADULT - ADDITIONAL RECOMMENDATIONS
Per CT Abd 4/17/24 recommendations; "Nonspecific thickened appearance to the upper esophagus. Follow-up upper endoscopy is recommended." Per CT Abd 4/17/24 recommendations; "Nonspecific thickened appearance to the upper esophagus. Follow-up upper endoscopy is recommended."  Restorative swallow therapy f/u 1 x week to improve oropharyngeal swallowing skills; consider tspn trials of moderately thickened liquids with cued cough/repeat swallow with SLP only during tx.  Will continue to follow while patient is in-house; swallowing therapy post d/c. Repeat FEES vs MBS pending Pt's improvement in therapy.

## 2024-04-20 LAB
GLUCOSE BLDC GLUCOMTR-MCNC: 163 MG/DL — HIGH (ref 70–99)
GLUCOSE BLDC GLUCOMTR-MCNC: 187 MG/DL — HIGH (ref 70–99)
GLUCOSE BLDC GLUCOMTR-MCNC: 192 MG/DL — HIGH (ref 70–99)
GLUCOSE BLDC GLUCOMTR-MCNC: 205 MG/DL — HIGH (ref 70–99)

## 2024-04-20 PROCEDURE — 70450 CT HEAD/BRAIN W/O DYE: CPT | Mod: 26

## 2024-04-20 PROCEDURE — 71045 X-RAY EXAM CHEST 1 VIEW: CPT | Mod: 26

## 2024-04-20 PROCEDURE — 99291 CRITICAL CARE FIRST HOUR: CPT

## 2024-04-20 PROCEDURE — 93010 ELECTROCARDIOGRAM REPORT: CPT

## 2024-04-20 PROCEDURE — 99232 SBSQ HOSP IP/OBS MODERATE 35: CPT | Mod: FS

## 2024-04-20 RX ORDER — ENOXAPARIN SODIUM 100 MG/ML
40 INJECTION SUBCUTANEOUS
Refills: 0 | Status: DISCONTINUED | OUTPATIENT
Start: 2024-04-20 | End: 2024-04-20

## 2024-04-20 RX ORDER — ACETAMINOPHEN 500 MG
1000 TABLET ORAL ONCE
Refills: 0 | Status: COMPLETED | OUTPATIENT
Start: 2024-04-20 | End: 2024-04-20

## 2024-04-20 RX ORDER — ENOXAPARIN SODIUM 100 MG/ML
40 INJECTION SUBCUTANEOUS
Refills: 0 | Status: DISCONTINUED | OUTPATIENT
Start: 2024-04-20 | End: 2024-04-25

## 2024-04-20 RX ORDER — HUMAN INSULIN 100 [IU]/ML
8 INJECTION, SUSPENSION SUBCUTANEOUS EVERY 6 HOURS
Refills: 0 | Status: DISCONTINUED | OUTPATIENT
Start: 2024-04-20 | End: 2024-04-23

## 2024-04-20 RX ORDER — ASPIRIN/CALCIUM CARB/MAGNESIUM 324 MG
81 TABLET ORAL DAILY
Refills: 0 | Status: DISCONTINUED | OUTPATIENT
Start: 2024-04-20 | End: 2024-04-20

## 2024-04-20 RX ORDER — IPRATROPIUM/ALBUTEROL SULFATE 18-103MCG
3 AEROSOL WITH ADAPTER (GRAM) INHALATION EVERY 6 HOURS
Refills: 0 | Status: DISCONTINUED | OUTPATIENT
Start: 2024-04-20 | End: 2024-04-25

## 2024-04-20 RX ORDER — ACETAMINOPHEN 500 MG
650 TABLET ORAL EVERY 6 HOURS
Refills: 0 | Status: DISCONTINUED | OUTPATIENT
Start: 2024-04-20 | End: 2024-04-25

## 2024-04-20 RX ADMIN — Medication 1000 MILLIGRAM(S): at 03:00

## 2024-04-20 RX ADMIN — Medication 650 MILLIGRAM(S): at 15:28

## 2024-04-20 RX ADMIN — Medication 20 MILLIEQUIVALENT(S): at 06:28

## 2024-04-20 RX ADMIN — PANTOPRAZOLE SODIUM 40 MILLIGRAM(S): 20 TABLET, DELAYED RELEASE ORAL at 12:44

## 2024-04-20 RX ADMIN — PIPERACILLIN AND TAZOBACTAM 25 GRAM(S): 4; .5 INJECTION, POWDER, LYOPHILIZED, FOR SOLUTION INTRAVENOUS at 14:28

## 2024-04-20 RX ADMIN — Medication 400 MILLIGRAM(S): at 02:30

## 2024-04-20 RX ADMIN — Medication 3 MILLILITER(S): at 23:39

## 2024-04-20 RX ADMIN — POTASSIUM PHOSPHATE, MONOBASIC POTASSIUM PHOSPHATE, DIBASIC 83.33 MILLIMOLE(S): 236; 224 INJECTION, SOLUTION INTRAVENOUS at 06:20

## 2024-04-20 RX ADMIN — PIPERACILLIN AND TAZOBACTAM 25 GRAM(S): 4; .5 INJECTION, POWDER, LYOPHILIZED, FOR SOLUTION INTRAVENOUS at 21:56

## 2024-04-20 RX ADMIN — HUMAN INSULIN 5 UNIT(S): 100 INJECTION, SUSPENSION SUBCUTANEOUS at 00:03

## 2024-04-20 RX ADMIN — FINASTERIDE 5 MILLIGRAM(S): 5 TABLET, FILM COATED ORAL at 12:44

## 2024-04-20 RX ADMIN — Medication 2: at 06:21

## 2024-04-20 RX ADMIN — ATORVASTATIN CALCIUM 80 MILLIGRAM(S): 80 TABLET, FILM COATED ORAL at 21:57

## 2024-04-20 RX ADMIN — Medication 5 MILLIGRAM(S): at 21:57

## 2024-04-20 RX ADMIN — LIDOCAINE 1 PATCH: 4 CREAM TOPICAL at 02:37

## 2024-04-20 RX ADMIN — Medication 2: at 12:45

## 2024-04-20 RX ADMIN — LIDOCAINE 1 PATCH: 4 CREAM TOPICAL at 12:44

## 2024-04-20 RX ADMIN — Medication 1 DROP(S): at 06:21

## 2024-04-20 RX ADMIN — HUMAN INSULIN 5 UNIT(S): 100 INJECTION, SUSPENSION SUBCUTANEOUS at 12:45

## 2024-04-20 RX ADMIN — ENOXAPARIN SODIUM 40 MILLIGRAM(S): 100 INJECTION SUBCUTANEOUS at 18:21

## 2024-04-20 RX ADMIN — CHLORHEXIDINE GLUCONATE 1 APPLICATION(S): 213 SOLUTION TOPICAL at 12:48

## 2024-04-20 RX ADMIN — LIDOCAINE 1 PATCH: 4 CREAM TOPICAL at 19:00

## 2024-04-20 RX ADMIN — HUMAN INSULIN 5 UNIT(S): 100 INJECTION, SUSPENSION SUBCUTANEOUS at 06:22

## 2024-04-20 RX ADMIN — HUMAN INSULIN 5 UNIT(S): 100 INJECTION, SUSPENSION SUBCUTANEOUS at 18:21

## 2024-04-20 RX ADMIN — PIPERACILLIN AND TAZOBACTAM 25 GRAM(S): 4; .5 INJECTION, POWDER, LYOPHILIZED, FOR SOLUTION INTRAVENOUS at 06:22

## 2024-04-20 RX ADMIN — SODIUM CHLORIDE 1000 MILLILITER(S): 9 INJECTION, SOLUTION INTRAVENOUS at 00:02

## 2024-04-20 RX ADMIN — Medication 4: at 18:21

## 2024-04-20 RX ADMIN — Medication 650 MILLIGRAM(S): at 14:28

## 2024-04-20 RX ADMIN — Medication 1 DROP(S): at 18:21

## 2024-04-20 NOTE — PROGRESS NOTE ADULT - SUBJECTIVE AND OBJECTIVE BOX
HOSPITAL COURSE:      Admission Scores  GCS:   HH:   MF:   NIHSS:   RASS:    CAM-ICU:   ICH:    24 hour Events:       Allergies    No Known Allergies    Intolerances        REVIEW OF SYSTEMS: [ ] Unable to Assess due to neurologic exam   [ ] All ROS addressed below are non-contributory, except:  Neuro: [ ] Headache [ ] Back pain [ ] Numbness [ ] Weakness [ ] Ataxia [ ] Dizziness [ ] Aphasia [ ] Dysarthria [ ] Visual disturbance  Resp: [ ] Shortness of breath/dyspnea, [ ] Orthopnea [ ] Cough  CV: [ ] Chest pain [ ] Palpitation [ ] Lightheadedness [ ] Syncope  Renal: [ ] Thirst [ ] Edema  GI: [ ] Nausea [ ] Emesis [ ] Abdominal pain [ ] Constipation [ ] Diarrhea  Hem: [ ] Hematemesis [ ] bright red blood per rectum  ID: [ ] Fever [ ] Chills [ ] Dysuria  ENT: [ ] Rhinorrhea      DEVICES:   [ ] Restraints [ ] ET tube [ ] central line [ ] arterial line [ ] talbert [ ] NGT/OGT [ ] EVD [ ] LD [ ] PARI/HMV [ ] Trach [ ] PEG [ ] Chest Tube     VITALS:   Vital Signs Last 24 Hrs  T(C): 37.4 (20 Apr 2024 15:00), Max: 37.4 (20 Apr 2024 15:00)  T(F): 99.4 (20 Apr 2024 15:00), Max: 99.4 (20 Apr 2024 15:00)  HR: 70 (20 Apr 2024 18:00) (56 - 79)  BP: --  BP(mean): --  RR: 18 (20 Apr 2024 18:00) (12 - 20)  SpO2: 95% (20 Apr 2024 18:00) (93% - 99%)    Parameters below as of 20 Apr 2024 15:00  Patient On (Oxygen Delivery Method): nasal cannula  O2 Flow (L/min): 2    CAPILLARY BLOOD GLUCOSE      POCT Blood Glucose.: 205 mg/dL (20 Apr 2024 18:20)  POCT Blood Glucose.: 187 mg/dL (20 Apr 2024 12:42)  POCT Blood Glucose.: 192 mg/dL (20 Apr 2024 06:19)  POCT Blood Glucose.: 141 mg/dL (19 Apr 2024 23:53)    I&O's Summary    19 Apr 2024 07:01  -  20 Apr 2024 07:00  --------------------------------------------------------  IN: 2942.3 mL / OUT: 1350 mL / NET: 1592.3 mL    20 Apr 2024 07:01  -  20 Apr 2024 19:44  --------------------------------------------------------  IN: 2063.5 mL / OUT: 500 mL / NET: 1563.5 mL        Respiratory:        LABS:                        7.9    6.24  )-----------( 109      ( 19 Apr 2024 20:08 )             24.1     04-19    154<H>  |  122<H>  |  17  ----------------------------<  172<H>  3.9   |  24  |  0.88             MEDICATION LEVELS:     IVF FLUIDS/MEDICATIONS:   MEDICATIONS  (STANDING):  atorvastatin 80 milliGRAM(s) Oral at bedtime  chlorhexidine 4% Liquid 1 Application(s) Topical daily  enoxaparin Injectable 40 milliGRAM(s) SubCutaneous <User Schedule>  finasteride 5 milliGRAM(s) Oral daily  influenza  Vaccine (HIGH DOSE) 0.7 milliLiter(s) IntraMuscular once  insulin lispro (ADMELOG) corrective regimen sliding scale   SubCutaneous every 6 hours  insulin NPH human recombinant 8 Unit(s) SubCutaneous every 6 hours  lidocaine   4% Patch 1 Patch Transdermal daily  melatonin 5 milliGRAM(s) Oral at bedtime  ondansetron   Disintegrating Tablet 4 milliGRAM(s) Oral once  pantoprazole  Injectable 40 milliGRAM(s) IV Push daily  piperacillin/tazobactam IVPB.. 3.375 Gram(s) IV Intermittent every 8 hours  polyethylene glycol 3350 17 Gram(s) Oral two times a day  senna 2 Tablet(s) Oral at bedtime  timolol 0.5% Solution 1 Drop(s) Left EYE two times a day    MEDICATIONS  (PRN):  acetaminophen   Oral Liquid .. 650 milliGRAM(s) Oral every 6 hours PRN Temp greater or equal to 38.5C (101.3F), Mild Pain (1 - 3)  methocarbamol 500 milliGRAM(s) Oral every 6 hours PRN musle spasm        IMAGING:      EXAMINATION:  PHYSICAL EXAM:    Constitutional: No Acute Distress     Neurological: Awake, alert oriented to person, place and time, Following Commands, PERRL, EOMI, No Gaze Preference, Face Symmetrical, Speech Fluent, No dysmetria, No ataxia, No nystagmus     Motor exam:          Upper extremity                         Delt     Bicep     Tricep    HG                                                 R         5/5        5/5        5/5       5/5                                               L          5/5        5/5        5/5       5/5          Lower extremity                        HF         KF        KE       DF         PF                                                  R        5/5        5/5        5/5       5/5         5/5                                               L         5/5        5/5       5/5       5/5          5/5                                                 Sensation: [ ] intact to light touch  [ ] decreased:     Reflexes: Deep Tendon Reflexes Intact     Pulmonary: Clear to Auscultation, No rales, No rhonchi, No wheezes     Cardiovascular: S1, S2, Regular rate and rhythm     Gastrointestinal: Soft, Non-tender, Non-distended     Extremities: No calf tenderness     Incision:    HOSPITAL COURSE: 80yo man PMH HTN, DM, CAD s/p stents on ASA LKW 1:30-2pm today, found down by family, ?fell down stairs and family heard a thud, brought to [facility] ED, where NIHSS reportedly 7, given TNK 16:57, found to have R M2 occlusion, transferred to [facility] for MT, TICI2B, one pass.     Admission Scores  GCS: 15  HH:   MF:   NIHSS:  16 at [facility] ED  RASS:    CAM-ICU:   ICH:    04/18: per report patient became hypotensive during blood transfusion (transfusion reaction?), spiked fever s/p infectious workup, and received 23.4% 30cc for AMS  04/19: no overnight events. Continue to monitor Hg  04/20: VS stable, patient failed FEEEST, NG tube to be placed r CTH today stable.    Admission Scores  :   NIHSS:16       24 hour Events:       Allergies    No Known Allergies    Intolerances        REVIEW OF SYSTEMS: [ ] Unable to Assess due to neurologic exam   [ x] All ROS addressed below are non-contributory, except:  Neuro: [ ] Headache [ ] Back pain [ ] Numbness [ ] Weakness [ ] Ataxia [ ] Dizziness [ ] Aphasia [ ] Dysarthria [ ] Visual disturbance  Resp: [ ] Shortness of breath/dyspnea, [ ] Orthopnea [ ] Cough  CV: [ ] Chest pain [ ] Palpitation [ ] Lightheadedness [ ] Syncope  Renal: [ ] Thirst [ ] Edema  GI: [ ] Nausea [ ] Emesis [ ] Abdominal pain [ ] Constipation [ ] Diarrhea  Hem: [ ] Hematemesis [ ] bright red blood per rectum  ID: [ ] Fever [ ] Chills [ ] Dysuria  ENT: [ ] Rhinorrhea      DEVICES:   [ x] Restraints [ ] ET tube [ ] central line [x ] arterial line [ ] talbert [x ] NGT/OGT [ ] EVD [ ] LD [ ] PARI/HMV [ ] Trach [ ] PEG [ ] Chest Tube     VITALS:   Vital Signs Last 24 Hrs  T(C): 37.4 (20 Apr 2024 15:00), Max: 37.4 (20 Apr 2024 15:00)  T(F): 99.4 (20 Apr 2024 15:00), Max: 99.4 (20 Apr 2024 15:00)  HR: 70 (20 Apr 2024 18:00) (56 - 79)  BP: --  BP(mean): --  RR: 18 (20 Apr 2024 18:00) (12 - 20)  SpO2: 95% (20 Apr 2024 18:00) (93% - 99%)    Parameters below as of 20 Apr 2024 15:00  Patient On (Oxygen Delivery Method): nasal cannula  O2 Flow (L/min): 2    CAPILLARY BLOOD GLUCOSE      POCT Blood Glucose.: 205 mg/dL (20 Apr 2024 18:20)  POCT Blood Glucose.: 187 mg/dL (20 Apr 2024 12:42)  POCT Blood Glucose.: 192 mg/dL (20 Apr 2024 06:19)  POCT Blood Glucose.: 141 mg/dL (19 Apr 2024 23:53)    I&O's Summary    19 Apr 2024 07:01  -  20 Apr 2024 07:00  --------------------------------------------------------  IN: 2942.3 mL / OUT: 1350 mL / NET: 1592.3 mL    20 Apr 2024 07:01  -  20 Apr 2024 19:44  --------------------------------------------------------  IN: 2063.5 mL / OUT: 500 mL / NET: 1563.5 mL        Respiratory: on 2L NC         LABS:                        7.9    6.24  )-----------( 109      ( 19 Apr 2024 20:08 )             24.1     04-19    154<H>  |  122<H>  |  17  ----------------------------<  172<H>  3.9   |  24  |  0.88             MEDICATION LEVELS:     IVF FLUIDS/MEDICATIONS:   MEDICATIONS  (STANDING):  atorvastatin 80 milliGRAM(s) Oral at bedtime  chlorhexidine 4% Liquid 1 Application(s) Topical daily  enoxaparin Injectable 40 milliGRAM(s) SubCutaneous <User Schedule>  finasteride 5 milliGRAM(s) Oral daily  influenza  Vaccine (HIGH DOSE) 0.7 milliLiter(s) IntraMuscular once  insulin lispro (ADMELOG) corrective regimen sliding scale   SubCutaneous every 6 hours  insulin NPH human recombinant 8 Unit(s) SubCutaneous every 6 hours  lidocaine   4% Patch 1 Patch Transdermal daily  melatonin 5 milliGRAM(s) Oral at bedtime  ondansetron   Disintegrating Tablet 4 milliGRAM(s) Oral once  pantoprazole  Injectable 40 milliGRAM(s) IV Push daily  piperacillin/tazobactam IVPB.. 3.375 Gram(s) IV Intermittent every 8 hours  polyethylene glycol 3350 17 Gram(s) Oral two times a day  senna 2 Tablet(s) Oral at bedtime  timolol 0.5% Solution 1 Drop(s) Left EYE two times a day    MEDICATIONS  (PRN):  acetaminophen   Oral Liquid .. 650 milliGRAM(s) Oral every 6 hours PRN Temp greater or equal to 38.5C (101.3F), Mild Pain (1 - 3)  methocarbamol 500 milliGRAM(s) Oral every 6 hours PRN musle spasm        IMAGING:      EXAMINATION:  PHYSICAL EXAM:    Constitutional: No Acute Distress     Neurological: AOx 3 dysarthric, L pupil irregular and NR (surgical), R pupil 3mm, Lt facial, FC on Rt, Rt side AG, LUE 0/5, LLE AG                                                 Sensation: [x ] intact to light touch  [ ] decreased:     Pulmonary: Clear to Auscultation, No rales, No rhonchi, No wheezes     Cardiovascular: S1, S2, Regular rate and rhythm     Gastrointestinal: Soft, Non-tender, Non-distended     Extremities: No calf tenderness     Incision:    HOSPITAL COURSE: 80yo man PMH HTN, DM, CAD s/p stents on ASA LKW 1:30-2pm today, found down by family, ?fell down stairs and family heard a thud, brought to [facility] ED, where NIHSS reportedly 7, given TNK 16:57, found to have R M2 occlusion, transferred to [facility] for MT, TICI2B, one pass.     Admission Scores  GCS: 15  HH:   MF:   NIHSS:  16 at [facility] ED  RASS:    CAM-ICU:   ICH:    04/18: per report patient became hypotensive during blood transfusion (transfusion reaction?), spiked fever s/p infectious workup, and received 23.4% 30cc for AMS  04/19: no overnight events. Continue to monitor Hg  04/20: VS stable, patient failed FEEEST, NG tube to be placed r CTH today stable.    Admission Scores  :   NIHSS:16       Allergies    No Known Allergies    Intolerances        REVIEW OF SYSTEMS: [ ] Unable to Assess due to neurologic exam   [ x] All ROS addressed below are non-contributory, except:  Neuro: [ ] Headache [ ] Back pain [ ] Numbness [ ] Weakness [ ] Ataxia [ ] Dizziness [ ] Aphasia [ ] Dysarthria [ ] Visual disturbance  Resp: [ ] Shortness of breath/dyspnea, [ ] Orthopnea [ ] Cough  CV: [ ] Chest pain [ ] Palpitation [ ] Lightheadedness [ ] Syncope  Renal: [ ] Thirst [ ] Edema  GI: [ ] Nausea [ ] Emesis [ ] Abdominal pain [ ] Constipation [ ] Diarrhea  Hem: [ ] Hematemesis [ ] bright red blood per rectum  ID: [ ] Fever [ ] Chills [ ] Dysuria  ENT: [ ] Rhinorrhea      DEVICES:   [ x] Restraints [ ] ET tube [ ] central line [x ] arterial line [ ] talbert [x ] NGT/OGT [ ] EVD [ ] LD [ ] PARI/HMV [ ] Trach [ ] PEG [ ] Chest Tube     VITALS:   Vital Signs Last 24 Hrs  T(C): 37.4 (20 Apr 2024 15:00), Max: 37.4 (20 Apr 2024 15:00)  T(F): 99.4 (20 Apr 2024 15:00), Max: 99.4 (20 Apr 2024 15:00)  HR: 70 (20 Apr 2024 18:00) (56 - 79)  BP: --  BP(mean): --  RR: 18 (20 Apr 2024 18:00) (12 - 20)  SpO2: 95% (20 Apr 2024 18:00) (93% - 99%)    Parameters below as of 20 Apr 2024 15:00  Patient On (Oxygen Delivery Method): nasal cannula  O2 Flow (L/min): 2    CAPILLARY BLOOD GLUCOSE      POCT Blood Glucose.: 205 mg/dL (20 Apr 2024 18:20)  POCT Blood Glucose.: 187 mg/dL (20 Apr 2024 12:42)  POCT Blood Glucose.: 192 mg/dL (20 Apr 2024 06:19)  POCT Blood Glucose.: 141 mg/dL (19 Apr 2024 23:53)    I&O's Summary    19 Apr 2024 07:01  -  20 Apr 2024 07:00  --------------------------------------------------------  IN: 2942.3 mL / OUT: 1350 mL / NET: 1592.3 mL    20 Apr 2024 07:01  -  20 Apr 2024 19:44  --------------------------------------------------------  IN: 2063.5 mL / OUT: 500 mL / NET: 1563.5 mL        Respiratory: on 2L NC         LABS:                        7.9    6.24  )-----------( 109      ( 19 Apr 2024 20:08 )             24.1     04-19    154<H>  |  122<H>  |  17  ----------------------------<  172<H>  3.9   |  24  |  0.88             MEDICATION LEVELS:     IVF FLUIDS/MEDICATIONS:   MEDICATIONS  (STANDING):  atorvastatin 80 milliGRAM(s) Oral at bedtime  chlorhexidine 4% Liquid 1 Application(s) Topical daily  enoxaparin Injectable 40 milliGRAM(s) SubCutaneous <User Schedule>  finasteride 5 milliGRAM(s) Oral daily  influenza  Vaccine (HIGH DOSE) 0.7 milliLiter(s) IntraMuscular once  insulin lispro (ADMELOG) corrective regimen sliding scale   SubCutaneous every 6 hours  insulin NPH human recombinant 8 Unit(s) SubCutaneous every 6 hours  lidocaine   4% Patch 1 Patch Transdermal daily  melatonin 5 milliGRAM(s) Oral at bedtime  ondansetron   Disintegrating Tablet 4 milliGRAM(s) Oral once  pantoprazole  Injectable 40 milliGRAM(s) IV Push daily  piperacillin/tazobactam IVPB.. 3.375 Gram(s) IV Intermittent every 8 hours  polyethylene glycol 3350 17 Gram(s) Oral two times a day  senna 2 Tablet(s) Oral at bedtime  timolol 0.5% Solution 1 Drop(s) Left EYE two times a day    MEDICATIONS  (PRN):  acetaminophen   Oral Liquid .. 650 milliGRAM(s) Oral every 6 hours PRN Temp greater or equal to 38.5C (101.3F), Mild Pain (1 - 3)  methocarbamol 500 milliGRAM(s) Oral every 6 hours PRN musle spasm        IMAGING: < from: CT Head No Cont (04.20.24 @ 09:47) >    ACC: [id number] EXAM:  CT BRAIN   ORDERED BY: NANCY BARRIGA     PROCEDURE DATE:  04/20/2024          INTERPRETATION:  INDICATIONS: Right M2 occlusion status post   thrombectomy. Follow-up    TECHNIQUE:  Serial axial images were obtained from the skull base to the   vertex without intravenous contrast. Examination was performed on a   portable unit    COMPARISON EXAMINATION: CT of the head 4/19/2024.    FINDINGS:    Right MCA distribution infarct with small amount of petechial hemorrhage,   similar to prior. Unchanged sulcal effacement and local mass effect on   the right lateral ventricle. No significant midline shift. No   hydrocephalus.    The visualized paranasal sinuses and tympanomastoid air cells are clear.   Bilateral lens replacement. Calvarium is intact.    IMPRESSION:  Stable appearance of right acute middle cerebral artery infarct with   petechial hemorrhage.    --- End of Report ---          THU MATT MD; Resident Radiologist  This document has been electronically signed.  STEFANY YUSUF MD; Attending Radiologist  This document has been electronically signed. Apr 20 2024  1:02PM    < end of copied text >        EXAMINATION:  PHYSICAL EXAM:    Constitutional: No Acute Distress     Neurological: AOx 3 dysarthric, L pupil irregular and NR (surgical), R pupil 3mm, Lt facial, FC on Rt, Rt side AG, LUE 0/5, LLE AG                                                 Sensation: [x ] intact to light touch  [ ] decreased:     Pulmonary: Clear to Auscultation, No rales, No rhonchi, No wheezes     Cardiovascular: S1, S2, Regular rate and rhythm     Gastrointestinal: Soft, Non-tender, Non-distended     Extremities: No calf tenderness     Incision: c/d/i, no hematoma, no active bleed

## 2024-04-20 NOTE — CHART NOTE - NSCHARTNOTEFT_GEN_A_CORE
Sew was awake and alert with son present during evaluation, measurements, and fitting of a TLSO with shoulder straps. Fit was good. Showed patient and son how to don and off TLSO. Provided written and verbal instructions. [facility] [phone]

## 2024-04-20 NOTE — PROGRESS NOTE ADULT - CRITICAL CARE ATTENDING COMMENT
right MCA ischemic stroke s/p TNK , right M 2 occlusion s/p thrombectomy TICI2b   trauma   with MR C Spine evidence of  C4-C5 and C5-C6 acute anterior longitudinal ligament injury and/or anterior corner avulsion fractures and CT L Spine evidence of an acute T6 vertebral body extending into a Right anterolateral T5-T6 osteophyte., keep cervical collar, will discuss with ortho if need further workup , and thoracic spine fracture  T 6 vert body    neuro checks q 2 hr   aspirin on hold given hip henmatoma and drop in hgb requiring multiple transfusions   left hip hematoma ,  hgb droped to 8 ,  s/p plts yesterday, repeat cbc at 4 pm , became hypotensive unsure if it was transufsion reaction    repeat CT head yesterday  stable   hypotensive now off neosyneprhine   pneumonia pancultured on zosyn, chestxray RML infiltrates, bronch gram + in pairs   brain edema on 3 % at 100 ml/hr, decrease to 60 ml/hr, BMP q 6 hr  , sodium 140-150 for brain edema    restart TF , last BM prior to admission, senna, miralax   if hgb stable tonight, would consider starting chemorphylaxis
right MCA ischemic stroke s/p TNK , right M 2 occlusion s/p thrombectomy TICI2b   trauma   with MR C Spine evidence of  C4-C5 and C5-C6 acute anterior longitudinal ligament injury and/or anterior corner avulsion fractures and CT L Spine evidence of an acute T6 vertebral body extending into a Right anterolateral T5-T6 osteophyte., keep cervical collar, will discuss with ortho if need further workup , and thoracic spine fracture  T 6 vert body    neuro checks q 2 hr   aspirin on hold given hip henmatoma and drop in hgb requiring multiple transfusions if hgb remains stable tonight, can start aspirin    left hip hematoma ,  hgb droped to 7.9 ,  repeat cbc tonight   CT head today stable pedning official report   no more hypotensive episodes , MAP> 65 mmhg   pneumonia pancultured on zosyn for 7 days   brain edema , not on hypertonic,  sodium 140-150   failed sweallow eval, place NG and restart TF    lovenox 40 mg sc for chemorpophylaxis as hgb has been stable   consult stroke team

## 2024-04-20 NOTE — PROGRESS NOTE ADULT - ASSESSMENT
ASSESSMENT/PLAN: R M2 thrombectomy s/p TNK 4/16 at 16:57 s/p MT TICI 2B    Neuro  #Acute stroke s/p R M@ MT  - 2hr stroke checks, VS Q1  - MR with large acute right MCA territory infarct with extensive cortical petechial hemorrhage  - Hold ASA 81 and DVT ppx once 24 hours stability is performed and unremarkable (with caution given that he has a hematoma on the right hip)  - 3% @ 100cc/hr for NA goal 140-150 in setting of cytotoxic edema    #Vertebral compression fracture ( thoracic T6 )  - Ortho on board (covering spine)  - MRI c -spine, Suspected acute anterior longitudinal ligament injury and/or anterior corner avulsion fractures at C4-C5 and C5-C6. Prevertebral edema extending from C1 through the upper cervical levels.  - c-collar to be placed  - f//u ortho reccs    Pulm   - RA  - sat > 92%  - ABG w/ Pa02 160, PF ratio 571  - monitor airway given mental status  - POCUS with BL trace pleural effusions, A line predominance with trace basilar B-lining, and non-mobile air bronchogram consistent with atelectasis     -CV-  #Hypontension  - SBP goal , MAP >65   - unclear etiology, septic vs intravascular volume depletion  - on damari @ 0.4, CBC w/ Hgb @ goal (>8)    #Hx of CAD s/p stenting  - will resume ASA when safe  - TTE w/ EF ~ 65%, entire septum, mid and apical inferior wall, and apex are abnormal, and mild (grade 1) left ventricular diastolic dysfunction, with normal filling pressure  - hold home beta-blocker, resume GDMT once off vasopressors    ---GI---  - failed dysphagia screen  - FEEST today  - NG tube feeds: Glucerna 1.2  - bowel regimen miralax/senna  - PPI   - last BM: PTA    Renal  - 3%@50cc/hr for cytotoxic edema  - f/u BMP Q6  - I&O  - NA goal 140-150    Endo  #DM  - a1c 8.2  - target euglycemia 120-180  - NPH 5q6 w/ ISS, will titrate based on coverage needs    Hematology  #Downtrending Hgb  - s/p 2 units PRBC (2nd stopped for transfusion reaction)  - post transfusion CBC w/ Hgb >8  - CT CAP w/ IV contrast negative for acute intrathoracic bleeding pathology s/p fall, R hip hematoma  - transfusion goal of 8 given hx of CAD  - F/u CBC today at 3 pm  - If cbc stable will start chemo ppx.    #Transfusion Reaction  - 2nd transfusion stopped given acute onset hypotension requiring vasopressor support, febrile later in day  - unlikely TRALI/TACO given minimal pulmonary congestion, normal PF ratios on minimal supplemental 02  - per report patient with diffuse maculopapular truncal rash during episode, self limiting with no use of anti-histamines or epi  - hemolytic labs w/o evidence of acute hemolytic transfusion reaction    #VTE prophylaxis  -BL SCDs, hold chemoprophylaxis at this time given hematoma R hip    ---ID---  #Fever  - transfusion reaction vs infectious etiology  - UA grossly unremarkable  - POCUS with BL trace pleural effusions, A line predominance with trace basilar B-lining, and non-mobile air bronchogram, PNA vs atelectasis?  - f/u BCx x2  - CT CAP w/ no evidence of acute infectious abdominal pathology  - Empiric zosyn until blood cultures result  - CXR: negative for any consolidation        SOCIAL/FAMILY:  [x] awaiting [] updated at bedside [] family meeting    CODE STATUS:  [x] Full Code [] DNR [] DNI [] Palliative/Comfort Care    DISPOSITION:  [x] ICU [] Stroke Unit [] Floor [] EMU [] RCU [] PCU    [x] Patient is at high risk of neurologic deterioration/death due to: acute stroke, hemorrhagic conversion    ASSESSMENT/PLAN: R M2 thrombectomy s/p TNK 4/16 at 16:57 s/p MT TICI 2B    Neuro  Acute stroke s/p R M@ MT  - 2hr stroke checks, VS Q2, if CBC stable tonight will start ASA (with caution given that he has a hematoma on the right hip), pending neurosgx disucssion  - MR with large acute right MCA territory infarct with extensive cortical petechial hemorrhage    - s/p 3% @ 100cc/hr for NA goal 140-150 in setting of cytotoxic edema    #Vertebral compression fracture ( thoracic T6 )  - Ortho on board (covering spine)  - MRI c -spine, Suspected acute anterior longitudinal ligament injury and/or anterior corner avulsion fractures at C4-C5 and C5-C6. Prevertebral edema extending from C1 through the upper cervical levels.  - c-collar to be placed  - f//u ortho reccs    Pulm   - RA  - sat > 92%    -CV-  #Hypontension ( resolved )  - SBP goal , MAP >65   - unclear etiology, septic vs intravascular volume depletion  - no pressor requirement   - s/p 1 L of fluids for a MA of 61, resolved    #Hx of CAD s/p stenting  - will resume ASA when safe  - TTE w/ EF ~ 65%, entire septum, mid and apical inferior wall, and apex are abnormal, and mild (grade 1) left ventricular diastolic dysfunction, with normal filling pressure  - hold home beta-blocker, resume GDMT once off vasopressors    ---GI---  - failed dysphagia screen  - FEEST failed  - NG tube feeds: Glucerna 1.2 once placed  - bowel regimen miralax/senna  - PPI   - last BM: 04/20    Renal  - s/p 3%@50cc/hr for cytotoxic edema  - f/u BMP nightly  - I&O  - NA goal 145-150    Endo  #DM  - a1c 8.2  - target euglycemia 120-180  - NPH 5q6 w/ ISS, will titrate based on coverage needs    Hematology  #Downtrending Hgb ( stable since 04/19 x 3 )  - s/p 2 units PRBC (2nd stopped for transfusion reaction)  - post transfusion CBC w/ Hgb >8  - CT CAP w/ IV contrast negative for acute intrathoracic bleeding pathology s/p fall, R hip hematoma  - transfusion goal of 8 given hx of CAD  Lovenox 40 mg QD    #Transfusion Reaction  - 2nd transfusion stopped given acute onset hypotension requiring vasopressor support, febrile later in day  - unlikely TRALI/TACO given minimal pulmonary congestion, normal PF ratios on minimal supplemental 02  - per report patient with diffuse maculopapular truncal rash during episode, self limiting with no use of anti-histamines or epi  - hemolytic labs w/o evidence of acute hemolytic transfusion reaction        ---ID---  #Fever ( resolved )  - transfusion reaction vs infectious etiology  - UA grossly unremarkable  - POCUS with BL trace pleural effusions, A line predominance with trace basilar B-lining, and non-mobile air bronchogram, PNA vs atelectasis?  - CT CAP w/ no evidence of acute infectious abdominal pathology  - Empiric zosyn until blood cultures result  - CXR: negative for any consolidation        SOCIAL/FAMILY:  [x] awaiting [] updated at bedside [] family meeting    CODE STATUS:  [x] Full Code [] DNR [] DNI [] Palliative/Comfort Care    DISPOSITION:  [x] ICU [] Stroke Unit [] Floor [] EMU [] RCU [] PCU    [x] Patient is at high risk of neurologic deterioration/death due to: acute stroke, hemorrhagic conversion

## 2024-04-20 NOTE — PROGRESS NOTE ADULT - ASSESSMENT
ASSESSMENT/PLAN: R M2 thrombectomy s/p TNK 4/16 at 16:57 s/p MT TICI 2B    Neuro  Acute stroke s/p R M2 MT  - 2hr stroke checks, VS Q2, if CBC stable tonight will start ASA (with caution given that he has a hematoma on the right hip), pending neurosgx disucssion  - MR with large acute right MCA territory infarct with extensive cortical petechial hemorrhage  - s/p 3% @ 100cc/hr for NA goal 140-150 in setting of cytotoxic edema    #Vertebral compression fracture ( thoracic T6 )  - Ortho on board (covering spine)- TLSO brace when out of bed  - MRI c -spine, Suspected acute anterior longitudinal ligament injury and/or anterior corner avulsion fractures at C4-C5 and C5-C6. Prevertebral edema extending from C1 through the upper cervical levels.  - c-collar on at all times   - f//u ortho reccs    Pulm   CXR 4/20 c/f aspiration   chest PT  - 2 L NC   - sat > 92%    -CV-  #Hypontension ( resolved ) liklely transfusio related   - SBP goal , MAP >65   - unclear etiology, septic vs intravascular volume depletion  - no pressor requirement   - s/p 1 L of fluids for a MA of 61, resolved  #Hx of CAD s/p stenting  - will resume ASA when safe  - TTE w/ EF ~ 65%, entire septum, mid and apical inferior wall, and apex are abnormal, and mild (grade 1) left ventricular diastolic dysfunction, with normal filling pressure  - hold home beta-blocker, resume GDMT once off vasopressors    ---GI---  - failed dysphagia screen  - FEEST failed  - NG tube feeds: Glucerna 1.2 once placed  - bowel regimen miralax/senna  - PPI   - last BM: 04/20    Renal  - s/p 3%@50cc/hr for cytotoxic edema now IVL   - f/u BMP nightly  - I&O  - NA goal 140-150    Endo  #DM  - a1c 8.2  - target euglycemia 120-180  - NPH 5q6 w/ ISS, will titrate based on coverage needs    Hematology  #Downtrending Hgb ( stable since 04/19 x 3 )  - s/p 2 units PRBC (2nd stopped for transfusion reaction)  - post transfusion CBC w/ Hgb >8  - CT CAP w/ IV contrast negative for acute intrathoracic bleeding pathology s/p fall, R hip hematoma  - transfusion goal of 8 given hx of CAD  Lovenox 40 mg QD  LED negative     #Transfusion Reaction  - 2nd transfusion stopped given acute onset hypotension requiring vasopressor support, febrile later in day  - unlikely TRALI/TACO given minimal pulmonary congestion, normal PF ratios on minimal supplemental 02  - per report patient with diffuse maculopapular truncal rash during episode, self limiting with no use of anti-histamines or epi  - hemolytic labs w/o evidence of acute hemolytic transfusion reaction        ---ID---  #Fever ( resolved )  - transfusion reaction vs infectious etiology  - UA grossly unremarkable  - POCUS with BL trace pleural effusions, A line predominance with trace basilar B-lining, and non-mobile air bronchogram, PNA vs atelectasis?  - CT CAP w/ no evidence of acute infectious abdominal pathology  - Empiric zosyn until blood cultures result  - CXR: negative for any consolidation        SOCIAL/FAMILY:  [x] awaiting [] updated at bedside [] family meeting    CODE STATUS:  [x] Full Code [] DNR [] DNI [] Palliative/Comfort Care    DISPOSITION:  [x] ICU [] Stroke Unit [] Floor [] EMU [] RCU [] PCU    [x] Patient is at high risk of neurologic deterioration/death due to: acute stroke, hemorrhagic conversion        ASSESSMENT/PLAN: R M2 thrombectomy s/p TNK 4/16 at 16:57 s/p MT TICI 2B    Neuro:  Acute stroke s/p R M2 MT 4/16  - 2hr stroke checks, VS Q2  - if CBC stable tonight will start ASA (with caution given that he has a hematoma on the right hip), pending neurosgx discussion  - MR with large acute right MCA territory infarct with extensive cortical petechial hemorrhage w/ cytotoxic edema   - s/p 3% @ 100cc/hr for NA goal 140-150 in setting of cytotoxic edema, last Na 154 now IVL   #Vertebral compression fracture ( thoracic T6 )  - Ortho on board (covering spine)- TLSO brace when out of bed  - MRI c -spine, Suspected acute anterior longitudinal ligament injury and/or anterior corner avulsion fractures at C4-C5 and C5-C6. Prevertebral edema extending from C1 through the upper cervical levels.  - c-collar on at all times   - f//u ortho reccs    Pulm   CXR 4/20 c/f aspiration   chest PT  - 2 L NC   - sat > 92%    -CV-  #Hypontension ( resolved )   - SBP goal , MAP >65   - unclear etiology, septic vs intravascular volume depletion  - no pressor requirement   #Hx of CAD s/p stenting  - will resume ASA when safe   - TTE w/ EF ~ 65%, entire septum, mid and apical inferior wall, and apex are abnormal, and mild (grade 1) left ventricular diastolic dysfunction, with normal filling pressure  - hold home beta-blocker, resume GDMT once off vasopressors  - lipitor 80     ---GI---  - failed dysphagia screen  - FEEST failed, S/S following   - NG tube feeds: Glucerna 1.2 once placed  - bowel regimen miralax/senna  - PPI ( home medication)   - last BM: 04/20    Renal  - s/p 3%@50cc/hr for cytotoxic edema now IVL   - f/u BMP nightly  - I&O  - NA goal 140-150 in setting of cerebral edema     Endo  #DM  - a1c 8.2  - target euglycemia 120-180  - NPH 5q6 w/ ISS, will titrate based on coverage needs    Hematology  #Downtrending Hgb ( stable since 04/19 x 3 )  - s/p 2 units PRBC (2nd stopped for transfusion reaction) 4/18  - post transfusion CBC w/ Hgb >8  - CT CAP w/ IV contrast negative for acute intrathoracic bleeding pathology s/p fall, R hip hematoma  - transfusion goal of 8 given hx of CAD  Lovenox 40 mg QD  LED negative     #Transfusion Reaction 4/18  - 2nd transfusion stopped given acute onset hypotension requiring vasopressor support, febrile later in day  - unlikely TRALI/TACO given minimal pulmonary congestion, normal PF ratios on minimal supplemental 02  - per report patient with diffuse maculopapular truncal rash during episode, self limiting with no use of anti-histamines or epi  - hemolytic labs w/o evidence of acute hemolytic transfusion reaction        ---ID---  #Fever ( resolved )  - transfusion reaction vs infectious etiology  - UA grossly unremarkable  - POCUS with BL trace pleural effusions, A line predominance with trace basilar B-lining, and non-mobile air bronchogram, PNA vs atelectasis?  - CT CAP w/ no evidence of acute infectious abdominal pathology  - Empiric zosyn until blood cultures result- sputum show klebsiella pneumoniae   - CXR: negative for any consolidation        SOCIAL/FAMILY:  [x] awaiting [] updated at bedside [] family meeting    CODE STATUS:  [x] Full Code [] DNR [] DNI [] Palliative/Comfort Care    DISPOSITION:  [x] ICU [] Stroke Unit [] Floor [] EMU [] RCU [] PCU    [x] Patient is at high risk of neurologic deterioration/death due to: acute stroke, hemorrhagic conversion     time spent ____35____ [x] critical care minutes

## 2024-04-20 NOTE — PROGRESS NOTE ADULT - SUBJECTIVE AND OBJECTIVE BOX
HOSPITAL COURSE: 80yo man PMH HTN, DM, CAD s/p stents on ASA LKW 1:30-2pm today, found down by family, ?fell down stairs and family heard a thud, brought to [facility] ED, where NIHSS reportedly 7, given TNK 16:57, found to have R M2 occlusion, transferred to [facility] for MT, TICI2B, one pass.     Admission Scores  GCS: 15  HH:   MF:   NIHSS:  16 at [facility] ED  RASS:    CAM-ICU:   ICH:    04/18: per report patient became hypotensive during blood transfusion (transfusion reaction?), spiked fever s/p infectious workup, and received 23.4% 30cc for AMS  04/19: no overnight events. Continue to monitor Hg    Allergies    No Known Allergies    Intolerances    REVIEW OF SYSTEMS: [ ] Unable to Assess due to neurologic exam   [ x] All ROS addressed below are non-contributory, except: N/A  Neuro: [ ] Headache [ ] Back pain [ ] Numbness [ ] Weakness [ ] Ataxia [ ] Dizziness [ ] Aphasia [ ] Dysarthria [ ] Visual disturbance  Resp: [ ] Shortness of breath/dyspnea, [ ] Orthopnea [ ] Cough  CV: [ ] Chest pain [ ] Palpitation [ ] Lightheadedness [ ] Syncope  Renal: [ ] Thirst [ ] Edema  GI: [ ] Nausea [ ] Emesis [ ] Abdominal pain [ ] Constipation [ ] Diarrhea  Hem: [ ] Hematemesis [ ] bright red blood per rectum  ID: [ ] Fever [ ] Chills [ ] Dysuria  ENT: [ ] Rhinorrhea      DEVICES:   [ ] Restraints [ ] ET tube [ ] central line [ x] arterial line L radial [x ] talbert [ ] NGT/OGT [ ] EVD [ ] LD [ ] PARI/HMV [ ] Trach [ ] PEG [ ] Chest Tube     ICU Vital Signs Last 24 Hrs  T(C): 38.5 (18 Apr 2024 15:00), Max: 38.5 (18 Apr 2024 15:00)  T(F): 101.3 (18 Apr 2024 15:00), Max: 101.3 (18 Apr 2024 15:00)  HR: 77 (18 Apr 2024 15:30) (75 - 105)  BP: --  BP(mean): --  ABP: 122/46 (18 Apr 2024 15:30) (71/34 - 146/61)  ABP(mean): 70 (18 Apr 2024 15:30) (50 - 93)  RR: 18 (18 Apr 2024 15:30) (12 - 24)  SpO2: 98% (18 Apr 2024 15:30) (92% - 100%)    O2 Parameters below as of 18 Apr 2024 07:00  Patient On (Oxygen Delivery Method): room air    I&O's Summary    17 Apr 2024 07:01  -  18 Apr 2024 07:00  --------------------------------------------------------  IN: 3857.9 mL / OUT: 1685 mL / NET: 2172.9 mL    18 Apr 2024 07:01  -  18 Apr 2024 19:13  --------------------------------------------------------  IN: 2966.6 mL / OUT: 625 mL / NET: 2341.6 mL      MEDICATIONS  (STANDING):  atorvastatin 80 milliGRAM(s) Oral at bedtime  chlorhexidine 4% Liquid 1 Application(s) Topical daily  finasteride 5 milliGRAM(s) Oral daily  influenza  Vaccine (HIGH DOSE) 0.7 milliLiter(s) IntraMuscular once  insulin lispro (ADMELOG) corrective regimen sliding scale   SubCutaneous every 6 hours  insulin NPH human recombinant 5 Unit(s) SubCutaneous every 6 hours  lidocaine   4% Patch 1 Patch Transdermal daily  ondansetron   Disintegrating Tablet 4 milliGRAM(s) Oral once  pantoprazole  Injectable 40 milliGRAM(s) IV Push daily  phenylephrine    Infusion 0.1 MICROgram(s)/kG/Min (2.66 mL/Hr) IV Continuous <Continuous>  piperacillin/tazobactam IVPB.- 3.375 Gram(s) IV Intermittent once  polyethylene glycol 3350 17 Gram(s) Oral two times a day  senna 2 Tablet(s) Oral at bedtime  sodium chloride 3% + sodium acetate 50:50 1000 milliLiter(s) (100 mL/Hr) IV Continuous <Continuous>  timolol 0.5% Solution 1 Drop(s) Left EYE two times a day    MEDICATIONS  (PRN):  methocarbamol 500 milliGRAM(s) Oral every 6 hours PRN musle spasm                          8.8    6.76  )-----------( 137      ( 18 Apr 2024 19:00 )             26.7   04-18    145  |  114<H>  |  13  ----------------------------<  175<H>  4.1   |  22  |  0.74    Ca    7.5<L>      18 Apr 2024 14:53  Phos  1.9     04-18  Mg     1.8     04-18    TPro  4.9<L>  /  Alb  2.6<L>  /  TBili  0.5  /  DBili  x   /  AST  30  /  ALT  22  /  AlkPhos  62  04-18    PT/INR - ( 18 Apr 2024 07:00 )   PT: 11.1 sec;   INR: 1.01 ratio         PTT - ( 18 Apr 2024 07:00 )  PTT:25.1 sec    ABG - ( 18 Apr 2024 18:50 )  pH, Arterial: 7.46  pH, Blood: x     /  pCO2: 32    /  pO2: 160   / HCO3: 23    / Base Excess: -0.7  /  SaO2: 98.3        EXAMINATION:  PHYSICAL EXAM:    Constitutional: No Acute Distress     Neurological: lethargic but rousable Ox3, L pupil surgical large/irregular, R 3mmR, able to say name but severely dysarthric, FC, L facial, R side 5, LUE WD, LLE 3/5    Pulmonary: diminished in bases BL    Cardiovascular: S1, S2, Regular rate and rhythm     Gastrointestinal: Soft, Non-tender, Non-distended     Extremities: No calf tenderness    HOSPITAL COURSE: 80yo man PMH HTN, DM, CAD s/p stents on ASA LKW 1:30-2pm today, found down by family, ?fell down stairs and family heard a thud, brought to [facility] ED, where NIHSS reportedly 7, given TNK 16:57, found to have R M2 occlusion, transferred to [facility] for MT, TICI2B, one pass.     Admission Scores  GCS: 15  HH:   MF:   NIHSS:  16 at [facility] ED  RASS:    CAM-ICU:   ICH:    04/18: per report patient became hypotensive during blood transfusion (transfusion reaction?), spiked fever s/p infectious workup, and received 23.4% 30cc for AMS  04/19: no overnight events. Continue to monitor Hg  04/20: VS stable, patient failed FEEEST, NG tube to be placed r CTH today stable.    Allergies    No Known Allergies    Intolerances    REVIEW OF SYSTEMS: [ ] Unable to Assess due to neurologic exam   [ x] All ROS addressed below are non-contributory, except: N/A  Neuro: [ ] Headache [ ] Back pain [ ] Numbness [ ] Weakness [ ] Ataxia [ ] Dizziness [ ] Aphasia [ ] Dysarthria [ ] Visual disturbance  Resp: [ ] Shortness of breath/dyspnea, [ ] Orthopnea [ ] Cough  CV: [ ] Chest pain [ ] Palpitation [ ] Lightheadedness [ ] Syncope  Renal: [ ] Thirst [ ] Edema  GI: [ ] Nausea [ ] Emesis [ ] Abdominal pain [ ] Constipation [ ] Diarrhea  Hem: [ ] Hematemesis [ ] bright red blood per rectum  ID: [ ] Fever [ ] Chills [ ] Dysuria  ENT: [ ] Rhinorrhea      DEVICES:   [ ] Restraints [ ] ET tube [ ] central line [ x] arterial line L radial [x ] talbert [ ] NGT/OGT [ ] EVD [ ] LD [ ] PARI/HMV [ ] Trach [ ] PEG [ ] Chest Tube     ICU Vital Signs Last 24 Hrs  T(C): 36.9 (20 Apr 2024 07:00), Max: 37.4 (19 Apr 2024 19:00)  T(F): 98.5 (20 Apr 2024 07:00), Max: 99.3 (19 Apr 2024 19:00)  HR: 62 (20 Apr 2024 08:00) (54 - 78)  BP: --  BP(mean): --  ABP: 117/48 (20 Apr 2024 08:00) (103/42 - 128/49)  ABP(mean): 71 (20 Apr 2024 08:00) (61 - 79)  RR: 19 (20 Apr 2024 08:00) (12 - 20)  SpO2: 93% (20 Apr 2024 08:00) (93% - 99%)    O2 Parameters below as of 20 Apr 2024 07:00  Patient On (Oxygen Delivery Method): nasal cannula  O2 Flow (L/min): 2      I&O's Summary    19 Apr 2024 07:01  -  20 Apr 2024 07:00  --------------------------------------------------------  IN: 2859 mL / OUT: 1350 mL / NET: 1509 mL          MEDICATIONS  (STANDING):  atorvastatin 80 milliGRAM(s) Oral at bedtime  chlorhexidine 4% Liquid 1 Application(s) Topical daily  finasteride 5 milliGRAM(s) Oral daily  influenza  Vaccine (HIGH DOSE) 0.7 milliLiter(s) IntraMuscular once  insulin lispro (ADMELOG) corrective regimen sliding scale   SubCutaneous every 6 hours  insulin NPH human recombinant 5 Unit(s) SubCutaneous every 6 hours  lidocaine   4% Patch 1 Patch Transdermal daily  ondansetron   Disintegrating Tablet 4 milliGRAM(s) Oral once  pantoprazole  Injectable 40 milliGRAM(s) IV Push daily  phenylephrine    Infusion 0.1 MICROgram(s)/kG/Min (2.66 mL/Hr) IV Continuous <Continuous>  piperacillin/tazobactam IVPB.- 3.375 Gram(s) IV Intermittent once  polyethylene glycol 3350 17 Gram(s) Oral two times a day  senna 2 Tablet(s) Oral at bedtime  sodium chloride 3% + sodium acetate 50:50 1000 milliLiter(s) (100 mL/Hr) IV Continuous <Continuous>  timolol 0.5% Solution 1 Drop(s) Left EYE two times a day    MEDICATIONS  (PRN):  methocarbamol 500 milliGRAM(s) Oral every 6 hours PRN musle spasm                          7.9    6.24  )-----------( 109      ( 19 Apr 2024 20:08 )             24.1   04-19    154<H>  |  122<H>  |  17  ----------------------------<  172<H>  3.9   |  24  |  0.88    Ca    8.6      19 Apr 2024 20:08  Phos  2.3     04-19  Mg     2.3     04-19    TPro  x   /  Alb  x   /  TBili  0.9  /  DBili  0.2  /  AST  x   /  ALT  x   /  AlkPhos  x   04-18        EXAMINATION:  PHYSICAL EXAM:    Constitutional: No Acute Distress     Neurological: lethargic but rousable Ox3, L pupil surgical large/irregular, R 3mmR, able to say name but severely dysarthric, FC, L facial, R side 5, LUE WD, LLE 3/5    Pulmonary: diminished in bases BL    Cardiovascular: S1, S2, Regular rate and rhythm     Gastrointestinal: Soft, Non-tender, Non-distended     Extremities: No calf tenderness

## 2024-04-21 LAB
-  AMOXICILLIN/CLAVULANIC ACID: SIGNIFICANT CHANGE UP
-  AMPICILLIN/SULBACTAM: SIGNIFICANT CHANGE UP
-  AMPICILLIN: SIGNIFICANT CHANGE UP
-  AZTREONAM: SIGNIFICANT CHANGE UP
-  CEFAZOLIN: SIGNIFICANT CHANGE UP
-  CEFEPIME: SIGNIFICANT CHANGE UP
-  CEFOXITIN: SIGNIFICANT CHANGE UP
-  CEFTRIAXONE: SIGNIFICANT CHANGE UP
-  CIPROFLOXACIN: SIGNIFICANT CHANGE UP
-  ERTAPENEM: SIGNIFICANT CHANGE UP
-  GENTAMICIN: SIGNIFICANT CHANGE UP
-  IMIPENEM: SIGNIFICANT CHANGE UP
-  LEVOFLOXACIN: SIGNIFICANT CHANGE UP
-  MEROPENEM: SIGNIFICANT CHANGE UP
-  PIPERACILLIN/TAZOBACTAM: SIGNIFICANT CHANGE UP
-  TOBRAMYCIN: SIGNIFICANT CHANGE UP
-  TRIMETHOPRIM/SULFAMETHOXAZOLE: SIGNIFICANT CHANGE UP
ANION GAP SERPL CALC-SCNC: 9 MMOL/L — SIGNIFICANT CHANGE UP (ref 5–17)
BASOPHILS # BLD AUTO: 0.02 K/UL — SIGNIFICANT CHANGE UP (ref 0–0.2)
BASOPHILS NFR BLD AUTO: 0.2 % — SIGNIFICANT CHANGE UP (ref 0–2)
BUN SERPL-MCNC: 22 MG/DL — SIGNIFICANT CHANGE UP (ref 7–23)
CALCIUM SERPL-MCNC: 8.4 MG/DL — SIGNIFICANT CHANGE UP (ref 8.4–10.5)
CHLORIDE SERPL-SCNC: 122 MMOL/L — HIGH (ref 96–108)
CO2 SERPL-SCNC: 23 MMOL/L — SIGNIFICANT CHANGE UP (ref 22–31)
CREAT SERPL-MCNC: 0.91 MG/DL — SIGNIFICANT CHANGE UP (ref 0.5–1.3)
CULTURE RESULTS: ABNORMAL
EGFR: 86 ML/MIN/1.73M2 — SIGNIFICANT CHANGE UP
EOSINOPHIL # BLD AUTO: 0.21 K/UL — SIGNIFICANT CHANGE UP (ref 0–0.5)
EOSINOPHIL NFR BLD AUTO: 2.3 % — SIGNIFICANT CHANGE UP (ref 0–6)
GLUCOSE BLDC GLUCOMTR-MCNC: 155 MG/DL — HIGH (ref 70–99)
GLUCOSE BLDC GLUCOMTR-MCNC: 161 MG/DL — HIGH (ref 70–99)
GLUCOSE BLDC GLUCOMTR-MCNC: 218 MG/DL — HIGH (ref 70–99)
GLUCOSE BLDC GLUCOMTR-MCNC: 84 MG/DL — SIGNIFICANT CHANGE UP (ref 70–99)
GLUCOSE SERPL-MCNC: 189 MG/DL — HIGH (ref 70–99)
HCT VFR BLD CALC: 25.6 % — LOW (ref 39–50)
HGB BLD-MCNC: 8.1 G/DL — LOW (ref 13–17)
IMM GRANULOCYTES NFR BLD AUTO: 0.7 % — SIGNIFICANT CHANGE UP (ref 0–0.9)
LYMPHOCYTES # BLD AUTO: 1.22 K/UL — SIGNIFICANT CHANGE UP (ref 1–3.3)
LYMPHOCYTES # BLD AUTO: 13.4 % — SIGNIFICANT CHANGE UP (ref 13–44)
MAGNESIUM SERPL-MCNC: 2.1 MG/DL — SIGNIFICANT CHANGE UP (ref 1.6–2.6)
MCHC RBC-ENTMCNC: 28.9 PG — SIGNIFICANT CHANGE UP (ref 27–34)
MCHC RBC-ENTMCNC: 31.6 GM/DL — LOW (ref 32–36)
MCV RBC AUTO: 91.4 FL — SIGNIFICANT CHANGE UP (ref 80–100)
METHOD TYPE: SIGNIFICANT CHANGE UP
MONOCYTES # BLD AUTO: 0.63 K/UL — SIGNIFICANT CHANGE UP (ref 0–0.9)
MONOCYTES NFR BLD AUTO: 6.9 % — SIGNIFICANT CHANGE UP (ref 2–14)
NEUTROPHILS # BLD AUTO: 6.96 K/UL — SIGNIFICANT CHANGE UP (ref 1.8–7.4)
NEUTROPHILS NFR BLD AUTO: 76.5 % — SIGNIFICANT CHANGE UP (ref 43–77)
NRBC # BLD: 0 /100 WBCS — SIGNIFICANT CHANGE UP (ref 0–0)
ORGANISM # SPEC MICROSCOPIC CNT: ABNORMAL
ORGANISM # SPEC MICROSCOPIC CNT: ABNORMAL
PHOSPHATE SERPL-MCNC: 4.2 MG/DL — SIGNIFICANT CHANGE UP (ref 2.5–4.5)
PLATELET # BLD AUTO: 122 K/UL — LOW (ref 150–400)
POTASSIUM SERPL-MCNC: 3.9 MMOL/L — SIGNIFICANT CHANGE UP (ref 3.5–5.3)
POTASSIUM SERPL-SCNC: 3.9 MMOL/L — SIGNIFICANT CHANGE UP (ref 3.5–5.3)
RBC # BLD: 2.8 M/UL — LOW (ref 4.2–5.8)
RBC # FLD: 15.8 % — HIGH (ref 10.3–14.5)
SODIUM SERPL-SCNC: 154 MMOL/L — HIGH (ref 135–145)
SPECIMEN SOURCE: SIGNIFICANT CHANGE UP
WBC # BLD: 9.1 K/UL — SIGNIFICANT CHANGE UP (ref 3.8–10.5)
WBC # FLD AUTO: 9.1 K/UL — SIGNIFICANT CHANGE UP (ref 3.8–10.5)

## 2024-04-21 PROCEDURE — 99291 CRITICAL CARE FIRST HOUR: CPT

## 2024-04-21 PROCEDURE — 71045 X-RAY EXAM CHEST 1 VIEW: CPT | Mod: 26

## 2024-04-21 PROCEDURE — 99233 SBSQ HOSP IP/OBS HIGH 50: CPT

## 2024-04-21 PROCEDURE — 74018 RADEX ABDOMEN 1 VIEW: CPT | Mod: 26

## 2024-04-21 PROCEDURE — 70450 CT HEAD/BRAIN W/O DYE: CPT | Mod: 26

## 2024-04-21 RX ORDER — IPRATROPIUM/ALBUTEROL SULFATE 18-103MCG
3 AEROSOL WITH ADAPTER (GRAM) INHALATION EVERY 6 HOURS
Refills: 0 | Status: DISCONTINUED | OUTPATIENT
Start: 2024-04-21 | End: 2024-04-21

## 2024-04-21 RX ORDER — ASPIRIN/CALCIUM CARB/MAGNESIUM 324 MG
81 TABLET ORAL DAILY
Refills: 0 | Status: DISCONTINUED | OUTPATIENT
Start: 2024-04-21 | End: 2024-04-23

## 2024-04-21 RX ORDER — POTASSIUM CHLORIDE 20 MEQ
40 PACKET (EA) ORAL ONCE
Refills: 0 | Status: COMPLETED | OUTPATIENT
Start: 2024-04-21 | End: 2024-04-21

## 2024-04-21 RX ORDER — ACETYLCYSTEINE 200 MG/ML
4 VIAL (ML) MISCELLANEOUS EVERY 6 HOURS
Refills: 0 | Status: DISCONTINUED | OUTPATIENT
Start: 2024-04-21 | End: 2024-04-25

## 2024-04-21 RX ORDER — ACETAMINOPHEN 500 MG
1000 TABLET ORAL ONCE
Refills: 0 | Status: COMPLETED | OUTPATIENT
Start: 2024-04-21 | End: 2024-04-21

## 2024-04-21 RX ORDER — PSYLLIUM SEED (WITH DEXTROSE)
2 POWDER (GRAM) ORAL ONCE
Refills: 0 | Status: COMPLETED | OUTPATIENT
Start: 2024-04-21 | End: 2024-04-21

## 2024-04-21 RX ORDER — FUROSEMIDE 40 MG
20 TABLET ORAL ONCE
Refills: 0 | Status: COMPLETED | OUTPATIENT
Start: 2024-04-21 | End: 2024-04-21

## 2024-04-21 RX ADMIN — HUMAN INSULIN 8 UNIT(S): 100 INJECTION, SUSPENSION SUBCUTANEOUS at 17:07

## 2024-04-21 RX ADMIN — METHOCARBAMOL 500 MILLIGRAM(S): 500 TABLET, FILM COATED ORAL at 09:22

## 2024-04-21 RX ADMIN — Medication 2: at 00:15

## 2024-04-21 RX ADMIN — Medication 3 MILLILITER(S): at 11:30

## 2024-04-21 RX ADMIN — Medication 2: at 23:52

## 2024-04-21 RX ADMIN — Medication 4 MILLILITER(S): at 23:25

## 2024-04-21 RX ADMIN — Medication 650 MILLIGRAM(S): at 22:00

## 2024-04-21 RX ADMIN — Medication 4: at 05:53

## 2024-04-21 RX ADMIN — PANTOPRAZOLE SODIUM 40 MILLIGRAM(S): 20 TABLET, DELAYED RELEASE ORAL at 11:39

## 2024-04-21 RX ADMIN — PIPERACILLIN AND TAZOBACTAM 25 GRAM(S): 4; .5 INJECTION, POWDER, LYOPHILIZED, FOR SOLUTION INTRAVENOUS at 21:13

## 2024-04-21 RX ADMIN — LIDOCAINE 1 PATCH: 4 CREAM TOPICAL at 19:19

## 2024-04-21 RX ADMIN — HUMAN INSULIN 8 UNIT(S): 100 INJECTION, SUSPENSION SUBCUTANEOUS at 00:15

## 2024-04-21 RX ADMIN — Medication 2 PACKET(S): at 02:44

## 2024-04-21 RX ADMIN — Medication 3 MILLILITER(S): at 05:32

## 2024-04-21 RX ADMIN — ENOXAPARIN SODIUM 40 MILLIGRAM(S): 100 INJECTION SUBCUTANEOUS at 17:06

## 2024-04-21 RX ADMIN — LIDOCAINE 1 PATCH: 4 CREAM TOPICAL at 11:38

## 2024-04-21 RX ADMIN — FINASTERIDE 5 MILLIGRAM(S): 5 TABLET, FILM COATED ORAL at 11:39

## 2024-04-21 RX ADMIN — Medication 4 MILLILITER(S): at 17:19

## 2024-04-21 RX ADMIN — Medication 1000 MILLIGRAM(S): at 04:00

## 2024-04-21 RX ADMIN — Medication 81 MILLIGRAM(S): at 11:39

## 2024-04-21 RX ADMIN — Medication 40 MILLIEQUIVALENT(S): at 05:43

## 2024-04-21 RX ADMIN — Medication 2: at 17:07

## 2024-04-21 RX ADMIN — PIPERACILLIN AND TAZOBACTAM 25 GRAM(S): 4; .5 INJECTION, POWDER, LYOPHILIZED, FOR SOLUTION INTRAVENOUS at 05:43

## 2024-04-21 RX ADMIN — Medication 3 MILLILITER(S): at 17:18

## 2024-04-21 RX ADMIN — METHOCARBAMOL 500 MILLIGRAM(S): 500 TABLET, FILM COATED ORAL at 21:14

## 2024-04-21 RX ADMIN — LIDOCAINE 1 PATCH: 4 CREAM TOPICAL at 00:00

## 2024-04-21 RX ADMIN — ATORVASTATIN CALCIUM 80 MILLIGRAM(S): 80 TABLET, FILM COATED ORAL at 21:14

## 2024-04-21 RX ADMIN — HUMAN INSULIN 8 UNIT(S): 100 INJECTION, SUSPENSION SUBCUTANEOUS at 23:51

## 2024-04-21 RX ADMIN — CHLORHEXIDINE GLUCONATE 1 APPLICATION(S): 213 SOLUTION TOPICAL at 11:50

## 2024-04-21 RX ADMIN — Medication 3 MILLILITER(S): at 23:25

## 2024-04-21 RX ADMIN — Medication 1 DROP(S): at 05:43

## 2024-04-21 RX ADMIN — HUMAN INSULIN 8 UNIT(S): 100 INJECTION, SUSPENSION SUBCUTANEOUS at 05:54

## 2024-04-21 RX ADMIN — Medication 20 MILLIGRAM(S): at 09:22

## 2024-04-21 RX ADMIN — Medication 81 MILLIGRAM(S): at 05:45

## 2024-04-21 RX ADMIN — Medication 1 DROP(S): at 17:06

## 2024-04-21 RX ADMIN — Medication 400 MILLIGRAM(S): at 03:00

## 2024-04-21 RX ADMIN — Medication 4 MILLILITER(S): at 11:30

## 2024-04-21 RX ADMIN — Medication 650 MILLIGRAM(S): at 21:00

## 2024-04-21 RX ADMIN — PIPERACILLIN AND TAZOBACTAM 25 GRAM(S): 4; .5 INJECTION, POWDER, LYOPHILIZED, FOR SOLUTION INTRAVENOUS at 14:19

## 2024-04-21 RX ADMIN — Medication 30 MILLILITER(S): at 05:42

## 2024-04-21 RX ADMIN — Medication 5 MILLIGRAM(S): at 21:14

## 2024-04-21 NOTE — PROGRESS NOTE ADULT - SUBJECTIVE AND OBJECTIVE BOX
Interval events:  No acute events on evening rounds.     VITALS:  T(C): , Max: 37.4 (04-21-24 @ 19:00)  HR:  (57 - 81)  BP: --  ABP:  (93/44 - 151/61)  RR:  (12 - 39)  SpO2:  (89% - 100%)  Wt(kg): --      04-20-24 @ 07:01  -  04-21-24 @ 07:00  --------------------------------------------------------  IN: 3523.5 mL / OUT: 1600 mL / NET: 1923.5 mL    04-21-24 @ 07:01  -  04-21-24 @ 22:13  --------------------------------------------------------  IN: 736 mL / OUT: 1200 mL / NET: -464 mL      LABS:  Na: 154 (04-21 @ 02:34), 154 (04-19 @ 20:08), 154 (04-19 @ 14:03), 148 (04-19 @ 03:43)  K: 3.9 (04-21 @ 02:34), 3.9 (04-19 @ 20:08), 3.8 (04-19 @ 14:03), 3.6 (04-19 @ 03:43)  Cl: 122 (04-21 @ 02:34), 122 (04-19 @ 20:08), 122 (04-19 @ 14:03), 117 (04-19 @ 03:43)  CO2: 23 (04-21 @ 02:34), 24 (04-19 @ 20:08), 23 (04-19 @ 14:03), 23 (04-19 @ 03:43)  BUN: 22 (04-21 @ 02:34), 17 (04-19 @ 20:08), 16 (04-19 @ 14:03), 13 (04-19 @ 03:43)  Cr: 0.91 (04-21 @ 02:34), 0.88 (04-19 @ 20:08), 0.88 (04-19 @ 14:03), 0.79 (04-19 @ 03:43)  Glu: 189(04-21 @ 02:34), 172(04-19 @ 20:08), 225(04-19 @ 14:03), 174(04-19 @ 03:43)    Hgb: 8.1 (04-21 @ 02:34), 7.9 (04-19 @ 20:08), 8.0 (04-19 @ 14:03), 8.1 (04-19 @ 03:43)  Hct: 25.6 (04-21 @ 02:34), 24.1 (04-19 @ 20:08), 25.5 (04-19 @ 14:03), 24.1 (04-19 @ 03:43)  WBC: 9.10 (04-21 @ 02:34), 6.24 (04-19 @ 20:08), 5.38 (04-19 @ 14:03), 5.16 (04-19 @ 03:43)  Plt: 122 (04-21 @ 02:34), 109 (04-19 @ 20:08), 107 (04-19 @ 14:03), 107 (04-19 @ 03:43)    MEDICATIONS:  acetaminophen   Oral Liquid .. 650 milliGRAM(s) Oral every 6 hours PRN  acetylcysteine 20%  Inhalation 4 milliLiter(s) Inhalation every 6 hours  albuterol/ipratropium for Nebulization 3 milliLiter(s) Nebulizer every 6 hours  aluminum hydroxide/magnesium hydroxide/simethicone Suspension 30 milliLiter(s) Oral every 4 hours PRN  aspirin  chewable 81 milliGRAM(s) Oral daily  atorvastatin 80 milliGRAM(s) Oral at bedtime  chlorhexidine 4% Liquid 1 Application(s) Topical daily  enoxaparin Injectable 40 milliGRAM(s) SubCutaneous <User Schedule>  finasteride 5 milliGRAM(s) Oral daily  influenza  Vaccine (HIGH DOSE) 0.7 milliLiter(s) IntraMuscular once  insulin lispro (ADMELOG) corrective regimen sliding scale   SubCutaneous every 6 hours  insulin NPH human recombinant 8 Unit(s) SubCutaneous every 6 hours  lidocaine   4% Patch 1 Patch Transdermal daily  melatonin 5 milliGRAM(s) Oral at bedtime  methocarbamol 500 milliGRAM(s) Oral every 6 hours PRN  pantoprazole  Injectable 40 milliGRAM(s) IV Push daily  piperacillin/tazobactam IVPB.. 3.375 Gram(s) IV Intermittent every 8 hours  polyethylene glycol 3350 17 Gram(s) Oral two times a day  senna 2 Tablet(s) Oral at bedtime  timolol 0.5% Solution 1 Drop(s) Left EYE two times a day    EXAMINATION:  Please see exam from daytime.     Assessment/Plan:   80 yo man admitted 4/16 with R M2 occlusion s/p MT with TICI 2B. Unfortunately developed a large R MCA stroke.     No change to plan from daytime.  c/w ASA for secondary stroke prevention  restart LAUREN MORENO 4/21  on Zosyn for Klebsiella PNA  Lov ppx

## 2024-04-21 NOTE — OCCUPATIONAL THERAPY INITIAL EVALUATION ADULT - ADDITIONAL COMMENTS
Pt lives in a private house w/ spouse +steps to enter. tub shower. +driving. R hand dominant. +glasses for distance

## 2024-04-21 NOTE — OCCUPATIONAL THERAPY INITIAL EVALUATION ADULT - NSOTDISCHREC_GEN_A_CORE
Acute Inpatient Rehab if home assistance and supervision with ADL/IADLs, 3-1 commode/Acute Inpatient Rehab

## 2024-04-21 NOTE — OCCUPATIONAL THERAPY INITIAL EVALUATION ADULT - DIAGNOSIS, OT EVAL
decreased functional mobility, decreased ADL performance decreased strength, balance, postural control, motor control, gross motor skills, processing and attention impacting ADLs

## 2024-04-21 NOTE — OCCUPATIONAL THERAPY INITIAL EVALUATION ADULT - SHORT TERM MEMORY, REHAB EVAL
SBT not performed 2/2 pt lethargic throughout, very garbled speech/impaired Unable to accurately assess due to decreased command follow and attention to task requiring redirection

## 2024-04-21 NOTE — OCCUPATIONAL THERAPY INITIAL EVALUATION ADULT - NS ASR FOLLOW COMMAND OT EVAL
25% of the time/able to follow single-step instructions Frequent verbal and tactile cueing/75% of the time/able to follow single-step instructions

## 2024-04-21 NOTE — PROGRESS NOTE ADULT - SUBJECTIVE AND OBJECTIVE BOX
Subjective  no acute event overnight, a little wheezing and had laxis 20. pt in cervical collar, with daughter at bedside, denied acute pain but also said something liek I dont want surgery (severe dysarthoria and hard to understand).     Med  MEDICATIONS  (STANDING):  acetylcysteine 20%  Inhalation 4 milliLiter(s) Inhalation every 6 hours  albuterol/ipratropium for Nebulization 3 milliLiter(s) Nebulizer every 6 hours  aspirin  chewable 81 milliGRAM(s) Oral daily  atorvastatin 80 milliGRAM(s) Oral at bedtime  chlorhexidine 4% Liquid 1 Application(s) Topical daily  enoxaparin Injectable 40 milliGRAM(s) SubCutaneous <User Schedule>  finasteride 5 milliGRAM(s) Oral daily  influenza  Vaccine (HIGH DOSE) 0.7 milliLiter(s) IntraMuscular once  insulin lispro (ADMELOG) corrective regimen sliding scale   SubCutaneous every 6 hours  insulin NPH human recombinant 8 Unit(s) SubCutaneous every 6 hours  lidocaine   4% Patch 1 Patch Transdermal daily  melatonin 5 milliGRAM(s) Oral at bedtime  pantoprazole  Injectable 40 milliGRAM(s) IV Push daily  piperacillin/tazobactam IVPB.. 3.375 Gram(s) IV Intermittent every 8 hours  polyethylene glycol 3350 17 Gram(s) Oral two times a day  senna 2 Tablet(s) Oral at bedtime  timolol 0.5% Solution 1 Drop(s) Left EYE two times a day    MEDICATIONS  (PRN):  acetaminophen   Oral Liquid .. 650 milliGRAM(s) Oral every 6 hours PRN Temp greater or equal to 38.5C (101.3F), Mild Pain (1 - 3)  aluminum hydroxide/magnesium hydroxide/simethicone Suspension 30 milliLiter(s) Oral every 4 hours PRN Dyspepsia  methocarbamol 500 milliGRAM(s) Oral every 6 hours PRN musle spasm    Objectives  ICU Vital Signs Last 24 Hrs  T(C): 37.3 (21 Apr 2024 15:00), Max: 37.4 (20 Apr 2024 15:00)  T(F): 99.2 (21 Apr 2024 15:00), Max: 99.4 (20 Apr 2024 15:00)  HR: 74 (21 Apr 2024 14:00) (59 - 82)  BP: --  BP(mean): --  ABP: 134/51 (21 Apr 2024 14:00) (93/44 - 168/70)  ABP(mean): 79 (21 Apr 2024 14:00) (60 - 106)  RR: 21 (21 Apr 2024 14:00) (12 - 39)  SpO2: 96% (21 Apr 2024 14:00) (89% - 100%)    O2 Parameters below as of 21 Apr 2024 12:45  Patient On (Oxygen Delivery Method): nasal cannula    Exam:   wake and not very alert, eyes open mostly during exam, severe dysarthria EOM full now, L-pupile surgical, R reactive, L-FD on NC, RUE RLE  at least 4/5 LUE WD, LLE 3/5    Lab:                        8.1    9.10  )-----------( 122      ( 21 Apr 2024 02:34 )             25.6   04-21    154<H>  |  122<H>  |  22  ----------------------------<  189<H>  3.9   |  23  |  0.91    Ca    8.4      21 Apr 2024 02:34  Phos  4.2     04-21  Mg     2.1     04-21

## 2024-04-21 NOTE — OCCUPATIONAL THERAPY INITIAL EVALUATION ADULT - VISUAL ASSESSMENT: VISUAL FIELD CUTS
suspected left visual field cut however, difficult to determine due to decreased processing and delayed responses

## 2024-04-21 NOTE — OCCUPATIONAL THERAPY INITIAL EVALUATION ADULT - GENERAL OBSERVATIONS, REHAB EVAL
Pt received semisupine in bed in NAD, RN aware. +IVL +ICU monitoring +ccollar +joan +NGT +NRB +condom cath. pt cleared for bedside eval only 4/21 Pt received semisupine in bed IVL +ICU monitoring +c-collar +condom cath+ TSLO at bedside

## 2024-04-21 NOTE — PROGRESS NOTE ADULT - SUBJECTIVE AND OBJECTIVE BOX
Patient seen and examined at bedside.    --Anticoagulation--  aspirin  chewable 81 milliGRAM(s) Oral daily  enoxaparin Injectable 40 milliGRAM(s) SubCutaneous <User Schedule>    T(C): 37.2 (04-20-24 @ 23:00), Max: 37.4 (04-20-24 @ 15:00)  HR: 76 (04-21-24 @ 02:00) (56 - 82)  BP: --  RR: 20 (04-21-24 @ 02:00) (12 - 20)  SpO2: 95% (04-21-24 @ 02:00) (93% - 99%)  Wt(kg): --    Exam:

## 2024-04-21 NOTE — OCCUPATIONAL THERAPY INITIAL EVALUATION ADULT - PERTINENT HX OF CURRENT PROBLEM, REHAB EVAL
PMHx: DM, HTN, CAD with stenting, on ASA. presenting for sudden onset L sided weakness. Family heard a thud and found patient on the floor and proceeded with hospitalization. He fell off the stairs. LKW 13:30. Patient was evaluated at [facility] for stroke and was found to have right MCA occlusion. Pt s/p tenecteplase at 16:57. pt transferred to [facility] for thrombectomy. Upon initial evaluation patient ws found to have NIHSS of 9 and subsequently at initial evaluation pt was not very well cooperative which contributed to a high NIHSS score of 19. After imaging and re-evaluation pt was found to have NIHSS of 11. pt s/p thrombectomy with TICI 2B resultant on 4/16.  CT spine: Acute fracture involving the anterior T6 vertebral body extending into a right anterolateral T5-T6 osteophyte. No significant vertebral body height loss or bony retropulsion into the spinal canal.  CT C/A/P: No CT evidence of acute traumatic sequelae within the chest or abdomen. L lateral hip subcutaneous hematoma with evidence of active bleeding. Nonspecific thickened appearance to the upper esophagus. Subcentimeter cystic lesion within the pancreatic head. Urinary bladder wall thickening despite underdistention.  CTH: New cytotoxic edema in the right frontal, parietal, and temporal lobes, as well as within the right insula consistent with an acute right MCA territory infarct. No hipolito hemorrhagic transformation. No midline shift.

## 2024-04-21 NOTE — PROGRESS NOTE ADULT - ASSESSMENT
81 years old man with a PMH of DM HTN CAD with stenting presenting for sudden onset left sided weakness. Family heard a thud and brought patient to ED. Patient received tenecteplase at [facility] VS, CTH found R M2 occlusion. Pt was transferred for thrombectomy. NIHSS9>11, mRS0, TICI 2B    4/20 started ASA, failed FEEST,   4/21 now on cervical collar, received 20mg Lasix for pulm edema (wheezing)- improved sxm. Now on NC, per cardio, plan for BB on 4/22,  swallow re-latonia for possible PEG. Had BM    Impression:   # Large R-M2 ischemic infarct (L deficits including hemiparesis, left facial droop, dysarthria, neglect, LHH)  s/p tenecteplase and endovascular therapy, TICI 2B, with extensive cortical petechial hemorrhage on MRI  Mechanism ESUS.   -workup EF 65% G1 LV diastolic dysfunction. LDL 50 A1C 8.2 ,[] Loop pending   - Med: now on Lipitor 80 (may consider 40 as at goal), SQH, ASA 81mgqD.     #Vertebral compression fracture ( thoracic T6 ) C- collar , and brace per ortho when out of bed   # fail swallow  # aspiration PNA- now on Zosyn improving.     Code: Full  Dispo: AR (per PT), can go to stroke unit bed  DVT ppx: SQH  Diet: TF    Lebron Mackey MD PhD  Vascular neurology fellow PGY5

## 2024-04-21 NOTE — PROGRESS NOTE ADULT - SUBJECTIVE AND OBJECTIVE BOX
· Subjective and Objective:   HOSPITAL COURSE: 80yo man PMH HTN, DM, CAD s/p stents on ASA LKW 1:30-2pm today, found down by family, ?fell down stairs and family heard a thud, brought to [facility] ED, where NIHSS reportedly 7, given TNK 16:57, found to have R M2 occlusion, transferred to [facility] for MT, TICI2B, one pass.     Admission Scores  GCS: 15  HH:   MF:   NIHSS:  16 at [facility] ED  RASS:    CAM-ICU:   ICH:    04/18: per report patient became hypotensive during blood transfusion (transfusion reaction?), spiked fever s/p infectious workup, and received 23.4% 30cc for AMS  04/19: no overnight events. Continue to monitor Hg  04/20: VS stable, patient failed FEEEST, NG tube to be placed r CTH today stable.  4/21 wheezing     Allergies    No Known Allergies    Intolerances    REVIEW OF SYSTEMS: [ ] Unable to Assess due to neurologic exam   [ x] All ROS addressed below are non-contributory, except: N/A  Neuro: [ ] Headache [ ] Back pain [ ] Numbness [ ] Weakness [ ] Ataxia [ ] Dizziness [ ] Aphasia [ ] Dysarthria [ ] Visual disturbance  Resp: [ ] Shortness of breath/dyspnea, [ ] Orthopnea [ ] Cough  CV: [ ] Chest pain [ ] Palpitation [ ] Lightheadedness [ ] Syncope  Renal: [ ] Thirst [ ] Edema  GI: [ ] Nausea [ ] Emesis [ ] Abdominal pain [ ] Constipation [ ] Diarrhea  Hem: [ ] Hematemesis [ ] bright red blood per rectum  ID: [ ] Fever [ ] Chills [ ] Dysuria  ENT: [ ] Rhinorrhea      DEVICES:   [ ] Restraints [ ] ET tube [ ] central line [ x] arterial line L radial [x ] talbert [ ] NGT/OGT [ ] EVD [ ] LD [ ] PARI/HMV [ ] Trach [ ] PEG [ ] Chest Tube     T(C): 36.9 (04-21-24 @ 03:00), Max: 37.4 (04-20-24 @ 15:00)  HR: 70 (04-21-24 @ 06:00) (56 - 82)  BP: --  RR: 20 (04-21-24 @ 06:00) (14 - 20)  SpO2: 93% (04-21-24 @ 06:00) (92% - 99%)  04-20-24 @ 07:01  -  04-21-24 @ 07:00  --------------------------------------------------------  IN: 3523.5 mL / OUT: 1600 mL / NET: 1923.5 mL    acetaminophen   Oral Liquid .. 650 milliGRAM(s) Oral every 6 hours PRN  albuterol/ipratropium for Nebulization 3 milliLiter(s) Nebulizer every 6 hours  aluminum hydroxide/magnesium hydroxide/simethicone Suspension 30 milliLiter(s) Oral every 4 hours PRN  aspirin  chewable 81 milliGRAM(s) Oral daily  atorvastatin 80 milliGRAM(s) Oral at bedtime  chlorhexidine 4% Liquid 1 Application(s) Topical daily  enoxaparin Injectable 40 milliGRAM(s) SubCutaneous <User Schedule>  finasteride 5 milliGRAM(s) Oral daily  influenza  Vaccine (HIGH DOSE) 0.7 milliLiter(s) IntraMuscular once  insulin lispro (ADMELOG) corrective regimen sliding scale   SubCutaneous every 6 hours  insulin NPH human recombinant 8 Unit(s) SubCutaneous every 6 hours  lidocaine   4% Patch 1 Patch Transdermal daily  melatonin 5 milliGRAM(s) Oral at bedtime  methocarbamol 500 milliGRAM(s) Oral every 6 hours PRN  pantoprazole  Injectable 40 milliGRAM(s) IV Push daily  piperacillin/tazobactam IVPB.. 3.375 Gram(s) IV Intermittent every 8 hours  polyethylene glycol 3350 17 Gram(s) Oral two times a day  senna 2 Tablet(s) Oral at bedtime  timolol 0.5% Solution 1 Drop(s) Left EYE two times a day        EXAMINATION:  PHYSICAL EXAM:    Constitutional: No Acute Distress     Neurological: lethargic but rousable Ox3, L pupil surgical large/irregular, R 3mmR, able to say name but severely dysarthric, FC, L facial, R side 5, LUE WD, LLE 3/5    Pulmonary: diminished in bases BL, crackled, and wheezes     Cardiovascular: S1, S2, Regular rate and rhythm     Gastrointestinal: Soft, Non-tender, Non-distended     Extremities: No calf tenderness       LABS:  Na: 154 (04-21 @ 02:34), 154 (04-19 @ 20:08), 154 (04-19 @ 14:03), 148 (04-19 @ 03:43), 147 (04-18 @ 19:00), 145 (04-18 @ 14:53), 141 (04-18 @ 10:41)  K: 3.9 (04-21 @ 02:34), 3.9 (04-19 @ 20:08), 3.8 (04-19 @ 14:03), 3.6 (04-19 @ 03:43), 4.1 (04-18 @ 19:00), 4.1 (04-18 @ 14:53), 4.3 (04-18 @ 10:41)  Cl: 122 (04-21 @ 02:34), 122 (04-19 @ 20:08), 122 (04-19 @ 14:03), 117 (04-19 @ 03:43), 115 (04-18 @ 19:00), 114 (04-18 @ 14:53), 111 (04-18 @ 10:41)  CO2: 23 (04-21 @ 02:34), 24 (04-19 @ 20:08), 23 (04-19 @ 14:03), 23 (04-19 @ 03:43), 24 (04-18 @ 19:00), 22 (04-18 @ 14:53), 23 (04-18 @ 10:41)  BUN: 22 (04-21 @ 02:34), 17 (04-19 @ 20:08), 16 (04-19 @ 14:03), 13 (04-19 @ 03:43), 13 (04-18 @ 19:00), 13 (04-18 @ 14:53), 14 (04-18 @ 10:41)  Cr: 0.91 (04-21 @ 02:34), 0.88 (04-19 @ 20:08), 0.88 (04-19 @ 14:03), 0.79 (04-19 @ 03:43), 0.79 (04-18 @ 19:00), 0.74 (04-18 @ 14:53), 0.73 (04-18 @ 10:41)  Glu: 189(04-21 @ 02:34), 172(04-19 @ 20:08), 225(04-19 @ 14:03), 174(04-19 @ 03:43), 182(04-18 @ 19:00), 175(04-18 @ 14:53), 193(04-18 @ 10:41)    Hgb: 8.1 (04-21 @ 02:34), 7.9 (04-19 @ 20:08), 8.0 (04-19 @ 14:03), 8.1 (04-19 @ 03:43), 8.8 (04-18 @ 19:00), 9.2 (04-18 @ 14:53), 9.2 (04-18 @ 10:41)  Hct: 25.6 (04-21 @ 02:34), 24.1 (04-19 @ 20:08), 25.5 (04-19 @ 14:03), 24.1 (04-19 @ 03:43), 26.7 (04-18 @ 19:00), 28.4 (04-18 @ 14:53), 28.5 (04-18 @ 10:41)  WBC: 9.10 (04-21 @ 02:34), 6.24 (04-19 @ 20:08), 5.38 (04-19 @ 14:03), 5.16 (04-19 @ 03:43), 6.76 (04-18 @ 19:00), 6.67 (04-18 @ 14:53), 6.35 (04-18 @ 10:41)  Plt: 122 (04-21 @ 02:34), 109 (04-19 @ 20:08), 107 (04-19 @ 14:03), 107 (04-19 @ 03:43), 137 (04-18 @ 19:00), 132 (04-18 @ 14:53), 143 (04-18 @ 10:41)    INR:   PTT:           ASSESSMENT/PLAN: R M2 thrombectomy s/p TNK 4/16 at 16:57 s/p MT TICI 2B    Neuro  Acute stroke s/p R M@ MT  ASA started for secondary stroke prevention   PT/OT/OBC   with MR C Spine evidence of  C4-C5 and C5-C6 acute anterior longitudinal ligament injury and/or anterior corner avulsion fractures and CT L Spine evidence of an acute T6 vertebral body extending into a Right anterolateral T5-T6 osteophyte., keep cervical collar, will discuss with ortho if need further workup , and thoracic spine fracture  T 6 vert body    #Vertebral compression fracture ( thoracic T6 ) C- collar , and brace per ortho     Pulm   - respiratory distress, wheezing, crackles, chest xray pulm edema, will give lasix 20 m,g IVx1 and re-assess   - sat > 92% on NC   - duonebs     -CV-  - SBP goal , MAP >65     #Hx of CAD s/p stenting  - on aspirin   - TTE w/ EF ~ 65%, entire septum, mid and apical inferior wall, and apex are abnormal, and mild (grade 1) left ventricular diastolic dysfunction, with normal filling pressure  - resume BB tomorrow     ---GI---  - failed dysphagia screen  - FEEST failed  - NG tube feeds: Glucerna 1.2 , NPO for now due to respiratory status   - bowel regimen miralax/senna  - PPI   - last BM: 04/21    Renal  - IVL   - f/u BMP nightly  - I&O  - NA goal 145-150    Endo  #DM  - a1c 8.2  - target euglycemia 120-180   - NPH 5q6 w/ ISS, will titrate based on coverage needs    Hematology  hgb stable   transfusion goal of 8 given hx of CAD  Lovenox 40 mg QD    ---ID---  afebrile   zosyn for aspiration pneumonia        SOCIAL/FAMILY:  [x] awaiting [] updated at bedside [] family meeting    CODE STATUS:  [x] Full Code [] DNR [] DNI [] Palliative/Comfort Care    DISPOSITION:  [x] ICU [] Stroke Unit [] Floor [] EMU [] RCU [] PCU    [x] Patient is at high risk of neurologic deterioration/death due to: acute stroke, hemorrhagic conversion                    · Subjective and Objective:   HOSPITAL COURSE: 78yo man PMH HTN, DM, CAD s/p stents on ASA LKW 1:30-2pm today, found down by family, ?fell down stairs and family heard a thud, brought to [facility] ED, where NIHSS reportedly 7, given TNK 16:57, found to have R M2 occlusion, transferred to [facility] for MT, TICI2B, one pass.     Admission Scores  GCS: 15  HH:   MF:   NIHSS:  16 at [facility] ED  RASS:    CAM-ICU:   ICH:    04/18: per report patient became hypotensive during blood transfusion (transfusion reaction?), spiked fever s/p infectious workup, and received 23.4% 30cc for AMS  04/19: no overnight events. Continue to monitor Hg  04/20: VS stable, patient failed FEEEST, NG tube to be placed r CTH today stable.  4/21 wheezing     Allergies    No Known Allergies    Intolerances    REVIEW OF SYSTEMS: [ ] Unable to Assess due to neurologic exam   [ x] All ROS addressed below are non-contributory, except: N/A  Neuro: [ ] Headache [ ] Back pain [ ] Numbness [ ] Weakness [ ] Ataxia [ ] Dizziness [ ] Aphasia [ ] Dysarthria [ ] Visual disturbance  Resp: [ ] Shortness of breath/dyspnea, [ ] Orthopnea [ ] Cough  CV: [ ] Chest pain [ ] Palpitation [ ] Lightheadedness [ ] Syncope  Renal: [ ] Thirst [ ] Edema  GI: [ ] Nausea [ ] Emesis [ ] Abdominal pain [ ] Constipation [ ] Diarrhea  Hem: [ ] Hematemesis [ ] bright red blood per rectum  ID: [ ] Fever [ ] Chills [ ] Dysuria  ENT: [ ] Rhinorrhea      DEVICES:   [ ] Restraints [ ] ET tube [ ] central line [ x] arterial line L radial [x ] talbert [ ] NGT/OGT [ ] EVD [ ] LD [ ] PARI/HMV [ ] Trach [ ] PEG [ ] Chest Tube     T(C): 36.9 (04-21-24 @ 03:00), Max: 37.4 (04-20-24 @ 15:00)  HR: 70 (04-21-24 @ 06:00) (56 - 82)  BP: --  RR: 20 (04-21-24 @ 06:00) (14 - 20)  SpO2: 93% (04-21-24 @ 06:00) (92% - 99%)  04-20-24 @ 07:01  -  04-21-24 @ 07:00  --------------------------------------------------------  IN: 3523.5 mL / OUT: 1600 mL / NET: 1923.5 mL    acetaminophen   Oral Liquid .. 650 milliGRAM(s) Oral every 6 hours PRN  albuterol/ipratropium for Nebulization 3 milliLiter(s) Nebulizer every 6 hours  aluminum hydroxide/magnesium hydroxide/simethicone Suspension 30 milliLiter(s) Oral every 4 hours PRN  aspirin  chewable 81 milliGRAM(s) Oral daily  atorvastatin 80 milliGRAM(s) Oral at bedtime  chlorhexidine 4% Liquid 1 Application(s) Topical daily  enoxaparin Injectable 40 milliGRAM(s) SubCutaneous <User Schedule>  finasteride 5 milliGRAM(s) Oral daily  influenza  Vaccine (HIGH DOSE) 0.7 milliLiter(s) IntraMuscular once  insulin lispro (ADMELOG) corrective regimen sliding scale   SubCutaneous every 6 hours  insulin NPH human recombinant 8 Unit(s) SubCutaneous every 6 hours  lidocaine   4% Patch 1 Patch Transdermal daily  melatonin 5 milliGRAM(s) Oral at bedtime  methocarbamol 500 milliGRAM(s) Oral every 6 hours PRN  pantoprazole  Injectable 40 milliGRAM(s) IV Push daily  piperacillin/tazobactam IVPB.. 3.375 Gram(s) IV Intermittent every 8 hours  polyethylene glycol 3350 17 Gram(s) Oral two times a day  senna 2 Tablet(s) Oral at bedtime  timolol 0.5% Solution 1 Drop(s) Left EYE two times a day        EXAMINATION:  PHYSICAL EXAM:    Constitutional: No Acute Distress     Neurological: lethargic but rousable Ox3, L pupil surgical large/irregular, R 3mmR, able to say name but severely dysarthric, FC, L facial, R side 5, LUE WD, LLE 3/5    Pulmonary: diminished in bases BL, crackled, and wheezes     Cardiovascular: S1, S2, Regular rate and rhythm     Gastrointestinal: Soft, Non-tender, Non-distended     Extremities: No calf tenderness       LABS:  Na: 154 (04-21 @ 02:34), 154 (04-19 @ 20:08), 154 (04-19 @ 14:03), 148 (04-19 @ 03:43), 147 (04-18 @ 19:00), 145 (04-18 @ 14:53), 141 (04-18 @ 10:41)  K: 3.9 (04-21 @ 02:34), 3.9 (04-19 @ 20:08), 3.8 (04-19 @ 14:03), 3.6 (04-19 @ 03:43), 4.1 (04-18 @ 19:00), 4.1 (04-18 @ 14:53), 4.3 (04-18 @ 10:41)  Cl: 122 (04-21 @ 02:34), 122 (04-19 @ 20:08), 122 (04-19 @ 14:03), 117 (04-19 @ 03:43), 115 (04-18 @ 19:00), 114 (04-18 @ 14:53), 111 (04-18 @ 10:41)  CO2: 23 (04-21 @ 02:34), 24 (04-19 @ 20:08), 23 (04-19 @ 14:03), 23 (04-19 @ 03:43), 24 (04-18 @ 19:00), 22 (04-18 @ 14:53), 23 (04-18 @ 10:41)  BUN: 22 (04-21 @ 02:34), 17 (04-19 @ 20:08), 16 (04-19 @ 14:03), 13 (04-19 @ 03:43), 13 (04-18 @ 19:00), 13 (04-18 @ 14:53), 14 (04-18 @ 10:41)  Cr: 0.91 (04-21 @ 02:34), 0.88 (04-19 @ 20:08), 0.88 (04-19 @ 14:03), 0.79 (04-19 @ 03:43), 0.79 (04-18 @ 19:00), 0.74 (04-18 @ 14:53), 0.73 (04-18 @ 10:41)  Glu: 189(04-21 @ 02:34), 172(04-19 @ 20:08), 225(04-19 @ 14:03), 174(04-19 @ 03:43), 182(04-18 @ 19:00), 175(04-18 @ 14:53), 193(04-18 @ 10:41)    Hgb: 8.1 (04-21 @ 02:34), 7.9 (04-19 @ 20:08), 8.0 (04-19 @ 14:03), 8.1 (04-19 @ 03:43), 8.8 (04-18 @ 19:00), 9.2 (04-18 @ 14:53), 9.2 (04-18 @ 10:41)  Hct: 25.6 (04-21 @ 02:34), 24.1 (04-19 @ 20:08), 25.5 (04-19 @ 14:03), 24.1 (04-19 @ 03:43), 26.7 (04-18 @ 19:00), 28.4 (04-18 @ 14:53), 28.5 (04-18 @ 10:41)  WBC: 9.10 (04-21 @ 02:34), 6.24 (04-19 @ 20:08), 5.38 (04-19 @ 14:03), 5.16 (04-19 @ 03:43), 6.76 (04-18 @ 19:00), 6.67 (04-18 @ 14:53), 6.35 (04-18 @ 10:41)  Plt: 122 (04-21 @ 02:34), 109 (04-19 @ 20:08), 107 (04-19 @ 14:03), 107 (04-19 @ 03:43), 137 (04-18 @ 19:00), 132 (04-18 @ 14:53), 143 (04-18 @ 10:41)    INR:   PTT:           ASSESSMENT/PLAN: R M2 thrombectomy s/p TNK 4/16 at 16:57 s/p MT TICI 2B    Neuro  neuro checks q 2 hr  CT head stable   Acute stroke s/p R M@ MT  ASA started for secondary stroke prevention   PT/OT/OBC   with MR C Spine evidence of  C4-C5 and C5-C6 acute anterior longitudinal ligament injury and/or anterior corner avulsion fractures and CT L Spine evidence of an acute T6 vertebral body extending into a Right anterolateral T5-T6 osteophyte., keep cervical collar, will discuss with ortho if need further workup , and thoracic spine fracture  T 6 vert body    #Vertebral compression fracture ( thoracic T6 ) C- collar , and brace per ortho when out of bed     Pulm   - respiratory distress, wheezing, crackles, chest xray pulm edema, will give lasix 20 m,g IVx1 and re-assess   chest PT   accurate I/O   - sat > 92% on NC   - duonebs, add mucomyst for secretions      -CV-  - SBP goal , MAP >65     #Hx of CAD s/p stenting  - on aspirin   - TTE w/ EF ~ 65%, entire septum, mid and apical inferior wall, and apex are abnormal, and mild (grade 1) left ventricular diastolic dysfunction, with normal filling pressure  - resume BB tomorrow     ---GI---  - failed dysphagia screen  - FEEST failed  - NG tube feeds: Glucerna 1.2 , NPO for now due to respiratory status   - bowel regimen miralax/senna  abd xray   - PPI   - last BM: 04/21    Renal  - IVL   - f/u BMP nightly  - I&O  - lasix   - NA goal 145-150    Endo  #DM  - a1c 8.2  - target euglycemia 120-180   - NPH 8q6 w/ ISS,     Hematology  hgb stable   transfusion goal of 8 given hx of CAD  Lovenox 40 mg QD    ---ID---  afebrile   zosyn for aspiration pneumonia        SOCIAL/FAMILY:  [x] awaiting [] updated at bedside [] family meeting    CODE STATUS:  [x] Full Code [] DNR [] DNI [] Palliative/Comfort Care    DISPOSITION:  [x] ICU [] Stroke Unit [] Floor [] EMU [] RCU [] PCU    [x] Patient is at high risk of neurologic deterioration/death due to: acute stroke, hemorrhagic conversion

## 2024-04-21 NOTE — OCCUPATIONAL THERAPY INITIAL EVALUATION ADULT - RANGE OF MOTION EXAMINATION, UPPER EXTREMITY
Left UE Passive ROM was WNL (within normal limits)/Right UE Active Assistive ROM was WNL (within normal limits) Left UE Passive ROM was WNL (within normal limits)/Right UE Active ROM was WFL (within functional limits)

## 2024-04-22 PROBLEM — E78.00 PURE HYPERCHOLESTEROLEMIA, UNSPECIFIED: Chronic | Status: ACTIVE | Noted: 2024-04-16

## 2024-04-22 PROBLEM — E11.9 TYPE 2 DIABETES MELLITUS WITHOUT COMPLICATIONS: Chronic | Status: ACTIVE | Noted: 2024-04-16

## 2024-04-22 PROBLEM — I25.10 ATHEROSCLEROTIC HEART DISEASE OF NATIVE CORONARY ARTERY WITHOUT ANGINA PECTORIS: Chronic | Status: ACTIVE | Noted: 2024-04-16

## 2024-04-22 PROBLEM — I10 ESSENTIAL (PRIMARY) HYPERTENSION: Chronic | Status: ACTIVE | Noted: 2024-04-16

## 2024-04-22 LAB
ANION GAP SERPL CALC-SCNC: 9 MMOL/L — SIGNIFICANT CHANGE UP (ref 5–17)
BUN SERPL-MCNC: 19 MG/DL — SIGNIFICANT CHANGE UP (ref 7–23)
CALCIUM SERPL-MCNC: 8.4 MG/DL — SIGNIFICANT CHANGE UP (ref 8.4–10.5)
CHLORIDE SERPL-SCNC: 116 MMOL/L — HIGH (ref 96–108)
CO2 SERPL-SCNC: 24 MMOL/L — SIGNIFICANT CHANGE UP (ref 22–31)
CREAT SERPL-MCNC: 0.88 MG/DL — SIGNIFICANT CHANGE UP (ref 0.5–1.3)
EGFR: 88 ML/MIN/1.73M2 — SIGNIFICANT CHANGE UP
GLUCOSE BLDC GLUCOMTR-MCNC: 118 MG/DL — HIGH (ref 70–99)
GLUCOSE BLDC GLUCOMTR-MCNC: 166 MG/DL — HIGH (ref 70–99)
GLUCOSE BLDC GLUCOMTR-MCNC: 188 MG/DL — HIGH (ref 70–99)
GLUCOSE BLDC GLUCOMTR-MCNC: 225 MG/DL — HIGH (ref 70–99)
GLUCOSE SERPL-MCNC: 132 MG/DL — HIGH (ref 70–99)
MAGNESIUM SERPL-MCNC: 2.1 MG/DL — SIGNIFICANT CHANGE UP (ref 1.6–2.6)
PHOSPHATE SERPL-MCNC: 4 MG/DL — SIGNIFICANT CHANGE UP (ref 2.5–4.5)
POTASSIUM SERPL-MCNC: 3.8 MMOL/L — SIGNIFICANT CHANGE UP (ref 3.5–5.3)
POTASSIUM SERPL-SCNC: 3.8 MMOL/L — SIGNIFICANT CHANGE UP (ref 3.5–5.3)
SODIUM SERPL-SCNC: 149 MMOL/L — HIGH (ref 135–145)

## 2024-04-22 PROCEDURE — 71045 X-RAY EXAM CHEST 1 VIEW: CPT | Mod: 26

## 2024-04-22 PROCEDURE — 99232 SBSQ HOSP IP/OBS MODERATE 35: CPT

## 2024-04-22 PROCEDURE — 99233 SBSQ HOSP IP/OBS HIGH 50: CPT

## 2024-04-22 RX ORDER — METOPROLOL TARTRATE 50 MG
12.5 TABLET ORAL EVERY 12 HOURS
Refills: 0 | Status: DISCONTINUED | OUTPATIENT
Start: 2024-04-22 | End: 2024-04-25

## 2024-04-22 RX ORDER — FUROSEMIDE 40 MG
20 TABLET ORAL ONCE
Refills: 0 | Status: COMPLETED | OUTPATIENT
Start: 2024-04-22 | End: 2024-04-22

## 2024-04-22 RX ORDER — POTASSIUM CHLORIDE 20 MEQ
40 PACKET (EA) ORAL ONCE
Refills: 0 | Status: COMPLETED | OUTPATIENT
Start: 2024-04-22 | End: 2024-04-22

## 2024-04-22 RX ADMIN — Medication 3 MILLILITER(S): at 05:02

## 2024-04-22 RX ADMIN — HUMAN INSULIN 8 UNIT(S): 100 INJECTION, SUSPENSION SUBCUTANEOUS at 17:52

## 2024-04-22 RX ADMIN — Medication 650 MILLIGRAM(S): at 14:59

## 2024-04-22 RX ADMIN — Medication 4 MILLILITER(S): at 23:19

## 2024-04-22 RX ADMIN — Medication 650 MILLIGRAM(S): at 13:59

## 2024-04-22 RX ADMIN — Medication 30 MILLILITER(S): at 11:28

## 2024-04-22 RX ADMIN — LIDOCAINE 1 PATCH: 4 CREAM TOPICAL at 11:29

## 2024-04-22 RX ADMIN — LIDOCAINE 1 PATCH: 4 CREAM TOPICAL at 00:00

## 2024-04-22 RX ADMIN — Medication 81 MILLIGRAM(S): at 11:28

## 2024-04-22 RX ADMIN — Medication 4 MILLILITER(S): at 11:11

## 2024-04-22 RX ADMIN — Medication 4 MILLILITER(S): at 17:27

## 2024-04-22 RX ADMIN — POLYETHYLENE GLYCOL 3350 17 GRAM(S): 17 POWDER, FOR SOLUTION ORAL at 05:49

## 2024-04-22 RX ADMIN — Medication 2: at 17:52

## 2024-04-22 RX ADMIN — Medication 4: at 11:33

## 2024-04-22 RX ADMIN — Medication 650 MILLIGRAM(S): at 06:30

## 2024-04-22 RX ADMIN — HUMAN INSULIN 8 UNIT(S): 100 INJECTION, SUSPENSION SUBCUTANEOUS at 23:41

## 2024-04-22 RX ADMIN — HUMAN INSULIN 8 UNIT(S): 100 INJECTION, SUSPENSION SUBCUTANEOUS at 11:33

## 2024-04-22 RX ADMIN — Medication 1 DROP(S): at 05:50

## 2024-04-22 RX ADMIN — PIPERACILLIN AND TAZOBACTAM 25 GRAM(S): 4; .5 INJECTION, POWDER, LYOPHILIZED, FOR SOLUTION INTRAVENOUS at 05:49

## 2024-04-22 RX ADMIN — Medication 1 DROP(S): at 17:53

## 2024-04-22 RX ADMIN — Medication 4 MILLILITER(S): at 05:02

## 2024-04-22 RX ADMIN — FINASTERIDE 5 MILLIGRAM(S): 5 TABLET, FILM COATED ORAL at 11:28

## 2024-04-22 RX ADMIN — PIPERACILLIN AND TAZOBACTAM 25 GRAM(S): 4; .5 INJECTION, POWDER, LYOPHILIZED, FOR SOLUTION INTRAVENOUS at 22:15

## 2024-04-22 RX ADMIN — Medication 3 MILLILITER(S): at 23:19

## 2024-04-22 RX ADMIN — PIPERACILLIN AND TAZOBACTAM 25 GRAM(S): 4; .5 INJECTION, POWDER, LYOPHILIZED, FOR SOLUTION INTRAVENOUS at 13:59

## 2024-04-22 RX ADMIN — CHLORHEXIDINE GLUCONATE 1 APPLICATION(S): 213 SOLUTION TOPICAL at 12:55

## 2024-04-22 RX ADMIN — Medication 2: at 23:40

## 2024-04-22 RX ADMIN — ENOXAPARIN SODIUM 40 MILLIGRAM(S): 100 INJECTION SUBCUTANEOUS at 17:52

## 2024-04-22 RX ADMIN — Medication 5 MILLIGRAM(S): at 22:44

## 2024-04-22 RX ADMIN — Medication 3 MILLILITER(S): at 17:27

## 2024-04-22 RX ADMIN — Medication 3 MILLILITER(S): at 11:11

## 2024-04-22 RX ADMIN — METHOCARBAMOL 500 MILLIGRAM(S): 500 TABLET, FILM COATED ORAL at 11:29

## 2024-04-22 RX ADMIN — PANTOPRAZOLE SODIUM 40 MILLIGRAM(S): 20 TABLET, DELAYED RELEASE ORAL at 11:28

## 2024-04-22 RX ADMIN — HUMAN INSULIN 8 UNIT(S): 100 INJECTION, SUSPENSION SUBCUTANEOUS at 06:03

## 2024-04-22 RX ADMIN — Medication 40 MILLIEQUIVALENT(S): at 05:52

## 2024-04-22 RX ADMIN — SENNA PLUS 2 TABLET(S): 8.6 TABLET ORAL at 22:44

## 2024-04-22 RX ADMIN — Medication 650 MILLIGRAM(S): at 07:30

## 2024-04-22 RX ADMIN — Medication 20 MILLIGRAM(S): at 17:52

## 2024-04-22 RX ADMIN — ATORVASTATIN CALCIUM 80 MILLIGRAM(S): 80 TABLET, FILM COATED ORAL at 22:44

## 2024-04-22 RX ADMIN — LIDOCAINE 1 PATCH: 4 CREAM TOPICAL at 19:16

## 2024-04-22 RX ADMIN — LIDOCAINE 1 PATCH: 4 CREAM TOPICAL at 23:17

## 2024-04-22 RX ADMIN — Medication 12.5 MILLIGRAM(S): at 17:52

## 2024-04-22 NOTE — PROGRESS NOTE ADULT - ASSESSMENT
81 years old man with a PMH of DM HTN CAD with stenting presenting for sudden onset left sided weakness. Family heard a thud and brought patient to ED. Patient received tenecteplase at [facility] VS, CTH found R M2 occlusion. Pt was transferred for thrombectomy. NIHSS9>11, mRS0, TICI 2B    4/20 started ASA, failed FEEST,   4/21 now on cervical collar, received 20mg Lasix for pulm edema (wheezing)- improved sxm. Now on NC, per cardio, plan for BB on 4/22,  swallow re-latonia for possible PEG. Had BM    Impression: Large R-M2 ischemic infarct (L deficits including hemiparesis, left facial droop, dysarthria, neglect, LHH)  s/p tenecteplase and endovascular therapy, TICI 2B, with extensive cortical petechial hemorrhage on MRI  Mechanism ESUS.       RECOMMENDATIONS:  [] Admitted to stroke unit  [] q1 neuro checks  [x] TTE: workup EF 65% G1 LV diastolic dysfunction. LDL 50 A1C 8.2   [] Loop pending   [] Med: now on Lipitor 80 (may consider 40 as at goal), SQH, ASA 81mgqD.   [] Vertebral compression fracture ( thoracic T6 ) C- collar on at all times , and brace per ortho when out of bed   [] Pending Mr thoracic spine non contrast  [] Diet: Now on Pureed diet s/p FEES  [] Code: Full  [] Dispo: AR (per PT)  [] DVT ppx: SQH  [] Stroke education provided  [] Fall and aspiration precautions    Case discussed w/ Dr. Johns.

## 2024-04-22 NOTE — SWALLOW FEES ASSESSMENT ADULT - RECOMMENDED FEEDING/EATING TECHNIQUES
allow for swallow between intakes/crush medication (when feasible)/maintain upright posture during/after eating for 30 mins/small sips/bites
oral hygiene

## 2024-04-22 NOTE — SWALLOW FEES ASSESSMENT ADULT - SPECIFY REASON(S)
to objectively assess pharyngeal swallow and r/o aspiration.
to objectively assess pharyngeal swallow and r/o aspiration.

## 2024-04-22 NOTE — PROGRESS NOTE ADULT - REASON FOR ADMISSION
CVA s/p MT
stroke
RM2 occlusion
STROKE
stroke
CVA s/p MT
R-MCA symptoms
STROKE
CVA s/p MT
CVA s/p MT
AIS
Stroke

## 2024-04-22 NOTE — SWALLOW FEES ASSESSMENT ADULT - ORAL PHASE COMMENTS
Poor mastication, a-p spillage; suspected laryngeal penetration prior to the swallow Poor control of bolus; laryngeal penetration prior to the swallow

## 2024-04-22 NOTE — PROGRESS NOTE ADULT - SUBJECTIVE AND OBJECTIVE BOX
· Subjective and Objective:   HOSPITAL COURSE: 80yo man PMH HTN, DM, CAD s/p stents on ASA LKW 1:30-2pm today, found down by family, ?fell down stairs and family heard a thud, brought to [facility] ED, where NIHSS reportedly 7, given TNK 16:57, found to have R M2 occlusion, transferred to [facility] for MT, TICI2B, one pass.     Admission Scores  GCS: 15  HH:   MF:   NIHSS:  16 at [facility] ED  RASS:    CAM-ICU:   ICH:    04/18: per report patient became hypotensive during blood transfusion (transfusion reaction?), spiked fever s/p infectious workup, and received 23.4% 30cc for AMS  04/19: no overnight events. Continue to monitor Hg  04/20: VS stable, patient failed FEEEST, NG tube to be placed r CTH today stable.  4/21 wheezing  4/22 respiratory status improved      Allergies    No Known Allergies    Intolerances    REVIEW OF SYSTEMS: [ ] Unable to Assess due to neurologic exam   [ x] All ROS addressed below are non-contributory, except: N/A  Neuro: [ ] Headache [ ] Back pain [ ] Numbness [ ] Weakness [ ] Ataxia [ ] Dizziness [ ] Aphasia [ ] Dysarthria [ ] Visual disturbance  Resp: [ ] Shortness of breath/dyspnea, [ ] Orthopnea [ ] Cough  CV: [ ] Chest pain [ ] Palpitation [ ] Lightheadedness [ ] Syncope  Renal: [ ] Thirst [ ] Edema  GI: [ ] Nausea [ ] Emesis [ ] Abdominal pain [ ] Constipation [ ] Diarrhea  Hem: [ ] Hematemesis [ ] bright red blood per rectum  ID: [ ] Fever [ ] Chills [ ] Dysuria  ENT: [ ] Rhinorrhea      DEVICES:   [ ] Restraints [ ] ET tube [ ] central line [ x] arterial line L radial [x ] talbert [ ] NGT/OGT [ ] EVD [ ] LD [ ] PARI/HMV [ ] Trach [ ] PEG [ ] Chest Tube     T(C): 37.3 (04-22-24 @ 07:00), Max: 37.8 (04-21-24 @ 23:00)  HR: 68 (04-22-24 @ 10:00) (54 - 80)  BP: --  RR: 17 (04-22-24 @ 10:00) (13 - 34)  SpO2: 98% (04-22-24 @ 10:00) (93% - 100%)  04-21-24 @ 07:01  -  04-22-24 @ 07:00  --------------------------------------------------------  IN: 1646 mL / OUT: 1700 mL / NET: -54 mL    04-22-24 @ 07:01  -  04-22-24 @ 10:28  --------------------------------------------------------  IN: 200 mL / OUT: 0 mL / NET: 200 mL    acetaminophen   Oral Liquid .. 650 milliGRAM(s) Oral every 6 hours PRN  acetylcysteine 20%  Inhalation 4 milliLiter(s) Inhalation every 6 hours  albuterol/ipratropium for Nebulization 3 milliLiter(s) Nebulizer every 6 hours  aluminum hydroxide/magnesium hydroxide/simethicone Suspension 30 milliLiter(s) Oral every 4 hours PRN  aspirin  chewable 81 milliGRAM(s) Oral daily  atorvastatin 80 milliGRAM(s) Oral at bedtime  chlorhexidine 4% Liquid 1 Application(s) Topical daily  enoxaparin Injectable 40 milliGRAM(s) SubCutaneous <User Schedule>  finasteride 5 milliGRAM(s) Oral daily  influenza  Vaccine (HIGH DOSE) 0.7 milliLiter(s) IntraMuscular once  insulin lispro (ADMELOG) corrective regimen sliding scale   SubCutaneous every 6 hours  insulin NPH human recombinant 8 Unit(s) SubCutaneous every 6 hours  lidocaine   4% Patch 1 Patch Transdermal daily  melatonin 5 milliGRAM(s) Oral at bedtime  methocarbamol 500 milliGRAM(s) Oral every 6 hours PRN  pantoprazole  Injectable 40 milliGRAM(s) IV Push daily  piperacillin/tazobactam IVPB.. 3.375 Gram(s) IV Intermittent every 8 hours  polyethylene glycol 3350 17 Gram(s) Oral two times a day  senna 2 Tablet(s) Oral at bedtime  timolol 0.5% Solution 1 Drop(s) Left EYE two times a day      EXAMINATION:  PHYSICAL EXAM:    Constitutional: No Acute Distress     Neurological: lethargic but rousable Ox3, L pupil surgical large/irregular, R 3mmR, able to say name but severely dysarthric, FC, L facial, R side 5, LUE WD, LLE 3/5    Pulmonary: diminished in bases BL, crackled, and wheezes     Cardiovascular: S1, S2, Regular rate and rhythm     Gastrointestinal: Soft, Non-tender, Non-distended     Extremities: No calf tenderness         LABS:  Na: 149 (04-22 @ 02:13), 154 (04-21 @ 02:34), 154 (04-19 @ 20:08), 154 (04-19 @ 14:03)  K: 3.8 (04-22 @ 02:13), 3.9 (04-21 @ 02:34), 3.9 (04-19 @ 20:08), 3.8 (04-19 @ 14:03)  Cl: 116 (04-22 @ 02:13), 122 (04-21 @ 02:34), 122 (04-19 @ 20:08), 122 (04-19 @ 14:03)  CO2: 24 (04-22 @ 02:13), 23 (04-21 @ 02:34), 24 (04-19 @ 20:08), 23 (04-19 @ 14:03)  BUN: 19 (04-22 @ 02:13), 22 (04-21 @ 02:34), 17 (04-19 @ 20:08), 16 (04-19 @ 14:03)  Cr: 0.88 (04-22 @ 02:13), 0.91 (04-21 @ 02:34), 0.88 (04-19 @ 20:08), 0.88 (04-19 @ 14:03)  Glu: 132(04-22 @ 02:13), 189(04-21 @ 02:34), 172(04-19 @ 20:08), 225(04-19 @ 14:03)    Hgb: 8.1 (04-21 @ 02:34), 7.9 (04-19 @ 20:08), 8.0 (04-19 @ 14:03)  Hct: 25.6 (04-21 @ 02:34), 24.1 (04-19 @ 20:08), 25.5 (04-19 @ 14:03)  WBC: 9.10 (04-21 @ 02:34), 6.24 (04-19 @ 20:08), 5.38 (04-19 @ 14:03)  Plt: 122 (04-21 @ 02:34), 109 (04-19 @ 20:08), 107 (04-19 @ 14:03)    INR:   PTT:                 ASSESSMENT/PLAN: R M2 thrombectomy s/p TNK 4/16 at 16:57 s/p MT TICI 2B    Neuro  neuro checks q 2 hr  ASA started for secondary stroke prevention   PT/OT/OBC   with MR C Spine evidence of  C4-C5 and C5-C6 acute anterior longitudinal ligament injury and/or anterior corner avulsion fractures and CT L Spine evidence of an acute T6 vertebral body extending into a Right anterolateral T5-T6 osteophyte., keep cervical collar, will discuss with ortho if need further workup , and thoracic spine fracture  T 6 vert body    #Vertebral compression fracture ( thoracic T6 ) C- collar , and brace per ortho when out of bed   MR T Spine without contrast for further evaluation per ortho   accepted to stroke team     Pulm    - pneumonia    chest PT   accurate I/O   - sat > 92% on NC   - klebsiealla pneumonia on zosyn   - duonebs, add mucomyst for secretions and chest PT     - repeat chest xray     -CV-  - SBP goal , MAP >65     #Hx of CAD s/p stenting  - on aspirin   - TTE w/ EF ~ 65%, entire septum, mid and apical inferior wall, and apex are abnormal, and mild (grade 1) left ventricular diastolic dysfunction, with normal filling pressure  - resume home metorpolol 12.5 mg BID     ---GI---  - failed dysphagia screen  - FEEST failed  - NG tube feeds: Glucerna 1.2 , NPO for now due to respiratory status   - bowel regimen miralax/senna  - PPI   - last BM: 04/21    Renal  - IVL   - NA goal 145-150    Endo  #DM  - a1c 8.2  - target euglycemia 120-180   - NPH 8q6 w/ ISS,     Hematology  hgb stable repeat cbc tonight   transfusion goal of 8 given hx of CAD  Lovenox 40 mg QD    ---ID---  afebrile   zosyn for aspiration pneumonia        SOCIAL/FAMILY:  [x] awaiting [] updated at bedside [] family meeting    CODE STATUS:  [x] Full Code [] DNR [] DNI [] Palliative/Comfort Care    DISPOSITION:  [x] ICU [] Stroke Unit [] Floor [] EMU [] RCU [] PCU    45 minutes

## 2024-04-22 NOTE — PROGRESS NOTE ADULT - SUBJECTIVE AND OBJECTIVE BOX
Interval events:  S/p Lasix 20 mg IV today.   On 2L NC. CXR improved.     No acute events on evening rounds.     VITALS:  T(C): , Max: 37.8 (04-21-24 @ 23:00)  HR:  (54 - 89)  BP: --  ABP:  (105/42 - 156/71)  RR:  (13 - 34)  SpO2:  (93% - 100%)  Wt(kg): --      04-21-24 @ 07:01  -  04-22-24 @ 07:00  --------------------------------------------------------  IN: 1646 mL / OUT: 1700 mL / NET: -54 mL    04-22-24 @ 07:01  -  04-22-24 @ 21:45  --------------------------------------------------------  IN: 1160 mL / OUT: 700 mL / NET: 460 mL      LABS:  Na: 149 (04-22 @ 02:13), 154 (04-21 @ 02:34)  K: 3.8 (04-22 @ 02:13), 3.9 (04-21 @ 02:34)  Cl: 116 (04-22 @ 02:13), 122 (04-21 @ 02:34)  CO2: 24 (04-22 @ 02:13), 23 (04-21 @ 02:34)  BUN: 19 (04-22 @ 02:13), 22 (04-21 @ 02:34)  Cr: 0.88 (04-22 @ 02:13), 0.91 (04-21 @ 02:34)  Glu: 132(04-22 @ 02:13), 189(04-21 @ 02:34)    Hgb: 8.1 (04-21 @ 02:34)  Hct: 25.6 (04-21 @ 02:34)  WBC: 9.10 (04-21 @ 02:34)  Plt: 122 (04-21 @ 02:34)    MEDICATIONS:  acetaminophen   Oral Liquid .. 650 milliGRAM(s) Oral every 6 hours PRN  acetylcysteine 20%  Inhalation 4 milliLiter(s) Inhalation every 6 hours  albuterol/ipratropium for Nebulization 3 milliLiter(s) Nebulizer every 6 hours  aluminum hydroxide/magnesium hydroxide/simethicone Suspension 30 milliLiter(s) Oral every 4 hours PRN  aspirin  chewable 81 milliGRAM(s) Oral daily  atorvastatin 80 milliGRAM(s) Oral at bedtime  chlorhexidine 4% Liquid 1 Application(s) Topical daily  enoxaparin Injectable 40 milliGRAM(s) SubCutaneous <User Schedule>  finasteride 5 milliGRAM(s) Oral daily  influenza  Vaccine (HIGH DOSE) 0.7 milliLiter(s) IntraMuscular once  insulin lispro (ADMELOG) corrective regimen sliding scale   SubCutaneous every 6 hours  insulin NPH human recombinant 8 Unit(s) SubCutaneous every 6 hours  lidocaine   4% Patch 1 Patch Transdermal daily  LORazepam     Tablet 1 milliGRAM(s) Oral once  melatonin 5 milliGRAM(s) Oral at bedtime  methocarbamol 500 milliGRAM(s) Oral every 6 hours PRN  metoprolol tartrate 12.5 milliGRAM(s) Oral every 12 hours  pantoprazole  Injectable 40 milliGRAM(s) IV Push daily  piperacillin/tazobactam IVPB.. 3.375 Gram(s) IV Intermittent every 8 hours  polyethylene glycol 3350 17 Gram(s) Oral two times a day  senna 2 Tablet(s) Oral at bedtime  timolol 0.5% Solution 1 Drop(s) Left EYE two times a day    EXAMINATION:  Please see exam from daytime.     Assessment/Plan:   78 yo man admitted 4/16 with R M2 occlusion s/p MT with TICI 2B. Unfortunately developed a large R MCA stroke.     No change to plan from daytime.  C collar at all times for ligament injury   c/w ASA for secondary stroke prevention  pureed diet, LBM 4/21  on Zosyn for Klebsiella PNA  Lov ppx

## 2024-04-22 NOTE — SWALLOW FEES ASSESSMENT ADULT - NS SWALLOW FEES REC ASPIR MON
*If evident, report to MD immediately and d/c oral diet./change of breathing pattern/gurgly voice/fever/pneumonia/throat clearing/upper respiratory infection

## 2024-04-22 NOTE — PROGRESS NOTE ADULT - THIS PATIENT HAS THE FOLLOWING CONDITION(S)/DIAGNOSES ON THIS ADMISSION:
Cerebral Edema
Encephalopathy/Cerebral Edema
None/Encephalopathy/Cerebral Edema
stroke
Cerebral Edema
None
None
acute stroke
None/Encephalopathy/Cerebral Edema
acute stroke
stroke
None
acute stroke

## 2024-04-22 NOTE — PROGRESS NOTE ADULT - SUBJECTIVE AND OBJECTIVE BOX
Patient seen and examined at bedside.    --Anticoagulation--  aspirin  chewable 81 milliGRAM(s) Oral daily  enoxaparin Injectable 40 milliGRAM(s) SubCutaneous <User Schedule>    T(C): 37.2 (04-20-24 @ 23:00), Max: 37.4 (04-20-24 @ 15:00)  HR: 76 (04-21-24 @ 02:00) (56 - 82)  BP: --  RR: 20 (04-21-24 @ 02:00) (12 - 20)  SpO2: 95% (04-21-24 @ 02:00) (93% - 99%)  Wt(kg): --    Exam: AOx 3 dysarthric, L pupil irregular and NR (surgical), R pupil 3mm, Lt facial, FC on Rt, Rt side AG, LUE 0/5, LLE AG

## 2024-04-22 NOTE — SWALLOW FEES ASSESSMENT ADULT - ROSENBEK'S PENETRATION ASPIRATION SCALE
(2) material enters airway, remains above the vocal cords, no residue remains (penetration) (1) no aspiration, material does not enter airway (3) material remains above the vocal cords, visible residue remains (penetration)

## 2024-04-22 NOTE — SWALLOW FEES ASSESSMENT ADULT - SLP PERTINENT HISTORY OF CURRENT PROBLEM
81 years old man with a PMH of DM, HTN, CAD with stenting who presented for sudden onset left sided weakness. Family heard a thud and found patient on the floor and proceeded with hospitalization. He fell off the stairs. Patient was evaluated at [facility] for stroke and was found to have right MCA occlusion. Patient given tenecteplase and was transferred to [facility] for thrombectomy. Upon initial evaluation patient ws found to have NIHSS of 9 and subsequently at initial evaluation patient was not very well cooperative which contributed to a high NIHSS score of 19. After imaging and re-evaluation patient was re-evaluated and was found to have NIHSS of 11 (2 for gaze preference right, 2 for lack of response to threat left, 2 for drift in LUE and 1 for drift in LLE, 1 for neglect, 1 for dysarthria and 2 for left face).
81 years old man with a PMH of DM, HTN, CAD with stenting who presented for sudden onset left sided weakness. Family heard a thud and found patient on the floor and proceeded with hospitalization. He fell off the stairs. Patient was evaluated at [facility] for stroke and was found to have right MCA occlusion. Patient given tenecteplase and was transferred to [facility] for thrombectomy. Upon initial evaluation patient ws found to have NIHSS of 9 and subsequently at initial evaluation patient was not very well cooperative which contributed to a high NIHSS score of 19. After imaging and re-evaluation patient was re-evaluated and was found to have NIHSS of 11 (2 for gaze preference right, 2 for lack of response to threat left, 2 for drift in LUE and 1 for drift in LLE, 1 for neglect, 1 for dysarthria and 2 for left face).

## 2024-04-22 NOTE — CHART NOTE - NSCHARTNOTEFT_GEN_A_CORE
Patient was fit and delivered a cervical collar occipital mandibular support with additional soft sleeve protection. The collar will stabilize and control the cervical spine, reduce ROM and safely protect the injury site. Care use and function were explained to the patient's daughter. Contact info was provided. All went without incident.   [facility]  [phone]

## 2024-04-22 NOTE — SWALLOW FEES ASSESSMENT ADULT - ASPIRATION PRECAUTIONS
Maintain aspiration precautions./yes
Maintain aspiration precaution for Pt' own secretions and during enteral feeds, if initiated./yes

## 2024-04-22 NOTE — SWALLOW FEES ASSESSMENT ADULT - DIAGNOSTIC IMPRESSIONS
Pt presents with oropharyngeal dysphagia characterized by laryngeal penetration which appeared to be retrieved with mildly thickened and thin liquids and laryngeal penetration with incomplete retrieval on minced and moist texture. Formation, control and transfer of the bolus are impaired. Orientation to PO trials and oral awareness are improving compared to previous FEES. Anterior spillage noted with tspn amounts of thin liquids. Disorders include: reduced lingual strength/ROM/Rate of motion, reduced BOT to posterior pharyngeal wall contact, delay in trigger of the swallow reflex, reduced laryngeal closure, and reduced supraglottic sensation.

## 2024-04-22 NOTE — CHART NOTE - NSCHARTNOTEFT_GEN_A_CORE
Nutrition Follow Up Note  Patient seen for: Nutrition Follow Up     Chart reviewed, events noted. Pt is a 78 yo M with PMH: HTN, DM, CAD s/p stents. Fell down stairs. Found to have a R M2 occlusion. Transferred to [facility] for MT, TICI2B, one pass.    Source: [] Patient       [x] EMR        [x] RN        [] Family at bedside       [x] Other: interdisciplinary medical team    -If unable to interview patient: [x] Trach/Vent/BiPAP  [x] Disoriented/confused/inappropriate to interview    Diet Order:   Diet, NPO with Tube Feed:   Tube Feeding Modality: Nasogastric  Glucerna 1.2 Billy (GLUCERNARTH)  Total Volume for 24 Hours (mL): 1200  Continuous  Starting Tube Feed Rate {mL per Hour}: 20  Increase Tube Feed Rate by (mL): 10     Every 2 hours  Until Goal Tube Feed Rate (mL per Hour): 50  Tube Feed Duration (in Hours): 24  Tube Feed Start Time: 10:00  Supplement Feeding Modality:  Nasogastric  Probiotic Yogurt/Smoothie Cans or Servings Per Day:  2       Frequency:  Daily (24)    EN Order Provides: 1200ml, 1440kcal and 72g protein     EN Provision (per nursing flow sheet):   (): feeds infusing at 50ml/hr  (): 55% of goal; feeds changed from goal rate of 70ml/hr to 50ml/hr  (): 75%% of goal (Glucerna 1.2 at goal rate 70ml/hr x 24 hrs)  (): 16.7% of goal (Glucerna 1.2 at goal rate 70ml/hr x 24 hrs)  (): feeds held in setting of concern for aspiration event    Is current diet order appropriate/adequate? see below for updated diet recommendations     Nutrition-related concerns:  -Last BM (): x 2; (): x 6. Prescribed senna, Miralax.   -S/P FEES ; recommended for pt to remain NPO with non-oral means of nutrition/hydration.   -Continues on NPH 8u q 6 hrs and insulin lispro sliding scale to aid in management of BG. A1c 8.2%.   -Sodium goal 145-150 determined by neurosurgical team.   -Continues on antibiotics as ordered; prescribed Eber Active 2x daily.     Weights:   Daily Weight in k.9 ()    MEDICATIONS  (STANDING):  atorvastatin  finasteride  insulin lispro (ADMELOG) corrective regimen sliding scale  insulin NPH human recombinant  metoprolol tartrate  pantoprazole  Injectable  piperacillin/tazobactam IVPB..  polyethylene glycol 3350  senna    Pertinent Labs:  @ 02:13: Na 149<H>, BUN 19, Cr 0.88, <H>, K+ 3.8, Phos 4.0, Mg 2.1, Alk Phos --, ALT/SGPT --, AST/SGOT --, HbA1c --    A1C with Estimated Average Glucose Result: 8.2 % (24 @ 22:59)    Finger Sticks:  POCT Blood Glucose.: 118 mg/dL ( @ 06:02)  POCT Blood Glucose.: 161 mg/dL ( @ 23:49)  POCT Blood Glucose.: 155 mg/dL ( @ 17:05)  POCT Blood Glucose.: 84 mg/dL ( @ 11:22)    Triglycerides, Serum: 52 mg/dL (24 @ 22:49)    Skin per nursing documentation: no documented pressure injuries  Edema: 1+ left arm    (based on dosing wt 70.9kg):   Estimated Energy Needs: (25-30kcal/kg): 1772-2127kcal  Estimated Protein Needs: (1.2-1.4g protein/kg): 85-99g protein   Estimated Fluid Needs: defer to team    Previous Nutrition Diagnosis: increased nutrient needs, swallowing difficulty  Nutrition Diagnosis is: [x] ongoing  [] resolved [] not applicable     New Nutrition Diagnosis: [x] Not applicable    Nutrition Care Plan:  [x] In Progress  [] Achieved  [] Not applicable    Nutrition Interventions:     Education Provided:       [] Yes:  [x] No: pt on tube feeds, acute neurological event       Recommendations:      1. Recommend to change tube feeds to Glucerna 1.5 at GOAL rate 50ml/hr x 24 hrs. To provide (based on dosing wt 70.9kg): 1200ml, 1800kcal (25.4kcal/kg) and 99g protein (1.4g protein/kg).   2. Monitor GI tolerance. RD to remain available to adjust EN formulary, volume/rate PRN.   3. Recommend multivitamin (if no medication contraindications) to aid in prevention of micronutrient deficiencies.   4. Monitor wt trends/labs/skin integrity/hydration status/bowel regularity.   5. Antihyperglycemic regimen deferred to team.     Monitoring and Evaluation:   Continue to monitor nutritional intake, tolerance to diet prescription, weights, labs, skin integrity      RD remains available upon request and will follow up per protocol

## 2024-04-22 NOTE — SWALLOW FEES ASSESSMENT ADULT - ADDITIONAL RECOMMENDATIONS
Restorative swallow therapy f/u 1 x week; Pt will tolerate diet with no s/s of aspiration. Will continue to follow while patient is in-house; Speech language evaluation upon MD request and swallowing therapy post d/c.

## 2024-04-22 NOTE — PROGRESS NOTE ADULT - SUBJECTIVE AND OBJECTIVE BOX
Neurology Progress Note    SUBJECTIVE/OBJECTIVE/INTERVAL EVENTS: Patient seen and examined at bedside w/ neuro attending and team.     INTERVAL HISTORY: Able to swallow well now. Still has C Collar in place per ortho. Otherwise states that he is feeling better. Able to follow all commands, recognizes his R arm but feels it is weak "similar to like a log".    REVIEW OF SYSTEMS: Otherwise denies fever, chills, headaches, vision changes.  MEDICATIONS  (STANDING):  acetylcysteine 20%  Inhalation 4 milliLiter(s) Inhalation every 6 hours  albuterol/ipratropium for Nebulization 3 milliLiter(s) Nebulizer every 6 hours  aspirin  chewable 81 milliGRAM(s) Oral daily  atorvastatin 80 milliGRAM(s) Oral at bedtime  chlorhexidine 4% Liquid 1 Application(s) Topical daily  enoxaparin Injectable 40 milliGRAM(s) SubCutaneous <User Schedule>  finasteride 5 milliGRAM(s) Oral daily  influenza  Vaccine (HIGH DOSE) 0.7 milliLiter(s) IntraMuscular once  insulin lispro (ADMELOG) corrective regimen sliding scale   SubCutaneous every 6 hours  insulin NPH human recombinant 8 Unit(s) SubCutaneous every 6 hours  lidocaine   4% Patch 1 Patch Transdermal daily  LORazepam     Tablet 1 milliGRAM(s) Oral once  melatonin 5 milliGRAM(s) Oral at bedtime  metoprolol tartrate 12.5 milliGRAM(s) Oral every 12 hours  pantoprazole  Injectable 40 milliGRAM(s) IV Push daily  piperacillin/tazobactam IVPB.. 3.375 Gram(s) IV Intermittent every 8 hours  polyethylene glycol 3350 17 Gram(s) Oral two times a day  senna 2 Tablet(s) Oral at bedtime  timolol 0.5% Solution 1 Drop(s) Left EYE two times a day    MEDICATIONS  (PRN):  acetaminophen   Oral Liquid .. 650 milliGRAM(s) Oral every 6 hours PRN Temp greater or equal to 38.5C (101.3F), Mild Pain (1 - 3)  aluminum hydroxide/magnesium hydroxide/simethicone Suspension 30 milliLiter(s) Oral every 4 hours PRN Dyspepsia  methocarbamol 500 milliGRAM(s) Oral every 6 hours PRN musle spasm      VITALS & EXAMINATION:  Vital Signs Last 24 Hrs  T(C): 37.4 (22 Apr 2024 15:00), Max: 37.8 (21 Apr 2024 23:00)  T(F): 99.3 (22 Apr 2024 15:00), Max: 100 (21 Apr 2024 23:00)  HR: 74 (22 Apr 2024 15:00) (54 - 89)  BP: --  BP(mean): --  RR: 17 (22 Apr 2024 15:00) (13 - 34)  SpO2: 95% (22 Apr 2024 15:00) (93% - 100%)    Parameters below as of 22 Apr 2024 11:11  Patient On (Oxygen Delivery Method): nasal cannula        General:  Constitutional: Male, appears stated age, nontoxic, not in distress,    Head: Normocephalic;   Eyes: clear sclera;   Extremities: No cyanosis;   Resp: breathing comfortably  Neck: no nuchal rigidity  Fundoscopic exam:      Neurological (>12):  MS: Awake, alert.  Oriented person place situation month. Follows simple commands. Able to ID 3/3 objects. Attends to examiner  Language: Speech is hypophonic, clear, fluent, good repetition,  comprehension, registration of words.  CNs: L pupil is surgical , R reactive. EOMI.   Motor: RUE and RLE 4-/5, LUE 5/5, LLE 4+/5    LABORATORY:  CBC                       8.1    9.10  )-----------( 122      ( 21 Apr 2024 02:34 )             25.6     Chem 04-22    149<H>  |  116<H>  |  19  ----------------------------<  132<H>  3.8   |  24  |  0.88    Ca    8.4      22 Apr 2024 02:13  Phos  4.0     04-22  Mg     2.1     04-22      LFTs   Coagulopathy   Lipid Panel 04-16 Chol 103 LDL -- HDL 41 Trig 52  A1c   Cardiac enzymes     U/A Urinalysis Basic - ( 22 Apr 2024 02:13 )    Color: x / Appearance: x / SG: x / pH: x  Gluc: 132 mg/dL / Ketone: x  / Bili: x / Urobili: x   Blood: x / Protein: x / Nitrite: x   Leuk Esterase: x / RBC: x / WBC x   Sq Epi: x / Non Sq Epi: x / Bacteria: x      CSF  Immunological  Other    STUDIES & IMAGING: (EEG, CT, MR, U/S, TTE/LIDA):

## 2024-04-22 NOTE — SWALLOW FEES ASSESSMENT ADULT - COMMENTS
Pt coughing up thick brownish creamy secretions into oral cavity and was suctioned with yankauer; slight red tinged secretions noted in right nare therefore endoscope passed via left nare; VSS. Mild improvement in phonation noted although overall intelligibility judged to be poor. 4/16/24 Per IR: Pt w/ right MCA infarct, transfer to Neuro IR, for a catheter cerebral angiogram and mechanical thrombectomy. After thrombectomy patient was noted to have NIHSS of 11. CT Head: IMPRESSION: An evolving acute right MCA distribution infarct is again noted in the right posterior frontal, parietal, temporal, and insular regions. The mild edema and mass effect associated with the infarct appears similar compared to the head CT study performed earlier the same day. There has been a substantial interval decrease of the gyriform areas of increased density involving the infarct most likely reflecting evolution of   contrast staining from the angiogram procedure. Small areas of cortical petechial hemorrhagic transformation can't be entirely excluded, as this   study is substantially limited by motion. Continued serial imaging follow-up over time is recommended to monitor for stability.  4/17/24: CT Abd/Pel: IMPRESSION: No CT evidence of acute traumatic sequelae within the chest or abdomen. Left lateral hip subcutaneous hematoma with evidence of active bleeding. *Nonspecific thickened appearance to the upper esophagus. Follow-up upper endoscopy is recommended.* Subcentimeter cystic lesion within the pancreatic head. Nonemergent MRI may be obtained for further characterization, if no contraindications exist. Urinary bladder wall thickening despite underdistention.  CT Spine: IMPRESSION: Acute fracture involving the anterior T6 vertebral body extending into a right anterolateral T5-T6 osteophyte. No significant vertebral body height loss or bony retropulsion into the spinal canal. More sensitive evaluation with MRI may be obtained for further evaluation, if no   contraindications exist. 4/17/24  Pt with increased lethargy and worsening cytoxic edema; Pt also c/o abdominal pain; Pt with wet respiratory sounds. RN suctioned pt orally Pt coughing up thick brownish creamy secretions into oral cavity and was suctioned with yankauer with Nsg and daughter/son at b/s; slight red tinged secretions noted in right nare therefore endoscope passed via left nare; VSS. Mild improvement in phonation noted although overall intelligibility judged to be poor. Pt awake, alert and oriented x 2-3 and requesting to eat/drink upon encounter.  +NGT paused by Nsg prior to FEES; supplemental 02 via nasal canula.

## 2024-04-22 NOTE — SWALLOW FEES ASSESSMENT ADULT - ORAL PHASE
Uncontrolled bolus/spillover in [place]-pharynx/Uncontrolled bolus/spillover in hypopharynx
Uncontrolled bolus/spillover in federico-pharynx/Uncontrolled bolus/spillover in hypopharynx

## 2024-04-23 LAB
ANION GAP SERPL CALC-SCNC: 10 MMOL/L — SIGNIFICANT CHANGE UP (ref 5–17)
ANION GAP SERPL CALC-SCNC: 13 MMOL/L — SIGNIFICANT CHANGE UP (ref 5–17)
BASOPHILS # BLD AUTO: 0.03 K/UL — SIGNIFICANT CHANGE UP (ref 0–0.2)
BASOPHILS NFR BLD AUTO: 0.3 % — SIGNIFICANT CHANGE UP (ref 0–2)
BUN SERPL-MCNC: 16 MG/DL — SIGNIFICANT CHANGE UP (ref 7–23)
BUN SERPL-MCNC: 16 MG/DL — SIGNIFICANT CHANGE UP (ref 7–23)
CALCIUM SERPL-MCNC: 8.4 MG/DL — SIGNIFICANT CHANGE UP (ref 8.4–10.5)
CALCIUM SERPL-MCNC: 8.6 MG/DL — SIGNIFICANT CHANGE UP (ref 8.4–10.5)
CHLORIDE SERPL-SCNC: 109 MMOL/L — HIGH (ref 96–108)
CHLORIDE SERPL-SCNC: 111 MMOL/L — HIGH (ref 96–108)
CO2 SERPL-SCNC: 22 MMOL/L — SIGNIFICANT CHANGE UP (ref 22–31)
CO2 SERPL-SCNC: 24 MMOL/L — SIGNIFICANT CHANGE UP (ref 22–31)
CREAT SERPL-MCNC: 0.91 MG/DL — SIGNIFICANT CHANGE UP (ref 0.5–1.3)
CREAT SERPL-MCNC: 0.93 MG/DL — SIGNIFICANT CHANGE UP (ref 0.5–1.3)
CULTURE RESULTS: SIGNIFICANT CHANGE UP
CULTURE RESULTS: SIGNIFICANT CHANGE UP
EGFR: 84 ML/MIN/1.73M2 — SIGNIFICANT CHANGE UP
EGFR: 86 ML/MIN/1.73M2 — SIGNIFICANT CHANGE UP
EOSINOPHIL # BLD AUTO: 0.48 K/UL — SIGNIFICANT CHANGE UP (ref 0–0.5)
EOSINOPHIL NFR BLD AUTO: 5.6 % — SIGNIFICANT CHANGE UP (ref 0–6)
GLUCOSE BLDC GLUCOMTR-MCNC: 106 MG/DL — HIGH (ref 70–99)
GLUCOSE BLDC GLUCOMTR-MCNC: 121 MG/DL — HIGH (ref 70–99)
GLUCOSE BLDC GLUCOMTR-MCNC: 127 MG/DL — HIGH (ref 70–99)
GLUCOSE BLDC GLUCOMTR-MCNC: 255 MG/DL — HIGH (ref 70–99)
GLUCOSE SERPL-MCNC: 103 MG/DL — HIGH (ref 70–99)
GLUCOSE SERPL-MCNC: 83 MG/DL — SIGNIFICANT CHANGE UP (ref 70–99)
HCT VFR BLD CALC: 26.8 % — LOW (ref 39–50)
HCT VFR BLD CALC: 29.5 % — LOW (ref 39–50)
HGB BLD-MCNC: 8.5 G/DL — LOW (ref 13–17)
HGB BLD-MCNC: 9.2 G/DL — LOW (ref 13–17)
IMM GRANULOCYTES NFR BLD AUTO: 0.6 % — SIGNIFICANT CHANGE UP (ref 0–0.9)
LYMPHOCYTES # BLD AUTO: 2.14 K/UL — SIGNIFICANT CHANGE UP (ref 1–3.3)
LYMPHOCYTES # BLD AUTO: 24.8 % — SIGNIFICANT CHANGE UP (ref 13–44)
MAGNESIUM SERPL-MCNC: 2.1 MG/DL — SIGNIFICANT CHANGE UP (ref 1.6–2.6)
MAGNESIUM SERPL-MCNC: 2.1 MG/DL — SIGNIFICANT CHANGE UP (ref 1.6–2.6)
MCHC RBC-ENTMCNC: 28.8 PG — SIGNIFICANT CHANGE UP (ref 27–34)
MCHC RBC-ENTMCNC: 28.9 PG — SIGNIFICANT CHANGE UP (ref 27–34)
MCHC RBC-ENTMCNC: 31.2 GM/DL — LOW (ref 32–36)
MCHC RBC-ENTMCNC: 31.7 GM/DL — LOW (ref 32–36)
MCV RBC AUTO: 91.2 FL — SIGNIFICANT CHANGE UP (ref 80–100)
MCV RBC AUTO: 92.5 FL — SIGNIFICANT CHANGE UP (ref 80–100)
MONOCYTES # BLD AUTO: 0.63 K/UL — SIGNIFICANT CHANGE UP (ref 0–0.9)
MONOCYTES NFR BLD AUTO: 7.3 % — SIGNIFICANT CHANGE UP (ref 2–14)
NEUTROPHILS # BLD AUTO: 5.29 K/UL — SIGNIFICANT CHANGE UP (ref 1.8–7.4)
NEUTROPHILS NFR BLD AUTO: 61.4 % — SIGNIFICANT CHANGE UP (ref 43–77)
NRBC # BLD: 0 /100 WBCS — SIGNIFICANT CHANGE UP (ref 0–0)
NRBC # BLD: 0 /100 WBCS — SIGNIFICANT CHANGE UP (ref 0–0)
PHOSPHATE SERPL-MCNC: 3.2 MG/DL — SIGNIFICANT CHANGE UP (ref 2.5–4.5)
PHOSPHATE SERPL-MCNC: 3.3 MG/DL — SIGNIFICANT CHANGE UP (ref 2.5–4.5)
PLATELET # BLD AUTO: 149 K/UL — LOW (ref 150–400)
PLATELET # BLD AUTO: 160 K/UL — SIGNIFICANT CHANGE UP (ref 150–400)
POTASSIUM SERPL-MCNC: 3.6 MMOL/L — SIGNIFICANT CHANGE UP (ref 3.5–5.3)
POTASSIUM SERPL-MCNC: 3.8 MMOL/L — SIGNIFICANT CHANGE UP (ref 3.5–5.3)
POTASSIUM SERPL-SCNC: 3.6 MMOL/L — SIGNIFICANT CHANGE UP (ref 3.5–5.3)
POTASSIUM SERPL-SCNC: 3.8 MMOL/L — SIGNIFICANT CHANGE UP (ref 3.5–5.3)
RBC # BLD: 2.94 M/UL — LOW (ref 4.2–5.8)
RBC # BLD: 3.19 M/UL — LOW (ref 4.2–5.8)
RBC # FLD: 15.5 % — HIGH (ref 10.3–14.5)
RBC # FLD: 15.6 % — HIGH (ref 10.3–14.5)
SODIUM SERPL-SCNC: 144 MMOL/L — SIGNIFICANT CHANGE UP (ref 135–145)
SODIUM SERPL-SCNC: 145 MMOL/L — SIGNIFICANT CHANGE UP (ref 135–145)
SPECIMEN SOURCE: SIGNIFICANT CHANGE UP
SPECIMEN SOURCE: SIGNIFICANT CHANGE UP
WBC # BLD: 8.18 K/UL — SIGNIFICANT CHANGE UP (ref 3.8–10.5)
WBC # BLD: 8.62 K/UL — SIGNIFICANT CHANGE UP (ref 3.8–10.5)
WBC # FLD AUTO: 8.18 K/UL — SIGNIFICANT CHANGE UP (ref 3.8–10.5)
WBC # FLD AUTO: 8.62 K/UL — SIGNIFICANT CHANGE UP (ref 3.8–10.5)

## 2024-04-23 PROCEDURE — 72146 MRI CHEST SPINE W/O DYE: CPT | Mod: 26

## 2024-04-23 RX ORDER — ACETAMINOPHEN 500 MG
1000 TABLET ORAL ONCE
Refills: 0 | Status: COMPLETED | OUTPATIENT
Start: 2024-04-23 | End: 2024-04-23

## 2024-04-23 RX ORDER — ATORVASTATIN CALCIUM 80 MG/1
80 TABLET, FILM COATED ORAL AT BEDTIME
Refills: 0 | Status: DISCONTINUED | OUTPATIENT
Start: 2024-04-23 | End: 2024-04-25

## 2024-04-23 RX ORDER — LANOLIN ALCOHOL/MO/W.PET/CERES
5 CREAM (GRAM) TOPICAL AT BEDTIME
Refills: 0 | Status: DISCONTINUED | OUTPATIENT
Start: 2024-04-23 | End: 2024-04-25

## 2024-04-23 RX ORDER — METHOCARBAMOL 500 MG/1
500 TABLET, FILM COATED ORAL EVERY 6 HOURS
Refills: 0 | Status: DISCONTINUED | OUTPATIENT
Start: 2024-04-23 | End: 2024-04-25

## 2024-04-23 RX ORDER — INSULIN LISPRO 100/ML
VIAL (ML) SUBCUTANEOUS AT BEDTIME
Refills: 0 | Status: DISCONTINUED | OUTPATIENT
Start: 2024-04-23 | End: 2024-04-25

## 2024-04-23 RX ORDER — SENNA PLUS 8.6 MG/1
2 TABLET ORAL AT BEDTIME
Refills: 0 | Status: DISCONTINUED | OUTPATIENT
Start: 2024-04-23 | End: 2024-04-25

## 2024-04-23 RX ORDER — FINASTERIDE 5 MG/1
5 TABLET, FILM COATED ORAL DAILY
Refills: 0 | Status: DISCONTINUED | OUTPATIENT
Start: 2024-04-23 | End: 2024-04-25

## 2024-04-23 RX ORDER — POLYETHYLENE GLYCOL 3350 17 G/17G
17 POWDER, FOR SOLUTION ORAL
Refills: 0 | Status: DISCONTINUED | OUTPATIENT
Start: 2024-04-23 | End: 2024-04-25

## 2024-04-23 RX ORDER — ASPIRIN/CALCIUM CARB/MAGNESIUM 324 MG
81 TABLET ORAL DAILY
Refills: 0 | Status: DISCONTINUED | OUTPATIENT
Start: 2024-04-23 | End: 2024-04-25

## 2024-04-23 RX ORDER — INSULIN LISPRO 100/ML
VIAL (ML) SUBCUTANEOUS
Refills: 0 | Status: DISCONTINUED | OUTPATIENT
Start: 2024-04-23 | End: 2024-04-25

## 2024-04-23 RX ADMIN — ENOXAPARIN SODIUM 40 MILLIGRAM(S): 100 INJECTION SUBCUTANEOUS at 16:57

## 2024-04-23 RX ADMIN — Medication 400 MILLIGRAM(S): at 11:20

## 2024-04-23 RX ADMIN — LIDOCAINE 1 PATCH: 4 CREAM TOPICAL at 21:29

## 2024-04-23 RX ADMIN — Medication 650 MILLIGRAM(S): at 21:56

## 2024-04-23 RX ADMIN — Medication 4 MILLILITER(S): at 11:22

## 2024-04-23 RX ADMIN — Medication 650 MILLIGRAM(S): at 01:40

## 2024-04-23 RX ADMIN — Medication 3 MILLILITER(S): at 23:51

## 2024-04-23 RX ADMIN — SENNA PLUS 2 TABLET(S): 8.6 TABLET ORAL at 21:56

## 2024-04-23 RX ADMIN — Medication 1 DROP(S): at 16:57

## 2024-04-23 RX ADMIN — PANTOPRAZOLE SODIUM 40 MILLIGRAM(S): 20 TABLET, DELAYED RELEASE ORAL at 11:21

## 2024-04-23 RX ADMIN — Medication 4 MILLILITER(S): at 16:58

## 2024-04-23 RX ADMIN — Medication 650 MILLIGRAM(S): at 22:26

## 2024-04-23 RX ADMIN — Medication 1 DROP(S): at 06:17

## 2024-04-23 RX ADMIN — Medication 12.5 MILLIGRAM(S): at 16:58

## 2024-04-23 RX ADMIN — Medication 1000 MILLIGRAM(S): at 11:50

## 2024-04-23 RX ADMIN — Medication 3 MILLILITER(S): at 06:17

## 2024-04-23 RX ADMIN — Medication 1 MILLIGRAM(S): at 07:24

## 2024-04-23 RX ADMIN — CHLORHEXIDINE GLUCONATE 1 APPLICATION(S): 213 SOLUTION TOPICAL at 11:22

## 2024-04-23 RX ADMIN — Medication 650 MILLIGRAM(S): at 02:10

## 2024-04-23 RX ADMIN — Medication 5 MILLIGRAM(S): at 21:55

## 2024-04-23 RX ADMIN — Medication 4 MILLILITER(S): at 06:17

## 2024-04-23 RX ADMIN — PIPERACILLIN AND TAZOBACTAM 25 GRAM(S): 4; .5 INJECTION, POWDER, LYOPHILIZED, FOR SOLUTION INTRAVENOUS at 22:27

## 2024-04-23 RX ADMIN — PIPERACILLIN AND TAZOBACTAM 25 GRAM(S): 4; .5 INJECTION, POWDER, LYOPHILIZED, FOR SOLUTION INTRAVENOUS at 06:17

## 2024-04-23 RX ADMIN — LIDOCAINE 1 PATCH: 4 CREAM TOPICAL at 11:24

## 2024-04-23 RX ADMIN — Medication 81 MILLIGRAM(S): at 11:22

## 2024-04-23 RX ADMIN — PIPERACILLIN AND TAZOBACTAM 25 GRAM(S): 4; .5 INJECTION, POWDER, LYOPHILIZED, FOR SOLUTION INTRAVENOUS at 13:19

## 2024-04-23 RX ADMIN — Medication 3 MILLILITER(S): at 16:56

## 2024-04-23 RX ADMIN — FINASTERIDE 5 MILLIGRAM(S): 5 TABLET, FILM COATED ORAL at 11:22

## 2024-04-23 RX ADMIN — POLYETHYLENE GLYCOL 3350 17 GRAM(S): 17 POWDER, FOR SOLUTION ORAL at 16:58

## 2024-04-23 RX ADMIN — Medication 6: at 16:57

## 2024-04-23 RX ADMIN — Medication 3 MILLILITER(S): at 11:20

## 2024-04-23 RX ADMIN — Medication 4 MILLILITER(S): at 23:50

## 2024-04-23 RX ADMIN — ATORVASTATIN CALCIUM 80 MILLIGRAM(S): 80 TABLET, FILM COATED ORAL at 21:55

## 2024-04-23 NOTE — PROGRESS NOTE ADULT - SUBJECTIVE AND OBJECTIVE BOX
THE PATIENT WAS SEEN AND EXAMINED BY ME WITH THE HOUSESTAFF AND STROKE TEAM DURING MORNING ROUNDS.   HPI:  81 years old man with a PMH of DM HTN CAD with stenting presenting for sudden onset left sided weakness. Family heard a thud and found patient on the floor and proceeded with hospitalization. He fell off the stairs. Patient was evaluated at [facility] for stroke and was found to have right MCA occlusion. Patient given tenecteplase and was transferred to [facility] for thrombectomy. Upon initial evaluation patient ws found to have NIHSS of 9 and subsequently at initial evaluation patient was not very well cooperative which contributed to a high NIHSS score of 19. After imaging and re-evaluation patient was re-evaluated and was found to have NIHSS of 11 (2 for gaze preference right, 2 for lack of response to threat left, 2 for drift in LUE and 1 for drift in LLE, 1 for neglect, 1 for dysarthria and 2 for left face). After thrombectomy patient was noted to have NIHSS of 11. Of note patient was on aspirin at home only and did not skip the dose. Last seen well was just before the incident at 1:30 pm.  (16 Apr 2024 22:09)      SUBJECTIVE: No events overnight.  No new neurologic complaints.  ROS negative unless otherwise noted.    acetaminophen   Oral Liquid .. 650 milliGRAM(s) Oral every 6 hours PRN  acetylcysteine 20%  Inhalation 4 milliLiter(s) Inhalation every 6 hours  albuterol/ipratropium for Nebulization 3 milliLiter(s) Nebulizer every 6 hours  aluminum hydroxide/magnesium hydroxide/simethicone Suspension 30 milliLiter(s) Oral every 4 hours PRN  aspirin  chewable 81 milliGRAM(s) Oral daily  atorvastatin 80 milliGRAM(s) Oral at bedtime  chlorhexidine 4% Liquid 1 Application(s) Topical daily  enoxaparin Injectable 40 milliGRAM(s) SubCutaneous <User Schedule>  finasteride 5 milliGRAM(s) Oral daily  influenza  Vaccine (HIGH DOSE) 0.7 milliLiter(s) IntraMuscular once  insulin lispro (ADMELOG) corrective regimen sliding scale   SubCutaneous three times a day before meals  insulin lispro (ADMELOG) corrective regimen sliding scale   SubCutaneous at bedtime  lidocaine   4% Patch 1 Patch Transdermal daily  melatonin 5 milliGRAM(s) Oral at bedtime  methocarbamol 500 milliGRAM(s) Oral every 6 hours PRN  metoprolol tartrate 12.5 milliGRAM(s) Oral every 12 hours  pantoprazole  Injectable 40 milliGRAM(s) IV Push daily  piperacillin/tazobactam IVPB.. 3.375 Gram(s) IV Intermittent every 8 hours  polyethylene glycol 3350 17 Gram(s) Oral two times a day  senna 2 Tablet(s) Oral at bedtime  timolol 0.5% Solution 1 Drop(s) Left EYE two times a day      PHYSICAL EXAM:   Vital Signs Last 24 Hrs  T(C): 37.1 (23 Apr 2024 04:00), Max: 37.7 (22 Apr 2024 11:00)  T(F): 98.7 (23 Apr 2024 04:00), Max: 99.8 (22 Apr 2024 11:00)  HR: 57 (23 Apr 2024 06:00) (54 - 89)  BP: 134/66 (23 Apr 2024 06:00) (118/62 - 134/66)  BP(mean): 92 (23 Apr 2024 06:00) (84 - 92)  RR: 18 (23 Apr 2024 06:00) (13 - 24)  SpO2: 97% (23 Apr 2024 06:00) (94% - 100%)    Parameters below as of 23 Apr 2024 04:00  Patient On (Oxygen Delivery Method): nasal cannula  O2 Flow (L/min): 2      General: No acute distress  HEENT: EOM intact, visual fields full, surgical L pupil  Abdomen: Soft, nontender, nondistended   Extremities: No edema    NEUROLOGICAL EXAM:  Mental status: Awake, alert, oriented x3. Speech fluent, hypophonic. Follows commands.   Cranial Nerves: No facial asymmetry, no nystagmus, no dysarthria,  tongue midline  Motor exam: Normal tone, no drift, 4-/5 RUE, 4-/5 RLE, 5/5 LUE, 4+/5 LLE, normal fine finger movements.  Sensation: Intact to light touch   Coordination/ Gait: No dysmetria, JESSY intact and symmetric bilaterally    LABS:                        9.2    8.62  )-----------( 160      ( 23 Apr 2024 07:06 )             29.5    04-23    145  |  111<H>  |  16  ----------------------------<  103<H>  3.6   |  24  |  0.91    Ca    8.4      23 Apr 2024 00:20  Phos  3.2     04-23  Mg     2.1     04-23      IMAGING: Reviewed by me.   CTH 4/21/24:  Right MCA subacute hemorrhagic infarction. No adverse   interval change 4/20/2024    CTH 4/20/24: Stable appearance of right acute middle cerebral artery infarct with   petechial hemorrhage.    CTH 4/19/24: No significant change in acute right middle cerebral artery   infarct with petechial hemorrhage from 4/18/2024.    CTH 4/18/24 @ 15:08: Right MCA evolving infarct with mass effect on the right lateral   ventricle and subtle midline shift to the left. Minimal vague hemorrhage   suggested similar in appearance compared with the prior 4/18/2024 8:46 AM.      CTH 4/18/24 @ 9:50: Interval demarcation of right frontoparietal and temporal acute   infarction. No hipolito hemorrhagic transformation.    Increased mass effect with in new leftward midline shift of 3 mm and some   compression of the right lateral ventricle.    Basal cisterns still visualized. No hydrocephalus.    MR brain 4/17/24: Redemonstrated large acute right MCA territory infarct with extensive cortical petechial hemorrhage. No gross hematoma formation.    MR cervical spine 4/17/24: Suspected acute anterior longitudinal ligament injury and/or anterior corner avulsion fractures at C4-C5 and C5-C6. Prevertebraledema  extending from C1 through the upper cervical levels.  -Suspected right greater than left posterior paraspinal muscular strain and/or ligamentous injury.    CTH 4/17/24: An evolving acute right MCA distribution infarct is again noted in the   right posterior frontal, parietal, temporal, and insular regions. New   mild edema and mass effect has developed since the 04/16/2024 head CT.   New gyriform areas of increased density involve the infarct most likely   reflecting contrast staining from the angiogram procedure, as the   follow-up head CT performed later the same morning shows a substantial   interval decrease in the density. Small areas of cortical petechial   hemorrhagic transformation can't be entirely excluded. Continued serial   imaging follow-up over time is recommended to monitor for stability.    CTH 4/16/24 @ 19:14:  New cytotoxic edema in the right frontal, parietal, and temporal lobes,   as well as within the right insula consistent with an acute right MCA   territory infarct. No hipolito hemorrhagic transformation. No midline shift.      CTH, CTA H&N and CTP 4/16/24 @ 16:36:  1.   Right carotid system:  No hemodynamically significant stenosis.  2.   Left carotid system:  No hemodynamically significant stenosis.  3.   Intracranial circulation:  large vessel occlusion at the origin   of the RIGHT M2 segment, posterior division.  4.   Brain:  No significant lesion identified.   5. Perfusion: Core infarct of 96 ml within the posterior RIGHT MCA   Territory. Based on the perfusion mismatch, there is a predicted volume   of 24 mL of critically hypoperfused tissue at risk.   THE PATIENT WAS SEEN AND EXAMINED BY ME WITH THE HOUSESTAFF AND STROKE TEAM DURING MORNING ROUNDS.   HPI:  81 years old man with a PMH of DM HTN CAD with stenting presenting for sudden onset left sided weakness. Family heard a thud and found patient on the floor and proceeded with hospitalization. He fell off the stairs. Patient was evaluated at [facility] for stroke and was found to have right MCA occlusion. Patient given tenecteplase and was transferred to [facility] for thrombectomy. Upon initial evaluation patient ws found to have NIHSS of 9 and subsequently at initial evaluation patient was not very well cooperative which contributed to a high NIHSS score of 19. After imaging and re-evaluation patient was re-evaluated and was found to have NIHSS of 11 (2 for gaze preference right, 2 for lack of response to threat left, 2 for drift in LUE and 1 for drift in LLE, 1 for neglect, 1 for dysarthria and 2 for left face). After thrombectomy patient was noted to have NIHSS of 11. Of note patient was on aspirin at home only and did not skip the dose. Last seen well was just before the incident at 1:30 pm.  (16 Apr 2024 22:09)      SUBJECTIVE: No events overnight.  No new neurologic complaints.  ROS negative unless otherwise noted.    acetaminophen   Oral Liquid .. 650 milliGRAM(s) Oral every 6 hours PRN  acetylcysteine 20%  Inhalation 4 milliLiter(s) Inhalation every 6 hours  albuterol/ipratropium for Nebulization 3 milliLiter(s) Nebulizer every 6 hours  aluminum hydroxide/magnesium hydroxide/simethicone Suspension 30 milliLiter(s) Oral every 4 hours PRN  aspirin  chewable 81 milliGRAM(s) Oral daily  atorvastatin 80 milliGRAM(s) Oral at bedtime  chlorhexidine 4% Liquid 1 Application(s) Topical daily  enoxaparin Injectable 40 milliGRAM(s) SubCutaneous <User Schedule>  finasteride 5 milliGRAM(s) Oral daily  influenza  Vaccine (HIGH DOSE) 0.7 milliLiter(s) IntraMuscular once  insulin lispro (ADMELOG) corrective regimen sliding scale   SubCutaneous three times a day before meals  insulin lispro (ADMELOG) corrective regimen sliding scale   SubCutaneous at bedtime  lidocaine   4% Patch 1 Patch Transdermal daily  melatonin 5 milliGRAM(s) Oral at bedtime  methocarbamol 500 milliGRAM(s) Oral every 6 hours PRN  metoprolol tartrate 12.5 milliGRAM(s) Oral every 12 hours  pantoprazole  Injectable 40 milliGRAM(s) IV Push daily  piperacillin/tazobactam IVPB.. 3.375 Gram(s) IV Intermittent every 8 hours  polyethylene glycol 3350 17 Gram(s) Oral two times a day  senna 2 Tablet(s) Oral at bedtime  timolol 0.5% Solution 1 Drop(s) Left EYE two times a day      PHYSICAL EXAM:   Vital Signs Last 24 Hrs  T(C): 37.1 (23 Apr 2024 04:00), Max: 37.7 (22 Apr 2024 11:00)  T(F): 98.7 (23 Apr 2024 04:00), Max: 99.8 (22 Apr 2024 11:00)  HR: 57 (23 Apr 2024 06:00) (54 - 89)  BP: 134/66 (23 Apr 2024 06:00) (118/62 - 134/66)  BP(mean): 92 (23 Apr 2024 06:00) (84 - 92)  RR: 18 (23 Apr 2024 06:00) (13 - 24)  SpO2: 97% (23 Apr 2024 06:00) (94% - 100%)    Parameters below as of 23 Apr 2024 04:00  Patient On (Oxygen Delivery Method): nasal cannula  O2 Flow (L/min): 2      General: No acute distress  HEENT: EOM intact, visual fields full, surgical L pupil  Abdomen: Soft, nontender, nondistended   Extremities: No edema    NEUROLOGICAL EXAM:  Mental status: Awake, alert, oriented x3. Speech fluent, hypophonic. Follows commands. No neglect.  Cranial Nerves: No facial asymmetry, no nystagmus, no dysarthria,  tongue midline  Motor exam: Normal tone, no drift, 4+/5 RUE, 4-/5 RLE, 1/5 LUE- uses RUE to lift LUE antigravity, 3/5 LLE moves within plan of bed,   Sensation: Intact to light touch   Coordination/ Gait: No dysmetria, gait not assessed    LABS:                        9.2    8.62  )-----------( 160      ( 23 Apr 2024 07:06 )             29.5    04-23    145  |  111<H>  |  16  ----------------------------<  103<H>  3.6   |  24  |  0.91    Ca    8.4      23 Apr 2024 00:20  Phos  3.2     04-23  Mg     2.1     04-23      IMAGING: Reviewed by me.   CTH 4/21/24:  Right MCA subacute hemorrhagic infarction. No adverse   interval change 4/20/2024    CTH 4/20/24: Stable appearance of right acute middle cerebral artery infarct with   petechial hemorrhage.    CTH 4/19/24: No significant change in acute right middle cerebral artery   infarct with petechial hemorrhage from 4/18/2024.    CTH 4/18/24 @ 15:08: Right MCA evolving infarct with mass effect on the right lateral   ventricle and subtle midline shift to the left. Minimal vague hemorrhage   suggested similar in appearance compared with the prior 4/18/2024 8:46 AM.      CTH 4/18/24 @ 9:50: Interval demarcation of right frontoparietal and temporal acute   infarction. No hipolito hemorrhagic transformation.    Increased mass effect with in new leftward midline shift of 3 mm and some   compression of the right lateral ventricle.    Basal cisterns still visualized. No hydrocephalus.    MR brain 4/17/24: Redemonstrated large acute right MCA territory infarct with extensive cortical petechial hemorrhage. No gross hematoma formation.    MR cervical spine 4/17/24: Suspected acute anterior longitudinal ligament injury and/or anterior corner avulsion fractures at C4-C5 and C5-C6. Prevertebraledema  extending from C1 through the upper cervical levels.  -Suspected right greater than left posterior paraspinal muscular strain and/or ligamentous injury.    CTH 4/17/24: An evolving acute right MCA distribution infarct is again noted in the   right posterior frontal, parietal, temporal, and insular regions. New   mild edema and mass effect has developed since the 04/16/2024 head CT.   New gyriform areas of increased density involve the infarct most likely   reflecting contrast staining from the angiogram procedure, as the   follow-up head CT performed later the same morning shows a substantial   interval decrease in the density. Small areas of cortical petechial   hemorrhagic transformation can't be entirely excluded. Continued serial   imaging follow-up over time is recommended to monitor for stability.    CTH 4/16/24 @ 19:14:  New cytotoxic edema in the right frontal, parietal, and temporal lobes,   as well as within the right insula consistent with an acute right MCA   territory infarct. No hipolito hemorrhagic transformation. No midline shift.      CTH, CTA H&N and CTP 4/16/24 @ 16:36:  1.   Right carotid system:  No hemodynamically significant stenosis.  2.   Left carotid system:  No hemodynamically significant stenosis.  3.   Intracranial circulation:  large vessel occlusion at the origin   of the RIGHT M2 segment, posterior division.  4.   Brain:  No significant lesion identified.   5. Perfusion: Core infarct of 96 ml within the posterior RIGHT MCA   Territory. Based on the perfusion mismatch, there is a predicted volume   of 24 mL of critically hypoperfused tissue at risk.

## 2024-04-23 NOTE — CHART NOTE - NSCHARTNOTESELECT_GEN_ALL_CORE
Event Note
Incidental Finding - Trauma workup
Neurosurgery/Event Note
exam change/Event Note
Event Note
Examination Change/Event Note
MRI T-sp/Event Note
Neurosurgery/Event Note
Nutrition Services
POCUS
Tertiary Exam - Trauma Surgery
Trauma Surgery follow up note/Event Note
VTE ppx note/Event Note
Vascular Neurology Resident/Event Note

## 2024-04-23 NOTE — PROGRESS NOTE ADULT - ASSESSMENT
ASSESSMENT: 81 years old man with a PMH of DM HTN CAD with stenting presenting for sudden onset left sided weakness. Family heard a thud and brought patient to ED. Patient received tenecteplase at [facility] VS, CTH found R M2 occlusion. Pt was transferred for thrombectomy. NIHSS9>11, mRS0, TICI 2B    Impression: L hemiparesis due to Large R-M2 ischemic infarct. S/p tenecteplase and endovascular therapy, TICI 2B, with extensive cortical petechial hemorrhage on MRI. Mechanism ESUS.     NEURO: Neurologic examination stable. Continue close monitoring for neurologic deterioration. Neurosurgery consulted, no need for hemicrani watch. Keep normotension. LDL 50, continue high intensity statin,  titrate statin to LDL goal less than 70. MRI Brain w/o as above. Physical therapy/OT recommend AR.    ANTITHROMBOTIC THERAPY: Aspirin 81mg daily    PULMONARY: CXR (4/21) with bilateral perihilar opacities, right greater than left. CXR (4/22) with slight decrease in pulmonary congestion with similar small effusions and there is no evidence of pneumothorax. On Zosyn for klebsiella pneumonia. Protecting airway, saturating well on nasal cannula @ 2LPM    CARDIOVASCULAR: TTE: EF 65%, no evidence of LV thrombus, normal left and right atrial size. Continue cardiac monitoring.                              SBP goal: < 160    GASTROINTESTINAL:  dysphagia screen initially failed.  S/s eval on 4/17/24: NPO.  S/p FEES on 4/22 recommend diet, tolerating well.     Diet: Pureed with moderately thick liquids    RENAL: BUN/Cr stable, good urine output      Na Goal: Greater than 135     Alcazar: no    HEMATOLOGY: H/H anemia, overall stable., Platelets normal at 160.      DVT ppx:  LMWH    ID: afebrile, no leukocytosis     OTHER: Case and plan discussed with patient and family at bedside, all questions addressed. MR c-spine with multiple cervical fractures, CT T-spine with T6 vertebral body fracture, patient was fitted on 4/22/24 for cervical collar occipital mandibular support. Pending MR T Spine without contrast for further evaluation per ortho. CT CAP with no evidence of acute trauma, Left lateral hip subcutaneous hematoma with evidence of active bleeding.    DISPOSITION: AR per PT eval once stable and workup is complete      CORE MEASURES:        Admission NIHSS: 19     TPA: [x] YES [] NO      LDL/HDL: 50/41     Depression Screen: p     Statin Therapy: yes     Dysphagia Screen: [] PASS [x] FAIL     Smoking [] YES [x] NO      Afib [] YES [x] NO     Stroke Education [x] YES [] NO    Obtain screening lower extremity venous ultrasound in patients who meet 1 or more of the following criteria as patient is high risk for DVT/PE on admission:   [] History of DVT/PE  []Hypercoagulable states (Factor V Leiden, Cancer, OCP, etc. )  []Prolonged immobility (hemiplegia/hemiparesis/post operative or any other extended immobilization)  [] Transferred from outside facility (Rehab or Long term care)  [] Age </= to 50 ASSESSMENT: 81 years old man with a PMH of DM HTN CAD with stenting presenting for sudden onset left sided weakness. Family heard a thud and brought patient to ED. Patient received [facility]plase at [facility] VS, CTH found R M2 occlusion. Pt was transferred for thrombectomy. NIHSS9>11, mRS0, TICI 2B    Impression: L hemiparesis due to Large R-M2 ischemic infarct. S/p tenecteplase and endovascular therapy, TICI 2B, with extensive cortical petechial hemorrhage on MRI. Mechanism ESUS.     NEURO: Neurologic examination stable. Continue close monitoring for neurologic deterioration. Neurosurgery consulted, no need for hemicrani watch. Keep normotension. LDL 50, continue high intensity statin,  titrate statin to LDL goal less than 70. MRI Brain w/o as above. Physical therapy/OT recommend AR.    ANTITHROMBOTIC THERAPY: Aspirin 81mg daily    PULMONARY: CXR (4/21) with bilateral perihilar opacities, right greater than left. CXR (4/22) with slight decrease in pulmonary congestion with similar small effusions and there is no evidence of pneumothorax. On Zosyn for klebsiella pneumonia. Protecting airway, saturating well on nasal cannula @ 2LPM    CARDIOVASCULAR: TTE: EF 65%, no evidence of LV thrombus, normal left and right atrial size. Continue cardiac monitoring.  Plan for ILR prior to discharge to rule out arrhythmia.                             SBP goal: < 160    GASTROINTESTINAL:  Dysphagia screen initially failed.  S/s eval on 4/17/24: NPO.  S/p FEES on 4/22 recommend diet, tolerating well.     Diet: Pureed with moderately thick liquids    RENAL: BUN/Cr stable, good urine output      Na Goal: Greater than 135     Alcazar: no    HEMATOLOGY: H/H anemia, overall stable., Platelets normal at 160.      DVT ppx:  LMWH    ID: afebrile, no leukocytosis     OTHER: Case and plan discussed with patient and family at bedside, all questions addressed. MR c-spine with multiple cervical fractures, CT T-spine with T6 vertebral body fracture, patient was fitted on 4/22/24 for cervical collar occipital mandibular support. MR T Spine without contrast reveals subtle band of increased T2/STIR signal within the T6 vertebral body, suggestive for acute fracture. CT CAP with no evidence of acute trauma, Left lateral hip subcutaneous hematoma with evidence of active bleeding.    DISPOSITION: AR per PT eval once stable and workup is complete      CORE MEASURES:        Admission NIHSS: 19     TPA: [x] YES [] NO      LDL/HDL: 50/41     Depression Screen: p     Statin Therapy: yes     Dysphagia Screen: [] PASS [x] FAIL     Smoking [] YES [x] NO      Afib [] YES [x] NO     Stroke Education [x] YES [] NO    Obtain screening lower extremity venous ultrasound in patients who meet 1 or more of the following criteria as patient is high risk for DVT/PE on admission:   [] History of DVT/PE  []Hypercoagulable states (Factor V Leiden, Cancer, OCP, etc. )  []Prolonged immobility (hemiplegia/hemiparesis/post operative or any other extended immobilization)  [] Transferred from outside facility (Rehab or Long term care)  [] Age </= to 50

## 2024-04-23 NOTE — CHART NOTE - NSCHARTNOTEFT_GEN_A_CORE
MRI T-sp reviewed with ortho spine attending, Dr. Gagnon. No acute ortho spine surgery at this time. Recommend continued conservative management. Continue c-collar and TLSO. F/u outpt with Dr. Gagnon in 1 week following discharge, please call office for appt.

## 2024-04-24 ENCOUNTER — TRANSCRIPTION ENCOUNTER (OUTPATIENT)
Age: 79
End: 2024-04-24

## 2024-04-24 LAB
ALBUMIN SERPL ELPH-MCNC: 3.1 G/DL — LOW (ref 3.3–5)
ALBUMIN SERPL ELPH-MCNC: 3.2 G/DL — LOW (ref 3.3–5)
ALP SERPL-CCNC: 59 U/L — SIGNIFICANT CHANGE UP (ref 40–120)
ALP SERPL-CCNC: 62 U/L — SIGNIFICANT CHANGE UP (ref 40–120)
ALT FLD-CCNC: 37 U/L — SIGNIFICANT CHANGE UP (ref 10–45)
ALT FLD-CCNC: 39 U/L — SIGNIFICANT CHANGE UP (ref 10–45)
ANION GAP SERPL CALC-SCNC: 10 MMOL/L — SIGNIFICANT CHANGE UP (ref 5–17)
AST SERPL-CCNC: 38 U/L — SIGNIFICANT CHANGE UP (ref 10–40)
AST SERPL-CCNC: 38 U/L — SIGNIFICANT CHANGE UP (ref 10–40)
BILIRUB DIRECT SERPL-MCNC: 0.4 MG/DL — HIGH (ref 0–0.3)
BILIRUB INDIRECT FLD-MCNC: 2 MG/DL — HIGH (ref 0.2–1)
BILIRUB SERPL-MCNC: 2.3 MG/DL — HIGH (ref 0.2–1.2)
BILIRUB SERPL-MCNC: 2.4 MG/DL — HIGH (ref 0.2–1.2)
BUN SERPL-MCNC: 16 MG/DL — SIGNIFICANT CHANGE UP (ref 7–23)
CALCIUM SERPL-MCNC: 8.3 MG/DL — LOW (ref 8.4–10.5)
CHLORIDE SERPL-SCNC: 106 MMOL/L — SIGNIFICANT CHANGE UP (ref 96–108)
CO2 SERPL-SCNC: 22 MMOL/L — SIGNIFICANT CHANGE UP (ref 22–31)
CREAT SERPL-MCNC: 0.79 MG/DL — SIGNIFICANT CHANGE UP (ref 0.5–1.3)
EGFR: 91 ML/MIN/1.73M2 — SIGNIFICANT CHANGE UP
GLUCOSE BLDC GLUCOMTR-MCNC: 175 MG/DL — HIGH (ref 70–99)
GLUCOSE BLDC GLUCOMTR-MCNC: 208 MG/DL — HIGH (ref 70–99)
GLUCOSE BLDC GLUCOMTR-MCNC: 268 MG/DL — HIGH (ref 70–99)
GLUCOSE BLDC GLUCOMTR-MCNC: 269 MG/DL — HIGH (ref 70–99)
GLUCOSE SERPL-MCNC: 163 MG/DL — HIGH (ref 70–99)
HCT VFR BLD CALC: 31 % — LOW (ref 39–50)
HGB BLD-MCNC: 9.7 G/DL — LOW (ref 13–17)
MCHC RBC-ENTMCNC: 28.5 PG — SIGNIFICANT CHANGE UP (ref 27–34)
MCHC RBC-ENTMCNC: 31.3 GM/DL — LOW (ref 32–36)
MCV RBC AUTO: 91.2 FL — SIGNIFICANT CHANGE UP (ref 80–100)
NRBC # BLD: 0 /100 WBCS — SIGNIFICANT CHANGE UP (ref 0–0)
PLATELET # BLD AUTO: 217 K/UL — SIGNIFICANT CHANGE UP (ref 150–400)
POTASSIUM SERPL-MCNC: 3.8 MMOL/L — SIGNIFICANT CHANGE UP (ref 3.5–5.3)
POTASSIUM SERPL-SCNC: 3.8 MMOL/L — SIGNIFICANT CHANGE UP (ref 3.5–5.3)
PROT SERPL-MCNC: 5.9 G/DL — LOW (ref 6–8.3)
PROT SERPL-MCNC: 6.2 G/DL — SIGNIFICANT CHANGE UP (ref 6–8.3)
RBC # BLD: 3.4 M/UL — LOW (ref 4.2–5.8)
RBC # FLD: 15.3 % — HIGH (ref 10.3–14.5)
SODIUM SERPL-SCNC: 138 MMOL/L — SIGNIFICANT CHANGE UP (ref 135–145)
WBC # BLD: 8.22 K/UL — SIGNIFICANT CHANGE UP (ref 3.8–10.5)
WBC # FLD AUTO: 8.22 K/UL — SIGNIFICANT CHANGE UP (ref 3.8–10.5)

## 2024-04-24 PROCEDURE — 99222 1ST HOSP IP/OBS MODERATE 55: CPT

## 2024-04-24 RX ADMIN — LIDOCAINE 1 PATCH: 4 CREAM TOPICAL at 17:57

## 2024-04-24 RX ADMIN — Medication 1 DROP(S): at 05:25

## 2024-04-24 RX ADMIN — POLYETHYLENE GLYCOL 3350 17 GRAM(S): 17 POWDER, FOR SOLUTION ORAL at 17:42

## 2024-04-24 RX ADMIN — ENOXAPARIN SODIUM 40 MILLIGRAM(S): 100 INJECTION SUBCUTANEOUS at 17:41

## 2024-04-24 RX ADMIN — Medication 3 MILLILITER(S): at 11:38

## 2024-04-24 RX ADMIN — FINASTERIDE 5 MILLIGRAM(S): 5 TABLET, FILM COATED ORAL at 11:39

## 2024-04-24 RX ADMIN — LIDOCAINE 1 PATCH: 4 CREAM TOPICAL at 11:40

## 2024-04-24 RX ADMIN — METHOCARBAMOL 500 MILLIGRAM(S): 500 TABLET, FILM COATED ORAL at 23:44

## 2024-04-24 RX ADMIN — Medication 3 MILLILITER(S): at 05:24

## 2024-04-24 RX ADMIN — Medication 650 MILLIGRAM(S): at 05:24

## 2024-04-24 RX ADMIN — Medication 4 MILLILITER(S): at 23:26

## 2024-04-24 RX ADMIN — Medication 3 MILLILITER(S): at 23:26

## 2024-04-24 RX ADMIN — CHLORHEXIDINE GLUCONATE 1 APPLICATION(S): 213 SOLUTION TOPICAL at 11:47

## 2024-04-24 RX ADMIN — PIPERACILLIN AND TAZOBACTAM 25 GRAM(S): 4; .5 INJECTION, POWDER, LYOPHILIZED, FOR SOLUTION INTRAVENOUS at 13:53

## 2024-04-24 RX ADMIN — Medication 3 MILLILITER(S): at 17:57

## 2024-04-24 RX ADMIN — SENNA PLUS 2 TABLET(S): 8.6 TABLET ORAL at 22:30

## 2024-04-24 RX ADMIN — Medication 12.5 MILLIGRAM(S): at 17:42

## 2024-04-24 RX ADMIN — Medication 6: at 09:28

## 2024-04-24 RX ADMIN — Medication 4 MILLILITER(S): at 17:57

## 2024-04-24 RX ADMIN — PIPERACILLIN AND TAZOBACTAM 25 GRAM(S): 4; .5 INJECTION, POWDER, LYOPHILIZED, FOR SOLUTION INTRAVENOUS at 05:25

## 2024-04-24 RX ADMIN — PANTOPRAZOLE SODIUM 40 MILLIGRAM(S): 20 TABLET, DELAYED RELEASE ORAL at 11:38

## 2024-04-24 RX ADMIN — POLYETHYLENE GLYCOL 3350 17 GRAM(S): 17 POWDER, FOR SOLUTION ORAL at 05:49

## 2024-04-24 RX ADMIN — Medication 6: at 11:40

## 2024-04-24 RX ADMIN — Medication 4: at 17:42

## 2024-04-24 RX ADMIN — Medication 1 DROP(S): at 17:42

## 2024-04-24 RX ADMIN — ATORVASTATIN CALCIUM 80 MILLIGRAM(S): 80 TABLET, FILM COATED ORAL at 22:30

## 2024-04-24 RX ADMIN — LIDOCAINE 1 PATCH: 4 CREAM TOPICAL at 00:00

## 2024-04-24 RX ADMIN — Medication 4 MILLILITER(S): at 11:38

## 2024-04-24 RX ADMIN — Medication 650 MILLIGRAM(S): at 06:00

## 2024-04-24 RX ADMIN — Medication 650 MILLIGRAM(S): at 23:00

## 2024-04-24 RX ADMIN — PIPERACILLIN AND TAZOBACTAM 25 GRAM(S): 4; .5 INJECTION, POWDER, LYOPHILIZED, FOR SOLUTION INTRAVENOUS at 22:30

## 2024-04-24 RX ADMIN — Medication 650 MILLIGRAM(S): at 22:30

## 2024-04-24 RX ADMIN — Medication 81 MILLIGRAM(S): at 11:40

## 2024-04-24 RX ADMIN — Medication 4 MILLILITER(S): at 05:25

## 2024-04-24 RX ADMIN — Medication 5 MILLIGRAM(S): at 22:30

## 2024-04-24 NOTE — DISCHARGE NOTE PROVIDER - PROVIDER TOKENS
PROVIDER:[TOKEN:[[id number]],FOLLOWUP:[2 weeks]],PROVIDER:[TOKEN:[[id number]],FOLLOWUP:[1 month]],PROVIDER:[TOKEN:[[id number]],FOLLOWUP:[1 week]]

## 2024-04-24 NOTE — DISCHARGE NOTE PROVIDER - NSDCFUSCHEDAPPT_GEN_ALL_CORE_FT
Louise Wright Physician [facility]  CARDIOLOGY [address]  Scheduled Appointment: 05/21/2024

## 2024-04-24 NOTE — DISCHARGE NOTE PROVIDER - HOSPITAL COURSE
HPI: 81 years old man with a PMH of DM HTN CAD with stenting presenting for sudden onset left sided weakness. Family heard a thud and found patient on the floor and proceeded with hospitalization. He fell off the stairs. Patient was evaluated at [facility] for stroke and was found to have right MCA occlusion. Patient given tenecteplase and was transferred to [facility] for thrombectomy. Upon initial evaluation patient ws found to have NIHSS of 9 and subsequently at initial evaluation patient was not very well cooperative which contributed to a high NIHSS score of 19. After imaging and re-evaluation patient was re-evaluated and was found to have NIHSS of 11 (2 for gaze preference right, 2 for lack of response to threat left, 2 for drift in LUE and 1 for drift in LLE, 1 for neglect, 1 for dysarthria and 2 for left face). After thrombectomy patient was noted to have NIHSS of 11. Of note patient was on aspirin at home only and did not skip the dose. Last seen well was just before the incident at 1:30 pm.    Imaging:   CTH 4/21/24:  Right MCA subacute hemorrhagic infarction. No adverse   interval change 4/20/2024    CTH 4/20/24: Stable appearance of right acute middle cerebral artery infarct with   petechial hemorrhage.    CTH 4/19/24: No significant change in acute right middle cerebral artery   infarct with petechial hemorrhage from 4/18/2024.    CTH 4/18/24 @ 15:08: Right MCA evolving infarct with mass effect on the right lateral   ventricle and subtle midline shift to the left. Minimal vague hemorrhage   suggested similar in appearance compared with the prior 4/18/2024 8:46 AM.      CTH 4/18/24 @ 9:50: Interval demarcation of right frontoparietal and temporal acute   infarction. No hipolito hemorrhagic transformation.    Increased mass effect with in new leftward midline shift of 3 mm and some   compression of the right lateral ventricle.    Basal cisterns still visualized. No hydrocephalus.    MR brain 4/17/24: Redemonstrated large acute right MCA territory infarct with extensive cortical petechial hemorrhage. No gross hematoma formation.    MR cervical spine 4/17/24: Suspected acute anterior longitudinal ligament injury and/or anterior corner avulsion fractures at C4-C5 and C5-C6. Prevertebraledema  extending from C1 through the upper cervical levels.  -Suspected right greater than left posterior paraspinal muscular strain and/or ligamentous injury.    CTH 4/17/24: An evolving acute right MCA distribution infarct is again noted in the   right posterior frontal, parietal, temporal, and insular regions. New   mild edema and mass effect has developed since the 04/16/2024 head CT.   New gyriform areas of increased density involve the infarct most likely   reflecting contrast staining from the angiogram procedure, as the   follow-up head CT performed later the same morning shows a substantial   interval decrease in the density. Small areas of cortical petechial   hemorrhagic transformation can't be entirely excluded. Continued serial   imaging follow-up over time is recommended to monitor for stability.    CTH 4/16/24 @ 19:14:  New cytotoxic edema in the right frontal, parietal, and temporal lobes,   as well as within the right insula consistent with an acute right MCA   territory infarct. No hipolito hemorrhagic transformation. No midline shift.      CTH, CTA H&N and CTP 4/16/24 @ 16:36:  1.   Right carotid system:  No hemodynamically significant stenosis.  2.   Left carotid system:  No hemodynamically significant stenosis.  3.   Intracranial circulation:  large vessel occlusion at the origin   of the RIGHT M2 segment, posterior division.  4.   Brain:  No significant lesion identified.   5. Perfusion: Core infarct of 96 ml within the posterior RIGHT MCA   Territory. Based on the perfusion mismatch, there is a predicted volume   of 24 mL of critically hypoperfused tissue at risk.      Impression:  L hemiparesis due to R MCA infarct in the setting of Large R-M2 occlusion. S/p tenecteplase and endovascular therapy, TICI 2B, with extensive cortical petechial hemorrhage on MRI. Mechanism ESUS.   ANTITHROMBOTIC THERAPY: Aspirin 81mg daily    TTE: EF 65%, no evidence of LV thrombus, normal left and right atrial size.  House EP consulted, plan for ILR prior to discharge to rule out arrhythmia.   Outpatient cardiologist, Dr. Louise Wright will follow up.     CT CAP with no evidence of acute trauma, Left lateral hip subcutaneous hematoma with evidence of active bleeding. MR c-spine with multiple cervical fractures, CT T-spine with T6 vertebral body fracture, patient was fitted on 4/22/24 for cervical collar occipital mandibular support. MR T Spine without contrast reveals subtle band of increased T2/STIR signal within the T6 vertebral body, suggestive for acute fracture. Per ortho, no need for ortho spine intervention, continue c-collar and TLSO, outpatient ortho follow up.    Disposition: Evaluated by PT/OT and recommended for AR. Patient is medically cleared for discharge.   HPI: 81 years old man with a PMH of DM HTN CAD with stenting presenting for sudden onset left sided weakness. Family heard a thud and found patient on the floor and proceeded with hospitalization. He fell off the stairs. Patient was evaluated at [facility] for stroke and was found to have right MCA occlusion. Patient given tenecteplase and was transferred to [facility] for thrombectomy. Upon initial evaluation patient ws found to have NIHSS of 9 and subsequently at initial evaluation patient was not very well cooperative which contributed to a high NIHSS score of 19. After imaging and re-evaluation patient was re-evaluated and was found to have NIHSS of 11 (2 for gaze preference right, 2 for lack of response to threat left, 2 for drift in LUE and 1 for drift in LLE, 1 for neglect, 1 for dysarthria and 2 for left face). After thrombectomy patient was noted to have NIHSS of 11. Of note patient was on aspirin at home only and did not skip the dose. Last seen well was just before the incident at 1:30 pm.    Imaging:   CTH 4/21/24:  Right MCA subacute hemorrhagic infarction. No adverse   interval change 4/20/2024    CTH 4/20/24: Stable appearance of right acute middle cerebral artery infarct with   petechial hemorrhage.    CTH 4/19/24: No significant change in acute right middle cerebral artery   infarct with petechial hemorrhage from 4/18/2024.    CTH 4/18/24 @ 15:08: Right MCA evolving infarct with mass effect on the right lateral   ventricle and subtle midline shift to the left. Minimal vague hemorrhage   suggested similar in appearance compared with the prior 4/18/2024 8:46 AM.      CTH 4/18/24 @ 9:50: Interval demarcation of right frontoparietal and temporal acute   infarction. No hipolito hemorrhagic transformation.    Increased mass effect with in new leftward midline shift of 3 mm and some   compression of the right lateral ventricle.    Basal cisterns still visualized. No hydrocephalus.    MR brain 4/17/24: Redemonstrated large acute right MCA territory infarct with extensive cortical petechial hemorrhage. No gross hematoma formation.    MR cervical spine 4/17/24: Suspected acute anterior longitudinal ligament injury and/or anterior corner avulsion fractures at C4-C5 and C5-C6. Prevertebraledema  extending from C1 through the upper cervical levels.  -Suspected right greater than left posterior paraspinal muscular strain and/or ligamentous injury.    CTH 4/17/24: An evolving acute right MCA distribution infarct is again noted in the   right posterior frontal, parietal, temporal, and insular regions. New   mild edema and mass effect has developed since the 04/16/2024 head CT.   New gyriform areas of increased density involve the infarct most likely   reflecting contrast staining from the angiogram procedure, as the   follow-up head CT performed later the same morning shows a substantial   interval decrease in the density. Small areas of cortical petechial   hemorrhagic transformation can't be entirely excluded. Continued serial   imaging follow-up over time is recommended to monitor for stability.    CTH 4/16/24 @ 19:14:  New cytotoxic edema in the right frontal, parietal, and temporal lobes,   as well as within the right insula consistent with an acute right MCA   territory infarct. No hipolito hemorrhagic transformation. No midline shift.      CTH, CTA H&N and CTP 4/16/24 @ 16:36:  1.   Right carotid system:  No hemodynamically significant stenosis.  2.   Left carotid system:  No hemodynamically significant stenosis.  3.   Intracranial circulation:  large vessel occlusion at the origin   of the RIGHT M2 segment, posterior division.  4.   Brain:  No significant lesion identified.   5. Perfusion: Core infarct of 96 ml within the posterior RIGHT MCA   Territory. Based on the perfusion mismatch, there is a predicted volume   of 24 mL of critically hypoperfused tissue at risk.      Impression:  L hemiparesis due to R MCA infarct in the setting of Large R-M2 occlusion. S/p tenecteplase and endovascular therapy, TICI 2B, with extensive cortical petechial hemorrhage on MRI. Mechanism ESUS.   ANTITHROMBOTIC THERAPY: Aspirin 81mg daily    TTE: EF 65%, no evidence of LV thrombus, normal left and right atrial size.  House EP consulted, plan for ILR prior to discharge to rule out arrhythmia.   Outpatient cardiologist, Dr. Louise Wright will follow up.     CT CAP with no evidence of acute trauma, Left lateral hip subcutaneous hematoma with evidence of active bleeding. MR c-spine with multiple cervical fractures, CT T-spine with T6 vertebral body fracture, patient was fitted on 4/22/24 for cervical collar occipital mandibular support. MR T Spine without contrast reveals subtle band of increased T2/STIR signal within the T6 vertebral body, suggestive for acute fracture. Per ortho, no need for ortho spine intervention, continue c-collar and TLSO, outpatient ortho follow up.    Will need to follow up with stroke neurology and ortho.      Disposition: Evaluated by PT/OT and recommended for AR. Patient is medically cleared for discharge.   HPI: 81 years old man with a PMH of DM HTN CAD with stenting presenting for sudden onset left sided weakness. Family heard a thud and found patient on the floor and proceeded with hospitalization. He fell off the stairs. Patient was evaluated at [facility] for stroke and was found to have right MCA occlusion. Patient given tenecteplase and was transferred to [facility] for thrombectomy. Upon initial evaluation patient ws found to have NIHSS of 9 and subsequently at initial evaluation patient was not very well cooperative which contributed to a high NIHSS score of 19. After imaging and re-evaluation patient was re-evaluated and was found to have NIHSS of 11 (2 for gaze preference right, 2 for lack of response to threat left, 2 for drift in LUE and 1 for drift in LLE, 1 for neglect, 1 for dysarthria and 2 for left face). After thrombectomy patient was noted to have NIHSS of 11. Of note patient was on aspirin at home only and did not skip the dose. Last seen well was just before the incident at 1:30 pm.    Imaging:   CTH 4/21/24:  Right MCA subacute hemorrhagic infarction. No adverse   interval change 4/20/2024    CTH 4/20/24: Stable appearance of right acute middle cerebral artery infarct with   petechial hemorrhage.    CTH 4/19/24: No significant change in acute right middle cerebral artery   infarct with petechial hemorrhage from 4/18/2024.    CTH 4/18/24 @ 15:08: Right MCA evolving infarct with mass effect on the right lateral   ventricle and subtle midline shift to the left. Minimal vague hemorrhage   suggested similar in appearance compared with the prior 4/18/2024 8:46 AM.      CTH 4/18/24 @ 9:50: Interval demarcation of right frontoparietal and temporal acute   infarction. No hipolito hemorrhagic transformation.    Increased mass effect with in new leftward midline shift of 3 mm and some   compression of the right lateral ventricle.    Basal cisterns still visualized. No hydrocephalus.    MR brain 4/17/24: Redemonstrated large acute right MCA territory infarct with extensive cortical petechial hemorrhage. No gross hematoma formation.    MR cervical spine 4/17/24: Suspected acute anterior longitudinal ligament injury and/or anterior corner avulsion fractures at C4-C5 and C5-C6. Prevertebraledema  extending from C1 through the upper cervical levels.  -Suspected right greater than left posterior paraspinal muscular strain and/or ligamentous injury.    CTH 4/17/24: An evolving acute right MCA distribution infarct is again noted in the   right posterior frontal, parietal, temporal, and insular regions. New   mild edema and mass effect has developed since the 04/16/2024 head CT.   New gyriform areas of increased density involve the infarct most likely   reflecting contrast staining from the angiogram procedure, as the   follow-up head CT performed later the same morning shows a substantial   interval decrease in the density. Small areas of cortical petechial   hemorrhagic transformation can't be entirely excluded. Continued serial   imaging follow-up over time is recommended to monitor for stability.    CTH 4/16/24 @ 19:14:  New cytotoxic edema in the right frontal, parietal, and temporal lobes,   as well as within the right insula consistent with an acute right MCA   territory infarct. No hipolito hemorrhagic transformation. No midline shift.      CTH, CTA H&N and CTP 4/16/24 @ 16:36:  1.   Right carotid system:  No hemodynamically significant stenosis.  2.   Left carotid system:  No hemodynamically significant stenosis.  3.   Intracranial circulation:  large vessel occlusion at the origin   of the RIGHT M2 segment, posterior division.  4.   Brain:  No significant lesion identified.   5. Perfusion: Core infarct of 96 ml within the posterior RIGHT MCA   Territory. Based on the perfusion mismatch, there is a predicted volume   of 24 mL of critically hypoperfused tissue at risk.      Impression:  L hemiparesis due to R MCA infarct in the setting of Large R-M2 occlusion. S/p tenecteplase and endovascular therapy, TICI 2B, with extensive cortical petechial hemorrhage on MRI. Mechanism ESUS.   ANTITHROMBOTIC THERAPY: Aspirin 81mg daily    TTE: EF 65%, no evidence of LV thrombus, normal left and right atrial size.  House EP consulted, plan for ILR prior to discharge to rule out arrhythmia.   Outpatient cardiologist, Dr. Louise Wright will follow up.     CT CAP with no evidence of acute trauma, Left lateral hip subcutaneous hematoma with evidence of active bleeding. MR c-spine with multiple cervical fractures, CT T-spine with T6 vertebral body fracture, patient was fitted on 4/22/24 for cervical collar occipital mandibular support. MR T Spine without contrast reveals subtle band of increased T2/STIR signal within the T6 vertebral body, suggestive for acute fracture. Per ortho, no need for ortho spine intervention, continue c-collar and TLSO, outpatient ortho follow up.    Will need to follow up with stroke neurology and ortho, and cardiology.        Finished 8 days of IV zosyn.      Zio patch placed by EP prior to discharge.      Disposition: Evaluated by PT/OT and recommended for AR. Patient is medically cleared for discharge.

## 2024-04-24 NOTE — PROGRESS NOTE ADULT - NS ATTEND AMEND GEN_ALL_CORE FT
I saw and examined the patient on AM stroke rounds.   Patient without any new complaints, has improvement in his headache.    Continues to have L arm weakness.      On exam:   AWake, alert, latency and paucity of speech, normal naming.  Follows bulbar commands.    Pupils 3-2mm, symmetric, blinking to threat bilaterally, normal EOM, L sided facial weakness.   MOTOR: L arm plegic, L leg moving in the plane of gravity, 2/5  SENSORY: intact to light touch    MRI brain images reviewed: diffusion restriction in the R insular, parietal, and frontal cortices.   CTA H&N images reviewed: no significant cervical carotid or vertebral stenosis.  R MCA M2 occlusion.      AP: 81 year old man with CAD s/p stenting, DM, HTN, presenting after a fall, found to have R MCA occlusion, s/p thrombolytics, and thrombectomy.  Now with persistent weakness in L face, arm, and leg, without neglect.  Imaging showing the R MCA territory stroke.   At this point, suspecting ESUS.  Continue on ASA and statin.  Continue cardiac tele.  Planning for loop recorder, discussed this with daughter at bedside.    Recommended for acute rehab.
I saw and examined the patient on AM stroke rounds.   Daughter reporting patient is complaing of headaches since time of stroke.    Continues to have L arm weakness.    More somnolent this AM after receiving medications for MRI of T spine.      On exam:   Somnolent, but follows commands.  Later more awake on reexamination.   Pupils 3-2mm, symmetric, full VF's, normal EOM, L sided facial weakness.   MOTOR: L arm plegic, L leg moving in the plane of gravity.   SENSORY: intact to light touch    MRI brain imges reviewed: diffusion restriction in the R insular, parietal, and frontal cortices.   CTA H&N images reviewed: no significant cervical carotid or vertebral stenosis.  R MCA M2 occlusion.      AP: 81 year old man with CAD s/p stenting, DM, HTN, presenting after a fall, found to have r MCA occlusion, s/p thrombolytics, and thrombectomy.  Now with persistent weakness in L face, arm, and leg, without neglect.  Imaging showing the R MCA territory stroke.   At this point, suspecting ESUS.  Continue on ASA and statin.  Continue cardiac tele.  Planning for loop recorder, discussed this with daughter at bedside.        Electronic Signatures:

## 2024-04-24 NOTE — DISCHARGE NOTE PROVIDER - CARE PROVIDER_API CALL
Sabrina Olguin  NP in Family Health  [address], [address]  [place] [address]  Phone: [phone]  Fax: [phone]  Follow Up Time: 2 weeks    Louise Wright  Interventional Cardiology  [address] [place] [address]  Phone: [phone]  Fax: [phone]  Follow Up Time: 1 month    Sagar Gagnon [facility]  Orthopaedic Surgery  [address], [address]  [place] [address]  Phone: [phone]  Fax: [phone]  Follow Up Time: 1 week

## 2024-04-24 NOTE — CONSULT NOTE ADULT - SUBJECTIVE AND OBJECTIVE BOX
CHIEF COMPLAINT: Patient is s/p Right MCA occlusion s/p thrombolytics and thrombectomy.  Patient seen resting in chair with son and daughter at his bedside.      HISTORY OF PRESENT ILLNESS: 79 year old male with a PMH of DM, HTN, CAD, with stenting presenting for sudden onset left sided weakness. Family heard a thud and brought patient to ED. Patient received tenecteplase at [facility] VS, CTH found R M2 occlusion. Pt was transferred for thrombectomy.  Patient is s/p tenecteplase and endovascular therapy, TICI 2B, with extensive cortical petechial hemorrhage on MRI.  Tele review reveals NSR 60-70's. Patient seen sitting in chair with daughter and son at bedside.  Patient has C-collar in place and chest brace in place.  EP consulted for assessment for arrythmia, further arrythmia monitoring with possible ILR.       Allergies    No Known Allergies    Intolerances    MEDICATIONS:  aspirin  chewable 81 milliGRAM(s) Oral daily  enoxaparin Injectable 40 milliGRAM(s) SubCutaneous <User Schedule>  metoprolol tartrate 12.5 milliGRAM(s) Oral every 12 hours    piperacillin/tazobactam IVPB.. 3.375 Gram(s) IV Intermittent every 8 hours    acetylcysteine 20%  Inhalation 4 milliLiter(s) Inhalation every 6 hours  albuterol/ipratropium for Nebulization 3 milliLiter(s) Nebulizer every 6 hours    acetaminophen   Oral Liquid .. 650 milliGRAM(s) Oral every 6 hours PRN  melatonin 5 milliGRAM(s) Oral at bedtime  methocarbamol 500 milliGRAM(s) Oral every 6 hours PRN    aluminum hydroxide/magnesium hydroxide/simethicone Suspension 30 milliLiter(s) Oral every 4 hours PRN  pantoprazole  Injectable 40 milliGRAM(s) IV Push daily  polyethylene glycol 3350 17 Gram(s) Oral two times a day  senna 2 Tablet(s) Oral at bedtime    atorvastatin 80 milliGRAM(s) Oral at bedtime  finasteride 5 milliGRAM(s) Oral daily  insulin lispro (ADMELOG) corrective regimen sliding scale   SubCutaneous three times a day before meals  insulin lispro (ADMELOG) corrective regimen sliding scale   SubCutaneous at bedtime    chlorhexidine 4% Liquid 1 Application(s) Topical daily  influenza  Vaccine (HIGH DOSE) 0.7 milliLiter(s) IntraMuscular once  lidocaine   4% Patch 1 Patch Transdermal daily  timolol 0.5% Solution 1 Drop(s) Left EYE two times a day      PAST MEDICAL & SURGICAL HISTORY:  HLD (hyperlipidemia)    T2DM (type 2 diabetes mellitus)    BPH (benign prostatic hyperplasia)    Diabetes mellitus    Hypertension    High cholesterol    CAD (coronary artery disease)    H/O eye surgery    History of cardiac cath  times 2    H/O heart artery stent      FAMILY HISTORY:  FH: myocardial infarction (Sibling)      REVIEW OF SYSTEMS:  See HPI. Otherwise, 12 point ROS done and otherwise negative.    PHYSICAL EXAM:  T(C): 36.9 (04-24-24 @ 08:00), Max: 36.9 (04-23-24 @ 20:00)  HR: 69 (04-24-24 @ 10:00) (57 - 75)  BP: 126/66 (04-24-24 @ 10:00) (122/59 - 147/71)  RR: 17 (04-24-24 @ 10:00) (15 - 28)  SpO2: 99% (04-24-24 @ 10:00) (95% - 100%)    23 Apr 2024 07:01  -  24 Apr 2024 07:00  --------------------------------------------------------  IN: 0 mL / OUT: 1400 mL / NET: -1400 mL        LABS:	 	    CBC Full  -  ( 24 Apr 2024 06:00 )  WBC Count : 8.22 K/uL  Hemoglobin : 9.7 g/dL  Hematocrit : 31.0 %  Platelet Count - Automated : 217 K/uL  Mean Cell Volume : 91.2 fl  Mean Cell Hemoglobin : 28.5 pg  Mean Cell Hemoglobin Concentration : 31.3 gm/dL  Auto Neutrophil # : x  Auto Lymphocyte # : x  Auto Monocyte # : x  Auto Eosinophil # : x  Auto Basophil # : x  Auto Neutrophil % : x  Auto Lymphocyte % : x  Auto Monocyte % : x  Auto Eosinophil % : x  Auto Basophil % : x    04-24    138  |  106  |  16  ----------------------------<  163<H>  3.8   |  22  |  0.79  04-23    144  |  109<H>  |  16  ----------------------------<  83  3.8   |  22  |  0.93    Ca    8.3<L>      24 Apr 2024 06:00  Ca    8.6      23 Apr 2024 07:07  Phos  3.3     04-23  Phos  3.2     04-23  Mg     2.1     04-23  Mg     2.1     04-23    TPro  6.2  /  Alb  3.2<L>  /  TBili  2.4<H>  /  DBili  0.4<H>  /  AST  38  /  ALT  39  /  AlkPhos  62  04-24    TELEMETRY: 	  NSR 60-70's    TTE:  TRANSTHORACIC ECHOCARDIOGRAM REPORT    Pt. Name:       YANNA TRONCOSO  Study Date:    4/17/2024  MRN:            [id number]   YOB: 1945  Accession #:    [id number]    Age:           79 years  Account#:       [id number] Gender:        M  Heart Rate:     77 bpm       Height:        60.00 in (152.40 cm)  Rhythm:         sinus rhythm Weight:        106.00 lb(48.08 kg)  Blood Pressure: 135/67 mmHg  BSA/BMI:       1.43 m² / 20.70 kg/m²  ________________________________________________________________________________________  Referring Physician:    [id number] Richard B Libman  Interpreting Physician: Sosa Nguyen  Primary Sonographer:    Claritza Lara BELTRAN    CPT:                ECHO TTE WITH CON COMP W DOPP - .m;DEFINITY ECHO                      CONTRAST PER ML - .m;DEFINITY ECHO CONTRAST PER ML                      WASTED - .m  Indication(s):      Cerebral infarction, unspecified - I63.9  Procedure:          Transthoracic echocardiogram with 2-D, M-mode and complete                      spectral and color flow Doppler.  [place]  [facility] [place] Status:   Inpatient  Contrast Injection: Verbal consent was obtained for injection of Ultrasonic                      Enhancing Agent following a discussion of risks and                      benefits.                      Endocardial visualization enhanced with 2 ml of Definity                      Ultrasound enhancing agent (Lot#:[id number] Discarded Dose:8ml).  UEA Reaction:       Patient had no adverse reaction after injection of                      Ultrasound Enhancing Agent.  Study Information:  Image quality for this study is less than ideal.    CONCLUSIONS:      1. Left ventricular cavity is normal in size. Left ventricular wall thickness is normal. Left ventricular systolic function is normal with an ejection fraction of 65 % by Carney's method of disks. Regional wall motion abnormalities present.   2. Entire septum, mid and apical inferior wall, and apex are abnormal.   3. There is no evidence of a left ventricular thrombus.   4. There is mild (grade 1) left ventricular diastolic dysfunction, with normal filling pressure.   5. Normal right ventricular cavity size, with normal wall thickness, and normal systolic function.   6. Normal left and right atrial size.   7. No significant valvular disease.   8. Pulmonary artery systolic pressure could not be estimated.   9. No pericardial effusion seen.  10. Compared to the transthoracic echocardiogram performed on 10/14/2021, there have been no significant interval changes.    FINDINGS:     Left Ventricle:  The left ventricular cavity is normal in size. Left ventricular wall thickness is normal. Left ventricular systolic function is normal with a calculated ejection fraction of 65 % by the Carney's biplane method of disks. There are regional wall motion abnormalities present. There is mild (grade 1) left ventricular diastolic dysfunction, with normal filling pressure. There is no evidence of a left ventricular thrombus.  LV Wall Scoring: The entire septum, mid and apical inferior wall, and apex are  hypokinetic. All remaining scored segments are normal.     Right Ventricle:  The right ventricular cavity is normal in size, with normal wall thickness and normal systolic function. Tricuspid annular plane systolic excursion (TAPSE) is 2.2 cm (normal >=1.7 cm).     Left Atrium:  The left atrium is normal in size with an indexed volume of 14.73 ml/m².     Right Atrium:  The right atrium is normal in size.     Interatrial Septum:  The interatrial septum appears intact.     Aortic Valve:  The aortic valve appears trileaflet with normal systolic excursion. There is no aortic valve stenosis. There is no evidence of aortic regurgitation.     Mitral Valve:  Structurally normal mitral valve with normal leaflet excursion. There is normal leaflet mobility of the mitral valve. There is no mitral valve stenosis. There is trace mitral regurgitation.     Tricuspid Valve:  Structurally normal tricuspid valve with normal leaflet excursion. There is trace tricuspid regurgitation. There is insufficient tricuspid regurgitation detected to calculate pulmonary artery systolic pressure.     Pulmonic Valve:  Structurally normal pulmonic valve with normal leaflet excursion. There is trace pulmonic regurgitation.     Aorta:  The aortic root appears normal in size.     Pericardium:  No pericardial effusion seen.     Systemic Veins:  The inferior vena cava is normal in size (normal <2.1cm) with normal inspiratory collapse (normal >50%) consistent with normal right atrial pressure (~3, range 0-5mmHg).    QUANTITATIVE DATA:  Left Ventricle Measurements: (Indexed to BSA)     IVSd (2D):   0.9 cm  LVPWd (2D):  0.9 cm  LVIDd (2D):  4.9 cm  LVIDs (2D):  3.0 cm  LV Mass:     151 g  106.1 g/m²  BiPlane LV EF%: 65 %     MV E Vmax:    0.37 m/s  MV A Vmax:    0.73 m/s  MV E/A:       0.50  e' lateral:   12.20 cm/s  e' medial:    6.20 cm/s  E/e' lateral: 3.01  E/e' medial:  5.92  E/e' Average: 3.99  MV DT:        195 msec    Aorta Measurements: (Normal range) (Indexed to BSA)     Sinuses of Valsalva: 3.60 cm (3.1 - 3.7 cm)    Left Atrium Measurements: (Indexed to BSA)  LA Diam 2D:        2.80 cm  LA Vol s, MOD A4C: 21.50 ml.  LA Vol s, MOD A2C: 20.20 ml.  LA Vol s, MOD BP:  21.00 ml  14.73 ml/m²    Right Ventricle Measurements:     TAPSE:      2.2 cm  RV S' Vmax: 16.30 cm/s     LVOT / RVOT/ Qp/Qs Data:(Indexed to BSA)  LVOT Vmax:      0.95 m/s  LVOT Vmn:       0.693 m/s  LVOT VTI:       14.50 cm  LVOT peak grad: 4 mmHg  LVOT mean grad: 2.0 mmHg    Mitral Valve Measurements:     MV E Vmax: 0.4 m/s  MV A Vmax: 0.7 m/s  MV E/A:    0.5    TricuspidValve Measurements:     RA Pressure: 3 mmHg  __________________________________________________________________________________  Electronically signed on 4/17/2024 at 11:12:10 AM by Sosa Nguyen    *** Final ***

## 2024-04-24 NOTE — DISCHARGE NOTE PROVIDER - CARE PROVIDERS DIRECT ADDRESSES
[email or web address]

## 2024-04-24 NOTE — DISCHARGE NOTE PROVIDER - NSDCMRMEDTOKEN_GEN_ALL_CORE_FT
aspirin 81 mg oral delayed release tablet: 1 tab(s) orally once a day  aspirin 81 mg oral tablet: 1 tab(s) orally once a day  atorvastatin 80 mg oral tablet: 1 tab(s) orally once a day  atorvastatin 80 mg oral tablet: 1 tab(s) orally once a day (at bedtime)  cervical collar for C 4-6 fracture: please wear at all times  finasteride 5 mg oral tablet: 1 tab(s) orally once a day  finasteride 5 mg oral tablet: 1 tab(s) orally once a day  losartan 25 mg oral tablet: 1 tab(s) orally once a day  losartan 25 mg oral tablet: 1 tab(s) orally once a day  metFORMIN 850 mg oral tablet: 1 tab(s) orally 2 times a day  metFORMIN 850 mg oral tablet: 1 tab(s) orally once a day in AM  metFORMIN 850 mg oral tablet: 0.5 tab(s) orally once a day in PM  metoprolol succinate 25 mg oral tablet, extended release: 1 tab(s) orally once a day  omeprazole 40 mg oral delayed release capsule: 1 cap(s) orally once a day  repaglinide 1 mg oral tablet: 1 tab(s) orally 3 times a day with meals  timolol maleate 0.5% ophthalmic solution: 1 drop(s) in the left eye 2 times a day  TLSO Brace: Please wear whenever OOB or transport, at all times. T6 VB fracture.  Toujeo Max SoloStar 300 units/mL subcutaneous solution: 26 unit(s) subcutaneous once a day at night after dinner   aluminum hydroxide-magnesium hydroxide 200 mg-200 mg/5 mL oral suspension: 30 milliliter(s) orally every 4 hours As needed Dyspepsia  aspirin 81 mg oral tablet, chewable: 1 tab(s) orally once a day  atorvastatin 80 mg oral tablet: 1 tab(s) orally once a day (at bedtime)  cervical collar for C 4-6 fracture: please wear at all times  enoxaparin: 40 milligram(s) subcutaneous once a day  finasteride 5 mg oral tablet: 1 tab(s) orally once a day  insulin lispro 100 units/mL injectable solution: injectable 3 times a day (before meals) 2 Unit(s) if Glucose 151 - 200  4 Unit(s) if Glucose 201 - 250  6 Unit(s) if Glucose 251 - 300  8 Unit(s) if Glucose 301 - 350  10 Unit(s) if Glucose 351 - 400  12 Unit(s) if Glucose Greater Than 400    three times a day before meals  insulin lispro 100 units/mL injectable solution: injectable once a day (at bedtime) 0 Unit(s) if Glucose 0 - 250  1 Unit(s) if Glucose 251 - 300  2 Unit(s) if Glucose 301 - 350  3 Unit(s) if Glucose 351 - 400  4 Unit(s) if Glucose 400    at bedtime  ipratropium-albuterol 0.5 mg-2.5 mg/3 mL inhalation solution: 3 milliliter(s) inhaled every 6 hours as needed for  shortness of breath and/or wheezing  lidocaine 4% topical film: Apply topically to affected area once a day  melatonin 5 mg oral tablet: 1 tab(s) orally once a day (at bedtime)  methocarbamol 500 mg oral tablet: 1 tab(s) orally every 6 hours As needed musle spasm  metoprolol succinate 25 mg oral tablet, extended release: 1 tab(s) orally once a day  omeprazole 40 mg oral delayed release capsule: 1 cap(s) orally once a day  polyethylene glycol 3350 oral powder for reconstitution: 17 gram(s) orally 2 times a day  senna leaf extract oral tablet: 2 tab(s) orally once a day (at bedtime)  timolol maleate 0.5% ophthalmic solution: 1 drop(s) in the left eye 2 times a day  TLSO Brace: Please wear whenever OOB or transport, at all times. T6 VB fracture.

## 2024-04-24 NOTE — CONSULT NOTE ADULT - SUBJECTIVE AND OBJECTIVE BOX
Patient is a 79y old  Male who presents with a chief complaint of stroke (22 Apr 2024 00:35)    Admission HPI:  81 years old man with a PMH of DM HTN CAD with stenting presenting for sudden onset left sided weakness. Family heard a thud and found patient on the floor and proceeded with hospitalization. He fell off the stairs. Patient was evaluated at [facility] for stroke and was found to have right MCA occlusion. Patient given tenecteplase and was transferred to [facility] for thrombectomy. Upon initial evaluation patient ws found to have NIHSS of 9 and subsequently at initial evaluation patient was not very well cooperative which contributed to a high NIHSS score of 19. After imaging and re-evaluation patient was re-evaluated and was found to have NIHSS of 11 (2 for gaze preference right, 2 for lack of response to threat left, 2 for drift in LUE and 1 for drift in LLE, 1 for neglect, 1 for dysarthria and 2 for left face). After thrombectomy patient was noted to have NIHSS of 11. Of note patient was on aspirin at home only and did not skip the dose. Last seen well was just before the incident at 1:30 pm.  (16 Apr 2024 22:09)    Interval History:  Patient s/p TICI 2B thrombectomy.  Had imaging of the brain and  spine:    MR Cervical Spine No Cont (04.17.24 @ 21:56) >  MR brain:  -Redemonstrated large acute right MCA territory infarct with extensive   cortical petechial hemorrhage. No gross hematoma formation.    MR cervical spine:  -Suspected acute anterior longitudinal ligament injury and/or anterior   corner avulsion fractures at C4-C5 and C5-C6. Prevertebral edema   extending from C1 through the upper cervical levels.  -Suspected right greater than left posterior paraspinal muscular strain   and/or ligamentous injury.    MR Thoracic Spine No Cont (04.23.24 @ 09:24) >  Study is greatly limited secondary to motion artifact.  Subtle band of increased T2/STIR signal within the T6 vertebral body,   suggestive for acute fracture. No significant loss of height of the T6   vertebral body.    No surgical intervention per neurosurgery- recommendation for C-collar and TLSO.    REVIEW OF SYSTEMS: L weakness, + poor balance, No chest pain, shortness of breath, nausea, vomiting or diarhea; other ROS neg     PAST MEDICAL & SURGICAL HISTORY  HLD (hyperlipidemia)    T2DM (type 2 diabetes mellitus)    BPH (benign prostatic hyperplasia)    Diabetes mellitus    Hypertension    High cholesterol    CAD (coronary artery disease)    H/O eye surgery    History of cardiac cath    H/O heart artery stent    FUNCTIONAL HISTORY:   Lives w spouse in home w 5 JEROME and flight to basement  PTA Independent    FAMILY HISTORY   FH: myocardial infarction (Sibling)    MEDICATIONS   acetaminophen   Oral Liquid .. 650 milliGRAM(s) Oral every 6 hours PRN  acetylcysteine 20%  Inhalation 4 milliLiter(s) Inhalation every 6 hours  albuterol/ipratropium for Nebulization 3 milliLiter(s) Nebulizer every 6 hours  aluminum hydroxide/magnesium hydroxide/simethicone Suspension 30 milliLiter(s) Oral every 4 hours PRN  aspirin  chewable 81 milliGRAM(s) Oral daily  atorvastatin 80 milliGRAM(s) Oral at bedtime  chlorhexidine 4% Liquid 1 Application(s) Topical daily  enoxaparin Injectable 40 milliGRAM(s) SubCutaneous <User Schedule>  finasteride 5 milliGRAM(s) Oral daily  influenza  Vaccine (HIGH DOSE) 0.7 milliLiter(s) IntraMuscular once  insulin lispro (ADMELOG) corrective regimen sliding scale   SubCutaneous at bedtime  insulin lispro (ADMELOG) corrective regimen sliding scale   SubCutaneous three times a day before meals  lidocaine   4% Patch 1 Patch Transdermal daily  melatonin 5 milliGRAM(s) Oral at bedtime  methocarbamol 500 milliGRAM(s) Oral every 6 hours PRN  metoprolol tartrate 12.5 milliGRAM(s) Oral every 12 hours  pantoprazole  Injectable 40 milliGRAM(s) IV Push daily  piperacillin/tazobactam IVPB.. 3.375 Gram(s) IV Intermittent every 8 hours  polyethylene glycol 3350 17 Gram(s) Oral two times a day  senna 2 Tablet(s) Oral at bedtime  timolol 0.5% Solution 1 Drop(s) Left EYE two times a day    ALLERGIES  No Known Allergies    VITALS  T(C): 36.9 (04-24-24 @ 08:00), Max: 36.9 (04-23-24 @ 20:00)  HR: 67 (04-24-24 @ 08:00) (56 - 75)  BP: 136/94 (04-24-24 @ 08:00) (112/54 - 147/71)  RR: 21 (04-24-24 @ 08:00) (13 - 28)  SpO2: 99% (04-24-24 @ 08:00) (95% - 100%)  Wt(kg): --    PHYSICAL EXAM  Constitutional - NAD, Comfortable  HEENT - NCAT, EOMI  Neck - Cervical Collar in place  Chest - No distress, no use of accessory muscles for respiration  Cardiovascular -Well perfused  Abdomen - BS+, Soft, NTND  Extremities - No C/C/E, No calf tenderness   Neurologic Exam -                 Sleeping but easily arousable and cooperative w exam.  Follows verbal instruction  Motor LUE 1/5, LLE 4/5; RUE/RLE - 5/5  Sensation intact  No clonus     Psychiatric - Mood stable, Affect WNL    RECENT LABS/IMAGING  CBC Full  -  ( 24 Apr 2024 06:00 )  WBC Count : 8.22 K/uL  RBC Count : 3.40 M/uL  Hemoglobin : 9.7 g/dL  Hematocrit : 31.0 %  Platelet Count - Automated : 217 K/uL  Mean Cell Volume : 91.2 fl  Mean Cell Hemoglobin : 28.5 pg  Mean Cell Hemoglobin Concentration : 31.3 gm/dL  Auto Neutrophil # : x  Auto Lymphocyte # : x  Auto Monocyte # : x  Auto Eosinophil # : x  Auto Basophil # : x  Auto Neutrophil % : x  Auto Lymphocyte % : x  Auto Monocyte % : x  Auto Eosinophil % : x  Auto Basophil % : x    04-24    138  |  106  |  16  ----------------------------<  163<H>  3.8   |  22  |  0.79    Ca    8.3<L>      24 Apr 2024 06:00  Phos  3.3     04-23  Mg     2.1     04-23    TPro  6.2  /  Alb  3.2<L>  /  TBili  2.4<H>  /  DBili  0.4<H>  /  AST  38  /  ALT  39  /  AlkPhos  62  04-24    Urinalysis Basic - ( 24 Apr 2024 06:00 )    Color: x / Appearance: x / SG: x / pH: x  Gluc: 163 mg/dL / Ketone: x  / Bili: x / Urobili: x   Blood: x / Protein: x / Nitrite: x   Leuk Esterase: x / RBC: x / WBC x   Sq Epi: x / Non Sq Epi: x / Bacteria: x    Impression:  82 yo with functional deficits secondary to diagnosis of CVA, Cervical and Thoracic spine fractures.    Plan:  PT- ROM, Bed Mob, Transfers, Amb w AD and bracing as needed  OT- ADLs, bracing  SLP- Dysphagia eval and treat  Prec- Falls, Cardiac, Cervical collar/TLSO  DVT Prophylaxis- Lovenox  Skin- Turn q2 h  Dispo- Acute Rehab- patient requires active and ongoing therapeutic interventions of multiple disciplines and can tolerate 3 hours of therapies x 2-4wks depending on progress at rehabilitation facility. Can actively participate and benefit from  an intensive rehabilitation program. Requires supervision by a rehabilitation physician and a coordinated interdisciplinary approach to providing rehabilitation.     Had d/w patient and his children at bedside and answered all questions.

## 2024-04-24 NOTE — PROGRESS NOTE ADULT - SUBJECTIVE AND OBJECTIVE BOX
THE PATIENT WAS SEEN AND EXAMINED BY ME WITH THE HOUSESTAFF AND STROKE TEAM DURING MORNING ROUNDS.   HPI:  81 years old man with a PMH of DM HTN CAD with stenting presenting for sudden onset left sided weakness. Family heard a thud and found patient on the floor and proceeded with hospitalization. He fell off the stairs. Patient was evaluated at [facility] for stroke and was found to have right MCA occlusion. Patient given tenecteplase and was transferred to [facility] for thrombectomy. Upon initial evaluation patient ws found to have NIHSS of 9 and subsequently at initial evaluation patient was not very well cooperative which contributed to a high NIHSS score of 19. After imaging and re-evaluation patient was re-evaluated and was found to have NIHSS of 11 (2 for gaze preference right, 2 for lack of response to threat left, 2 for drift in LUE and 1 for drift in LLE, 1 for neglect, 1 for dysarthria and 2 for left face). After thrombectomy patient was noted to have NIHSS of 11. Of note patient was on aspirin at home only and did not skip the dose. Last seen well was just before the incident at 1:30 pm.  (16 Apr 2024 22:09)      SUBJECTIVE: No events overnight.  No new neurologic complaints.  ROS negative unless otherwise noted.    acetaminophen   Oral Liquid .. 650 milliGRAM(s) Oral every 6 hours PRN  acetylcysteine 20%  Inhalation 4 milliLiter(s) Inhalation every 6 hours  albuterol/ipratropium for Nebulization 3 milliLiter(s) Nebulizer every 6 hours  aluminum hydroxide/magnesium hydroxide/simethicone Suspension 30 milliLiter(s) Oral every 4 hours PRN  aspirin  chewable 81 milliGRAM(s) Oral daily  atorvastatin 80 milliGRAM(s) Oral at bedtime  chlorhexidine 4% Liquid 1 Application(s) Topical daily  enoxaparin Injectable 40 milliGRAM(s) SubCutaneous <User Schedule>  finasteride 5 milliGRAM(s) Oral daily  influenza  Vaccine (HIGH DOSE) 0.7 milliLiter(s) IntraMuscular once  insulin lispro (ADMELOG) corrective regimen sliding scale   SubCutaneous at bedtime  insulin lispro (ADMELOG) corrective regimen sliding scale   SubCutaneous three times a day before meals  lidocaine   4% Patch 1 Patch Transdermal daily  melatonin 5 milliGRAM(s) Oral at bedtime  methocarbamol 500 milliGRAM(s) Oral every 6 hours PRN  metoprolol tartrate 12.5 milliGRAM(s) Oral every 12 hours  pantoprazole  Injectable 40 milliGRAM(s) IV Push daily  piperacillin/tazobactam IVPB.. 3.375 Gram(s) IV Intermittent every 8 hours  polyethylene glycol 3350 17 Gram(s) Oral two times a day  senna 2 Tablet(s) Oral at bedtime  timolol 0.5% Solution 1 Drop(s) Left EYE two times a day      PHYSICAL EXAM:   Vital Signs Last 24 Hrs  T(C): 36.9 (24 Apr 2024 04:00), Max: 36.9 (23 Apr 2024 20:00)  T(F): 98.5 (24 Apr 2024 04:00), Max: 98.5 (23 Apr 2024 20:00)  HR: 61 (24 Apr 2024 06:00) (53 - 75)  BP: 147/71 (24 Apr 2024 06:00) (112/54 - 147/71)  BP(mean): 102 (24 Apr 2024 06:00) (78 - 102)  RR: 17 (24 Apr 2024 06:00) (13 - 28)  SpO2: 100% (24 Apr 2024 06:00) (95% - 100%)  Patient On (Oxygen Delivery Method): nasal cannula  O2 Flow (L/min): 2      General: No acute distress  HEENT: EOM intact, visual fields full, surgical L pupil  Abdomen: Soft, nontender, nondistended   Extremities: No edema    NEUROLOGICAL EXAM:  Mental status: Awake, alert, oriented x3. Speech fluent, hypophonic. Follows commands. No neglect.  Cranial Nerves: L facial asymmetry, no nystagmus, no dysarthria,  tongue midline  Motor exam: Normal tone, no drift, 4+/5 RUE, 4-/5 RLE, 1/5 LUE (uses RUE to lift LUE antigravity), 3/5 LLE moves within plane of bed   Sensation: Intact to light touch   Coordination/ Gait: No dysmetria, gait not assessed    LABS:                        9.7    8.22  )-----------( 217      ( 24 Apr 2024 06:00 )             31.0    04-24    138  |  106  |  16  ----------------------------<  163<H>  3.8   |  22  |  0.79    Ca    8.3<L>      24 Apr 2024 06:00  Phos  3.3     04-23  Mg     2.1     04-23    TPro  6.2  /  Alb  3.2<L>  /  TBili  2.4<H>  /  DBili  0.4<H>  /  AST  38  /  ALT  39  /  AlkPhos  62  04-24        IMAGING: Reviewed by me.   CTH 4/21/24:  Right MCA subacute hemorrhagic infarction. No adverse   interval change 4/20/2024    CTH 4/20/24: Stable appearance of right acute middle cerebral artery infarct with   petechial hemorrhage.    CTH 4/19/24: No significant change in acute right middle cerebral artery   infarct with petechial hemorrhage from 4/18/2024.    CTH 4/18/24 @ 15:08: Right MCA evolving infarct with mass effect on the right lateral   ventricle and subtle midline shift to the left. Minimal vague hemorrhage   suggested similar in appearance compared with the prior 4/18/2024 8:46 AM.      CTH 4/18/24 @ 9:50: Interval demarcation of right frontoparietal and temporal acute   infarction. No hipolito hemorrhagic transformation.    Increased mass effect with in new leftward midline shift of 3 mm and some   compression of the right lateral ventricle.    Basal cisterns still visualized. No hydrocephalus.    MR brain 4/17/24: Redemonstrated large acute right MCA territory infarct with extensive cortical petechial hemorrhage. No gross hematoma formation.    MR cervical spine 4/17/24: Suspected acute anterior longitudinal ligament injury and/or anterior corner avulsion fractures at C4-C5 and C5-C6. Prevertebraledema  extending from C1 through the upper cervical levels.  -Suspected right greater than left posterior paraspinal muscular strain and/or ligamentous injury.    CTH 4/17/24: An evolving acute right MCA distribution infarct is again noted in the   right posterior frontal, parietal, temporal, and insular regions. New   mild edema and mass effect has developed since the 04/16/2024 head CT.   New gyriform areas of increased density involve the infarct most likely   reflecting contrast staining from the angiogram procedure, as the   follow-up head CT performed later the same morning shows a substantial   interval decrease in the density. Small areas of cortical petechial   hemorrhagic transformation can't be entirely excluded. Continued serial   imaging follow-up over time is recommended to monitor for stability.    CTH 4/16/24 @ 19:14:  New cytotoxic edema in the right frontal, parietal, and temporal lobes,   as well as within the right insula consistent with an acute right MCA   territory infarct. No hipolito hemorrhagic transformation. No midline shift.      CTH, CTA H&N and CTP 4/16/24 @ 16:36:  1.   Right carotid system:  No hemodynamically significant stenosis.  2.   Left carotid system:  No hemodynamically significant stenosis.  3.   Intracranial circulation:  large vessel occlusion at the origin   of the RIGHT M2 segment, posterior division.  4.   Brain:  No significant lesion identified.   5. Perfusion: Core infarct of 96 ml within the posterior RIGHT MCA   Territory. Based on the perfusion mismatch, there is a predicted volume   of 24 mL of critically hypoperfused tissue at risk. THE PATIENT WAS SEEN AND EXAMINED BY ME WITH THE HOUSESTAFF AND STROKE TEAM DURING MORNING ROUNDS.   HPI:  81 years old man with a PMH of DM HTN CAD with stenting presenting for sudden onset left sided weakness. Family heard a thud and found patient on the floor and proceeded with hospitalization. He fell off the stairs. Patient was evaluated at [facility] for stroke and was found to have right MCA occlusion. Patient given tenecteplase and was transferred to [facility] for thrombectomy. Upon initial evaluation patient ws found to have NIHSS of 9 and subsequently at initial evaluation patient was not very well cooperative which contributed to a high NIHSS score of 19. After imaging and re-evaluation patient was re-evaluated and was found to have NIHSS of 11 (2 for gaze preference right, 2 for lack of response to threat left, 2 for drift in LUE and 1 for drift in LLE, 1 for neglect, 1 for dysarthria and 2 for left face). After thrombectomy patient was noted to have NIHSS of 11. Of note patient was on aspirin at home only and did not skip the dose. Last seen well was just before the incident at 1:30 pm.  (16 Apr 2024 22:09)      SUBJECTIVE: No events overnight.  No new neurologic complaints.  ROS negative unless otherwise noted.    acetaminophen   Oral Liquid .. 650 milliGRAM(s) Oral every 6 hours PRN  acetylcysteine 20%  Inhalation 4 milliLiter(s) Inhalation every 6 hours  albuterol/ipratropium for Nebulization 3 milliLiter(s) Nebulizer every 6 hours  aluminum hydroxide/magnesium hydroxide/simethicone Suspension 30 milliLiter(s) Oral every 4 hours PRN  aspirin  chewable 81 milliGRAM(s) Oral daily  atorvastatin 80 milliGRAM(s) Oral at bedtime  chlorhexidine 4% Liquid 1 Application(s) Topical daily  enoxaparin Injectable 40 milliGRAM(s) SubCutaneous <User Schedule>  finasteride 5 milliGRAM(s) Oral daily  influenza  Vaccine (HIGH DOSE) 0.7 milliLiter(s) IntraMuscular once  insulin lispro (ADMELOG) corrective regimen sliding scale   SubCutaneous at bedtime  insulin lispro (ADMELOG) corrective regimen sliding scale   SubCutaneous three times a day before meals  lidocaine   4% Patch 1 Patch Transdermal daily  melatonin 5 milliGRAM(s) Oral at bedtime  methocarbamol 500 milliGRAM(s) Oral every 6 hours PRN  metoprolol tartrate 12.5 milliGRAM(s) Oral every 12 hours  pantoprazole  Injectable 40 milliGRAM(s) IV Push daily  piperacillin/tazobactam IVPB.. 3.375 Gram(s) IV Intermittent every 8 hours  polyethylene glycol 3350 17 Gram(s) Oral two times a day  senna 2 Tablet(s) Oral at bedtime  timolol 0.5% Solution 1 Drop(s) Left EYE two times a day      PHYSICAL EXAM:   Vital Signs Last 24 Hrs  T(C): 36.9 (24 Apr 2024 04:00), Max: 36.9 (23 Apr 2024 20:00)  T(F): 98.5 (24 Apr 2024 04:00), Max: 98.5 (23 Apr 2024 20:00)  HR: 61 (24 Apr 2024 06:00) (53 - 75)  BP: 147/71 (24 Apr 2024 06:00) (112/54 - 147/71)  BP(mean): 102 (24 Apr 2024 06:00) (78 - 102)  RR: 17 (24 Apr 2024 06:00) (13 - 28)  SpO2: 100% (24 Apr 2024 06:00) (95% - 100%)  Patient On (Oxygen Delivery Method): nasal cannula  O2 Flow (L/min): 2      General: No acute distress  HEENT: EOM intact, visual fields full, surgical L pupil  Abdomen: Soft, nontender, nondistended   Extremities: No edema    NEUROLOGICAL EXAM:  Mental status: Awake, alert, oriented x3. Speech fluent, hypophonic. Follows commands. No neglect.  Cranial Nerves: L facial asymmetry, no nystagmus, no dysarthria,  tongue midline  Motor exam: Normal tone, no drift, 4+/5 RUE, 4-/5 RLE, 2/5 LUE, 3/5 LLE moves against gravity  Sensation: Intact to light touch   Coordination/ Gait: No dysmetria, gait not assessed    LABS:                        9.7    8.22  )-----------( 217      ( 24 Apr 2024 06:00 )             31.0    04-24    138  |  106  |  16  ----------------------------<  163<H>  3.8   |  22  |  0.79    Ca    8.3<L>      24 Apr 2024 06:00  Phos  3.3     04-23  Mg     2.1     04-23    TPro  6.2  /  Alb  3.2<L>  /  TBili  2.4<H>  /  DBili  0.4<H>  /  AST  38  /  ALT  39  /  AlkPhos  62  04-24        IMAGING: Reviewed by me.   CTH 4/21/24:  Right MCA subacute hemorrhagic infarction. No adverse   interval change 4/20/2024    CTH 4/20/24: Stable appearance of right acute middle cerebral artery infarct with   petechial hemorrhage.    CTH 4/19/24: No significant change in acute right middle cerebral artery   infarct with petechial hemorrhage from 4/18/2024.    CTH 4/18/24 @ 15:08: Right MCA evolving infarct with mass effect on the right lateral   ventricle and subtle midline shift to the left. Minimal vague hemorrhage   suggested similar in appearance compared with the prior 4/18/2024 8:46 AM.      CTH 4/18/24 @ 9:50: Interval demarcation of right frontoparietal and temporal acute   infarction. No hipolito hemorrhagic transformation.    Increased mass effect with in new leftward midline shift of 3 mm and some   compression of the right lateral ventricle.    Basal cisterns still visualized. No hydrocephalus.    MR brain 4/17/24: Redemonstrated large acute right MCA territory infarct with extensive cortical petechial hemorrhage. No gross hematoma formation.    MR cervical spine 4/17/24: Suspected acute anterior longitudinal ligament injury and/or anterior corner avulsion fractures at C4-C5 and C5-C6. Prevertebraledema  extending from C1 through the upper cervical levels.  -Suspected right greater than left posterior paraspinal muscular strain and/or ligamentous injury.    CTH 4/17/24: An evolving acute right MCA distribution infarct is again noted in the   right posterior frontal, parietal, temporal, and insular regions. New   mild edema and mass effect has developed since the 04/16/2024 head CT.   New gyriform areas of increased density involve the infarct most likely   reflecting contrast staining from the angiogram procedure, as the   follow-up head CT performed later the same morning shows a substantial   interval decrease in the density. Small areas of cortical petechial   hemorrhagic transformation can't be entirely excluded. Continued serial   imaging follow-up over time is recommended to monitor for stability.    CTH 4/16/24 @ 19:14:  New cytotoxic edema in the right frontal, parietal, and temporal lobes,   as well as within the right insula consistent with an acute right MCA   territory infarct. No hipolito hemorrhagic transformation. No midline shift.      CTH, CTA H&N and CTP 4/16/24 @ 16:36:  1.   Right carotid system:  No hemodynamically significant stenosis.  2.   Left carotid system:  No hemodynamically significant stenosis.  3.   Intracranial circulation:  large vessel occlusion at the origin   of the RIGHT M2 segment, posterior division.  4.   Brain:  No significant lesion identified.   5. Perfusion: Core infarct of 96 ml within the posterior RIGHT MCA   Territory. Based on the perfusion mismatch, there is a predicted volume   of 24 mL of critically hypoperfused tissue at risk. Biopsy Type: H and E

## 2024-04-24 NOTE — DISCHARGE NOTE PROVIDER - NSDCCPCAREPLAN_GEN_ALL_CORE_FT
PRINCIPAL DISCHARGE DIAGNOSIS  Diagnosis: Acute right MCA stroke  Assessment and Plan of Treatment: .Please follow up with neurologist in 1-2 weeks. Continue taking medications as prescribed. Monitor your blood pressure. Reduce fat, cholesterol and salt in your diet. Increase intake of fruits and vegetables. Limit alcohol to minimum and do not smoke. You may be at risk for falling, make changes to your home to help you walk easier. Keep up to date on vaccinations.  If you experience any symptoms of facial drooping, slurred speech, arm or leg weakness, severe headache, vision changes or any worsening symptoms, notify provider immediatley and return to ER.

## 2024-04-24 NOTE — PROGRESS NOTE ADULT - ASSESSMENT
81 years old man with a PMH of DM HTN CAD with stenting presenting for sudden onset left sided weakness. Family heard a thud and brought patient to ED. Patient received tenecteplase at [facility] VS, CTH found R M2 occlusion. Pt was transferred for thrombectomy. NIHSS9>11, mRS0, TICI 2B    Impression: L hemiparesis due to R MCA infarct in the setting of Large R-M2 occlusion. S/p tenecteplase and endovascular therapy, TICI 2B, with extensive cortical petechial hemorrhage on MRI. Mechanism ESUS.     NEURO: Neurologic examination stable. Continue close monitoring for neurologic deterioration. Neurosurgery consulted, no need for hemicrani watch. Keep normotension. LDL 50, continue high intensity statin,  titrate statin to LDL goal less than 70. MRI Brain w/o as above. Physical therapy/OT recommend AR.    ANTITHROMBOTIC THERAPY: Aspirin 81mg daily    PULMONARY: CXR (4/21) with bilateral perihilar opacities, right greater than left. CXR (4/22) with slight decrease in pulmonary congestion with similar small effusions and there is no evidence of pneumothorax. On Zosyn for klebsiella pneumonia. Protecting airway, saturating well on nasal cannula @ 2LPM. Medicine consulted, recommendations appreciated.     CARDIOVASCULAR: TTE: EF 65%, no evidence of LV thrombus, normal left and right atrial size. Continue cardiac monitoring.  Plan for ILR prior to discharge to rule out arrhythmia.                             SBP goal: < 160    GASTROINTESTINAL:  Dysphagia screen initially failed.  S/s eval on 4/17/24: NPO.  S/p FEES on 4/22 recommend diet, tolerating well.     Diet: Pureed with moderately thick liquids    RENAL: BUN/Cr stable, good urine output      Na Goal: Greater than 135     Alcazar: no    HEMATOLOGY: H/H anemia, overall stable., Platelets normal at 217.      DVT ppx:  LMWH    ID: afebrile, no leukocytosis     OTHER: Case and plan discussed with patient and family at bedside, all questions addressed. CT CAP with no evidence of acute trauma, Left lateral hip subcutaneous hematoma with evidence of active bleeding. MR c-spine with multiple cervical fractures, CT T-spine with T6 vertebral body fracture, patient was fitted on 4/22/24 for cervical collar occipital mandibular support. MR T Spine without contrast reveals subtle band of increased T2/STIR signal within the T6 vertebral body, suggestive for acute fracture. Per ortho, no need for ortho spine intervention, continue c-collar and TLSO, outpatient ortho follow up.      DISPOSITION: AR per PT eval once as workup is complete      CORE MEASURES:        Admission NIHSS: 19     TPA: [x] YES [] NO      LDL/HDL: 50/41     Depression Screen: p     Statin Therapy: yes     Dysphagia Screen: [] PASS [x] FAIL     Smoking [] YES [x] NO      Afib [] YES [x] NO     Stroke Education [x] YES [] NO    Obtain screening lower extremity venous ultrasound in patients who meet 1 or more of the following criteria as patient is high risk for DVT/PE on admission:   [] History of DVT/PE  []Hypercoagulable states (Factor V Leiden, Cancer, OCP, etc. )  []Prolonged immobility (hemiplegia/hemiparesis/post operative or any other extended immobilization)  [] Transferred from outside facility (Rehab or Long term care)  [] Age </= to 50     81 years old man with a PMH of DM HTN CAD with stenting presenting for sudden onset left sided weakness. Family heard a thud and brought patient to ED. Patient received tenecteplase at [facility] VS, CTH found R M2 occlusion. Pt was transferred for thrombectomy. NIHSS9>11, mRS0, TICI 2B    Impression: L hemiparesis due to R MCA infarct in the setting of Large R-M2 occlusion. S/p tenecteplase and endovascular therapy, TICI 2B, with extensive cortical petechial hemorrhage on MRI. Mechanism ESUS.     NEURO: Neurologic examination improved since yesterday. Continue close monitoring for neurologic deterioration. Neurosurgery consulted, no need for hemicrani watch. Keep normotension. LDL 50, continue high intensity statin,  titrate statin to LDL goal less than 70. MRI Brain w/o as above. Physical therapy/OT recommend AR.    ANTITHROMBOTIC THERAPY: Aspirin 81mg daily    PULMONARY: CXR (4/21) with bilateral perihilar opacities, right greater than left. CXR (4/22) with slight decrease in pulmonary congestion with similar small effusions and there is no evidence of pneumothorax. On Zosyn for klebsiella pneumonia. Protecting airway, saturating well on nasal cannula @ 2LPM. Medicine consulted, recommendations appreciated.     CARDIOVASCULAR: TTE: EF 65%, no evidence of LV thrombus, normal left and right atrial size. Continue cardiac monitoring.  House EP consulted, Plan for ILR prior to discharge to rule out arrhythmia.                             SBP goal: < 160    GASTROINTESTINAL:  Dysphagia screen initially failed.  S/s eval on 4/17/24: NPO.  S/p FEES on 4/22 recommend diet, tolerating well.     Diet: Pureed with moderately thick liquids    RENAL: BUN/Cr stable, good urine output      Na Goal: Greater than 135     Alcazar: no    HEMATOLOGY: H/H anemia, overall stable., Platelets normal at 217.      DVT ppx:  LMWH    ID: afebrile, no leukocytosis     OTHER: Case and plan discussed with patient and family at bedside, all questions addressed. CT CAP with no evidence of acute trauma, Left lateral hip subcutaneous hematoma with evidence of active bleeding. MR c-spine with multiple cervical fractures, CT T-spine with T6 vertebral body fracture, patient was fitted on 4/22/24 for cervical collar occipital mandibular support. MR T Spine without contrast reveals subtle band of increased T2/STIR signal within the T6 vertebral body, suggestive for acute fracture. Per ortho, no need for ortho spine intervention, continue c-collar and TLSO, outpatient ortho follow up.      DISPOSITION: AR per PT eval once as workup is complete      CORE MEASURES:        Admission NIHSS: 19     TPA: [x] YES [] NO      LDL/HDL: 50/41     Depression Screen: p     Statin Therapy: yes     Dysphagia Screen: [] PASS [x] FAIL     Smoking [] YES [x] NO      Afib [] YES [x] NO     Stroke Education [x] YES [] NO    Obtain screening lower extremity venous ultrasound in patients who meet 1 or more of the following criteria as patient is high risk for DVT/PE on admission:   [] History of DVT/PE  []Hypercoagulable states (Factor V Leiden, Cancer, OCP, etc. )  []Prolonged immobility (hemiplegia/hemiparesis/post operative or any other extended immobilization)  [] Transferred from outside facility (Rehab or Long term care)  [] Age </= to 50

## 2024-04-25 ENCOUNTER — NON-APPOINTMENT (OUTPATIENT)
Age: 79
End: 2024-04-25

## 2024-04-25 ENCOUNTER — INPATIENT (INPATIENT)
Facility: HOSPITAL | Age: 79
LOS: 10 days | Discharge: ANOTHER HOSPITAL | DRG: 66 | End: 2024-05-06
Attending: PHYSICAL MEDICINE & REHABILITATION | Admitting: PHYSICAL MEDICINE & REHABILITATION
Payer: MEDICARE

## 2024-04-25 ENCOUNTER — APPOINTMENT (OUTPATIENT)
Dept: ELECTROPHYSIOLOGY | Facility: CLINIC | Age: 79
End: 2024-04-25
Payer: MEDICARE

## 2024-04-25 VITALS
WEIGHT: 154.54 LBS | SYSTOLIC BLOOD PRESSURE: 136 MMHG | DIASTOLIC BLOOD PRESSURE: 79 MMHG | OXYGEN SATURATION: 99 % | HEART RATE: 76 BPM | HEIGHT: 69 IN | TEMPERATURE: 99 F | RESPIRATION RATE: 17 BRPM

## 2024-04-25 VITALS
DIASTOLIC BLOOD PRESSURE: 66 MMHG | TEMPERATURE: 98 F | RESPIRATION RATE: 17 BRPM | OXYGEN SATURATION: 97 % | HEART RATE: 69 BPM | SYSTOLIC BLOOD PRESSURE: 155 MMHG

## 2024-04-25 DIAGNOSIS — Z95.5 PRESENCE OF CORONARY ANGIOPLASTY IMPLANT AND GRAFT: Chronic | ICD-10-CM

## 2024-04-25 DIAGNOSIS — Z98.890 OTHER SPECIFIED POSTPROCEDURAL STATES: Chronic | ICD-10-CM

## 2024-04-25 DIAGNOSIS — I63.9 CEREBRAL INFARCTION, UNSPECIFIED: ICD-10-CM

## 2024-04-25 DIAGNOSIS — J18.9 PNEUMONIA, UNSPECIFIED ORGANISM: ICD-10-CM

## 2024-04-25 LAB
ADD ON TEST-SPECIMEN IN LAB: SIGNIFICANT CHANGE UP
ANION GAP SERPL CALC-SCNC: 11 MMOL/L — SIGNIFICANT CHANGE UP (ref 5–17)
BASOPHILS # BLD AUTO: 0.02 K/UL — SIGNIFICANT CHANGE UP (ref 0–0.2)
BASOPHILS NFR BLD AUTO: 0.2 % — SIGNIFICANT CHANGE UP (ref 0–2)
BUN SERPL-MCNC: 14 MG/DL — SIGNIFICANT CHANGE UP (ref 7–23)
CALCIUM SERPL-MCNC: 9 MG/DL — SIGNIFICANT CHANGE UP (ref 8.4–10.5)
CHLORIDE SERPL-SCNC: 105 MMOL/L — SIGNIFICANT CHANGE UP (ref 96–108)
CO2 SERPL-SCNC: 23 MMOL/L — SIGNIFICANT CHANGE UP (ref 22–31)
CREAT SERPL-MCNC: 0.76 MG/DL — SIGNIFICANT CHANGE UP (ref 0.5–1.3)
EGFR: 92 ML/MIN/1.73M2 — SIGNIFICANT CHANGE UP
EOSINOPHIL # BLD AUTO: 0.32 K/UL — SIGNIFICANT CHANGE UP (ref 0–0.5)
EOSINOPHIL NFR BLD AUTO: 4 % — SIGNIFICANT CHANGE UP (ref 0–6)
GLUCOSE BLDC GLUCOMTR-MCNC: 172 MG/DL — HIGH (ref 70–99)
GLUCOSE BLDC GLUCOMTR-MCNC: 207 MG/DL — HIGH (ref 70–99)
GLUCOSE BLDC GLUCOMTR-MCNC: 225 MG/DL — HIGH (ref 70–99)
GLUCOSE BLDC GLUCOMTR-MCNC: 281 MG/DL — HIGH (ref 70–99)
GLUCOSE SERPL-MCNC: 173 MG/DL — HIGH (ref 70–99)
HCT VFR BLD CALC: 32.8 % — LOW (ref 39–50)
HGB BLD-MCNC: 10.8 G/DL — LOW (ref 13–17)
IMM GRANULOCYTES NFR BLD AUTO: 0.5 % — SIGNIFICANT CHANGE UP (ref 0–0.9)
LYMPHOCYTES # BLD AUTO: 1.76 K/UL — SIGNIFICANT CHANGE UP (ref 1–3.3)
LYMPHOCYTES # BLD AUTO: 21.9 % — SIGNIFICANT CHANGE UP (ref 13–44)
MCHC RBC-ENTMCNC: 29.3 PG — SIGNIFICANT CHANGE UP (ref 27–34)
MCHC RBC-ENTMCNC: 32.9 GM/DL — SIGNIFICANT CHANGE UP (ref 32–36)
MCV RBC AUTO: 89.1 FL — SIGNIFICANT CHANGE UP (ref 80–100)
MONOCYTES # BLD AUTO: 0.53 K/UL — SIGNIFICANT CHANGE UP (ref 0–0.9)
MONOCYTES NFR BLD AUTO: 6.6 % — SIGNIFICANT CHANGE UP (ref 2–14)
NEUTROPHILS # BLD AUTO: 5.38 K/UL — SIGNIFICANT CHANGE UP (ref 1.8–7.4)
NEUTROPHILS NFR BLD AUTO: 66.8 % — SIGNIFICANT CHANGE UP (ref 43–77)
NRBC # BLD: 0 /100 WBCS — SIGNIFICANT CHANGE UP (ref 0–0)
PLATELET # BLD AUTO: 248 K/UL — SIGNIFICANT CHANGE UP (ref 150–400)
POTASSIUM SERPL-MCNC: 3.7 MMOL/L — SIGNIFICANT CHANGE UP (ref 3.5–5.3)
POTASSIUM SERPL-SCNC: 3.7 MMOL/L — SIGNIFICANT CHANGE UP (ref 3.5–5.3)
PROCALCITONIN SERPL-MCNC: 0.08 NG/ML — SIGNIFICANT CHANGE UP (ref 0.02–0.1)
RBC # BLD: 3.68 M/UL — LOW (ref 4.2–5.8)
RBC # FLD: 15 % — HIGH (ref 10.3–14.5)
SODIUM SERPL-SCNC: 139 MMOL/L — SIGNIFICANT CHANGE UP (ref 135–145)
WBC # BLD: 8.05 K/UL — SIGNIFICANT CHANGE UP (ref 3.8–10.5)
WBC # FLD AUTO: 8.05 K/UL — SIGNIFICANT CHANGE UP (ref 3.8–10.5)

## 2024-04-25 PROCEDURE — 85018 HEMOGLOBIN: CPT

## 2024-04-25 PROCEDURE — 84484 ASSAY OF TROPONIN QUANT: CPT

## 2024-04-25 PROCEDURE — 73070 X-RAY EXAM OF ELBOW: CPT

## 2024-04-25 PROCEDURE — 93970 EXTREMITY STUDY: CPT

## 2024-04-25 PROCEDURE — 92610 EVALUATE SWALLOWING FUNCTION: CPT

## 2024-04-25 PROCEDURE — 87040 BLOOD CULTURE FOR BACTERIA: CPT

## 2024-04-25 PROCEDURE — 87641 MR-STAPH DNA AMP PROBE: CPT

## 2024-04-25 PROCEDURE — 82248 BILIRUBIN DIRECT: CPT

## 2024-04-25 PROCEDURE — 85379 FIBRIN DEGRADATION QUANT: CPT

## 2024-04-25 PROCEDURE — 87640 STAPH A DNA AMP PROBE: CPT

## 2024-04-25 PROCEDURE — 86901 BLOOD TYPING SEROLOGIC RH(D): CPT

## 2024-04-25 PROCEDURE — 36430 TRANSFUSION BLD/BLD COMPNT: CPT

## 2024-04-25 PROCEDURE — 72146 MRI CHEST SPINE W/O DYE: CPT | Mod: MC

## 2024-04-25 PROCEDURE — 92526 ORAL FUNCTION THERAPY: CPT

## 2024-04-25 PROCEDURE — 93005 ELECTROCARDIOGRAM TRACING: CPT

## 2024-04-25 PROCEDURE — 72125 CT NECK SPINE W/O DYE: CPT | Mod: MC

## 2024-04-25 PROCEDURE — 96374 THER/PROPH/DIAG INJ IV PUSH: CPT

## 2024-04-25 PROCEDURE — 83615 LACTATE (LD) (LDH) ENZYME: CPT

## 2024-04-25 PROCEDURE — 86923 COMPATIBILITY TEST ELECTRIC: CPT

## 2024-04-25 PROCEDURE — C1769: CPT

## 2024-04-25 PROCEDURE — 82330 ASSAY OF CALCIUM: CPT

## 2024-04-25 PROCEDURE — 85027 COMPLETE CBC AUTOMATED: CPT

## 2024-04-25 PROCEDURE — 83605 ASSAY OF LACTIC ACID: CPT

## 2024-04-25 PROCEDURE — 85384 FIBRINOGEN ACTIVITY: CPT

## 2024-04-25 PROCEDURE — 97166 OT EVAL MOD COMPLEX 45 MIN: CPT

## 2024-04-25 PROCEDURE — 61645 PERQ ART M-THROMBECT &/NFS: CPT

## 2024-04-25 PROCEDURE — 82247 BILIRUBIN TOTAL: CPT

## 2024-04-25 PROCEDURE — 86850 RBC ANTIBODY SCREEN: CPT

## 2024-04-25 PROCEDURE — 71045 X-RAY EXAM CHEST 1 VIEW: CPT

## 2024-04-25 PROCEDURE — 70551 MRI BRAIN STEM W/O DYE: CPT | Mod: MC

## 2024-04-25 PROCEDURE — 81001 URINALYSIS AUTO W/SCOPE: CPT

## 2024-04-25 PROCEDURE — 92611 MOTION FLUOROSCOPY/SWALLOW: CPT

## 2024-04-25 PROCEDURE — 84443 ASSAY THYROID STIM HORMONE: CPT

## 2024-04-25 PROCEDURE — 99285 EMERGENCY DEPT VISIT HI MDM: CPT | Mod: 25

## 2024-04-25 PROCEDURE — 74177 CT ABD & PELVIS W/CONTRAST: CPT | Mod: MC

## 2024-04-25 PROCEDURE — 71260 CT THORAX DX C+: CPT | Mod: MC

## 2024-04-25 PROCEDURE — 85025 COMPLETE CBC W/AUTO DIFF WBC: CPT

## 2024-04-25 PROCEDURE — 73110 X-RAY EXAM OF WRIST: CPT

## 2024-04-25 PROCEDURE — 85610 PROTHROMBIN TIME: CPT

## 2024-04-25 PROCEDURE — 82010 KETONE BODYS QUAN: CPT

## 2024-04-25 PROCEDURE — 97535 SELF CARE MNGMENT TRAINING: CPT

## 2024-04-25 PROCEDURE — 86922 COMPATIBILITY TEST ANTIGLOB: CPT

## 2024-04-25 PROCEDURE — 97110 THERAPEUTIC EXERCISES: CPT

## 2024-04-25 PROCEDURE — 83010 ASSAY OF HAPTOGLOBIN QUANT: CPT

## 2024-04-25 PROCEDURE — 87077 CULTURE AEROBIC IDENTIFY: CPT

## 2024-04-25 PROCEDURE — C1894: CPT

## 2024-04-25 PROCEDURE — 73552 X-RAY EXAM OF FEMUR 2/>: CPT

## 2024-04-25 PROCEDURE — 97116 GAIT TRAINING THERAPY: CPT

## 2024-04-25 PROCEDURE — 80053 COMPREHEN METABOLIC PANEL: CPT

## 2024-04-25 PROCEDURE — 86900 BLOOD TYPING SEROLOGIC ABO: CPT

## 2024-04-25 PROCEDURE — 80061 LIPID PANEL: CPT

## 2024-04-25 PROCEDURE — 72141 MRI NECK SPINE W/O DYE: CPT | Mod: MC

## 2024-04-25 PROCEDURE — 84295 ASSAY OF SERUM SODIUM: CPT

## 2024-04-25 PROCEDURE — C1887: CPT

## 2024-04-25 PROCEDURE — 92612 ENDOSCOPY SWALLOW (FEES) VID: CPT

## 2024-04-25 PROCEDURE — 73090 X-RAY EXAM OF FOREARM: CPT

## 2024-04-25 PROCEDURE — 99232 SBSQ HOSP IP/OBS MODERATE 35: CPT | Mod: FS

## 2024-04-25 PROCEDURE — C1760: CPT

## 2024-04-25 PROCEDURE — 82435 ASSAY OF BLOOD CHLORIDE: CPT

## 2024-04-25 PROCEDURE — 83735 ASSAY OF MAGNESIUM: CPT

## 2024-04-25 PROCEDURE — 85014 HEMATOCRIT: CPT

## 2024-04-25 PROCEDURE — 82947 ASSAY GLUCOSE BLOOD QUANT: CPT

## 2024-04-25 PROCEDURE — 97530 THERAPEUTIC ACTIVITIES: CPT

## 2024-04-25 PROCEDURE — 94640 AIRWAY INHALATION TREATMENT: CPT

## 2024-04-25 PROCEDURE — 87070 CULTURE OTHR SPECIMN AEROBIC: CPT

## 2024-04-25 PROCEDURE — 87186 SC STD MICRODIL/AGAR DIL: CPT

## 2024-04-25 PROCEDURE — C1757: CPT

## 2024-04-25 PROCEDURE — 97162 PT EVAL MOD COMPLEX 30 MIN: CPT

## 2024-04-25 PROCEDURE — 82962 GLUCOSE BLOOD TEST: CPT

## 2024-04-25 PROCEDURE — 84100 ASSAY OF PHOSPHORUS: CPT

## 2024-04-25 PROCEDURE — P9037: CPT

## 2024-04-25 PROCEDURE — 84145 PROCALCITONIN (PCT): CPT

## 2024-04-25 PROCEDURE — 73590 X-RAY EXAM OF LOWER LEG: CPT

## 2024-04-25 PROCEDURE — 74018 RADEX ABDOMEN 1 VIEW: CPT

## 2024-04-25 PROCEDURE — 82803 BLOOD GASES ANY COMBINATION: CPT

## 2024-04-25 PROCEDURE — C8929: CPT

## 2024-04-25 PROCEDURE — 70450 CT HEAD/BRAIN W/O DYE: CPT | Mod: MC

## 2024-04-25 PROCEDURE — P9100: CPT

## 2024-04-25 PROCEDURE — 36415 COLL VENOUS BLD VENIPUNCTURE: CPT

## 2024-04-25 PROCEDURE — 84132 ASSAY OF SERUM POTASSIUM: CPT

## 2024-04-25 PROCEDURE — P9016: CPT

## 2024-04-25 PROCEDURE — 85730 THROMBOPLASTIN TIME PARTIAL: CPT

## 2024-04-25 PROCEDURE — 73560 X-RAY EXAM OF KNEE 1 OR 2: CPT

## 2024-04-25 PROCEDURE — 80048 BASIC METABOLIC PNL TOTAL CA: CPT

## 2024-04-25 PROCEDURE — 80076 HEPATIC FUNCTION PANEL: CPT

## 2024-04-25 PROCEDURE — 83036 HEMOGLOBIN GLYCOSYLATED A1C: CPT

## 2024-04-25 PROCEDURE — C9399: CPT

## 2024-04-25 RX ORDER — ACETAMINOPHEN 500 MG
650 TABLET ORAL EVERY 6 HOURS
Refills: 0 | Status: ACTIVE | OUTPATIENT
Start: 2024-04-25 | End: 2025-03-24

## 2024-04-25 RX ORDER — INSULIN LISPRO 100/ML
0 VIAL (ML) SUBCUTANEOUS
Qty: 0 | Refills: 0 | DISCHARGE
Start: 2024-04-25

## 2024-04-25 RX ORDER — PANTOPRAZOLE SODIUM 20 MG/1
40 TABLET, DELAYED RELEASE ORAL
Refills: 0 | Status: DISCONTINUED | OUTPATIENT
Start: 2024-04-25 | End: 2024-05-06

## 2024-04-25 RX ORDER — SENNA PLUS 8.6 MG/1
2 TABLET ORAL AT BEDTIME
Refills: 0 | Status: DISCONTINUED | OUTPATIENT
Start: 2024-04-25 | End: 2024-05-06

## 2024-04-25 RX ORDER — METOPROLOL TARTRATE 50 MG
12.5 TABLET ORAL EVERY 12 HOURS
Refills: 0 | Status: DISCONTINUED | OUTPATIENT
Start: 2024-04-25 | End: 2024-04-25

## 2024-04-25 RX ORDER — SODIUM CHLORIDE 9 MG/ML
1000 INJECTION, SOLUTION INTRAVENOUS
Refills: 0 | Status: DISCONTINUED | OUTPATIENT
Start: 2024-04-25 | End: 2024-05-06

## 2024-04-25 RX ORDER — INSULIN GLARGINE 100 [IU]/ML
36 INJECTION, SOLUTION SUBCUTANEOUS
Refills: 0 | DISCHARGE

## 2024-04-25 RX ORDER — REPAGLINIDE 1 MG/1
1 TABLET ORAL
Refills: 0 | DISCHARGE

## 2024-04-25 RX ORDER — INSULIN LISPRO 100/ML
VIAL (ML) SUBCUTANEOUS AT BEDTIME
Refills: 0 | Status: DISCONTINUED | OUTPATIENT
Start: 2024-04-25 | End: 2024-05-06

## 2024-04-25 RX ORDER — ATORVASTATIN CALCIUM 80 MG/1
1 TABLET, FILM COATED ORAL
Refills: 0 | DISCHARGE

## 2024-04-25 RX ORDER — ATORVASTATIN CALCIUM 80 MG/1
80 TABLET, FILM COATED ORAL AT BEDTIME
Refills: 0 | Status: DISCONTINUED | OUTPATIENT
Start: 2024-04-25 | End: 2024-04-25

## 2024-04-25 RX ORDER — METFORMIN HYDROCHLORIDE 850 MG/1
0.5 TABLET ORAL
Refills: 0 | DISCHARGE

## 2024-04-25 RX ORDER — ASPIRIN/CALCIUM CARB/MAGNESIUM 324 MG
81 TABLET ORAL DAILY
Refills: 0 | Status: DISCONTINUED | OUTPATIENT
Start: 2024-04-26 | End: 2024-05-01

## 2024-04-25 RX ORDER — INSULIN LISPRO 100/ML
VIAL (ML) SUBCUTANEOUS AT BEDTIME
Refills: 0 | Status: DISCONTINUED | OUTPATIENT
Start: 2024-04-25 | End: 2024-04-25

## 2024-04-25 RX ORDER — TIMOLOL 0.5 %
1 DROPS OPHTHALMIC (EYE)
Refills: 0 | Status: DISCONTINUED | OUTPATIENT
Start: 2024-04-25 | End: 2024-04-25

## 2024-04-25 RX ORDER — ASPIRIN/CALCIUM CARB/MAGNESIUM 324 MG
81 TABLET ORAL DAILY
Refills: 0 | Status: DISCONTINUED | OUTPATIENT
Start: 2024-04-25 | End: 2024-04-25

## 2024-04-25 RX ORDER — FINASTERIDE 5 MG/1
1 TABLET, FILM COATED ORAL
Refills: 0 | DISCHARGE

## 2024-04-25 RX ORDER — GLUCAGON INJECTION, SOLUTION 0.5 MG/.1ML
1 INJECTION, SOLUTION SUBCUTANEOUS ONCE
Refills: 0 | Status: DISCONTINUED | OUTPATIENT
Start: 2024-04-25 | End: 2024-05-06

## 2024-04-25 RX ORDER — ASPIRIN/CALCIUM CARB/MAGNESIUM 324 MG
1 TABLET ORAL
Refills: 0 | DISCHARGE

## 2024-04-25 RX ORDER — SENNA PLUS 8.6 MG/1
2 TABLET ORAL AT BEDTIME
Refills: 0 | Status: DISCONTINUED | OUTPATIENT
Start: 2024-04-25 | End: 2024-04-25

## 2024-04-25 RX ORDER — LIDOCAINE 4 G/100G
1 CREAM TOPICAL
Qty: 0 | Refills: 0 | DISCHARGE
Start: 2024-04-25

## 2024-04-25 RX ORDER — ENOXAPARIN SODIUM 100 MG/ML
40 INJECTION SUBCUTANEOUS
Qty: 0 | Refills: 0 | DISCHARGE
Start: 2024-04-25

## 2024-04-25 RX ORDER — ATORVASTATIN CALCIUM 80 MG/1
80 TABLET, FILM COATED ORAL AT BEDTIME
Refills: 0 | Status: DISCONTINUED | OUTPATIENT
Start: 2024-04-25 | End: 2024-04-30

## 2024-04-25 RX ORDER — LOSARTAN POTASSIUM 100 MG/1
1 TABLET, FILM COATED ORAL
Refills: 0 | DISCHARGE

## 2024-04-25 RX ORDER — FINASTERIDE 5 MG/1
5 TABLET, FILM COATED ORAL DAILY
Refills: 0 | Status: DISCONTINUED | OUTPATIENT
Start: 2024-04-25 | End: 2024-04-25

## 2024-04-25 RX ORDER — LANOLIN ALCOHOL/MO/W.PET/CERES
6 CREAM (GRAM) TOPICAL AT BEDTIME
Refills: 0 | Status: DISCONTINUED | OUTPATIENT
Start: 2024-04-25 | End: 2024-04-30

## 2024-04-25 RX ORDER — METFORMIN HYDROCHLORIDE 850 MG/1
1 TABLET ORAL
Refills: 0 | DISCHARGE

## 2024-04-25 RX ORDER — ENOXAPARIN SODIUM 100 MG/ML
40 INJECTION SUBCUTANEOUS
Refills: 0 | Status: DISCONTINUED | OUTPATIENT
Start: 2024-04-25 | End: 2024-05-01

## 2024-04-25 RX ORDER — TIMOLOL 0.5 %
1 DROPS OPHTHALMIC (EYE)
Refills: 0 | Status: DISCONTINUED | OUTPATIENT
Start: 2024-04-26 | End: 2024-05-06

## 2024-04-25 RX ORDER — LANOLIN ALCOHOL/MO/W.PET/CERES
1 CREAM (GRAM) TOPICAL
Qty: 0 | Refills: 0 | DISCHARGE
Start: 2024-04-25

## 2024-04-25 RX ORDER — LIDOCAINE 4 G/100G
1 CREAM TOPICAL DAILY
Refills: 0 | Status: DISCONTINUED | OUTPATIENT
Start: 2024-04-25 | End: 2024-04-25

## 2024-04-25 RX ORDER — METHOCARBAMOL 500 MG/1
1 TABLET, FILM COATED ORAL
Qty: 0 | Refills: 0 | DISCHARGE
Start: 2024-04-25

## 2024-04-25 RX ORDER — INSULIN LISPRO 100/ML
VIAL (ML) SUBCUTANEOUS
Refills: 0 | Status: DISCONTINUED | OUTPATIENT
Start: 2024-04-25 | End: 2024-04-25

## 2024-04-25 RX ORDER — METHOCARBAMOL 500 MG/1
500 TABLET, FILM COATED ORAL EVERY 6 HOURS
Refills: 0 | Status: DISCONTINUED | OUTPATIENT
Start: 2024-04-25 | End: 2024-04-25

## 2024-04-25 RX ORDER — POLYETHYLENE GLYCOL 3350 17 G/17G
17 POWDER, FOR SOLUTION ORAL
Refills: 0 | Status: DISCONTINUED | OUTPATIENT
Start: 2024-04-25 | End: 2024-04-25

## 2024-04-25 RX ORDER — INSULIN LISPRO 100/ML
VIAL (ML) SUBCUTANEOUS
Refills: 0 | Status: DISCONTINUED | OUTPATIENT
Start: 2024-04-25 | End: 2024-05-06

## 2024-04-25 RX ORDER — ASPIRIN/CALCIUM CARB/MAGNESIUM 324 MG
1 TABLET ORAL
Qty: 0 | Refills: 0 | DISCHARGE
Start: 2024-04-25

## 2024-04-25 RX ORDER — ACETAMINOPHEN 500 MG
1000 TABLET ORAL ONCE
Refills: 0 | Status: COMPLETED | OUTPATIENT
Start: 2024-04-25 | End: 2024-04-25

## 2024-04-25 RX ORDER — SENNA PLUS 8.6 MG/1
2 TABLET ORAL
Qty: 0 | Refills: 0 | DISCHARGE
Start: 2024-04-25

## 2024-04-25 RX ORDER — METHOCARBAMOL 500 MG/1
500 TABLET, FILM COATED ORAL EVERY 6 HOURS
Refills: 0 | Status: DISCONTINUED | OUTPATIENT
Start: 2024-04-25 | End: 2024-05-01

## 2024-04-25 RX ORDER — DEXTROSE 50 % IN WATER 50 %
25 SYRINGE (ML) INTRAVENOUS ONCE
Refills: 0 | Status: DISCONTINUED | OUTPATIENT
Start: 2024-04-25 | End: 2024-05-06

## 2024-04-25 RX ORDER — OMEPRAZOLE 10 MG/1
1 CAPSULE, DELAYED RELEASE ORAL
Refills: 0 | DISCHARGE

## 2024-04-25 RX ORDER — POLYETHYLENE GLYCOL 3350 17 G/17G
17 POWDER, FOR SOLUTION ORAL
Refills: 0 | Status: DISCONTINUED | OUTPATIENT
Start: 2024-04-25 | End: 2024-05-01

## 2024-04-25 RX ORDER — FINASTERIDE 5 MG/1
5 TABLET, FILM COATED ORAL DAILY
Refills: 0 | Status: DISCONTINUED | OUTPATIENT
Start: 2024-04-26 | End: 2024-05-06

## 2024-04-25 RX ORDER — METOPROLOL TARTRATE 50 MG
12.5 TABLET ORAL EVERY 12 HOURS
Refills: 0 | Status: DISCONTINUED | OUTPATIENT
Start: 2024-04-25 | End: 2024-05-06

## 2024-04-25 RX ORDER — LANOLIN ALCOHOL/MO/W.PET/CERES
5 CREAM (GRAM) TOPICAL AT BEDTIME
Refills: 0 | Status: DISCONTINUED | OUTPATIENT
Start: 2024-04-25 | End: 2024-04-25

## 2024-04-25 RX ORDER — ENOXAPARIN SODIUM 100 MG/ML
40 INJECTION SUBCUTANEOUS
Refills: 0 | Status: DISCONTINUED | OUTPATIENT
Start: 2024-04-25 | End: 2024-04-25

## 2024-04-25 RX ORDER — LIDOCAINE 4 G/100G
1 CREAM TOPICAL DAILY
Refills: 0 | Status: DISCONTINUED | OUTPATIENT
Start: 2024-04-26 | End: 2024-05-06

## 2024-04-25 RX ORDER — IPRATROPIUM/ALBUTEROL SULFATE 18-103MCG
3 AEROSOL WITH ADAPTER (GRAM) INHALATION
Qty: 0 | Refills: 0 | DISCHARGE
Start: 2024-04-25

## 2024-04-25 RX ORDER — DEXTROSE 10 % IN WATER 10 %
125 INTRAVENOUS SOLUTION INTRAVENOUS ONCE
Refills: 0 | Status: DISCONTINUED | OUTPATIENT
Start: 2024-04-25 | End: 2024-05-06

## 2024-04-25 RX ORDER — PANTOPRAZOLE SODIUM 20 MG/1
40 TABLET, DELAYED RELEASE ORAL DAILY
Refills: 0 | Status: DISCONTINUED | OUTPATIENT
Start: 2024-04-25 | End: 2024-04-25

## 2024-04-25 RX ORDER — ATORVASTATIN CALCIUM 80 MG/1
1 TABLET, FILM COATED ORAL
Qty: 0 | Refills: 0 | DISCHARGE
Start: 2024-04-25

## 2024-04-25 RX ORDER — DEXTROSE 50 % IN WATER 50 %
15 SYRINGE (ML) INTRAVENOUS ONCE
Refills: 0 | Status: DISCONTINUED | OUTPATIENT
Start: 2024-04-25 | End: 2024-05-06

## 2024-04-25 RX ORDER — POLYETHYLENE GLYCOL 3350 17 G/17G
17 POWDER, FOR SOLUTION ORAL
Qty: 0 | Refills: 0 | DISCHARGE
Start: 2024-04-25

## 2024-04-25 RX ORDER — DEXTROSE 50 % IN WATER 50 %
12.5 SYRINGE (ML) INTRAVENOUS ONCE
Refills: 0 | Status: DISCONTINUED | OUTPATIENT
Start: 2024-04-25 | End: 2024-05-06

## 2024-04-25 RX ADMIN — Medication 2: at 13:05

## 2024-04-25 RX ADMIN — Medication 4: at 16:37

## 2024-04-25 RX ADMIN — PANTOPRAZOLE SODIUM 40 MILLIGRAM(S): 20 TABLET, DELAYED RELEASE ORAL at 11:36

## 2024-04-25 RX ADMIN — Medication 3 MILLILITER(S): at 17:23

## 2024-04-25 RX ADMIN — FINASTERIDE 5 MILLIGRAM(S): 5 TABLET, FILM COATED ORAL at 11:35

## 2024-04-25 RX ADMIN — Medication 1 DROP(S): at 05:06

## 2024-04-25 RX ADMIN — ATORVASTATIN CALCIUM 80 MILLIGRAM(S): 80 TABLET, FILM COATED ORAL at 23:43

## 2024-04-25 RX ADMIN — Medication 1000 MILLIGRAM(S): at 05:13

## 2024-04-25 RX ADMIN — Medication 6: at 07:54

## 2024-04-25 RX ADMIN — Medication 6 MILLIGRAM(S): at 23:42

## 2024-04-25 RX ADMIN — PIPERACILLIN AND TAZOBACTAM 25 GRAM(S): 4; .5 INJECTION, POWDER, LYOPHILIZED, FOR SOLUTION INTRAVENOUS at 05:07

## 2024-04-25 RX ADMIN — LIDOCAINE 1 PATCH: 4 CREAM TOPICAL at 11:36

## 2024-04-25 RX ADMIN — Medication 400 MILLIGRAM(S): at 04:43

## 2024-04-25 RX ADMIN — Medication 12.5 MILLIGRAM(S): at 17:22

## 2024-04-25 RX ADMIN — Medication 1 DROP(S): at 17:22

## 2024-04-25 RX ADMIN — Medication 3 MILLILITER(S): at 05:06

## 2024-04-25 RX ADMIN — ENOXAPARIN SODIUM 40 MILLIGRAM(S): 100 INJECTION SUBCUTANEOUS at 17:21

## 2024-04-25 RX ADMIN — CHLORHEXIDINE GLUCONATE 1 APPLICATION(S): 213 SOLUTION TOPICAL at 11:24

## 2024-04-25 RX ADMIN — Medication 3 MILLILITER(S): at 11:35

## 2024-04-25 RX ADMIN — Medication 81 MILLIGRAM(S): at 11:35

## 2024-04-25 RX ADMIN — LIDOCAINE 1 PATCH: 4 CREAM TOPICAL at 00:49

## 2024-04-25 RX ADMIN — SENNA PLUS 2 TABLET(S): 8.6 TABLET ORAL at 23:44

## 2024-04-25 RX ADMIN — Medication 4 MILLILITER(S): at 05:06

## 2024-04-25 NOTE — CONSULT NOTE ADULT - SUBJECTIVE AND OBJECTIVE BOX
PULMONARY CONSULT    HPI: 82 y/o M with PMH of DM, HTN, CAD s/p stents. Presented to [facility] for L sided weakness, found to have R MCA occlusion on imaging, given Tenecteplase and transferred to [facility] for thrombectomy and further management. S/p mechanical thrombectomy on 4/16. Course c/b hemorrhagic transformation seen on MRI. Pulmonary called to consult for PNA, Sputum culture + for Klebsiella Pneumoniae.       PAST MEDICAL & SURGICAL HISTORY:  HLD (hyperlipidemia)  T2DM (type 2 diabetes mellitus)  BPH (benign prostatic hyperplasia)  Diabetes mellitus  Hypertension  High cholesterol  CAD (coronary artery disease)  H/O eye surgery  History of cardiac cath  H/O heart artery stent    No Known Allergies    FAMILY HISTORY:  FH: myocardial infarction (Sibling)    Social history:     Review of Systems:  CONSTITUTIONAL: No fever, chills, or fatigue  EYES: No eye pain, visual disturbances, or discharge  ENMT:  No difficulty hearing, tinnitus, vertigo; No sinus or throat pain  NECK: No pain or stiffness  RESPIRATORY: Per above  CARDIOVASCULAR: No chest pain, palpitations, dizziness, or leg swelling  GASTROINTESTINAL: No abdominal or epigastric pain. No nausea, vomiting, or hematemesis; No diarrhea or constipation. No melena or hematochezia.  GENITOURINARY: No dysuria, frequency, hematuria, or incontinence  NEUROLOGICAL: No headaches, memory loss, loss of strength, numbness, or tremors  SKIN: No itching, burning, rashes, or lesions   MUSCULOSKELETAL: No joint pain or swelling; No muscle, back, or extremity pain  PSYCHIATRIC: No depression, anxiety, mood swings, or difficulty sleeping    Medications:  MEDICATIONS  (STANDING):  acetylcysteine 20%  Inhalation 4 milliLiter(s) Inhalation every 6 hours  albuterol/ipratropium for Nebulization 3 milliLiter(s) Nebulizer every 6 hours  aspirin  chewable 81 milliGRAM(s) Oral daily  atorvastatin 80 milliGRAM(s) Oral at bedtime  chlorhexidine 4% Liquid 1 Application(s) Topical daily  enoxaparin Injectable 40 milliGRAM(s) SubCutaneous <User Schedule>  finasteride 5 milliGRAM(s) Oral daily  influenza  Vaccine (HIGH DOSE) 0.7 milliLiter(s) IntraMuscular once  insulin lispro (ADMELOG) corrective regimen sliding scale   SubCutaneous three times a day before meals  insulin lispro (ADMELOG) corrective regimen sliding scale   SubCutaneous at bedtime  lidocaine   4% Patch 1 Patch Transdermal daily  melatonin 5 milliGRAM(s) Oral at bedtime  metoprolol tartrate 12.5 milliGRAM(s) Oral every 12 hours  pantoprazole  Injectable 40 milliGRAM(s) IV Push daily  piperacillin/tazobactam IVPB.. 3.375 Gram(s) IV Intermittent every 8 hours  polyethylene glycol 3350 17 Gram(s) Oral two times a day  senna 2 Tablet(s) Oral at bedtime  timolol 0.5% Solution 1 Drop(s) Left EYE two times a day    MEDICATIONS  (PRN):  acetaminophen   Oral Liquid .. 650 milliGRAM(s) Oral every 6 hours PRN Temp greater or equal to 38.5C (101.3F), Mild Pain (1 - 3)  aluminum hydroxide/magnesium hydroxide/simethicone Suspension 30 milliLiter(s) Oral every 4 hours PRN Dyspepsia  methocarbamol 500 milliGRAM(s) Oral every 6 hours PRN musle spasm    Vital Signs Last 24 Hrs  T(C): 36.8 (25 Apr 2024 08:00), Max: 36.8 (24 Apr 2024 20:00)  T(F): 98.2 (25 Apr 2024 08:00), Max: 98.3 (24 Apr 2024 20:00)  HR: 65 (25 Apr 2024 08:00) (53 - 82)  BP: 111/56 (25 Apr 2024 08:00) (111/56 - 150/69)  BP(mean): 81 (25 Apr 2024 08:00) (81 - 98)  RR: 14 (25 Apr 2024 08:00) (14 - 32)  SpO2: 99% (25 Apr 2024 08:00) (92% - 100%)    Parameters below as of 25 Apr 2024 08:00  Patient On (Oxygen Delivery Method): room air    04-24 @ 07:01  -  04-25 @ 07:00  --------------------------------------------------------  IN: 0 mL / OUT: 850 mL / NET: -850 mL      LABS:                        10.8   8.05  )-----------( 248      ( 25 Apr 2024 06:02 )             32.8     04-25    139  |  105  |  14  ----------------------------<  173<H>  3.7   |  23  |  0.76    Ca    9.0      25 Apr 2024 06:03    TPro  6.2  /  Alb  3.2<L>  /  TBili  2.4<H>  /  DBili  0.4<H>  /  AST  38  /  ALT  39  /  AlkPhos  62  04-24    CAPILLARY BLOOD GLUCOSE  POCT Blood Glucose.: 281 mg/dL (25 Apr 2024 07:45)    Urinalysis Basic - ( 25 Apr 2024 06:03 )    Color: x / Appearance: x / SG: x / pH: x  Gluc: 173 mg/dL / Ketone: x  / Bili: x / Urobili: x   Blood: x / Protein: x / Nitrite: x   Leuk Esterase: x / RBC: x / WBC x   Sq Epi: x / Non Sq Epi: x / Bacteria: x    CULTURES:    (collected 04-18-24 @ 15:25)  Source: .Blood Blood  Final Report (04-23-24 @ 20:01):    No growth at 5 days     (collected 04-18-24 @ 15:00)  Source: .Blood Blood  Final Report (04-23-24 @ 20:01):    No growth at 5 days    Culture - Sputum (collected 04-18-24 @ 18:15)  Source: .Sputum Sputum  Gram Stain (04-19-24 @ 07:49):    Few polymorphonuclear leukocytes per low power field    Few Squamous epithelial cells per low power field    Numerous Gram positive cocci in pairs, chains and clusters seen per oil    power field    Few Gram Negative Rods seen per oil power field  Final Report (04-21-24 @ 16:09):    Moderate Klebsiella pneumoniae    Normal Respiratory Flor present  Organism: Klebsiella pneumoniae (04-21-24 @ 16:09)  Organism: Klebsiella pneumoniae (04-21-24 @ 16:09)      -  Levofloxacin: S <=0.5      -  Tobramycin: S <=2      -  Aztreonam: S <=4      -  Gentamicin: S <=2      -  Cefazolin: S <=2      -  Cefepime: S <=2      -  Piperacillin/Tazobactam: S <=8      -  Ciprofloxacin: I 0.5      -  Imipenem: S <=1      -  Ceftriaxone: S <=1      -  Ampicillin: R >16 These ampicillin results predict results for amoxicillin      Method Type: COY      -  Meropenem: S <=1      -  Ampicillin/Sulbactam: S 8/4      -  Cefoxitin: S <=8      -  Amoxicillin/Clavulanic Acid: S <=8/4      -  Trimethoprim/Sulfamethoxazole: S 1/19      -  Ertapenem: S <=0.5    Physical Examination:    General: No acute distress.      HEENT: Pupils equal, reactive to light.  Symmetric.    PULM: Clear to auscultation bilaterally, no significant sputum production    CVS: S1, S2    ABD: Soft, nondistended, nontender, normoactive bowel sounds, no masses    EXT: No edema, nontender    SKIN: Warm and well perfused, no rashes noted.    NEURO: Alert, oriented, interactive, nonfocal    RADIOLOGY REVIEWED  CXR: congestion     CT chest: < from: CT Chest w/ IV Cont (04.17.24 @ 02:04) >    FINDINGS:  CHEST:  LUNGS AND LARGE AIRWAYS: Patent central airways. Right middle lobe   calcified granuloma. Mild bilateral lower lobe subsegmental atelectasis.  PLEURA: No pleural effusion.  VESSELS: Within normal limits.  HEART: Heart size is normal. No pericardial effusion. Coronary artery   calcification and/or stent.  MEDIASTINUM AND VIVIAN: No lymphadenopathy. Upper esophageal wall   thickening.  CHEST WALL AND LOWER NECK: Infiltration of the subcutaneous fat overlying   the upper back, may represent sequelae of recent traumatic injury.    ABDOMEN AND PELVIS:  LIVER: Within normal limits.  BILE DUCTS: Normal caliber.  GALLBLADDER: Vicarious excretion of contrast material into the gallbladder  SPLEEN: Within normal limits.  PANCREAS: 0.7 cm cystic lesion within the pancreatic head.  ADRENALS: Within normal limits.  KIDNEYS/URETERS: Within normal limits.    BLADDER: Alcazar catheter. Wall thickening despite underdistention.  REPRODUCTIVE ORGANS: Prostate is mildly enlarged.    BOWEL: No bowel obstruction. Colonic diverticulosis without evidence of   diverticulitis. Appendix is normal.  PERITONEUM: No ascites.  VESSELS: Atherosclerotic changes.  RETROPERITONEUM/LYMPH NODES: No lymphadenopathy.  ABDOMINAL WALL: Infiltration of the subcutaneous fat overlying the left   lateral hip. 3.9 x 2.2 x 4.8 cm hematoma within the deep subcutaneous fat   with a internal hyperdense foci, concerning for focus of active bleeding.  BONES: Within normal limits.    IMPRESSION:  No CT evidence of acute traumatic sequelae within the chest or abdomen.    Left lateral hip subcutaneous hematoma with evidence of active bleeding.    Nonspecific thickened appearance to the upper esophagus. Follow-up upper   endoscopy is recommended.    Subcentimeter cystic lesion within the pancreatic head. Nonemergent MRI   may be obtained for further characterization, if no contraindications   exist.    Urinary bladder wall thickening despite underdistention.    < end of copied text >     PULMONARY CONSULT    HPI: 82 y/o M with PMH of DM, HTN, CAD s/p stents. Presented to [facility] for L sided weakness, found to have R MCA occlusion on imaging, given Tenecteplase and transferred to [facility] for thrombectomy and further management. S/p mechanical thrombectomy on 4/16. Course c/b hemorrhagic transformation seen on MRI. Pulmonary called to consult for PNA, Sputum culture + for Klebsiella Pneumoniae. Never smoker. Denies hx of lung disease, inhaler use. Denies CP, SOB. Cough at times. O2 sats 95% RA.     PAST MEDICAL & SURGICAL HISTORY:  HLD (hyperlipidemia)  T2DM (type 2 diabetes mellitus)  BPH (benign prostatic hyperplasia)  Diabetes mellitus  Hypertension  High cholesterol  CAD (coronary artery disease)  H/O eye surgery  History of cardiac cath  H/O heart artery stent    No Known Allergies    FAMILY HISTORY:  FH: myocardial infarction (Sibling)    Social history: never smoker     Review of Systems:  CONSTITUTIONAL: No fever, chills, or fatigue  EYES: No eye pain, visual disturbances, or discharge  ENMT:  No difficulty hearing, tinnitus, vertigo; No sinus or throat pain  NECK: No pain or stiffness  RESPIRATORY: Per above  CARDIOVASCULAR: No chest pain, palpitations, dizziness, or leg swelling  GASTROINTESTINAL: No abdominal or epigastric pain. No nausea, vomiting, or hematemesis; No diarrhea or constipation. No melena or hematochezia.  GENITOURINARY: No dysuria, frequency, hematuria, or incontinence  NEUROLOGICAL: per above  SKIN: No itching, burning, rashes, or lesions   MUSCULOSKELETAL: No joint pain or swelling; No muscle, back, or extremity pain  PSYCHIATRIC: No depression, anxiety, mood swings, or difficulty sleeping    Medications:  MEDICATIONS  (STANDING):  acetylcysteine 20%  Inhalation 4 milliLiter(s) Inhalation every 6 hours  albuterol/ipratropium for Nebulization 3 milliLiter(s) Nebulizer every 6 hours  aspirin  chewable 81 milliGRAM(s) Oral daily  atorvastatin 80 milliGRAM(s) Oral at bedtime  chlorhexidine 4% Liquid 1 Application(s) Topical daily  enoxaparin Injectable 40 milliGRAM(s) SubCutaneous <User Schedule>  finasteride 5 milliGRAM(s) Oral daily  influenza  Vaccine (HIGH DOSE) 0.7 milliLiter(s) IntraMuscular once  insulin lispro (ADMELOG) corrective regimen sliding scale   SubCutaneous three times a day before meals  insulin lispro (ADMELOG) corrective regimen sliding scale   SubCutaneous at bedtime  lidocaine   4% Patch 1 Patch Transdermal daily  melatonin 5 milliGRAM(s) Oral at bedtime  metoprolol tartrate 12.5 milliGRAM(s) Oral every 12 hours  pantoprazole  Injectable 40 milliGRAM(s) IV Push daily  piperacillin/tazobactam IVPB.. 3.375 Gram(s) IV Intermittent every 8 hours  polyethylene glycol 3350 17 Gram(s) Oral two times a day  senna 2 Tablet(s) Oral at bedtime  timolol 0.5% Solution 1 Drop(s) Left EYE two times a day    MEDICATIONS  (PRN):  acetaminophen   Oral Liquid .. 650 milliGRAM(s) Oral every 6 hours PRN Temp greater or equal to 38.5C (101.3F), Mild Pain (1 - 3)  aluminum hydroxide/magnesium hydroxide/simethicone Suspension 30 milliLiter(s) Oral every 4 hours PRN Dyspepsia  methocarbamol 500 milliGRAM(s) Oral every 6 hours PRN musle spasm    Vital Signs Last 24 Hrs  T(C): 36.8 (25 Apr 2024 08:00), Max: 36.8 (24 Apr 2024 20:00)  T(F): 98.2 (25 Apr 2024 08:00), Max: 98.3 (24 Apr 2024 20:00)  HR: 65 (25 Apr 2024 08:00) (53 - 82)  BP: 111/56 (25 Apr 2024 08:00) (111/56 - 150/69)  BP(mean): 81 (25 Apr 2024 08:00) (81 - 98)  RR: 14 (25 Apr 2024 08:00) (14 - 32)  SpO2: 99% (25 Apr 2024 08:00) (92% - 100%)    Parameters below as of 25 Apr 2024 08:00  Patient On (Oxygen Delivery Method): room air    04-24 @ 07:01  -  04-25 @ 07:00  --------------------------------------------------------  IN: 0 mL / OUT: 850 mL / NET: -850 mL      LABS:                        10.8   8.05  )-----------( 248      ( 25 Apr 2024 06:02 )             32.8     04-25    139  |  105  |  14  ----------------------------<  173<H>  3.7   |  23  |  0.76    Ca    9.0      25 Apr 2024 06:03    TPro  6.2  /  Alb  3.2<L>  /  TBili  2.4<H>  /  DBili  0.4<H>  /  AST  38  /  ALT  39  /  AlkPhos  62  04-24    CAPILLARY BLOOD GLUCOSE  POCT Blood Glucose.: 281 mg/dL (25 Apr 2024 07:45)    Urinalysis Basic - ( 25 Apr 2024 06:03 )    Color: x / Appearance: x / SG: x / pH: x  Gluc: 173 mg/dL / Ketone: x  / Bili: x / Urobili: x   Blood: x / Protein: x / Nitrite: x   Leuk Esterase: x / RBC: x / WBC x   Sq Epi: x / Non Sq Epi: x / Bacteria: x    CULTURES:    (collected 04-18-24 @ 15:25)  Source: .Blood Blood  Final Report (04-23-24 @ 20:01):    No growth at 5 days     (collected 04-18-24 @ 15:00)  Source: .Blood Blood  Final Report (04-23-24 @ 20:01):    No growth at 5 days    Culture - Sputum (collected 04-18-24 @ 18:15)  Source: .Sputum Sputum  Gram Stain (04-19-24 @ 07:49):    Few polymorphonuclear leukocytes per low power field    Few Squamous epithelial cells per low power field    Numerous Gram positive cocci in pairs, chains and clusters seen per oil    power field    Few Gram Negative Rods seen per oil power field  Final Report (04-21-24 @ 16:09):    Moderate Klebsiella pneumoniae    Normal Respiratory Flor present  Organism: Klebsiella pneumoniae (04-21-24 @ 16:09)  Organism: Klebsiella pneumoniae (04-21-24 @ 16:09)      -  Levofloxacin: S <=0.5      -  Tobramycin: S <=2      -  Aztreonam: S <=4      -  Gentamicin: S <=2      -  Cefazolin: S <=2      -  Cefepime: S <=2      -  Piperacillin/Tazobactam: S <=8      -  Ciprofloxacin: I 0.5      -  Imipenem: S <=1      -  Ceftriaxone: S <=1      -  Ampicillin: R >16 These ampicillin results predict results for amoxicillin      Method Type: COY      -  Meropenem: S <=1      -  Ampicillin/Sulbactam: S 8/4      -  Cefoxitin: S <=8      -  Amoxicillin/Clavulanic Acid: S <=8/4      -  Trimethoprim/Sulfamethoxazole: S 1/19      -  Ertapenem: S <=0.5    Physical Examination:    General: No acute distress.      HEENT: Pupils equal, reactive to light.  Symmetric.    PULM: grossly clear     CVS: S1, S2    ABD: Soft, nondistended, nontender, normoactive bowel sounds, no masses    EXT: No edema, nontender    SKIN: Warm and well perfused, no rashes noted.    NEURO: Alert, oriented, interactive, nonfocal    RADIOLOGY REVIEWED  CXR: mild congestion, small effusions/atelectasis R>L     CT chest: < from: CT Chest w/ IV Cont (04.17.24 @ 02:04) >    FINDINGS:  CHEST:  LUNGS AND LARGE AIRWAYS: Patent central airways. Right middle lobe   calcified granuloma. Mild bilateral lower lobe subsegmental atelectasis.  PLEURA: No pleural effusion.  VESSELS: Within normal limits.  HEART: Heart size is normal. No pericardial effusion. Coronary artery   calcification and/or stent.  MEDIASTINUM AND VIVIAN: No lymphadenopathy. Upper esophageal wall   thickening.  CHEST WALL AND LOWER NECK: Infiltration of the subcutaneous fat overlying   the upper back, may represent sequelae of recent traumatic injury.    ABDOMEN AND PELVIS:  LIVER: Within normal limits.  BILE DUCTS: Normal caliber.  GALLBLADDER: Vicarious excretion of contrast material into the gallbladder  SPLEEN: Within normal limits.  PANCREAS: 0.7 cm cystic lesion within the pancreatic head.  ADRENALS: Within normal limits.  KIDNEYS/URETERS: Within normal limits.    BLADDER: Alcazar catheter. Wall thickening despite underdistention.  REPRODUCTIVE ORGANS: Prostate is mildly enlarged.    BOWEL: No bowel obstruction. Colonic diverticulosis without evidence of   diverticulitis. Appendix is normal.  PERITONEUM: No ascites.  VESSELS: Atherosclerotic changes.  RETROPERITONEUM/LYMPH NODES: No lymphadenopathy.  ABDOMINAL WALL: Infiltration of the subcutaneous fat overlying the left   lateral hip. 3.9 x 2.2 x 4.8 cm hematoma within the deep subcutaneous fat   with a internal hyperdense foci, concerning for focus of active bleeding.  BONES: Within normal limits.    IMPRESSION:  No CT evidence of acute traumatic sequelae within the chest or abdomen.    Left lateral hip subcutaneous hematoma with evidence of active bleeding.    Nonspecific thickened appearance to the upper esophagus. Follow-up upper   endoscopy is recommended.    Subcentimeter cystic lesion within the pancreatic head. Nonemergent MRI   may be obtained for further characterization, if no contraindications   exist.    Urinary bladder wall thickening despite underdistention.    < end of copied text >

## 2024-04-25 NOTE — CONSULT NOTE ADULT - ASSESSMENT
79 year old male with a PMH of DM, HTN, CAD, with stenting presenting for sudden onset left sided weakness. Family heard a thud and brought patient to ED. Patient received tenecteplase at [facility] VS, CTH found R M2 occlusion. Pt was transferred for thrombectomy.  Patient is s/p tenecteplase and endovascular therapy, TICI 2B, with extensive cortical petechial hemorrhage on MRI.  Tele review reveals NSR 60-70's. Patient seen sitting in chair with daughter and son at bedside.  Patient has C-collar in place and chest brace in place.  EP consulted for assessment for arrythmia, further arrythmia monitoring with possible ILR.     1.  Right MCA occlusion, s/p thrombolytics and thrombectomy  2.  History of CAD, stent    - Discussed ILR monitor with patient and son and daughter at bedside.  Patient's wife was on cell phone and collective decision was that they do not want an ILR.  Wife states that "My  has been through enough".    They are willing to wear wearable monitor at time of discharge  - Please arrange for 30 day wearable monitor at time of discharge  - Patient will follow up with his Cardiologist Dr. Adriana Marino [facility]  Teams 
80 y/o M with PMH of DM, HTN, CAD s/p stents. Presented to [facility] for L sided weakness, found to have R MCA occlusion on imaging, given Tenecteplase and transferred to [facility] for thrombectomy and further management. S/p mechanical thrombectomy on 4/16. Course c/b hemorrhagic transformation seen on MRI. Pulmonary called to consult for PNA, Sputum culture + for Klebsiella Pneumoniae. 
Mr. Jung is an 79 year old man with PMH of HTN, DMII, CAD s/p stents (on ASA) who presents after mechanical fall aroudn 130p, likely 2/2 acute R MCA stroke. He was in his usual state of health at home chatting with friends and moving throughout the house unencumbered when his wife heard a thud and found him at the bottom of a 10-stair staircase (noncarpeted, tile floor). She attended to him immediately and he was awake but confused, there was blood on the floor but no obvious injuries to the patient. Last dose ASA this AM.  Patient was evaluated at [facility] for stroke and was found to have right MCA occlusion for which he was given tenecteplase and was transferred to [facility] for thrombectomy. He is now s/p cerebral angio with mechanical thrombectomy.    Plan:  - plan for trauma scans: CT c-spine, CT CAP with IV contrast & recon thoracic and lumbar spine  - xray L femur, knee, tib/fib  - patient unable to be assessed for tenderness to palpation over UE wounds, would also add b/l elbow, forearm, wrist xray  - continue c-collar as patient unable to express pain at this moment  - will follow up results  - tertiary in AM    Tanner Wilkes, PGY3  Acute Care Surgery   [id number]    
Patient presented within tenecteplase window and thrombolysis was administered.   Right inferior M2 occlusion noted on CTA h/n.  D/W NeuroIR - patient to be transferred to [facility] for EVT.    Chandrakant Cortez MD

## 2024-04-25 NOTE — H&P ADULT - NSICDXPASTMEDICALHX_GEN_ALL_CORE_FT
PAST MEDICAL HISTORY:  BPH (benign prostatic hyperplasia)     CAD (coronary artery disease)     Diabetes mellitus     High cholesterol     HLD (hyperlipidemia)     Hypertension     T2DM (type 2 diabetes mellitus)

## 2024-04-25 NOTE — CONSULT NOTE ADULT - CONSULT REQUESTED DATE/TIME
24-Apr-2024 10:25
17-Apr-2024 00:15
18-Apr-2024 11:53
24-Apr-2024
16-Apr-2024 17:00
25-Apr-2024 10:08

## 2024-04-25 NOTE — H&P ADULT - NS ATTEND AMEND GEN_ALL_CORE FT
Rehab Attending- Patient seen and examined by me on 4/26- Case discussed, above note reviewed by me with modifications made    patient seen and examined bedside  Poor sleep- on Melatonin  Pain in Neck- taking robaxin, tylenol , lidocaine  last BM reported 4/22- taking miralax/senna- will give Dulcolax suppos tonight  in cerv collar and TLSO    IMP Rehab CVA left Alexander- good candidate for intensive rehab - will tolerate three hours Rehab daily  Multitrauma- cerv/ thoracic Fx- C Collar/ TLSO- analgesics- Rehab    MEDICATIONS  (STANDING):  aspirin  chewable 81 milliGRAM(s) Oral daily  atorvastatin 80 milliGRAM(s) Oral at bedtime  bisacodyl Suppository 10 milliGRAM(s) Rectal once  dextrose 10% Bolus 125 milliLiter(s) IV Bolus once  dextrose 5%. 1000 milliLiter(s) (100 mL/Hr) IV Continuous <Continuous>  dextrose 5%. 1000 milliLiter(s) (50 mL/Hr) IV Continuous <Continuous>  dextrose 50% Injectable 25 Gram(s) IV Push once  dextrose 50% Injectable 12.5 Gram(s) IV Push once  enoxaparin Injectable 40 milliGRAM(s) SubCutaneous <User Schedule>  finasteride 5 milliGRAM(s) Oral daily  glucagon  Injectable 1 milliGRAM(s) IntraMuscular once  insulin glargine Injectable (LANTUS) 10 Unit(s) SubCutaneous at bedtime  insulin lispro (ADMELOG) corrective regimen sliding scale   SubCutaneous three times a day before meals  insulin lispro (ADMELOG) corrective regimen sliding scale   SubCutaneous at bedtime  lidocaine   4% Patch 1 Patch Transdermal daily  melatonin 6 milliGRAM(s) Oral at bedtime  metoprolol tartrate 12.5 milliGRAM(s) Oral every 12 hours  pantoprazole    Tablet 40 milliGRAM(s) Oral before breakfast  polyethylene glycol 3350 17 Gram(s) Oral two times a day  senna 2 Tablet(s) Oral at bedtime  timolol 0.5% Solution 1 Drop(s) Left EYE two times a day    MEDICATIONS  (PRN):  acetaminophen     Tablet .. 650 milliGRAM(s) Oral every 6 hours PRN Mild Pain (1 - 3)  aluminum hydroxide/magnesium hydroxide/simethicone Suspension 30 milliLiter(s) Oral every 4 hours PRN Dyspepsia  dextrose Oral Gel 15 Gram(s) Oral once PRN Blood Glucose LESS THAN 70 milliGRAM(s)/deciliter  methocarbamol 500 milliGRAM(s) Oral every 6 hours PRN musle spasm                   10.3   9.66  )-----------( 266      ( 26 Apr 2024 05:00 )             30.8     04-26    141  |  107  |  15  ----------------------------<  198<H>  3.7   |  24  |  0.90    Ca    8.1<L>      26 Apr 2024 05:00    TPro  6.2  /  Alb  2.5<L>  /  TBili  2.2<H>  /  DBili  x   /  AST  81<H>  /  ALT  70<H>  /  AlkPhos  70  04-26    CAPILLARY BLOOD GLUCOSE  POCT Blood Glucose.: 237 mg/dL (26 Apr 2024 12:05)  POCT Blood Glucose.: 211 mg/dL (26 Apr 2024 07:56)  POCT Blood Glucose.: 207 mg/dL (25 Apr 2024 23:28)  POCT Blood Glucose.: 225 mg/dL (25 Apr 2024 16:35)      Vital Signs Last 24 Hrs  T(C): 36.8 (26 Apr 2024 07:28), Max: 37 (25 Apr 2024 21:18)  T(F): 98.2 (26 Apr 2024 07:28), Max: 98.6 (25 Apr 2024 21:18)  HR: 68 (26 Apr 2024 07:28) (66 - 76)  BP: 124/75 (26 Apr 2024 07:28) (124/75 - 155/66)  RR: 16 (26 Apr 2024 07:28) (16 - 17)  SpO2: 98% (26 Apr 2024 07:28) (97% - 99%)Rm air    Constitutional - NAD, Comfortable  HEENT -  EOMI, Left pupil is 7mm irregular and fixed (chronic from prior cataract surgery  Neck -+ c-collar  Chest - good chest expansion, good respiratory effort, CTAB   Cardio - warm and well perfused, RRR, no murmur  Abdomen -  Soft, NTND  Extremities - No peripheral edema, No calf tenderness; Bruising of left shoulder, hip and knee   Neurologic Exam:                    Cognitive -             Orientation: Awake, Alert, AAO to self, place, month year,             Attention:  recall 1/3 objects without cueing     Speech - +dysarthria     Cranial Nerves - +left facial droop, non-reactive left pupil (chronic)+ Left field cut-/Left neglect     Motor -                      LEFT    UE - ShAB 2-/5, EF 3/5, EE 3/5, WE 0/5,  0/5                    RIGHT UE - ShAB 5/5, EF 5/5, EE 5/5, WE 5/5,  WNL                    LEFT    LE - HF 4/5, KE 4/5, DF 5/5, PF 5/5                    RIGHT LE - HF 5/5, KE 5/5, DF 5/5, PF 5/5        Sensory - decreased to LT LLE and absent on LUE     Reflexes - DTR 2 / 4 , neg [facility]'s b/l,      Coordination - FTN intact on the right/ HTS intact     OculoVestibular -  No nystagmus  Psychiatric - Mood stable, Affect WNL  Skin on admission: skin tear upper back, bruising over the left shoulder, hip and knee.  skin tears over the RUE.

## 2024-04-25 NOTE — PROGRESS NOTE ADULT - PROVIDER SPECIALTY LIST ADULT
NSICU
Neurology
Neurosurgery
Neurosurgery
Trauma Surgery
NSICU
NSICU
Neurology
Neurosurgery
Neurosurgery
NSICU
Neurosurgery
Neurosurgery
NSICU

## 2024-04-25 NOTE — PROGRESS NOTE ADULT - ATTENDING COMMENTS
ATTENDING ATTESTATION  I have seen and examined this patient on rounds thismorning with the surgery team. I have reviewed all new labs, imaging and reports. I have participated in formulating the plan for the day, and have read and agree with the history, ROS, exam, assessment and plan as stated above.     Trauma imaging reviewed.   T6 vertebral body fracture --> ortho spine consult  left hip hematoma (no underlying fx) with ??? active extrav --> hematoma is small. Wrap with ace or abd binder for compression if able and clinically monitor the hematoma.   Incidental finding of pancreas lesion --> recommend telling family and having patient get outpatient follow up with PMD.   Please obtain XRays of bilateral elbows, forearms and wrists when able (ordered).   Trauma will follow up for a tertiary exam tomorrow.     I have personally discussed this plan with [facility] fellow Dr. Orozco.     Total time spent in the care of this patient today (excluding critical care, teaching & procedures): 25 min                 Over 50% of the total time was spent in discussion and coordination of care with consulting services, dietary and rehab services.     Verónica Gallagher M.D., M.S.  Division of Acute Care Surgery
I saw and examined the patient on the date of service on stroke rounds.   Patient endorsing L arm weakness.      On exam:   Awake, alert, oriented to month, year, hospital.  No neglect.   Pupils 3-2mm, symmetric, full VF's, normal EOM, L sided facial weakness.   MOTOR: L arm plegic, L leg moving in the plane of gravity.   SENSORY: intact to light touch    MRI brain imges reviewed: diffusion restriction in the R insular, parietal, and frontal cortices.   CTA H&N images reviewed: no significant cervical carotid or vertebral stenosis.  R MCA M2 occlusion.      AP: 81 year old man with CAD s/p stenting, DM, HTN, presenting after a fall, found to have r MCA occlusion, s/p thrombolytics, and thrombectomy.  Now with persistent weakness in L face, arm, and leg, without neglect.  Imaging showing the R MCA territory stroke.   At this point, suspecting ESUS.  Continue on ASA and statin.  Continue cardiac tele.  Planning for loop recorder.  transfer to stroke unit on 4C when bed available.
I agree with fellow's history, exam, assessment and plan. Personally reviewed all available images. 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 s/p tenecteplase and MT with TICI 2B reperfusion. Nontraumatic intracerebral hemorrhage. EF 65% G1 LV diastolic dysfunction. LDL 50 A1C 8.2. Exam improved - some respiratory distress with wheezing earlier, improved with lasix. Also with asp pna, cont abx. Will need repeat speech eval, and consider PEG if needed - PEG explained to daughter and son, open if needed, but will discuss in case it's necessary moving forward. Will need loop. Case discussed at length with family at bedside, all questions answered. OK for stroke unit.
I reviewed available diagnostic studies, and reviewed images personally. I agree with resident's history, exam, orders placed, and plan of care. Thirty five minutes of critical care time was spent in caring for this patient who has concern of sudden and clinically significant deterioration requiring a high level of physician preparedness, management of brain supporting interventions, and frequent physician assessments. 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 s/p tenecteplase and MT with TICI 2B reperfusion. Nontraumatic intracerebral hemorrhage. Worsening exam yesterday- s/p 23%, continuing on 3% 100cc/hr. hypotension on pressors. Had to receive transfusion and likely transfusion reaction - management per NSCU. Cont NSCU monitoring and care. Service provided on 4/18/2024.
I saw and examined the patient on AM stroke rounds on the date of service with LUZMARIA Rivera.  .   Patient without any new complaints.  Does report that he has not had a bowel movement in several days.    Continues to have L arm weakness.      On exam:   AWake, alert, latency and paucity of speech, normal naming.  Follows bulbar and appendicular commands.    Pupils 3-2mm, symmetric, blinking to threat bilaterally, normal EOM, L sided facial weakness.   MOTOR: L arm plegic, L leg moving in the plane of gravity, 2/5.  R arm and leg sustained against gravity, 4/5.    SENSORY: intact to light touch    MRI brain images reviewed: diffusion restriction in the R insular, parietal, and frontal cortices.   CTA H&N images reviewed: no significant cervical carotid or vertebral stenosis.  R MCA M2 occlusion.      AP: 81 year old man with CAD s/p stenting, DM, HTN, presenting after a fall, found to have R MCA occlusion, s/p thrombolytics, and thrombectomy.  Now with persistent weakness in L face, arm, and leg, without neglect.  Imaging showing the R MCA territory stroke.   At this point, suspecting ESUS.  Continue on ASA and statin.  Loop recorder recommended, but declining in favor of MCOT at this point.  Patient and family endorsing fatigue in setting of prolonged medical illness and desire to move on to rehab.    Outpatient follow up with neurology.  Planning for DC to [place] today.
admitted to NSCU s/p M2 thrombectomy.  Hgb drop noted, got tnk yesterday in setting of trauma.  follow-up CBC pending but will likely transfuse 1U pRBCs and reassess.
got another 1U pRBC last night, still on low dose damari, modest response to last unit.  trauma following.    got 1U plt today then start of 3rd unit pRBCs, became acutely hypotensive, hypoxic.  bedside ultrasound without evidence of circulatory overload, CXR clear.  damari uptitrated, oxygen initially placed on NRB, then weaned to NC.  unclear etiology, ?transfusion reaction.  pRBC sent back to blood bank.    more lethargic later in the morning, stat repeat CT brain largely unchanged.  got 23% x 1, increase hypertonic 2/2 cerebral edema.  also spiked low grade fever, unclear source but has some rhodochrous breath sounds, cultures sent, start zosyn empiric for aspiration PNA.
right MCA ischemic stroke s/p TNK , right M 2 occlusion s/p thrombectomy TICI2b   trauma   with MR C Spine evidence of  C4-C5 and C5-C6 acute anterior longitudinal ligament injury and/or anterior corner avulsion fractures and CT L Spine evidence of an acute T6 vertebral body extending into a Right anterolateral T5-T6 osteophyte., keep cervical collar, will discuss with ortho if need further workup , and thoracic spine fracture  T 6 vert body    neuro checks q 2 hr   aspirin on hold given hip henmatoma and drop in hgb requiring multiple transfusions   left hip hematoma ,  hgb droped to 8 ,  s/p plts yesterday, repeat cbc at 4 pm , became hypotensive unsure if it was transufsion reaction    repeat CT head yesterday  stable   hypotensive now off neosyneprhine   pneumonia pancultured on zosyn, chestxray RML infiltrates, bronch gram + in pairs   brain edema on 3 % at 100 ml/hr, decrease to 60 ml/hr, BMP q 6 hr  , sodium 140-150 for brain edema    restart TF , last BM prior to admission, senna, miralax   if hgb stable tonight, would consider starting chemorphylaxis

## 2024-04-25 NOTE — H&P ADULT - HISTORY OF PRESENT ILLNESS
81 years old man with a PMH of DM HTN CAD with stenting (on daily ASA) with stenting. Pt  presented for sudden onset left sided weakness to [facility] on 4/16/24 . Pt fell down the stairs was found by family awake and confused  on the floor prior to hospitalization.  CT brain showed right MCA occlusion( 4/16). Patient given tenecteplase and was transferred to [facility] on 4/16 for thrombectomy. Upon initial evaluation patient ws found to have NIHSS of 9 and subsequently at initial evaluation patient was not very well cooperative which contributed to a high NIHSS score of 19. After imaging and re-evaluation patient was re-evaluated and was found to have NIHSS of 11 (2 for gaze preference right, 2 for lack of response to threat left, 2 for drift in LUE and 1 for drift in LLE, 1 for neglect, 1 for dysarthria and 2 for left face). Repeat CT brain on 4/16  showed new cytotoxic edema in the right frontal, parietal, and temporal lobes, as well as within the right insula consistent with an acute right MCA territory infarct. No hipolito hemorrhagic transformation.   Pt is s/p cerebral angio with mechanical thrombectomy by Dr Pate on 4/16 with  no change in NIHSS of 11. Pt still with left sided weakness facial weakness, dysarthria, left neglect,  right gaze preference and left hemianopsia.  CT CAP with no evidence of acute trauma, Left lateral hip subcutaneous hematoma with evidence of active bleeding. MR c-spine with multiple cervical fractures, CT T-spine with T6 vertebral body fracture and incidental:cystic pancreatic head mass. Hospital course significant for anemia requiring dual Pressor support and 1 unit PRBC and PLT  2/2 to left hip hematoma. Worsening in mental status MRI brain done showing large R MCA infarct with hemorrhagic conversion. Pt treated with Hypertonic saline for cytotoxic edema with improvement on 4/18. Fever with neg cx treated with zosyn for Klebsiella PNA.  Failed FEEST, NGT place for feeding. Started on ASA and SQL on 4/21. 4/22 repeat FEEST pt advanced to puree with mod thickened liquid ( crush meds). Patient was fitted on 4/22/24 for cervical collar occipital mandibular support. MR T Spine without contrast reveals subtle band of increased T2/STIR signal within the T6 vertebral body, suggestive for acute fracture. Per ortho, no need for ortho spine intervention, continue c-collar and TLSO, outpatient ortho follow up no surgical intervention at this time. Family refused suggested ILR. Patient was evaluated by PM&R and therapy for functional deficits, gait/ADL impairments and acute rehabilitation was recommended. Patient was medically optimized for discharge to [facility] IRU on 4/25/24               81 years old man with a PMH of DM HTN CAD with stenting (on daily ASA) with stenting. Pt  presented for sudden onset left sided weakness to [facility] on 4/16/24 . Pt fell down the stairs was found by family awake and confused  on the floor prior to hospitalization.  CT brain showed right MCA occlusion( 4/16). Patient given tenecteplase and was transferred to [facility] on 4/16 for thrombectomy. Upon initial evaluation patient ws found to have NIHSS of 9 and subsequently at initial evaluation patient was not very well cooperative which contributed to a high NIHSS score of 19. After imaging and re-evaluation patient was re-evaluated and was found to have NIHSS of 11 (2 for gaze preference right, 2 for lack of response to threat left, 2 for drift in LUE and 1 for drift in LLE, 1 for neglect, 1 for dysarthria and 2 for left face). Repeat CT brain on 4/16  showed new cytotoxic edema in the right frontal, parietal, and temporal lobes, as well as within the right insula consistent with an acute right MCA territory infarct. No hipolito hemorrhagic transformation.   Pt is s/p cerebral angio with mechanical thrombectomy by Dr Pate on 4/16 with  no change in NIHSS of 11. Pt still with left sided weakness facial weakness, dysarthria, left neglect,  right gaze preference and left hemianopsia.  CT CAP with no evidence of acute trauma, Left lateral hip subcutaneous hematoma with evidence of active bleeding. MR c-spine with multiple cervical fractures, CT T-spine with T6 vertebral body fracture and incidental:cystic pancreatic head mass. Hospital course significant for anemia requiring dual Pressor support and 1 unit PRBC and PLT  2/2 to left hip hematoma. Worsening in mental status MRI brain done showing large R MCA infarct with hemorrhagic conversion. Pt treated with Hypertonic saline for cytotoxic edema with improvement on 4/18. Fever with neg cx treated with zosyn for Klebsiella PNA.  Failed FEEST, NGT place for feeding. Started on ASA and SQL on 4/21. 4/22 repeat FEEST pt advanced to puree with mod thickened liquid ( crush meds). Patient was fitted on 4/22/24 for cervical collar occipital mandibular support. MR T Spine without contrast reveals subtle band of increased T2/STIR signal within the T6 vertebral body, suggestive for acute fracture. Per ortho, no need for ortho spine intervention, continue c-collar and TLSO, outpatient ortho follow up no surgical intervention at this time. Family refused suggested ILR. Patient was evaluated by PM&R and therapy for functional deficits, gait/ADL impairments and acute rehabilitation was recommended. Patient was medically optimized for discharge to [facility] IRU on 4/25/24    Patient             81 years old man with a PMH of DM HTN CAD with stenting (on daily ASA) with stenting. Pt  presented for sudden onset left sided weakness to [facility] on 4/16/24 . Pt fell down the stairs was found by family awake and confused  on the floor prior to hospitalization.  CT brain showed right MCA occlusion( 4/16). Patient given tenecteplase and was transferred to [facility] on 4/16 for thrombectomy. Upon initial evaluation patient ws found to have NIHSS of 9 and subsequently at initial evaluation patient was not very well cooperative which contributed to a high NIHSS score of 19. After imaging and re-evaluation patient was re-evaluated and was found to have NIHSS of 11 (2 for gaze preference right, 2 for lack of response to threat left, 2 for drift in LUE and 1 for drift in LLE, 1 for neglect, 1 for dysarthria and 2 for left face). Repeat CT brain on 4/16  showed new cytotoxic edema in the right frontal, parietal, and temporal lobes, as well as within the right insula consistent with an acute right MCA territory infarct. No hipolito hemorrhagic transformation.   Pt is s/p cerebral angio with mechanical thrombectomy by Dr Pate on 4/16 with  no change in NIHSS of 11. Pt still with left sided weakness facial weakness, dysarthria, left neglect,  right gaze preference and left hemianopsia.  CT CAP with no evidence of acute trauma, Left lateral hip subcutaneous hematoma with evidence of active bleeding. MR c-spine with multiple cervical fractures, CT T-spine with T6 vertebral body fracture and incidental:cystic pancreatic head mass. Hospital course significant for anemia requiring dual Pressor support and 1 unit PRBC and PLT  2/2 to left hip hematoma. Worsening in mental status MRI brain done showing large R MCA infarct with hemorrhagic conversion. Pt treated with Hypertonic saline for cytotoxic edema with improvement on 4/18. Fever with neg cx treated with zosyn for Klebsiella PNA.  Failed FEEST, NGT place for feeding. Started on ASA and SQL on 4/21. 4/22 repeat FEEST pt advanced to puree with mod thickened liquid ( crush meds). Patient was fitted on 4/22/24 for cervical collar occipital mandibular support. MR T Spine without contrast reveals subtle band of increased T2/STIR signal within the T6 vertebral body, suggestive for acute fracture. Per ortho, no need for ortho spine intervention, continue c-collar and TLSO, outpatient ortho follow up no surgical intervention at this time. Family refused suggested ILR. Patient was evaluated by PM&R and therapy for functional deficits, gait/ADL impairments and acute rehabilitation was recommended. Patient was medically optimized for discharge to [facility] IRU on 4/25/24    Patient spouse and son were present during the history taking.

## 2024-04-25 NOTE — PROGRESS NOTE ADULT - SUBJECTIVE AND OBJECTIVE BOX
THE PATIENT WAS SEEN AND EXAMINED BY ME WITH THE HOUSESTAFF AND STROKE TEAM DURING MORNING ROUNDS.   HPI: 79 year old man with a PMH of DM HTN CAD with stenting presenting for sudden onset left sided weakness. Family heard a thud and found patient on the floor and proceeded with hospitalization. He fell off the stairs. Patient was evaluated at [facility] for stroke and was found to have right MCA occlusion. Patient given tenecteplase and was transferred to [facility] for thrombectomy. Upon initial evaluation patient ws found to have NIHSS of 9 and subsequently at initial evaluation patient was not very well cooperative which contributed to a high NIHSS score of 19. After imaging and re-evaluation patient was re-evaluated and was found to have NIHSS of 11 (2 for gaze preference right, 2 for lack of response to threat left, 2 for drift in LUE and 1 for drift in LLE, 1 for neglect, 1 for dysarthria and 2 for left face). After thrombectomy patient was noted to have NIHSS of 11. Of note patient was on aspirin at home only and did not skip the dose. Last seen well was just before the incident at 1:30 pm.  (16 Apr 2024 22:09)      SUBJECTIVE: No events overnight.  No new neurologic complaints.      acetaminophen   Oral Liquid .. 650 milliGRAM(s) Oral every 6 hours PRN  acetylcysteine 20%  Inhalation 4 milliLiter(s) Inhalation every 6 hours  albuterol/ipratropium for Nebulization 3 milliLiter(s) Nebulizer every 6 hours  aluminum hydroxide/magnesium hydroxide/simethicone Suspension 30 milliLiter(s) Oral every 4 hours PRN  aspirin  chewable 81 milliGRAM(s) Oral daily  atorvastatin 80 milliGRAM(s) Oral at bedtime  chlorhexidine 4% Liquid 1 Application(s) Topical daily  enoxaparin Injectable 40 milliGRAM(s) SubCutaneous <User Schedule>  finasteride 5 milliGRAM(s) Oral daily  influenza  Vaccine (HIGH DOSE) 0.7 milliLiter(s) IntraMuscular once  insulin lispro (ADMELOG) corrective regimen sliding scale   SubCutaneous three times a day before meals  insulin lispro (ADMELOG) corrective regimen sliding scale   SubCutaneous at bedtime  lidocaine   4% Patch 1 Patch Transdermal daily  melatonin 5 milliGRAM(s) Oral at bedtime  methocarbamol 500 milliGRAM(s) Oral every 6 hours PRN  metoprolol tartrate 12.5 milliGRAM(s) Oral every 12 hours  pantoprazole  Injectable 40 milliGRAM(s) IV Push daily  piperacillin/tazobactam IVPB.. 3.375 Gram(s) IV Intermittent every 8 hours  polyethylene glycol 3350 17 Gram(s) Oral two times a day  senna 2 Tablet(s) Oral at bedtime  timolol 0.5% Solution 1 Drop(s) Left EYE two times a day    PHYSICAL EXAM:   Vital Signs Last 24 Hrs  T(C): 36.8 (25 Apr 2024 04:00), Max: 36.9 (24 Apr 2024 08:00)  T(F): 98.3 (25 Apr 2024 04:00), Max: 98.4 (24 Apr 2024 08:00)  HR: 53 (25 Apr 2024 06:00) (53 - 82)  BP: 127/67 (25 Apr 2024 06:00) (114/58 - 150/69)  BP(mean): 90 (25 Apr 2024 06:00) (81 - 111)  RR: 21 (25 Apr 2024 06:00) (17 - 32)  SpO2: 99% (25 Apr 2024 06:00) (92% - 100%)    Parameters below as of 25 Apr 2024 06:00  Patient On (Oxygen Delivery Method): room air    General: No acute distress  HEENT: EOM intact, visual fields full, surgical L pupil  Abdomen: Soft, nontender, nondistended   Extremities: No edema    NEUROLOGICAL EXAM:  Mental status: Awake, alert, oriented x3. Speech fluent, hypophonic. Follows commands. No neglect.  Cranial Nerves: L facial asymmetry, no nystagmus, no dysarthria,  tongue midline  Motor exam: Normal tone, no drift, 4+/5 RUE, 4-/5 RLE, 2/5 LUE, 3/5 LLE moves against gravity  Sensation: Intact to light touch   Coordination/ Gait: No dysmetria, gait not assessed    LABS:                        10.8   8.05  )-----------( 248      ( 25 Apr 2024 06:02 )             32.8    04-25    139  |  105  |  14  ----------------------------<  173<H>  3.7   |  23  |  0.76    Ca    9.0      25 Apr 2024 06:03    TPro  6.2  /  Alb  3.2<L>  /  TBili  2.4<H>  /  DBili  0.4<H>  /  AST  38  /  ALT  39  /  AlkPhos  62  04-24        IMAGING: Reviewed by me.     CTH 4/21/24:  Right MCA subacute hemorrhagic infarction. No adverse   interval change 4/20/2024    CTH 4/20/24: Stable appearance of right acute middle cerebral artery infarct with   petechial hemorrhage.    CTH 4/19/24: No significant change in acute right middle cerebral artery   infarct with petechial hemorrhage from 4/18/2024.    CTH 4/18/24 @ 15:08: Right MCA evolving infarct with mass effect on the right lateral   ventricle and subtle midline shift to the left. Minimal vague hemorrhage   suggested similar in appearance compared with the prior 4/18/2024 8:46 AM.      CTH 4/18/24 @ 9:50: Interval demarcation of right frontoparietal and temporal acute   infarction. No hipolito hemorrhagic transformation.    Increased mass effect with in new leftward midline shift of 3 mm and some   compression of the right lateral ventricle.    Basal cisterns still visualized. No hydrocephalus.    MR brain 4/17/24: Redemonstrated large acute right MCA territory infarct with extensive cortical petechial hemorrhage. No gross hematoma formation.    MR cervical spine 4/17/24: Suspected acute anterior longitudinal ligament injury and/or anterior corner avulsion fractures at C4-C5 and C5-C6. Prevertebraledema  extending from C1 through the upper cervical levels.  -Suspected right greater than left posterior paraspinal muscular strain and/or ligamentous injury.    CTH 4/17/24: An evolving acute right MCA distribution infarct is again noted in the   right posterior frontal, parietal, temporal, and insular regions. New   mild edema and mass effect has developed since the 04/16/2024 head CT.   New gyriform areas of increased density involve the infarct most likely   reflecting contrast staining from the angiogram procedure, as the   follow-up head CT performed later the same morning shows a substantial   interval decrease in the density. Small areas of cortical petechial   hemorrhagic transformation can't be entirely excluded. Continued serial   imaging follow-up over time is recommended to monitor for stability.    CTH 4/16/24 @ 19:14:  New cytotoxic edema in the right frontal, parietal, and temporal lobes,   as well as within the right insula consistent with an acute right MCA   territory infarct. No hipolito hemorrhagic transformation. No midline shift.      CTH, CTA H&N and CTP 4/16/24 @ 16:36:  1.   Right carotid system:  No hemodynamically significant stenosis.  2.   Left carotid system:  No hemodynamically significant stenosis.  3.   Intracranial circulation:  large vessel occlusion at the origin   of the RIGHT M2 segment, posterior division.  4.   Brain:  No significant lesion identified.   5. Perfusion: Core infarct of 96 ml within the posterior RIGHT MCA   Territory. Based on the perfusion mismatch, there is a predicted volume   of 24 mL of critically hypoperfused tissue at risk.       THE PATIENT WAS SEEN AND EXAMINED BY ME WITH THE HOUSESTAFF AND STROKE TEAM DURING MORNING ROUNDS.   HPI: 79 year old man with a PMH of DM HTN CAD with stenting presenting for sudden onset left sided weakness. Family heard a thud and found patient on the floor and proceeded with hospitalization. He fell off the stairs. Patient was evaluated at [facility] for [facility] and was found to have right MCA occlusion. Patient given tenecteplase and was transferred to [facility] for thrombectomy. Upon initial evaluation patient ws found to have NIHSS of 9 and subsequently at initial evaluation patient was not very well cooperative which contributed to a high NIHSS score of 19. After imaging and re-evaluation patient was re-evaluated and was found to have NIHSS of 11 (2 for gaze preference right, 2 for lack of response to threat left, 2 for drift in LUE and 1 for drift in LLE, 1 for neglect, 1 for dysarthria and 2 for left face). After thrombectomy patient was noted to have NIHSS of 11. Of note patient was on aspirin at home only and did not skip the dose. Last seen well was just before the incident at 1:30 pm.  (16 Apr 2024 22:09)    SUBJECTIVE: No events overnight.  No new neurologic complaints.      acetaminophen   Oral Liquid .. 650 milliGRAM(s) Oral every 6 hours PRN  acetylcysteine 20%  Inhalation 4 milliLiter(s) Inhalation every 6 hours  albuterol/ipratropium for Nebulization 3 milliLiter(s) Nebulizer every 6 hours  aluminum hydroxide/magnesium hydroxide/simethicone Suspension 30 milliLiter(s) Oral every 4 hours PRN  aspirin  chewable 81 milliGRAM(s) Oral daily  atorvastatin 80 milliGRAM(s) Oral at bedtime  chlorhexidine 4% Liquid 1 Application(s) Topical daily  enoxaparin Injectable 40 milliGRAM(s) SubCutaneous <User Schedule>  finasteride 5 milliGRAM(s) Oral daily  influenza  Vaccine (HIGH DOSE) 0.7 milliLiter(s) IntraMuscular once  insulin lispro (ADMELOG) corrective regimen sliding scale   SubCutaneous three times a day before meals  insulin lispro (ADMELOG) corrective regimen sliding scale   SubCutaneous at bedtime  lidocaine   4% Patch 1 Patch Transdermal daily  melatonin 5 milliGRAM(s) Oral at bedtime  methocarbamol 500 milliGRAM(s) Oral every 6 hours PRN  metoprolol tartrate 12.5 milliGRAM(s) Oral every 12 hours  pantoprazole  Injectable 40 milliGRAM(s) IV Push daily  piperacillin/tazobactam IVPB.. 3.375 Gram(s) IV Intermittent every 8 hours  polyethylene glycol 3350 17 Gram(s) Oral two times a day  senna 2 Tablet(s) Oral at bedtime  timolol 0.5% Solution 1 Drop(s) Left EYE two times a day    PHYSICAL EXAM:   Vital Signs Last 24 Hrs  T(C): 36.8 (25 Apr 2024 04:00), Max: 36.9 (24 Apr 2024 08:00)  T(F): 98.3 (25 Apr 2024 04:00), Max: 98.4 (24 Apr 2024 08:00)  HR: 53 (25 Apr 2024 06:00) (53 - 82)  BP: 127/67 (25 Apr 2024 06:00) (114/58 - 150/69)  BP(mean): 90 (25 Apr 2024 06:00) (81 - 111)  RR: 21 (25 Apr 2024 06:00) (17 - 32)  SpO2: 99% (25 Apr 2024 06:00) (92% - 100%)    Parameters below as of 25 Apr 2024 06:00  Patient On (Oxygen Delivery Method): room air    General: No acute distress  HEENT: EOM intact, visual fields full, surgical L pupil  Abdomen: Soft, nontender, nondistended   Extremities: No edema    NEUROLOGICAL EXAM:  Mental status: Awake, alert, oriented x3. Speech fluent, hypophonic. Follows commands. No neglect.  Cranial Nerves: L facial asymmetry, no nystagmus, no dysarthria,  tongue midline  Motor exam: Normal tone, no drift, 4+/5 RUE, 4-/5 RLE, 2/5 LUE, 3/5 LLE moves against gravity  Sensation: Intact to light touch   Coordination/ Gait: No dysmetria, gait not assessed    LABS:                        10.8   8.05  )-----------( 248      ( 25 Apr 2024 06:02 )             32.8    04-25    139  |  105  |  14  ----------------------------<  173<H>  3.7   |  23  |  0.76    Ca    9.0      25 Apr 2024 06:03    TPro  6.2  /  Alb  3.2<L>  /  TBili  2.4<H>  /  DBili  0.4<H>  /  AST  38  /  ALT  39  /  AlkPhos  62  04-24        IMAGING: Reviewed by me.     CTH 4/21/24:  Right MCA subacute hemorrhagic infarction. No adverse   interval change 4/20/2024    CTH 4/20/24: Stable appearance of right acute middle cerebral artery infarct with   petechial hemorrhage.    CTH 4/19/24: No significant change in acute right middle cerebral artery   infarct with petechial hemorrhage from 4/18/2024.    CTH 4/18/24 @ 15:08: Right MCA evolving infarct with mass effect on the right lateral   ventricle and subtle midline shift to the left. Minimal vague hemorrhage   suggested similar in appearance compared with the prior 4/18/2024 8:46 AM.    CTH 4/18/24 @ 9:50: Interval demarcation of right frontoparietal and temporal acute   infarction. No hipolito hemorrhagic transformation.    Increased mass effect with in new leftward midline shift of 3 mm and some   compression of the right lateral ventricle.    Basal cisterns still visualized. No hydrocephalus.    MR brain 4/17/24: Redemonstrated large acute right MCA territory infarct with extensive cortical petechial hemorrhage. No gross hematoma formation.    MR cervical spine 4/17/24: Suspected acute anterior longitudinal ligament injury and/or anterior corner avulsion fractures at C4-C5 and C5-C6. Prevertebraledema  extending from C1 through the upper cervical levels.  -Suspected right greater than left posterior paraspinal muscular strain and/or ligamentous injury.    CTH 4/17/24: An evolving acute right MCA distribution infarct is again noted in the   right posterior frontal, parietal, temporal, and insular regions. New   mild edema and mass effect has developed since the 04/16/2024 head CT.   New gyriform areas of increased density involve the infarct most likely   reflecting contrast staining from the angiogram procedure, as the   follow-up head CT performed later the same morning shows a substantial   interval decrease in the density. Small areas of cortical petechial   hemorrhagic transformation can't be entirely excluded. Continued serial   imaging follow-up over time is recommended to monitor for stability.    CTH 4/16/24 @ 19:14:  New cytotoxic edema in the right frontal, parietal, and temporal lobes,   as well as within the right insula consistent with an acute right MCA   territory infarct. No hipolito hemorrhagic transformation. No midline shift.      CTH, CTA H&N and CTP 4/16/24 @ 16:36:  1.   Right carotid system:  No hemodynamically significant stenosis.  2.   Left carotid system:  No hemodynamically significant stenosis.  3.   Intracranial circulation:  large vessel occlusion at the origin   of the RIGHT M2 segment, posterior division.  4.   Brain:  No significant lesion identified.   5. Perfusion: Core infarct of 96 ml within the posterior RIGHT MCA   Territory. Based on the perfusion mismatch, there is a predicted volume   of 24 mL of critically hypoperfused tissue at risk.

## 2024-04-25 NOTE — H&P ADULT - NSHPLABSRESULTS_GEN_ALL_CORE
Imaging:   CTH 4/21/24:  Right MCA subacute hemorrhagic infarction. No adverse   interval change 4/20/2024    CTH 4/20/24: Stable appearance of right acute middle cerebral artery infarct with   petechial hemorrhage.    CTH 4/19/24: No significant change in acute right middle cerebral artery   infarct with petechial hemorrhage from 4/18/2024.    CTH 4/18/24 @ 15:08: Right MCA evolving infarct with mass effect on the right lateral   ventricle and subtle midline shift to the left. Minimal vague hemorrhage   suggested similar in appearance compared with the prior 4/18/2024 8:46 AM.      CTH 4/18/24 @ 9:50: Interval demarcation of right frontoparietal and temporal acute   infarction. No hipolito hemorrhagic transformation.    Increased mass effect with in new leftward midline shift of 3 mm and some   compression of the right lateral ventricle.    Basal cisterns still visualized. No hydrocephalus.    MR brain 4/17/24: Redemonstrated large acute right MCA territory infarct with extensive cortical petechial hemorrhage. No gross hematoma formation.    MR cervical spine 4/17/24: Suspected acute anterior longitudinal ligament injury and/or anterior corner avulsion fractures at C4-C5 and C5-C6. Prevertebraledema  extending from C1 through the upper cervical levels.  -Suspected right greater than left posterior paraspinal muscular strain and/or ligamentous injury.    CTH 4/17/24: An evolving acute right MCA distribution infarct is again noted in the   right posterior frontal, parietal, temporal, and insular regions. New   mild edema and mass effect has developed since the 04/16/2024 head CT.   New gyriform areas of increased density involve the infarct most likely   reflecting contrast staining from the angiogram procedure, as the   follow-up head CT performed later the same morning shows a substantial   interval decrease in the density. Small areas of cortical petechial   hemorrhagic transformation can't be entirely excluded. Continued serial   imaging follow-up over time is recommended to monitor for stability.    CTH 4/16/24 @ 19:14:  New cytotoxic edema in the right frontal, parietal, and temporal lobes,   as well as within the right insula consistent with an acute right MCA   territory infarct. No hipolito hemorrhagic transformation. No midline shift.      CTH, CTA H&N and CTP 4/16/24 @ 16:36:  1.   Right carotid system:  No hemodynamically significant stenosis.  2.   Left carotid system:  No hemodynamically significant stenosis.  3.   Intracranial circulation:  large vessel occlusion at the origin   of the RIGHT M2 segment, posterior division.  4.   Brain:  No significant lesion identified.   5. Perfusion: Core infarct of 96 ml within the posterior RIGHT MCA   Territory. Based on the perfusion mismatch, there is a predicted volume   of 24 mL of critically hypoperfused tissue at risk.      Impression:  L hemiparesis due to R MCA infarct in the setting of Large R-M2 occlusion. S/p tenecteplase and endovascular therapy, TICI 2B, with extensive cortical petechial hemorrhage on MRI. Mechanism ESUS.   ANTITHROMBOTIC THERAPY: Aspirin 81mg daily

## 2024-04-25 NOTE — H&P ADULT - NSHPPHYSICALEXAM_GEN_ALL_CORE
+dysarthria; +hypophonia; +Left sided facial weakness.  fall precautions; spinal precautions; c-collar at all times, TLSO when OOB    Left hip hematoma  Pupils Left is 7mm irregular and fixed and right pupil is 3mm round and brisk. Constitutional - NAD, Comfortable  HEENT -  EOMI, Left pupil is 7mm irregular and fixed (chronic from prior cataract surgery  Neck -+ c-collar  Chest - good chest expansion, good respiratory effort, CTAB   Cardio - warm and well perfused, RRR, no murmur  Abdomen -  Soft, NTND  Extremities - No peripheral edema, No calf tenderness; Bruising of left shoulder, hip and knee   Neurologic Exam:                    Cognitive -             Orientation: Awake, Alert, AAO to self, place, month year,             Attention:  recall 1/3 objects without cueing     Speech - +dysarthria     Cranial Nerves - +left facial droop, non-reactive left pupil (chronic)     Motor -                      LEFT    UE - ShAB 2/5, EF 2/5, EE 2/5, WE 0/5,  0/5                    RIGHT UE - ShAB 5/5, EF 5/5, EE 5/5, WE 5/5,  WNL                    LEFT    LE - HF 4/5, KE 4/5, DF 5/5, PF 5/5                    RIGHT LE - HF 5/5, KE 5/5, DF 5/5, PF 5/5        Sensory - decreased to LT LLE and absent on LUE     Reflexes - DTR 2 / 4 , neg Duke's b/l,      Coordination - FTN intact on the righ/ HTS intact     OculoVestibular -  No nystagmus  Psychiatric - Mood stable, Affect WNL  Skin on admission: skin tear upper back, bruising over the left shoulder, hip and knee.  skin tears over the RUE.

## 2024-04-25 NOTE — PATIENT PROFILE ADULT - FALL HARM RISK - HARM RISK INTERVENTIONS
Assistance with ambulation/Assistance OOB with selected safe patient handling equipment/Communicate Risk of Fall with Harm to all staff/Discuss with provider need for PT consult/Monitor gait and stability/Reinforce activity limits and safety measures with patient and family/Tailored Fall Risk Interventions/Visual Cue: Yellow wristband and red socks/Bed in lowest position, wheels locked, appropriate side rails in place/Call bell, personal items and telephone in reach/Instruct patient to call for assistance before getting out of bed or chair/Non-slip footwear when patient is out of bed/[place] to call system/Physically safe environment - no spills, clutter or unnecessary equipment/Purposeful Proactive Rounding/Room/bathroom lighting operational, light cord in reach

## 2024-04-25 NOTE — H&P ADULT - NSHPREVIEWOFSYSTEMS_GEN_ALL_CORE
REVIEW OF SYSTEMS  Constitutional: No fever, No Chills, No fatigue  HEENT: No eye pain, +visual disturbances (in left eye chronic), No difficulty hearing  Pulm: No cough,  No shortness of breath  Cardio: No chest pain, No palpitations  GI:  No abdominal pain, No nausea, No vomiting, No diarrhea, No constipation  : No dysuria, No frequency, No hematuria  Neuro: + headaches, No memory loss, + loss of strength, No numbness, No tremors  Skin: + skin tears  MSK: + neck pain  Psych:  No depression, No anxiety

## 2024-04-25 NOTE — H&P ADULT - ASSESSMENT
81 years old man with a PMH of DM HTN CAD with stenting (on daily ASA) with stenting. Pt  presented for sudden onset left sided weakness to [facility] on 4/16/24 . Pt fell down the stairs was found by family awake and confused  on the floor prior to hospitalization.  CT brain showed right MCA occlusion( 4/16). Patient given tenecteplase and was transferred to [facility] on 4/16 for thrombectomy. Upon initial evaluation patient ws found to have NIHSS of 9 and subsequently at initial evaluation patient was not very well cooperative which contributed to a high NIHSS score of 19. After imaging and re-evaluation patient was re-evaluated and was found to have NIHSS of 11 (2 for gaze preference right, 2 for lack of response to threat left, 2 for drift in LUE and 1 for drift in LLE, 1 for neglect, 1 for dysarthria and 2 for left face). Repeat CT brain on 4/16  showed new cytotoxic edema in the right frontal, parietal, and temporal lobes, as well as within the right insula consistent with an acute right MCA territory infarct. No hipolito hemorrhagic transformation.   Pt is s/p cerebral angio with mechanical thrombectomy by Dr Pate on 4/16 with  no change in NIHSS of 11. Pt still with left sided weakness facial weakness, dysarthria, left neglect,  right gaze preference and left hemianopsia.  CT CAP with no evidence of acute trauma, Left lateral hip subcutaneous hematoma with evidence of active bleeding. MR c-spine with multiple cervical fractures, CT T-spine with T6 vertebral body fracture and incidental:cystic pancreatic head mass. Hospital course significant for anemia requiring dual Pressor support and 1 unit PRBC and PLT  2/2 to left hip hematoma. Worsening in mental status MRI brain done showing large R MCA infarct with hemorrhagic conversion. Pt treated with Hypertonic saline for cytotoxic edema with improvement on 4/18. Fever with neg cx treated with zosyn for Klebsiella PNA.  Failed FEEST, NGT place for feeding. Started on ASA and SQL on 4/21. 4/22 repeat FEEST pt advanced to puree with mod thickened liquid ( crush meds). Patient was fitted on 4/22/24 for cervical collar occipital mandibular support. MR T Spine without contrast reveals subtle band of increased T2/STIR signal within the T6 vertebral body, suggestive for acute fracture. Per ortho, no need for ortho spine intervention, continue c-collar and TLSO, outpatient ortho follow up no surgical intervention at this time. Family refused suggested ILR. Patient was evaluated by PM&R and therapy for functional deficits, gait/ADL impairments and acute rehabilitation was recommended. Patient was medically optimized for discharge to [facility] IRU on 4/25/24    #Gait Instability, ADL impairments and Functional impairments  - Start Comprehensive Rehab Program of PT/OT/SLP    # Acute right MCA territory infarct. No hipolito hemorrhagic transformation.  - ASA 81mg daily  - Atrovastatin 80mg q hs  - LDL 50    #Pain control/ Left Hip Hematoma   - Lidocaine patch  - Tylenol PRN  -  Robaxin     # T6 vertebral body fracture Suspected acute anterior longitudinal ligament injury and/or anterior corner avulsion fractures at C4-C5 and C5-C6. Prevertebraledema  extending from C1 through the upper cervical levels.-Suspected right greater than left posterior paraspinal muscular strain and/or ligamentous injury. No Ortho interventions at this time. Ortho follow up post d/c  - TLSO when oob   - spinal precautions  -  c-collar at all times    # Sleep  - Melatonin q hs    # DM  -ISS  - a1c 8.2    # HTN  - Metoprolol 12.5mg      # Klebsella PNA  - S/p Zosyn    #GI/Bowel Mgmt   - Continue Senna at bedtime   - Miralax   -Protonix    #Bladder management/ BPH  - Proscar  - Continue to monitor PVR q 8 hours (SC if > 400)  -Monitor UO    #DVT  - Lovenox  - TEDs     #Skin:  -***    # Cataract  - Timolol left eye BID      #FEN   - Diet - Pureed   - Dysphagia  SLP - evaluation and treatment    Precautions / PROPHYLAXIS:   - Falls  - ortho: Spine precation, Weight bearing status: WBAT   - Lungs: Aspiration, Incentive Spirometer   - Pressure injury/Skin: Turn Q2hrs while in bed, OOB to Chair, PT/OT        Follow up    Louise Wright  Interventional Cardiology  [address] [place] [address]  Phone: [phone]  Fax: [phone]  Follow Up Time: 1 month    Sagar Gagnon  Orthopaedic Surgery  [address], [address]  [place] [address]  Phone: [phone]  Fax: [phone]  Follow Up Time: 1 week      MEDICAL PROGNOSIS: GOOD              REHAB POTENTIAL: GOOD              ESTIMATED DISPOSITION: HOME WITH HOME CARE              ELOS: 10-14 Days   EXPECTED THERAPY:     P.T. 1hr/day       O.T. 1hr/day      S.L.P. 1hr/day       P&O Unnecessary       EXP FREQUENCY: 5 days per 7 day period     PRESCREEN COMPARISON:   I have reviewed the prescreen information and I have found no relevant changes between the preadmission screening and my post admission evaluation     RATIONALE FOR INPATIENT ADMISSION - Patient demonstrates the following: (check all that apply)  [X] Medically appropriate for rehabilitation admission  [X] Has attainable rehab goals with an appropriate initial discharge plan  [X] Has rehabilitation potential (expected to make a significant improvement within a reasonable period of time)   [X] Requires close medical management by a rehab physician, rehab nursing care, Hospitalist and comprehensive interdisciplinary team (including PT, OT, & or SLP, Prosthetics and Orthotics)  Pressor support and 1 unit PRBC and PLT  2/2 to left hip hematoma. Worsening in mental status MRI brain done showing large R MCA infarct with hemorrhagic conversion. Pt treated with Hypertonic saline for cytotoxic edema with improvement on 4/18. Fever with neg cx treated with zosyn for Klebsiella PNA.  Failed FEEST, NGT place for feeding. Started on ASA and SQL on 4/21. 4/22 repeat FEEST pt advanced to puree with mod thickened liquid ( crush meds). Patient was fitted on 4/22/24 for cervical collar occipital mandibular support. MR T Spine without contrast reveals subtle band of increased T2/STIR signal within the T6 vertebral body, suggestive for acute fracture. Per ortho, no need for ortho spine intervention, continue c-collar and TLSO, outpatient ortho follow up no surgical intervention at this time. Family refused suggested ILR. Patient was evaluated by PM&R and therapy for functional deficits, gait/ADL impairments and acute rehabilitation was recommended. Patient was medically optimized for discharge to [facility] IRU on 4/25/24.            81 years old man with a PMH of DM HTN CAD with stenting (on daily ASA) with stenting. Pt  presented for sudden onset left sided weakness to [facility] on 4/16/24 . Pt fell down the stairs was found by family awake and confused  on the floor prior to hospitalization.  CT brain showed right MCA occlusion( 4/16). Patient given tenecteplase and was transferred to [facility] on 4/16 for thrombectomy. Upon initial evaluation patient ws found to have NIHSS of 9 and subsequently at initial evaluation patient was not very well cooperative which contributed to a high NIHSS score of 19. After imaging and re-evaluation patient was re-evaluated and was found to have NIHSS of 11 (2 for gaze preference right, 2 for lack of response to threat left, 2 for drift in LUE and 1 for drift in LLE, 1 for neglect, 1 for dysarthria and 2 for left face). Repeat CT brain on 4/16  showed new cytotoxic edema in the right frontal, parietal, and temporal lobes, as well as within the right insula consistent with an acute right MCA territory infarct. No hipolito hemorrhagic transformation.   Pt is s/p cerebral angio with mechanical thrombectomy by Dr Pate on 4/16 with  no change in NIHSS of 11. Pt still with left sided weakness facial weakness, dysarthria, left neglect,  right gaze preference and left hemianopsia.  CT CAP with no evidence of acute trauma, Left lateral hip subcutaneous hematoma with evidence of active bleeding. MR c-spine with multiple cervical fractures, CT T-spine with T6 vertebral body fracture and incidental:cystic pancreatic head mass. Hospital course significant for anemia requiring dual Pressor support and 1 unit PRBC and PLT  2/2 to left hip hematoma. Worsening in mental status MRI brain done showing large R MCA infarct with hemorrhagic conversion. Pt treated with Hypertonic saline for cytotoxic edema with improvement on 4/18. Fever with neg cx treated with zosyn for Klebsiella PNA.  Failed FEEST, NGT place for feeding. Started on ASA and SQL on 4/21. 4/22 repeat FEEST pt advanced to puree with mod thickened liquid ( crush meds). Patient was fitted on 4/22/24 for cervical collar occipital mandibular support. MR T Spine without contrast reveals subtle band of increased T2/STIR signal within the T6 vertebral body, suggestive for acute fracture. Per ortho, no need for ortho spine intervention, continue c-collar and TLSO, outpatient ortho follow up no surgical intervention at this time. Family refused suggested ILR. Patient was evaluated by PM&R and therapy for functional deficits, gait/ADL impairments and acute rehabilitation was recommended. Patient was medically optimized for discharge to [facility] IRU on 4/25/24    #Gait Instability, ADL impairments and Functional impairments  - Start Comprehensive Rehab Program of PT/OT/SLP    # Acute right MCA territory infarct. No hipolito hemorrhagic transformation.  - ASA 81mg daily  - Atrovastatin 80mg q hs  - LDL 50    #Pain control/ Left Hip Hematoma   - Lidocaine patch  - Tylenol PRN  -  Robaxin     # T6 vertebral body fracture Suspected acute anterior longitudinal ligament injury and/or anterior corner avulsion fractures at C4-C5 and C5-C6. Prevertebraledema  extending from C1 through the upper cervical levels.-Suspected right greater than left posterior paraspinal muscular strain and/or ligamentous injury. No Ortho interventions at this time. Ortho follow up post d/c  - TLSO when oob   - spinal precautions  -  c-collar at all times    # Sleep  - Melatonin q hs    # DM  -ISS  - a1c 8.2    # HTN  - Metoprolol 12.5mg      # Klebsella PNA  - S/p Zosyn    #GI/Bowel Mgmt   - Continue Senna at bedtime   - Miralax   -Protonix    #Bladder management/ BPH  - Proscar  - Continue to monitor PVR q 8 hours (SC if > 400)  -Monitor UO    #DVT  - Lovenox  - TEDs     #Skin:  -skin tear upper back, bruising over the left shoulder, hip and knee.  skin tears over the RUE.    # Cataract  - Timolol left eye BID      #FEN   - Diet - Pureed   - Dysphagia  SLP - evaluation and treatment    Precautions / PROPHYLAXIS:   - Falls  - ortho: Spine precation, Weight bearing status: WBAT   - Lungs: Aspiration, Incentive Spirometer   - Pressure injury/Skin: Turn Q2hrs while in bed, OOB to Chair, PT/OT        Follow up    Louise Wright  Interventional Cardiology  [address] [place] [address]  Phone: [phone]  Fax: [phone]  Follow Up Time: 1 month    Sagar Gagnon  Orthopaedic Surgery  [address], [address]  [place] [address]  Phone: [phone]  Fax: [phone]  Follow Up Time: 1 week      MEDICAL PROGNOSIS: GOOD              REHAB POTENTIAL: GOOD              ESTIMATED DISPOSITION: HOME WITH HOME CARE              ELOS: 10-14 Days   EXPECTED THERAPY:     P.T. 1hr/day       O.T. 1hr/day      S.L.P. 1hr/day       P&O Unnecessary       EXP FREQUENCY: 5 days per 7 day period     PRESCREEN COMPARISON:   I have reviewed the prescreen information and I have found no relevant changes between the preadmission screening and my post admission evaluation     RATIONALE FOR INPATIENT ADMISSION - Patient demonstrates the following: (check all that apply)  [X] Medically appropriate for rehabilitation admission  [X] Has attainable rehab goals with an appropriate initial discharge plan  [X] Has rehabilitation potential (expected to make a significant improvement within a reasonable period of time)   [X] Requires close medical management by a rehab physician, rehab nursing care, Hospitalist and comprehensive interdisciplinary team (including PT, OT, & or SLP, Prosthetics and Orthotics)  Pressor support and 1 unit PRBC and PLT  2/2 to left hip hematoma. Worsening in mental status MRI brain done showing large R MCA infarct with hemorrhagic conversion. Pt treated with Hypertonic saline for cytotoxic edema with improvement on 4/18. Fever with neg cx treated with zosyn for Klebsiella PNA.  Failed FEEST, NGT place for feeding. Started on ASA and SQL on 4/21. 4/22 repeat FEEST pt advanced to puree with mod thickened liquid ( crush meds). Patient was fitted on 4/22/24 for cervical collar occipital mandibular support. MR T Spine without contrast reveals subtle band of increased T2/STIR signal within the T6 vertebral body, suggestive for acute fracture. Per ortho, no need for ortho spine intervention, continue c-collar and TLSO, outpatient ortho follow up no surgical intervention at this time. Family refused suggested ILR. Patient was evaluated by PM&R and therapy for functional deficits, gait/ADL impairments and acute rehabilitation was recommended. Patient was medically optimized for discharge to [facility] IRU on 4/25/24.            81 years old man with a PMH of DM HTN CAD with stenting (on daily ASA) with stenting. Pt  presented for sudden onset left sided weakness to [facility] on 4/16/24 . Pt fell down the stairs was found by family awake and confused  on the floor prior to hospitalization.  CT brain showed right MCA occlusion( 4/16). Patient given tenecteplase and was transferred to [facility] on 4/16 for thrombectomy. Upon initial evaluation patient ws found to have NIHSS of 9 and subsequently at initial evaluation patient was not very well cooperative which contributed to a high NIHSS score of 19. After imaging and re-evaluation patient was re-evaluated and was found to have NIHSS of 11 (2 for gaze preference right, 2 for lack of response to threat left, 2 for drift in LUE and 1 for drift in LLE, 1 for neglect, 1 for dysarthria and 2 for left face). Repeat CT brain on 4/16  showed new cytotoxic edema in the right frontal, parietal, and temporal lobes, as well as within the right insula consistent with an acute right MCA territory infarct. No hipolito hemorrhagic transformation.   Pt is s/p cerebral angio with mechanical thrombectomy by Dr Pate on 4/16 with  no change in NIHSS of 11. Pt still with left sided weakness facial weakness, dysarthria, left neglect,  right gaze preference and left hemianopsia.  CT CAP with no evidence of acute trauma, Left lateral hip subcutaneous hematoma with evidence of active bleeding. MR c-spine with multiple cervical fractures, CT T-spine with T6 vertebral body fracture and incidental:cystic pancreatic head mass. Hospital course significant for anemia requiring dual Pressor support and 1 unit PRBC and PLT  2/2 to left hip hematoma. Worsening in mental status MRI brain done showing large R MCA infarct with hemorrhagic conversion. Pt treated with Hypertonic saline for cytotoxic edema with improvement on 4/18. Fever with neg cx treated with zosyn for Klebsiella PNA.  Failed FEEST, NGT place for feeding. Started on ASA and SQL on 4/21. 4/22 repeat FEEST pt advanced to puree with mod thickened liquid ( crush meds). Patient was fitted on 4/22/24 for cervical collar occipital mandibular support. MR T Spine without contrast reveals subtle band of increased T2/STIR signal within the T6 vertebral body, suggestive for acute fracture. Per ortho, no need for ortho spine intervention, continue c-collar and TLSO, outpatient ortho follow up no surgical intervention at this time. Family refused suggested ILR. Patient was evaluated by PM&R and therapy for functional deficits, gait/ADL impairments and acute rehabilitation was recommended. Patient was medically optimized for discharge to [facility] IRU on 4/25/24    #Gait Instability, ADL impairments and Functional impairments  - Start Comprehensive Rehab Program of PT/OT/SLP    # Acute right MCA territory infarct. No hipolito hemorrhagic transformation.  - ASA 81mg daily  - Atrovastatin 80mg q hs  - LDL 50    #Pain control/ Left Hip Hematoma   - Lidocaine patch  - Tylenol PRN  -  Robaxin     # T6 vertebral body fracture Suspected acute anterior longitudinal ligament injury and/or anterior corner avulsion fractures at C4-C5 and C5-C6. Prevertebraledema  extending from C1 through the upper cervical levels.-Suspected right greater than left posterior paraspinal muscular strain and/or ligamentous injury. No Ortho interventions at this time. Ortho follow up post d/c  - TLSO when oob   - spinal precautions  -  c-collar at all times    # Sleep  - Melatonin q hs    # DM  -ISS  - a1c 8.2    # HTN  - Metoprolol 12.5mg      # Klebsella PNA  - S/p Zosyn    #GI/Bowel Mgmt   - Continue Senna at bedtime - dulcolax suppository tonight  - Miralax   -Protonix    #Bladder management/ BPH  - Proscar  - Continue to monitor PVR q 8 hours (SC if > 400)  -Monitor UO    #DVT  - Lovenox  - TEDs     #Skin:  -skin tear upper back, bruising over the left shoulder, hip and knee.  skin tears over the RUE.    # Cataract  - Timolol left eye BID      #FEN   - Diet - Pureed   - Dysphagia  SLP - evaluation and treatment    Precautions / PROPHYLAXIS:   - Falls  - ortho: Spine precation, Weight bearing status: WBAT   - Lungs: Aspiration, Incentive Spirometer   - Pressure injury/Skin: Turn Q2hrs while in bed, OOB to Chair, PT/OT        Follow up    Louise Wright  Interventional Cardiology  [address] [place] [address]  Phone: [phone]  Fax: [phone]  Follow Up Time: 1 month    Sagar Gagnon  Orthopaedic Surgery  [address], [address]  [place] [address]  Phone: [phone]  Fax: [phone]  Follow Up Time: 1 week      MEDICAL PROGNOSIS: GOOD              REHAB POTENTIAL: GOOD              ESTIMATED DISPOSITION: HOME WITH HOME CARE              ELOS: 10-14 Days   EXPECTED THERAPY:     P.T. 1hr/day       O.T. 1hr/day      S.L.P. 1hr/day       P&O Unnecessary       EXP FREQUENCY: 5 days per 7 day period     PRESCREEN COMPARISON:   I have reviewed the prescreen information and I have found no relevant changes between the preadmission screening and my post admission evaluation     RATIONALE FOR INPATIENT ADMISSION - Patient demonstrates the following: (check all that apply)  [X] Medically appropriate for rehabilitation admission  [X] Has attainable rehab goals with an appropriate initial discharge plan  [X] Has rehabilitation potential (expected to make a significant improvement within a reasonable period of time)   [X] Requires close medical management by a rehab physician, rehab nursing care, Hospitalist and comprehensive interdisciplinary team (including PT, OT, & or SLP, Prosthetics and Orthotics)  Pressor support and 1 unit PRBC and PLT  2/2 to left hip hematoma. Worsening in mental status MRI brain done showing large R MCA infarct with hemorrhagic conversion. Pt treated with Hypertonic saline for cytotoxic edema with improvement on 4/18. Fever with neg cx treated with zosyn for Klebsiella PNA.  Failed FEEST, NGT place for feeding. Started on ASA and SQL on 4/21. 4/22 repeat FEEST pt advanced to puree with mod thickened liquid ( crush meds). Patient was fitted on 4/22/24 for cervical collar occipital mandibular support. MR T Spine without contrast reveals subtle band of increased T2/STIR signal within the T6 vertebral body, suggestive for acute fracture. Per ortho, no need for ortho spine intervention, continue c-collar and TLSO, outpatient ortho follow up no surgical intervention at this time. Family refused suggested ILR. Patient was evaluated by PM&R and therapy for functional deficits, gait/ADL impairments and acute rehabilitation was recommended. Patient was medically optimized for discharge to [facility] IRU on 4/25/24.

## 2024-04-25 NOTE — CONSULT NOTE ADULT - PROBLEM SELECTOR RECOMMENDATION 9
CT chest 4/17 with no clear PNA  -CXR with R>L opacities, ? infectious vs pulm edema  -Sputum cx + Klebsiella pneumoniae   -Procalcitonin added to AM labs  -Afebrile, no leukocytosis  -Continue Zosyn. CT chest 4/17 with no clear PNA  -CXR with R>L opacities, ? infectious vs pulm edema  -Sputum cx + Klebsiella pneumoniae   -Procalcitonin added to AM labs  -Afebrile, no leukocytosis  -Continue Zosyn for now (Current order completes this evening. Pt received first dose on 4/18, today day 8 of ABX). Will discuss total duration of ABX with Dr Wick.   -Minimal secretions on exam, suggest d/c Mucomyst  -Continue Duoneb q6h, can make PRN on discharge   -Keep sats >90% with o2 PRN.

## 2024-04-25 NOTE — PROGRESS NOTE ADULT - ASSESSMENT
79 year old man with a PMH of DM HTN CAD with stenting presenting for sudden onset left sided weakness. Family heard a thud and brought patient to ED. Patient received tenecteplase at [facility] VS, CTH found R M2 occlusion. Pt was transferred for thrombectomy. NIHSS9>11, mRS0, TICI 2B    Impression: L hemiparesis due to R MCA infarct in the setting of Large R-M2 occlusion. S/p tenecteplase and endovascular therapy, TICI 2B, with extensive cortical petechial hemorrhage on MRI. Mechanism ESUS.     NEURO: Neurologic examination improved since yesterday. Continue close monitoring for neurologic deterioration. Neurosurgery consulted, no need for hemicrani watch. Keep normotension. LDL 50, continue high intensity statin,  titrate statin to LDL goal less than 70. MRI Brain w/o as above. Physical therapy/OT recommend AR.    ANTITHROMBOTIC THERAPY: Aspirin 81mg daily    PULMONARY: CXR (4/21) with bilateral perihilar opacities, right greater than left. CXR (4/22) with slight decrease in pulmonary congestion with similar small effusions and there is no evidence of pneumothorax. On Zosyn for klebsiella pneumonia. Protecting airway, saturating well on nasal cannula @ 2LPM. Medicine consulted, recommendations appreciated.     CARDIOVASCULAR: TTE: EF 65%, no evidence of LV thrombus, normal left and right atrial size. Continue cardiac monitoring.  House EP consulted, Plan for ILR prior to discharge to rule out arrhythmia.                             SBP goal: < 160    GASTROINTESTINAL:  Dysphagia screen initially failed.  S/s eval on 4/17/24: NPO.  S/p FEES on 4/22 recommend diet, tolerating well.     Diet: Pureed with moderately thick liquids    RENAL: BUN/Cr stable, good urine output      Na Goal: Greater than 135     Alcazar: no    HEMATOLOGY: H/H anemia, overall stable., Platelets normal at 248.      DVT ppx:  LMWH    ID: afebrile, no leukocytosis     OTHER: Case and plan discussed with patient and family at bedside, all questions addressed. CT CAP with no evidence of acute trauma, Left lateral hip subcutaneous hematoma with evidence of active bleeding. MR c-spine with multiple cervical fractures, CT T-spine with T6 vertebral body fracture, patient was fitted on 4/22/24 for cervical collar occipital mandibular support. MR T Spine without contrast reveals subtle band of increased T2/STIR signal within the T6 vertebral body, suggestive for acute fracture. Per ortho, no need for ortho spine intervention, continue c-collar and TLSO, outpatient ortho follow up.      DISPOSITION: AR per PT eval once as workup is complete      CORE MEASURES:        Admission NIHSS: 19     TPA: [x] YES [] NO      LDL/HDL: 50/41     Depression Screen: p     Statin Therapy: yes     Dysphagia Screen: [] PASS [x] FAIL     Smoking [] YES [x] NO      Afib [] YES [x] NO     Stroke Education [x] YES [] NO    Obtain screening lower extremity venous ultrasound in patients who meet 1 or more of the following criteria as patient is high risk for DVT/PE on admission:   [] History of DVT/PE  []Hypercoagulable states (Factor V Leiden, Cancer, OCP, etc. )  []Prolonged immobility (hemiplegia/hemiparesis/post operative or any other extended immobilization)  [] Transferred from outside facility (Rehab or Long term care)  [] Age </= to 50     79 year old man with a PMH of DM HTN CAD with stenting presenting for sudden onset left sided weakness. Family heard a thud and brought patient to ED. Patient received tenecteplase at [facility] VS, CTH found R M2 occlusion. Pt was transferred for thrombectomy. NIHSS9>11, mRS0, TICI 2B    Impression: L hemiparesis due to R MCA infarct in the setting of Large R-M2 occlusion. S/p tenecteplase and endovascular therapy, TICI 2B, with extensive cortical petechial hemorrhage on MRI. Mechanism ESUS.     NEURO: Neurologic examination improved since admission. Continue close monitoring for neurologic deterioration. Neurosurgery consulted, no need for hemicrani watch. Keep normotension. LDL 50, continue high intensity statin,  titrate statin to LDL goal less than 70. MRI Brain w/o as above. Physical therapy/OT recommend AR.    ANTITHROMBOTIC THERAPY: Aspirin 81mg daily    PULMONARY: CXR (4/21) with bilateral perihilar opacities, right greater than left. CXR (4/22) with slight decrease in pulmonary congestion with similar small effusions and there is no evidence of pneumothorax. On Zosyn for klebsiella pneumonia, pulm consulted, recs appreciated  Protecting airway, saturating well on nasal cannula @ 2LPM. Medicine consulted, recommendations appreciated.     CARDIOVASCULAR: TTE: EF 65%, no evidence of LV thrombus, normal left and right atrial size. Continue cardiac monitoring.  House EP consulted, Plan for ILR prior to discharge to rule out arrhythmia.                             SBP goal: < 160    GASTROINTESTINAL:  Dysphagia screen initially failed.  S/s eval on 4/17/24: NPO.  S/p FEES on 4/22 recommend diet, tolerating well.     Diet: Pureed with moderately thick liquids    RENAL: BUN/Cr stable, good urine output      Na Goal: Greater than 135     Alcazar: no    HEMATOLOGY: H/H anemia, overall stable., Platelets normal at 248.      DVT ppx:  LMWH    ID: afebrile, no leukocytosis     OTHER: Case and plan discussed with patient and family at bedside, all questions addressed. CT CAP with no evidence of acute trauma, Left lateral hip subcutaneous hematoma with evidence of active bleeding. MR c-spine with multiple cervical fractures, CT T-spine with T6 vertebral body fracture, patient was fitted on 4/22/24 for cervical collar occipital mandibular support. MR T Spine without contrast reveals subtle band of increased T2/STIR signal within the T6 vertebral body, suggestive for acute fracture. Per ortho, no need for ortho spine intervention, continue c-collar and TLSO, outpatient ortho follow up.      DISPOSITION: AR per PT eval once as workup is complete      CORE MEASURES:        Admission NIHSS: 19     TPA: [x] YES [] NO      LDL/HDL: 50/41     Depression Screen: p     Statin Therapy: yes     Dysphagia Screen: [] PASS [x] FAIL     Smoking [] YES [x] NO      Afib [] YES [x] NO     Stroke Education [x] YES [] NO    Obtain screening lower extremity venous ultrasound in patients who meet 1 or more of the following criteria as patient is high risk for DVT/PE on admission:   [] History of DVT/PE  []Hypercoagulable states (Factor V Leiden, Cancer, OCP, etc. )  []Prolonged immobility (hemiplegia/hemiparesis/post operative or any other extended immobilization)  [] Transferred from outside facility (Rehab or Long term care)  [] Age </= to 50

## 2024-04-25 NOTE — CONSULT NOTE ADULT - NS ATTEND AMEND GEN_ALL_CORE FT
Refusing ILR
pt ok to dc abx, has had 8d  breathing comfortably on ra  keep sat>90%  ok from pulmonary perspective for dc

## 2024-04-25 NOTE — H&P ADULT - NSHPSOCIALHISTORY_GEN_ALL_CORE
Social  Drugs:  Smoking:  ETOH:    Pt lives in a private house with spouse, +steps to enter, R hand dominant. wears glasses for distance, drives. Denies AD for ADL    Bed mobility: Mod A 1 person  Transfer: Mod A 1 person + TLSO and C collar  Gait: Max A 1 person TLSO and C Collar ( Knee Buckling)  LBD: Max A

## 2024-04-25 NOTE — H&P ADULT - NSICDXPASTSURGICALHX_GEN_ALL_CORE_FT
Fasting labs 12/3/18   Change Levothyroxine to 75mcg tab, 1 tab daily in Am for thyroid   Nonfasting thyroid and A1C lab in 7-8 weeks   Stop Flovent inhaler    Azelastine 2 spray each nostril twice a day for nasal drainage. If resolved after a few weeks, then may try stopping   Azithromycin 2 tabs  today, then 1 tab daily for 4 days (antibiotic)  Warm liquids for voice/throat  OTC Delsym 2 tsp  Twice a day as needed for cough suppression  Continue other medications   PAST SURGICAL HISTORY:  H/O eye surgery     H/O heart artery stent     History of cardiac cath times 2

## 2024-04-25 NOTE — CONSULT NOTE ADULT - CONSULT REASON
T6 VCF
PNA; +sputum culture
CVA, ILR evaluation    Cardiologist Dr. Wright
Evaluate Rehabilitation Needs
Fall
Code stroke

## 2024-04-26 LAB
ALBUMIN SERPL ELPH-MCNC: 2.5 G/DL — LOW (ref 3.3–5)
ALP SERPL-CCNC: 70 U/L — SIGNIFICANT CHANGE UP (ref 40–120)
ALT FLD-CCNC: 70 U/L — HIGH (ref 10–45)
ANION GAP SERPL CALC-SCNC: 10 MMOL/L — SIGNIFICANT CHANGE UP (ref 5–17)
AST SERPL-CCNC: 81 U/L — HIGH (ref 10–40)
BASOPHILS # BLD AUTO: 0.04 K/UL — SIGNIFICANT CHANGE UP (ref 0–0.2)
BASOPHILS NFR BLD AUTO: 0.4 % — SIGNIFICANT CHANGE UP (ref 0–2)
BILIRUB SERPL-MCNC: 2.2 MG/DL — HIGH (ref 0.2–1.2)
BUN SERPL-MCNC: 15 MG/DL — SIGNIFICANT CHANGE UP (ref 7–23)
CALCIUM SERPL-MCNC: 8.1 MG/DL — LOW (ref 8.4–10.5)
CHLORIDE SERPL-SCNC: 107 MMOL/L — SIGNIFICANT CHANGE UP (ref 96–108)
CO2 SERPL-SCNC: 24 MMOL/L — SIGNIFICANT CHANGE UP (ref 22–31)
CREAT SERPL-MCNC: 0.9 MG/DL — SIGNIFICANT CHANGE UP (ref 0.5–1.3)
EGFR: 87 ML/MIN/1.73M2 — SIGNIFICANT CHANGE UP
EOSINOPHIL # BLD AUTO: 0.35 K/UL — SIGNIFICANT CHANGE UP (ref 0–0.5)
EOSINOPHIL NFR BLD AUTO: 3.6 % — SIGNIFICANT CHANGE UP (ref 0–6)
FLUAV AG NPH QL: SIGNIFICANT CHANGE UP
FLUBV AG NPH QL: SIGNIFICANT CHANGE UP
GLUCOSE BLDC GLUCOMTR-MCNC: 170 MG/DL — HIGH (ref 70–99)
GLUCOSE BLDC GLUCOMTR-MCNC: 206 MG/DL — HIGH (ref 70–99)
GLUCOSE BLDC GLUCOMTR-MCNC: 211 MG/DL — HIGH (ref 70–99)
GLUCOSE BLDC GLUCOMTR-MCNC: 237 MG/DL — HIGH (ref 70–99)
GLUCOSE SERPL-MCNC: 198 MG/DL — HIGH (ref 70–99)
HCT VFR BLD CALC: 30.8 % — LOW (ref 39–50)
HGB BLD-MCNC: 10.3 G/DL — LOW (ref 13–17)
IMM GRANULOCYTES NFR BLD AUTO: 0.5 % — SIGNIFICANT CHANGE UP (ref 0–0.9)
LYMPHOCYTES # BLD AUTO: 1.81 K/UL — SIGNIFICANT CHANGE UP (ref 1–3.3)
LYMPHOCYTES # BLD AUTO: 18.7 % — SIGNIFICANT CHANGE UP (ref 13–44)
MCHC RBC-ENTMCNC: 29.4 PG — SIGNIFICANT CHANGE UP (ref 27–34)
MCHC RBC-ENTMCNC: 33.4 GM/DL — SIGNIFICANT CHANGE UP (ref 32–36)
MCV RBC AUTO: 88 FL — SIGNIFICANT CHANGE UP (ref 80–100)
MONOCYTES # BLD AUTO: 0.71 K/UL — SIGNIFICANT CHANGE UP (ref 0–0.9)
MONOCYTES NFR BLD AUTO: 7.3 % — SIGNIFICANT CHANGE UP (ref 2–14)
NEUTROPHILS # BLD AUTO: 6.7 K/UL — SIGNIFICANT CHANGE UP (ref 1.8–7.4)
NEUTROPHILS NFR BLD AUTO: 69.5 % — SIGNIFICANT CHANGE UP (ref 43–77)
NRBC # BLD: 0 /100 WBCS — SIGNIFICANT CHANGE UP (ref 0–0)
PLATELET # BLD AUTO: 266 K/UL — SIGNIFICANT CHANGE UP (ref 150–400)
POTASSIUM SERPL-MCNC: 3.7 MMOL/L — SIGNIFICANT CHANGE UP (ref 3.5–5.3)
POTASSIUM SERPL-SCNC: 3.7 MMOL/L — SIGNIFICANT CHANGE UP (ref 3.5–5.3)
PROT SERPL-MCNC: 6.2 G/DL — SIGNIFICANT CHANGE UP (ref 6–8.3)
RBC # BLD: 3.5 M/UL — LOW (ref 4.2–5.8)
RBC # FLD: 15.2 % — HIGH (ref 10.3–14.5)
RSV RNA NPH QL NAA+NON-PROBE: SIGNIFICANT CHANGE UP
SARS-COV-2 RNA SPEC QL NAA+PROBE: SIGNIFICANT CHANGE UP
SODIUM SERPL-SCNC: 141 MMOL/L — SIGNIFICANT CHANGE UP (ref 135–145)
WBC # BLD: 9.66 K/UL — SIGNIFICANT CHANGE UP (ref 3.8–10.5)
WBC # FLD AUTO: 9.66 K/UL — SIGNIFICANT CHANGE UP (ref 3.8–10.5)

## 2024-04-26 PROCEDURE — 99223 1ST HOSP IP/OBS HIGH 75: CPT

## 2024-04-26 PROCEDURE — 99222 1ST HOSP IP/OBS MODERATE 55: CPT | Mod: GC

## 2024-04-26 RX ORDER — INSULIN GLARGINE 100 [IU]/ML
10 INJECTION, SOLUTION SUBCUTANEOUS AT BEDTIME
Refills: 0 | Status: DISCONTINUED | OUTPATIENT
Start: 2024-04-26 | End: 2024-05-01

## 2024-04-26 RX ADMIN — Medication 4: at 18:01

## 2024-04-26 RX ADMIN — Medication 4: at 12:41

## 2024-04-26 RX ADMIN — ATORVASTATIN CALCIUM 80 MILLIGRAM(S): 80 TABLET, FILM COATED ORAL at 22:13

## 2024-04-26 RX ADMIN — Medication 650 MILLIGRAM(S): at 22:14

## 2024-04-26 RX ADMIN — Medication 650 MILLIGRAM(S): at 22:24

## 2024-04-26 RX ADMIN — Medication 6 MILLIGRAM(S): at 22:13

## 2024-04-26 RX ADMIN — PANTOPRAZOLE SODIUM 40 MILLIGRAM(S): 20 TABLET, DELAYED RELEASE ORAL at 05:08

## 2024-04-26 RX ADMIN — Medication 4: at 08:36

## 2024-04-26 RX ADMIN — SENNA PLUS 2 TABLET(S): 8.6 TABLET ORAL at 22:14

## 2024-04-26 RX ADMIN — Medication 1 DROP(S): at 05:09

## 2024-04-26 RX ADMIN — Medication 650 MILLIGRAM(S): at 01:14

## 2024-04-26 RX ADMIN — INSULIN GLARGINE 10 UNIT(S): 100 INJECTION, SOLUTION SUBCUTANEOUS at 22:13

## 2024-04-26 RX ADMIN — Medication 12.5 MILLIGRAM(S): at 05:08

## 2024-04-26 RX ADMIN — Medication 1 DROP(S): at 18:01

## 2024-04-26 RX ADMIN — ENOXAPARIN SODIUM 40 MILLIGRAM(S): 100 INJECTION SUBCUTANEOUS at 18:00

## 2024-04-26 RX ADMIN — POLYETHYLENE GLYCOL 3350 17 GRAM(S): 17 POWDER, FOR SOLUTION ORAL at 18:01

## 2024-04-26 RX ADMIN — POLYETHYLENE GLYCOL 3350 17 GRAM(S): 17 POWDER, FOR SOLUTION ORAL at 05:08

## 2024-04-26 RX ADMIN — FINASTERIDE 5 MILLIGRAM(S): 5 TABLET, FILM COATED ORAL at 12:44

## 2024-04-26 RX ADMIN — Medication 81 MILLIGRAM(S): at 12:43

## 2024-04-26 RX ADMIN — Medication 12.5 MILLIGRAM(S): at 18:02

## 2024-04-26 NOTE — DIETITIAN INITIAL EVALUATION ADULT - FACTORS AFF FOOD INTAKE
States Fair Intake over Last Week  Consuming Less than Prior to Admission to Hospital (Per Patient)/persistent lack of appetite

## 2024-04-26 NOTE — DIETITIAN INITIAL EVALUATION ADULT - EDUCATION DIETARY MODIFICATIONS
Education Provided on Need for Supplementation & Consistent Carbohydrate Diet/(1) partially meets; needs review/practice/verbalization

## 2024-04-26 NOTE — DIETITIAN INITIAL EVALUATION ADULT - NSFNSNUTRCHEWSWALLOWFT_GEN_A_CORE
Tolerates Diet Consistency Well  Tolerates Fluid Consistency Well  No Chewing/Swallowing Difficulties (Per Patient)   Previously on Glucerna 1.5Cal via NGT @50ml/hr x24  FEES Done on  4/22- Recommended Puree (IDDSI Level 4/Dysphagia 1) Diet w/ Moderately Thick Liquids (IDDSI Level 3/Honey Thick)

## 2024-04-26 NOTE — DIETITIAN NUTRITION RISK NOTIFICATION - TREATMENT: THE FOLLOWING DIET HAS BEEN RECOMMENDED
Recommend Initiate Glucerna 8oz PO BID (Provides 440kcal-20grams of Protein)   Nutrition Education Provided

## 2024-04-26 NOTE — DIETITIAN INITIAL EVALUATION ADULT - PERTINENT MEDS FT
MEDICATIONS  (STANDING):  aspirin  chewable 81 milliGRAM(s) Oral daily  atorvastatin 80 milliGRAM(s) Oral at bedtime  dextrose 10% Bolus 125 milliLiter(s) IV Bolus once  dextrose 5%. 1000 milliLiter(s) (100 mL/Hr) IV Continuous <Continuous>  dextrose 5%. 1000 milliLiter(s) (50 mL/Hr) IV Continuous <Continuous>  dextrose 50% Injectable 25 Gram(s) IV Push once  dextrose 50% Injectable 12.5 Gram(s) IV Push once  enoxaparin Injectable 40 milliGRAM(s) SubCutaneous <User Schedule>  finasteride 5 milliGRAM(s) Oral daily  glucagon  Injectable 1 milliGRAM(s) IntraMuscular once  insulin glargine Injectable (LANTUS) 10 Unit(s) SubCutaneous at bedtime  insulin lispro (ADMELOG) corrective regimen sliding scale   SubCutaneous three times a day before meals  insulin lispro (ADMELOG) corrective regimen sliding scale   SubCutaneous at bedtime  lidocaine   4% Patch 1 Patch Transdermal daily  melatonin 6 milliGRAM(s) Oral at bedtime  metoprolol tartrate 12.5 milliGRAM(s) Oral every 12 hours  pantoprazole    Tablet 40 milliGRAM(s) Oral before breakfast  polyethylene glycol 3350 17 Gram(s) Oral two times a day  senna 2 Tablet(s) Oral at bedtime  timolol 0.5% Solution 1 Drop(s) Left EYE two times a day    MEDICATIONS  (PRN):  acetaminophen     Tablet .. 650 milliGRAM(s) Oral every 6 hours PRN Mild Pain (1 - 3)  aluminum hydroxide/magnesium hydroxide/simethicone Suspension 30 milliLiter(s) Oral every 4 hours PRN Dyspepsia  dextrose Oral Gel 15 Gram(s) Oral once PRN Blood Glucose LESS THAN 70 milliGRAM(s)/deciliter  methocarbamol 500 milliGRAM(s) Oral every 6 hours PRN musle spasm

## 2024-04-26 NOTE — DIETITIAN INITIAL EVALUATION ADULT - ORAL INTAKE PTA/DIET HISTORY
Patient Does Not Follow Diet @Home But Tries to Limit Sugar  Consumes 3 Meals a Day   Wife Usually Cooks For Patient   Does Take Vitamin/Supplements @Home (Multivitamin, Vitamin B Complex, Vitamin D)

## 2024-04-26 NOTE — DIETITIAN INITIAL EVALUATION ADULT - ADD RECOMMEND
1) Monitor Weights, Intake, Tolerance, Skin, POCT & Labwork  2) Recommend Change Diet to Consistent Carbohydrate Puree (IDDSI Level 4/Dysphagia 1) Diet   3) Education Provided on Need for Supplementation & Consistent Carbohydrate Diet  4) Glucerna 8oz PO BID  5) Continue Nutrition Plan of Care

## 2024-04-26 NOTE — DIETITIAN INITIAL EVALUATION ADULT - PERTINENT LABORATORY DATA
04-26    141  |  107  |  15  ----------------------------<  198<H>  3.7   |  24  |  0.90    Ca    8.1<L>      26 Apr 2024 05:00    TPro  6.2  /  Alb  2.5<L>  /  TBili  2.2<H>  /  DBili  x   /  AST  81<H>  /  ALT  70<H>  /  AlkPhos  70  04-26  POCT Blood Glucose.: 211 mg/dL (04-26-24 @ 07:56)  A1C with Estimated Average Glucose Result: 8.2 % (04-16-24 @ 22:59)

## 2024-04-26 NOTE — DIETITIAN NUTRITION RISK NOTIFICATION - PHYSICAL ASSESSMENT CALF
Impression: S/P CE/Standard IOL OD - . Encounter for surgical aftercare following surgery on a sense organ  Z48.810. Plan: PO instructions reviewed. IOP elevated, begin Simbrinza TID OD. severe

## 2024-04-26 NOTE — CONSULT NOTE ADULT - ASSESSMENT
82 yo with a history of DMII, HTN, CAD s/p stenting (on daily ASA) admitted to  rehab after hospital stay for right MCA occlusion( 4/16) at [place]. Patient was given tenecteplase and transferred to [facility] on 4/16 for thrombectomy. Pt is s/p cerebral angio with mechanical thrombectomy by Dr Pate on 4/16. Left lateral hip subcutaneous hematoma with evidence of active bleeding. MR c-spine with multiple cervical fractures, CT T-spine with T6 vertebral body fracture and incidental cystic pancreatic head mass. Course complicated by anemia s/p 1 unit PRBC and platelets, hemorrhagic conversion, fever. Patient medically optimized and cleared for rehab.    #Right MCA occlusion s/p cerebral angio with mechanical thrombectomy by Dr Pate on 4/16  #Large acute right MCA territory infarct with extensive cortical petechial hemorrhage.  #Multiple cervical fractures  #T6 vertebral body acute fracture  Comprehensive rehab as per primary team  Bowel/Pain regimen as per primary team   Maintain c-collar and TLSO brace as per primary team  Continue aspirin and atorvastatin 80mg.  Outpatient Neurosurgery F/U  Family refused ILR    #Diabetes Mellitus II:  A1c 8.2 on 4/16  Home Med: Toujeo 24units qhs, metformin 850mg, Repaglinide 1mg daily  Continue with Lantus 10 units sc QHS  Hypoglycemia protocol and insulin sliding scale at pre-meal and at night  Blood glucose goal 100-180  Monitor BUN/Cr and electrolytes    #Transaminitis  Mildly elevated  Continue atorvastatin for now  Monitor LFTs    #Blood loss anemia  Left lateral hip subcutaneous hematoma with evidence of active bleeding.  s/p 1 unit PRBC and platelet  stable, continue to monitor    #Hypertension  Continue metoprolol 12.5mg BID  Monitor vital signs    #History CAD s/p stent  Continue aspirin, statin, and metoprolol BID    #BPH  Continue finasteride    #Glaucoma  Continue timolol BID    DVT Prophylaxis with lovenox  GI Prophylaxis with Protonix

## 2024-04-26 NOTE — DIETITIAN INITIAL EVALUATION ADULT - NS FNS DIET ORDER
Puree (IDDSI Level 4/Dysphagia 1) Diet w/ Moderately Thick Liquids (IDDSI Level 3/Honey Thick)   Recommend Change Diet to Consistent Carbohydrate Puree (IDDSI Level 4/Dysphagia 1) Diet - Hemoglobin A1c - 8.2H  Recommend Initiate Glucerna 8oz PO BID (Provides 440kcal-20grams of Protein)   Education Provided on Need for Supplementation & Consistent Carbohydrate Diet

## 2024-04-26 NOTE — DIETITIAN INITIAL EVALUATION ADULT - OTHER INFO
Initial Nutrition Assessment   79yr Old Male   Denies Food Allergy/Intolerance  Tolerates Diet Well - No Significant Chewing/Swallowing Complications (Per Patient)  Consumed 100% of 1st Meals (Per Patient Observation)   No Pressure Ulcers (as Per Nursing Flow Sheets)  No Edema Noted (as Per Nursing Flow Sheets)  No Recent Nausea/Vomiting/Diarrhea/Constipation (as Per Patient)

## 2024-04-26 NOTE — DIETITIAN INITIAL EVALUATION ADULT - NSICDXPASTSURGICALHX_GEN_ALL_CORE_FT
PAST SURGICAL HISTORY:  H/O eye surgery     H/O heart artery stent     History of cardiac cath times 2

## 2024-04-27 LAB
GLUCOSE BLDC GLUCOMTR-MCNC: 155 MG/DL — HIGH (ref 70–99)
GLUCOSE BLDC GLUCOMTR-MCNC: 162 MG/DL — HIGH (ref 70–99)
GLUCOSE BLDC GLUCOMTR-MCNC: 174 MG/DL — HIGH (ref 70–99)
GLUCOSE BLDC GLUCOMTR-MCNC: 206 MG/DL — HIGH (ref 70–99)

## 2024-04-27 PROCEDURE — 99232 SBSQ HOSP IP/OBS MODERATE 35: CPT

## 2024-04-27 RX ORDER — MINERAL OIL
133 OIL (ML) MISCELLANEOUS ONCE
Refills: 0 | Status: COMPLETED | OUTPATIENT
Start: 2024-04-27 | End: 2024-04-27

## 2024-04-27 RX ORDER — LIDOCAINE 4 G/100G
1 CREAM TOPICAL DAILY
Refills: 0 | Status: DISCONTINUED | OUTPATIENT
Start: 2024-04-27 | End: 2024-05-06

## 2024-04-27 RX ORDER — TAMSULOSIN HYDROCHLORIDE 0.4 MG/1
0.4 CAPSULE ORAL AT BEDTIME
Refills: 0 | Status: DISCONTINUED | OUTPATIENT
Start: 2024-04-27 | End: 2024-05-06

## 2024-04-27 RX ADMIN — INSULIN GLARGINE 10 UNIT(S): 100 INJECTION, SOLUTION SUBCUTANEOUS at 22:02

## 2024-04-27 RX ADMIN — Medication 12.5 MILLIGRAM(S): at 18:32

## 2024-04-27 RX ADMIN — TAMSULOSIN HYDROCHLORIDE 0.4 MILLIGRAM(S): 0.4 CAPSULE ORAL at 22:02

## 2024-04-27 RX ADMIN — Medication 12.5 MILLIGRAM(S): at 05:34

## 2024-04-27 RX ADMIN — Medication 2: at 08:52

## 2024-04-27 RX ADMIN — LIDOCAINE 1 PATCH: 4 CREAM TOPICAL at 07:10

## 2024-04-27 RX ADMIN — LIDOCAINE 1 PATCH: 4 CREAM TOPICAL at 05:35

## 2024-04-27 RX ADMIN — Medication 81 MILLIGRAM(S): at 11:57

## 2024-04-27 RX ADMIN — SENNA PLUS 2 TABLET(S): 8.6 TABLET ORAL at 22:01

## 2024-04-27 RX ADMIN — FINASTERIDE 5 MILLIGRAM(S): 5 TABLET, FILM COATED ORAL at 11:56

## 2024-04-27 RX ADMIN — ENOXAPARIN SODIUM 40 MILLIGRAM(S): 100 INJECTION SUBCUTANEOUS at 18:31

## 2024-04-27 RX ADMIN — Medication 6 MILLIGRAM(S): at 22:02

## 2024-04-27 RX ADMIN — Medication 4: at 17:13

## 2024-04-27 RX ADMIN — Medication 1 DROP(S): at 18:31

## 2024-04-27 RX ADMIN — Medication 133 MILLILITER(S): at 01:16

## 2024-04-27 RX ADMIN — Medication 2: at 11:55

## 2024-04-27 RX ADMIN — LIDOCAINE 1 PATCH: 4 CREAM TOPICAL at 15:51

## 2024-04-27 RX ADMIN — POLYETHYLENE GLYCOL 3350 17 GRAM(S): 17 POWDER, FOR SOLUTION ORAL at 05:41

## 2024-04-27 RX ADMIN — Medication 1 DROP(S): at 05:35

## 2024-04-27 RX ADMIN — PANTOPRAZOLE SODIUM 40 MILLIGRAM(S): 20 TABLET, DELAYED RELEASE ORAL at 05:34

## 2024-04-27 RX ADMIN — ATORVASTATIN CALCIUM 80 MILLIGRAM(S): 80 TABLET, FILM COATED ORAL at 22:01

## 2024-04-27 RX ADMIN — POLYETHYLENE GLYCOL 3350 17 GRAM(S): 17 POWDER, FOR SOLUTION ORAL at 18:31

## 2024-04-27 RX ADMIN — LIDOCAINE 1 PATCH: 4 CREAM TOPICAL at 17:51

## 2024-04-27 NOTE — CHART NOTE - NSCHARTNOTEFT_GEN_A_CORE
Notified by RN as pt has not had a BM since 4/22. Pt endorsing nausea as well.  1x order for oil enema placed. Pt denies CP, SOB, heart palpitations, diaphoresis, dizziness, lightheadedness, vomiting, headache, cough. Notified by RN as pt has not had a BM since 4/22. Pt endorsing nausea as well.  1x order for oil enema placed. Pt denies CP, SOB, heart palpitations, diaphoresis, dizziness, lightheadedness, vomiting, headache, cough, VSS.

## 2024-04-27 NOTE — PROGRESS NOTE ADULT - SUBJECTIVE AND OBJECTIVE BOX
Cc: Gait dysfunction    HPI: Patient resting comfortably.  Denies any complaints.   Has been tolerating rehabilitation program.    acetaminophen     Tablet .. 650 milliGRAM(s) Oral every 6 hours PRN  aluminum hydroxide/magnesium hydroxide/simethicone Suspension 30 milliLiter(s) Oral every 4 hours PRN  aspirin  chewable 81 milliGRAM(s) Oral daily  atorvastatin 80 milliGRAM(s) Oral at bedtime  bisacodyl Suppository 10 milliGRAM(s) Rectal daily PRN  dextrose 10% Bolus 125 milliLiter(s) IV Bolus once  dextrose 5%. 1000 milliLiter(s) IV Continuous <Continuous>  dextrose 5%. 1000 milliLiter(s) IV Continuous <Continuous>  dextrose 50% Injectable 25 Gram(s) IV Push once  dextrose 50% Injectable 12.5 Gram(s) IV Push once  dextrose Oral Gel 15 Gram(s) Oral once PRN  enoxaparin Injectable 40 milliGRAM(s) SubCutaneous <User Schedule>  finasteride 5 milliGRAM(s) Oral daily  glucagon  Injectable 1 milliGRAM(s) IntraMuscular once  insulin glargine Injectable (LANTUS) 10 Unit(s) SubCutaneous at bedtime  insulin lispro (ADMELOG) corrective regimen sliding scale   SubCutaneous at bedtime  insulin lispro (ADMELOG) corrective regimen sliding scale   SubCutaneous three times a day before meals  lidocaine   4% Patch 1 Patch Transdermal daily  melatonin 6 milliGRAM(s) Oral at bedtime  methocarbamol 500 milliGRAM(s) Oral every 6 hours PRN  metoprolol tartrate 12.5 milliGRAM(s) Oral every 12 hours  pantoprazole    Tablet 40 milliGRAM(s) Oral before breakfast  polyethylene glycol 3350 17 Gram(s) Oral two times a day  senna 2 Tablet(s) Oral at bedtime  tamsulosin 0.4 milliGRAM(s) Oral at bedtime  timolol 0.5% Solution 1 Drop(s) Left EYE two times a day      T(C): 36.5 (04-27-24 @ 08:06), Max: 36.8 (04-26-24 @ 20:53)  HR: 72 (04-27-24 @ 08:06) (66 - 75)  BP: 125/72 (04-27-24 @ 08:06) (125/72 - 134/76)  RR: 16 (04-27-24 @ 08:06) (16 - 16)  SpO2: 98% (04-27-24 @ 08:06) (95% - 98%)    In NAD  HEENT- EOMI  Heart- No cyanosis   Lungs- No respiratory distress   Abd- + BS, NT  Ext- No calf pain  Neuro- Exam unchanged                          10.3   9.66  )-----------( 266      ( 26 Apr 2024 05:00 )             30.8     04-26    141  |  107  |  15  ----------------------------<  198<H>  3.7   |  24  |  0.90    Ca    8.1<L>      26 Apr 2024 05:00    TPro  6.2  /  Alb  2.5<L>  /  TBili  2.2<H>  /  DBili  x   /  AST  81<H>  /  ALT  70<H>  /  AlkPhos  70  04-26        Imp: Patient with diagnosis of Right MCA occlusion s/p cerebral angio with mechanical thrombectomy admitted for comprehensive acute rehabilitation.    Plan:  -Impaired mobility - Continue PT/OT  - DVT prophylaxis - Lovenox   - Skin- Turn q2h, check skin daily  - Continue current medications; patient medically stable.   -Active issues-   -CVA - aspirin, atorvastatin  -HTN - metoprolol   - Patient is stable to continue current rehabilitation program.

## 2024-04-27 NOTE — PROGRESS NOTE ADULT - ASSESSMENT
80 yo with a history of DMII, HTN, CAD s/p stenting (on daily ASA) admitted to  rehab after hospital stay for right MCA occlusion( 4/16) at [place]. Patient was given tenecteplase and transferred to [facility] on 4/16 for thrombectomy. Pt is s/p cerebral angio with mechanical thrombectomy by Dr Pate on 4/16. Left lateral hip subcutaneous hematoma with evidence of active bleeding. MR c-spine with multiple cervical fractures, CT T-spine with T6 vertebral body fracture and incidental cystic pancreatic head mass. Course complicated by anemia s/p 1 unit PRBC and platelets, hemorrhagic conversion, fever. Patient medically optimized and cleared for rehab.    #Right MCA occlusion s/p cerebral angio with mechanical thrombectomy by Dr Pate on 4/16  #Large acute right MCA territory infarct with extensive cortical petechial hemorrhage.  #Multiple cervical fractures  #T6 vertebral body acute fracture  - Comprehensive rehab program  - Maintain c-collar and TLSO brace as per primary team  - Bowel/Pain regimen as per primary team   - LDL 50  - A1c 4/16 8.2  - Continue aspirin and atorvastatin 80mg  - Outpatient Neurosurgery F/U  - Family refused ILR    #Diabetes Mellitus II:  - A1c 8.2 on 4/16  - Home Med: Toujeo 24units qhs, metformin 850mg, Repaglinide 1mg daily  - Continue with Lantus 10 units sc QHS  - SSI premeal and qhs  - Blood glucose goal 100-180    #Transaminitis  - Mildly elevated  - Continue atorvastatin for now  - Monitor LFTs    #Blood loss anemia  - Left lateral hip subcutaneous hematoma with evidence of active bleeding.  - s/p 1 unit PRBC and PLT  - stable, continue to monitor    #Hypertension  - Continue metoprolol 12.5mg BID  - Monitor vital signs    #CAD s/p stent  - Continue aspirin, statin, and metoprolol BID    #BPH  - Continue finasteride    #Glaucoma  - Continue timolol BID    #DVT Prophylaxis  - lovenox

## 2024-04-27 NOTE — PROGRESS NOTE ADULT - SUBJECTIVE AND OBJECTIVE BOX
Patient seen and examined at bedside. no complaints. not compliant with neck brace however he will try to use it more frequently      ALLERGIES:  No Known Allergies    MEDICATIONS  (STANDING):  aspirin  chewable 81 milliGRAM(s) Oral daily  atorvastatin 80 milliGRAM(s) Oral at bedtime  dextrose 10% Bolus 125 milliLiter(s) IV Bolus once  dextrose 5%. 1000 milliLiter(s) (100 mL/Hr) IV Continuous <Continuous>  dextrose 5%. 1000 milliLiter(s) (50 mL/Hr) IV Continuous <Continuous>  dextrose 50% Injectable 25 Gram(s) IV Push once  dextrose 50% Injectable 12.5 Gram(s) IV Push once  enoxaparin Injectable 40 milliGRAM(s) SubCutaneous <User Schedule>  finasteride 5 milliGRAM(s) Oral daily  glucagon  Injectable 1 milliGRAM(s) IntraMuscular once  insulin glargine Injectable (LANTUS) 10 Unit(s) SubCutaneous at bedtime  insulin lispro (ADMELOG) corrective regimen sliding scale   SubCutaneous at bedtime  insulin lispro (ADMELOG) corrective regimen sliding scale   SubCutaneous three times a day before meals  lidocaine   4% Patch 1 Patch Transdermal daily  lidocaine   4% Patch 1 Patch Transdermal daily  melatonin 6 milliGRAM(s) Oral at bedtime  metoprolol tartrate 12.5 milliGRAM(s) Oral every 12 hours  pantoprazole    Tablet 40 milliGRAM(s) Oral before breakfast  polyethylene glycol 3350 17 Gram(s) Oral two times a day  senna 2 Tablet(s) Oral at bedtime  tamsulosin 0.4 milliGRAM(s) Oral at bedtime  timolol 0.5% Solution 1 Drop(s) Left EYE two times a day    MEDICATIONS  (PRN):  acetaminophen     Tablet .. 650 milliGRAM(s) Oral every 6 hours PRN Mild Pain (1 - 3)  aluminum hydroxide/magnesium hydroxide/simethicone Suspension 30 milliLiter(s) Oral every 4 hours PRN Dyspepsia  bisacodyl Suppository 10 milliGRAM(s) Rectal daily PRN Constipation  dextrose Oral Gel 15 Gram(s) Oral once PRN Blood Glucose LESS THAN 70 milliGRAM(s)/deciliter  methocarbamol 500 milliGRAM(s) Oral every 6 hours PRN musle spasm    Vital Signs Last 24 Hrs  T(F): 97.7 (27 Apr 2024 08:06), Max: 98.2 (26 Apr 2024 20:53)  HR: 72 (27 Apr 2024 08:06) (66 - 75)  BP: 125/72 (27 Apr 2024 08:06) (125/72 - 134/76)  RR: 16 (27 Apr 2024 08:06) (16 - 16)  SpO2: 98% (27 Apr 2024 08:06) (95% - 98%)  I&O's Summary      PHYSICAL EXAM:    Gen: nad, resting in bed  Neuro: aaox3, no focal deficits  Heent: neck brace in place  Pulm: cta b/l, no w/r/r  CV: +s1s2, no m/r/g  Ab: soft, nt/nd, normoactive bs x 4  Extrem: no edema, pulses intact and equal  Skin: left arm rash at  left elbow (stable per notes)  Psych: normal    LABS:                        10.3   9.66  )-----------( 266      ( 26 Apr 2024 05:00 )             30.8       04-26    141  |  107  |  15  ----------------------------<  198  3.7   |  24  |  0.90    Ca    8.1      26 Apr 2024 05:00    TPro  6.2  /  Alb  2.5  /  TBili  2.2  /  DBili  x   /  AST  81  /  ALT  70  /  AlkPhos  70  04-26                04-16 Chol 103 mg/dL LDL -- HDL 41 mg/dL Trig 52 mg/dL              POCT Blood Glucose.: 162 mg/dL (27 Apr 2024 11:52)  POCT Blood Glucose.: 174 mg/dL (27 Apr 2024 07:57)  POCT Blood Glucose.: 170 mg/dL (26 Apr 2024 22:11)  POCT Blood Glucose.: 206 mg/dL (26 Apr 2024 17:28)      Urinalysis Basic - ( 26 Apr 2024 05:00 )    Color: x / Appearance: x / SG: x / pH: x  Gluc: 198 mg/dL / Ketone: x  / Bili: x / Urobili: x   Blood: x / Protein: x / Nitrite: x   Leuk Esterase: x / RBC: x / WBC x   Sq Epi: x / Non Sq Epi: x / Bacteria: x            RADIOLOGY & ADDITIONAL TESTS:    Care Discussed with Consultants/Other Providers:

## 2024-04-28 LAB
GLUCOSE BLDC GLUCOMTR-MCNC: 160 MG/DL — HIGH (ref 70–99)
GLUCOSE BLDC GLUCOMTR-MCNC: 234 MG/DL — HIGH (ref 70–99)
GLUCOSE BLDC GLUCOMTR-MCNC: 241 MG/DL — HIGH (ref 70–99)
GLUCOSE BLDC GLUCOMTR-MCNC: 256 MG/DL — HIGH (ref 70–99)

## 2024-04-28 PROCEDURE — 99232 SBSQ HOSP IP/OBS MODERATE 35: CPT

## 2024-04-28 RX ADMIN — Medication 12.5 MILLIGRAM(S): at 17:31

## 2024-04-28 RX ADMIN — Medication 4: at 17:30

## 2024-04-28 RX ADMIN — Medication 2: at 12:19

## 2024-04-28 RX ADMIN — Medication 81 MILLIGRAM(S): at 12:22

## 2024-04-28 RX ADMIN — PANTOPRAZOLE SODIUM 40 MILLIGRAM(S): 20 TABLET, DELAYED RELEASE ORAL at 06:02

## 2024-04-28 RX ADMIN — LIDOCAINE 1 PATCH: 4 CREAM TOPICAL at 12:22

## 2024-04-28 RX ADMIN — Medication 6: at 08:16

## 2024-04-28 RX ADMIN — Medication 1 DROP(S): at 06:02

## 2024-04-28 RX ADMIN — Medication 650 MILLIGRAM(S): at 00:24

## 2024-04-28 RX ADMIN — METHOCARBAMOL 500 MILLIGRAM(S): 500 TABLET, FILM COATED ORAL at 00:24

## 2024-04-28 RX ADMIN — Medication 12.5 MILLIGRAM(S): at 06:02

## 2024-04-28 RX ADMIN — LIDOCAINE 1 PATCH: 4 CREAM TOPICAL at 08:12

## 2024-04-28 RX ADMIN — FINASTERIDE 5 MILLIGRAM(S): 5 TABLET, FILM COATED ORAL at 12:22

## 2024-04-28 RX ADMIN — ENOXAPARIN SODIUM 40 MILLIGRAM(S): 100 INJECTION SUBCUTANEOUS at 17:31

## 2024-04-28 RX ADMIN — POLYETHYLENE GLYCOL 3350 17 GRAM(S): 17 POWDER, FOR SOLUTION ORAL at 17:30

## 2024-04-28 RX ADMIN — Medication 650 MILLIGRAM(S): at 22:38

## 2024-04-28 RX ADMIN — Medication 1 DROP(S): at 17:30

## 2024-04-28 RX ADMIN — TAMSULOSIN HYDROCHLORIDE 0.4 MILLIGRAM(S): 0.4 CAPSULE ORAL at 22:28

## 2024-04-28 RX ADMIN — Medication 650 MILLIGRAM(S): at 00:54

## 2024-04-28 RX ADMIN — INSULIN GLARGINE 10 UNIT(S): 100 INJECTION, SOLUTION SUBCUTANEOUS at 22:38

## 2024-04-28 RX ADMIN — METHOCARBAMOL 500 MILLIGRAM(S): 500 TABLET, FILM COATED ORAL at 22:37

## 2024-04-28 RX ADMIN — SENNA PLUS 2 TABLET(S): 8.6 TABLET ORAL at 22:28

## 2024-04-28 RX ADMIN — Medication 6 MILLIGRAM(S): at 22:29

## 2024-04-28 RX ADMIN — POLYETHYLENE GLYCOL 3350 17 GRAM(S): 17 POWDER, FOR SOLUTION ORAL at 06:02

## 2024-04-28 RX ADMIN — ATORVASTATIN CALCIUM 80 MILLIGRAM(S): 80 TABLET, FILM COATED ORAL at 22:28

## 2024-04-28 NOTE — PROGRESS NOTE ADULT - ASSESSMENT
82 yo with a history of DMII, HTN, CAD s/p stenting (on daily ASA) admitted to  rehab after hospital stay for right MCA occlusion( 4/16) at [place]. Patient was given tenecteplase and transferred to [facility] on 4/16 for thrombectomy. Pt is s/p cerebral angio with mechanical thrombectomy by Dr Pate on 4/16. Left lateral hip subcutaneous hematoma with evidence of active bleeding. MR c-spine with multiple cervical fractures, CT T-spine with T6 vertebral body fracture and incidental cystic pancreatic head mass. Course complicated by anemia s/p 1 unit PRBC and platelets, hemorrhagic conversion, fever. Patient medically optimized and cleared for rehab.    #Right MCA occlusion s/p cerebral angio with mechanical thrombectomy by Dr Pate on 4/16  #Large acute right MCA territory infarct with extensive cortical petechial hemorrhage.  #Multiple cervical fractures  #T6 vertebral body acute fracture  - Comprehensive rehab program  - Maintain c-collar and TLSO brace as per primary team  - Bowel/Pain regimen as per primary team   - LDL 50  - A1c 4/16 8.2  - Continue aspirin and atorvastatin 80mg  - Outpatient Neurosurgery F/U  - Family refused ILR    #Diabetes Mellitus II:  - A1c 8.2 on 4/16  - Home Med: Toujeo 24units qhs, metformin 850mg, Repaglinide 1mg daily  - Continue with Lantus 10 units sc QHS  - SSI premeal and qhs  - Blood glucose goal 100-180    #Transaminitis  - Mildly elevated  - Continue atorvastatin for now  - Monitor LFTs    #Blood loss anemia  - Left lateral hip subcutaneous hematoma with evidence of active bleeding.  - s/p 1 unit PRBC and PLT  - stable, continue to monitor  - repeat CBC 4/29    #Hypertension  - Continue metoprolol 12.5mg BID  - Monitor vital signs    #CAD s/p stent  #CVA  - Continue aspirin, statin, and metoprolol BID    #BPH  - Continue finasteride    #Glaucoma  - Continue timolol BID    #DVT Prophylaxis  - lovenox

## 2024-04-28 NOTE — PROGRESS NOTE ADULT - SUBJECTIVE AND OBJECTIVE BOX
Cc: Impaired mobility     HPI: Patient resting comfortably.  Denies any complaints currently.    Has been tolerating rehabilitation program.    acetaminophen     Tablet .. 650 milliGRAM(s) Oral every 6 hours PRN  aluminum hydroxide/magnesium hydroxide/simethicone Suspension 30 milliLiter(s) Oral every 4 hours PRN  aspirin  chewable 81 milliGRAM(s) Oral daily  atorvastatin 80 milliGRAM(s) Oral at bedtime  bisacodyl Suppository 10 milliGRAM(s) Rectal daily PRN  dextrose 10% Bolus 125 milliLiter(s) IV Bolus once  dextrose 5%. 1000 milliLiter(s) IV Continuous <Continuous>  dextrose 5%. 1000 milliLiter(s) IV Continuous <Continuous>  dextrose 50% Injectable 25 Gram(s) IV Push once  dextrose 50% Injectable 12.5 Gram(s) IV Push once  dextrose Oral Gel 15 Gram(s) Oral once PRN  enoxaparin Injectable 40 milliGRAM(s) SubCutaneous <User Schedule>  finasteride 5 milliGRAM(s) Oral daily  glucagon  Injectable 1 milliGRAM(s) IntraMuscular once  insulin glargine Injectable (LANTUS) 10 Unit(s) SubCutaneous at bedtime  insulin lispro (ADMELOG) corrective regimen sliding scale   SubCutaneous three times a day before meals  insulin lispro (ADMELOG) corrective regimen sliding scale   SubCutaneous at bedtime  lidocaine   4% Patch 1 Patch Transdermal daily  lidocaine   4% Patch 1 Patch Transdermal daily  melatonin 6 milliGRAM(s) Oral at bedtime  methocarbamol 500 milliGRAM(s) Oral every 6 hours PRN  metoprolol tartrate 12.5 milliGRAM(s) Oral every 12 hours  pantoprazole    Tablet 40 milliGRAM(s) Oral before breakfast  polyethylene glycol 3350 17 Gram(s) Oral two times a day  senna 2 Tablet(s) Oral at bedtime  tamsulosin 0.4 milliGRAM(s) Oral at bedtime  timolol 0.5% Solution 1 Drop(s) Left EYE two times a day      T(C): 36.6 (04-28-24 @ 07:16), Max: 36.8 (04-28-24 @ 06:17)  HR: 73 (04-28-24 @ 07:16) (67 - 75)  BP: 114/71 (04-28-24 @ 07:16) (114/71 - 137/76)  RR: 14 (04-28-24 @ 07:16) (14 - 16)  SpO2: 99% (04-28-24 @ 07:16) (96% - 99%)      In NAD  HEENT- EOMI  Heart- No cyanosis   Lungs- No respiratory distress   Abd- + BS, NT  Ext- No calf pain  Neuro- Exam unchanged                    Imp: Patient with diagnosis of Right MCA occlusion s/p cerebral angio with mechanical thrombectomy admitted for comprehensive acute rehabilitation.    Plan:  -Impaired mobility - Continue PT/OT  - DVT prophylaxis - Lovenox   - Skin- Turn q2h, check skin daily  - Continue current medications; patient medically stable.   -Active issues-   -CVA - aspirin, atorvastatin  -HTN - metoprolol   -Constipation/bowel program - miralax  - Patient is stable to continue current rehabilitation program.

## 2024-04-28 NOTE — PROGRESS NOTE ADULT - SUBJECTIVE AND OBJECTIVE BOX
Patient seen and examined at bedside. in chair, has neck collar.      ALLERGIES:  No Known Allergies    MEDICATIONS  (STANDING):  aspirin  chewable 81 milliGRAM(s) Oral daily  atorvastatin 80 milliGRAM(s) Oral at bedtime  dextrose 10% Bolus 125 milliLiter(s) IV Bolus once  dextrose 5%. 1000 milliLiter(s) (100 mL/Hr) IV Continuous <Continuous>  dextrose 5%. 1000 milliLiter(s) (50 mL/Hr) IV Continuous <Continuous>  dextrose 50% Injectable 25 Gram(s) IV Push once  dextrose 50% Injectable 12.5 Gram(s) IV Push once  enoxaparin Injectable 40 milliGRAM(s) SubCutaneous <User Schedule>  finasteride 5 milliGRAM(s) Oral daily  glucagon  Injectable 1 milliGRAM(s) IntraMuscular once  insulin glargine Injectable (LANTUS) 10 Unit(s) SubCutaneous at bedtime  insulin lispro (ADMELOG) corrective regimen sliding scale   SubCutaneous three times a day before meals  insulin lispro (ADMELOG) corrective regimen sliding scale   SubCutaneous at bedtime  lidocaine   4% Patch 1 Patch Transdermal daily  lidocaine   4% Patch 1 Patch Transdermal daily  melatonin 6 milliGRAM(s) Oral at bedtime  metoprolol tartrate 12.5 milliGRAM(s) Oral every 12 hours  pantoprazole    Tablet 40 milliGRAM(s) Oral before breakfast  polyethylene glycol 3350 17 Gram(s) Oral two times a day  senna 2 Tablet(s) Oral at bedtime  tamsulosin 0.4 milliGRAM(s) Oral at bedtime  timolol 0.5% Solution 1 Drop(s) Left EYE two times a day    MEDICATIONS  (PRN):  acetaminophen     Tablet .. 650 milliGRAM(s) Oral every 6 hours PRN Mild Pain (1 - 3)  aluminum hydroxide/magnesium hydroxide/simethicone Suspension 30 milliLiter(s) Oral every 4 hours PRN Dyspepsia  bisacodyl Suppository 10 milliGRAM(s) Rectal daily PRN Constipation  dextrose Oral Gel 15 Gram(s) Oral once PRN Blood Glucose LESS THAN 70 milliGRAM(s)/deciliter  methocarbamol 500 milliGRAM(s) Oral every 6 hours PRN musle spasm    Vital Signs Last 24 Hrs  T(F): 97.9 (28 Apr 2024 07:16), Max: 98.3 (28 Apr 2024 06:17)  HR: 73 (28 Apr 2024 07:16) (67 - 75)  BP: 114/71 (28 Apr 2024 07:16) (114/71 - 137/76)  RR: 14 (28 Apr 2024 07:16) (14 - 16)  SpO2: 99% (28 Apr 2024 07:16) (96% - 99%)  I&O's Summary      PHYSICAL EXAM:    Gen: nad, resting in bed  Neuro: aaox3, no focal deficits  Heent: neck brace in place  Pulm: cta b/l, no w/r/r  CV: +s1s2, no m/r/g  Ab: soft, nt/nd, normoactive bs x 4  Extrem: no edema, pulses intact and equal  Skin: left arm ecchymosis at left elbow (stable)  Psych: normal    LABS:                        10.3   9.66  )-----------( 266      ( 26 Apr 2024 05:00 )             30.8       04-26    141  |  107  |  15  ----------------------------<  198  3.7   |  24  |  0.90    Ca    8.1      26 Apr 2024 05:00    TPro  6.2  /  Alb  2.5  /  TBili  2.2  /  DBili  x   /  AST  81  /  ALT  70  /  AlkPhos  70  04-26                04-16 Chol 103 mg/dL LDL -- HDL 41 mg/dL Trig 52 mg/dL              POCT Blood Glucose.: 160 mg/dL (28 Apr 2024 12:02)  POCT Blood Glucose.: 256 mg/dL (28 Apr 2024 08:01)  POCT Blood Glucose.: 155 mg/dL (27 Apr 2024 21:59)  POCT Blood Glucose.: 206 mg/dL (27 Apr 2024 16:49)      Urinalysis Basic - ( 26 Apr 2024 05:00 )    Color: x / Appearance: x / SG: x / pH: x  Gluc: 198 mg/dL / Ketone: x  / Bili: x / Urobili: x   Blood: x / Protein: x / Nitrite: x   Leuk Esterase: x / RBC: x / WBC x   Sq Epi: x / Non Sq Epi: x / Bacteria: x            RADIOLOGY & ADDITIONAL TESTS:    Care Discussed with Consultants/Other Providers:

## 2024-04-29 LAB
ALBUMIN SERPL ELPH-MCNC: 2.7 G/DL — LOW (ref 3.3–5)
ALP SERPL-CCNC: 93 U/L — SIGNIFICANT CHANGE UP (ref 40–120)
ALT FLD-CCNC: 94 U/L — HIGH (ref 10–45)
ANION GAP SERPL CALC-SCNC: 11 MMOL/L — SIGNIFICANT CHANGE UP (ref 5–17)
AST SERPL-CCNC: 75 U/L — HIGH (ref 10–40)
BASOPHILS # BLD AUTO: 0.02 K/UL — SIGNIFICANT CHANGE UP (ref 0–0.2)
BASOPHILS NFR BLD AUTO: 0.3 % — SIGNIFICANT CHANGE UP (ref 0–2)
BILIRUB SERPL-MCNC: 2.2 MG/DL — HIGH (ref 0.2–1.2)
BUN SERPL-MCNC: 12 MG/DL — SIGNIFICANT CHANGE UP (ref 7–23)
CALCIUM SERPL-MCNC: 8.9 MG/DL — SIGNIFICANT CHANGE UP (ref 8.4–10.5)
CHLORIDE SERPL-SCNC: 104 MMOL/L — SIGNIFICANT CHANGE UP (ref 96–108)
CO2 SERPL-SCNC: 24 MMOL/L — SIGNIFICANT CHANGE UP (ref 22–31)
CREAT SERPL-MCNC: 0.74 MG/DL — SIGNIFICANT CHANGE UP (ref 0.5–1.3)
EGFR: 93 ML/MIN/1.73M2 — SIGNIFICANT CHANGE UP
EOSINOPHIL # BLD AUTO: 0.25 K/UL — SIGNIFICANT CHANGE UP (ref 0–0.5)
EOSINOPHIL NFR BLD AUTO: 3.3 % — SIGNIFICANT CHANGE UP (ref 0–6)
GLUCOSE BLDC GLUCOMTR-MCNC: 164 MG/DL — HIGH (ref 70–99)
GLUCOSE BLDC GLUCOMTR-MCNC: 196 MG/DL — HIGH (ref 70–99)
GLUCOSE BLDC GLUCOMTR-MCNC: 211 MG/DL — HIGH (ref 70–99)
GLUCOSE BLDC GLUCOMTR-MCNC: 219 MG/DL — HIGH (ref 70–99)
GLUCOSE SERPL-MCNC: 178 MG/DL — HIGH (ref 70–99)
HCT VFR BLD CALC: 32.2 % — LOW (ref 39–50)
HGB BLD-MCNC: 10.5 G/DL — LOW (ref 13–17)
IMM GRANULOCYTES NFR BLD AUTO: 0.5 % — SIGNIFICANT CHANGE UP (ref 0–0.9)
LYMPHOCYTES # BLD AUTO: 1.81 K/UL — SIGNIFICANT CHANGE UP (ref 1–3.3)
LYMPHOCYTES # BLD AUTO: 24.1 % — SIGNIFICANT CHANGE UP (ref 13–44)
MCHC RBC-ENTMCNC: 29.2 PG — SIGNIFICANT CHANGE UP (ref 27–34)
MCHC RBC-ENTMCNC: 32.6 GM/DL — SIGNIFICANT CHANGE UP (ref 32–36)
MCV RBC AUTO: 89.7 FL — SIGNIFICANT CHANGE UP (ref 80–100)
MONOCYTES # BLD AUTO: 0.57 K/UL — SIGNIFICANT CHANGE UP (ref 0–0.9)
MONOCYTES NFR BLD AUTO: 7.6 % — SIGNIFICANT CHANGE UP (ref 2–14)
NEUTROPHILS # BLD AUTO: 4.81 K/UL — SIGNIFICANT CHANGE UP (ref 1.8–7.4)
NEUTROPHILS NFR BLD AUTO: 64.2 % — SIGNIFICANT CHANGE UP (ref 43–77)
NRBC # BLD: 0 /100 WBCS — SIGNIFICANT CHANGE UP (ref 0–0)
PLATELET # BLD AUTO: 343 K/UL — SIGNIFICANT CHANGE UP (ref 150–400)
POTASSIUM SERPL-MCNC: 4.2 MMOL/L — SIGNIFICANT CHANGE UP (ref 3.5–5.3)
POTASSIUM SERPL-SCNC: 4.2 MMOL/L — SIGNIFICANT CHANGE UP (ref 3.5–5.3)
PROT SERPL-MCNC: 6.5 G/DL — SIGNIFICANT CHANGE UP (ref 6–8.3)
RBC # BLD: 3.59 M/UL — LOW (ref 4.2–5.8)
RBC # FLD: 17 % — HIGH (ref 10.3–14.5)
SODIUM SERPL-SCNC: 139 MMOL/L — SIGNIFICANT CHANGE UP (ref 135–145)
WBC # BLD: 7.5 K/UL — SIGNIFICANT CHANGE UP (ref 3.8–10.5)
WBC # FLD AUTO: 7.5 K/UL — SIGNIFICANT CHANGE UP (ref 3.8–10.5)

## 2024-04-29 PROCEDURE — 99232 SBSQ HOSP IP/OBS MODERATE 35: CPT | Mod: GC

## 2024-04-29 RX ADMIN — LIDOCAINE 1 PATCH: 4 CREAM TOPICAL at 06:40

## 2024-04-29 RX ADMIN — Medication 650 MILLIGRAM(S): at 21:57

## 2024-04-29 RX ADMIN — Medication 1 DROP(S): at 06:40

## 2024-04-29 RX ADMIN — LIDOCAINE 1 PATCH: 4 CREAM TOPICAL at 13:07

## 2024-04-29 RX ADMIN — ENOXAPARIN SODIUM 40 MILLIGRAM(S): 100 INJECTION SUBCUTANEOUS at 18:21

## 2024-04-29 RX ADMIN — INSULIN GLARGINE 10 UNIT(S): 100 INJECTION, SOLUTION SUBCUTANEOUS at 21:57

## 2024-04-29 RX ADMIN — POLYETHYLENE GLYCOL 3350 17 GRAM(S): 17 POWDER, FOR SOLUTION ORAL at 18:20

## 2024-04-29 RX ADMIN — Medication 1 DROP(S): at 18:21

## 2024-04-29 RX ADMIN — PANTOPRAZOLE SODIUM 40 MILLIGRAM(S): 20 TABLET, DELAYED RELEASE ORAL at 06:39

## 2024-04-29 RX ADMIN — SENNA PLUS 2 TABLET(S): 8.6 TABLET ORAL at 21:56

## 2024-04-29 RX ADMIN — POLYETHYLENE GLYCOL 3350 17 GRAM(S): 17 POWDER, FOR SOLUTION ORAL at 06:40

## 2024-04-29 RX ADMIN — LIDOCAINE 1 PATCH: 4 CREAM TOPICAL at 21:34

## 2024-04-29 RX ADMIN — LIDOCAINE 1 PATCH: 4 CREAM TOPICAL at 10:24

## 2024-04-29 RX ADMIN — Medication 12.5 MILLIGRAM(S): at 06:39

## 2024-04-29 RX ADMIN — TAMSULOSIN HYDROCHLORIDE 0.4 MILLIGRAM(S): 0.4 CAPSULE ORAL at 21:57

## 2024-04-29 RX ADMIN — Medication 4: at 13:05

## 2024-04-29 RX ADMIN — Medication 6 MILLIGRAM(S): at 21:57

## 2024-04-29 RX ADMIN — Medication 650 MILLIGRAM(S): at 22:50

## 2024-04-29 RX ADMIN — FINASTERIDE 5 MILLIGRAM(S): 5 TABLET, FILM COATED ORAL at 13:07

## 2024-04-29 RX ADMIN — Medication 4: at 08:03

## 2024-04-29 RX ADMIN — Medication 12.5 MILLIGRAM(S): at 18:22

## 2024-04-29 RX ADMIN — LIDOCAINE 1 PATCH: 4 CREAM TOPICAL at 20:33

## 2024-04-29 RX ADMIN — Medication 81 MILLIGRAM(S): at 14:37

## 2024-04-29 RX ADMIN — Medication 2: at 18:20

## 2024-04-29 RX ADMIN — ATORVASTATIN CALCIUM 80 MILLIGRAM(S): 80 TABLET, FILM COATED ORAL at 21:56

## 2024-04-29 NOTE — PROGRESS NOTE ADULT - SUBJECTIVE AND OBJECTIVE BOX
Chief Complaint- Left sided weakness-/ Neck pain- difficulty ambulating    81 years old man with a PMH of DM HTN CAD with stenting (on daily ASA) with stenting. Pt  presented for sudden onset left sided weakness to [facility] on 4/16/24 . Pt fell down the stairs was found by family awake and confused  on the floor prior to hospitalization.  CT brain showed right MCA occlusion( 4/16). Patient given tenecteplase and was transferred to [facility] on 4/16 for thrombectomy. Upon initial evaluation patient ws found to have NIHSS of 9 and subsequently at initial evaluation patient was not very well cooperative which contributed to a high NIHSS score of 19. After imaging and re-evaluation patient was re-evaluated and was found to have NIHSS of 11 (2 for gaze preference right, 2 for lack of response to threat left, 2 for drift in LUE and 1 for drift in LLE, 1 for neglect, 1 for dysarthria and 2 for left face). Repeat CT brain on 4/16  showed new cytotoxic edema in the right frontal, parietal, and temporal lobes, as well as within the right insula consistent with an acute right MCA territory infarct. No hipolito hemorrhagic transformation.   Pt is s/p cerebral angio with mechanical thrombectomy by Dr Pate on 4/16 with  no change in NIHSS of 11. Pt still with left sided weakness facial weakness, dysarthria, left neglect,  right gaze preference and left hemianopsia.  CT CAP with no evidence of acute trauma, Left lateral hip subcutaneous hematoma with evidence of active bleeding. MR c-spine with multiple cervical fractures, CT T-spine with T6 vertebral body fracture and incidental:cystic pancreatic head mass. Hospital course significant for anemia requiring dual Pressor support and 1 unit PRBC and PLT  2/2 to left hip hematoma. Worsening in mental status MRI brain done showing large R MCA infarct with hemorrhagic conversion. Pt treated with Hypertonic saline for cytotoxic edema with improvement on 4/18. Fever with neg cx treated with zosyn for Klebsiella PNA.  Failed FEEST, NGT place for feeding. Started on ASA and SQL on 4/21. 4/22 repeat FEEST pt advanced to puree with mod thickened liquid ( crush meds). Patient was fitted on 4/22/24 for cervical collar occipital mandibular support. MR T Spine without contrast reveals subtle band of increased T2/STIR signal within the T6 vertebral body, suggestive for acute fracture. Per ortho, no need for ortho spine intervention, continue c-collar and TLSO, outpatient ortho follow up no surgical intervention at this time. Family refused suggested ILR. Patient was evaluated by PM&R and therapy for functional deficits, gait/ADL impairments and acute rehabilitation was recommended. Patient was medically optimized for discharge to [facility] IRU on 4/25/24    ROS/Subjective:  patient seen and evaluated bedside  still reports Neck pain isra when wearing collar  Clearance with Ortho Obtained over weekend to take Collar off in Bed  last BM 4/27  no dizziness, no headaches, + Neck pain, no nausea, no vomiting, no SOB, no chest pain    MEDICATIONS  (STANDING):  aspirin  chewable 81 milliGRAM(s) Oral daily  atorvastatin 80 milliGRAM(s) Oral at bedtime  dextrose 10% Bolus 125 milliLiter(s) IV Bolus once  dextrose 5%. 1000 milliLiter(s) (100 mL/Hr) IV Continuous <Continuous>  dextrose 5%. 1000 milliLiter(s) (50 mL/Hr) IV Continuous <Continuous>  dextrose 50% Injectable 25 Gram(s) IV Push once  dextrose 50% Injectable 12.5 Gram(s) IV Push once  enoxaparin Injectable 40 milliGRAM(s) SubCutaneous <User Schedule>  finasteride 5 milliGRAM(s) Oral daily  glucagon  Injectable 1 milliGRAM(s) IntraMuscular once  insulin glargine Injectable (LANTUS) 10 Unit(s) SubCutaneous at bedtime  insulin lispro (ADMELOG) corrective regimen sliding scale   SubCutaneous three times a day before meals  insulin lispro (ADMELOG) corrective regimen sliding scale   SubCutaneous at bedtime  lidocaine   4% Patch 1 Patch Transdermal daily  lidocaine   4% Patch 1 Patch Transdermal daily  melatonin 6 milliGRAM(s) Oral at bedtime  metoprolol tartrate 12.5 milliGRAM(s) Oral every 12 hours  pantoprazole    Tablet 40 milliGRAM(s) Oral before breakfast  polyethylene glycol 3350 17 Gram(s) Oral two times a day  senna 2 Tablet(s) Oral at bedtime  tamsulosin 0.4 milliGRAM(s) Oral at bedtime  timolol 0.5% Solution 1 Drop(s) Left EYE two times a day    MEDICATIONS  (PRN):  acetaminophen     Tablet .. 650 milliGRAM(s) Oral every 6 hours PRN Mild Pain (1 - 3)  aluminum hydroxide/magnesium hydroxide/simethicone Suspension 30 milliLiter(s) Oral every 4 hours PRN Dyspepsia  bisacodyl Suppository 10 milliGRAM(s) Rectal daily PRN Constipation  dextrose Oral Gel 15 Gram(s) Oral once PRN Blood Glucose LESS THAN 70 milliGRAM(s)/deciliter  methocarbamol 500 milliGRAM(s) Oral every 6 hours PRN musle spasm                            10.5   7.50  )-----------( 343      ( 29 Apr 2024 06:35 )             32.2     04-29    139  |  104  |  12  ----------------------------<  178<H>  4.2   |  24  |  0.74    Ca    8.9      29 Apr 2024 06:35    TPro  6.5  /  Alb  2.7<L>  /  TBili  2.2<H>  /  DBili  x   /  AST  75<H>  /  ALT  94<H>  /  AlkPhos  93  04-29    Urinalysis Basic - ( 29 Apr 2024 06:35 )    Color: x / Appearance: x / SG: x / pH: x  Gluc: 178 mg/dL / Ketone: x  / Bili: x / Urobili: x   Blood: x / Protein: x / Nitrite: x   Leuk Esterase: x / RBC: x / WBC x   Sq Epi: x / Non Sq Epi: x / Bacteria: x        CAPILLARY BLOOD GLUCOSE      POCT Blood Glucose.: 219 mg/dL (29 Apr 2024 12:39)  POCT Blood Glucose.: 211 mg/dL (29 Apr 2024 07:58)  POCT Blood Glucose.: 241 mg/dL (28 Apr 2024 22:34)  POCT Blood Glucose.: 234 mg/dL (28 Apr 2024 17:27)      Vital Signs Last 24 Hrs  T(C): 36.7 (29 Apr 2024 07:26), Max: 36.7 (28 Apr 2024 20:23)  T(F): 98 (29 Apr 2024 07:26), Max: 98.1 (28 Apr 2024 20:23)  HR: 65 (29 Apr 2024 07:26) (65 - 73)  BP: 124/76 (29 Apr 2024 07:26) (121/70 - 124/76)  BP(mean): --  RR: 16 (29 Apr 2024 07:26) (16 - 16)  SpO2: 99% (29 Apr 2024 07:26) (95% - 99%)    Parameters below as of 29 Apr 2024 07:26  Patient On (Oxygen Delivery Method): room air      Constitutional - NAD, Comfortable  HEENT -  EOMI, Left pupil is 7mm irregular and fixed (chronic from prior cataract surgery  Neck -+ c-collar  Chest - good chest expansion, good respiratory effort, CTAB   Cardio - warm and well perfused, RRR, no murmur  Abdomen -  Soft, NTND  Extremities - No peripheral edema, No calf tenderness; Bruising of left shoulder, hip and knee   Neurologic Exam:                    Cognitive -             Orientation: Awake, Alert, AAO to self, place, month year,             Attention:  recall 1/3 objects without cueing     Speech - +dysarthria     Cranial Nerves - +left facial droop, non-reactive left pupil (chronic)     Motor -                      LEFT    UE - ShAB 2/5, EF 2/5, EE 2/5, WE 0/5,  0/5                    RIGHT UE - ShAB 5/5, EF 5/5, EE 5/5, WE 5/5,  WNL                    LEFT    LE - HF 4/5, KE 4/5, DF 5/5, PF 5/5                    RIGHT LE - HF 5/5, KE 5/5, DF 5/5, PF 5/5        Sensory - decreased to LT LLE and absent on LUE     Reflexes - DTR 2 / 4 , neg Duke's b/l,      Coordination - FTN intact on the righ/ HTS intact     OculoVestibular -  No nystagmus  Psychiatric - Mood stable, Affect WNL  Skin on admission: skin tear upper back, bruising over the left shoulder, hip and knee.  skin tears over the RUE.

## 2024-04-29 NOTE — PROGRESS NOTE ADULT - ASSESSMENT
81 years old man with a PMH of DM HTN CAD with stenting (on daily ASA) with stenting. Pt  presented for sudden onset left sided weakness to [facility] on 4/16/24 . Pt fell down the stairs was found by family awake and confused  on the floor prior to hospitalization.  CT brain showed right MCA occlusion( 4/16). Patient given tenecteplase and was transferred to [facility] on 4/16 for thrombectomy. Upon initial evaluation patient ws found to have NIHSS of 9 and subsequently at initial evaluation patient was not very well cooperative which contributed to a high NIHSS score of 19. After imaging and re-evaluation patient was re-evaluated and was found to have NIHSS of 11 (2 for gaze preference right, 2 for lack of response to threat left, 2 for drift in LUE and 1 for drift in LLE, 1 for neglect, 1 for dysarthria and 2 for left face). Repeat CT brain on 4/16  showed new cytotoxic edema in the right frontal, parietal, and temporal lobes, as well as within the right insula consistent with an acute right MCA territory infarct. No hipolito hemorrhagic transformation.   Pt is s/p cerebral angio with mechanical thrombectomy by Dr Pate on 4/16 with  no change in NIHSS of 11. Pt still with left sided weakness facial weakness, dysarthria, left neglect,  right gaze preference and left hemianopsia.  CT CAP with no evidence of acute trauma, Left lateral hip subcutaneous hematoma with evidence of active bleeding. MR c-spine with multiple cervical fractures, CT T-spine with T6 vertebral body fracture and incidental:cystic pancreatic head mass. Hospital course significant for anemia requiring dual Pressor support and 1 unit PRBC and PLT  2/2 to left hip hematoma. Worsening in mental status MRI brain done showing large R MCA infarct with hemorrhagic conversion. Pt treated with Hypertonic saline for cytotoxic edema with improvement on 4/18. Fever with neg cx treated with zosyn for Klebsiella PNA.  Failed FEEST, NGT place for feeding. Started on ASA and SQL on 4/21. 4/22 repeat FEEST pt advanced to puree with mod thickened liquid ( crush meds). Patient was fitted on 4/22/24 for cervical collar occipital mandibular support. MR T Spine without contrast reveals subtle band of increased T2/STIR signal within the T6 vertebral body, suggestive for acute fracture. Per ortho, no need for ortho spine intervention, continue c-collar and TLSO, outpatient ortho follow up no surgical intervention at this time. Family refused suggested ILR. Patient was evaluated by PM&R and therapy for functional deficits, gait/ADL impairments and acute rehabilitation was recommended. Patient was medically optimized for discharge to [facility] IRU on 4/25/24    #Gait Instability, ADL impairments and Functional impairments  - Comprehensive Rehab Program of PT/OT/SLP    # Acute right MCA territory infarct. No hipolito hemorrhagic transformation.  - ASA 81mg daily  - Atrovastatin 80mg q hs  - LDL 50    #Pain control/ Left Hip Hematoma   - Lidocaine patch  - Tylenol PRN  -  Robaxin     # T6 vertebral body fracture Suspected acute anterior longitudinal ligament injury and/or anterior corner avulsion fractures at C4-C5 and C5-C6. Prevertebraledema  extending from C1 through the upper cervical levels.-Suspected right greater than left posterior paraspinal muscular strain and/or ligamentous injury. No Ortho interventions at this time. Ortho follow up post d/c  - TLSO when oob   - spinal precautions  -  c-collar at all times- cleared by ortho to remove collar when in Bed    # Sleep  - Melatonin q hs    # DM  -ISS  - a1c 8.2  Glucoses Elevated- hospitalist to adjust meds    # HTN  - Metoprolol 12.5mg      # Klebsella PNA  - S/p Zosyn    Elevated LFTs  not taking much tylenol  to follow CMP    #GI/Bowel Mgmt   - Continue Senna at bedtime -  - Miralax   -Protonix  Given Mineral oil enema on friday - large BM  last BM 4/27    #Bladder management/ BPH  - Proscar  - Continue to monitor PVR q 8 hours (SC if > 400)  -Monitor UO    #DVT  - Lovenox  - TEDs     #Skin:  -skin tear upper back, bruising over the left shoulder, hip and knee.  skin tears over the RUE.    # Cataract  - Timolol left eye BID      #FEN   - Diet - Pureed   - Dysphagia  SLP - evaluation and treatment- ? MBS    Precautions / PROPHYLAXIS:   - Falls  - ortho: Spine precation, Weight bearing status: WBAT   - Lungs: Aspiration, Incentive Spirometer   - Pressure injury/Skin: Turn Q2hrs while in bed, OOB to Chair, PT/OT

## 2024-04-30 LAB
GLUCOSE BLDC GLUCOMTR-MCNC: 164 MG/DL — HIGH (ref 70–99)
GLUCOSE BLDC GLUCOMTR-MCNC: 185 MG/DL — HIGH (ref 70–99)
GLUCOSE BLDC GLUCOMTR-MCNC: 200 MG/DL — HIGH (ref 70–99)
GLUCOSE BLDC GLUCOMTR-MCNC: 270 MG/DL — HIGH (ref 70–99)

## 2024-04-30 PROCEDURE — 99232 SBSQ HOSP IP/OBS MODERATE 35: CPT | Mod: GC

## 2024-04-30 PROCEDURE — 76700 US EXAM ABDOM COMPLETE: CPT | Mod: 26

## 2024-04-30 PROCEDURE — 99232 SBSQ HOSP IP/OBS MODERATE 35: CPT

## 2024-04-30 RX ORDER — ATORVASTATIN CALCIUM 80 MG/1
40 TABLET, FILM COATED ORAL AT BEDTIME
Refills: 0 | Status: ACTIVE | OUTPATIENT
Start: 2024-04-30 | End: 2025-03-29

## 2024-04-30 RX ORDER — RAMELTEON 8 MG
8 TABLET ORAL AT BEDTIME
Refills: 0 | Status: DISCONTINUED | OUTPATIENT
Start: 2024-04-30 | End: 2024-05-01

## 2024-04-30 RX ADMIN — TAMSULOSIN HYDROCHLORIDE 0.4 MILLIGRAM(S): 0.4 CAPSULE ORAL at 22:08

## 2024-04-30 RX ADMIN — LIDOCAINE 1 PATCH: 4 CREAM TOPICAL at 05:42

## 2024-04-30 RX ADMIN — Medication 1: at 22:06

## 2024-04-30 RX ADMIN — ATORVASTATIN CALCIUM 40 MILLIGRAM(S): 80 TABLET, FILM COATED ORAL at 22:08

## 2024-04-30 RX ADMIN — LIDOCAINE 1 PATCH: 4 CREAM TOPICAL at 00:34

## 2024-04-30 RX ADMIN — Medication 8 MILLIGRAM(S): at 22:07

## 2024-04-30 RX ADMIN — Medication 2: at 12:41

## 2024-04-30 RX ADMIN — Medication 12.5 MILLIGRAM(S): at 19:45

## 2024-04-30 RX ADMIN — Medication 1 DROP(S): at 05:41

## 2024-04-30 RX ADMIN — ENOXAPARIN SODIUM 40 MILLIGRAM(S): 100 INJECTION SUBCUTANEOUS at 19:45

## 2024-04-30 RX ADMIN — FINASTERIDE 5 MILLIGRAM(S): 5 TABLET, FILM COATED ORAL at 12:42

## 2024-04-30 RX ADMIN — Medication 81 MILLIGRAM(S): at 12:42

## 2024-04-30 RX ADMIN — Medication 12.5 MILLIGRAM(S): at 05:41

## 2024-04-30 RX ADMIN — PANTOPRAZOLE SODIUM 40 MILLIGRAM(S): 20 TABLET, DELAYED RELEASE ORAL at 05:41

## 2024-04-30 RX ADMIN — LIDOCAINE 1 PATCH: 4 CREAM TOPICAL at 18:33

## 2024-04-30 RX ADMIN — LIDOCAINE 1 PATCH: 4 CREAM TOPICAL at 12:42

## 2024-04-30 RX ADMIN — INSULIN GLARGINE 10 UNIT(S): 100 INJECTION, SOLUTION SUBCUTANEOUS at 22:06

## 2024-04-30 RX ADMIN — LIDOCAINE 1 PATCH: 4 CREAM TOPICAL at 18:32

## 2024-04-30 RX ADMIN — LIDOCAINE 1 PATCH: 4 CREAM TOPICAL at 07:32

## 2024-04-30 RX ADMIN — SENNA PLUS 2 TABLET(S): 8.6 TABLET ORAL at 22:08

## 2024-04-30 RX ADMIN — Medication 2: at 08:41

## 2024-04-30 NOTE — PROGRESS NOTE ADULT - SUBJECTIVE AND OBJECTIVE BOX
HPI:  81 years old man with a PMH of DM HTN CAD with stenting (on daily ASA) with stenting. Pt  presented for sudden onset left sided weakness to [facility] on 4/16/24 . Pt fell down the stairs was found by family awake and confused  on the floor prior to hospitalization.  CT brain showed right MCA occlusion( 4/16). Patient given tenecteplase and was transferred to [facility] on 4/16 for thrombectomy. Upon initial evaluation patient ws found to have NIHSS of 9 and subsequently at initial evaluation patient was not very well cooperative which contributed to a high NIHSS score of 19. After imaging and re-evaluation patient was re-evaluated and was found to have NIHSS of 11 (2 for gaze preference right, 2 for lack of response to threat left, 2 for drift in LUE and 1 for drift in LLE, 1 for neglect, 1 for dysarthria and 2 for left face). Repeat CT brain on 4/16  showed new cytotoxic edema in the right frontal, parietal, and temporal lobes, as well as within the right insula consistent with an acute right MCA territory infarct. No hipolito hemorrhagic transformation.   Pt is s/p cerebral angio with mechanical thrombectomy by Dr Pate on 4/16 with  no change in NIHSS of 11. Pt still with left sided weakness facial weakness, dysarthria, left neglect,  right gaze preference and left hemianopsia.  CT CAP with no evidence of acute trauma, Left lateral hip subcutaneous hematoma with evidence of active bleeding. MR c-spine with multiple cervical fractures, CT T-spine with T6 vertebral body fracture and incidental:cystic pancreatic head mass. Hospital course significant for anemia requiring dual Pressor support and 1 unit PRBC and PLT  2/2 to left hip hematoma. Worsening in mental status MRI brain done showing large R MCA infarct with hemorrhagic conversion. Pt treated with Hypertonic saline for cytotoxic edema with improvement on 4/18. Fever with neg cx treated with zosyn for Klebsiella PNA.  Failed FEEST, NGT place for feeding. Started on ASA and SQL on 4/21. 4/22 repeat FEEST pt advanced to puree with mod thickened liquid ( crush meds). Patient was fitted on 4/22/24 for cervical collar occipital mandibular support. MR T Spine without contrast reveals subtle band of increased T2/STIR signal within the T6 vertebral body, suggestive for acute fracture. Per ortho, no need for ortho spine intervention, continue c-collar and TLSO, outpatient ortho follow up no surgical intervention at this time. Family refused suggested ILR. Patient was evaluated by PM&R and therapy for functional deficits, gait/ADL impairments and acute rehabilitation was recommended. Patient was medically optimized for discharge to [facility] IRU on 4/25/24      ROS/Subjective:  patient seen and evaluated bedside  still reports Neck pain isra when wearing collar  Clearance with Ortho Obtained over weekend to take Collar off in Bed  last BM 4/27  no dizziness, no headaches, + Neck pain, no nausea, no vomiting, no SOB, no chest pain      MEDICATIONS  (STANDING):  aspirin  chewable 81 milliGRAM(s) Oral daily  atorvastatin 80 milliGRAM(s) Oral at bedtime  dextrose 10% Bolus 125 milliLiter(s) IV Bolus once  dextrose 5%. 1000 milliLiter(s) (100 mL/Hr) IV Continuous <Continuous>  dextrose 5%. 1000 milliLiter(s) (50 mL/Hr) IV Continuous <Continuous>  dextrose 50% Injectable 25 Gram(s) IV Push once  dextrose 50% Injectable 12.5 Gram(s) IV Push once  enoxaparin Injectable 40 milliGRAM(s) SubCutaneous <User Schedule>  finasteride 5 milliGRAM(s) Oral daily  glucagon  Injectable 1 milliGRAM(s) IntraMuscular once  insulin glargine Injectable (LANTUS) 10 Unit(s) SubCutaneous at bedtime  insulin lispro (ADMELOG) corrective regimen sliding scale   SubCutaneous at bedtime  insulin lispro (ADMELOG) corrective regimen sliding scale   SubCutaneous three times a day before meals  lidocaine   4% Patch 1 Patch Transdermal daily  lidocaine   4% Patch 1 Patch Transdermal daily  metoprolol tartrate 12.5 milliGRAM(s) Oral every 12 hours  pantoprazole    Tablet 40 milliGRAM(s) Oral before breakfast  polyethylene glycol 3350 17 Gram(s) Oral two times a day  ramelteon 8 milliGRAM(s) Oral at bedtime  senna 2 Tablet(s) Oral at bedtime  tamsulosin 0.4 milliGRAM(s) Oral at bedtime  timolol 0.5% Solution 1 Drop(s) Left EYE two times a day    MEDICATIONS  (PRN):  acetaminophen     Tablet .. 650 milliGRAM(s) Oral every 6 hours PRN Mild Pain (1 - 3)  aluminum hydroxide/magnesium hydroxide/simethicone Suspension 30 milliLiter(s) Oral every 4 hours PRN Dyspepsia  bisacodyl Suppository 10 milliGRAM(s) Rectal daily PRN Constipation  dextrose Oral Gel 15 Gram(s) Oral once PRN Blood Glucose LESS THAN 70 milliGRAM(s)/deciliter  methocarbamol 500 milliGRAM(s) Oral every 6 hours PRN musle spasm                            10.5   7.50  )-----------( 343      ( 29 Apr 2024 06:35 )             32.2     04-29    139  |  104  |  12  ----------------------------<  178<H>  4.2   |  24  |  0.74    Ca    8.9      29 Apr 2024 06:35    TPro  6.5  /  Alb  2.7<L>  /  TBili  2.2<H>  /  DBili  x   /  AST  75<H>  /  ALT  94<H>  /  AlkPhos  93  04-29    Urinalysis Basic - ( 29 Apr 2024 06:35 )    Color: x / Appearance: x / SG: x / pH: x  Gluc: 178 mg/dL / Ketone: x  / Bili: x / Urobili: x   Blood: x / Protein: x / Nitrite: x   Leuk Esterase: x / RBC: x / WBC x   Sq Epi: x / Non Sq Epi: x / Bacteria: x        CAPILLARY BLOOD GLUCOSE      POCT Blood Glucose.: 185 mg/dL (30 Apr 2024 12:10)  POCT Blood Glucose.: 200 mg/dL (30 Apr 2024 08:08)  POCT Blood Glucose.: 196 mg/dL (29 Apr 2024 21:49)  POCT Blood Glucose.: 164 mg/dL (29 Apr 2024 17:56)      Vital Signs Last 24 Hrs  T(C): 36.9 (30 Apr 2024 07:56), Max: 36.9 (30 Apr 2024 02:33)  T(F): 98.4 (30 Apr 2024 07:56), Max: 98.4 (30 Apr 2024 02:33)  HR: 67 (30 Apr 2024 07:56) (67 - 73)  BP: 131/79 (30 Apr 2024 07:56) (106/65 - 131/79)  BP(mean): --  RR: 14 (30 Apr 2024 07:56) (14 - 16)  SpO2: 99% (30 Apr 2024 07:56) (99% - 99%)    Parameters below as of 30 Apr 2024 07:56  Patient On (Oxygen Delivery Method): room air        Constitutional - NAD, Comfortable  HEENT -  EOMI, Left pupil is 7mm irregular and fixed (chronic from prior cataract surgery  Neck -+ c-collar  Chest - good chest expansion, good respiratory effort, CTAB   Cardio - warm and well perfused, RRR, no murmur  Abdomen -  Soft, NTND  Extremities - No peripheral edema, No calf tenderness; Bruising of left shoulder, hip and knee   Neurologic Exam:                    Cognitive -             Orientation: Awake, Alert, AAO to self, place, month year,             Attention:  recall 1/3 objects without cueing     Speech - +dysarthria     Cranial Nerves - +left facial droop, non-reactive left pupil (chronic)     Motor -                      LEFT    UE - ShAB 2/5, EF 2/5, EE 2/5, WE 0/5,  0/5                    RIGHT UE - ShAB 5/5, EF 5/5, EE 5/5, WE 5/5,  WNL                    LEFT    LE - HF 4/5, KE 4/5, DF 5/5, PF 5/5                    RIGHT LE - HF 5/5, KE 5/5, DF 5/5, PF 5/5        Sensory - decreased to LT LLE and absent on LUE     Reflexes - DTR 2 / 4 , neg Duke's b/l,      Coordination - FTN intact on the righ/ HTS intact     OculoVestibular -  No nystagmus  Psychiatric - Mood stable, Affect WNL  Skin on admission: skin tear upper back, bruising over the left shoulder, hip and knee.  skin tears over the RUE.      Continue comprehensive acute rehab program consisting of 3hrs/day of OT/PT and SLP.

## 2024-04-30 NOTE — PROGRESS NOTE ADULT - ASSESSMENT
80 yo with a history of DMII, HTN, CAD s/p stenting (on daily ASA) admitted to [facility] after hospital stay for right MCA occlusion( 4/16) at [place]. Patient was given tenecteplase and transferred to [facility] on 4/16 for thrombectomy. Pt is s/p cerebral angio with mechanical thrombectomy by Dr Pate on 4/16. Left lateral hip subcutaneous hematoma with evidence of active bleeding. MR c-spine with multiple cervical fractures, CT T-spine with T6 vertebral body fracture and incidental cystic pancreatic head mass. Course complicated by anemia s/p 1 unit PRBC and platelets, hemorrhagic conversion, fever.     #Right MCA occlusion s/p cerebral angio with mechanical thrombectomy by Dr Pate on 4/16  #Large acute right MCA territory infarct with extensive cortical petechial hemorrhage.  #Multiple cervical fractures  #T6 vertebral body acute fracture  - Comprehensive rehab program  - Maintain c-collar and TLSO brace as per primary team  - Bowel/Pain regimen as per primary team   - LDL 50  - A1c 4/16 8.2  - Continue aspirin and atorvastatin 80mg  - Outpatient Neurosurgery F/U  - Family refused ILR    #Diabetes Mellitus II:  - A1c 8.2 on 4/16  - Home Med: Toujeo 24units qhs, metformin 850mg, Repaglinide 1mg daily  - Continue with Lantus 10 units sc QHS  - SSI premeal and qhs  - Blood glucose goal 100-180    #Transaminitis, hyperbilirubinemia  - usg abdomen/ liver. if no abnormality found and lfts keep going up, will hold atorvastatin.    #Blood loss anemia  - Left lateral hip subcutaneous hematoma with evidence of active bleeding.  - s/p 1 unit PRBC and PLT  - stable, continue to monitor  - repeat CBC 4/29    #Hypertension  - Continue metoprolol 12.5mg BID  - Monitor vital signs    #CAD s/p stent  #CVA  - Continue aspirin, statin, and metoprolol BID    #BPH  - Continue finasteride    #Glaucoma  - Continue timolol BID    #DVT Prophylaxis  - lovenox

## 2024-04-30 NOTE — PROGRESS NOTE ADULT - ASSESSMENT
#Gait Instability, ADL impairments and Functional impairments  - Comprehensive Rehab Program of PT/OT/SLP    # Acute right MCA territory infarct. No hipolito hemorrhagic transformation.  - ASA 81mg daily  - Atorvastatin 80mg q hs  - LDL 50  - Estim to LUE in therapy    #Pain control/ Left Hip Hematoma   - Lidocaine patch  - Tylenol PRN  - Robaxin     # T6 vertebral body fracture Suspected acute anterior longitudinal ligament injury and/or anterior corner avulsion fractures at C4-C5 and C5-C6. Prevertebral edema  extending from C1 through the upper cervical levels.-Suspected right greater than left posterior paraspinal muscular strain and/or ligamentous injury. No Ortho interventions at this time. Ortho follow up post d/c  - TLSO when oob   - spinal precautions  - cleared by ortho to remove collar when in Bed      # Sleep  - Melatonin q hs-not effective  - Rozerem trial  -neuropsych eval    # DM  -ISS  - a1c 8.2  Glucoses Elevated- hospitalist to adjust meds 4/30    # HTN  - Metoprolol 12.5mg      # Klebsella PNA  - S/p Zosyn, afebrile, no leukocytosis, VSS    Elevated LFTs  not taking much tylenol  to follow CMP    #GI/Bowel Mgmt   - Continue Senna at bedtime -  - Miralax   -Protonix  Given Mineral oil enema on friday - large BM  last BM 4/27    #Bladder management/ BPH  - Proscar  - Flomax    #DVT  - Lovenox  - TEDs     #Skin:  -skin tear upper back, bruising over the left shoulder, hip and knee.  skin tears over the RUE.    # Cataract  - Timolol left eye BID      #FEN   - Diet - Pureed   - Dysphagia  SLP - evaluation and treatment- ? MBS    Precautions / PROPHYLAXIS:   - Falls  - ortho: Spine precation, Weight bearing status: WBAT   - Lungs: Aspiration, Incentive Spirometer   - Pressure injury/Skin: Turn Q2hrs while in bed, OOB to Chair, PT/OT   #Gait Instability, ADL impairments and Functional impairments  - Comprehensive Rehab Program of PT/OT/SLP    # Acute right MCA territory infarct. No hipolito hemorrhagic transformation.  - ASA 81mg daily  - Atorvastatin 80mg q hs  - LDL 50  - Estim to LUE in therapy    #Pain control/ Left Hip Hematoma   - Lidocaine patch  - Tylenol PRN  - Robaxin     # T6 vertebral body fracture Suspected acute anterior longitudinal ligament injury and/or anterior corner avulsion fractures at C4-C5 and C5-C6. Prevertebral edema  extending from C1 through the upper cervical levels.-Suspected right greater than left posterior paraspinal muscular strain and/or ligamentous injury. No Ortho interventions at this time. Ortho follow up post d/c  - TLSO when oob   - spinal precautions  - cleared by ortho to remove collar when in Bed      # Sleep  - Melatonin q hs-not effective  - Rozerem trial  -neuropsych eval    # DM2 with hyperglycemia  -ISS  - a1c 8.2  Glucoses Elevated- hospitalist to adjust meds 4/30    # HTN  - Metoprolol 12.5mg      # Klebsella PNA  - S/p Zosyn, afebrile, no leukocytosis, VSS    Elevated LFTs  not taking much tylenol  to follow CMP    #GI/Bowel Mgmt   - Continue Senna at bedtime -  - Miralax   -Protonix  Given Mineral oil enema on friday - large BM  last BM 4/27    #Bladder management/ BPH  - Proscar  - Flomax    #DVT  - Lovenox  - TEDs     #Skin:  -skin tear upper back, bruising over the left shoulder, hip and knee.  skin tears over the RUE.    # Cataract  - Timolol left eye BID      #FEN   - Diet - Pureed   - Dysphagia  SLP - evaluation and treatment- ? MBS    Precautions / PROPHYLAXIS:   - Falls  - ortho: Spine precation, Weight bearing status: WBAT   - Lungs: Aspiration, Incentive Spirometer   - Pressure injury/Skin: Turn Q2hrs while in bed, OOB to Chair, PT/OT   #Gait Instability, ADL impairments and Functional impairments  - Comprehensive Rehab Program of PT/OT/SLP    # Acute right MCA territory infarct. No hipolito hemorrhagic transformation.  - ASA 81mg daily  - Atorvastatin 80mg q hs  - LDL 50  - Estim to LUE in therapy    #Pain control/ Left Hip Hematoma   - Lidocaine patch  - Tylenol PRN  - Robaxin     # T6 vertebral body fracture Suspected acute anterior longitudinal ligament injury and/or anterior corner avulsion fractures at C4-C5 and C5-C6. Prevertebral edema  extending from C1 through the upper cervical levels.-Suspected right greater than left posterior paraspinal muscular strain and/or ligamentous injury. No Ortho interventions at this time. Ortho follow up post d/c  - TLSO when oob   - spinal precautions  - cleared by ortho to remove collar when in Bed      # Sleep  - Melatonin q hs-not effective  - Rozerem trial  -neuropsych eval    # DM2 with hyperglycemia  -ISS  - a1c 8.2  Glucoses Elevated- hospitalist to adjust meds 4/30    # HTN  - Metoprolol 12.5mg      # Klebsella PNA  - S/p Zosyn, afebrile, no leukocytosis, VSS    Elevated LFTs  not taking much tylenol  to follow CMP    #GI/Bowel Mgmt   - Continue Senna at bedtime -  - Miralax   -Protonix  Given Mineral oil enema on friday - large BM  last BM 4/30    #Bladder management/ BPH  - Proscar  - Flomax    #DVT  - Lovenox  - TEDs     #Skin:  -skin tear upper back, bruising over the left shoulder, hip and knee.  skin tears over the RUE.    # Cataract  - Timolol left eye BID      #FEN   - Diet - Pureed   - Dysphagia  SLP - evaluation and treatment- ? MBS    Precautions / PROPHYLAXIS:   - Falls  - ortho: Spine precation, Weight bearing status: WBAT   - Lungs: Aspiration, Incentive Spirometer   - Pressure injury/Skin: Turn Q2hrs while in bed, OOB to Chair, PT/OT

## 2024-04-30 NOTE — PROGRESS NOTE ADULT - SUBJECTIVE AND OBJECTIVE BOX
No acute events overnight. No new complaints.      ALLERGIES:  No Known Allergies    MEDICATIONS  (STANDING):  aspirin  chewable 81 milliGRAM(s) Oral daily  atorvastatin 80 milliGRAM(s) Oral at bedtime  dextrose 10% Bolus 125 milliLiter(s) IV Bolus once  dextrose 5%. 1000 milliLiter(s) (100 mL/Hr) IV Continuous <Continuous>  dextrose 5%. 1000 milliLiter(s) (50 mL/Hr) IV Continuous <Continuous>  dextrose 50% Injectable 25 Gram(s) IV Push once  dextrose 50% Injectable 12.5 Gram(s) IV Push once  enoxaparin Injectable 40 milliGRAM(s) SubCutaneous <User Schedule>  finasteride 5 milliGRAM(s) Oral daily  glucagon  Injectable 1 milliGRAM(s) IntraMuscular once  insulin glargine Injectable (LANTUS) 10 Unit(s) SubCutaneous at bedtime  insulin lispro (ADMELOG) corrective regimen sliding scale   SubCutaneous three times a day before meals  insulin lispro (ADMELOG) corrective regimen sliding scale   SubCutaneous at bedtime  lidocaine   4% Patch 1 Patch Transdermal daily  lidocaine   4% Patch 1 Patch Transdermal daily  melatonin 6 milliGRAM(s) Oral at bedtime  metoprolol tartrate 12.5 milliGRAM(s) Oral every 12 hours  pantoprazole    Tablet 40 milliGRAM(s) Oral before breakfast  polyethylene glycol 3350 17 Gram(s) Oral two times a day  senna 2 Tablet(s) Oral at bedtime  tamsulosin 0.4 milliGRAM(s) Oral at bedtime  timolol 0.5% Solution 1 Drop(s) Left EYE two times a day    MEDICATIONS  (PRN):  acetaminophen     Tablet .. 650 milliGRAM(s) Oral every 6 hours PRN Mild Pain (1 - 3)  aluminum hydroxide/magnesium hydroxide/simethicone Suspension 30 milliLiter(s) Oral every 4 hours PRN Dyspepsia  bisacodyl Suppository 10 milliGRAM(s) Rectal daily PRN Constipation  dextrose Oral Gel 15 Gram(s) Oral once PRN Blood Glucose LESS THAN 70 milliGRAM(s)/deciliter  methocarbamol 500 milliGRAM(s) Oral every 6 hours PRN musle spasm    T(C): 36.9 (04-30-24 @ 07:56), Max: 36.9 (04-30-24 @ 02:33)  T(F): 98.4 (04-30-24 @ 07:56), Max: 98.4 (04-30-24 @ 02:33)  HR: 67 (04-30-24 @ 07:56) (67 - 73)  BP: 131/79 (04-30-24 @ 07:56) (106/65 - 131/79)  ABP: --  ABP(mean): --  RR: 14 (04-30-24 @ 07:56) (14 - 16)  SpO2: 99% (04-30-24 @ 07:56) (99% - 99%)      PHYSICAL EXAM:    Gen: nad, resting in bed  Neuro: aaox3, no focal deficits  Heent: neck brace in place  Pulm: cta b/l, no w/r/r  CV: +s1s2, no m/r/g  Ab: soft, nt/nd, normoactive bs x 4  Extrem: no edema, pulses intact and equal  Skin: left arm ecchymosis at left elbow (stable)  Psych: normal    LABS:                        10.5   7.50  )-----------( 343      ( 29 Apr 2024 06:35 )             32.2     04-29    139  |  104  |  12  ----------------------------<  178<H>  4.2   |  24  |  0.74    Ca    8.9      29 Apr 2024 06:35    TPro  6.5  /  Alb  2.7<L>  /  TBili  2.2<H>  /  DBili  x   /  AST  75<H>  /  ALT  94<H>  /  AlkPhos  93  04-29      Urinalysis Basic - ( 29 Apr 2024 06:35 )    Color: x / Appearance: x / SG: x / pH: x  Gluc: 178 mg/dL / Ketone: x  / Bili: x / Urobili: x   Blood: x / Protein: x / Nitrite: x   Leuk Esterase: x / RBC: x / WBC x   Sq Epi: x / Non Sq Epi: x / Bacteria: x       CAPILLARY BLOOD GLUCOSE      POCT Blood Glucose.: 185 mg/dL (30 Apr 2024 12:10)  POCT Blood Glucose.: 200 mg/dL (30 Apr 2024 08:08)  POCT Blood Glucose.: 196 mg/dL (29 Apr 2024 21:49)  POCT Blood Glucose.: 164 mg/dL (29 Apr 2024 17:56)        Urinalysis Basic - ( 29 Apr 2024 06:35 )    Color: x / Appearance: x / SG: x / pH: x  Gluc: 178 mg/dL / Ketone: x  / Bili: x / Urobili: x   Blood: x / Protein: x / Nitrite: x   Leuk Esterase: x / RBC: x / WBC x   Sq Epi: x / Non Sq Epi: x / Bacteria: x

## 2024-05-01 LAB
GLUCOSE BLDC GLUCOMTR-MCNC: 178 MG/DL — HIGH (ref 70–99)
GLUCOSE BLDC GLUCOMTR-MCNC: 216 MG/DL — HIGH (ref 70–99)
GLUCOSE BLDC GLUCOMTR-MCNC: 234 MG/DL — HIGH (ref 70–99)
GLUCOSE BLDC GLUCOMTR-MCNC: 234 MG/DL — HIGH (ref 70–99)

## 2024-05-01 PROCEDURE — 99233 SBSQ HOSP IP/OBS HIGH 50: CPT | Mod: GC

## 2024-05-01 PROCEDURE — 90791 PSYCH DIAGNOSTIC EVALUATION: CPT

## 2024-05-01 PROCEDURE — 70450 CT HEAD/BRAIN W/O DYE: CPT | Mod: 26

## 2024-05-01 PROCEDURE — 99232 SBSQ HOSP IP/OBS MODERATE 35: CPT

## 2024-05-01 PROCEDURE — 70450 CT HEAD/BRAIN W/O DYE: CPT | Mod: 26,77

## 2024-05-01 RX ORDER — INSULIN GLARGINE 100 [IU]/ML
12 INJECTION, SOLUTION SUBCUTANEOUS AT BEDTIME
Refills: 0 | Status: ACTIVE | OUTPATIENT
Start: 2024-05-01 | End: 2025-03-30

## 2024-05-01 RX ORDER — TRAZODONE HCL 50 MG
25 TABLET ORAL AT BEDTIME
Refills: 0 | Status: DISCONTINUED | OUTPATIENT
Start: 2024-05-01 | End: 2024-05-06

## 2024-05-01 RX ORDER — POLYETHYLENE GLYCOL 3350 17 G/17G
17 POWDER, FOR SOLUTION ORAL DAILY
Refills: 0 | Status: DISCONTINUED | OUTPATIENT
Start: 2024-05-01 | End: 2024-05-06

## 2024-05-01 RX ORDER — GABAPENTIN 400 MG/1
200 CAPSULE ORAL AT BEDTIME
Refills: 0 | Status: ACTIVE | OUTPATIENT
Start: 2024-05-01 | End: 2025-03-30

## 2024-05-01 RX ORDER — MODAFINIL 200 MG/1
50 TABLET ORAL
Refills: 0 | Status: ACTIVE | OUTPATIENT
Start: 2024-05-01 | End: 2024-05-08

## 2024-05-01 RX ADMIN — Medication 25 MILLIGRAM(S): at 21:25

## 2024-05-01 RX ADMIN — LIDOCAINE 1 PATCH: 4 CREAM TOPICAL at 22:29

## 2024-05-01 RX ADMIN — POLYETHYLENE GLYCOL 3350 17 GRAM(S): 17 POWDER, FOR SOLUTION ORAL at 05:31

## 2024-05-01 RX ADMIN — LIDOCAINE 1 PATCH: 4 CREAM TOPICAL at 10:26

## 2024-05-01 RX ADMIN — Medication 81 MILLIGRAM(S): at 11:15

## 2024-05-01 RX ADMIN — SENNA PLUS 2 TABLET(S): 8.6 TABLET ORAL at 21:23

## 2024-05-01 RX ADMIN — LIDOCAINE 1 PATCH: 4 CREAM TOPICAL at 11:16

## 2024-05-01 RX ADMIN — Medication 12.5 MILLIGRAM(S): at 17:13

## 2024-05-01 RX ADMIN — Medication 4: at 12:31

## 2024-05-01 RX ADMIN — LIDOCAINE 1 PATCH: 4 CREAM TOPICAL at 19:28

## 2024-05-01 RX ADMIN — TAMSULOSIN HYDROCHLORIDE 0.4 MILLIGRAM(S): 0.4 CAPSULE ORAL at 21:24

## 2024-05-01 RX ADMIN — PANTOPRAZOLE SODIUM 40 MILLIGRAM(S): 20 TABLET, DELAYED RELEASE ORAL at 05:30

## 2024-05-01 RX ADMIN — Medication 1 DROP(S): at 17:13

## 2024-05-01 RX ADMIN — ATORVASTATIN CALCIUM 40 MILLIGRAM(S): 80 TABLET, FILM COATED ORAL at 21:23

## 2024-05-01 RX ADMIN — Medication 12.5 MILLIGRAM(S): at 05:30

## 2024-05-01 RX ADMIN — Medication 4: at 17:12

## 2024-05-01 RX ADMIN — INSULIN GLARGINE 12 UNIT(S): 100 INJECTION, SOLUTION SUBCUTANEOUS at 21:24

## 2024-05-01 RX ADMIN — GABAPENTIN 200 MILLIGRAM(S): 400 CAPSULE ORAL at 21:23

## 2024-05-01 RX ADMIN — Medication 1 DROP(S): at 05:30

## 2024-05-01 RX ADMIN — FINASTERIDE 5 MILLIGRAM(S): 5 TABLET, FILM COATED ORAL at 11:16

## 2024-05-01 NOTE — PROGRESS NOTE ADULT - SUBJECTIVE AND OBJECTIVE BOX
HPI:  81 years old man with a PMH of DM HTN CAD with stenting (on daily ASA) with stenting. Pt  presented for sudden onset left sided weakness to [facility] on 4/16/24 . Pt fell down the stairs was found by family awake and confused  on the floor prior to hospitalization.  CT brain showed right MCA occlusion( 4/16). Patient given tenecteplase and was transferred to [facility] on 4/16 for thrombectomy. Upon initial evaluation patient ws found to have NIHSS of 9 and subsequently at initial evaluation patient was not very well cooperative which contributed to a high NIHSS score of 19. After imaging and re-evaluation patient was re-evaluated and was found to have NIHSS of 11 (2 for gaze preference right, 2 for lack of response to threat left, 2 for drift in LUE and 1 for drift in LLE, 1 for neglect, 1 for dysarthria and 2 for left face). Repeat CT brain on 4/16  showed new cytotoxic edema in the right frontal, parietal, and temporal lobes, as well as within the right insula consistent with an acute right MCA territory infarct. No hipolito hemorrhagic transformation.   Pt is s/p cerebral angio with mechanical thrombectomy by Dr Pate on 4/16 with  no change in NIHSS of 11. Pt still with left sided weakness facial weakness, dysarthria, left neglect,  right gaze preference and left hemianopsia.  CT CAP with no evidence of acute trauma, Left lateral hip subcutaneous hematoma with evidence of active bleeding. MR c-spine with multiple cervical fractures, CT T-spine with T6 vertebral body fracture and incidental:cystic pancreatic head mass. Hospital course significant for anemia requiring dual Pressor support and 1 unit PRBC and PLT  2/2 to left hip hematoma. Worsening in mental status MRI brain done showing large R MCA infarct with hemorrhagic conversion. Pt treated with Hypertonic saline for cytotoxic edema with improvement on 4/18. Fever with neg cx treated with zosyn for Klebsiella PNA.  Failed FEEST, NGT place for feeding. Started on ASA and SQL on 4/21. 4/22 repeat FEEST pt advanced to puree with mod thickened liquid ( crush meds). Patient was fitted on 4/22/24 for cervical collar occipital mandibular support. MR T Spine without contrast reveals subtle band of increased T2/STIR signal within the T6 vertebral body, suggestive for acute fracture. Per ortho, no need for ortho spine intervention, continue c-collar and TLSO, outpatient ortho follow up no surgical intervention at this time. Family refused suggested ILR. Patient was evaluated by PM&R and therapy for functional deficits, gait/ADL impairments and acute rehabilitation was recommended. Patient was medically optimized for discharge to [facility] IRU on 4/25/24      ROS/Subjective:  patient seen and evaluated in OT  reports poor sleep but according to nursing slept well  pain in LUE/ LLE  waxing/waning level of alertness- falling  asleep while talking to patient  moved bowels today  no dizziness, no headaches, + Neck pain, no nausea, no vomiting, no SOB, no chest pain  ambulated in PT 50' one arm assist      MEDICATIONS  (STANDING):  atorvastatin 40 milliGRAM(s) Oral at bedtime  dextrose 10% Bolus 125 milliLiter(s) IV Bolus once  dextrose 5%. 1000 milliLiter(s) (100 mL/Hr) IV Continuous <Continuous>  dextrose 5%. 1000 milliLiter(s) (50 mL/Hr) IV Continuous <Continuous>  dextrose 50% Injectable 25 Gram(s) IV Push once  dextrose 50% Injectable 12.5 Gram(s) IV Push once  finasteride 5 milliGRAM(s) Oral daily  gabapentin 200 milliGRAM(s) Oral at bedtime  glucagon  Injectable 1 milliGRAM(s) IntraMuscular once  insulin glargine Injectable (LANTUS) 12 Unit(s) SubCutaneous at bedtime  insulin lispro (ADMELOG) corrective regimen sliding scale   SubCutaneous at bedtime  insulin lispro (ADMELOG) corrective regimen sliding scale   SubCutaneous three times a day before meals  lidocaine   4% Patch 1 Patch Transdermal daily  lidocaine   4% Patch 1 Patch Transdermal daily  metoprolol tartrate 12.5 milliGRAM(s) Oral every 12 hours  modafinil 50 milliGRAM(s) Oral <User Schedule>  pantoprazole    Tablet 40 milliGRAM(s) Oral before breakfast  polyethylene glycol 3350 17 Gram(s) Oral daily  senna 2 Tablet(s) Oral at bedtime  tamsulosin 0.4 milliGRAM(s) Oral at bedtime  timolol 0.5% Solution 1 Drop(s) Left EYE two times a day  traZODone 25 milliGRAM(s) Oral at bedtime    MEDICATIONS  (PRN):  acetaminophen     Tablet .. 650 milliGRAM(s) Oral every 6 hours PRN Mild Pain (1 - 3)  aluminum hydroxide/magnesium hydroxide/simethicone Suspension 30 milliLiter(s) Oral every 4 hours PRN Dyspepsia  bisacodyl Suppository 10 milliGRAM(s) Rectal daily PRN Constipation  dextrose Oral Gel 15 Gram(s) Oral once PRN Blood Glucose LESS THAN 70 milliGRAM(s)/deciliter                            10.5   7.50  )-----------( 343      ( 29 Apr 2024 06:35 )             32.2     04-29    139  |  104  |  12  ----------------------------<  178<H>  4.2   |  24  |  0.74    Ca    8.9      29 Apr 2024 06:35    TPro  6.5  /  Alb  2.7<L>  /  TBili  2.2<H>  /  DBili  x   /  AST  75<H>  /  ALT  94<H>  /  AlkPhos  93  04-29    Urinalysis Basic - ( 29 Apr 2024 06:35 )    Color: x / Appearance: x / SG: x / pH: x  Gluc: 178 mg/dL / Ketone: x  / Bili: x / Urobili: x   Blood: x / Protein: x / Nitrite: x   Leuk Esterase: x / RBC: x / WBC x   Sq Epi: x / Non Sq Epi: x / Bacteria: x    < from: US Abdomen Complete (US Abdomen Complete .) (04.30.24 @ 19:15) >  INTERPRETATION:  CLINICAL INFORMATION: Transaminitis    COMPARISON: None available.    TECHNIQUE: Sonography of the abdomen.    FINDINGS:  Liver: Within normal limits.  Bile ducts: Normal caliber. Common bile duct measures 4 mm.  Gallbladder: Evidence of sludge. No evidence of gallstones. No   pericholecystic fluid. Negative Carias's sign.  Pancreas: Visualized portions are within normal limits.  Spleen: 7.6 cm. Within normal limits.  Right kidney: 10.4 cm. No hydronephrosis. Suspect 1.2 cm sized   nonobstructing calculus lower pole.  Left kidney: 10.3 cm. Mild hydronephrosis.  Ascites: None.  Aorta and IVC: Visualized portions arewithin normal limits.    IMPRESSION:  Mild left-sided hydronephrosis.  Possible nonobstructing calculus lower pole of the right kidney.    < end of copied text >          CAPILLARY BLOOD GLUCOSE      POCT Blood Glucose.: 216 mg/dL (01 May 2024 17:06)  POCT Blood Glucose.: 234 mg/dL (01 May 2024 12:28)  POCT Blood Glucose.: 178 mg/dL (01 May 2024 07:34)  POCT Blood Glucose.: 270 mg/dL (30 Apr 2024 21:58)        Constitutional - NAD, Comfortable  HEENT -  EOMI, Left pupil is 7mm irregular and fixed (chronic from prior cataract surgery  Neck -+ c-collar  Chest - good chest expansion, good respiratory effort, CTAB   Cardio - warm and well perfused, RRR, no murmur  Abdomen -  Soft, NTND  Extremities - No peripheral edema, No calf tenderness; Bruising of left shoulder, hip and knee   Neurologic Exam:                    Cognitive -             Orientation: waxing/waning AAO to self, place, month year,      Speech - +dysarthria     Cranial Nerves - +left facial droop, non-reactive left pupil (chronic)     Motor -                      LEFT    UE - ShAB 2/5, EF 2/5, EE 2/5, WE 0/5,  0/5                    RIGHT UE - ShAB 5/5, EF 5/5, EE 5/5, WE 5/5,  WNL                    LEFT    LE - HF 4/5, KE 4/5, DF 5/5, PF 5/5                    RIGHT LE - HF 5/5, KE 5/5, DF 5/5, PF 5/5        Sensory - decreased to LT LLE and absent on LUE     Reflexes - DTR 2 / 4 , neg Duke's b/l,      Coordination - FTN intact on the righ/ HTS intact     OculoVestibular -  No nystagmus  Psychiatric - Mood stable, Affect WNL  Skin on admission: skin tear upper back, bruising over the left shoulder, hip and knee.  skin tears over the RUE.    IDT in [place] 5/1  tentative Dc 5/16 to home    Continue comprehensive acute rehab program consisting of 3hrs/day of OT/PT and SLP. HPI:  81 years old man with a PMH of DM HTN CAD with stenting (on daily ASA) with stenting. Pt  presented for sudden onset left sided weakness to [facility] on 4/16/24 . Pt fell down the stairs was found by family awake and confused  on the floor prior to hospitalization.  CT brain showed right MCA occlusion( 4/16). Patient given tenecteplase and was transferred to [facility] on 4/16 for thrombectomy. Upon initial evaluation patient ws found to have NIHSS of 9 and subsequently at initial evaluation patient was not very well cooperative which contributed to a high NIHSS score of 19. After imaging and re-evaluation patient was re-evaluated and was found to have NIHSS of 11 (2 for gaze preference right, 2 for lack of response to threat left, 2 for drift in LUE and 1 for drift in LLE, 1 for neglect, 1 for dysarthria and 2 for left face). Repeat CT brain on 4/16  showed new cytotoxic edema in the right frontal, parietal, and temporal lobes, as well as within the right insula consistent with an acute right MCA territory infarct. No hipolito hemorrhagic transformation.   Pt is s/p cerebral angio with mechanical thrombectomy by Dr Pate on 4/16 with  no change in NIHSS of 11. Pt still with left sided weakness facial weakness, dysarthria, left neglect,  right gaze preference and left hemianopsia.  CT CAP with no evidence of acute trauma, Left lateral hip subcutaneous hematoma with evidence of active bleeding. MR c-spine with multiple cervical fractures, CT T-spine with T6 vertebral body fracture and incidental:cystic pancreatic head mass. Hospital course significant for anemia requiring dual Pressor support and 1 unit PRBC and PLT  2/2 to left hip hematoma. Worsening in mental status MRI brain done showing large R MCA infarct with hemorrhagic conversion. Pt treated with Hypertonic saline for cytotoxic edema with improvement on 4/18. Fever with neg cx treated with zosyn for Klebsiella PNA.  Failed FEEST, NGT place for feeding. Started on ASA and SQL on 4/21. 4/22 repeat FEEST pt advanced to puree with mod thickened liquid ( crush meds). Patient was fitted on 4/22/24 for cervical collar occipital mandibular support. MR T Spine without contrast reveals subtle band of increased T2/STIR signal within the T6 vertebral body, suggestive for acute fracture. Per ortho, no need for ortho spine intervention, continue c-collar and TLSO, outpatient ortho follow up no surgical intervention at this time. Family refused suggested ILR. Patient was evaluated by PM&R and therapy for functional deficits, gait/ADL impairments and acute rehabilitation was recommended. Patient was medically optimized for discharge to [facility] IRU on 4/25/24      ROS/Subjective:  patient seen and evaluated in OT  reports poor sleep but according to nursing slept well  pain in LUE/ LLE  waxing/waning level of alertness- falling  asleep while talking to patient  moved bowels today  no dizziness, no headaches, + Neck pain, no nausea, no vomiting, no SOB, no chest pain  ambulated in PT 50' one arm assist      MEDICATIONS  (STANDING):  atorvastatin 40 milliGRAM(s) Oral at bedtime  dextrose 10% Bolus 125 milliLiter(s) IV Bolus once  dextrose 5%. 1000 milliLiter(s) (100 mL/Hr) IV Continuous <Continuous>  dextrose 5%. 1000 milliLiter(s) (50 mL/Hr) IV Continuous <Continuous>  dextrose 50% Injectable 25 Gram(s) IV Push once  dextrose 50% Injectable 12.5 Gram(s) IV Push once  finasteride 5 milliGRAM(s) Oral daily  gabapentin 200 milliGRAM(s) Oral at bedtime  glucagon  Injectable 1 milliGRAM(s) IntraMuscular once  insulin glargine Injectable (LANTUS) 12 Unit(s) SubCutaneous at bedtime  insulin lispro (ADMELOG) corrective regimen sliding scale   SubCutaneous at bedtime  insulin lispro (ADMELOG) corrective regimen sliding scale   SubCutaneous three times a day before meals  lidocaine   4% Patch 1 Patch Transdermal daily  lidocaine   4% Patch 1 Patch Transdermal daily  metoprolol tartrate 12.5 milliGRAM(s) Oral every 12 hours  modafinil 50 milliGRAM(s) Oral <User Schedule>  pantoprazole    Tablet 40 milliGRAM(s) Oral before breakfast  polyethylene glycol 3350 17 Gram(s) Oral daily  senna 2 Tablet(s) Oral at bedtime  tamsulosin 0.4 milliGRAM(s) Oral at bedtime  timolol 0.5% Solution 1 Drop(s) Left EYE two times a day  traZODone 25 milliGRAM(s) Oral at bedtime    MEDICATIONS  (PRN):  acetaminophen     Tablet .. 650 milliGRAM(s) Oral every 6 hours PRN Mild Pain (1 - 3)  aluminum hydroxide/magnesium hydroxide/simethicone Suspension 30 milliLiter(s) Oral every 4 hours PRN Dyspepsia  bisacodyl Suppository 10 milliGRAM(s) Rectal daily PRN Constipation  dextrose Oral Gel 15 Gram(s) Oral once PRN Blood Glucose LESS THAN 70 milliGRAM(s)/deciliter                            10.5   7.50  )-----------( 343      ( 29 Apr 2024 06:35 )             32.2     04-29    139  |  104  |  12  ----------------------------<  178<H>  4.2   |  24  |  0.74    Ca    8.9      29 Apr 2024 06:35    TPro  6.5  /  Alb  2.7<L>  /  TBili  2.2<H>  /  DBili  x   /  AST  75<H>  /  ALT  94<H>  /  AlkPhos  93  04-29    Urinalysis Basic - ( 29 Apr 2024 06:35 )    Color: x / Appearance: x / SG: x / pH: x  Gluc: 178 mg/dL / Ketone: x  / Bili: x / Urobili: x   Blood: x / Protein: x / Nitrite: x   Leuk Esterase: x / RBC: x / WBC x   Sq Epi: x / Non Sq Epi: x / Bacteria: x    < from: US Abdomen Complete (US Abdomen Complete .) (04.30.24 @ 19:15) >  INTERPRETATION:  CLINICAL INFORMATION: Transaminitis    COMPARISON: None available.    TECHNIQUE: Sonography of the abdomen.    FINDINGS:  Liver: Within normal limits.  Bile ducts: Normal caliber. Common bile duct measures 4 mm.  Gallbladder: Evidence of sludge. No evidence of gallstones. No   pericholecystic fluid. Negative Carias's sign.  Pancreas: Visualized portions are within normal limits.  Spleen: 7.6 cm. Within normal limits.  Right kidney: 10.4 cm. No hydronephrosis. Suspect 1.2 cm sized   nonobstructing calculus lower pole.  Left kidney: 10.3 cm. Mild hydronephrosis.  Ascites: None.  Aorta and IVC: Visualized portions arewithin normal limits.    IMPRESSION:  Mild left-sided hydronephrosis.  Possible nonobstructing calculus lower pole of the right kidney.    < end of copied text >          CAPILLARY BLOOD GLUCOSE      POCT Blood Glucose.: 216 mg/dL (01 May 2024 17:06)  POCT Blood Glucose.: 234 mg/dL (01 May 2024 12:28)  POCT Blood Glucose.: 178 mg/dL (01 May 2024 07:34)  POCT Blood Glucose.: 270 mg/dL (30 Apr 2024 21:58)    Vital Signs Last 24 Hrs  T(C): 36.8 (01 May 2024 19:51), Max: 36.8 (01 May 2024 19:51)  T(F): 98.3 (01 May 2024 19:51), Max: 98.3 (01 May 2024 19:51)  HR: 66 (01 May 2024 19:51) (66 - 80)  BP: 114/66 (01 May 2024 19:51) (114/66 - 138/80)  BP(mean): --  RR: 16 (01 May 2024 19:51) (16 - 17)  SpO2: 99% (01 May 2024 19:51) (99% - 100%)    Parameters below as of 01 May 2024 19:51  Patient On (Oxygen Delivery Method): room air      Constitutional - NAD, Comfortable  HEENT -  EOMI, Left pupil is 7mm irregular and fixed (chronic from prior cataract surgery  Neck -+ c-collar  Chest - good chest expansion, good respiratory effort, CTAB   Cardio - warm and well perfused, RRR, no murmur  Abdomen -  Soft, NTND  Extremities - No peripheral edema, No calf tenderness; Bruising of left shoulder, hip and knee   Neurologic Exam:                    Cognitive -             Orientation: waxing/waning AAO to self, place, month year,      Speech - +dysarthria     Cranial Nerves - +left facial droop, non-reactive left pupil (chronic)     Motor -                      LEFT    UE - ShAB 2/5, EF 2/5, EE 2/5, WE 0/5,  0/5                    RIGHT UE - ShAB 5/5, EF 5/5, EE 5/5, WE 5/5,  WNL                    LEFT    LE - HF 4/5, KE 4/5, DF 5/5, PF 5/5                    RIGHT LE - HF 5/5, KE 5/5, DF 5/5, PF 5/5        Sensory - decreased to LT LLE and absent on LUE     Reflexes - DTR 2 / 4 , neg Duke's b/l,      Coordination - FTN intact on the righ/ HTS intact     OculoVestibular -  No nystagmus  Psychiatric - Mood stable, Affect WNL  Skin on admission: skin tear upper back, bruising over the left shoulder, hip and knee.  skin tears over the RUE.    IDT in [facility] 5/1  tentative Dc 5/16 to home    Continue comprehensive acute rehab program consisting of 3hrs/day of OT/PT and SLP.

## 2024-05-01 NOTE — CONSULT NOTE ADULT - ASSESSMENT
Patient is alert and oriented to person, place, time, and situation. Speech is fluent, dysarthric, low volume. Left facial droop and left chronic non-reactive pupil observed. He indicates pain is managed, sometimes his left upper and lower extremity hurt and he indicates massage of the area helps. Appetite is good. Sleep is poor. Per records, trial of Rozerem initiated for sleep.     Emotional functioning and behavioral history: Mood is anxious, affect congruent. Patient reports concern about his ability to increase mobility. In the hospital, he notes feelings of calm and security when family is present at bedside. During session, he is observed relaxing with eyes closed while his wife provides collateral information at bedside. Family is trying to be as present as possible. At night when alone he notes feelings of heightened fear and anxiety, interfering with sleep.     He has a history of anxiety with panic attacks commencing in the past year following left eye cataract repair, which resulted in vision loss for 5-6 weeks. His primary care physician prescribed lorazepam for a couple of weeks, which was helpful. Per patient's wife, patient consulted another eye surgeon and underwent revision surgery resulting in minor improvement in vision and cessation of panic attacks.     Psychosocial history: He was born and raised in [facility]. He completed a master's degree in education from [employer]. He worked as a  for many years. He is retired. He resides with his wife. He has three adult children and seven grandchildren. He indicates having a supportive family.     Impression: Patient with adjustment difficulty associated with recent change of functioning. There is a pre-existing history of claustrophobia (longstanding) and anxiety with panic attacks (within the past year). Presently, he reports feelings of anxiety and lack of situational control. He relaxes when family is at bedside. His stated goal is to improve mobility and return home. He is eager to continue participate in rehabilitation and is hopeful he will make progress over time.     Themes discussed include heightened stress and perceived lack of situational control. Support and encouragement are provided. He expresses appreciation for session.     Plan: Follow-up, supportive therapy to address adjustment, anxiety, and reinforce sleep hygiene. Patient in agreement with plan.     Patient has a history of claustrophobia and may benefit from curtain partially open, when appropriate.     Continue addressing sleep. Consider low environmental stimulation and avoid unnecessary waking.     Neuropsychology remains available through discharge.       Patient is drowsy at times and oriented to person, place, time, and situation. Speech is fluent, dysarthric, low volume. Left facial droop and left chronic non-reactive pupil observed. He indicates pain is managed, sometimes his left upper and lower extremity hurt and he indicates massage of the area helps. Appetite is good. Sleep is poor. Per records, trial of Rozerem initiated for sleep.     Emotional functioning and behavioral history: Mood is anxious, affect congruent. Patient reports concern about his ability to increase mobility. In the hospital, he notes feelings of calm and security when family is present at bedside. During session, he is observed relaxing with eyes closed while his wife provides collateral information at bedside. Family is trying to be as present as possible. At night when alone he notes feelings of heightened fear and anxiety, interfering with sleep.     He has a history of anxiety with panic attacks commencing in the past year following left eye cataract repair, which resulted in vision loss for 5-6 weeks. His primary care physician prescribed lorazepam for a couple of weeks, which was helpful. Per patient's wife, patient consulted another eye surgeon and underwent revision surgery resulting in minor improvement in vision and cessation of panic attacks.     Psychosocial history: He was born and raised in [facility]. He completed a master's degree in education from [employer]. He worked as a  for many years. He is retired. He resides with his wife. He has three adult children and seven grandchildren. He indicates having a supportive family.     Impression: Patient with adjustment difficulty associated with recent change of functioning. There is a pre-existing history of claustrophobia (longstanding) and anxiety with panic attacks (within the past year). Presently, he reports feelings of anxiety and lack of situational control. He relaxes when family is at bedside. His stated goal is to improve mobility and return home. He is eager to continue participate in rehabilitation and is hopeful he will make progress over time.     Themes discussed include heightened stress and perceived lack of situational control. Support and encouragement are provided. He expresses appreciation for session.     Plan: Follow-up, supportive therapy to address adjustment, anxiety, and reinforce sleep hygiene. Patient in agreement with plan.     Patient has a history of claustrophobia and may benefit from curtain partially open, when appropriate.     Continue addressing sleep. Consider low environmental stimulation and avoid unnecessary waking.     Neuropsychology remains available through discharge.       Patient is drowsy at times and oriented to person, place, time, and situation. Speech is fluent, dysarthric, low volume. Left facial droop and left chronic non-reactive pupil observed. He indicates pain is managed, sometimes his left upper and lower extremity hurt and he indicates massage of the area helps. Appetite is good. Sleep is poor. Per records, trial of Rozerem initiated for sleep.     Emotional functioning and behavioral history: Mood is anxious, affect congruent. Patient reports concern about his ability to increase mobility. In the hospital, he notes feelings of calm and security when family is present at bedside. During session, he is observed relaxing with eyes closed while his wife provides collateral information at bedside. Family is trying to be as present as possible. At night when alone he notes feelings of heightened fear and anxiety, interfering with sleep.     He has a history of anxiety with panic attacks commencing in the past year following left eye cataract repair, which resulted in vision loss for 5-6 weeks. His primary care physician prescribed lorazepam for a couple of weeks, which was helpful. Per patient's wife, patient consulted another eye surgeon and underwent revision surgery resulting in minor improvement in vision and cessation of panic attacks.     Psychosocial history: He was born and raised in [facility]. He completed a master's degree in education from [employer]. He worked as a  for many years. He is retired. He resides with his wife. He has three adult children and seven grandchildren. He indicates having a supportive family.     Impression: Patient with adjustment difficulty associated with recent change of functioning. There is a pre-existing history of claustrophobia (longstanding) and anxiety with panic attacks (within the past year treated with lorazepam). Presently, he reports feelings of anxiety and lack of situational control. He relaxes when family is at bedside. His stated goal is to improve mobility and return home. He is eager to continue participate in rehabilitation and is hopeful he will make progress over time.     Themes discussed include heightened stress and perceived lack of situational control. Support and encouragement are provided. He expresses appreciation for session.     Plan: Follow-up, supportive therapy to address adjustment, anxiety, and reinforce sleep hygiene. Patient has a history of claustrophobia and may benefit from curtain partially open, when appropriate. Continue addressing sleep. Consider low environmental stimulation and avoid unnecessary waking. Neuropsychology remains available through discharge.       Patient is drowsy at times. He is oriented to person, place, time, and situation. Speech is dysarthric, low volume. Left facial droop and left chronic non-reactive pupil observed. He indicates pain is managed, sometimes his left upper and lower extremity hurt -  he indicates massage of the area helps. Appetite is good. Sleep is poor. Per records, trial of Rozerem initiated for sleep.     Emotional functioning and behavioral history: Mood is anxious, affect congruent. Patient reports concern about his ability to increase mobility. In the hospital, he notes feelings of calm and security when family is present at bedside. During session, he is observed relaxing with eyes closed while his wife provides collateral information at bedside. Family is trying to be as present at bedside as possible during the day. When alone at night he notes heightened anxiety interfering with sleep.     He has a history of anxiety with panic attacks commencing in the past year following left eye cataract repair, which resulted in vision loss for 5-6 weeks. His primary care physician prescribed lorazepam for a couple of weeks, which was helpful. Per patient's wife, patient consulted another eye surgeon and underwent revision surgery resulting in minor improvement in vision and cessation of panic attacks.     Psychosocial history: He was born and raised in [facility]. He completed a master's degree in education from [employer]. He is a retired . He resides with his wife. He has three adult children and seven grandchildren. He indicates having a supportive family.     Impression: Patient with adjustment difficulty associated with recent change of functioning. There is a pre-existing history of claustrophobia (longstanding) and anxiety with panic attacks (within the past year treated by internist with lorazepam). Presently, he reports feelings of anxiety and lack of situational control. He relaxes when family is at bedside. His stated goal is to improve mobility and return home. He is eager to continue participate in rehabilitation and is hopeful he will make progress over time.     Themes discussed include heightened stress and perceived lack of situational control. Support and encouragement are provided. He expresses appreciation for session.     Plan: Follow-up, supportive therapy to address adjustment, anxiety, and reinforce sleep hygiene. Patient has a history of claustrophobia and may benefit from curtain partially open, when appropriate. Continue addressing sleep. Consider low environmental stimulation and avoid unnecessary waking. Neuropsychology remains available through discharge.

## 2024-05-01 NOTE — PROGRESS NOTE ADULT - SUBJECTIVE AND OBJECTIVE BOX
No acute events overnight. complaints of pain in back/ arm present.    ALLERGIES:  No Known Allergies    MEDICATIONS  (STANDING):  aspirin  chewable 81 milliGRAM(s) Oral daily  atorvastatin 80 milliGRAM(s) Oral at bedtime  dextrose 10% Bolus 125 milliLiter(s) IV Bolus once  dextrose 5%. 1000 milliLiter(s) (100 mL/Hr) IV Continuous <Continuous>  dextrose 5%. 1000 milliLiter(s) (50 mL/Hr) IV Continuous <Continuous>  dextrose 50% Injectable 25 Gram(s) IV Push once  dextrose 50% Injectable 12.5 Gram(s) IV Push once  enoxaparin Injectable 40 milliGRAM(s) SubCutaneous <User Schedule>  finasteride 5 milliGRAM(s) Oral daily  glucagon  Injectable 1 milliGRAM(s) IntraMuscular once  insulin glargine Injectable (LANTUS) 10 Unit(s) SubCutaneous at bedtime  insulin lispro (ADMELOG) corrective regimen sliding scale   SubCutaneous three times a day before meals  insulin lispro (ADMELOG) corrective regimen sliding scale   SubCutaneous at bedtime  lidocaine   4% Patch 1 Patch Transdermal daily  lidocaine   4% Patch 1 Patch Transdermal daily  melatonin 6 milliGRAM(s) Oral at bedtime  metoprolol tartrate 12.5 milliGRAM(s) Oral every 12 hours  pantoprazole    Tablet 40 milliGRAM(s) Oral before breakfast  polyethylene glycol 3350 17 Gram(s) Oral two times a day  senna 2 Tablet(s) Oral at bedtime  tamsulosin 0.4 milliGRAM(s) Oral at bedtime  timolol 0.5% Solution 1 Drop(s) Left EYE two times a day    MEDICATIONS  (PRN):  acetaminophen     Tablet .. 650 milliGRAM(s) Oral every 6 hours PRN Mild Pain (1 - 3)  aluminum hydroxide/magnesium hydroxide/simethicone Suspension 30 milliLiter(s) Oral every 4 hours PRN Dyspepsia  bisacodyl Suppository 10 milliGRAM(s) Rectal daily PRN Constipation  dextrose Oral Gel 15 Gram(s) Oral once PRN Blood Glucose LESS THAN 70 milliGRAM(s)/deciliter  methocarbamol 500 milliGRAM(s) Oral every 6 hours PRN musle spasm    T(C): 36.9 (04-30-24 @ 07:56), Max: 36.9 (04-30-24 @ 02:33)  T(F): 98.4 (04-30-24 @ 07:56), Max: 98.4 (04-30-24 @ 02:33)  HR: 67 (04-30-24 @ 07:56) (67 - 73)  BP: 131/79 (04-30-24 @ 07:56) (106/65 - 131/79)  ABP: --  ABP(mean): --  RR: 14 (04-30-24 @ 07:56) (14 - 16)  SpO2: 99% (04-30-24 @ 07:56) (99% - 99%)      PHYSICAL EXAM:    Gen: nad, resting in bed  Neuro: aaox3, no focal deficits  Heent: neck brace in place  Pulm: cta b/l, no w/r/r  CV: +s1s2, no m/r/g  Ab: soft, nt/nd, normoactive bs x 4  Extrem: no edema, pulses intact and equal  Skin: left arm ecchymosis at left elbow (stable)  Psych: normal    LABS:                        10.5   7.50  )-----------( 343      ( 29 Apr 2024 06:35 )             32.2     04-29    139  |  104  |  12  ----------------------------<  178<H>  4.2   |  24  |  0.74    Ca    8.9      29 Apr 2024 06:35    TPro  6.5  /  Alb  2.7<L>  /  TBili  2.2<H>  /  DBili  x   /  AST  75<H>  /  ALT  94<H>  /  AlkPhos  93  04-29      Urinalysis Basic - ( 29 Apr 2024 06:35 )    Color: x / Appearance: x / SG: x / pH: x  Gluc: 178 mg/dL / Ketone: x  / Bili: x / Urobili: x   Blood: x / Protein: x / Nitrite: x   Leuk Esterase: x / RBC: x / WBC x   Sq Epi: x / Non Sq Epi: x / Bacteria: x       CAPILLARY BLOOD GLUCOSE      POCT Blood Glucose.: 185 mg/dL (30 Apr 2024 12:10)  POCT Blood Glucose.: 200 mg/dL (30 Apr 2024 08:08)  POCT Blood Glucose.: 196 mg/dL (29 Apr 2024 21:49)  POCT Blood Glucose.: 164 mg/dL (29 Apr 2024 17:56)        Urinalysis Basic - ( 29 Apr 2024 06:35 )    Color: x / Appearance: x / SG: x / pH: x  Gluc: 178 mg/dL / Ketone: x  / Bili: x / Urobili: x   Blood: x / Protein: x / Nitrite: x   Leuk Esterase: x / RBC: x / WBC x   Sq Epi: x / Non Sq Epi: x / Bacteria: x     No acute events overnight. complaints of pain in back/ arm present.    ALLERGIES:  No Known Allergies    MEDICATIONS  (STANDING):  aspirin  chewable 81 milliGRAM(s) Oral daily  atorvastatin 80 milliGRAM(s) Oral at bedtime  dextrose 10% Bolus 125 milliLiter(s) IV Bolus once  dextrose 5%. 1000 milliLiter(s) (100 mL/Hr) IV Continuous <Continuous>  dextrose 5%. 1000 milliLiter(s) (50 mL/Hr) IV Continuous <Continuous>  dextrose 50% Injectable 25 Gram(s) IV Push once  dextrose 50% Injectable 12.5 Gram(s) IV Push once  enoxaparin Injectable 40 milliGRAM(s) SubCutaneous <User Schedule>  finasteride 5 milliGRAM(s) Oral daily  glucagon  Injectable 1 milliGRAM(s) IntraMuscular once  insulin glargine Injectable (LANTUS) 10 Unit(s) SubCutaneous at bedtime  insulin lispro (ADMELOG) corrective regimen sliding scale   SubCutaneous three times a day before meals  insulin lispro (ADMELOG) corrective regimen sliding scale   SubCutaneous at bedtime  lidocaine   4% Patch 1 Patch Transdermal daily  lidocaine   4% Patch 1 Patch Transdermal daily  melatonin 6 milliGRAM(s) Oral at bedtime  metoprolol tartrate 12.5 milliGRAM(s) Oral every 12 hours  pantoprazole    Tablet 40 milliGRAM(s) Oral before breakfast  polyethylene glycol 3350 17 Gram(s) Oral two times a day  senna 2 Tablet(s) Oral at bedtime  tamsulosin 0.4 milliGRAM(s) Oral at bedtime  timolol 0.5% Solution 1 Drop(s) Left EYE two times a day    MEDICATIONS  (PRN):  acetaminophen     Tablet .. 650 milliGRAM(s) Oral every 6 hours PRN Mild Pain (1 - 3)  aluminum hydroxide/magnesium hydroxide/simethicone Suspension 30 milliLiter(s) Oral every 4 hours PRN Dyspepsia  bisacodyl Suppository 10 milliGRAM(s) Rectal daily PRN Constipation  dextrose Oral Gel 15 Gram(s) Oral once PRN Blood Glucose LESS THAN 70 milliGRAM(s)/deciliter  methocarbamol 500 milliGRAM(s) Oral every 6 hours PRN musle spasm    T(C): 36.9 (04-30-24 @ 07:56), Max: 36.9 (04-30-24 @ 02:33)  T(F): 98.4 (04-30-24 @ 07:56), Max: 98.4 (04-30-24 @ 02:33)  HR: 67 (04-30-24 @ 07:56) (67 - 73)  BP: 131/79 (04-30-24 @ 07:56) (106/65 - 131/79)  ABP: --  ABP(mean): --  RR: 14 (04-30-24 @ 07:56) (14 - 16)  SpO2: 99% (04-30-24 @ 07:56) (99% - 99%)      PHYSICAL EXAM:  aaox 3, sitting in chair  no apparant distress  tslo brace present  hard collar present  dressing clean and dry  both lungs clear  s1, s2, regular  abdomen- soft  no pedal edema  decrease rom in left arm    LABS:                        10.5   7.50  )-----------( 343      ( 29 Apr 2024 06:35 )             32.2     04-29    139  |  104  |  12  ----------------------------<  178<H>  4.2   |  24  |  0.74    Ca    8.9      29 Apr 2024 06:35    TPro  6.5  /  Alb  2.7<L>  /  TBili  2.2<H>  /  DBili  x   /  AST  75<H>  /  ALT  94<H>  /  AlkPhos  93  04-29      Urinalysis Basic - ( 29 Apr 2024 06:35 )    Color: x / Appearance: x / SG: x / pH: x  Gluc: 178 mg/dL / Ketone: x  / Bili: x / Urobili: x   Blood: x / Protein: x / Nitrite: x   Leuk Esterase: x / RBC: x / WBC x   Sq Epi: x / Non Sq Epi: x / Bacteria: x       CAPILLARY BLOOD GLUCOSE      POCT Blood Glucose.: 185 mg/dL (30 Apr 2024 12:10)  POCT Blood Glucose.: 200 mg/dL (30 Apr 2024 08:08)  POCT Blood Glucose.: 196 mg/dL (29 Apr 2024 21:49)  POCT Blood Glucose.: 164 mg/dL (29 Apr 2024 17:56)        Urinalysis Basic - ( 29 Apr 2024 06:35 )    Color: x / Appearance: x / SG: x / pH: x  Gluc: 178 mg/dL / Ketone: x  / Bili: x / Urobili: x   Blood: x / Protein: x / Nitrite: x   Leuk Esterase: x / RBC: x / WBC x   Sq Epi: x / Non Sq Epi: x / Bacteria: x

## 2024-05-01 NOTE — PROGRESS NOTE ADULT - ASSESSMENT
80 yo with a history of DMII, HTN, CAD s/p stenting (on daily ASA) admitted to [facility] after hospital stay for right MCA occlusion( 4/16) at [facility]. Patient was given tenecteplase and transferred to [facility] on 4/16 for thrombectomy. Pt is s/p cerebral angio with mechanical thrombectomy by Dr Pate on 4/16. Left lateral hip subcutaneous hematoma with evidence of active bleeding. MR c-spine with multiple cervical fractures, CT T-spine with T6 vertebral body fracture and incidental cystic pancreatic head mass. Course complicated by anemia s/p 1 unit PRBC and platelets, hemorrhagic conversion, fever.     #Right MCA occlusion s/p cerebral angio with mechanical thrombectomy by Dr Pate on 4/16  #Large acute right MCA territory infarct with extensive cortical petechial hemorrhage.  #Multiple cervical fractures  #T6 vertebral body acute fracture  - Comprehensive rehab program  - Maintain c-collar and TLSO brace as per primary team  - Bowel/Pain regimen as per primary team   - LDL 50  - A1c 4/16 8.2  - Continue aspirin and atorvastatin 80mg  - Outpatient Neurosurgery F/U  - Family refused ILR    #Diabetes Mellitus II:  - A1c 8.2 on 4/16  - Home Med: Toujeo 24units qhs, metformin 850mg, Repaglinide 1mg daily  - Continue with Lantus 10 units sc QHS  - SSI premeal and qhs  - Blood glucose goal 100-180    #Transaminitis, hyperbilirubinemia  - usg abdomen/ liver. if no abnormality found and lfts keep going up, will hold atorvastatin.    #Blood loss anemia  - Left lateral hip subcutaneous hematoma with evidence of active bleeding.  - s/p 1 unit PRBC and PLT  - stable, continue to monitor  - repeat CBC 4/29    #Hypertension  - Continue metoprolol 12.5mg BID  - Monitor vital signs    #CAD s/p stent  #CVA  - Continue aspirin, statin, and metoprolol BID    #BPH  - Continue finasteride    #Glaucoma  - Continue timolol BID    #DVT Prophylaxis  - lovenox 80 yo with a history of DMII, HTN, CAD s/p stenting (on daily ASA) admitted to [facility] after hospital stay for right MCA occlusion( 4/16) at [place]. Patient was given tenecteplase and transferred to [facility] on 4/16 for thrombectomy. Pt is s/p cerebral angio with mechanical thrombectomy by Dr Pate on 4/16. Left lateral hip subcutaneous hematoma with evidence of active bleeding. MR c-spine with multiple cervical fractures, CT T-spine with T6 vertebral body fracture and incidental cystic pancreatic head mass. Course complicated by anemia s/p 1 unit PRBC and platelets, hemorrhagic conversion, fever.     #Right MCA occlusion s/p cerebral angio with mechanical thrombectomy by Dr Pate on 4/16  #Large acute right MCA territory infarct with extensive cortical petechial hemorrhage.  #Multiple cervical fractures  #T6 vertebral body acute fracture  - Comprehensive rehab program  - Maintain c-collar and TLSO brace as per primary team  - Bowel/Pain regimen as per primary team   - LDL 50  - A1c 4/16 8.2  - Continue aspirin and atorvastatin 80mg  - Outpatient Neurosurgery F/U  - Family refused ILR    #Diabetes Mellitus II:  - A1c 8.2 on 4/16  - Home Med: Toujeo 24units qhs, metformin 850mg, Repaglinide 1mg daily  - Continue with Lantus 10 units sc QHS  - SSI premeal and qhs  - Blood glucose goal 100-180    #Transaminitis, hyperbilirubinemia  - moniotr. recheck tomoroow, if no improvemt will hold statin. usg with no liver/ gall bladder issues.    #Blood loss anemia  - Left lateral hip subcutaneous hematoma with evidence of active bleeding.  - s/p 1 unit PRBC and PLT  - stable, continue to monitor    #Hypertension  - Continue metoprolol 12.5mg BID  - Monitor vital signs    #CAD s/p stent  #CVA  - Continue aspirin, statin, and metoprolol BID    #BPH  - Continue finasteride    #Glaucoma  - Continue timolol BID    #DVT Prophylaxis  - lovenox

## 2024-05-01 NOTE — PROGRESS NOTE ADULT - ASSESSMENT
#Gait Instability, ADL impairments and Functional impairments  - Comprehensive Rehab Program of PT/OT/SLP    # Acute right MCA territory infarct. No hipolito hemorrhagic transformation.  - ASA 81mg daily  - Atorvastatin 80mg q hs  - LDL 50  - Estim to LUE in therapy  waxing waning level of alertness today- Head CT with hemorrhagic conversion- ASA/ lovenox DCd  Spoke with dr Johns neuro- to repeat Head CT 6-12 hours- any changes/ clinical deterioration- to return to [facility]  gabapentin for left sided pain HS  modafinil in AM     #Pain control/ Left Hip Hematoma   - Lidocaine patch  - Tylenol PRN  - Robaxin     # T6 vertebral body fracture Suspected acute anterior longitudinal ligament injury and/or anterior corner avulsion fractures at C4-C5 and C5-C6. Prevertebral edema  extending from C1 through the upper cervical levels.-Suspected right greater than left posterior paraspinal muscular strain and/or ligamentous injury. No Ortho interventions at this time. Ortho follow up post d/c  - TLSO when oob   - spinal precautions  - cleared by ortho to remove collar when in Bed      # Sleep  - Melatonin q hs-not effective  - Rozerem for sleep  -neuropsych eval  Add Sleep wake cycle    # DM2 with hyperglycemia  -ISS  - a1c 8.2  Glucoses Elevated- hospitalist to adjust meds 4/30    # HTN  - Metoprolol 12.5mg      # Klebsella PNA  - S/p Zosyn, afebrile, no leukocytosis, VSS    Elevated LFTs  not taking much tylenol  to follow CMP  US neg for pathology    #GI/Bowel Mgmt   - Continue Senna at bedtime -  - Miralax   -Protonix  last BM 5/1    #Bladder management/ BPH  - Proscar  - Flomax    #DVT  - Lovenox  - TEDs     #Skin:  -skin tear upper back, bruising over the left shoulder, hip and knee.  skin tears over the RUE.    # Cataract  - Timolol left eye BID      #FEN   - Diet - Pureed   - Dysphagia  SLP - evaluation and treatment- ? MBS    Precautions / PROPHYLAXIS:   - Falls  - ortho: Spine precation, Weight bearing status: WBAT   - Lungs: Aspiration, Incentive Spirometer   - Pressure injury/Skin: Turn Q2hrs while in bed, OOB to Chair, PT/OT      50 total minutes spent on today's encounter including team conference as well as phone conversation with patient's wife Zunilda - I updated her on plan of care- All questions were answered by me as well.

## 2024-05-01 NOTE — CHART NOTE - NSCHARTNOTEFT_GEN_A_CORE
Nutrition Follow Up Note  Hospital Course   (Per Electronic Medical Record)    Source:  Nursing Staff [X]   Medical Record [X]      Diet:   Consistent Carbohydrate Puree (IDDSI Level 4/Dysphagia 1) Diet w/ Moderately Thick Liquids (IDDSI Level 3/Honey Thick)   Tolerates Diet Consistency Well   No Chewing/Swallowing Difficulties - (Per Patient Observation)   Will Discuss w/ Speech Therapy   No Recent Nausea, Vomiting, Diarrhea or Constipation (as Per Nursing Staff)  Consumes 50-75% of Meals (as Per Documentation) - Good PO Intake/Appetite (Per Patient Observation)   on Glucerna 8oz PO BID (Provides 440kcal-20grams of Protein)   Education Provided on Need for Supplementation   Family Brings in Food from Home    Enteral/Parenteral Nutrition: Not Applicable    Current Weight: 154.5lb on 4/25  Obtain New Weight  Obtain Weights Weekly     Pertinent Medications: MEDICATIONS  (STANDING):  aspirin  chewable 81 milliGRAM(s) Oral daily  atorvastatin 40 milliGRAM(s) Oral at bedtime  dextrose 10% Bolus 125 milliLiter(s) IV Bolus once  dextrose 5%. 1000 milliLiter(s) (100 mL/Hr) IV Continuous <Continuous>  dextrose 5%. 1000 milliLiter(s) (50 mL/Hr) IV Continuous <Continuous>  dextrose 50% Injectable 25 Gram(s) IV Push once  dextrose 50% Injectable 12.5 Gram(s) IV Push once  enoxaparin Injectable 40 milliGRAM(s) SubCutaneous <User Schedule>  finasteride 5 milliGRAM(s) Oral daily  glucagon  Injectable 1 milliGRAM(s) IntraMuscular once  insulin glargine Injectable (LANTUS) 10 Unit(s) SubCutaneous at bedtime  insulin lispro (ADMELOG) corrective regimen sliding scale   SubCutaneous three times a day before meals  insulin lispro (ADMELOG) corrective regimen sliding scale   SubCutaneous at bedtime  lidocaine   4% Patch 1 Patch Transdermal daily  lidocaine   4% Patch 1 Patch Transdermal daily  metoprolol tartrate 12.5 milliGRAM(s) Oral every 12 hours  pantoprazole    Tablet 40 milliGRAM(s) Oral before breakfast  polyethylene glycol 3350 17 Gram(s) Oral two times a day  ramelteon 8 milliGRAM(s) Oral at bedtime  senna 2 Tablet(s) Oral at bedtime  tamsulosin 0.4 milliGRAM(s) Oral at bedtime  timolol 0.5% Solution 1 Drop(s) Left EYE two times a day    MEDICATIONS  (PRN):  acetaminophen     Tablet .. 650 milliGRAM(s) Oral every 6 hours PRN Mild Pain (1 - 3)  aluminum hydroxide/magnesium hydroxide/simethicone Suspension 30 milliLiter(s) Oral every 4 hours PRN Dyspepsia  bisacodyl Suppository 10 milliGRAM(s) Rectal daily PRN Constipation  dextrose Oral Gel 15 Gram(s) Oral once PRN Blood Glucose LESS THAN 70 milliGRAM(s)/deciliter  methocarbamol 500 milliGRAM(s) Oral every 6 hours PRN musle spasm    Pertinent Labs:  04-29 Na139 mmol/L Glu 178 mg/dL<H> K+ 4.2 mmol/L Cr  0.74 mg/dL BUN 12 mg/dL 04-29 Alb 2.7 g/dL<L> 04-16 Chol 103 mg/dL LDL --    HDL 41 mg/dL Trig 52 mg/dL    POCT (over Last 3 Days) - Ranging from 164-270    Skin: No Pressure Ulcers     Edema: None Noted (as Per Documentation)     Last Bowel Movement: on 4/30    Estimated Needs:   [X] No Change Since Previous Assessment    Previous Nutrition Diagnosis:   Moderate Malnutrition     Nutrition Diagnosis is [X] Ongoing - Continues on Nutrition Supplement & Education Provided on Need for Supplementation     New Nutrition Diagnosis: [X] Not Applicable    Interventions:   1. Education Provided on Need for Supplementation    2. Recommend Continue Nutrition Plan of Care     Monitoring & Evaluation:   [X] Weights   [X] PO Intake   [X] Skin Integrity   [X] Follow Up (Per Protocol)  [X] Tolerance to Diet Prescription   [X] Other: Labs & POCT    Registered Dietitian/Nutritionist Remains Available.  Mike Euceda, VON, CDN    Phone# [phone]

## 2024-05-02 LAB
ALBUMIN SERPL ELPH-MCNC: 2.8 G/DL — LOW (ref 3.3–5)
ALP SERPL-CCNC: 110 U/L — SIGNIFICANT CHANGE UP (ref 40–120)
ALT FLD-CCNC: 61 U/L — HIGH (ref 10–45)
ANION GAP SERPL CALC-SCNC: 6 MMOL/L — SIGNIFICANT CHANGE UP (ref 5–17)
AST SERPL-CCNC: 63 U/L — HIGH (ref 10–40)
BASOPHILS # BLD AUTO: 0.02 K/UL — SIGNIFICANT CHANGE UP (ref 0–0.2)
BASOPHILS NFR BLD AUTO: 0.3 % — SIGNIFICANT CHANGE UP (ref 0–2)
BILIRUB SERPL-MCNC: 1.8 MG/DL — HIGH (ref 0.2–1.2)
BUN SERPL-MCNC: 16 MG/DL — SIGNIFICANT CHANGE UP (ref 7–23)
CALCIUM SERPL-MCNC: 8.9 MG/DL — SIGNIFICANT CHANGE UP (ref 8.4–10.5)
CHLORIDE SERPL-SCNC: 103 MMOL/L — SIGNIFICANT CHANGE UP (ref 96–108)
CO2 SERPL-SCNC: 27 MMOL/L — SIGNIFICANT CHANGE UP (ref 22–31)
CREAT SERPL-MCNC: 0.85 MG/DL — SIGNIFICANT CHANGE UP (ref 0.5–1.3)
EGFR: 89 ML/MIN/1.73M2 — SIGNIFICANT CHANGE UP
EOSINOPHIL # BLD AUTO: 0.3 K/UL — SIGNIFICANT CHANGE UP (ref 0–0.5)
EOSINOPHIL NFR BLD AUTO: 3.9 % — SIGNIFICANT CHANGE UP (ref 0–6)
GLUCOSE BLDC GLUCOMTR-MCNC: 195 MG/DL — HIGH (ref 70–99)
GLUCOSE BLDC GLUCOMTR-MCNC: 218 MG/DL — HIGH (ref 70–99)
GLUCOSE BLDC GLUCOMTR-MCNC: 218 MG/DL — HIGH (ref 70–99)
GLUCOSE BLDC GLUCOMTR-MCNC: 229 MG/DL — HIGH (ref 70–99)
GLUCOSE SERPL-MCNC: 204 MG/DL — HIGH (ref 70–99)
HAV IGM SER-ACNC: SIGNIFICANT CHANGE UP
HBV CORE IGM SER-ACNC: SIGNIFICANT CHANGE UP
HBV SURFACE AG SER-ACNC: SIGNIFICANT CHANGE UP
HCT VFR BLD CALC: 37.2 % — LOW (ref 39–50)
HCV AB S/CO SERPL IA: 0.13 S/CO — SIGNIFICANT CHANGE UP (ref 0–0.99)
HCV AB SERPL-IMP: SIGNIFICANT CHANGE UP
HGB BLD-MCNC: 12.1 G/DL — LOW (ref 13–17)
IMM GRANULOCYTES NFR BLD AUTO: 0.4 % — SIGNIFICANT CHANGE UP (ref 0–0.9)
LYMPHOCYTES # BLD AUTO: 1.86 K/UL — SIGNIFICANT CHANGE UP (ref 1–3.3)
LYMPHOCYTES # BLD AUTO: 24.1 % — SIGNIFICANT CHANGE UP (ref 13–44)
MCHC RBC-ENTMCNC: 29.1 PG — SIGNIFICANT CHANGE UP (ref 27–34)
MCHC RBC-ENTMCNC: 32.5 GM/DL — SIGNIFICANT CHANGE UP (ref 32–36)
MCV RBC AUTO: 89.4 FL — SIGNIFICANT CHANGE UP (ref 80–100)
MONOCYTES # BLD AUTO: 0.62 K/UL — SIGNIFICANT CHANGE UP (ref 0–0.9)
MONOCYTES NFR BLD AUTO: 8 % — SIGNIFICANT CHANGE UP (ref 2–14)
NEUTROPHILS # BLD AUTO: 4.9 K/UL — SIGNIFICANT CHANGE UP (ref 1.8–7.4)
NEUTROPHILS NFR BLD AUTO: 63.3 % — SIGNIFICANT CHANGE UP (ref 43–77)
NRBC # BLD: 0 /100 WBCS — SIGNIFICANT CHANGE UP (ref 0–0)
PLATELET # BLD AUTO: 377 K/UL — SIGNIFICANT CHANGE UP (ref 150–400)
POTASSIUM SERPL-MCNC: 4.6 MMOL/L — SIGNIFICANT CHANGE UP (ref 3.5–5.3)
POTASSIUM SERPL-SCNC: 4.6 MMOL/L — SIGNIFICANT CHANGE UP (ref 3.5–5.3)
PROT SERPL-MCNC: 7 G/DL — SIGNIFICANT CHANGE UP (ref 6–8.3)
RBC # BLD: 4.16 M/UL — LOW (ref 4.2–5.8)
RBC # FLD: 16.5 % — HIGH (ref 10.3–14.5)
SODIUM SERPL-SCNC: 136 MMOL/L — SIGNIFICANT CHANGE UP (ref 135–145)
WBC # BLD: 7.73 K/UL — SIGNIFICANT CHANGE UP (ref 3.8–10.5)
WBC # FLD AUTO: 7.73 K/UL — SIGNIFICANT CHANGE UP (ref 3.8–10.5)

## 2024-05-02 PROCEDURE — 99222 1ST HOSP IP/OBS MODERATE 55: CPT

## 2024-05-02 PROCEDURE — 99232 SBSQ HOSP IP/OBS MODERATE 35: CPT

## 2024-05-02 RX ORDER — INSULIN GLARGINE 100 [IU]/ML
14 INJECTION, SOLUTION SUBCUTANEOUS AT BEDTIME
Refills: 0 | Status: DISCONTINUED | OUTPATIENT
Start: 2024-05-02 | End: 2024-05-02

## 2024-05-02 RX ORDER — METFORMIN HYDROCHLORIDE 850 MG/1
500 TABLET ORAL
Refills: 0 | Status: DISCONTINUED | OUTPATIENT
Start: 2024-05-02 | End: 2024-05-06

## 2024-05-02 RX ORDER — INSULIN GLARGINE 100 [IU]/ML
16 INJECTION, SOLUTION SUBCUTANEOUS AT BEDTIME
Refills: 0 | Status: DISCONTINUED | OUTPATIENT
Start: 2024-05-02 | End: 2024-05-06

## 2024-05-02 RX ORDER — MODAFINIL 200 MG/1
50 TABLET ORAL
Refills: 0 | Status: DISCONTINUED | OUTPATIENT
Start: 2024-05-02 | End: 2024-05-06

## 2024-05-02 RX ORDER — ATORVASTATIN CALCIUM 80 MG/1
20 TABLET, FILM COATED ORAL AT BEDTIME
Refills: 0 | Status: DISCONTINUED | OUTPATIENT
Start: 2024-05-02 | End: 2024-05-06

## 2024-05-02 RX ORDER — ACETAMINOPHEN 500 MG
650 TABLET ORAL EVERY 12 HOURS
Refills: 0 | Status: DISCONTINUED | OUTPATIENT
Start: 2024-05-02 | End: 2024-05-06

## 2024-05-02 RX ORDER — GABAPENTIN 400 MG/1
300 CAPSULE ORAL AT BEDTIME
Refills: 0 | Status: DISCONTINUED | OUTPATIENT
Start: 2024-05-02 | End: 2024-05-06

## 2024-05-02 RX ADMIN — TAMSULOSIN HYDROCHLORIDE 0.4 MILLIGRAM(S): 0.4 CAPSULE ORAL at 21:15

## 2024-05-02 RX ADMIN — Medication 1 DROP(S): at 05:58

## 2024-05-02 RX ADMIN — LIDOCAINE 1 PATCH: 4 CREAM TOPICAL at 19:31

## 2024-05-02 RX ADMIN — GABAPENTIN 300 MILLIGRAM(S): 400 CAPSULE ORAL at 21:16

## 2024-05-02 RX ADMIN — Medication 650 MILLIGRAM(S): at 19:00

## 2024-05-02 RX ADMIN — ATORVASTATIN CALCIUM 20 MILLIGRAM(S): 80 TABLET, FILM COATED ORAL at 21:16

## 2024-05-02 RX ADMIN — Medication 2: at 17:27

## 2024-05-02 RX ADMIN — Medication 12.5 MILLIGRAM(S): at 05:58

## 2024-05-02 RX ADMIN — Medication 4: at 11:56

## 2024-05-02 RX ADMIN — Medication 4: at 08:06

## 2024-05-02 RX ADMIN — LIDOCAINE 1 PATCH: 4 CREAM TOPICAL at 12:22

## 2024-05-02 RX ADMIN — Medication 12.5 MILLIGRAM(S): at 18:29

## 2024-05-02 RX ADMIN — INSULIN GLARGINE 16 UNIT(S): 100 INJECTION, SOLUTION SUBCUTANEOUS at 21:20

## 2024-05-02 RX ADMIN — METFORMIN HYDROCHLORIDE 500 MILLIGRAM(S): 850 TABLET ORAL at 18:30

## 2024-05-02 RX ADMIN — MODAFINIL 50 MILLIGRAM(S): 200 TABLET ORAL at 08:09

## 2024-05-02 RX ADMIN — Medication 650 MILLIGRAM(S): at 18:29

## 2024-05-02 RX ADMIN — Medication 650 MILLIGRAM(S): at 04:01

## 2024-05-02 RX ADMIN — SENNA PLUS 2 TABLET(S): 8.6 TABLET ORAL at 21:15

## 2024-05-02 RX ADMIN — Medication 1 DROP(S): at 18:29

## 2024-05-02 RX ADMIN — MODAFINIL 50 MILLIGRAM(S): 200 TABLET ORAL at 14:54

## 2024-05-02 RX ADMIN — PANTOPRAZOLE SODIUM 40 MILLIGRAM(S): 20 TABLET, DELAYED RELEASE ORAL at 05:58

## 2024-05-02 RX ADMIN — Medication 650 MILLIGRAM(S): at 03:01

## 2024-05-02 RX ADMIN — Medication 25 MILLIGRAM(S): at 21:16

## 2024-05-02 NOTE — PROGRESS NOTE ADULT - SUBJECTIVE AND OBJECTIVE BOX
HPI:  81 years old man with a PMH of DM HTN CAD with stenting (on daily ASA) with stenting. Pt  presented for sudden onset left sided weakness to [facility] on 4/16/24 . Pt fell down the stairs was found by family awake and confused  on the floor prior to hospitalization.  CT brain showed right MCA occlusion( 4/16). Patient given tenecteplase and was transferred to [facility] on 4/16 for thrombectomy. Upon initial evaluation patient ws found to have NIHSS of 9 and subsequently at initial evaluation patient was not very well cooperative which contributed to a high NIHSS score of 19. After imaging and re-evaluation patient was re-evaluated and was found to have NIHSS of 11 (2 for gaze preference right, 2 for lack of response to threat left, 2 for drift in LUE and 1 for drift in LLE, 1 for neglect, 1 for dysarthria and 2 for left face). Repeat CT brain on 4/16  showed new cytotoxic edema in the right frontal, parietal, and temporal lobes, as well as within the right insula consistent with an acute right MCA territory infarct. No hipolito hemorrhagic transformation.   Pt is s/p cerebral angio with mechanical thrombectomy by Dr Pate on 4/16 with  no change in NIHSS of 11. Pt still with left sided weakness facial weakness, dysarthria, left neglect,  right gaze preference and left hemianopsia.  CT CAP with no evidence of acute trauma, Left lateral hip subcutaneous hematoma with evidence of active bleeding. MR c-spine with multiple cervical fractures, CT T-spine with T6 vertebral body fracture and incidental:cystic pancreatic head mass. Hospital course significant for anemia requiring dual Pressor support and 1 unit PRBC and PLT  2/2 to left hip hematoma. Worsening in mental status MRI brain done showing large R MCA infarct with hemorrhagic conversion. Pt treated with Hypertonic saline for cytotoxic edema with improvement on 4/18. Fever with neg cx treated with zosyn for Klebsiella PNA.  Failed FEEST, NGT place for feeding. Started on ASA and SQL on 4/21. 4/22 repeat FEEST pt advanced to puree with mod thickened liquid ( crush meds). Patient was fitted on 4/22/24 for cervical collar occipital mandibular support. MR T Spine without contrast reveals subtle band of increased T2/STIR signal within the T6 vertebral body, suggestive for acute fracture. Per ortho, no need for ortho spine intervention, continue c-collar and TLSO, outpatient ortho follow up no surgical intervention at this time. Family refused suggested ILR. Patient was evaluated by PM&R and therapy for functional deficits, gait/ADL impairments and acute rehabilitation was recommended. Patient was medically optimized for discharge to [facility] IRU on 4/25/24    Patient spouse and son were present during the history taking.      ROS/Subjective:  patient seen and evaluated in chair this am and in PT  reports slept ok, '5 hours'  paresthesias LUE/ LLE-on Neurontin to 300mg bedtime  waxing/waning level of alertness- able to sustain attention during visit this am, fell asleep in chair w/PT when not interacted with. Introducing Provigil.   Neuro in for CTH w/new bleeding, f/up stable. ASA/Lovenox on hold per Dr Johns. SCDs and TEds meantime until cleared. CTH next week f/up.   no dizziness, no headaches, + Neck pain, no nausea, no vomiting, no SOB, no chest pain  Hyperglycemic-Lantus adjusted. Restart home Metformin-discussed w/hospitalist      MEDICATIONS  (STANDING):  acetaminophen     Tablet .. 650 milliGRAM(s) Oral every 12 hours  atorvastatin 20 milliGRAM(s) Oral at bedtime  dextrose 10% Bolus 125 milliLiter(s) IV Bolus once  dextrose 5%. 1000 milliLiter(s) (100 mL/Hr) IV Continuous <Continuous>  dextrose 5%. 1000 milliLiter(s) (50 mL/Hr) IV Continuous <Continuous>  dextrose 50% Injectable 25 Gram(s) IV Push once  dextrose 50% Injectable 12.5 Gram(s) IV Push once  finasteride 5 milliGRAM(s) Oral daily  gabapentin 300 milliGRAM(s) Oral at bedtime  glucagon  Injectable 1 milliGRAM(s) IntraMuscular once  insulin glargine Injectable (LANTUS) 16 Unit(s) SubCutaneous at bedtime  insulin lispro (ADMELOG) corrective regimen sliding scale   SubCutaneous three times a day before meals  insulin lispro (ADMELOG) corrective regimen sliding scale   SubCutaneous at bedtime  lidocaine   4% Patch 1 Patch Transdermal daily  lidocaine   4% Patch 1 Patch Transdermal daily  metoprolol tartrate 12.5 milliGRAM(s) Oral every 12 hours  modafinil 50 milliGRAM(s) Oral <User Schedule>  pantoprazole    Tablet 40 milliGRAM(s) Oral before breakfast  polyethylene glycol 3350 17 Gram(s) Oral daily  senna 2 Tablet(s) Oral at bedtime  tamsulosin 0.4 milliGRAM(s) Oral at bedtime  timolol 0.5% Solution 1 Drop(s) Left EYE two times a day  traZODone 25 milliGRAM(s) Oral at bedtime    MEDICATIONS  (PRN):  aluminum hydroxide/magnesium hydroxide/simethicone Suspension 30 milliLiter(s) Oral every 4 hours PRN Dyspepsia  bisacodyl Suppository 10 milliGRAM(s) Rectal daily PRN Constipation  dextrose Oral Gel 15 Gram(s) Oral once PRN Blood Glucose LESS THAN 70 milliGRAM(s)/deciliter      ACC: [id number] EXAM:  CT BRAIN   ORDERED BY:  TD RODRIGUEZ     PROCEDURE DATE:  05/01/2024          INTERPRETATION:  CLINICAL INDICATIONS:  lethargic. Monitor new bleed      monitor new bleed    COMPARISON: Head CT dated 5/1/2024    TECHNIQUE: Noncontrast CT of the head. Multiplanar reformations are   submitted.    FINDINGS: Stable subacute large right MCA territory infarction with   stable multiple foci of acute hemorrhage in the right frontal lobe, and   right occipital lobe. Moderate cytotoxicedema. Minimal right to left   midline shift.  There is periventricular and subcortical white matter hypodensity without   mass effect, nonspecific, likely representing mild chronic microvascular   ischemic changes  . The orbits, mastoid air cells and visualized paranasal sinuses are   unremarkable. The calvarium is intact. Consider MRI as clinically   warranted.    IMPRESSION: Stable subacute large right MCA territory infarction with   stable multiple foci of acute hemorrhage in the right frontal lobe, and   right occipital lobe. Moderate cytotoxic edema. Minimal right to left   midline shift.    --- End of Report ---               12.1   7.73  )-----------( 377      ( 02 May 2024 05:55 )             37.2     05-02    136  |  103  |  16  ----------------------------<  204<H>  4.6   |  27  |  0.85    Ca    8.9      02 May 2024 05:55    TPro  7.0  /  Alb  2.8<L>  /  TBili  1.8<H>  /  DBili  x   /  AST  63<H>  /  ALT  61<H>  /  AlkPhos  110  05-02    Urinalysis Basic - ( 02 May 2024 05:55 )    Color: x / Appearance: x / SG: x / pH: x  Gluc: 204 mg/dL / Ketone: x  / Bili: x / Urobili: x   Blood: x / Protein: x / Nitrite: x   Leuk Esterase: x / RBC: x / WBC x   Sq Epi: x / Non Sq Epi: x / Bacteria: x    CAPILLARY BLOOD GLUCOSE      POCT Blood Glucose.: 218 mg/dL (02 May 2024 12:19)  POCT Blood Glucose.: 229 mg/dL (02 May 2024 08:04)  POCT Blood Glucose.: 234 mg/dL (01 May 2024 21:22)  POCT Blood Glucose.: 216 mg/dL (01 May 2024 17:06)      Vital Signs Last 24 Hrs  T(C): 36.3 (02 May 2024 08:34), Max: 36.8 (01 May 2024 19:51)  T(F): 97.3 (02 May 2024 08:34), Max: 98.3 (01 May 2024 19:51)  HR: 62 (02 May 2024 08:34) (62 - 68)  BP: 103/66 (02 May 2024 08:34) (103/66 - 126/72)  BP(mean): --  RR: 16 (02 May 2024 08:34) (16 - 16)  SpO2: 99% (02 May 2024 08:34) (98% - 99%)    Parameters below as of 02 May 2024 08:34  Patient On (Oxygen Delivery Method): room air      Constitutional - NAD, Comfortable  HEENT -  EOMI, Left pupil is 7mm irregular and fixed (chronic from prior cataract surgery  Neck -+ c-collar  Chest - good chest expansion, good respiratory effort, CTAB   Cardio - warm and well perfused, RRR, no murmur  Abdomen -  Soft, NTND  Extremities - No peripheral edema, No calf tenderness; Bruising of left shoulder, hip and knee   Neurologic Exam:                    Cognitive -             Orientation: waxing/waning AAO to self, place, month year, longer periods of alertness today      Speech - +dysarthria     Cranial Nerves - +left facial droop, non-reactive left pupil (chronic)     Motor -                      LEFT    UE - ShAB 2/5, EF 2/5, EE 2/5, WE 0/5,  0/5                    RIGHT UE - ShAB 5/5, EF 5/5, EE 5/5, WE 5/5,  WNL                    LEFT    LE - HF 4/5, KE 4/5, DF 5/5, PF 5/5                    RIGHT LE - HF 5/5, KE 5/5, DF 5/5, PF 5/5        Sensory - decreased to LT LLE and absent on LUE     Reflexes - DTR 2 / 4 , neg Duke's b/l,      Coordination - FTN intact on the righ/ HTS intact     OculoVestibular -  No nystagmus  Psychiatric - Mood stable, Affect WNL  Skin on admission: skin tear upper back, bruising over the left shoulder, hip and knee.  skin tears over the RUE.    IDT in [facility] 5/1  tentative Dc 5/16 to home    Continue comprehensive acute rehab program consisting of 3hrs/day of OT/PT and SLP.

## 2024-05-02 NOTE — PROGRESS NOTE ADULT - ASSESSMENT
#Gait Instability, ADL impairments and Functional impairments  - Comprehensive Rehab Program of PT/OT/SLP    # Acute right MCA territory infarct. No hipolito hemorrhagic transformation.  - ASA 81mg daily-on hold for hemorrhage changes on CTH yesterday  - Atorvastatin 80mg q hs to 20mg for LFTs up. LDL 50 at goal/below goal. US abd ok   - Provigil added, Neurontin at bedtime for left sided paresthesias   - Estim to LUE in therapy  waxing waning level of alertness-improved today  -DTR at bedside update on status and CTH findings. All questions answered to her satisfaction. Case discussed in detail w/primary Neuro Dr Johns and [facility] consulted Neurologist DR Silveira.     #Pain control/ Left Hip Hematoma   - Lidocaine patch  - Tylenol low dose BID-monitor LFTs  - Robaxin dc for drowsiness    # T6 vertebral body fracture Suspected acute anterior longitudinal ligament injury and/or anterior corner avulsion fractures at C4-C5 and C5-C6. Prevertebral edema  extending from C1 through the upper cervical levels.-Suspected right greater than left posterior paraspinal muscular strain and/or ligamentous injury. No Ortho interventions at this time. Ortho follow up post d/c  - TLSO when oob   - spinal precautions  - cleared by ortho to remove collar when in Bed      # Sleep  - Melatonin/Rozerem-not effective  -neuropsych eval  - on Trazodone-slept ok    # DM2 with hyperglycemia  -ISS  - a1c 8.2  Glucoses Elevated  - Lantus adjusted, chart reviewed by primary team-home Metformin restarted    # HTN  - Metoprolol 12.5mg      # Klebsella PNA  - S/p Zosyn, afebrile, no leukocytosis, VSS    Elevated LFTs  not taking much tylenol  US neg for pathology    #GI/Bowel Mgmt   - Continue Senna at bedtime -  - Miralax   -Protonix  last BM 5/1    #Bladder management/ BPH  - Proscar  - Flomax    #DVT  - Lovenox-hold until cleared by Neuro  - TEDs /SCDs    #Skin:  -skin tear upper back, bruising over the left shoulder, hip and knee.  skin tears over the RUE.    # Cataract  - Timolol left eye BID      #FEN   - Diet - Pureed   - Dysphagia  SLP - evaluation and treatment- ? [facility] [place]

## 2024-05-02 NOTE — CONSULT NOTE ADULT - SUBJECTIVE AND OBJECTIVE BOX
Patient is a 79y old  Male who presents with a chief complaint of Stroke (25 Apr 2024 15:47)    HPI:  81 years old man with a PMH of DM HTN CAD with stenting (on daily ASA) with stenting. Pt  presented for sudden onset left sided weakness to [facility] on 4/16/24 . Pt fell down the stairs was found by family awake and confused  on the floor prior to hospitalization.  CT brain showed right MCA occlusion( 4/16). Patient given tenecteplase and was transferred to [facility] on 4/16 for thrombectomy. Upon initial evaluation patient ws found to have NIHSS of 9 and subsequently at initial evaluation patient was not very well cooperative which contributed to a high NIHSS score of 19. After imaging and re-evaluation patient was re-evaluated and was found to have NIHSS of 11 (2 for gaze preference right, 2 for lack of response to threat left, 2 for drift in LUE and 1 for drift in LLE, 1 for neglect, 1 for dysarthria and 2 for left face). Repeat CT brain on 4/16  showed new cytotoxic edema in the right frontal, parietal, and temporal lobes, as well as within the right insula consistent with an acute right MCA territory infarct. No hipolito hemorrhagic transformation.   Pt is s/p cerebral angio with mechanical thrombectomy by Dr Pate on 4/16 with  no change in NIHSS of 11. Pt still with left sided weakness facial weakness, dysarthria, left neglect,  right gaze preference and left hemianopsia.  CT CAP with no evidence of acute trauma, Left lateral hip subcutaneous hematoma with evidence of active bleeding. MR c-spine with multiple cervical fractures, CT T-spine with T6 vertebral body fracture and incidental:cystic pancreatic head mass. Hospital course significant for anemia requiring dual Pressor support and 1 unit PRBC and PLT  2/2 to left hip hematoma. Worsening in mental status MRI brain done showing large R MCA infarct with hemorrhagic conversion. Pt treated with Hypertonic saline for cytotoxic edema with improvement on 4/18. Fever with neg cx treated with zosyn for Klebsiella PNA.  Failed FEEST, NGT place for feeding. Started on ASA and SQL on 4/21. 4/22 repeat FEEST pt advanced to puree with mod thickened liquid ( crush meds). Patient was fitted on 4/22/24 for cervical collar occipital mandibular support. MR T Spine without contrast reveals subtle band of increased T2/STIR signal within the T6 vertebral body, suggestive for acute fracture. Per ortho, no need for ortho spine intervention, continue c-collar and TLSO, outpatient ortho follow up no surgical intervention at this time. Family refused suggested ILR. Patient was evaluated by PM&R and therapy for functional deficits, gait/ADL impairments and acute rehabilitation was recommended. Patient was medically optimized for discharge to [facility] IRU on 4/25/24  Patient spouse and son were present during the history taking.    TODAY:  Patient seen and examined in NAD  No overnight event  Patient c/o some pain when he moves    PAST MEDICAL & SURGICAL HISTORY:  HLD (hyperlipidemia)  T2DM (type 2 diabetes mellitus)  BPH (benign prostatic hyperplasia)  Hypertension  CAD (coronary artery disease)    H/O eye surgery  History of cardiac cath x 2  H/O heart artery stent    FAMILY HISTORY:  Sibling with MI    SOCIAL HISTORY:  Substance Use (street drugs): denies  Tobacco Usage:  denies  Alcohol Usage: denies    ALLERGIES:  No Known Allergies or Intolerances    MEDICATIONS  (STANDING):  aspirin  chewable 81 milliGRAM(s) Oral daily  atorvastatin 80 milliGRAM(s) Oral at bedtime  dextrose 10% Bolus 125 milliLiter(s) IV Bolus once  dextrose 5%. 1000 milliLiter(s) (100 mL/Hr) IV Continuous <Continuous>  dextrose 5%. 1000 milliLiter(s) (50 mL/Hr) IV Continuous <Continuous>  dextrose 50% Injectable 25 Gram(s) IV Push once  dextrose 50% Injectable 12.5 Gram(s) IV Push once  enoxaparin Injectable 40 milliGRAM(s) SubCutaneous <User Schedule>  finasteride 5 milliGRAM(s) Oral daily  glucagon  Injectable 1 milliGRAM(s) IntraMuscular once  insulin lispro (ADMELOG) corrective regimen sliding scale   SubCutaneous three times a day before meals  insulin lispro (ADMELOG) corrective regimen sliding scale   SubCutaneous at bedtime  lidocaine   4% Patch 1 Patch Transdermal daily  melatonin 6 milliGRAM(s) Oral at bedtime  metoprolol tartrate 12.5 milliGRAM(s) Oral every 12 hours  pantoprazole    Tablet 40 milliGRAM(s) Oral before breakfast  polyethylene glycol 3350 17 Gram(s) Oral two times a day  senna 2 Tablet(s) Oral at bedtime  timolol 0.5% Solution 1 Drop(s) Left EYE two times a day    MEDICATIONS  (PRN):  acetaminophen     Tablet .. 650 milliGRAM(s) Oral every 6 hours PRN Mild Pain (1 - 3)  aluminum hydroxide/magnesium hydroxide/simethicone Suspension 30 milliLiter(s) Oral every 4 hours PRN Dyspepsia  dextrose Oral Gel 15 Gram(s) Oral once PRN Blood Glucose LESS THAN 70 milliGRAM(s)/deciliter  methocarbamol 500 milliGRAM(s) Oral every 6 hours PRN musle spasm    REVIEW OF SYSTEMS:  CONSTITUTIONAL: No fever, weight loss, or fatigue  EYES: No eye pain, visual disturbances, or discharge  RESPIRATORY: No cough, wheezing, chills or hemoptysis; No shortness of breath  CARDIOVASCULAR: No chest pain, palpitations, dizziness, or leg swelling  GASTROINTESTINAL: No abdominal or epigastric pain. No nausea, vomiting, or hematemesis; No diarrhea or constipation. No melena or hematochezia.  GENITOURINARY: No dysuria, frequency, hematuria, or incontinence  NEUROLOGICAL: No headaches, memory loss, loss of strength, numbness, or tremors  SKIN: No itching, burning, rashes, or lesions   MUSCULOSKELETAL: pain on movement  PSYCHIATRIC: No depression, anxiety, mood swings, or difficulty sleeping    ALL OTHER SYSTEMS REVIEWED AND ARE NEGATIVE    VITAL SIGNS:  T(C): 36.8 (04-26-24 @ 07:28), Max: 37 (04-25-24 @ 21:18)  HR: 68 (04-26-24 @ 07:28) (61 - 77)  BP: 124/75 (04-26-24 @ 07:28) (111/66 - 155/66)  RR: 16 (04-26-24 @ 07:28) (13 - 31)  SpO2: 98% (04-26-24 @ 07:28) (95% - 100%)  Wt(kg): --Vital Signs Last 24 Hrs  T(C): 36.8 (26 Apr 2024 07:28), Max: 37 (25 Apr 2024 21:18)  T(F): 98.2 (26 Apr 2024 07:28), Max: 98.6 (25 Apr 2024 21:18)  HR: 68 (26 Apr 2024 07:28) (61 - 77)  BP: 124/75 (26 Apr 2024 07:28) (111/66 - 155/66)  BP(mean): 92 (25 Apr 2024 12:07) (83 - 92)  RR: 16 (26 Apr 2024 07:28) (13 - 31)  SpO2: 98% (26 Apr 2024 07:28) (95% - 100%)    Parameters below as of 26 Apr 2024 07:28  Patient On (Oxygen Delivery Method): room air    PHYSICAL EXAM:  GENERAL: NAD, c- collar and TLSO brace  HENT:  Atraumatic, Normocephalic; No tonsillar erythema, exudates, ulcers, or enlargement in oropharynx; Moist mucous membranes  CHEST/LUNG: Clear to percussion bilaterally; No rales, rhonchi, wheezing, or rubs; normal respiratory effort, no intercostal retractions  HEART: Regular rate and rhythm; No murmurs, rubs, or gallops; No peripheral edema  ABDOMEN: Soft, Nontender, Nondistended; Bowel sounds present; No HSM  EXTREMITIES:  2+ Peripheral Pulses, No clubbing, cyanosis  PSYCH: Appropriate affect; Alert & Oriented x 3; Good judgement/insight    LABS:                        10.3   9.66  )-----------( 266      ( 26 Apr 2024 05:00 )             30.8     04-26    141  |  107  |  15  ----------------------------<  198<H>  3.7   |  24  |  0.90    Ca    8.1<L>      26 Apr 2024 05:00    TPro  6.2  /  Alb  2.5<L>  /  TBili  2.2<H>  /  DBili  x   /  AST  81<H>  /  ALT  70<H>  /  AlkPhos  70  04-26    Urinalysis Basic - ( 26 Apr 2024 05:00 )  Color: x / Appearance: x / SG: x / pH: x  Gluc: 198 mg/dL / Ketone: x  / Bili: x / Urobili: x   Blood: x / Protein: x / Nitrite: x   Leuk Esterase: x / RBC: x / WBC x   Sq Epi: x / Non Sq Epi: x / Bacteria: x    CAPILLARY BLOOD GLUCOSE  POCT Blood Glucose.: 211 mg/dL (26 Apr 2024 07:56)  POCT Blood Glucose.: 207 mg/dL (25 Apr 2024 23:28)  POCT Blood Glucose.: 225 mg/dL (25 Apr 2024 16:35)  POCT Blood Glucose.: 172 mg/dL (25 Apr 2024 11:32)    Previous Consultant(s) Notes Reviewed:  YES  Care Discussed with Consultants/Other Providers YES  Previous Imaging Personally Reviewed:  YES
    Patient is a 79 year old man [facility]. He was initially admitted to an outlying hospital with left-sided weakness and a fall.  He  was given tenecteplase and transferred to [facility] for thrombectomy for Right MCA distribution cytotoxic edema. He was seen by Orthopedic Surgery for multiple C Spine fractures and T6 vertebral body fracture. Orthopedic Surgery advised cervical collar and TLSO Brace. At [facility] he was noted yesterday, 5/1/24, to be drowsy during the day and CT Head obtained with increased hemorrhage in the subacute right MCA infarct. Dr. Issa, Vascular Neurology, was contacted by Marissa CHAVEZ and advised to obtain F/U CT Head and discontinue ASA and DVT prophylaxis. The patient denies HA. He states he is improving in PT.    PMH: CAD           Coronary artery stents           HTN           HLD              Eye surgery            SH: Allergy-None    Exam: Awake, alert, appropriate            Speech-dysarthric           Pupils right-2mm, L 5mm,, EOM intact, no nystagmus, VFF          CN II-XII intact except Left VII          Motor tone and strength   RUE   5/5    LUE   0/5                                                 RLE   5/5     LLE   5/5                          12.1   7.73  )-----------( 377      ( 02 May 2024 05:55 )             37.2             05-02    136  |  103  |  16  ----------------------------<  204<H>  4.6   |  27  |  0.85    Ca    8.9      02 May 2024 05:55    TPro  7.0  /  Alb  2.8<L>  /  TBili  1.8<H>  /  DBili  x   /  AST  63<H>  /  ALT  61<H>  /  AlkPhos  110  05-02        < from: CT Head No Cont (05.01.24 @ 21:47) >    COMPARISON: Head CT dated 5/1/2024    TECHNIQUE: Noncontrast CT of the head. Multiplanar reformations are   submitted.    FINDINGS: Stable subacute large right MCA territory infarction with   stable multiple foci of acute hemorrhage in the right frontal lobe, and   right occipital lobe. Moderate cytotoxicedema. Minimal right to left   midline shift.  There is periventricular and subcortical white matter hypodensity without   mass effect, nonspecific, likely representing mild chronic microvascular   ischemic changes  . The orbits, mastoid air cells and visualized paranasal sinuses are   unremarkable. The calvarium is intact. Consider MRI as clinically   warranted.    IMPRESSION: Stable subacute large right MCA territory infarction with   stable multiple foci of acute hemorrhage in the right frontal lobe, and   right occipital lobe. Moderate cytotoxic edema. Minimal right to left   midline shift.        ADEEL DE LOS SANTOS MD; Attending Radiologist  This document has been electronically signed. May  1 2024  9:54PM        < from: CT Head No Cont (05.01.24 @ 14:02) >    Comparison is made with the prior CT of 4/21/2024.    There is mixed density and lucency in the right frontal parietal and   temporal cortex in the right middle cerebral artery distribution. There   is increased hemorrhagic component compared with the previous exam   although there is no significant change in sulcal effacement or mass   effect on the right lateral ventricle. Previously somewhat petechial in   appearance there is more hipolito scattered hemorrhages within the acute   infarct. There is minimal midline shift to the left which is unchanged.    IMPRESSION: Subacute right middle cerebral artery infarct with increased   hemorrhagic component compared with 4/21/2024. No change in moderate mass   effect.    Dr. Keys discussed these findings with SCOTT Juan on 5/1/2024 2:30 PM   with read back.        JAY KEYS MD; Attending Radiologist  This document has been electronically signed. May  1 2024  2:30PM        
Patient seen at bedside for 50-minute psychology consultation. Neuropsychologist is introduced as a member of the rehabilitation team and the purpose of the session is explained. Patient agrees to participate.    Patient's wife and grandson are present at bedside, per patient preference.       Per patient, "I fell down my basement steps and hurt myself terribly." Therapists say I am working hard. I want to improve my mobility.      Medical records are reviewed. Per records: 81-year-old, male, with a history of DMII, HTN, CAD s/p stenting (on daily ASA) admitted to  rehab after hospital stay for right MCA occlusion( 4/16) at [place]. Patient was given tenecteplase and transferred to [facility] on 4/16 for thrombectomy. Pt is s/p cerebral angio with mechanical thrombectomy by Dr Pate on 4/16. Left lateral hip subcutaneous hematoma with evidence of active bleeding. MR c-spine with multiple cervical fractures, CT T-spine with T6 vertebral body fracture and incidental cystic pancreatic head mass. Course complicated by anemia s/p 1 unit PRBC and platelets, hemorrhagic conversion, fever.

## 2024-05-02 NOTE — CONSULT NOTE ADULT - ASSESSMENT
Patient is a 79 year old man admitted to [facility] on 4/25/24. He was initially admitted to an outlying hospital with left-sided weakness and a fall.  He  was given tenecteplase and transferred to [facility] for thrombectomy for Right MCA distribution cytotoxic edema. He was seen by Orthopedic Surgery for multiple C Spine fractures and T6 vertebral body fracture. Orthopedic Surgery advised cervical collar and TLSO Brace. At [facility] he was noted yesterday, 5/1/24, to be drowsy during the day and CT Head obtained with increased hemorrhage in the subacute right MCA infarct. Dr. Issa, Vascular Neurology, was contacted by Marissa CHAVEZ and advised to obtain F/U CT Head and discontinue ASA and DVT prophylaxis. The patient denies HA. He states he is improving in PT. Neurological exam as above.    With regard to the Right MCA infarct with hemorrhagic transformation and  CT Head, 5/1/24, early afternoon, with increased hemorrhage and stable repeat CT Head 5/1/24 late evening, would continue to hold ASA and DVT prophylaxis. Would obtain CT Head no contrast follow-up in 1 week.    1) CT head 5/1/24 as above early afternoon- subacute right MCA infact with increased hemorrhagic component compared with 4/21/24. No change in moderate mass effect.  2) CT head 5/1/24 as above late evening- stable subacute large right MCA infarct with stable multiple foci of acute hemorrhage right frontal and right occipital lobe. Moderate cytotoxic edema. Minimal right to left midline shift.  3) CT Head no contrast follow-up 1 week.

## 2024-05-03 LAB
GLUCOSE BLDC GLUCOMTR-MCNC: 135 MG/DL — HIGH (ref 70–99)
GLUCOSE BLDC GLUCOMTR-MCNC: 177 MG/DL — HIGH (ref 70–99)
GLUCOSE BLDC GLUCOMTR-MCNC: 186 MG/DL — HIGH (ref 70–99)
GLUCOSE BLDC GLUCOMTR-MCNC: 207 MG/DL — HIGH (ref 70–99)

## 2024-05-03 PROCEDURE — 99232 SBSQ HOSP IP/OBS MODERATE 35: CPT | Mod: GC

## 2024-05-03 PROCEDURE — 99233 SBSQ HOSP IP/OBS HIGH 50: CPT

## 2024-05-03 RX ADMIN — METFORMIN HYDROCHLORIDE 500 MILLIGRAM(S): 850 TABLET ORAL at 17:29

## 2024-05-03 RX ADMIN — Medication 650 MILLIGRAM(S): at 06:00

## 2024-05-03 RX ADMIN — Medication 1 DROP(S): at 17:30

## 2024-05-03 RX ADMIN — Medication 2: at 08:22

## 2024-05-03 RX ADMIN — LIDOCAINE 1 PATCH: 4 CREAM TOPICAL at 18:32

## 2024-05-03 RX ADMIN — Medication 1 DROP(S): at 06:00

## 2024-05-03 RX ADMIN — Medication 4: at 12:15

## 2024-05-03 RX ADMIN — MODAFINIL 50 MILLIGRAM(S): 200 TABLET ORAL at 13:01

## 2024-05-03 RX ADMIN — Medication 650 MILLIGRAM(S): at 07:00

## 2024-05-03 RX ADMIN — SENNA PLUS 2 TABLET(S): 8.6 TABLET ORAL at 22:00

## 2024-05-03 RX ADMIN — FINASTERIDE 5 MILLIGRAM(S): 5 TABLET, FILM COATED ORAL at 13:00

## 2024-05-03 RX ADMIN — INSULIN GLARGINE 16 UNIT(S): 100 INJECTION, SOLUTION SUBCUTANEOUS at 21:59

## 2024-05-03 RX ADMIN — PANTOPRAZOLE SODIUM 40 MILLIGRAM(S): 20 TABLET, DELAYED RELEASE ORAL at 06:00

## 2024-05-03 RX ADMIN — POLYETHYLENE GLYCOL 3350 17 GRAM(S): 17 POWDER, FOR SOLUTION ORAL at 13:01

## 2024-05-03 RX ADMIN — Medication 650 MILLIGRAM(S): at 17:30

## 2024-05-03 RX ADMIN — MODAFINIL 50 MILLIGRAM(S): 200 TABLET ORAL at 06:07

## 2024-05-03 RX ADMIN — LIDOCAINE 1 PATCH: 4 CREAM TOPICAL at 00:15

## 2024-05-03 RX ADMIN — LIDOCAINE 1 PATCH: 4 CREAM TOPICAL at 13:01

## 2024-05-03 RX ADMIN — ATORVASTATIN CALCIUM 20 MILLIGRAM(S): 80 TABLET, FILM COATED ORAL at 22:00

## 2024-05-03 RX ADMIN — Medication 25 MILLIGRAM(S): at 22:00

## 2024-05-03 RX ADMIN — Medication 12.5 MILLIGRAM(S): at 17:30

## 2024-05-03 RX ADMIN — GABAPENTIN 300 MILLIGRAM(S): 400 CAPSULE ORAL at 22:01

## 2024-05-03 RX ADMIN — Medication 12.5 MILLIGRAM(S): at 06:00

## 2024-05-03 RX ADMIN — LIDOCAINE 1 PATCH: 4 CREAM TOPICAL at 08:25

## 2024-05-03 RX ADMIN — TAMSULOSIN HYDROCHLORIDE 0.4 MILLIGRAM(S): 0.4 CAPSULE ORAL at 22:00

## 2024-05-03 RX ADMIN — METFORMIN HYDROCHLORIDE 500 MILLIGRAM(S): 850 TABLET ORAL at 08:23

## 2024-05-03 RX ADMIN — LIDOCAINE 1 PATCH: 4 CREAM TOPICAL at 20:12

## 2024-05-03 NOTE — PROGRESS NOTE ADULT - TIME BILLING
- Ordering, reviewing, and interpreting labs, testing, and imaging.  - Independently obtaining a review of systems and performing a physical exam  - Reviewing prior hospitalization and where necessary, outpatient records.  - Counselling and educating patient and family regarding interpretation of aforementioned items and plan of care.

## 2024-05-03 NOTE — PROGRESS NOTE ADULT - NS ATTEND AMEND GEN_ALL_CORE FT
Rehab Attending- Patient seen and examined by me - Case discussed, above note reviewed by me with modifications made    patient seen and evaluated Bedside  More awake - slept well with Trazadone/ gabapentin HS  Provigil effective  sayda BM with lactulose/ mineral oil  to continue intensive rehab program    Vital Signs Last 24 Hrs  T(C): 36.5 (03 May 2024 07:20), Max: 36.7 (02 May 2024 20:06)  T(F): 97.7 (03 May 2024 07:20), Max: 98 (02 May 2024 20:06)  HR: 59 (03 May 2024 07:20) (59 - 71)  BP: 114/75 (03 May 2024 07:20) (111/71 - 119/72)  BP(mean): --  RR: 16 (03 May 2024 07:20) (16 - 16)  SpO2: 99% (03 May 2024 07:20) (98% - 99%)    Parameters below as of 03 May 2024 07:20  Patient On (Oxygen Delivery Method): room air      Constitutional - NAD, Comfortable  HEENT -  EOMI, Left pupil is 7mm irregular and fixed (chronic from prior cataract surgery  Neck -+ c-collar  Chest - good chest expansion, good respiratory effort, CTAB   Cardio - warm and well perfused, RRR, no murmur  Abdomen -  Soft, NTND  Extremities - No peripheral edema, No calf tenderness; Bruising of left shoulder, hip and knee   Neurologic Exam:                    Cognitive -             Orientation: AAO to self, place, month year     Speech - +dysarthria     Cranial Nerves - +left facial droop, non-reactive left pupil (chronic)     Motor -                      LEFT    UE - ShAB 2/5, EF 2/5, EE 2/5, WE 0/5,  0/5                    RIGHT UE - ShAB 5/5, EF 5/5, EE 5/5, WE 5/5,  WNL                    LEFT    LE - HF 4/5, KE 4/5, DF 5/5, PF 5/5                    RIGHT LE - HF 5/5, KE 5/5, DF 5/5, PF 5/5        Sensory - decreased to LT LLE, paresthesia to LUE  Psychiatric - Mood stable, Affect WNL  Skin on admission: skin tear upper back, bruising over the left shoulder, hip and knee.  skin tears over the RUE.
Rehab Attending- Patient seen and examined by me - Case discussed, above note reviewed by me with modifications made    patient seen and evaluated in OT  Moved bowels today  Poor sleep- will try rozerem  Left Arm remains weak especially distally- to add E stim in OT  noted elevated LFTS , Bili 2.2- Hospitalist ordered RUQ US  glucoses remain elevated- hospitalist to address  to continue intensive rehab program      Vital Signs Last 24 Hrs  T(C): 36.9 (30 Apr 2024 07:56), Max: 36.9 (30 Apr 2024 02:33)  T(F): 98.4 (30 Apr 2024 07:56), Max: 98.4 (30 Apr 2024 02:33)  HR: 67 (30 Apr 2024 07:56) (67 - 73)  BP: 131/79 (30 Apr 2024 07:56) (106/65 - 131/79)  BP(mean): --  RR: 14 (30 Apr 2024 07:56) (14 - 16)  SpO2: 99% (30 Apr 2024 07:56) (99% - 99%)    Parameters below as of 30 Apr 2024 07:56  Patient On (Oxygen Delivery Method): room air        Constitutional - NAD, Comfortable  HEENT -  EOMI, Left pupil is 7mm irregular and fixed (chronic from prior cataract surgery  Neck -+ c-collar  Chest - good chest expansion, good respiratory effort, CTAB   Cardio - warm and well perfused, RRR, no murmur  Abdomen -  Soft, NTND  Extremities - No peripheral edema, No calf tenderness; Bruising of left shoulder, hip and knee   Neurologic Exam:                    Cognitive -             Orientation: Awake, Alert, AAO to self, place, month year,             Attention:  recall 1/3 objects without cueing     Speech - +dysarthria     Cranial Nerves - +left facial droop, non-reactive left pupil (chronic)     Motor -                      LEFT    UE - ShAB 2/5, EF 2/5, EE 2/5, WE 0/5,  0/5                    RIGHT UE - ShAB 5/5, EF 5/5, EE 5/5, WE 5/5,  WNL                    LEFT    LE - HF 4/5, KE 4/5, DF 5/5, PF 5/5                    RIGHT LE - HF 5/5, KE 5/5, DF 5/5, PF 5/5        Sensory - decreased to LT LLE and absent on LUE     Reflexes - DTR 2 / 4 , neg Duke's b/l,      Coordination - FTN intact on the righ/ HTS intact     OculoVestibular -  No nystagmus  Psychiatric - Mood stable, Affect WNL  Skin on admission: skin tear upper back, bruising over the left shoulder, hip and knee.  skin tears over the RUE.

## 2024-05-03 NOTE — PROGRESS NOTE ADULT - ASSESSMENT
#Gait Instability, ADL impairments and Functional impairments  - Comprehensive Rehab Program of PT/OT/SLP    # Acute right MCA territory infarct. No hipolito hemorrhagic transformation.  - ASA 81mg daily-on hold for hemorrhage changes on CTH, stable-Neuro appreciated  - Atorvastatin 80mg q hs to 20mg for LFTs up. LDL 50 at goal/below goal. US abd ok   - Provigil added, Neurontin at bedtime for left sided paresthesias   - Estim to LUE in therapy  waxing waning level of alertness-improved today  -DTR at bedside update on status and CTH findings. All questions answered to her satisfaction. Case discussed in detail w/primary Neuro Dr Johns and [facility] consulted Neurologist DR Silveira.     #Pain control/ Left Hip Hematoma   - Lidocaine patch  - Tylenol low dose BID-monitor LFTs  - Robaxin dc for drowsiness    # T6 vertebral body fracture Suspected acute anterior longitudinal ligament injury and/or anterior corner avulsion fractures at C4-C5 and C5-C6. Prevertebral edema  extending from C1 through the upper cervical levels.-Suspected right greater than left posterior paraspinal muscular strain and/or ligamentous injury. No Ortho interventions at this time. Ortho follow up post d/c  - TLSO when oob   - spinal precautions  - cleared by ortho to remove collar when in Bed      # Sleep  - Melatonin/Rozerem-not effective  -neuropsych eval  - on Trazodone-slept ok    # DM2 with hyperglycemia  -ISS  - a1c 8.2  Glucoses Elevated  - Lantus adjusted, chart reviewed by primary team-home Metformin restarted-discussed w/hospitalist    # HTN  - Metoprolol 12.5mg      # Klebsella PNA  - S/p Zosyn, afebrile, no leukocytosis, VSS    Elevated LFTs  US neg for pathology, improved  -lipitor 20mg    #GI/Bowel Mgmt   - Continue Senna at bedtime -  - Miralax   -Protonix  last BM 5/2    #Bladder management/ BPH  - Proscar  - Flomax    #DVT  - Lovenox-hold until cleared by Neuro  - TEDs /SCDs    #Skin:  -skin tear upper back, bruising over the left shoulder, hip and knee.  skin tears over the RUE.    # Cataract  - Timolol left eye BID      #FEN   - Diet - Pureed   - Dysphagia  SLP - evaluation and treatment- ? [facility] [place]

## 2024-05-03 NOTE — PROGRESS NOTE ADULT - ASSESSMENT
82 yo with a history of DMII, HTN, CAD s/p stenting (on daily ASA) admitted to [facility] after hospital stay for right MCA occlusion( 4/16) at [place]. Patient was given tenecteplase and transferred to [facility] on 4/16 for thrombectomy. Pt is s/p cerebral angio with mechanical thrombectomy by Dr Pate on 4/16. Left lateral hip subcutaneous hematoma with evidence of active bleeding. MR c-spine with multiple cervical fractures, CT T-spine with T6 vertebral body fracture and incidental cystic pancreatic head mass. Course complicated by anemia s/p 1 unit PRBC and platelets, hemorrhagic conversion, fever.         #Right MCA occlusion s/p cerebral angio with mechanical thrombectomy by Dr Pate on 4/16  #Large acute right MCA territory infarct with extensive cortical petechial hemorrhage.  # Now with worsening hemorrhagic component  - aspirin and lovenox held. seen by neuro, repeat ct in 1 week. close monitoring for any change in mental/ neuro status, repeat earlier if needed.    #Multiple cervical fractures  #T6 vertebral body acute fracture  - Comprehensive rehab program  - Maintain c-collar and TLSO brace as per primary team  - Bowel/Pain regimen as per primary team   - LDL 50  - A1c 4/16 8.2  - Continue atorvastatin 80mg  - Outpatient Neurosurgery F/U  - Family refused ILR    #Diabetes Mellitus II:  - A1c 8.2 on 4/16  - Home Med: Toujeo 24units qhs, metformin 850mg, Repaglinide 1mg daily  - Continue with Lantus 10 units sc QHS  - SSI premeal and qhs  - Blood glucose goal 100-180    #Transaminitis, hyperbilirubinemia  - improving. usg with no liver/ gall bladder issues.    #Blood loss anemia  - Left lateral hip subcutaneous hematoma with evidence of active bleeding.  - s/p 1 unit PRBC and PLT  - stable, continue to monitor    #Hypertension  - Continue metoprolol 12.5mg BID  - Monitor vital signs    #CAD s/p stent  #CVA  - Continue  statin, and metoprolol BID. aspirin on hold.    #BPH  - Continue finasteride    #Glaucoma  - Continue timolol BID    #DVT Prophylaxis  - lovenox

## 2024-05-03 NOTE — PROGRESS NOTE ADULT - SUBJECTIVE AND OBJECTIVE BOX
events from last 2 days noted. aspirin and lovenox on hold. No new neurological issues. seen by neuro, recommendations reviewed.    ALLERGIES:  No Known Allergies    MEDICATIONS  (STANDING):  acetaminophen     Tablet .. 650 milliGRAM(s) Oral every 12 hours  atorvastatin 20 milliGRAM(s) Oral at bedtime  dextrose 10% Bolus 125 milliLiter(s) IV Bolus once  dextrose 5%. 1000 milliLiter(s) (100 mL/Hr) IV Continuous <Continuous>  dextrose 5%. 1000 milliLiter(s) (50 mL/Hr) IV Continuous <Continuous>  dextrose 50% Injectable 25 Gram(s) IV Push once  dextrose 50% Injectable 12.5 Gram(s) IV Push once  finasteride 5 milliGRAM(s) Oral daily  gabapentin 300 milliGRAM(s) Oral at bedtime  glucagon  Injectable 1 milliGRAM(s) IntraMuscular once  insulin glargine Injectable (LANTUS) 16 Unit(s) SubCutaneous at bedtime  insulin lispro (ADMELOG) corrective regimen sliding scale   SubCutaneous at bedtime  insulin lispro (ADMELOG) corrective regimen sliding scale   SubCutaneous three times a day before meals  lidocaine   4% Patch 1 Patch Transdermal daily  lidocaine   4% Patch 1 Patch Transdermal daily  metFORMIN 500 milliGRAM(s) Oral two times a day  metoprolol tartrate 12.5 milliGRAM(s) Oral every 12 hours  modafinil 50 milliGRAM(s) Oral <User Schedule>  pantoprazole    Tablet 40 milliGRAM(s) Oral before breakfast  polyethylene glycol 3350 17 Gram(s) Oral daily  senna 2 Tablet(s) Oral at bedtime  tamsulosin 0.4 milliGRAM(s) Oral at bedtime  timolol 0.5% Solution 1 Drop(s) Left EYE two times a day  traZODone 25 milliGRAM(s) Oral at bedtime    MEDICATIONS  (PRN):  aluminum hydroxide/magnesium hydroxide/simethicone Suspension 30 milliLiter(s) Oral every 4 hours PRN Dyspepsia  bisacodyl Suppository 10 milliGRAM(s) Rectal daily PRN Constipation  dextrose Oral Gel 15 Gram(s) Oral once PRN Blood Glucose LESS THAN 70 milliGRAM(s)/deciliter    T(C): 36.5 (05-03-24 @ 07:20), Max: 36.7 (05-02-24 @ 20:06)  T(F): 97.7 (05-03-24 @ 07:20), Max: 98 (05-02-24 @ 20:06)  HR: 59 (05-03-24 @ 07:20) (59 - 71)  BP: 114/75 (05-03-24 @ 07:20) (111/71 - 119/72)  ABP: --  ABP(mean): --  RR: 16 (05-03-24 @ 07:20) (16 - 16)  SpO2: 99% (05-03-24 @ 07:20) (98% - 99%)        PHYSICAL EXAM:  aaox 3, sitting in chair  follows simple commands  no apparant distress  cervical collar present  dressing clean and dry  both lungs clear  s1, s2, regular  abdomen- soft  no pedal edema  decrease rom in left arm    LABS:                        12.1   7.73  )-----------( 377      ( 02 May 2024 05:55 )             37.2     05-02    136  |  103  |  16  ----------------------------<  204<H>  4.6   |  27  |  0.85    Ca    8.9      02 May 2024 05:55    TPro  7.0  /  Alb  2.8<L>  /  TBili  1.8<H>  /  DBili  x   /  AST  63<H>  /  ALT  61<H>  /  AlkPhos  110  05-02      Urinalysis Basic - ( 02 May 2024 05:55 )    Color: x / Appearance: x / SG: x / pH: x  Gluc: 204 mg/dL / Ketone: x  / Bili: x / Urobili: x   Blood: x / Protein: x / Nitrite: x   Leuk Esterase: x / RBC: x / WBC x   Sq Epi: x / Non Sq Epi: x / Bacteria: x       CAPILLARY BLOOD GLUCOSE      POCT Blood Glucose.: 177 mg/dL (03 May 2024 07:49)  POCT Blood Glucose.: 218 mg/dL (02 May 2024 21:14)  POCT Blood Glucose.: 195 mg/dL (02 May 2024 17:24)  POCT Blood Glucose.: 218 mg/dL (02 May 2024 12:19)        Urinalysis Basic - ( 02 May 2024 05:55 )    Color: x / Appearance: x / SG: x / pH: x  Gluc: 204 mg/dL / Ketone: x  / Bili: x / Urobili: x   Blood: x / Protein: x / Nitrite: x   Leuk Esterase: x / RBC: x / WBC x   Sq Epi: x / Non Sq Epi: x / Bacteria: x       CAPILLARY BLOOD GLUCOSE      POCT Blood Glucose.: 185 mg/dL (30 Apr 2024 12:10)  POCT Blood Glucose.: 200 mg/dL (30 Apr 2024 08:08)  POCT Blood Glucose.: 196 mg/dL (29 Apr 2024 21:49)  POCT Blood Glucose.: 164 mg/dL (29 Apr 2024 17:56)        Urinalysis Basic - ( 29 Apr 2024 06:35 )    Color: x / Appearance: x / SG: x / pH: x  Gluc: 178 mg/dL / Ketone: x  / Bili: x / Urobili: x   Blood: x / Protein: x / Nitrite: x   Leuk Esterase: x / RBC: x / WBC x   Sq Epi: x / Non Sq Epi: x / Bacteria: x      CT head reviewed by me, initially showed Subacute right middle cerebral artery infarct with increased   hemorrhagic component compared with 4/21/2024. No change in moderate mass   effect.  Repeat showed  stable status.

## 2024-05-03 NOTE — PROGRESS NOTE ADULT - SUBJECTIVE AND OBJECTIVE BOX
HPI:  81 years old man with a PMH of DM HTN CAD with stenting (on daily ASA) with stenting. Pt  presented for sudden onset left sided weakness to [facility] on 4/16/24 . Pt fell down the stairs was found by family awake and confused  on the floor prior to hospitalization.  CT brain showed right MCA occlusion( 4/16). Patient given tenecteplase and was transferred to [facility] on 4/16 for thrombectomy. Upon initial evaluation patient ws found to have NIHSS of 9 and subsequently at initial evaluation patient was not very well cooperative which contributed to a high NIHSS score of 19. After imaging and re-evaluation patient was re-evaluated and was found to have NIHSS of 11 (2 for gaze preference right, 2 for lack of response to threat left, 2 for drift in LUE and 1 for drift in LLE, 1 for neglect, 1 for dysarthria and 2 for left face). Repeat CT brain on 4/16  showed new cytotoxic edema in the right frontal, parietal, and temporal lobes, as well as within the right insula consistent with an acute right MCA territory infarct. No hipolito hemorrhagic transformation.   Pt is s/p cerebral angio with mechanical thrombectomy by Dr Pate on 4/16 with  no change in NIHSS of 11. Pt still with left sided weakness facial weakness, dysarthria, left neglect,  right gaze preference and left hemianopsia.  CT CAP with no evidence of acute trauma, Left lateral hip subcutaneous hematoma with evidence of active bleeding. MR c-spine with multiple cervical fractures, CT T-spine with T6 vertebral body fracture and incidental:cystic pancreatic head mass. Hospital course significant for anemia requiring dual Pressor support and 1 unit PRBC and PLT  2/2 to left hip hematoma. Worsening in mental status MRI brain done showing large R MCA infarct with hemorrhagic conversion. Pt treated with Hypertonic saline for cytotoxic edema with improvement on 4/18. Fever with neg cx treated with zosyn for Klebsiella PNA.  Failed FEEST, NGT place for feeding. Started on ASA and SQL on 4/21. 4/22 repeat FEEST pt advanced to puree with mod thickened liquid ( crush meds). Patient was fitted on 4/22/24 for cervical collar occipital mandibular support. MR T Spine without contrast reveals subtle band of increased T2/STIR signal within the T6 vertebral body, suggestive for acute fracture. Per ortho, no need for ortho spine intervention, continue c-collar and TLSO, outpatient ortho follow up no surgical intervention at this time. Family refused suggested ILR. Patient was evaluated by PM&R and therapy for functional deficits, gait/ADL impairments and acute rehabilitation was recommended. Patient was medically optimized for discharge to [facility] IRU on 4/25/24    Patient spouse and son were present during the history taking.      ROS/Subjective:  patient seen and evaluated in chair this am. NAD, alert and awake, wife at bedside. Slept well, appears rested, neurontin effective.   paresthesias LUE/ LLE-on Neurontin to 300mg bedtime  improved alertness, able to sustain attention during visit this am, on low dose Provigil.   Neuro in for CTH w/new bleeding, f/up stable. ASA/Lovenox on hold per Dr Johns. SCDs and TEds meantime until cleared. CTH next week f/up.   no dizziness, no headaches, + Neck pain, no nausea, no vomiting, no SOB, no chest pain  Hyperglycemic-Lantus adjusted. Restart home Metformin-discussed w/hospitalist      MEDICATIONS  (STANDING):  acetaminophen     Tablet .. 650 milliGRAM(s) Oral every 12 hours  atorvastatin 20 milliGRAM(s) Oral at bedtime  dextrose 10% Bolus 125 milliLiter(s) IV Bolus once  dextrose 5%. 1000 milliLiter(s) (100 mL/Hr) IV Continuous <Continuous>  dextrose 5%. 1000 milliLiter(s) (50 mL/Hr) IV Continuous <Continuous>  dextrose 50% Injectable 25 Gram(s) IV Push once  dextrose 50% Injectable 12.5 Gram(s) IV Push once  finasteride 5 milliGRAM(s) Oral daily  gabapentin 300 milliGRAM(s) Oral at bedtime  glucagon  Injectable 1 milliGRAM(s) IntraMuscular once  insulin glargine Injectable (LANTUS) 16 Unit(s) SubCutaneous at bedtime  insulin lispro (ADMELOG) corrective regimen sliding scale   SubCutaneous at bedtime  insulin lispro (ADMELOG) corrective regimen sliding scale   SubCutaneous three times a day before meals  lidocaine   4% Patch 1 Patch Transdermal daily  lidocaine   4% Patch 1 Patch Transdermal daily  metFORMIN 500 milliGRAM(s) Oral two times a day  metoprolol tartrate 12.5 milliGRAM(s) Oral every 12 hours  modafinil 50 milliGRAM(s) Oral <User Schedule>  pantoprazole    Tablet 40 milliGRAM(s) Oral before breakfast  polyethylene glycol 3350 17 Gram(s) Oral daily  senna 2 Tablet(s) Oral at bedtime  tamsulosin 0.4 milliGRAM(s) Oral at bedtime  timolol 0.5% Solution 1 Drop(s) Left EYE two times a day  traZODone 25 milliGRAM(s) Oral at bedtime    MEDICATIONS  (PRN):  aluminum hydroxide/magnesium hydroxide/simethicone Suspension 30 milliLiter(s) Oral every 4 hours PRN Dyspepsia  bisacodyl Suppository 10 milliGRAM(s) Rectal daily PRN Constipation  dextrose Oral Gel 15 Gram(s) Oral once PRN Blood Glucose LESS THAN 70 milliGRAM(s)/deciliter                            12.1   7.73  )-----------( 377      ( 02 May 2024 05:55 )             37.2     05-02    136  |  103  |  16  ----------------------------<  204<H>  4.6   |  27  |  0.85    Ca    8.9      02 May 2024 05:55    TPro  7.0  /  Alb  2.8<L>  /  TBili  1.8<H>  /  DBili  x   /  AST  63<H>  /  ALT  61<H>  /  AlkPhos  110  05-02    Urinalysis Basic - ( 02 May 2024 05:55 )    Color: x / Appearance: x / SG: x / pH: x  Gluc: 204 mg/dL / Ketone: x  / Bili: x / Urobili: x   Blood: x / Protein: x / Nitrite: x   Leuk Esterase: x / RBC: x / WBC x   Sq Epi: x / Non Sq Epi: x / Bacteria: x        CAPILLARY BLOOD GLUCOSE      POCT Blood Glucose.: 177 mg/dL (03 May 2024 07:49)  POCT Blood Glucose.: 218 mg/dL (02 May 2024 21:14)  POCT Blood Glucose.: 195 mg/dL (02 May 2024 17:24)  POCT Blood Glucose.: 218 mg/dL (02 May 2024 12:19)      Vital Signs Last 24 Hrs  T(C): 36.5 (03 May 2024 07:20), Max: 36.7 (02 May 2024 20:06)  T(F): 97.7 (03 May 2024 07:20), Max: 98 (02 May 2024 20:06)  HR: 59 (03 May 2024 07:20) (59 - 71)  BP: 114/75 (03 May 2024 07:20) (111/71 - 119/72)  BP(mean): --  RR: 16 (03 May 2024 07:20) (16 - 16)  SpO2: 99% (03 May 2024 07:20) (98% - 99%)    Parameters below as of 03 May 2024 07:20  Patient On (Oxygen Delivery Method): room air      Constitutional - NAD, Comfortable  HEENT -  EOMI, Left pupil is 7mm irregular and fixed (chronic from prior cataract surgery  Neck -+ c-collar  Chest - good chest expansion, good respiratory effort, CTAB   Cardio - warm and well perfused, RRR, no murmur  Abdomen -  Soft, NTND  Extremities - No peripheral edema, No calf tenderness; Bruising of left shoulder, hip and knee   Neurologic Exam:                    Cognitive -             Orientation: AAO to self, place, month year     Speech - +dysarthria     Cranial Nerves - +left facial droop, non-reactive left pupil (chronic)     Motor -                      LEFT    UE - ShAB 2/5, EF 2/5, EE 2/5, WE 0/5,  0/5                    RIGHT UE - ShAB 5/5, EF 5/5, EE 5/5, WE 5/5,  WNL                    LEFT    LE - HF 4/5, KE 4/5, DF 5/5, PF 5/5                    RIGHT LE - HF 5/5, KE 5/5, DF 5/5, PF 5/5        Sensory - decreased to LT LLE, paresthesia to LUE  Psychiatric - Mood stable, Affect WNL  Skin on admission: skin tear upper back, bruising over the left shoulder, hip and knee.  skin tears over the RUE.    IDT in [place] 5/1  tentative Dc 5/16 to home    Continue comprehensive acute rehab program consisting of 3hrs/day of OT/PT.

## 2024-05-04 LAB
GLUCOSE BLDC GLUCOMTR-MCNC: 102 MG/DL — HIGH (ref 70–99)
GLUCOSE BLDC GLUCOMTR-MCNC: 167 MG/DL — HIGH (ref 70–99)
GLUCOSE BLDC GLUCOMTR-MCNC: 172 MG/DL — HIGH (ref 70–99)
GLUCOSE BLDC GLUCOMTR-MCNC: 173 MG/DL — HIGH (ref 70–99)

## 2024-05-04 PROCEDURE — 99232 SBSQ HOSP IP/OBS MODERATE 35: CPT

## 2024-05-04 RX ADMIN — MODAFINIL 50 MILLIGRAM(S): 200 TABLET ORAL at 06:19

## 2024-05-04 RX ADMIN — GABAPENTIN 300 MILLIGRAM(S): 400 CAPSULE ORAL at 21:37

## 2024-05-04 RX ADMIN — LIDOCAINE 1 PATCH: 4 CREAM TOPICAL at 11:21

## 2024-05-04 RX ADMIN — TAMSULOSIN HYDROCHLORIDE 0.4 MILLIGRAM(S): 0.4 CAPSULE ORAL at 21:37

## 2024-05-04 RX ADMIN — Medication 650 MILLIGRAM(S): at 06:48

## 2024-05-04 RX ADMIN — METFORMIN HYDROCHLORIDE 500 MILLIGRAM(S): 850 TABLET ORAL at 17:51

## 2024-05-04 RX ADMIN — MODAFINIL 50 MILLIGRAM(S): 200 TABLET ORAL at 12:29

## 2024-05-04 RX ADMIN — LIDOCAINE 1 PATCH: 4 CREAM TOPICAL at 23:01

## 2024-05-04 RX ADMIN — Medication 650 MILLIGRAM(S): at 17:51

## 2024-05-04 RX ADMIN — Medication 2: at 11:19

## 2024-05-04 RX ADMIN — ATORVASTATIN CALCIUM 20 MILLIGRAM(S): 80 TABLET, FILM COATED ORAL at 21:35

## 2024-05-04 RX ADMIN — LIDOCAINE 1 PATCH: 4 CREAM TOPICAL at 18:00

## 2024-05-04 RX ADMIN — FINASTERIDE 5 MILLIGRAM(S): 5 TABLET, FILM COATED ORAL at 11:19

## 2024-05-04 RX ADMIN — Medication 12.5 MILLIGRAM(S): at 05:48

## 2024-05-04 RX ADMIN — LIDOCAINE 1 PATCH: 4 CREAM TOPICAL at 00:32

## 2024-05-04 RX ADMIN — Medication 12.5 MILLIGRAM(S): at 17:51

## 2024-05-04 RX ADMIN — SENNA PLUS 2 TABLET(S): 8.6 TABLET ORAL at 21:36

## 2024-05-04 RX ADMIN — Medication 25 MILLIGRAM(S): at 23:09

## 2024-05-04 RX ADMIN — Medication 650 MILLIGRAM(S): at 05:48

## 2024-05-04 RX ADMIN — Medication 1 DROP(S): at 17:51

## 2024-05-04 RX ADMIN — METFORMIN HYDROCHLORIDE 500 MILLIGRAM(S): 850 TABLET ORAL at 08:10

## 2024-05-04 RX ADMIN — INSULIN GLARGINE 16 UNIT(S): 100 INJECTION, SOLUTION SUBCUTANEOUS at 21:37

## 2024-05-04 RX ADMIN — Medication 1 DROP(S): at 05:48

## 2024-05-04 RX ADMIN — PANTOPRAZOLE SODIUM 40 MILLIGRAM(S): 20 TABLET, DELAYED RELEASE ORAL at 05:48

## 2024-05-04 RX ADMIN — Medication 2: at 07:36

## 2024-05-04 RX ADMIN — LIDOCAINE 1 PATCH: 4 CREAM TOPICAL at 08:10

## 2024-05-04 RX ADMIN — LIDOCAINE 1 PATCH: 4 CREAM TOPICAL at 20:00

## 2024-05-04 NOTE — PROGRESS NOTE ADULT - ASSESSMENT
Imp: Patient with diagnosis of     Acute right MCA territory infarct admitted for comprehensive acute rehabilitation.    Plan:  - Continue PT/OT/SLP  - DVT prophylaxis  - Skin- Turn q2h, check skin daily  - Continue current medications; patient medically stable.   -Active issues-   - Patient is stable to continue current rehabilitation program  - writer encouraged using the collar as directed  - reviewed with nursing skin care needs including lack of wounds/skin breakdown in the groin region  - BM program working   - if testicular pain continues, shall order ultrasound of the region

## 2024-05-04 NOTE — PROGRESS NOTE ADULT - ASSESSMENT
82 yo with a history of DMII, HTN, CAD s/p stenting (on daily ASA) admitted to [facility] after hospital stay for right MCA occlusion( 4/16) at [place]. Patient was given tenecteplase and transferred to [facility] on 4/16 for thrombectomy. Pt is s/p cerebral angio with mechanical thrombectomy by Dr Pate on 4/16. Left lateral hip subcutaneous hematoma with evidence of active bleeding. MR c-spine with multiple cervical fractures, CT T-spine with T6 vertebral body fracture and incidental cystic pancreatic head mass. Course complicated by anemia s/p 1 unit PRBC and platelets, hemorrhagic conversion, fever.         #Right MCA occlusion s/p cerebral angio with mechanical thrombectomy by Dr Pate on 4/16  #Large acute right MCA territory infarct with extensive cortical petechial hemorrhage.  # Now with worsening hemorrhagic component  - aspirin and lovenox held. seen by neuro, repeat ct in 1 week. close monitoring for any change in mental/ neuro status, repeat earlier if needed.    #Multiple cervical fractures  #T6 vertebral body acute fracture  - Comprehensive rehab program  - Maintain c-collar and TLSO brace as per primary team  - Bowel/Pain regimen as per primary team   - LDL 50  - A1c 4/16 8.2  - Continue atorvastatin 80mg  - Outpatient Neurosurgery F/U  - Family refused ILR    #Diabetes Mellitus II:  - A1c 8.2 on 4/16  - Home Med: Toujeo 24units qhs, metformin 850mg, Repaglinide 1mg daily  - Continue with Lantus 10 units sc QHS  - SSI premeal and qhs  - Blood glucose goal 100-180    #Transaminitis, hyperbilirubinemia  - improving. usg with no liver/ gall bladder issues.    #Blood loss anemia  - Left lateral hip subcutaneous hematoma with evidence of active bleeding.  - s/p 1 unit PRBC and PLT  - stable, continue to monitor    #Hypertension  - Continue metoprolol 12.5mg BID  - Monitor vital signs    #CAD s/p stent  #CVA  - Continue  statin, and metoprolol BID. aspirin on hold.    #BPH  - Continue finasteride    #Glaucoma  - Continue timolol BID    #DVT Prophylaxis  - lovenox    Case discussed in detail with physiatry team

## 2024-05-04 NOTE — PROGRESS NOTE ADULT - SUBJECTIVE AND OBJECTIVE BOX
Patient is a 79y old  Male who presents with a chief complaint of Stroke (03 May 2024 12:04)      Patient seen and examined at bedside.  Denies chest pain, dyspnea, abd pain.    ALLERGIES:  No Known Allergies    MEDICATIONS  (STANDING):  acetaminophen     Tablet .. 650 milliGRAM(s) Oral every 12 hours  atorvastatin 20 milliGRAM(s) Oral at bedtime  dextrose 10% Bolus 125 milliLiter(s) IV Bolus once  dextrose 5%. 1000 milliLiter(s) (100 mL/Hr) IV Continuous <Continuous>  dextrose 5%. 1000 milliLiter(s) (50 mL/Hr) IV Continuous <Continuous>  dextrose 50% Injectable 25 Gram(s) IV Push once  dextrose 50% Injectable 12.5 Gram(s) IV Push once  finasteride 5 milliGRAM(s) Oral daily  gabapentin 300 milliGRAM(s) Oral at bedtime  glucagon  Injectable 1 milliGRAM(s) IntraMuscular once  insulin glargine Injectable (LANTUS) 16 Unit(s) SubCutaneous at bedtime  insulin lispro (ADMELOG) corrective regimen sliding scale   SubCutaneous at bedtime  insulin lispro (ADMELOG) corrective regimen sliding scale   SubCutaneous three times a day before meals  lidocaine   4% Patch 1 Patch Transdermal daily  lidocaine   4% Patch 1 Patch Transdermal daily  metFORMIN 500 milliGRAM(s) Oral two times a day  metoprolol tartrate 12.5 milliGRAM(s) Oral every 12 hours  modafinil 50 milliGRAM(s) Oral <User Schedule>  pantoprazole    Tablet 40 milliGRAM(s) Oral before breakfast  polyethylene glycol 3350 17 Gram(s) Oral daily  senna 2 Tablet(s) Oral at bedtime  tamsulosin 0.4 milliGRAM(s) Oral at bedtime  timolol 0.5% Solution 1 Drop(s) Left EYE two times a day  traZODone 25 milliGRAM(s) Oral at bedtime    MEDICATIONS  (PRN):  aluminum hydroxide/magnesium hydroxide/simethicone Suspension 30 milliLiter(s) Oral every 4 hours PRN Dyspepsia  bisacodyl Suppository 10 milliGRAM(s) Rectal daily PRN Constipation  dextrose Oral Gel 15 Gram(s) Oral once PRN Blood Glucose LESS THAN 70 milliGRAM(s)/deciliter    Vital Signs Last 24 Hrs  T(F): 97.4 (04 May 2024 08:44), Max: 98.3 (03 May 2024 19:40)  HR: 61 (04 May 2024 08:44) (61 - 74)  BP: 104/61 (04 May 2024 08:44) (104/61 - 115/73)  RR: 16 (04 May 2024 08:44) (16 - 16)  SpO2: 98% (04 May 2024 08:44) (97% - 98%)  I&O's Summary      PHYSICAL EXAM:  General: NAD, A/O x 3  ENT: MMM  Neck: Supple, No JVD  Lungs: Clear to auscultation bilaterally  Cardio: RRR, S1/S2, No murmurs  Abdomen: Soft, Nontender, Nondistended; Bowel sounds present  Extremities: No calf tenderness, No pitting edema    LABS:                        12.1   7.73  )-----------( 377      ( 02 May 2024 05:55 )             37.2       05-02    136  |  103  |  16  ----------------------------<  204  4.6   |  27  |  0.85    Ca    8.9      02 May 2024 05:55    TPro  7.0  /  Alb  2.8  /  TBili  1.8  /  DBili  x   /  AST  63  /  ALT  61  /  AlkPhos  110  05-02                04-16 Chol 103 mg/dL LDL -- HDL 41 mg/dL Trig 52 mg/dL              POCT Blood Glucose.: 167 mg/dL (04 May 2024 07:31)  POCT Blood Glucose.: 186 mg/dL (03 May 2024 21:58)  POCT Blood Glucose.: 135 mg/dL (03 May 2024 17:23)  POCT Blood Glucose.: 207 mg/dL (03 May 2024 11:41)      Urinalysis Basic - ( 02 May 2024 05:55 )    Color: x / Appearance: x / SG: x / pH: x  Gluc: 204 mg/dL / Ketone: x  / Bili: x / Urobili: x   Blood: x / Protein: x / Nitrite: x   Leuk Esterase: x / RBC: x / WBC x   Sq Epi: x / Non Sq Epi: x / Bacteria: x            RADIOLOGY & ADDITIONAL TESTS:    Care Discussed with Consultants/Other Providers:

## 2024-05-04 NOTE — PROGRESS NOTE ADULT - SUBJECTIVE AND OBJECTIVE BOX
Cc: Gait dysfunction    HPI: Patient with no new medical issues overnight.  overall they feel "better"  c/o testicle/groin pain after being "wiped" then demonstrates with his hand how the area was wiped somewhat aggressively  Pain controlled, no chest pain, no N/V, no Fevers/Chills.   No other new ROS  Has been tolerating rehabilitation program.    acetaminophen     Tablet .. 650 milliGRAM(s) Oral every 12 hours  aluminum hydroxide/magnesium hydroxide/simethicone Suspension 30 milliLiter(s) Oral every 4 hours PRN  atorvastatin 20 milliGRAM(s) Oral at bedtime  bisacodyl Suppository 10 milliGRAM(s) Rectal daily PRN  dextrose 10% Bolus 125 milliLiter(s) IV Bolus once  dextrose 5%. 1000 milliLiter(s) IV Continuous <Continuous>  dextrose 5%. 1000 milliLiter(s) IV Continuous <Continuous>  dextrose 50% Injectable 25 Gram(s) IV Push once  dextrose 50% Injectable 12.5 Gram(s) IV Push once  dextrose Oral Gel 15 Gram(s) Oral once PRN  finasteride 5 milliGRAM(s) Oral daily  gabapentin 300 milliGRAM(s) Oral at bedtime  glucagon  Injectable 1 milliGRAM(s) IntraMuscular once  insulin glargine Injectable (LANTUS) 16 Unit(s) SubCutaneous at bedtime  insulin lispro (ADMELOG) corrective regimen sliding scale   SubCutaneous three times a day before meals  insulin lispro (ADMELOG) corrective regimen sliding scale   SubCutaneous at bedtime  lidocaine   4% Patch 1 Patch Transdermal daily  lidocaine   4% Patch 1 Patch Transdermal daily  metFORMIN 500 milliGRAM(s) Oral two times a day  metoprolol tartrate 12.5 milliGRAM(s) Oral every 12 hours  modafinil 50 milliGRAM(s) Oral <User Schedule>  pantoprazole    Tablet 40 milliGRAM(s) Oral before breakfast  polyethylene glycol 3350 17 Gram(s) Oral daily  senna 2 Tablet(s) Oral at bedtime  tamsulosin 0.4 milliGRAM(s) Oral at bedtime  timolol 0.5% Solution 1 Drop(s) Left EYE two times a day  traZODone 25 milliGRAM(s) Oral at bedtime      T(C): 36.3 (05-04-24 @ 08:44), Max: 36.8 (05-03-24 @ 19:40)  HR: 61 (05-04-24 @ 08:44) (61 - 74)  BP: 104/61 (05-04-24 @ 08:44) (104/61 - 115/73)  RR: 16 (05-04-24 @ 08:44) (16 - 16)  SpO2: 98% (05-04-24 @ 08:44) (97% - 98%)    In NAD  HEENT- EOMI  Heart- RRR, S1S2  Lungs- CTA bl.  Abd- + BS, NT  Ext- No calf pain  Neuro- Exam unchanged

## 2024-05-05 LAB
GLUCOSE BLDC GLUCOMTR-MCNC: 142 MG/DL — HIGH (ref 70–99)
GLUCOSE BLDC GLUCOMTR-MCNC: 176 MG/DL — HIGH (ref 70–99)
GLUCOSE BLDC GLUCOMTR-MCNC: 234 MG/DL — HIGH (ref 70–99)
GLUCOSE BLDC GLUCOMTR-MCNC: 87 MG/DL — SIGNIFICANT CHANGE UP (ref 70–99)

## 2024-05-05 PROCEDURE — 70450 CT HEAD/BRAIN W/O DYE: CPT | Mod: 26

## 2024-05-05 PROCEDURE — 99232 SBSQ HOSP IP/OBS MODERATE 35: CPT

## 2024-05-05 RX ADMIN — Medication 650 MILLIGRAM(S): at 18:41

## 2024-05-05 RX ADMIN — Medication 1 DROP(S): at 17:36

## 2024-05-05 RX ADMIN — GABAPENTIN 300 MILLIGRAM(S): 400 CAPSULE ORAL at 22:20

## 2024-05-05 RX ADMIN — LIDOCAINE 1 PATCH: 4 CREAM TOPICAL at 12:59

## 2024-05-05 RX ADMIN — Medication 12.5 MILLIGRAM(S): at 17:29

## 2024-05-05 RX ADMIN — Medication 25 MILLIGRAM(S): at 22:22

## 2024-05-05 RX ADMIN — SENNA PLUS 2 TABLET(S): 8.6 TABLET ORAL at 22:23

## 2024-05-05 RX ADMIN — LIDOCAINE 1 PATCH: 4 CREAM TOPICAL at 06:21

## 2024-05-05 RX ADMIN — PANTOPRAZOLE SODIUM 40 MILLIGRAM(S): 20 TABLET, DELAYED RELEASE ORAL at 06:15

## 2024-05-05 RX ADMIN — LIDOCAINE 1 PATCH: 4 CREAM TOPICAL at 19:00

## 2024-05-05 RX ADMIN — METFORMIN HYDROCHLORIDE 500 MILLIGRAM(S): 850 TABLET ORAL at 08:27

## 2024-05-05 RX ADMIN — ATORVASTATIN CALCIUM 20 MILLIGRAM(S): 80 TABLET, FILM COATED ORAL at 22:21

## 2024-05-05 RX ADMIN — Medication 1 DROP(S): at 06:16

## 2024-05-05 RX ADMIN — Medication 30 MILLILITER(S): at 18:05

## 2024-05-05 RX ADMIN — LIDOCAINE 1 PATCH: 4 CREAM TOPICAL at 07:00

## 2024-05-05 RX ADMIN — Medication 650 MILLIGRAM(S): at 17:30

## 2024-05-05 RX ADMIN — Medication 12.5 MILLIGRAM(S): at 06:15

## 2024-05-05 RX ADMIN — Medication 2: at 12:13

## 2024-05-05 RX ADMIN — TAMSULOSIN HYDROCHLORIDE 0.4 MILLIGRAM(S): 0.4 CAPSULE ORAL at 22:22

## 2024-05-05 RX ADMIN — FINASTERIDE 5 MILLIGRAM(S): 5 TABLET, FILM COATED ORAL at 12:56

## 2024-05-05 RX ADMIN — MODAFINIL 50 MILLIGRAM(S): 200 TABLET ORAL at 12:57

## 2024-05-05 RX ADMIN — INSULIN GLARGINE 16 UNIT(S): 100 INJECTION, SOLUTION SUBCUTANEOUS at 22:21

## 2024-05-05 RX ADMIN — METFORMIN HYDROCHLORIDE 500 MILLIGRAM(S): 850 TABLET ORAL at 17:29

## 2024-05-05 RX ADMIN — MODAFINIL 50 MILLIGRAM(S): 200 TABLET ORAL at 06:15

## 2024-05-05 RX ADMIN — Medication 650 MILLIGRAM(S): at 06:21

## 2024-05-05 NOTE — PROGRESS NOTE ADULT - SUBJECTIVE AND OBJECTIVE BOX
Patient is a 79y old  Male who presents with a chief complaint of Acute right MCA territory infarct (04 May 2024 13:14)      Patient seen and examined at bedside.  Denies chest pain, dyspnea, abd pain.    ALLERGIES:  No Known Allergies    MEDICATIONS  (STANDING):  acetaminophen     Tablet .. 650 milliGRAM(s) Oral every 12 hours  atorvastatin 20 milliGRAM(s) Oral at bedtime  dextrose 10% Bolus 125 milliLiter(s) IV Bolus once  dextrose 5%. 1000 milliLiter(s) (100 mL/Hr) IV Continuous <Continuous>  dextrose 5%. 1000 milliLiter(s) (50 mL/Hr) IV Continuous <Continuous>  dextrose 50% Injectable 25 Gram(s) IV Push once  dextrose 50% Injectable 12.5 Gram(s) IV Push once  finasteride 5 milliGRAM(s) Oral daily  gabapentin 300 milliGRAM(s) Oral at bedtime  glucagon  Injectable 1 milliGRAM(s) IntraMuscular once  insulin glargine Injectable (LANTUS) 16 Unit(s) SubCutaneous at bedtime  insulin lispro (ADMELOG) corrective regimen sliding scale   SubCutaneous at bedtime  insulin lispro (ADMELOG) corrective regimen sliding scale   SubCutaneous three times a day before meals  lidocaine   4% Patch 1 Patch Transdermal daily  lidocaine   4% Patch 1 Patch Transdermal daily  metFORMIN 500 milliGRAM(s) Oral two times a day  metoprolol tartrate 12.5 milliGRAM(s) Oral every 12 hours  modafinil 50 milliGRAM(s) Oral <User Schedule>  pantoprazole    Tablet 40 milliGRAM(s) Oral before breakfast  polyethylene glycol 3350 17 Gram(s) Oral daily  senna 2 Tablet(s) Oral at bedtime  tamsulosin 0.4 milliGRAM(s) Oral at bedtime  timolol 0.5% Solution 1 Drop(s) Left EYE two times a day  traZODone 25 milliGRAM(s) Oral at bedtime    MEDICATIONS  (PRN):  aluminum hydroxide/magnesium hydroxide/simethicone Suspension 30 milliLiter(s) Oral every 4 hours PRN Dyspepsia  bisacodyl Suppository 10 milliGRAM(s) Rectal daily PRN Constipation  dextrose Oral Gel 15 Gram(s) Oral once PRN Blood Glucose LESS THAN 70 milliGRAM(s)/deciliter    Vital Signs Last 24 Hrs  T(F): 98.9 (05 May 2024 07:50), Max: 98.9 (05 May 2024 06:03)  HR: 68 (05 May 2024 07:50) (68 - 93)  BP: 100/65 (05 May 2024 07:50) (100/65 - 120/79)  RR: 16 (05 May 2024 07:50) (14 - 16)  SpO2: 96% (05 May 2024 07:50) (96% - 99%)  I&O's Summary    04 May 2024 07:01  -  05 May 2024 07:00  --------------------------------------------------------  IN: 240 mL / OUT: 550 mL / NET: -310 mL        PHYSICAL EXAM:  General: NAD, A/O x 3  ENT: MMM  Neck: Supple, No JVD  Lungs: Clear to auscultation bilaterally  Cardio: RRR, S1/S2, No murmurs  Abdomen: Soft, Nontender, Nondistended; Bowel sounds present  Extremities: No calf tenderness, No pitting edema    LABS:                        04-16 Chol 103 mg/dL LDL -- HDL 41 mg/dL Trig 52 mg/dL              POCT Blood Glucose.: 87 mg/dL (05 May 2024 07:53)  POCT Blood Glucose.: 173 mg/dL (04 May 2024 21:30)  POCT Blood Glucose.: 102 mg/dL (04 May 2024 16:36)  POCT Blood Glucose.: 172 mg/dL (04 May 2024 11:17)              RADIOLOGY & ADDITIONAL TESTS:    Care Discussed with Consultants/Other Providers:

## 2024-05-05 NOTE — PROGRESS NOTE ADULT - ASSESSMENT
82 yo with a history of DMII, HTN, CAD s/p stenting (on daily ASA) admitted to [facility] after hospital stay for right MCA occlusion( 4/16) at [place]. Patient was given tenecteplase and transferred to [facility] on 4/16 for thrombectomy. Pt is s/p cerebral angio with mechanical thrombectomy by Dr Pate on 4/16. Left lateral hip subcutaneous hematoma with evidence of active bleeding. MR c-spine with multiple cervical fractures, CT T-spine with T6 vertebral body fracture and incidental cystic pancreatic head mass. Course complicated by anemia s/p 1 unit PRBC and platelets, hemorrhagic conversion, fever.         #Right MCA occlusion s/p cerebral angio with mechanical thrombectomy by Dr Pate on 4/16  #Large acute right MCA territory infarct with extensive cortical petechial hemorrhage.  # Now with worsening hemorrhagic component  - aspirin and lovenox held. seen by neuro, repeat ct in 1 week. close monitoring for any change in mental/ neuro status, repeat earlier if needed.  - Pt lethargic appearing in AM on 5/5 however improved  - Repeat CT head 5/5 - stable exam    #Multiple cervical fractures  #T6 vertebral body acute fracture  - Comprehensive rehab program  - Maintain c-collar and TLSO brace as per primary team  - Bowel/Pain regimen as per primary team   - LDL 50  - A1c 4/16 8.2  - Continue atorvastatin 80mg  - Outpatient Neurosurgery F/U  - Family refused ILR    #Diabetes Mellitus II:  - A1c 8.2 on 4/16  - Home Med: Toujeo 24units qhs, metformin 850mg, Repaglinide 1mg daily  - Continue with Lantus 10 units sc QHS  - SSI premeal and qhs  - Blood glucose goal 100-180    #Transaminitis, hyperbilirubinemia  - improving. usg with no liver/ gall bladder issues.    #Blood loss anemia  - Left lateral hip subcutaneous hematoma with evidence of active bleeding.  - s/p 1 unit PRBC and PLT  - stable, continue to monitor    #Hypertension  - Continue metoprolol 12.5mg BID  - Monitor vital signs    #CAD s/p stent  #CVA  - Continue  statin, and metoprolol BID. aspirin on hold.    #BPH  - Continue finasteride    #Glaucoma  - Continue timolol BID    #DVT Prophylaxis  - lovenox    Case discussed in detail with physiatry team

## 2024-05-05 NOTE — PROGRESS NOTE ADULT - SUBJECTIVE AND OBJECTIVE BOX
Cc: Gait dysfunction    HPI: Patient with no new medical issues overnight.  Pain controlled, no chest pain, no N/V, no Fevers/Chills.   No other new ROS  Has been tolerating rehabilitation program.    acetaminophen     Tablet .. 650 milliGRAM(s) Oral every 12 hours  aluminum hydroxide/magnesium hydroxide/simethicone Suspension 30 milliLiter(s) Oral every 4 hours PRN  atorvastatin 20 milliGRAM(s) Oral at bedtime  bisacodyl Suppository 10 milliGRAM(s) Rectal daily PRN  dextrose 10% Bolus 125 milliLiter(s) IV Bolus once  dextrose 5%. 1000 milliLiter(s) IV Continuous <Continuous>  dextrose 5%. 1000 milliLiter(s) IV Continuous <Continuous>  dextrose 50% Injectable 25 Gram(s) IV Push once  dextrose 50% Injectable 12.5 Gram(s) IV Push once  dextrose Oral Gel 15 Gram(s) Oral once PRN  finasteride 5 milliGRAM(s) Oral daily  gabapentin 300 milliGRAM(s) Oral at bedtime  glucagon  Injectable 1 milliGRAM(s) IntraMuscular once  insulin glargine Injectable (LANTUS) 16 Unit(s) SubCutaneous at bedtime  insulin lispro (ADMELOG) corrective regimen sliding scale   SubCutaneous at bedtime  insulin lispro (ADMELOG) corrective regimen sliding scale   SubCutaneous three times a day before meals  lidocaine   4% Patch 1 Patch Transdermal daily  lidocaine   4% Patch 1 Patch Transdermal daily  metFORMIN 500 milliGRAM(s) Oral two times a day  metoprolol tartrate 12.5 milliGRAM(s) Oral every 12 hours  modafinil 50 milliGRAM(s) Oral <User Schedule>  pantoprazole    Tablet 40 milliGRAM(s) Oral before breakfast  polyethylene glycol 3350 17 Gram(s) Oral daily  senna 2 Tablet(s) Oral at bedtime  tamsulosin 0.4 milliGRAM(s) Oral at bedtime  timolol 0.5% Solution 1 Drop(s) Left EYE two times a day  traZODone 25 milliGRAM(s) Oral at bedtime      T(C): 37.2 (05-05-24 @ 07:50), Max: 37.2 (05-05-24 @ 06:03)  HR: 68 (05-05-24 @ 07:50) (68 - 93)  BP: 100/65 (05-05-24 @ 07:50) (100/65 - 120/79)  RR: 16 (05-05-24 @ 07:50) (14 - 16)  SpO2: 96% (05-05-24 @ 07:50) (96% - 99%)    In NAD  HEENT- EOMI  Heart- RRR, S1S2  Lungs- CTA bl.  Abd- + BS, NT  Ext- No calf pain  Neuro- Exam unchanged

## 2024-05-05 NOTE — PROGRESS NOTE ADULT - ASSESSMENT
Imp: Patient with diagnosis of    CVA      admitted for comprehensive acute rehabilitation.    Plan:  - Continue PT/OT/SLP  - DVT prophylaxis  - Skin- Turn q2h, check skin daily  - Continue current medications; patient medically stable.   -Active issues-   - Patient is stable to continue current rehabilitation program.   - some increased AM lethargy despite patient being AOX baseline, sharp with recollection and instruction and making witty remarks about the situation (heading to CT scan for example)  - CTH reviewed, agree with impression of no worsening of the study  - patient had 150cc on PVR this AM  - no longer c/o testicular pain

## 2024-05-06 ENCOUNTER — INPATIENT (INPATIENT)
Facility: HOSPITAL | Age: 79
LOS: 7 days | Discharge: HOSPICE | DRG: 72 | End: 2024-05-14
Attending: FAMILY MEDICINE | Admitting: INTERNAL MEDICINE
Payer: MEDICARE

## 2024-05-06 ENCOUNTER — TRANSCRIPTION ENCOUNTER (OUTPATIENT)
Age: 79
End: 2024-05-06

## 2024-05-06 VITALS
DIASTOLIC BLOOD PRESSURE: 65 MMHG | TEMPERATURE: 98 F | OXYGEN SATURATION: 95 % | RESPIRATION RATE: 18 BRPM | SYSTOLIC BLOOD PRESSURE: 101 MMHG | HEART RATE: 75 BPM

## 2024-05-06 DIAGNOSIS — Z95.5 PRESENCE OF CORONARY ANGIOPLASTY IMPLANT AND GRAFT: Chronic | ICD-10-CM

## 2024-05-06 DIAGNOSIS — Z98.890 OTHER SPECIFIED POSTPROCEDURAL STATES: Chronic | ICD-10-CM

## 2024-05-06 DIAGNOSIS — I67.9 CEREBROVASCULAR DISEASE, UNSPECIFIED: ICD-10-CM

## 2024-05-06 LAB
ALBUMIN SERPL ELPH-MCNC: 2.5 G/DL — LOW (ref 3.3–5)
ALBUMIN SERPL ELPH-MCNC: 2.6 G/DL — LOW (ref 3.3–5)
ALP SERPL-CCNC: 88 U/L — SIGNIFICANT CHANGE UP (ref 40–120)
ALP SERPL-CCNC: 96 U/L — SIGNIFICANT CHANGE UP (ref 40–120)
ALT FLD-CCNC: 41 U/L — SIGNIFICANT CHANGE UP (ref 10–45)
ALT FLD-CCNC: 47 U/L — HIGH (ref 10–45)
ANION GAP SERPL CALC-SCNC: 6 MMOL/L — SIGNIFICANT CHANGE UP (ref 5–17)
ANION GAP SERPL CALC-SCNC: 8 MMOL/L — SIGNIFICANT CHANGE UP (ref 5–17)
AST SERPL-CCNC: 65 U/L — HIGH (ref 10–40)
AST SERPL-CCNC: 65 U/L — HIGH (ref 10–40)
BASE EXCESS BLDA CALC-SCNC: 1.8 MMOL/L — SIGNIFICANT CHANGE UP (ref -2–3)
BASOPHILS # BLD AUTO: 0.03 K/UL — SIGNIFICANT CHANGE UP (ref 0–0.2)
BASOPHILS NFR BLD AUTO: 0.3 % — SIGNIFICANT CHANGE UP (ref 0–2)
BILIRUB SERPL-MCNC: 1 MG/DL — SIGNIFICANT CHANGE UP (ref 0.2–1.2)
BILIRUB SERPL-MCNC: 1 MG/DL — SIGNIFICANT CHANGE UP (ref 0.2–1.2)
BLOOD GAS COMMENTS ARTERIAL: SIGNIFICANT CHANGE UP
BUN SERPL-MCNC: 16 MG/DL — SIGNIFICANT CHANGE UP (ref 7–23)
BUN SERPL-MCNC: 17 MG/DL — SIGNIFICANT CHANGE UP (ref 7–23)
CALCIUM SERPL-MCNC: 8.8 MG/DL — SIGNIFICANT CHANGE UP (ref 8.4–10.5)
CALCIUM SERPL-MCNC: 9.1 MG/DL — SIGNIFICANT CHANGE UP (ref 8.4–10.5)
CHLORIDE SERPL-SCNC: 101 MMOL/L — SIGNIFICANT CHANGE UP (ref 96–108)
CHLORIDE SERPL-SCNC: 101 MMOL/L — SIGNIFICANT CHANGE UP (ref 96–108)
CO2 BLDA-SCNC: 27 MMOL/L — HIGH (ref 19–24)
CO2 SERPL-SCNC: 26 MMOL/L — SIGNIFICANT CHANGE UP (ref 22–31)
CO2 SERPL-SCNC: 28 MMOL/L — SIGNIFICANT CHANGE UP (ref 22–31)
CREAT SERPL-MCNC: 0.95 MG/DL — SIGNIFICANT CHANGE UP (ref 0.5–1.3)
CREAT SERPL-MCNC: 0.97 MG/DL — SIGNIFICANT CHANGE UP (ref 0.5–1.3)
EGFR: 80 ML/MIN/1.73M2 — SIGNIFICANT CHANGE UP
EGFR: 82 ML/MIN/1.73M2 — SIGNIFICANT CHANGE UP
EOSINOPHIL # BLD AUTO: 0.16 K/UL — SIGNIFICANT CHANGE UP (ref 0–0.5)
EOSINOPHIL NFR BLD AUTO: 1.7 % — SIGNIFICANT CHANGE UP (ref 0–6)
GAS PNL BLDA: SIGNIFICANT CHANGE UP
GLUCOSE BLDC GLUCOMTR-MCNC: 101 MG/DL — HIGH (ref 70–99)
GLUCOSE BLDC GLUCOMTR-MCNC: 114 MG/DL — HIGH (ref 70–99)
GLUCOSE BLDC GLUCOMTR-MCNC: 143 MG/DL — HIGH (ref 70–99)
GLUCOSE BLDC GLUCOMTR-MCNC: 193 MG/DL — HIGH (ref 70–99)
GLUCOSE BLDC GLUCOMTR-MCNC: 206 MG/DL — HIGH (ref 70–99)
GLUCOSE SERPL-MCNC: 178 MG/DL — HIGH (ref 70–99)
GLUCOSE SERPL-MCNC: 89 MG/DL — SIGNIFICANT CHANGE UP (ref 70–99)
HCO3 BLDA-SCNC: 26 MMOL/L — SIGNIFICANT CHANGE UP (ref 21–28)
HCT VFR BLD CALC: 32.5 % — LOW (ref 39–50)
HCT VFR BLD CALC: 34.1 % — LOW (ref 39–50)
HGB BLD-MCNC: 10.8 G/DL — LOW (ref 13–17)
HGB BLD-MCNC: 11.4 G/DL — LOW (ref 13–17)
HOROWITZ INDEX BLDA+IHG-RTO: 21 — SIGNIFICANT CHANGE UP
IMM GRANULOCYTES NFR BLD AUTO: 0.4 % — SIGNIFICANT CHANGE UP (ref 0–0.9)
LACTATE SERPL-SCNC: 1.1 MMOL/L — SIGNIFICANT CHANGE UP (ref 0.7–2)
LYMPHOCYTES # BLD AUTO: 1.13 K/UL — SIGNIFICANT CHANGE UP (ref 1–3.3)
LYMPHOCYTES # BLD AUTO: 12.2 % — LOW (ref 13–44)
MAGNESIUM SERPL-MCNC: 1.8 MG/DL — SIGNIFICANT CHANGE UP (ref 1.6–2.6)
MCHC RBC-ENTMCNC: 29.5 PG — SIGNIFICANT CHANGE UP (ref 27–34)
MCHC RBC-ENTMCNC: 29.7 PG — SIGNIFICANT CHANGE UP (ref 27–34)
MCHC RBC-ENTMCNC: 33.2 GM/DL — SIGNIFICANT CHANGE UP (ref 32–36)
MCHC RBC-ENTMCNC: 33.4 GM/DL — SIGNIFICANT CHANGE UP (ref 32–36)
MCV RBC AUTO: 88.8 FL — SIGNIFICANT CHANGE UP (ref 80–100)
MCV RBC AUTO: 88.8 FL — SIGNIFICANT CHANGE UP (ref 80–100)
MONOCYTES # BLD AUTO: 0.83 K/UL — SIGNIFICANT CHANGE UP (ref 0–0.9)
MONOCYTES NFR BLD AUTO: 8.9 % — SIGNIFICANT CHANGE UP (ref 2–14)
NEUTROPHILS # BLD AUTO: 7.1 K/UL — SIGNIFICANT CHANGE UP (ref 1.8–7.4)
NEUTROPHILS NFR BLD AUTO: 76.5 % — SIGNIFICANT CHANGE UP (ref 43–77)
NRBC # BLD: 0 /100 WBCS — SIGNIFICANT CHANGE UP (ref 0–0)
NRBC # BLD: 0 /100 WBCS — SIGNIFICANT CHANGE UP (ref 0–0)
PCO2 BLDA: 35 MMHG — SIGNIFICANT CHANGE UP (ref 35–48)
PH BLDA: 7.47 — HIGH (ref 7.35–7.45)
PHOSPHATE SERPL-MCNC: 3.2 MG/DL — SIGNIFICANT CHANGE UP (ref 2.5–4.5)
PLATELET # BLD AUTO: 277 K/UL — SIGNIFICANT CHANGE UP (ref 150–400)
PLATELET # BLD AUTO: 293 K/UL — SIGNIFICANT CHANGE UP (ref 150–400)
PO2 BLDA: 96 MMHG — SIGNIFICANT CHANGE UP (ref 83–108)
POTASSIUM SERPL-MCNC: 4.1 MMOL/L — SIGNIFICANT CHANGE UP (ref 3.5–5.3)
POTASSIUM SERPL-MCNC: 4.6 MMOL/L — SIGNIFICANT CHANGE UP (ref 3.5–5.3)
POTASSIUM SERPL-SCNC: 4.1 MMOL/L — SIGNIFICANT CHANGE UP (ref 3.5–5.3)
POTASSIUM SERPL-SCNC: 4.6 MMOL/L — SIGNIFICANT CHANGE UP (ref 3.5–5.3)
PROCALCITONIN SERPL-MCNC: 0.16 NG/ML — HIGH
PROT SERPL-MCNC: 6.7 G/DL — SIGNIFICANT CHANGE UP (ref 6–8.3)
PROT SERPL-MCNC: 7.2 G/DL — SIGNIFICANT CHANGE UP (ref 6–8.3)
RBC # BLD: 3.66 M/UL — LOW (ref 4.2–5.8)
RBC # BLD: 3.84 M/UL — LOW (ref 4.2–5.8)
RBC # FLD: 16.2 % — HIGH (ref 10.3–14.5)
RBC # FLD: 16.3 % — HIGH (ref 10.3–14.5)
SAO2 % BLDA: 98.3 % — HIGH (ref 94–98)
SODIUM SERPL-SCNC: 135 MMOL/L — SIGNIFICANT CHANGE UP (ref 135–145)
SODIUM SERPL-SCNC: 135 MMOL/L — SIGNIFICANT CHANGE UP (ref 135–145)
WBC # BLD: 8.99 K/UL — SIGNIFICANT CHANGE UP (ref 3.8–10.5)
WBC # BLD: 9.29 K/UL — SIGNIFICANT CHANGE UP (ref 3.8–10.5)
WBC # FLD AUTO: 8.99 K/UL — SIGNIFICANT CHANGE UP (ref 3.8–10.5)
WBC # FLD AUTO: 9.29 K/UL — SIGNIFICANT CHANGE UP (ref 3.8–10.5)

## 2024-05-06 PROCEDURE — 93010 ELECTROCARDIOGRAM REPORT: CPT

## 2024-05-06 PROCEDURE — 99232 SBSQ HOSP IP/OBS MODERATE 35: CPT

## 2024-05-06 PROCEDURE — 70450 CT HEAD/BRAIN W/O DYE: CPT | Mod: 26

## 2024-05-06 RX ORDER — METOPROLOL TARTRATE 50 MG
2.5 TABLET ORAL EVERY 6 HOURS
Refills: 0 | Status: DISCONTINUED | OUTPATIENT
Start: 2024-05-06 | End: 2024-05-07

## 2024-05-06 RX ORDER — MODAFINIL 200 MG/1
50 TABLET ORAL
Qty: 0 | Refills: 0 | DISCHARGE
Start: 2024-05-06

## 2024-05-06 RX ORDER — METOPROLOL TARTRATE 50 MG
12.5 TABLET ORAL
Qty: 0 | Refills: 0 | DISCHARGE
Start: 2024-05-06

## 2024-05-06 RX ORDER — FINASTERIDE 5 MG/1
1 TABLET, FILM COATED ORAL
Qty: 0 | Refills: 0 | DISCHARGE
Start: 2024-05-06

## 2024-05-06 RX ORDER — LIDOCAINE 4 G/100G
1 CREAM TOPICAL
Qty: 0 | Refills: 0 | DISCHARGE
Start: 2024-05-06

## 2024-05-06 RX ORDER — SENNA PLUS 8.6 MG/1
2 TABLET ORAL
Qty: 0 | Refills: 0 | DISCHARGE
Start: 2024-05-06

## 2024-05-06 RX ORDER — TIMOLOL 0.5 %
1 DROPS OPHTHALMIC (EYE)
Qty: 0 | Refills: 0 | DISCHARGE
Start: 2024-05-06

## 2024-05-06 RX ORDER — PANTOPRAZOLE SODIUM 20 MG/1
40 TABLET, DELAYED RELEASE ORAL DAILY
Refills: 0 | Status: DISCONTINUED | OUTPATIENT
Start: 2024-05-06 | End: 2024-05-08

## 2024-05-06 RX ORDER — METHOCARBAMOL 500 MG/1
1 TABLET, FILM COATED ORAL
Qty: 0 | Refills: 0 | DISCHARGE
Start: 2024-05-06

## 2024-05-06 RX ORDER — ACETAMINOPHEN 500 MG
2 TABLET ORAL
Qty: 0 | Refills: 0 | DISCHARGE
Start: 2024-05-06

## 2024-05-06 RX ORDER — POLYETHYLENE GLYCOL 3350 17 G/17G
17 POWDER, FOR SOLUTION ORAL
Qty: 0 | Refills: 0 | DISCHARGE
Start: 2024-05-06

## 2024-05-06 RX ORDER — FINASTERIDE 5 MG/1
1 TABLET, FILM COATED ORAL
Refills: 0 | DISCHARGE

## 2024-05-06 RX ORDER — TIMOLOL 0.5 %
1 DROPS OPHTHALMIC (EYE)
Refills: 0 | Status: DISCONTINUED | OUTPATIENT
Start: 2024-05-06 | End: 2024-05-06

## 2024-05-06 RX ORDER — METHOCARBAMOL 500 MG/1
500 TABLET, FILM COATED ORAL EVERY 6 HOURS
Refills: 0 | Status: DISCONTINUED | OUTPATIENT
Start: 2024-05-06 | End: 2024-05-06

## 2024-05-06 RX ORDER — GLUCAGON INJECTION, SOLUTION 0.5 MG/.1ML
1 INJECTION, SOLUTION SUBCUTANEOUS ONCE
Refills: 0 | Status: DISCONTINUED | OUTPATIENT
Start: 2024-05-06 | End: 2024-05-09

## 2024-05-06 RX ORDER — INSULIN GLARGINE 100 [IU]/ML
16 INJECTION, SOLUTION SUBCUTANEOUS
Qty: 0 | Refills: 0 | DISCHARGE
Start: 2024-05-06

## 2024-05-06 RX ORDER — DEXTROSE 50 % IN WATER 50 %
15 SYRINGE (ML) INTRAVENOUS ONCE
Refills: 0 | Status: DISCONTINUED | OUTPATIENT
Start: 2024-05-06 | End: 2024-05-09

## 2024-05-06 RX ORDER — METOPROLOL TARTRATE 50 MG
1 TABLET ORAL
Qty: 0 | Refills: 0 | DISCHARGE

## 2024-05-06 RX ORDER — PANTOPRAZOLE SODIUM 20 MG/1
1 TABLET, DELAYED RELEASE ORAL
Qty: 0 | Refills: 0 | DISCHARGE
Start: 2024-05-06

## 2024-05-06 RX ORDER — DEXTROSE 10 % IN WATER 10 %
125 INTRAVENOUS SOLUTION INTRAVENOUS ONCE
Refills: 0 | Status: DISCONTINUED | OUTPATIENT
Start: 2024-05-06 | End: 2024-05-14

## 2024-05-06 RX ORDER — ATORVASTATIN CALCIUM 80 MG/1
1 TABLET, FILM COATED ORAL
Qty: 0 | Refills: 0 | DISCHARGE
Start: 2024-05-06

## 2024-05-06 RX ORDER — DEXTROSE 50 % IN WATER 50 %
25 SYRINGE (ML) INTRAVENOUS ONCE
Refills: 0 | Status: DISCONTINUED | OUTPATIENT
Start: 2024-05-06 | End: 2024-05-14

## 2024-05-06 RX ORDER — DEXTROSE 50 % IN WATER 50 %
12.5 SYRINGE (ML) INTRAVENOUS ONCE
Refills: 0 | Status: DISCONTINUED | OUTPATIENT
Start: 2024-05-06 | End: 2024-05-14

## 2024-05-06 RX ORDER — INSULIN LISPRO 100/ML
VIAL (ML) SUBCUTANEOUS EVERY 6 HOURS
Refills: 0 | Status: DISCONTINUED | OUTPATIENT
Start: 2024-05-06 | End: 2024-05-13

## 2024-05-06 RX ORDER — FINASTERIDE 5 MG/1
5 TABLET, FILM COATED ORAL DAILY
Refills: 0 | Status: DISCONTINUED | OUTPATIENT
Start: 2024-05-06 | End: 2024-05-06

## 2024-05-06 RX ORDER — SODIUM CHLORIDE 9 MG/ML
1000 INJECTION, SOLUTION INTRAVENOUS
Refills: 0 | Status: DISCONTINUED | OUTPATIENT
Start: 2024-05-06 | End: 2024-05-14

## 2024-05-06 RX ORDER — TIMOLOL 0.5 %
1 DROPS OPHTHALMIC (EYE)
Refills: 0 | DISCHARGE

## 2024-05-06 RX ORDER — GABAPENTIN 400 MG/1
1 CAPSULE ORAL
Qty: 0 | Refills: 0 | DISCHARGE
Start: 2024-05-06

## 2024-05-06 RX ORDER — INFLUENZA VIRUS VACCINE 15; 15; 15; 15 UG/.5ML; UG/.5ML; UG/.5ML; UG/.5ML
0.7 SUSPENSION INTRAMUSCULAR ONCE
Refills: 0 | Status: DISCONTINUED | OUTPATIENT
Start: 2024-05-06 | End: 2024-05-14

## 2024-05-06 RX ORDER — TAMSULOSIN HYDROCHLORIDE 0.4 MG/1
1 CAPSULE ORAL
Qty: 0 | Refills: 0 | DISCHARGE
Start: 2024-05-06

## 2024-05-06 RX ORDER — TRAZODONE HCL 50 MG
25 TABLET ORAL
Qty: 0 | Refills: 0 | DISCHARGE
Start: 2024-05-06

## 2024-05-06 RX ADMIN — Medication 2: at 17:08

## 2024-05-06 RX ADMIN — FINASTERIDE 5 MILLIGRAM(S): 5 TABLET, FILM COATED ORAL at 12:08

## 2024-05-06 RX ADMIN — PANTOPRAZOLE SODIUM 40 MILLIGRAM(S): 20 TABLET, DELAYED RELEASE ORAL at 05:31

## 2024-05-06 RX ADMIN — Medication 650 MILLIGRAM(S): at 05:30

## 2024-05-06 RX ADMIN — METFORMIN HYDROCHLORIDE 500 MILLIGRAM(S): 850 TABLET ORAL at 08:32

## 2024-05-06 RX ADMIN — MODAFINIL 50 MILLIGRAM(S): 200 TABLET ORAL at 07:10

## 2024-05-06 RX ADMIN — METFORMIN HYDROCHLORIDE 500 MILLIGRAM(S): 850 TABLET ORAL at 17:08

## 2024-05-06 RX ADMIN — Medication 12.5 MILLIGRAM(S): at 17:08

## 2024-05-06 RX ADMIN — LIDOCAINE 1 PATCH: 4 CREAM TOPICAL at 05:30

## 2024-05-06 RX ADMIN — Medication 650 MILLIGRAM(S): at 17:08

## 2024-05-06 RX ADMIN — MODAFINIL 50 MILLIGRAM(S): 200 TABLET ORAL at 12:08

## 2024-05-06 RX ADMIN — Medication 12.5 MILLIGRAM(S): at 05:32

## 2024-05-06 RX ADMIN — Medication 4: at 08:32

## 2024-05-06 RX ADMIN — LIDOCAINE 1 PATCH: 4 CREAM TOPICAL at 05:59

## 2024-05-06 RX ADMIN — Medication 1 DROP(S): at 05:33

## 2024-05-06 RX ADMIN — Medication 1 DROP(S): at 17:08

## 2024-05-06 NOTE — DISCHARGE NOTE PROVIDER - NSDCCPCAREPLAN_GEN_ALL_CORE_FT
PRINCIPAL DISCHARGE DIAGNOSIS  Diagnosis: CVA (cerebrovascular accident)  Assessment and Plan of Treatment:      PRINCIPAL DISCHARGE DIAGNOSIS  Diagnosis: CVA (cerebrovascular accident)  Assessment and Plan of Treatment: Acute R MCA with aspirin on hold due two hemorrhage changes on CT Head imaging.  Take lipitor as prescribed.   You need to follow up with your neurologist on when to restart aspirin.      SECONDARY DISCHARGE DIAGNOSES  Diagnosis: T6 vertebral fracture  Assessment and Plan of Treatment: tlso brace should be worn out of bed. Follow up with ortho.    Diagnosis: Cervical spine fracture  Assessment and Plan of Treatment: fracture Suspected acute anterior longitudinal ligament injury and/or anterior corner avulsion fractures at C4-C5 and C5-C6.   follow up with ortho    Diagnosis: DM (diabetes mellitus)  Assessment and Plan of Treatment:     Diagnosis: HTN (hypertension)  Assessment and Plan of Treatment:     Diagnosis: Pneumonia due to Klebsiella pneumoniae  Assessment and Plan of Treatment: finished zosyn antibiotic.    Diagnosis: H/O cataract  Assessment and Plan of Treatment: timolol left eye BID

## 2024-05-06 NOTE — DISCHARGE NOTE PROVIDER - DETAILS OF MALNUTRITION DIAGNOSIS/DIAGNOSES
This patient has been assessed with a concern for Malnutrition and was treated during this hospitalization for the following Nutrition diagnosis/diagnoses:     -  04/26/2024: Moderate protein-calorie malnutrition

## 2024-05-06 NOTE — PROGRESS NOTE ADULT - PROVIDER SPECIALTY LIST ADULT
Hospitalist
Internal Medicine
Physiatry
Rehab Medicine
Rehab Medicine
Physiatry
Hospitalist
Internal Medicine
Physiatry
Physiatry
Rehab Medicine
Physiatry
Rehab Medicine
Hospitalist
Physiatry

## 2024-05-06 NOTE — PATIENT PROFILE ADULT - FUNCTIONAL ASSESSMENT - BASIC MOBILITY 6.
1-calculated by average/Not able to assess (calculate score using [facility] averaging method)

## 2024-05-06 NOTE — DISCHARGE NOTE PROVIDER - NSDCQMANTICOAGCONTRA_GEN_A_CORE
Patient does not have atrial fibrillation/flutter/Intracranial Hemorrhage/Recent bleed or risk of bleeding/Status post recent surgery

## 2024-05-06 NOTE — DISCHARGE NOTE PROVIDER - CARE PROVIDERS DIRECT ADDRESSES
[email or web address]

## 2024-05-06 NOTE — PROGRESS NOTE ADULT - NUTRITIONAL ASSESSMENT
This patient has been assessed with a concern for Malnutrition and has been determined to have a diagnosis/diagnoses of Moderate protein-calorie malnutrition.    This patient is being managed with:   Diet Pureed-  Consistent Carbohydrate {Evening Snack}  Moderately Thick Liquids (MODTHICKLIQS)  No Citrus  No Lactose  Supplement Feeding Modality:  Oral  Ensure Max Cans or Servings Per Day:  1       Frequency:  Two Times a day  Entered: May  3 2024 12:28PM  
This patient has been assessed with a concern for Malnutrition and has been determined to have a diagnosis/diagnoses of Moderate protein-calorie malnutrition.    This patient is being managed with:   Diet Pureed-  Consistent Carbohydrate {No Snacks}  Moderately Thick Liquids (MODTHICKLIQS)  No Citrus  No Lactose  Supplement Feeding Modality:  Oral  Glucerna Shake Cans or Servings Per Day:  1       Frequency:  Two Times a day  Entered: Apr 26 2024 10:08AM  
This patient has been assessed with a concern for Malnutrition and has been determined to have a diagnosis/diagnoses of Moderate protein-calorie malnutrition.    This patient is being managed with:   Diet Pureed-  Consistent Carbohydrate {Evening Snack}  Moderately Thick Liquids (MODTHICKLIQS)  No Citrus  No Lactose  Supplement Feeding Modality:  Oral  Ensure Max Cans or Servings Per Day:  1       Frequency:  Two Times a day  Entered: May  3 2024 12:28PM  
This patient has been assessed with a concern for Malnutrition and has been determined to have a diagnosis/diagnoses of Moderate protein-calorie malnutrition.    This patient is being managed with:   Diet Pureed-  Consistent Carbohydrate {Evening Snack}  Moderately Thick Liquids (MODTHICKLIQS)  No Citrus  No Lactose  Supplement Feeding Modality:  Oral  Ensure Max Cans or Servings Per Day:  1       Frequency:  Two Times a day  Entered: May  3 2024 12:28PM  
This patient has been assessed with a concern for Malnutrition and has been determined to have a diagnosis/diagnoses of Moderate protein-calorie malnutrition.    This patient is being managed with:   Diet Pureed-  Consistent Carbohydrate {Evening Snack}  Moderately Thick Liquids (MODTHICKLIQS)  No Citrus  No Lactose  Supplement Feeding Modality:  Oral  Ensure Max Cans or Servings Per Day:  1       Frequency:  Two Times a day  Entered: May  3 2024 12:28PM    Diet Pureed-  Consistent Carbohydrate {No Snacks}  Moderately Thick Liquids (MODTHICKLIQS)  No Citrus  No Lactose  Supplement Feeding Modality:  Oral  Glucerna Shake Cans or Servings Per Day:  1       Frequency:  Two Times a day  Entered: Apr 26 2024 10:08AM    The following pending diet order is being considered for treatment of Moderate protein-calorie malnutrition:null
This patient has been assessed with a concern for Malnutrition and has been determined to have a diagnosis/diagnoses of Moderate protein-calorie malnutrition.    This patient is being managed with:   Diet Pureed-  Consistent Carbohydrate {No Snacks}  Moderately Thick Liquids (MODTHICKLIQS)  No Citrus  No Lactose  Supplement Feeding Modality:  Oral  Glucerna Shake Cans or Servings Per Day:  1       Frequency:  Two Times a day  Entered: Apr 26 2024 10:08AM  
This patient has been assessed with a concern for Malnutrition and has been determined to have a diagnosis/diagnoses of Moderate protein-calorie malnutrition.    This patient is being managed with:   Diet Pureed-  Consistent Carbohydrate {No Snacks}  Moderately Thick Liquids (MODTHICKLIQS)  No Citrus  No Lactose  Supplement Feeding Modality:  Oral  Glucerna Shake Cans or Servings Per Day:  1       Frequency:  Two Times a day  Entered: Apr 26 2024 10:08AM    This patient has been assessed with a concern for Malnutrition and has been determined to have a diagnosis/diagnoses of Moderate protein-calorie malnutrition.    This patient is being managed with:   Diet Pureed-  Consistent Carbohydrate {No Snacks}  Moderately Thick Liquids (MODTHICKLIQS)  No Citrus  No Lactose  Supplement Feeding Modality:  Oral  Glucerna Shake Cans or Servings Per Day:  1       Frequency:  Two Times a day  Entered: Apr 26 2024 10:08AM  
This patient has been assessed with a concern for Malnutrition and has been determined to have a diagnosis/diagnoses of Moderate protein-calorie malnutrition.    This patient is being managed with:   Diet Pureed-  Consistent Carbohydrate {No Snacks}  Moderately Thick Liquids (MODTHICKLIQS)  No Citrus  No Lactose  Supplement Feeding Modality:  Oral  Glucerna Shake Cans or Servings Per Day:  1       Frequency:  Two Times a day  Entered: Apr 26 2024 10:08AM

## 2024-05-06 NOTE — PROGRESS NOTE ADULT - ASSESSMENT
#Gait Instability, ADL impairments and Functional impairments  - Comprehensive Rehab Program of PT/OT/SLP    # Acute right MCA territory infarct. No hipolito hemorrhagic transformation.  - ASA 81mg daily-on hold for hemorrhage changes on CTH, stable-Neuro appreciated  - Atorvastatin 80mg q hs to 20mg for LFTs up. LDL 50 at goal/below goal. US abd ok   - Provigil added, Neurontin at bedtime for left sided paresthesias   - Estim to LUE in therapy    #Pain control/ Left Hip Hematoma   - Lidocaine patch  - Tylenol low dose BID-monitor LFTs  - Robaxin dc for drowsiness    # T6 vertebral body fracture Suspected acute anterior longitudinal ligament injury and/or anterior corner avulsion fractures at C4-C5 and C5-C6. Prevertebral edema  extending from C1 through the upper cervical levels.-Suspected right greater than left posterior paraspinal muscular strain and/or ligamentous injury. No Ortho interventions at this time. Ortho follow up post d/c  - TLSO when oob   - spinal precautions  - cleared by ortho to remove collar when in Bed      # Sleep  - Melatonin/Rozerem-not effective  -neuropsych eval  - on Trazodone-slept ok    # DM2 with hyperglycemia  -ISS  - a1c 8.2  Glucoses Elevated  - Lantus adjusted fri, chart reviewed by primary team-home Metformin restarted-discussed w/hospitalist    # HTN  - Metoprolol 12.5mg      # Klebsella PNA  - S/p Zosyn, afebrile, no leukocytosis, VSS    Elevated LFTs  US neg for pathology, improved  -lipitor 20mg    #GI/Bowel Mgmt   - Continue Senna at bedtime -  - Miralax   -Protonix  last BM 5/5    #Bladder management/ BPH  - Proscar  - Flomax    #DVT  - Lovenox-hold until cleared by Neuro  - TEDs /SCDs    #Skin:  -skin tear upper back, bruising over the left shoulder, hip and knee.  skin tears over the RUE.    # Cataract  - Timolol left eye BID      #FEN   - Diet - Pureed   - Dysphagia  SLP - evaluation and treatment- ? MBS

## 2024-05-06 NOTE — DISCHARGE NOTE PROVIDER - HOSPITAL COURSE
81 years old man with a PMH of DM HTN CAD with stenting (on daily ASA) with stenting. Pt  presented for sudden onset left sided weakness to [facility] on 4/16/24 . Pt fell down the stairs was found by family awake and confused  on the floor prior to hospitalization.  CT brain showed right MCA occlusion( 4/16). Patient given tenecteplase and was transferred to [facility] on 4/16 for thrombectomy. Upon initial evaluation patient ws found to have NIHSS of 9 and subsequently at initial evaluation patient was not very well cooperative which contributed to a high NIHSS score of 19. After imaging and re-evaluation patient was re-evaluated and was found to have NIHSS of 11 (2 for gaze preference right, 2 for lack of response to threat left, 2 for drift in LUE and 1 for drift in LLE, 1 for neglect, 1 for dysarthria and 2 for left face). Repeat CT brain on 4/16  showed new cytotoxic edema in the right frontal, parietal, and temporal lobes, as well as within the right insula consistent with an acute right MCA territory infarct. No hipolito hemorrhagic transformation.   Pt is s/p cerebral angio with mechanical thrombectomy by Dr Pate on 4/16 with  no change in NIHSS of 11. Pt still with left sided weakness facial weakness, dysarthria, left neglect,  right gaze preference and left hemianopsia.  CT CAP with no evidence of acute trauma, Left lateral hip subcutaneous hematoma with evidence of active bleeding. MR c-spine with multiple cervical fractures, CT T-spine with T6 vertebral body fracture and incidental:cystic pancreatic head mass. Hospital course significant for anemia requiring dual Pressor support and 1 unit PRBC and PLT  2/2 to left hip hematoma. Worsening in mental status MRI brain done showing large R MCA infarct with hemorrhagic conversion. Pt treated with Hypertonic saline for cytotoxic edema with improvement on 4/18. Fever with neg cx treated with zosyn for Klebsiella PNA.  Failed FEEST, NGT place for feeding. Started on ASA and SQL on 4/21. 4/22 repeat FEEST pt advanced to puree with mod thickened liquid ( crush meds). Patient was fitted on 4/22/24 for cervical collar occipital mandibular support. MR T Spine without contrast reveals subtle band of increased T2/STIR signal within the T6 vertebral body, suggestive for acute fracture. Per ortho, no need for ortho spine intervention, continue c-collar and TLSO, outpatient ortho follow up no surgical intervention at this time. Family refused suggested ILR. Patient was evaluated by PM&R and therapy for functional deficits, gait/ADL impairments and acute rehabilitation was recommended. Patient was medically optimized for discharge to [facility] IRU on 4/25/24      Patient appearing more lethargic with new neurological changes. CT head ordered with new infarcts shown. Spoke with neurology and telestroke, patient not a candidate for TNK. Patient transferred to ICU for closer monitoring. 81 years old man with a PMH of DM HTN CAD with stenting (on daily ASA) with stenting. Pt  presented for sudden onset left sided weakness to [facility] on 4/16/24 . Pt fell down the stairs was found by family awake and confused  on the floor prior to hospitalization.  CT brain showed right MCA occlusion( 4/16). Patient given tenecteplase and was transferred to [facility] on 4/16 for thrombectomy. Upon initial evaluation patient ws found to have NIHSS of 9 and subsequently at initial evaluation patient was not very well cooperative which contributed to a high NIHSS score of 19. After imaging and re-evaluation patient was re-evaluated and was found to have NIHSS of 11 (2 for gaze preference right, 2 for lack of response to threat left, 2 for drift in LUE and 1 for drift in LLE, 1 for neglect, 1 for dysarthria and 2 for left face). Repeat CT brain on 4/16  showed new cytotoxic edema in the right frontal, parietal, and temporal lobes, as well as within the right insula consistent with an acute right MCA territory infarct. No hipolito hemorrhagic transformation.   Pt is s/p cerebral angio with mechanical thrombectomy by Dr Pate on 4/16 with  no change in NIHSS of 11. Pt still with left sided weakness facial weakness, dysarthria, left neglect,  right gaze preference and left hemianopsia.  CT CAP with no evidence of acute trauma, Left lateral hip subcutaneous hematoma with evidence of active bleeding. MR c-spine with multiple cervical fractures, CT T-spine with T6 vertebral body fracture and incidental:cystic pancreatic head mass. Hospital course significant for anemia requiring dual Pressor support and 1 unit PRBC and PLT  2/2 to left hip hematoma. Worsening in mental status MRI brain done showing large R MCA infarct with hemorrhagic conversion. Pt treated with Hypertonic saline for cytotoxic edema with improvement on 4/18. Fever with neg cx treated with zosyn for Klebsiella PNA.  Failed FEEST, NGT place for feeding. Started on ASA and SQL on 4/21. 4/22 repeat FEEST pt advanced to puree with mod thickened liquid ( crush meds). Patient was fitted on 4/22/24 for cervical collar occipital mandibular support. MR T Spine without contrast reveals subtle band of increased T2/STIR signal within the T6 vertebral body, suggestive for acute fracture. Per ortho, no need for ortho spine intervention, continue c-collar and TLSO, outpatient ortho follow up no surgical intervention at this time. Family refused suggested ILR. Patient was evaluated by PM&R and therapy for functional deficits, gait/ADL impairments and acute rehabilitation was recommended. Patient was medically optimized for discharge to [facility] IRU on 4/25/24      Patient appearing more lethargic with new neurological changes. CT head ordered with new infarcts shown. Spoke with [facility] on call neurology and [facility] telestroke, patient not a candidate for TNK. Patient transferred to ICU for closer monitoring. Family at bedside and updated. Family (wife, son and family member who is a doctor) had an extensive conversation with nocturnist and ICU PA. 81 years old man with a PMH of DM HTN CAD with stenting (on daily ASA) with stenting. Pt  presented for sudden onset left sided weakness to [facility] on 4/16/24 . Pt fell down the stairs was found by family awake and confused  on the floor prior to hospitalization.  CT brain showed right MCA occlusion( 4/16). Patient given tenecteplase and was transferred to [facility] on 4/16 for thrombectomy. Upon initial evaluation patient ws found to have NIHSS of 9 and subsequently at initial evaluation patient was not very well cooperative which contributed to a high NIHSS score of 19. After imaging and re-evaluation patient was re-evaluated and was found to have NIHSS of 11 (2 for gaze preference right, 2 for lack of response to threat left, 2 for drift in LUE and 1 for drift in LLE, 1 for neglect, 1 for dysarthria and 2 for left face). Repeat CT brain on 4/16  showed new cytotoxic edema in the right frontal, parietal, and temporal lobes, as well as within the right insula consistent with an acute right MCA territory infarct. No hipolito hemorrhagic transformation.   Pt is s/p cerebral angio with mechanical thrombectomy by Dr Pate on 4/16 with  no change in NIHSS of 11. Pt still with left sided weakness facial weakness, dysarthria, left neglect,  right gaze preference and left hemianopsia.  CT CAP with no evidence of acute trauma, Left lateral hip subcutaneous hematoma with evidence of active bleeding. MR c-spine with multiple cervical fractures, CT T-spine with T6 vertebral body fracture and incidental:cystic pancreatic head mass. Hospital course significant for anemia requiring dual Pressor support and 1 unit PRBC and PLT  2/2 to left hip hematoma. Worsening in mental status MRI brain done showing large R MCA infarct with hemorrhagic conversion. Pt treated with Hypertonic saline for cytotoxic edema with improvement on 4/18. Fever with neg cx treated with zosyn for Klebsiella PNA.  Failed FEEST, NGT place for feeding. Started on ASA and SQL on 4/21. 4/22 repeat FEEST pt advanced to puree with mod thickened liquid ( crush meds). Patient was fitted on 4/22/24 for cervical collar occipital mandibular support. MR T Spine without contrast reveals subtle band of increased T2/STIR signal within the T6 vertebral body, suggestive for acute fracture. Per ortho, no need for ortho spine intervention, continue c-collar and TLSO, outpatient ortho follow up no surgical intervention at this time. Family refused suggested ILR. Patient was evaluated by PM&R and therapy for functional deficits, gait/ADL impairments and acute rehabilitation was recommended. Patient was medically optimized for discharge to [facility] IRU on 4/25/24       Patient appearing more lethargic with new neurological changes. CT head ordered with new infarcts shown. Spoke with [facility] on call neurology and [facility] telestroke, patient not a candidate for TNK. Patient transferred to ICU for closer monitoring. Family at bedside and updated. Family (wife, son and family member who is a doctor) had an extensive conversation with nocturnist and ICU PA. 81 years old man with a PMH of DM HTN CAD with stenting (on daily ASA) with stenting. Pt  presented for sudden onset left sided weakness to [facility] on 4/16/24 . Pt fell down the stairs was found by family awake and confused  on the floor prior to hospitalization.  CT brain showed right MCA occlusion( 4/16). Patient given tenecteplase and was transferred to [facility] on 4/16 for thrombectomy. Upon initial evaluation patient ws found to have NIHSS of 9 and subsequently at initial evaluation patient was not very well cooperative which contributed to a high NIHSS score of 19. After imaging and re-evaluation patient was re-evaluated and was found to have NIHSS of 11 (2 for gaze preference right, 2 for lack of response to threat left, 2 for drift in LUE and 1 for drift in LLE, 1 for neglect, 1 for dysarthria and 2 for left face). Repeat CT brain on 4/16  showed new cytotoxic edema in the right frontal, parietal, and temporal lobes, as well as within the right insula consistent with an acute right MCA territory infarct. No hipolito hemorrhagic transformation.   Pt is s/p cerebral angio with mechanical thrombectomy by Dr Pate on 4/16 with  no change in NIHSS of 11. Pt still with left sided weakness facial weakness, dysarthria, left neglect,  right gaze preference and left hemianopsia.  CT CAP with no evidence of acute trauma, Left lateral hip subcutaneous hematoma with evidence of active bleeding. MR c-spine with multiple cervical fractures, CT T-spine with T6 vertebral body fracture and incidental:cystic pancreatic head mass. Hospital course significant for anemia requiring dual Pressor support and 1 unit PRBC and PLT  2/2 to left hip hematoma. Worsening in mental status MRI brain done showing large R MCA infarct with hemorrhagic conversion. Pt treated with Hypertonic saline for cytotoxic edema with improvement on 4/18. Fever with neg cx treated with zosyn for Klebsiella PNA.  Failed FEEST, NGT place for feeding. Started on ASA and SQL on 4/21. 4/22 repeat FEEST pt advanced to puree with mod thickened liquid ( crush meds). Patient was fitted on 4/22/24 for cervical collar occipital mandibular support. MR T Spine without contrast reveals subtle band of increased T2/STIR signal within the T6 vertebral body, suggestive for acute fracture. Per ortho, no need for ortho spine intervention, continue c-collar and TLSO, outpatient ortho follow up no surgical intervention at this time. Family refused suggested ILR. Patient was evaluated by PM&R and therapy for functional deficits, gait/ADL impairments and acute rehabilitation was recommended. Patient was medically optimized for discharge to [facility] IRU on 4/25/24       Patient appearing more lethargic with new neurological changes. CT head ordered with new infarcts shown. Spoke with [facility] on call neurology and [facility] telestroke, patient not a candidate for TNK. Patient transferred to ICU for closer monitoring. Family at bedside and updated. Family (wife, son and family member who is a doctor) had an extensive conversation with nocturnist and ICU PA.     I have personally seen and examined patient on the above date.  I discussed the case with ICU PA and Medical PA and I agree with findings and plan as detailed per note above, which I have amended where appropriate.    81M hx of HTN, T2DM, CAD/stent s/p R MCA stroke with L sided hemiparesis with hemorrhagic conversion off ASA and dvt proph lovenox reported with intermittent lethargy and AMS. s/p CT head yesterday reported with no changes. This evening with recurrent lethargy and repeat CT head ordered which noted 2 new infarcts, one of which was probably present from CT head yesterday but seen more clearly today. On re-evaluation, pt became unresponsive with new dense R hemiplegia. RRT called. Neuro consulted. Pt not a candidate for TNK/asa sec to hemorrhagic conversion. D/W pt's cousin who is a physician. GOC discussed given potential need for intubation given pt's AMS. Family wants a period of 48 hrs to monitor for any clinical improvement prior to making further decisions regarding DNR/DNI. Pt is FULL CODE for now. Pt transferred to ICU for close respiratory monitoring/neuro checks.

## 2024-05-06 NOTE — DISCHARGE NOTE PROVIDER - PROVIDER TOKENS
PROVIDER:[TOKEN:[[id number]],FOLLOWUP:[Routine]] PROVIDER:[TOKEN:[[id number]],FOLLOWUP:[Routine]],PROVIDER:[TOKEN:[[id number]],FOLLOWUP:[2 weeks]],PROVIDER:[TOKEN:[[id number]],FOLLOWUP:[1 week]],PROVIDER:[TOKEN:[[id number]],FOLLOWUP:[1 week]]

## 2024-05-06 NOTE — PROGRESS NOTE ADULT - ASSESSMENT
82 yo with a history of DMII, HTN, CAD s/p stenting (on daily ASA) admitted to [facility] after hospital stay for right MCA occlusion( 4/16) at [facility]. Patient was given tenecteplase and transferred to [facility] on 4/16 for thrombectomy. Pt is s/p cerebral angio with mechanical thrombectomy by Dr Pate on 4/16. Left lateral hip subcutaneous hematoma with evidence of active bleeding. MR c-spine with multiple cervical fractures, CT T-spine with T6 vertebral body fracture and incidental cystic pancreatic head mass. Course complicated by anemia s/p 1 unit PRBC and platelets, hemorrhagic conversion, fever.         #Right MCA occlusion s/p cerebral angio with mechanical thrombectomy by Dr Pate on 4/16  #Large acute right MCA territory infarct with extensive cortical petechial hemorrhage  # Now with worsening hemorrhagic component  - aspirin and lovenox held. seen by neuro, repeat ct in 1 week. close monitoring for any change in mental/ neuro status, repeat earlier if needed.  - Pt lethargic appearing in AM on 5/5. Repeat CT head 5/5 - stable exam  - Mental status much improved this morning     #Multiple cervical fractures  #T6 vertebral body acute fracture  - Comprehensive rehab program  - Maintain c-collar and TLSO brace as per primary team  - Bowel/Pain regimen as per primary team   - LDL 50  - A1c 4/16 8.2  - Continue atorvastatin 80mg  - Outpatient Neurosurgery F/U  - Family refused ILR    #Diabetes Mellitus II:  - A1c 8.2 on 4/16  - Home Med: Toujeo 24units qhs, metformin 850mg, Repaglinide 1mg daily  - Continue with Lantus 10 units sc QHS  - SSI premeal and qhs  - Blood glucose goal 100-180    #Transaminitis, hyperbilirubinemia  - improving. usg with no liver/ gall bladder issues.    #Blood loss anemia  - Left lateral hip subcutaneous hematoma with evidence of active bleeding.  - s/p 1 unit PRBC and PLT  - stable, continue to monitor    #Hypertension  - Continue metoprolol 12.5mg BID  - Monitor vital signs    #CAD s/p stent  #CVA  - Continue  statin, and metoprolol BID. aspirin on hold.    #BPH  - Continue finasteride    #Glaucoma  - Continue timolol BID    #DVT Prophylaxis  - lovenox    Case discussed with physiatry team

## 2024-05-06 NOTE — PROGRESS NOTE ADULT - SUBJECTIVE AND OBJECTIVE BOX
Patient is a 79y old  Male who presents with a chief complaint of Stroke (06 May 2024 12:38)      Subjective and overnight events:  Patient seen and examined at bedside. lethargy/ confusion much improved. pt reports he is sleeping much better now and anxiety improved. no fever ,chills, headache, dizziness, sob, cp, abd pain.     ALLERGIES:  No Known Allergies    MEDICATIONS  (STANDING):  acetaminophen     Tablet .. 650 milliGRAM(s) Oral every 12 hours  atorvastatin 20 milliGRAM(s) Oral at bedtime  dextrose 10% Bolus 125 milliLiter(s) IV Bolus once  dextrose 5%. 1000 milliLiter(s) (100 mL/Hr) IV Continuous <Continuous>  dextrose 5%. 1000 milliLiter(s) (50 mL/Hr) IV Continuous <Continuous>  dextrose 50% Injectable 25 Gram(s) IV Push once  dextrose 50% Injectable 12.5 Gram(s) IV Push once  finasteride 5 milliGRAM(s) Oral daily  gabapentin 300 milliGRAM(s) Oral at bedtime  glucagon  Injectable 1 milliGRAM(s) IntraMuscular once  insulin glargine Injectable (LANTUS) 16 Unit(s) SubCutaneous at bedtime  insulin lispro (ADMELOG) corrective regimen sliding scale   SubCutaneous three times a day before meals  insulin lispro (ADMELOG) corrective regimen sliding scale   SubCutaneous at bedtime  lidocaine   4% Patch 1 Patch Transdermal daily  lidocaine   4% Patch 1 Patch Transdermal daily  metFORMIN 500 milliGRAM(s) Oral two times a day  metoprolol tartrate 12.5 milliGRAM(s) Oral every 12 hours  modafinil 50 milliGRAM(s) Oral <User Schedule>  pantoprazole    Tablet 40 milliGRAM(s) Oral before breakfast  polyethylene glycol 3350 17 Gram(s) Oral daily  senna 2 Tablet(s) Oral at bedtime  tamsulosin 0.4 milliGRAM(s) Oral at bedtime  timolol 0.5% Solution 1 Drop(s) Left EYE two times a day  traZODone 25 milliGRAM(s) Oral at bedtime    MEDICATIONS  (PRN):  aluminum hydroxide/magnesium hydroxide/simethicone Suspension 30 milliLiter(s) Oral every 4 hours PRN Dyspepsia  bisacodyl Suppository 10 milliGRAM(s) Rectal daily PRN Constipation  dextrose Oral Gel 15 Gram(s) Oral once PRN Blood Glucose LESS THAN 70 milliGRAM(s)/deciliter    Vital Signs Last 24 Hrs  T(F): 98.7 (06 May 2024 06:20), Max: 98.7 (06 May 2024 06:20)  HR: 83 (06 May 2024 06:20) (70 - 86)  BP: 112/76 (06 May 2024 06:20) (111/70 - 112/76)  RR: 15 (06 May 2024 06:20) (14 - 15)  SpO2: 97% (06 May 2024 06:20) (96% - 97%)  I&O's Summary    05 May 2024 07:01  -  06 May 2024 07:00  --------------------------------------------------------  IN: 100 mL / OUT: 550 mL / NET: -450 mL      PHYSICAL EXAM:  General: NAD, Awake alert. answering questions appropriately. dysarthria   ENT: MMM  Neck: Supple, No JVD  Lungs: Clear to auscultation bilaterally  Cardio: RRR, S1/S2, No murmurs  Abdomen: Soft, Nontender, Nondistended; Bowel sounds present  Extremities: No calf tenderness, No pitting edema    LABS:                        10.8   9.29  )-----------( 277      ( 06 May 2024 06:45 )             32.5     05-06    135  |  101  |  17  ----------------------------<  178  4.1   |  26  |  0.97    Ca    8.8      06 May 2024 06:45    TPro  6.7  /  Alb  2.5  /  TBili  1.0  /  DBili  x   /  AST  65  /  ALT  41  /  AlkPhos  88  05-06              04-16 Chol 103 mg/dL LDL -- HDL 41 mg/dL Trig 52 mg/dL              POCT Blood Glucose.: 114 mg/dL (06 May 2024 12:06)  POCT Blood Glucose.: 206 mg/dL (06 May 2024 08:03)  POCT Blood Glucose.: 234 mg/dL (05 May 2024 22:18)  POCT Blood Glucose.: 142 mg/dL (05 May 2024 17:20)      Urinalysis Basic - ( 06 May 2024 06:45 )    Color: x / Appearance: x / SG: x / pH: x  Gluc: 178 mg/dL / Ketone: x  / Bili: x / Urobili: x   Blood: x / Protein: x / Nitrite: x   Leuk Esterase: x / RBC: x / WBC x   Sq Epi: x / Non Sq Epi: x / Bacteria: x            RADIOLOGY & ADDITIONAL TESTS:    Care Discussed with Consultants/Other Providers:

## 2024-05-06 NOTE — PROGRESS NOTE ADULT - REASON FOR ADMISSION
Stroke
Stroke
Acute right MCA territory infarct
Stroke

## 2024-05-06 NOTE — PATIENT PROFILE ADULT - FALL HARM RISK - HARM RISK INTERVENTIONS
Assistance with ambulation/Assistance OOB with selected safe patient handling equipment/Communicate Risk of Fall with Harm to all staff/Discuss with provider need for PT consult/Monitor gait and stability/Reinforce activity limits and safety measures with patient and family/Tailored Fall Risk Interventions/Visual Cue: Yellow wristband and red socks/Bed in lowest position, wheels locked, appropriate side rails in place/Call bell, personal items and telephone in reach/Instruct patient to call for assistance before getting out of bed or chair/Non-slip footwear when patient is out of bed/Jber to call system/Physically safe environment - no spills, clutter or unnecessary equipment/Purposeful Proactive Rounding/Room/bathroom lighting operational, light cord in reach

## 2024-05-06 NOTE — DISCHARGE NOTE PROVIDER - CARE PROVIDER_API CALL
Omar Madison  Physical/Rehab Medicine  [address] [place] [address]  Phone: [phone]  Fax: [phone]  Follow Up Time: Routine   Omar Madison  Physical/Rehab Medicine  [address] [place] [address]  Phone: [phone]  Fax: [phone]  Follow Up Time: Routine    Louise Wright  Interventional Cardiology  [address] [place] [address]  Phone: [phone]  Fax: [phone]  Follow Up Time: 2 weeks    Sagar Gagnon  Orthopaedic Surgery  [address]  [place] [address]  Phone: [phone]  Fax: [phone]  Follow Up Time: 1 week    Sabrina Olguin  NP in [facility]  [address] [place] [address] [address]  [place] [address]  Phone: [phone]  Fax: [phone]  Follow Up Time: 1 week

## 2024-05-06 NOTE — PROGRESS NOTE ADULT - SUBJECTIVE AND OBJECTIVE BOX
HPI:  81 years old man with a PMH of DM HTN CAD with stenting (on daily ASA) with stenting. Pt  presented for sudden onset left sided weakness to [facility] on 4/16/24 . Pt fell down the stairs was found by family awake and confused  on the floor prior to hospitalization.  CT brain showed right MCA occlusion( 4/16). Patient given tenecteplase and was transferred to [facility] on 4/16 for thrombectomy. Upon initial evaluation patient ws found to have NIHSS of 9 and subsequently at initial evaluation patient was not very well cooperative which contributed to a high NIHSS score of 19. After imaging and re-evaluation patient was re-evaluated and was found to have NIHSS of 11 (2 for gaze preference right, 2 for lack of response to threat left, 2 for drift in LUE and 1 for drift in LLE, 1 for neglect, 1 for dysarthria and 2 for left face). Repeat CT brain on 4/16  showed new cytotoxic edema in the right frontal, parietal, and temporal lobes, as well as within the right insula consistent with an acute right MCA territory infarct. No hipolito hemorrhagic transformation.   Pt is s/p cerebral angio with mechanical thrombectomy by Dr Pate on 4/16 with  no change in NIHSS of 11. Pt still with left sided weakness facial weakness, dysarthria, left neglect,  right gaze preference and left hemianopsia.  CT CAP with no evidence of acute trauma, Left lateral hip subcutaneous hematoma with evidence of active bleeding. MR c-spine with multiple cervical fractures, CT T-spine with T6 vertebral body fracture and incidental:cystic pancreatic head mass. Hospital course significant for anemia requiring dual Pressor support and 1 unit PRBC and PLT  2/2 to left hip hematoma. Worsening in mental status MRI brain done showing large R MCA infarct with hemorrhagic conversion. Pt treated with Hypertonic saline for cytotoxic edema with improvement on 4/18. Fever with neg cx treated with zosyn for Klebsiella PNA.  Failed FEEST, NGT place for feeding. Started on ASA and SQL on 4/21. 4/22 repeat FEEST pt advanced to puree with mod thickened liquid ( crush meds). Patient was fitted on 4/22/24 for cervical collar occipital mandibular support. MR T Spine without contrast reveals subtle band of increased T2/STIR signal within the T6 vertebral body, suggestive for acute fracture. Per ortho, no need for ortho spine intervention, continue c-collar and TLSO, outpatient ortho follow up no surgical intervention at this time. Family refused suggested ILR. Patient was evaluated by PM&R and therapy for functional deficits, gait/ADL impairments and acute rehabilitation was recommended. Patient was medically optimized for discharge to [facility] IRU on 4/25/24    Patient spouse and son were present during the history taking.      ROS/Subjective:  patient seen and evaluated in chair this am. NAD, alert and awake. Slept well, appears rested, neurontin effective.   paresthesias LUE/ LLE-on Neurontin to 300mg bedtime  improved alertness, able to sustain attention during visit this am, on low dose Provigil.   Neuro in for CTH w/new bleeding, f/up stable over the weekend. ASA/Lovenox on hold per Dr Johns. SCDs and TEds meantime until cleared. Will ask Neuro to return-?Lvx/asa  no dizziness, no headaches,no Neck pain, no nausea, no vomiting, no SOB, no chest pain  Lantus + home Metformin  LFTs w/low dose Tylenol.       MEDICATIONS  (STANDING):  acetaminophen     Tablet .. 650 milliGRAM(s) Oral every 12 hours  atorvastatin 20 milliGRAM(s) Oral at bedtime  dextrose 10% Bolus 125 milliLiter(s) IV Bolus once  dextrose 5%. 1000 milliLiter(s) (100 mL/Hr) IV Continuous <Continuous>  dextrose 5%. 1000 milliLiter(s) (50 mL/Hr) IV Continuous <Continuous>  dextrose 50% Injectable 25 Gram(s) IV Push once  dextrose 50% Injectable 12.5 Gram(s) IV Push once  finasteride 5 milliGRAM(s) Oral daily  gabapentin 300 milliGRAM(s) Oral at bedtime  glucagon  Injectable 1 milliGRAM(s) IntraMuscular once  insulin glargine Injectable (LANTUS) 16 Unit(s) SubCutaneous at bedtime  insulin lispro (ADMELOG) corrective regimen sliding scale   SubCutaneous three times a day before meals  insulin lispro (ADMELOG) corrective regimen sliding scale   SubCutaneous at bedtime  lidocaine   4% Patch 1 Patch Transdermal daily  lidocaine   4% Patch 1 Patch Transdermal daily  metFORMIN 500 milliGRAM(s) Oral two times a day  metoprolol tartrate 12.5 milliGRAM(s) Oral every 12 hours  modafinil 50 milliGRAM(s) Oral <User Schedule>  pantoprazole    Tablet 40 milliGRAM(s) Oral before breakfast  polyethylene glycol 3350 17 Gram(s) Oral daily  senna 2 Tablet(s) Oral at bedtime  tamsulosin 0.4 milliGRAM(s) Oral at bedtime  timolol 0.5% Solution 1 Drop(s) Left EYE two times a day  traZODone 25 milliGRAM(s) Oral at bedtime    MEDICATIONS  (PRN):  aluminum hydroxide/magnesium hydroxide/simethicone Suspension 30 milliLiter(s) Oral every 4 hours PRN Dyspepsia  bisacodyl Suppository 10 milliGRAM(s) Rectal daily PRN Constipation  dextrose Oral Gel 15 Gram(s) Oral once PRN Blood Glucose LESS THAN 70 milliGRAM(s)/deciliter                            10.8   9.29  )-----------( 277      ( 06 May 2024 06:45 )             32.5     05-06    135  |  101  |  17  ----------------------------<  178<H>  4.1   |  26  |  0.97    Ca    8.8      06 May 2024 06:45    TPro  6.7  /  Alb  2.5<L>  /  TBili  1.0  /  DBili  x   /  AST  65<H>  /  ALT  41  /  AlkPhos  88  05-06    Urinalysis Basic - ( 06 May 2024 06:45 )    Color: x / Appearance: x / SG: x / pH: x  Gluc: 178 mg/dL / Ketone: x  / Bili: x / Urobili: x   Blood: x / Protein: x / Nitrite: x   Leuk Esterase: x / RBC: x / WBC x   Sq Epi: x / Non Sq Epi: x / Bacteria: x        CAPILLARY BLOOD GLUCOSE      POCT Blood Glucose.: 114 mg/dL (06 May 2024 12:06)  POCT Blood Glucose.: 206 mg/dL (06 May 2024 08:03)  POCT Blood Glucose.: 234 mg/dL (05 May 2024 22:18)  POCT Blood Glucose.: 142 mg/dL (05 May 2024 17:20)      Vital Signs Last 24 Hrs  T(C): 37.1 (06 May 2024 06:20), Max: 37.1 (06 May 2024 06:20)  T(F): 98.7 (06 May 2024 06:20), Max: 98.7 (06 May 2024 06:20)  HR: 83 (06 May 2024 06:20) (70 - 86)  BP: 112/76 (06 May 2024 06:20) (111/70 - 112/76)  BP(mean): --  RR: 15 (06 May 2024 06:20) (14 - 15)  SpO2: 97% (06 May 2024 06:20) (96% - 97%)    Parameters below as of 06 May 2024 06:20  Patient On (Oxygen Delivery Method): room air        Constitutional - NAD, Comfortable  HEENT -  EOMI, Left pupil is 7mm irregular and fixed (chronic from prior cataract surgery  Neck -+ c-collar  Chest - good chest expansion, good respiratory effort, CTAB   Cardio - warm and well perfused, RRR, no murmur  Abdomen -  Soft, NTND  Extremities - No peripheral edema, No calf tenderness; Bruising of left shoulder, hip and knee   Neurologic Exam:                    Cognitive -             Orientation: AAO to self, place, month year     Speech - +dysarthria     Cranial Nerves - +left facial droop, non-reactive left pupil (chronic)     Motor -                      LEFT    UE - ShAB 2/5, EF 2/5, EE 2/5, WE 0/5,  0/5                    RIGHT UE - ShAB 5/5, EF 5/5, EE 5/5, WE 5/5,  WNL                    LEFT    LE - HF 4/5, KE 4/5, DF 5/5, PF 5/5                    RIGHT LE - HF 5/5, KE 5/5, DF 5/5, PF 5/5        Sensory - decreased to LT LLE, paresthesia to LUE  Psychiatric - Mood stable, Affect WNL  Skin on admission: skin tear upper back, bruising over the left shoulder, hip and knee.  skin tears over the RUE.    IDT in [facility] 5/1  tentative Dc 5/16 to home       Continue comprehensive acute rehab program consisting of 3hrs/day of OT/PT and SLP.

## 2024-05-06 NOTE — DISCHARGE NOTE PROVIDER - NSDCFUSCHEDAPPT_GEN_ALL_CORE_FT
Louise Wright  [facility]  CARDIOLOGY [address]  Scheduled Appointment: 05/21/2024    Onelia Pantoja  [facility]  OPHTHALM [address]  Scheduled Appointment: 07/25/2024

## 2024-05-06 NOTE — DISCHARGE NOTE PROVIDER - NSDCMRMEDTOKEN_GEN_ALL_CORE_FT
acetaminophen 325 mg oral tablet: 2 tab(s) orally every 12 hours  aluminum hydroxide-magnesium hydroxide 200 mg-200 mg/5 mL oral suspension: 30 milliliter(s) orally every 4 hours As needed Dyspepsia  aluminum hydroxide-magnesium hydroxide 200 mg-200 mg/5 mL oral suspension: 30 milliliter(s) orally every 4 hours As needed Dyspepsia  atorvastatin 20 mg oral tablet: 1 tab(s) orally once a day (at bedtime)  bisacodyl 10 mg rectal suppository: 1 suppository(ies) rectal once a day As needed Constipation  cervical collar for C 4-6 fracture: please wear at all times  finasteride 5 mg oral tablet: 1 tab(s) orally once a day  finasteride 5 mg oral tablet: 1 tab(s) orally once a day  gabapentin 300 mg oral capsule: 1 cap(s) orally once a day (at bedtime)  insulin glargine 100 units/mL subcutaneous solution: 16 unit(s) subcutaneous once a day (at bedtime)  lidocaine 4% topical film: Apply topically to affected area once a day as needed for  mild pain on 12 hours off 12 hours  lidocaine 4% topical film: Apply topically to affected area once a day as needed for  mild pain 12 hours on 12 hours off  methocarbamol 500 mg oral tablet: 1 tab(s) orally every 6 hours As needed musle spasm  metoprolol: 12.5 milligram(s) orally 2 times a day  modafinil: 50 milligram(s) orally 2 times a day  pantoprazole 40 mg oral delayed release tablet: 1 tab(s) orally once a day (before a meal)  polyethylene glycol 3350 oral powder for reconstitution: 17 gram(s) orally once a day  senna leaf extract oral tablet: 2 tab(s) orally once a day (at bedtime)  tamsulosin 0.4 mg oral capsule: 1 cap(s) orally once a day (at bedtime)  timolol maleate 0.5% ophthalmic solution: 1 drop(s) to each affected eye 2 times a day  timolol maleate 0.5% ophthalmic solution: 1 drop(s) to each affected eye 2 times a day  TLSO Brace: Please wear whenever OOB or transport, at all times. T6 VB fracture.  traZODone: 25 milligram(s) orally once a day (at bedtime)

## 2024-05-07 VITALS
DIASTOLIC BLOOD PRESSURE: 67 MMHG | WEIGHT: 143.96 LBS | RESPIRATION RATE: 17 BRPM | SYSTOLIC BLOOD PRESSURE: 126 MMHG | HEART RATE: 76 BPM | OXYGEN SATURATION: 100 % | TEMPERATURE: 98 F

## 2024-05-07 LAB
ALBUMIN SERPL ELPH-MCNC: 2.5 G/DL — LOW (ref 3.3–5)
ALP SERPL-CCNC: 93 U/L — SIGNIFICANT CHANGE UP (ref 40–120)
ALT FLD-CCNC: 39 U/L — SIGNIFICANT CHANGE UP (ref 10–45)
ANION GAP SERPL CALC-SCNC: 9 MMOL/L — SIGNIFICANT CHANGE UP (ref 5–17)
AST SERPL-CCNC: 55 U/L — HIGH (ref 10–40)
BASOPHILS # BLD AUTO: 0.03 K/UL — SIGNIFICANT CHANGE UP (ref 0–0.2)
BASOPHILS NFR BLD AUTO: 0.3 % — SIGNIFICANT CHANGE UP (ref 0–2)
BILIRUB SERPL-MCNC: 0.9 MG/DL — SIGNIFICANT CHANGE UP (ref 0.2–1.2)
BUN SERPL-MCNC: 16 MG/DL — SIGNIFICANT CHANGE UP (ref 7–23)
CALCIUM SERPL-MCNC: 9.1 MG/DL — SIGNIFICANT CHANGE UP (ref 8.4–10.5)
CHLORIDE SERPL-SCNC: 100 MMOL/L — SIGNIFICANT CHANGE UP (ref 96–108)
CHOLEST SERPL-MCNC: 112 MG/DL — SIGNIFICANT CHANGE UP
CO2 SERPL-SCNC: 25 MMOL/L — SIGNIFICANT CHANGE UP (ref 22–31)
CREAT SERPL-MCNC: 0.79 MG/DL — SIGNIFICANT CHANGE UP (ref 0.5–1.3)
EGFR: 91 ML/MIN/1.73M2 — SIGNIFICANT CHANGE UP
EOSINOPHIL # BLD AUTO: 0.16 K/UL — SIGNIFICANT CHANGE UP (ref 0–0.5)
EOSINOPHIL NFR BLD AUTO: 1.9 % — SIGNIFICANT CHANGE UP (ref 0–6)
GLUCOSE BLDC GLUCOMTR-MCNC: 120 MG/DL — HIGH (ref 70–99)
GLUCOSE BLDC GLUCOMTR-MCNC: 124 MG/DL — HIGH (ref 70–99)
GLUCOSE BLDC GLUCOMTR-MCNC: 146 MG/DL — HIGH (ref 70–99)
GLUCOSE BLDC GLUCOMTR-MCNC: 159 MG/DL — HIGH (ref 70–99)
GLUCOSE BLDC GLUCOMTR-MCNC: 177 MG/DL — HIGH (ref 70–99)
GLUCOSE SERPL-MCNC: 153 MG/DL — HIGH (ref 70–99)
HCT VFR BLD CALC: 34.3 % — LOW (ref 39–50)
HDLC SERPL-MCNC: 48 MG/DL — SIGNIFICANT CHANGE UP
HGB BLD-MCNC: 11.3 G/DL — LOW (ref 13–17)
IMM GRANULOCYTES NFR BLD AUTO: 0.3 % — SIGNIFICANT CHANGE UP (ref 0–0.9)
LIPID PNL WITH DIRECT LDL SERPL: 50 MG/DL — SIGNIFICANT CHANGE UP
LYMPHOCYTES # BLD AUTO: 1.15 K/UL — SIGNIFICANT CHANGE UP (ref 1–3.3)
LYMPHOCYTES # BLD AUTO: 13.4 % — SIGNIFICANT CHANGE UP (ref 13–44)
MAGNESIUM SERPL-MCNC: 1.9 MG/DL — SIGNIFICANT CHANGE UP (ref 1.6–2.6)
MCHC RBC-ENTMCNC: 29.7 PG — SIGNIFICANT CHANGE UP (ref 27–34)
MCHC RBC-ENTMCNC: 32.9 GM/DL — SIGNIFICANT CHANGE UP (ref 32–36)
MCV RBC AUTO: 90 FL — SIGNIFICANT CHANGE UP (ref 80–100)
MONOCYTES # BLD AUTO: 0.55 K/UL — SIGNIFICANT CHANGE UP (ref 0–0.9)
MONOCYTES NFR BLD AUTO: 6.4 % — SIGNIFICANT CHANGE UP (ref 2–14)
NEUTROPHILS # BLD AUTO: 6.68 K/UL — SIGNIFICANT CHANGE UP (ref 1.8–7.4)
NEUTROPHILS NFR BLD AUTO: 77.7 % — HIGH (ref 43–77)
NON HDL CHOLESTEROL: 65 MG/DL — SIGNIFICANT CHANGE UP
NRBC # BLD: 0 /100 WBCS — SIGNIFICANT CHANGE UP (ref 0–0)
PHOSPHATE SERPL-MCNC: 3.7 MG/DL — SIGNIFICANT CHANGE UP (ref 2.5–4.5)
PLATELET # BLD AUTO: 282 K/UL — SIGNIFICANT CHANGE UP (ref 150–400)
POTASSIUM SERPL-MCNC: 4.7 MMOL/L — SIGNIFICANT CHANGE UP (ref 3.5–5.3)
POTASSIUM SERPL-SCNC: 4.7 MMOL/L — SIGNIFICANT CHANGE UP (ref 3.5–5.3)
PROT SERPL-MCNC: 7 G/DL — SIGNIFICANT CHANGE UP (ref 6–8.3)
RBC # BLD: 3.81 M/UL — LOW (ref 4.2–5.8)
RBC # FLD: 16.2 % — HIGH (ref 10.3–14.5)
SODIUM SERPL-SCNC: 134 MMOL/L — LOW (ref 135–145)
TRIGL SERPL-MCNC: 70 MG/DL — SIGNIFICANT CHANGE UP
WBC # BLD: 8.6 K/UL — SIGNIFICANT CHANGE UP (ref 3.8–10.5)
WBC # FLD AUTO: 8.6 K/UL — SIGNIFICANT CHANGE UP (ref 3.8–10.5)

## 2024-05-07 PROCEDURE — 99223 1ST HOSP IP/OBS HIGH 75: CPT

## 2024-05-07 PROCEDURE — 71045 X-RAY EXAM CHEST 1 VIEW: CPT | Mod: 26

## 2024-05-07 PROCEDURE — 95816 EEG AWAKE AND DROWSY: CPT | Mod: 26

## 2024-05-07 PROCEDURE — 99497 ADVNCD CARE PLAN 30 MIN: CPT | Mod: 25

## 2024-05-07 PROCEDURE — 99291 CRITICAL CARE FIRST HOUR: CPT

## 2024-05-07 RX ORDER — SODIUM CHLORIDE 9 MG/ML
1000 INJECTION, SOLUTION INTRAVENOUS
Refills: 0 | Status: DISCONTINUED | OUTPATIENT
Start: 2024-05-07 | End: 2024-05-07

## 2024-05-07 RX ORDER — ATORVASTATIN CALCIUM 80 MG/1
80 TABLET, FILM COATED ORAL AT BEDTIME
Refills: 0 | Status: DISCONTINUED | OUTPATIENT
Start: 2024-05-07 | End: 2024-05-09

## 2024-05-07 RX ORDER — ASPIRIN/CALCIUM CARB/MAGNESIUM 324 MG
300 TABLET ORAL DAILY
Refills: 0 | Status: DISCONTINUED | OUTPATIENT
Start: 2024-05-07 | End: 2024-05-08

## 2024-05-07 RX ORDER — METOPROLOL TARTRATE 50 MG
12.5 TABLET ORAL
Refills: 0 | Status: DISCONTINUED | OUTPATIENT
Start: 2024-05-07 | End: 2024-05-09

## 2024-05-07 RX ORDER — TIMOLOL 0.5 %
1 DROPS OPHTHALMIC (EYE)
Refills: 0 | Status: DISCONTINUED | OUTPATIENT
Start: 2024-05-07 | End: 2024-05-14

## 2024-05-07 RX ADMIN — Medication 2.5 MILLIGRAM(S): at 11:32

## 2024-05-07 RX ADMIN — Medication 2.5 MILLIGRAM(S): at 17:36

## 2024-05-07 RX ADMIN — ATORVASTATIN CALCIUM 80 MILLIGRAM(S): 80 TABLET, FILM COATED ORAL at 22:33

## 2024-05-07 RX ADMIN — Medication 300 MILLIGRAM(S): at 14:47

## 2024-05-07 RX ADMIN — Medication 2.5 MILLIGRAM(S): at 06:07

## 2024-05-07 RX ADMIN — Medication 2.5 MILLIGRAM(S): at 00:49

## 2024-05-07 RX ADMIN — Medication 1 DROP(S): at 21:12

## 2024-05-07 RX ADMIN — Medication 1: at 17:37

## 2024-05-07 RX ADMIN — Medication 1: at 06:06

## 2024-05-07 RX ADMIN — PANTOPRAZOLE SODIUM 40 MILLIGRAM(S): 20 TABLET, DELAYED RELEASE ORAL at 11:32

## 2024-05-07 RX ADMIN — SODIUM CHLORIDE 50 MILLILITER(S): 9 INJECTION, SOLUTION INTRAVENOUS at 21:13

## 2024-05-07 NOTE — H&P ADULT - ASSESSMENT
Problem List:  1) acute on chronic CVA  2) AMS    Transfer to ICU from rehab for closer monitoring of airway   Patient with new left sided strokes on top of severe right sided stroke from <1month ago  not a candidate for TNK given unknown last known well as well as hemorraghic conversion of right stroke  aspiration precautions  NPO  end tidal monitoring  Check for seizures using ceribell system  PPi IV  IV lopressor  Spoke to son and wife at the bedside. Explained in detail the patient's current condition and prognosis. They would like him intubated if needed. Currently his ABG is wnl, SPo2 is 98% no room air. His mental status is poor and he is at risk for aspiration however mechanical ventilation will not improve this patient's prognosis and most likely cause more harm unless the patient becomes hypoxic. Will not intubate at this time, but will monitor closely for changes in resp status  Discussed with E-ICU attending Dr. Busch     CRITICAL CARE TIME: 78 minutes assessing presenting problems of acute illness, which pose high probability of life threatening deterioration or end organ damage/dysfunction, as well as medical decision making including initiating plan of care, reviewing data, reviewing radiologic exams, discussing with multidisciplinary team,  discussing goals of care with patient/family, and writing this note.  Non-inclusive of procedures performed.    Date of Entry of this note is equal to the date of services rendered

## 2024-05-07 NOTE — DIETITIAN INITIAL EVALUATION ADULT - NS FNS DIET ORDER
NPO  When Medically Feasible Recommend Initiate Consistent Carbohydrate Diet - Diet/Liquid Consistencies per SLP & Ensure Max 11oz PO TID  Pt Family Met w/ Palliative Care - No Intubate or Tubefeeding @This Time

## 2024-05-07 NOTE — H&P ADULT - CRITICAL CARE ATTENDING COMMENT
Patient with now bilateral strokes, new small acute infarct involving the left insula. Slight increased density within the left MCA trifurcation concerning for new strokes.   NIHSS 28 this morning   Not able to perform dysphagia screening given poor mental status  Not given Aspirin and TNK due to evidence of hemorrhage   Non chemical DVT prophylaxis with SCD  Neurochecks per stroke protocol in the ICU  Neurology consult   Admitted to the ICU for close monitoring

## 2024-05-07 NOTE — CONSULT NOTE ADULT - LOCATION OF DISCUSSION
Consultation - [address]  68 y o  male  :1944  WHR:[id number]    Physician Requesting Consult: Troy Adams MD     Reason for Consult : At your kind request I saw this patient for initial sleep evaluation today  The patient had both diagnostic and therapeutic sleep studies and is here to review results and to initiate therapy  The diagnostic study demonstrated an AHI of 22 per hour, considerably higher during REM  Minimum oxygen saturation was 7 7% and 4% of the study was spent with saturations less than 90%  During a subsequent therapeutic study, he is sleep disordered breathing was successfully remediated with nasal CPAP at 12 cm H2O  Medications, Past, Family and Social Histories were reviewed  Comorbidities are notable for:  Hypertension, cardiac arrhythmia for which she has a pacemaker, diabetes with peripheral neuropathy, recent lumbar vertebral fracture  Herewith findings in summary  Full details are available from our records  HPI:  He has study was undertaken because of complaints of excessive daytime drowsiness of several years duration that has gotten worse  He has also been snoring for decades and this is loud enough to disturb his wife  However they do not note breathing difficulties during sleep or modifying factors  He has lower extremity pain due to peripheral neuropathy but did not report any restless leg symptoms  He sleep talks but reported no other features of parasomnia  Sleep Routine:  He is spending 6 hours or more in bed  He typically falls asleep within minutes  Sleep is interrupted up to 5 times a night  He awakens feeling under refreshed and has significant daytime drowsiness  He rated himself at 20/24 on the [place] Sleepiness Scale and is dozing off inadvertently in various settings  Habits:  ], [ reports that he drinks alcohol ], [ reports that he does not use drugs ], [he uses moderate amoun of caffeine    He is unable to execie ]     ROS:  He has musculoskeletal ahe and pains  He reported some coughing  He reported no other rspiratory or cardiac symptoms  A [10] point review of systems was otherwise unremarkable  Physical Exam: Salient findings on exam, ar aody mass index 23 7  Vitals are stable  Mental state [  ] appears normal   Craniofacial anatomy [is normal]  [Neck Circumference is 44 cm ]   There are no abnomal neck masses  Nasal airway [is paten]  Mucousmebranes appeared normal  The ral airway [is crowded ] Base of tongue is at [facility] class [IV]  Heart sounds are regular, there [ar no] murmurs], no JVD or pedal edema  The rest of his eamwas unremarkable  Impression:   1  oderae obstrcive sleep apnea  2  Hypersomnolence secondary to the above  3  Insufficient sleep is contributing  4  Cardiac arrhythmia   5  Comorbidities as outlined above    Plan:  1  I reviewed results of the Sleep studies with the patient  2  With respect to above conditions, I counseled on pathophysiology, diagnosis, treatment options, risks and benefits; inter-relationship and effects on symptoms and comorbidities; risks of no treatment; costs and insurance aspects  3  Need for compliance with therapy was emphasized  I also advised increasing the amount of sleep that he gets  5  Patient elected positive airway pressure therapy and care coordinated with the DME provider for set up in clinic today  6  Follow-up to be scheduled in 2 months to monitor compliance, progress and to adjust therapy        Thank you for allowing me to participate in the care of this patient  I will keep you apprised of developments       Sincerely,    Jocelyne Almendarez MD  Board Certified Specialist Face to face

## 2024-05-07 NOTE — CONSULT NOTE ADULT - ASSESSMENT
A/P 78y/o M with PMH of PMH of DM HTN CAD with stenting (on daily ASA) with stenting. Worsening in mental status MRI brain done  in April  showing large R MCA infarct with hemorrhagic conversion. Eventually stabilized for transfer to [facility] acute rehab.     5/6/24  patient with worsening mental status and lethargy, CT head showing two new acute infarcts of left insula and left cerebellum. Mental status  worse .    transferred to ICU for closer monitoring.      Patient with now bilateral strokes, new small acute infarct involving the left insula. Slight increased density within the left MCA trifurcation concerning for new strokes.   Not given Aspirin and TNK due to evidence of hemorrhage Non chemical DVT prophylaxis with SCD    Assessment/Plans:      in ccu   new acute on chronic CVA   AMS  dysphagia   aphasic    Patient with new left sided strokes on top of severe right sided stroke from <1month ago  not a candidate for TNK given unknown last known well as well as hemorraghic conversion of right stroke  aspiration precautions  NPO  Check for seizures using ceribell   EEG today   supportive care     Neurology consult- Plan for EEG  r/o seizures        Palliative :   asked for extra support, goc, pt w/  new CVA and worsening MS,  after previous CVA in April.  Pt from AR unit. Case d/w ccu team today , and chart reviewed.  P seen bedside, eyes open, not interactive, non verbal, sl restless w/ lower ext.   Daughter and younger son at bedside.  Wife had gone home to rest .   See GOC note above, family son daughter leaning towards dnr/dni status for pt , as they understand pt condition.  They will  speak w/ spouse to fill out Molst.   Family aware of pt poor prognosis at this time, and comfort/hospice services discussed today , they  remain hopeful for some recovery in next 24-48 hrs.     Neurology consulted - appreciated   Plan for EEG today - r/o seizure  Plan to follow pt clinical course w/ ccu team    A/P 80y/o M with PMH of PMH of DM HTN CAD with stenting (on daily ASA) with stenting. Worsening in mental status MRI brain done  in April  showing large R MCA infarct with hemorrhagic conversion. Eventually stabilized for transfer to [facility] acute rehab.     5/6/24  patient with worsening mental status and lethargy, CT head showing two new acute infarcts of left insula and left cerebellum. Mental status  worse .    transferred to ICU for closer monitoring.      Patient with now bilateral strokes, new small acute infarct involving the left insula. Slight increased density within the left MCA trifurcation concerning for new strokes.   Not given Aspirin and TNK due to evidence of hemorrhage Non chemical DVT prophylaxis with SCD    Assessment/Plans:      in ccu   new acute on chronic CVA   AMS  dysphagia   aphasic    Patient with new left sided strokes on top of severe right sided stroke from <1month ago  not a candidate for TNK given unknown last known well as well as hemorraghic conversion of right stroke  aspiration precautions  NPO  Check for seizures using ceribell   EEG today   supportive care     Neurology consult- Plan for EEG  r/o seizures        Palliative :   asked for extra support, goc, pt w/  new CVA and worsening MS,  after previous CVA in April.  Pt from AR unit. Case d/w ccu team today , and chart reviewed.  P seen bedside, eyes open, not interactive, non verbal, sl restless w/ lower ext.   Daughter and younger son at bedside.  Wife had gone home to rest .   See GOC note above, family son daughter leaning towards dnr/dni status for pt , as they understand pt condition.  They will  speak w/ spouse to fill out Molst.   Family aware of pt poor prognosis at this time, and comfort/hospice services discussed today , they  remain hopeful for some recovery in next 24-48 hrs.     Offered family chaplaincy / support, they declined at this time      Neurology consulted - appreciated   Plan for EEG today - r/o seizure  Plan to follow pt clinical course w/ ccu team

## 2024-05-07 NOTE — H&P ADULT - HISTORY OF PRESENT ILLNESS
80y/o M with PMH of PMH of DM HTN CAD with stenting (on daily ASA) with stenting. Pt  presented for sudden onset left sided weakness to [facility] on 4/16/24 . CT brain showed right MCA occlusion( 4/16). Patient given tenecteplase and was transferred to [facility] on 4/16 for thrombectomy. Underwent thrombectomy but symptoms barely improved, Pt still with left sided weakness facial weakness, dysarthria, left neglect,  right gaze preference and left hemianopsia.  Course complicated by anemia requiring dual Pressor support and 1 unit PRBC and PLT  2/2 to left hip hematoma. Worsening in mental status MRI brain done showing large R MCA infarct with hemorrhagic conversion. Eventually stabilized for transfer to [facility] acute rehab.     5/6/24 patient with worsening mental status and lethargy, CT head showing two new acute infarcts of left insula and left cerebellum. Mental status much worse now. Discussed with family would like intubation if needed, transferred to ICU for closer monitoring.

## 2024-05-07 NOTE — PROGRESS NOTE ADULT - ASSESSMENT
78y/o M with PMH of PMH of DM HTN CAD with stenting (on daily ASA)  right MCA stroke s/p TNK and thrombectomy 4/16 and eventually sent to [facility] now with worsening mental status on 5/6 and  found to have two new acute infarcts of left insula and left cerebellum. Now in ICU for closer monitoring.       Problem List:  1) acute on chronic CVA  2) AMS      Prior CVA now with two new left sided infarcts  Continue neuro checks Q 1 hour  Neurology input appreciated  was not a candidate for TNK given unknown last known well as well as hemorraghic conversion of right stroke  aspiration precautions  NPO, not alert enough for formal bedside swallow eval   consider NG tube placement  Family would like to hold off for now given aspiration risk and re-eval mental status  Bedside EEG negative for seizure continue keppra per neuro   IV fluids for hydration per family request  OK to restart aspirin per neuro     Family at bedside  Full code, guarded prognosis   Palliative care following    Seen and evaluated with Dr Ibrahim who agrees with above stated plan of care    78y/o M with PMH of PMH of DM HTN CAD with stenting (on daily ASA)  right MCA stroke s/p TNK and thrombectomy 4/16 and eventually sent to [facility] now with worsening mental status on 5/6 and  found to have two new acute infarcts of left insula and left cerebellum. Now in ICU for closer monitoring.       Problem List:  1) acute on chronic CVA  2) AMS  3) DM type 2  4) CAD    Prior CVA now with two new left sided infarcts  Continue neuro checks Q 1 hour  Neurology input appreciated  was not a candidate for TNK given unknown last known well as well as hemorraghic conversion of right stroke  aspiration precautions  NPO, not alert enough for formal bedside swallow eval   consider NG tube placement  Family would like to hold off for now given aspiration risk and re-eval mental status  Bedside EEG negative for seizure continue keppra per neuro   IV fluids for hydration per family request  OK to restart aspirin per neuro     Family at bedside  Full code, guarded prognosis   Palliative care following    Seen and evaluated with Dr Ibrahim who agrees with above stated plan of care

## 2024-05-07 NOTE — DIETITIAN NUTRITION RISK NOTIFICATION - TREATMENT: THE FOLLOWING DIET HAS BEEN RECOMMENDED
NPO  When Medically Feasible Recommend Consistent Carbohydrate Diet - Diet/Liquids Consistencies Per SLP & Ensure Max 11oz PO TID (Provides 450kcal & 90grams of Protein)

## 2024-05-07 NOTE — EEG REPORT - NS EEG TEXT BOX
Date: 5/6/24 23:32 - 5/7/24 02:15  Duration: 2 hr 42 min    [facility]  Technical Description:    EEG performed with limited number of EEG electrodes/channels for expedited completion and evaluation for possible seizures.  The [facility] EEG recording device consists of a 10-electrode headband, an EEG recorder with the Brain Stethoscope and Clarity (auto seizure detection) features, and a cloud portal for continuous seizure monitoring, EEG data storing, and remote EEG reviewing. With the Brain Stethoscope and Clarity features, spot-checking and continuous seizure detection were available.    Interpretation:  PDR (Hz): not discerned  Slowing: generalized irregular delta/theta activity  IEDs:  none   Seizures: none  State changes: Drowsiness with attenuation and slowing of the background activity with decrease in muscle artifact. No stage 2 sleep.  Artifacts: Nearly continuous muscle artifact limits interpretation    Impression:  Within the technical limitations of the reduced electrode recording and artifacts:  Moderate generalized slowing indicative of nonspecific diffuse/multifocal cerebral dysfunction.   No seizures.    Consider correlation with CEEG monitoring for further verification of findings if clinically warranted.    Sandi Branch MD  Attending Physician, [facility]     [facility] EEG Reading Room Ph#: [phone]  Epilepsy Answering Service after 5PM and before 8:30AM: Ph#: [phone]

## 2024-05-07 NOTE — EEG REPORT - NS EEG TEXT BOX
YANNA TRONCOSO [id number]     Study Date: 05-07-24  Duration: 20 min  --------------------------------------------------------------------------------------------------  History:  CC/ HPI Patient is a 79y old  Male who presents with a chief complaint of Acute CVA (07 May 2024 10:48)    MEDICATIONS  (STANDING):  aspirin Suppository 300 milliGRAM(s) Rectal daily  atorvastatin 80 milliGRAM(s) Oral at bedtime  dextrose 10% Bolus 125 milliLiter(s) IV Bolus once  dextrose 5% + lactated ringers. 1000 milliLiter(s) (50 mL/Hr) IV Continuous <Continuous>  dextrose 5%. 1000 milliLiter(s) (50 mL/Hr) IV Continuous <Continuous>  dextrose 5%. 1000 milliLiter(s) (100 mL/Hr) IV Continuous <Continuous>  dextrose 50% Injectable 25 Gram(s) IV Push once  dextrose 50% Injectable 12.5 Gram(s) IV Push once  glucagon  Injectable 1 milliGRAM(s) IntraMuscular once  influenza  Vaccine (HIGH DOSE) 0.7 milliLiter(s) IntraMuscular once  insulin lispro (ADMELOG) corrective regimen sliding scale   SubCutaneous every 6 hours  metoprolol tartrate Injectable 2.5 milliGRAM(s) IV Push every 6 hours  pantoprazole  Injectable 40 milliGRAM(s) IV Push daily    --------------------------------------------------------------------------------------------------  Study Interpretation:    [[[Abbreviation Key:  PDR=alpha rhythm/posterior dominant rhythm. A-P=anterior posterior.  Amplitude: ‘very low’:<20; ‘low’:20-49; ‘medium’:; ‘high’:>150uV.  Persistence for periodic/rhythmic patterns (% of epoch) ‘rare’:<1%; ‘occasional’:1-10%; ‘frequent’:10-50%; ‘abundant’:50-90%; ‘continuous’:>90%.  Persistence for sporadic discharges: ‘rare’:<1/hr; ‘occasional’:1/min-1/hr; ‘frequent’:>1/min; ‘abundant’:>1/10 sec.  RPP=rhythmic and periodic patterns; GRDA=generalized rhythmic delta activity; FIRDA=frontal intermittent GRDA; LRDA=lateralized rhythmic delta activity; TIRDA=temporal intermittent rhythmic delta activity;  LPD=PLED=lateralized periodic discharges; GPD=generalized periodic discharges; BIPDs =bilateral independent periodic discharges; Mf=multifocal; SIRPDs=stimulus induced rhythmic, periodic, or ictal appearing discharges; BIRDs=brief potentially ictal rhythmic discharges >4 Hz, lasting .5-10s; PFA (paroxysmal bursts >13 Hz or =8 Hz <10s).  Modifiers: +F=with fast component; +S=with spike component; +R=with rhythmic component.  S-B=burst suppression pattern.  Max=maximal. N1-drowsy; N2-stage II sleep; N3-slow wave sleep. SSS/BETS=small sharp spikes/benign epileptiform transients of sleep. HV=hyperventilation; PS=photic stimulation]]]    Daily EEG Visual Analysis    FINDINGS:      Background:  Continuity: Continuous  Symmetry: Symmetric  Posterior dominant rhythm (PDR): Intermittent, poorly formed, 6-6.5 Hz on the left, not seen on the right (may be limited by artifact).  Voltage: Normal  Anterior-Posterior Gradient: Present  Other background findings: Abundant diffuse polymorphic delta and theta slowing  Breach: Absent    Background Slowing:  Generalized slowing: As above  Focal slowing: Right hemispheric (asymmetric PDR as above, limited by artifact; and occasional right frontotemporal focal polymorphic delta slowing)    State Changes:   Drowsiness is characterized by fragmentation, attenuation, and slowing of the background activity.  Rudimentary stage 2 sleep is characterized by rudimentary symmetric K complexes.    Interictal Findings:  None    Electrographic and Electroclinical seizures:  None    Other Clinical Events:  None    Activation Procedures:   Hyperventilation is not performed.    Photic stimulation is not performed.    Artifacts:  Intermittent movement artifacts are present. Continuous muscle artifact especially in the right hemisphere limits interpretation.    EKG:  Single-lead EKG shows regular rhythm.    EEG Classification / Summary:  Abnormal routine EEG in the awake, drowsy, and asleep states.   -Right hemispheric focal slowing.  -Mild-moderate diffuse slowing.  -No epileptiform abnormalities are captured.   -Artifact limits interpretation.    Clinical Impression:  -Right hemispheric focal cerebral dysfunction can be structural or functional in etiology.  -Mild-moderate diffuse or multifocal cerebral dysfunction is nonspecific in etiology.  -No epileptiform abnormalities.   -Artifact limits interpretation.        -------------------------------------------------------------------------------------------------------    Sandi Branch MD  Attending Physician, [facility]    -------------------------------------------------------------------------------------------------------    To reach EEG technologist:  Please use the pager number for the appropriate hospital or contact the .  At [facility] - Pager #: [phone]    To reach EEG-reading physician:  [facility] EEG Reading Room Phone #: [phone]  Epilepsy Answering Service after 5PM and before 8:30AM: Phone #: [phone]

## 2024-05-07 NOTE — CONSULT NOTE ADULT - ASSESSMENT
Mr. Jung has new acute ischemic stroke on the left side, and left cerebellar, with stable large subacute right MCA infarct with hemorrhagic transformation.  Etiology of stroke: i think it is from cardiac, he has both sides involvement.  twitching on right chin: concerns for NCSE. Recommend EEG.   continue monitor his vital and neuro exam.   cardiac monitoring. will need LIDA and loop recorder if LIDA negative.  restart Aspirin 81 mg daily. then repeat CT head 24-48 hours.  Prognosis is guarded.   I have discussed patient's condition with his son and daughter at bedside. All questions were answered. Patient is in critical condition, and needed close monitoring. prognosis is guarded.

## 2024-05-07 NOTE — DIETITIAN INITIAL EVALUATION ADULT - NSFNSNUTRCHEWSWALLOWFT_GEN_A_CORE
Currently Deemed NPO   Speech Therapy to Eval - Discussed w/ Speech Therapy   If Enteral Nutrition is Needed Recommend Glucerna 1.5Cal @35ml/hr Increase Every 3hr by 10ml/hr to Goal Rate of 65ml/hr x16hrs

## 2024-05-07 NOTE — CONSULT NOTE ADULT - CONVERSATION DETAILS
Met and spoke w/ pts daughter and youngest son at bedside today .  Pts wife is involved but she is at home resting at this time. Spoke w/ children about pt present condition and likely poor prognosis. They are also updated by ccu team and have had d/w Neurologist today . They are aware pt has had a second cva , which worsens his prognosis. Daughter very tearful, but understands pt condition.  They are remaining hopeful at this time, that pt may some sign of improvement in next 24 hrs. They discussed plan presently is for EEG today to r/o seizure activity. Daughter wanted to know potential plans if pt does not go the way they hope he does.  I then discussed that a comfort focused plan of care could be put in place and introduced hospice services as an option. Discussed home vs inpatient and requirements for inpatient.  We also discussed cpr and intubation today , daughter saying that pt would not want those interventions at this time, and would not want a Peg or permanent feeding  tube  . They are ok w/ temp Ng if needed . We talked about if we want to put these wishes into effect we need to complete the Mol[employer] form, I explained we would need pt wife , her mom here to do this as she is the surrogate decision maker at this point.  Son and daughter stated understanding, and they will speak w/ her, she just went home to rest after being here all night.   They have my contact info for any questions

## 2024-05-07 NOTE — DIETITIAN INITIAL EVALUATION ADULT - PERTINENT MEDS FT
MEDICATIONS  (STANDING):  aspirin Suppository 300 milliGRAM(s) Rectal daily  atorvastatin 80 milliGRAM(s) Oral at bedtime  dextrose 10% Bolus 125 milliLiter(s) IV Bolus once  dextrose 5% + lactated ringers. 1000 milliLiter(s) (50 mL/Hr) IV Continuous <Continuous>  dextrose 5%. 1000 milliLiter(s) (100 mL/Hr) IV Continuous <Continuous>  dextrose 5%. 1000 milliLiter(s) (50 mL/Hr) IV Continuous <Continuous>  dextrose 50% Injectable 12.5 Gram(s) IV Push once  dextrose 50% Injectable 25 Gram(s) IV Push once  glucagon  Injectable 1 milliGRAM(s) IntraMuscular once  influenza  Vaccine (HIGH DOSE) 0.7 milliLiter(s) IntraMuscular once  insulin lispro (ADMELOG) corrective regimen sliding scale   SubCutaneous every 6 hours  metoprolol tartrate Injectable 2.5 milliGRAM(s) IV Push every 6 hours  pantoprazole  Injectable 40 milliGRAM(s) IV Push daily    MEDICATIONS  (PRN):  dextrose Oral Gel 15 Gram(s) Oral once PRN Blood Glucose LESS THAN 70 milliGRAM(s)/deciliter

## 2024-05-07 NOTE — DIETITIAN INITIAL EVALUATION ADULT - ENERGY INTAKE
Fair (50-75%) States Good PO Intake/Appetite Prior to Admission to Hospital (Per Previous RD Assessment)

## 2024-05-07 NOTE — H&P ADULT - NSHPPHYSICALEXAM_GEN_ALL_CORE
Physical Examination:    General: ill appearing, elderly, cachetic     HEENT: right pupil pinpoint, left pupil dilated irregular, left gaze preference, eyes moving from left to midline repeatedly     PULM: Clear to auscultation bilaterally, no significant sputum production    CVS: Regular rate and rhythm    ABD: Soft, nondistended, nontender, normoactive bowel sounds    EXT: No edema, nontender    SKIN: Warm     NEURO: obtunded, moves extremities but weak, non purposeful

## 2024-05-07 NOTE — CONSULT NOTE ADULT - SUBJECTIVE AND OBJECTIVE BOX
Neurology consult    YANNA Damon    HPI:  78y/o M with PMH of PMH of DM HTN CAD with stenting (on daily ASA) with stenting. Pt  presented for sudden onset left sided weakness to [facility] on 4/16/24 . CT brain showed right MCA occlusion( 4/16). Patient given tenecteplase and was transferred to [facility] on 4/16 for thrombectomy. Underwent thrombectomy but symptoms barely improved, Pt still with left sided weakness facial weakness, dysarthria, left neglect,  right gaze preference and left hemianopsia.  Course complicated by anemia requiring dual Pressor support and 1 unit PRBC and PLT  2/2 to left hip hematoma. Worsening in mental status MRI brain done showing large R MCA infarct with hemorrhagic conversion. Eventually stabilized for transfer to [facility] acute rehab.     5/6/24 patient with worsening mental status and lethargy, CT head showing two new acute infarcts of left insula and left cerebellum. Mental status much worse now. Discussed with family would like intubation if needed, transferred to ICU for closer monitoring.  (07 May 2024 00:10)          MEDICATIONS    dextrose 10% Bolus 125 milliLiter(s) IV Bolus once  dextrose 5%. 1000 milliLiter(s) IV Continuous <Continuous>  dextrose 5%. 1000 milliLiter(s) IV Continuous <Continuous>  dextrose 50% Injectable 12.5 Gram(s) IV Push once  dextrose 50% Injectable 25 Gram(s) IV Push once  dextrose Oral Gel 15 Gram(s) Oral once PRN  glucagon  Injectable 1 milliGRAM(s) IntraMuscular once  influenza  Vaccine (HIGH DOSE) 0.7 milliLiter(s) IntraMuscular once  insulin lispro (ADMELOG) corrective regimen sliding scale   SubCutaneous every 6 hours  metoprolol tartrate Injectable 2.5 milliGRAM(s) IV Push every 6 hours  pantoprazole  Injectable 40 milliGRAM(s) IV Push daily         Family history: No history of dementia, strokes, or seizures   FAMILY HISTORY:  FH: myocardial infarction (Sibling)      SOCIAL HISTORY -- No history of tobacco or alcohol use     Allergies    No Known Allergies    Intolerances          Weight (kg): 65.3 (05-07 @ 00:00)    Vital Signs Last 24 Hrs  T(C): 36.7 (07 May 2024 05:00), Max: 36.7 (06 May 2024 20:35)  T(F): 98.1 (07 May 2024 05:00), Max: 98.1 (06 May 2024 22:00)  HR: 77 (07 May 2024 09:00) (67 - 78)  BP: 110/59 (07 May 2024 09:00) (93/75 - 126/67)  BP(mean): 76 (07 May 2024 09:00) (72 - 98)  RR: 18 (07 May 2024 09:00) (12 - 19)  SpO2: 100% (07 May 2024 09:00) (95% - 100%)    Parameters below as of 07 May 2024 09:00  Patient On (Oxygen Delivery Method): room air          REVIEW OF SYSTEMS:    Constitutional: No fever, chills, fatigue, weakness  Eyes: no eye pain, visual disturbances, or discharge  ENT:  No difficulty hearing, tinnitus, vertigo; No sinus or throat pain  Neck: No pain or stiffness  Respiratory: No cough, dyspnea, wheezing   Cardiovascular: No chest pain, palpitations,   Gastrointestinal: No abdominal or epigastric pain. No nausea, vomiting  No diarrhea or constipation.   Genitourinary: No dysuria, frequency, hematuria or incontinence  Neurological: No headaches, lightheadedness, vertigo, numbness or tremors  Psychiatric: No depression, anxiety, mood swings or difficulty sleeping  Musculoskeletal: No joint pain or swelling; No muscle, back or extremity pain  Skin: No itching, burning, rashes or lesions   Lymph Nodes: No enlarged glands  Endocrine: No heat or cold intolerance; No hair loss, No h/o diabetes or thyroid dysfunction  Allergy and Immunologic: No hives or eczema    On Neurological Examination:    Mental Status - Patient is alert, awake, oriented X3. Confused Dementia Lethargic .     Follows commands well and able to answer questions appropriately. Mood and affect  normal  Follow simple commands  Follow complex commands  Does not follow commands    Speech -   Fluent    Dysarthria  Aphasia                              Cranial Nerves - Pupils 3 mm equal and reactive to light,   extraocular eye movements intact.     Motor Exam -   Right upper  Left upper  Right lower  Left lower     With stimuli positive movement of all 4 extremities    Muscle tone - is normal all over. No asymmetry is seen.      Sensory    Bilateral intact to light touch    Gait -  normal  ataxia     GENERAL Exam:     Nontoxic , No Acute Distress   	  HEENT:  normocephalic, atraumatic  		  LUNGS:	Clear bilaterally  No Wheeze  Decreased bilaterally  	  HEART:	 Normal S1S2   No murmur RRR        	  GI/ ABDOMEN:  Soft  Non tender    EXTREMITIES:   No Edema  No Clubbing  No Cyanosis No Edema    MUSCULOSKELETAL: Normal Range of Motion  	   SKIN:      Normal   No Ecchymosis               LABS:  CBC Full  -  ( 07 May 2024 05:50 )  WBC Count : 8.60 K/uL  RBC Count : 3.81 M/uL  Hemoglobin : 11.3 g/dL  Hematocrit : 34.3 %  Platelet Count - Automated : 282 K/uL  Mean Cell Volume : 90.0 fl  Mean Cell Hemoglobin : 29.7 pg  Mean Cell Hemoglobin Concentration : 32.9 gm/dL  Auto Neutrophil # : 6.68 K/uL  Auto Lymphocyte # : 1.15 K/uL  Auto Monocyte # : 0.55 K/uL  Auto Eosinophil # : 0.16 K/uL  Auto Basophil # : 0.03 K/uL  Auto Neutrophil % : 77.7 %  Auto Lymphocyte % : 13.4 %  Auto Monocyte % : 6.4 %  Auto Eosinophil % : 1.9 %  Auto Basophil % : 0.3 %    Urinalysis Basic - ( 07 May 2024 05:50 )    Color: x / Appearance: x / SG: x / pH: x  Gluc: 153 mg/dL / Ketone: x  / Bili: x / Urobili: x   Blood: x / Protein: x / Nitrite: x   Leuk Esterase: x / RBC: x / WBC x   Sq Epi: x / Non Sq Epi: x / Bacteria: x      05-07    134<L>  |  100  |  16  ----------------------------<  153<H>  4.7   |  25  |  0.79    Ca    9.1      07 May 2024 05:50  Phos  3.7     05-07  Mg     1.9     05-07    TPro  7.0  /  Alb  2.5<L>  /  TBili  0.9  /  DBili  x   /  AST  55<H>  /  ALT  39  /  AlkPhos  93  05-07    Hemoglobin A1C:     LIVER FUNCTIONS - ( 07 May 2024 05:50 )  Alb: 2.5 g/dL / Pro: 7.0 g/dL / ALK PHOS: 93 U/L / ALT: 39 U/L / AST: 55 U/L / GGT: x           Vitamin B12         RADIOLOGY    EKG                     Neurology consult    YANNA [id number]    HPI:  80y/o M with PMH of PMH of DM HTN CAD with stenting (on daily ASA) with stenting. Pt  presented for sudden onset left sided weakness to [facility] on 4/16/24 . CT brain showed right MCA occlusion( 4/16). Patient given tenecteplase and was transferred to [facility] on 4/16 for thrombectomy. Underwent thrombectomy but symptoms barely improved, Pt still with left sided weakness facial weakness, dysarthria, left neglect,  right gaze preference and left hemianopsia.  Course complicated by anemia requiring dual Pressor support and 1 unit PRBC and PLT  2/2 to left hip hematoma. Worsening in mental status MRI brain done showing large R MCA infarct with hemorrhagic conversion. Eventually stabilized for transfer to [facility] acute rehab.     5/6/24 patient with worsening mental status and lethargy, CT head showing two new acute infarcts of left insula and left cerebellum. Mental status much worse now. Discussed with family would like intubation if needed, transferred to ICU for closer monitoring.  (07 May 2024 00:10)    I spoke with patient's son and daughter at bedside. they report patient is more drowsy on Sunday but able to call his daughter's name and look for her. Then he became more lethargic over the night.    MEDICATIONS    dextrose 10% Bolus 125 milliLiter(s) IV Bolus once  dextrose 5%. 1000 milliLiter(s) IV Continuous <Continuous>  dextrose 5%. 1000 milliLiter(s) IV Continuous <Continuous>  dextrose 50% Injectable 12.5 Gram(s) IV Push once  dextrose 50% Injectable 25 Gram(s) IV Push once  dextrose Oral Gel 15 Gram(s) Oral once PRN  glucagon  Injectable 1 milliGRAM(s) IntraMuscular once  influenza  Vaccine (HIGH DOSE) 0.7 milliLiter(s) IntraMuscular once  insulin lispro (ADMELOG) corrective regimen sliding scale   SubCutaneous every 6 hours  metoprolol tartrate Injectable 2.5 milliGRAM(s) IV Push every 6 hours  pantoprazole  Injectable 40 milliGRAM(s) IV Push daily         Family history: No history of dementia, strokes, or seizures   FAMILY HISTORY:  FH: myocardial infarction (Sibling)      SOCIAL HISTORY -- No history of tobacco or alcohol use     Allergies    No Known Allergies    Intolerances          Weight (kg): 65.3 (05-07 @ 00:00)    Vital Signs Last 24 Hrs  T(C): 36.7 (07 May 2024 05:00), Max: 36.7 (06 May 2024 20:35)  T(F): 98.1 (07 May 2024 05:00), Max: 98.1 (06 May 2024 22:00)  HR: 77 (07 May 2024 09:00) (67 - 78)  BP: 110/59 (07 May 2024 09:00) (93/75 - 126/67)  BP(mean): 76 (07 May 2024 09:00) (72 - 98)  RR: 18 (07 May 2024 09:00) (12 - 19)  SpO2: 100% (07 May 2024 09:00) (95% - 100%)    Parameters below as of 07 May 2024 09:00  Patient On (Oxygen Delivery Method): room air          REVIEW OF SYSTEMS: unable to obtain as patient is lethargic.     On Neurological Examination:    Mental Status - Patient is lethargic.   no spontaneous eye opening.  Roving eyes. No hippus. No nystagmus.  right chin slow twitch rhythmic repetitively. No other abnormal movement elsewhere.   Not following commands  withdraw to pain all extremities but less on the left side.   Babinski positive left.        LABS:  CBC Full  -  ( 07 May 2024 05:50 )  WBC Count : 8.60 K/uL  RBC Count : 3.81 M/uL  Hemoglobin : 11.3 g/dL  Hematocrit : 34.3 %  Platelet Count - Automated : 282 K/uL  Mean Cell Volume : 90.0 fl  Mean Cell Hemoglobin : 29.7 pg  Mean Cell Hemoglobin Concentration : 32.9 gm/dL  Auto Neutrophil # : 6.68 K/uL  Auto Lymphocyte # : 1.15 K/uL  Auto Monocyte # : 0.55 K/uL  Auto Eosinophil # : 0.16 K/uL  Auto Basophil # : 0.03 K/uL  Auto Neutrophil % : 77.7 %  Auto Lymphocyte % : 13.4 %  Auto Monocyte % : 6.4 %  Auto Eosinophil % : 1.9 %  Auto Basophil % : 0.3 %    05-07    134<L>  |  100  |  16  ----------------------------<  153<H>  4.7   |  25  |  0.79    Ca    9.1      07 May 2024 05:50  Phos  3.7     05-07  Mg     1.9     05-07    TPro  7.0  /  Alb  2.5<L>  /  TBili  0.9  /  DBili  x   /  AST  55<H>  /  ALT  39  /  AlkPhos  93  05-07        ACC: [id number] EXAM:  CT BRAIN   ORDERED BY: KAVON HUSSEIN     PROCEDURE DATE:  05/06/2024          INTERPRETATION:  CT HEAD    CLINICAL HISTORY: AMS    TECHNIQUE:  Noncontrast CT.  Axial Acquisition.  Sagittal and coronal reformations.    COMPARISON:  Compared to study dated 5/5/2024    FINDINGS:  HEMORRHAGE/BRAIN PARENCHYMA:  Large subacute right MCA infarct is stable   with stable areas of superimposed acute hemorrhage. Stable regional mass   effect. There is a new small acute infarct withinthe left insula. Slight   increased density within the left MCA bifurcation. Small infarct in the   coronal medial aspect of the left cerebellar hemisphere better seen than   prior exam.  VENTRICLES / SHIFT:  No hydrocephalus. No midline shift.  EXTRA-AXIAL / BASAL CISTERNS:  No extra-axial mass. Basal cisterns   preserved.  CALVARIUM AND EXTRACRANIAL SOFT TISSUES:  No depressed calvarial fracture.  SINUSES, ORBITS, MASTOIDS:  The visualized paranasal sinuses and mastoid   air cells are well aerated.    IMPRESSION:  New small acute infarct involving the left insula. Slight increased   density within the left MCA trifurcation. Correlate with CTA if warranted   and no contraindication  Small acute infarct inferior aspect of the left cerebellar hemisphere   better seen than prior exam.  Stable large subacute right MCA infarct with associated hemorrhage.    Preliminary findings discussed with HOLLEY Bernard at 9:10 PM on 5/6/2024    --- End of Report ---            JOCELYNE TAPIA MD; Attending Radiologist  This document has been electronically signed. May  6 2024  9:12PM

## 2024-05-07 NOTE — CONSULT NOTE ADULT - SUBJECTIVE AND OBJECTIVE BOX
HPI: 78y/o M with PMH of PMH of DM HTN CAD with stenting (on daily ASA) with stenting. Pt  presented for sudden onset left sided weakness to [facility] on 4/16/24 . CT brain showed right MCA occlusion( 4/16). Patient given tenecteplase and was transferred to [facility] on 4/16 for thrombectomy. Underwent thrombectomy but symptoms barely improved, Pt still with left sided weakness facial weakness, dysarthria, left neglect,  right gaze preference and left hemianopsia.  Course complicated by anemia requiring dual Pressor support and 1 unit PRBC and PLT  2/2 to left hip hematoma. Worsening in mental status MRI brain done showing large R MCA infarct with hemorrhagic conversion. Eventually stabilized for transfer to [facility] acute rehab.     5/6/24 patient with worsening mental status and lethargy, CT head showing two new acute infarcts of left insula and left cerebellum. Mental status much worse now.    transferred to ICU for closer monitoring.  (07 May 2024 00:10)      PAST MEDICAL & SURGICAL HISTORY:  HLD (hyperlipidemia)      T2DM (type 2 diabetes mellitus)      BPH (benign prostatic hyperplasia)      Diabetes mellitus      Hypertension      High cholesterol      CAD (coronary artery disease)      H/O eye surgery      History of cardiac cath  times 2      H/O heart artery stent          SOCIAL HISTORY:    Admitted from:  AR   Substance abuse history:              Tobacco hx:                  Alcohol hx:              Home Opioid hx:  Congregation:                                    Preferred Language:    Surrogate/:    spouse   , then daughter           Phone#:  [phone]    FAMILY HISTORY:  FH: myocardial infarction (Sibling)      Baseline ADLs (prior to admission):    Allergies    No Known Allergies    Intolerances              Review of Systems: [ Unable to obtain due to poor mentation]    MEDICATIONS  (STANDING):  aspirin Suppository 300 milliGRAM(s) Rectal daily  dextrose 10% Bolus 125 milliLiter(s) IV Bolus once  dextrose 5% + lactated ringers. 1000 milliLiter(s) (50 mL/Hr) IV Continuous <Continuous>  dextrose 5%. 1000 milliLiter(s) (50 mL/Hr) IV Continuous <Continuous>  dextrose 5%. 1000 milliLiter(s) (100 mL/Hr) IV Continuous <Continuous>  dextrose 50% Injectable 25 Gram(s) IV Push once  dextrose 50% Injectable 12.5 Gram(s) IV Push once  glucagon  Injectable 1 milliGRAM(s) IntraMuscular once  influenza  Vaccine (HIGH DOSE) 0.7 milliLiter(s) IntraMuscular once  insulin lispro (ADMELOG) corrective regimen sliding scale   SubCutaneous every 6 hours  metoprolol tartrate Injectable 2.5 milliGRAM(s) IV Push every 6 hours  pantoprazole  Injectable 40 milliGRAM(s) IV Push daily    MEDICATIONS  (PRN):  dextrose Oral Gel 15 Gram(s) Oral once PRN Blood Glucose LESS THAN 70 milliGRAM(s)/deciliter      PHYSICAL EXAM:    Vital Signs Last 24 Hrs  T(C): 37.2 (07 May 2024 10:00), Max: 37.2 (07 May 2024 10:00)  T(F): 99 (07 May 2024 10:00), Max: 99 (07 May 2024 10:00)  HR: 78 (07 May 2024 10:00) (67 - 78)  BP: 112/53 (07 May 2024 10:00) (93/75 - 126/67)  BP(mean): 61 (07 May 2024 10:00) (61 - 98)  RR: 12 (07 May 2024 10:00) (12 - 19)  SpO2: 100% (07 May 2024 10:00) (95% - 100%)    Parameters below as of 07 May 2024 10:00  Patient On (Oxygen Delivery Method): room air        General: lethargic ,nonverbal, not interactive   Karnofsky Performance Score/Palliative Performance Status Version2: 10     %  PPSV: 10%  HEENT: normal  w/ dry mouth    Lungs: resp non labored,   CV: normal rate   GI: normal, abd nml, non distended    : normal w/ incontinence   talbert  Musculoskeletal: contractures, and  weakness, no  edema   Skin: normal , w/d   Neuro: + deficits , non verbal, opens eyes   Oral intake ability: unable/only mouth care   Diet: [NPO]    LABS:                        11.3   8.60  )-----------( 282      ( 07 May 2024 05:50 )             34.3     05-07    134<L>  |  100  |  16  ----------------------------<  153<H>  4.7   |  25  |  0.79    Ca    9.1      07 May 2024 05:50  Phos  3.7     05-07  Mg     1.9     05-07    TPro  7.0  /  Alb  2.5<L>  /  TBili  0.9  /  DBili  x   /  AST  55<H>  /  ALT  39  /  AlkPhos  93  05-07    Urinalysis Basic - ( 07 May 2024 05:50 )    Color: x / Appearance: x / SG: x / pH: x  Gluc: 153 mg/dL / Ketone: x  / Bili: x / Urobili: x   Blood: x / Protein: x / Nitrite: x   Leuk Esterase: x / RBC: x / WBC x   Sq Epi: x / Non Sq Epi: x / Bacteria: x        RADIOLOGY & ADDITIONAL STUDIES: < from: CT Head No Cont (05.06.24 @ 20:19) >  IMPRESSION:  New small acute infarct involving the left insula. Slight increased   density within the left MCA trifurcation. Correlate with CTA if warranted   and no contraindication  Small acute infarct inferior aspect of the left cerebellar hemisphere   better seen than prior exam.  Stable large subacute right MCA infarct with associated hemorrhage.            ADVANCE DIRECTIVES: living will     Advanced Care Planning discussion total time spent:

## 2024-05-07 NOTE — DIETITIAN INITIAL EVALUATION ADULT - PERTINENT LABORATORY DATA
05-07    134<L>  |  100  |  16  ----------------------------<  153<H>  4.7   |  25  |  0.79    Ca    9.1      07 May 2024 05:50  Phos  3.7     05-07  Mg     1.9     05-07    TPro  7.0  /  Alb  2.5<L>  /  TBili  0.9  /  DBili  x   /  AST  55<H>  /  ALT  39  /  AlkPhos  93  05-07  POCT Blood Glucose.: 124 mg/dL (05-07-24 @ 11:28)  A1C with Estimated Average Glucose Result: 8.2 % (04-16-24 @ 22:59)

## 2024-05-07 NOTE — PROGRESS NOTE ADULT - SUBJECTIVE AND OBJECTIVE BOX
Follow-up Critical Care Progress Note  Chief Complaint : Cerebrovascular disease    Admitted from rehab overnight for AMS with now new stroke on CT. Ceribell negative      Allergies :No Known Allergies      PAST MEDICAL & SURGICAL HISTORY:  HLD (hyperlipidemia)  T2DM (type 2 diabetes mellitus)  BPH (benign prostatic hyperplasia)  Diabetes mellitus  Hypertension  High cholesterol  CAD (coronary artery disease)  H/O eye surgery  History of cardiac cath  times 2  H/O heart artery stent        Medications:  MEDICATIONS  (STANDING):  aspirin Suppository 300 milliGRAM(s) Rectal daily  atorvastatin 80 milliGRAM(s) Oral at bedtime  dextrose 10% Bolus 125 milliLiter(s) IV Bolus once  dextrose 5% + lactated ringers. 1000 milliLiter(s) (50 mL/Hr) IV Continuous <Continuous>  dextrose 5%. 1000 milliLiter(s) (100 mL/Hr) IV Continuous <Continuous>  dextrose 5%. 1000 milliLiter(s) (50 mL/Hr) IV Continuous <Continuous>  dextrose 50% Injectable 12.5 Gram(s) IV Push once  dextrose 50% Injectable 25 Gram(s) IV Push once  glucagon  Injectable 1 milliGRAM(s) IntraMuscular once  influenza  Vaccine (HIGH DOSE) 0.7 milliLiter(s) IntraMuscular once  insulin lispro (ADMELOG) corrective regimen sliding scale   SubCutaneous every 6 hours  metoprolol tartrate Injectable 2.5 milliGRAM(s) IV Push every 6 hours  pantoprazole  Injectable 40 milliGRAM(s) IV Push daily    MEDICATIONS  (PRN):  dextrose Oral Gel 15 Gram(s) Oral once PRN Blood Glucose LESS THAN 70 milliGRAM(s)/deciliter    GI Medications  pantoprazole  Injectable 40 milliGRAM(s) IV Push daily, 05-06-24 @ 23:45, Routine      COVID  04-25-24 @ 23:30  [facility]  07[facility] @ 08:17  COVID -   [facility]      COVID Biomarkers    04-18-24 @ 19:00 ESR --  ---  CRP --  ---  DDimer  --   ---   <H>   ---   Ferritin --    04-18-24 @ 10:41 ESR --  ---  CRP --  ---  DDimer  2860<H>   ---      ---   Ferritin --    04-18-24 @ 06:57 ESR --  ---  CRP --  ---  DDimer  --   ---      ---   Ferritin --          Trend BNP  08-13-21 @ 21:39   -  1669<H>    Procalcitonin Trend  05-06-24 @ 22:00   -   0.16<H>  04-25-24 @ 06:03   -   0.08    WBC Trend  05-07-24 @ 05:50   -  8.60  05-06-24 @ 22:00   -  8.99  05-06-24 @ 06:45   -  9.29    H/H Trend  05-07-24 @ 05:50   -   11.3<L>/ 34.3<L>  05-06-24 @ 22:00   -   11.4<L>/ 34.1<L>  05-06-24 @ 06:45   -   10.8<L>/ 32.5<L>  05-02-24 @ 05:55   -   12.1<L>/ 37.2<L>  04-29-24 @ 06:35   -   10.5<L>/ 32.2<L>  04-26-24 @ 05:00   -   10.3<L>/ 30.8<L>    Platelet Trend  05-07-24 @ 05:50   -  282  05-06-24 @ 22:00   -  293  05-06-24 @ 06:45   -  277    Trend Sodium  05-07-24 @ 05:50   -  134<L>  05-06-24 @ 22:00   -  135  05-06-24 @ 06:45   -  135    Trend Potassium  05-07-24 @ 05:50   -  4.7  05-06-24 @ 22:00   -  4.6  05-06-24 @ 06:45   -  4.1    Trend Bun/Cr  05-07-24 @ 05:50  BUN/CR -  16 / 0.79  05-06-24 @ 22:00  BUN/CR -  16 / 0.95  05-06-24 @ 06:45  BUN/CR -  17 / 0.97    Lactic Acid Trend  05-06-24 @ 22:00   -   1.1    ABG Trend  05-06-24 @ 22:00   - 7.47<H>/35/96/98.3<H>  04-18-24 @ 18:50   - 7.46<H>/32<L>/160<H>/98.3<H>  04-18-24 @ 10:07   - 7.42/37/152<H>/99.1<H>  04-17-24 @ 09:15   - 7.35/37/87/97.4  04-17-24 @ 05:06   - 7.31<L>/39/161<H>/98.9<H>  04-17-24 @ 02:48   - 7.35/33<L>/128<H>/98.7<H>  07-08-21 @ 11:00   - 7.38/36/73<L>/94    Trend AST/ALT/ALK Phos/Bili  05-07-24 @ 05:50   55<H>/39/93/0.9  05-06-24 @ 22:00   65<H>/47<H>/96/1.0  05-06-24 @ 06:45   65<H>/41/88/1.0  05-02-24 @ 05:55   63<H>/61<H>/110/1.8<H>  04-29-24 @ 06:35   75<H>/94<H>/93/2.2<H>  04-26-24 @ 05:00   81<H>/70<H>/70/2.2<H>  04-24-24 @ 06:00   38/39/62/2.4<H>  04-18-24 @ 19:00   --/--/--/0.9  04-18-24 @ 10:41   30/22/62/0.5  04-16-24 @ 19:05   21/19/62/0.3  04-16-24 @ 17:16   33/40/97/0.6  08-13-21 @ 21:39   25/32/117/0.3      Albumin Trend  05-07-24 @ 05:50   -   2.5<L>  05-06-24 @ 22:00   -   2.6<L>  05-06-24 @ 06:45   -   2.5<L>  05-02-24 @ 05:55   -   2.8<L>  04-29-24 @ 06:35   -   2.7<L>  04-26-24 @ 05:00   -   2.5<L>      PTT - PT - INR Trend  04-18-24 @ 07:00   -   25.1 - 11.1 - 1.01  04-16-24 @ 19:29   -   24.5 - 11.1 - 1.06  04-16-24 @ 17:16   -   25.1 - 10.6 - 0.89    Glucose Trend  05-07-24 @ 11:28   -  -- -- 124<H>  05-07-24 @ 06:00   -  -- -- 159<H>  05-07-24 @ 05:50   -  153<H> -- --  05-07-24 @ 00:11   -  -- -- 120<H>  05-06-24 @ 22:00   -  89 -- --  05-06-24 @ 21:55   -  -- -- 101<H>  05-06-24 @ 18:52   -  -- -- 143<H>  05-06-24 @ 17:04   -  -- -- 193<H>  05-06-24 @ 12:06   -  -- -- 114<H>  05-06-24 @ 08:03   -  -- -- 206<H>    A1C with Estimated Average Glucose Result: 8.2 % *H* [4.0 - 5.6] (04-16-24 @ 22:59)      LABS:                        11.3   8.60  )-----------( 282      ( 07 May 2024 05:50 )             34.3     05-07    134<L>  |  100  |  16  ----------------------------<  153<H>  4.7   |  25  |  0.79    Ca    9.1      07 May 2024 05:50  Phos  3.7     05-07  Mg     1.9     05-07    TPro  7.0  /  Alb  2.5<L>  /  TBili  0.9  /  DBili  x   /  AST  55<H>  /  ALT  39  /  AlkPhos  93  05-07        Urinalysis Basic - ( 07 May 2024 05:50 )    Color: x / Appearance: x / SG: x / pH: x  Gluc: 153 mg/dL / Ketone: x  / Bili: x / Urobili: x   Blood: x / Protein: x / Nitrite: x   Leuk Esterase: x / RBC: x / WBC x   Sq Epi: x / Non Sq Epi: x / Bacteria: x      Procalcitonin: 0.16 ng/mL (05-06-24 @ 22:00)        ABG - ( 06 May 2024 22:00 )  pH, Arterial: 7.47  pH, Blood: x     /  pCO2: 35    /  pO2: 96    / HCO3: 26    / Base Excess: 1.8   /  SaO2: 98.3        POCT Blood Glucose.: 124 mg/dL (07 May 2024 11:28)      RADIOLOGY  CT:< from: CT Head No Cont (05.06.24 @ 20:19) >  IMPRESSION:  New small acute infarct involving the left insula. Slight increased   density within the left MCA trifurcation. Correlate with CTA if warranted   and no contraindication  Small acute infarct inferior aspect of the left cerebellar hemisphere   better seen than prior exam.  Stable large subacute right MCA infarct with associated hemorrhage.      < end of copied text >        VITALS:  T(C): 37.3 (05-07-24 @ 15:00), Max: 37.3 (05-07-24 @ 15:00)  T(F): 99.1 (05-07-24 @ 15:00), Max: 99.1 (05-07-24 @ 15:00)  HR: 83 (05-07-24 @ 15:00) (67 - 87)  BP: 98/82 (05-07-24 @ 15:00) (93/75 - 134/83)  BP(mean): 89 (05-07-24 @ 15:00) (61 - 99)  RR: 13 (05-07-24 @ 15:00) (12 - 19)  SpO2: 97% (05-07-24 @ 15:00) (95% - 100%)    Ins and Outs     05-07-24 @ 07:01  -  05-07-24 @ 15:50  --------------------------------------------------------  IN: 200 mL / OUT: 0 mL / NET: 200 mL      Weight (kg): 65.3 (05-07-24 @ 00:00)        I&O's Detail    07 May 2024 07:01  -  07 May 2024 15:50  --------------------------------------------------------  IN:    dextrose 5% + lactated ringers: 200 mL  Total IN: 200 mL    OUT:  Total OUT: 0 mL    Total NET: 200 mL          Physical Examination:  GENERAL:               Alert, nonverbal No acute distress.    HEENT:                   irregularly shaped pupil on left, irghtward gaze R pupil pinpoint but reactive   PULM:                     Bilateral air entry, Clear to auscultation bilaterally, no significant sputum production, NCVS:                         S1, S2,  No Murmur  ABD:                        Soft, nondistended, nontender, normoactive bowel sounds,   EXT:                         No edema, nontender, No Cyanosis or Clubbing   Vascular:                Warm Extremities, Normal Capillary refill, Normal Distal Pulses  SKIN:                       Warm and well perfused, no rashes noted.   NEURO:                 arousable, but lethargic, some effort against gravity to BL upper and lower ext,  nonverbal,               Follow-up Critical Care Progress Note  Chief Complaint : Cerebrovascular disease    Admitted from rehab overnight for AMS with now new stroke on CT. Ceribell negative      Allergies :No Known Allergies      PAST MEDICAL & SURGICAL HISTORY:  HLD (hyperlipidemia)  T2DM (type 2 diabetes mellitus)  BPH (benign prostatic hyperplasia)  Diabetes mellitus  Hypertension  High cholesterol  CAD (coronary artery disease)  H/O eye surgery  History of cardiac cath  times 2  H/O heart artery stent        Medications:  MEDICATIONS  (STANDING):  aspirin Suppository 300 milliGRAM(s) Rectal daily  atorvastatin 80 milliGRAM(s) Oral at bedtime  dextrose 10% Bolus 125 milliLiter(s) IV Bolus once  dextrose 5% + lactated ringers. 1000 milliLiter(s) (50 mL/Hr) IV Continuous <Continuous>  dextrose 5%. 1000 milliLiter(s) (100 mL/Hr) IV Continuous <Continuous>  dextrose 5%. 1000 milliLiter(s) (50 mL/Hr) IV Continuous <Continuous>  dextrose 50% Injectable 12.5 Gram(s) IV Push once  dextrose 50% Injectable 25 Gram(s) IV Push once  glucagon  Injectable 1 milliGRAM(s) IntraMuscular once  influenza  Vaccine (HIGH DOSE) 0.7 milliLiter(s) IntraMuscular once  insulin lispro (ADMELOG) corrective regimen sliding scale   SubCutaneous every 6 hours  metoprolol tartrate Injectable 2.5 milliGRAM(s) IV Push every 6 hours  pantoprazole  Injectable 40 milliGRAM(s) IV Push daily    MEDICATIONS  (PRN):  dextrose Oral Gel 15 Gram(s) Oral once PRN Blood Glucose LESS THAN 70 milliGRAM(s)/deciliter    GI Medications  pantoprazole  Injectable 40 milliGRAM(s) IV Push daily, 05-06-24 @ 23:45, Routine      COVID  04-25-24 @ 23:30  [facility]  07-08-21 @ 08:17  COVID -   [facility]      COVID Biomarkers    04-18-24 @ 19:00 ESR --  ---  CRP --  ---  DDimer  --   ---   <H>   ---   Ferritin --    04-18-24 @ 10:41 ESR --  ---  CRP --  ---  DDimer  2860<H>   ---      ---   Ferritin --    04-18-24 @ 06:57 ESR --  ---  CRP --  ---  DDimer  --   ---      ---   Ferritin --          Trend BNP  08-13-21 @ 21:39   -  1669<H>    Procalcitonin Trend  05-06-24 @ 22:00   -   0.16<H>  04-25-24 @ 06:03   -   0.08    WBC Trend  05-07-24 @ 05:50   -  8.60  05-06-24 @ 22:00   -  8.99  05-06-24 @ 06:45   -  9.29    H/H Trend  05-07-24 @ 05:50   -   11.3<L>/ 34.3<L>  05-06-24 @ 22:00   -   11.4<L>/ 34.1<L>  05-06-24 @ 06:45   -   10.8<L>/ 32.5<L>  05-02-24 @ 05:55   -   12.1<L>/ 37.2<L>  04-29-24 @ 06:35   -   10.5<L>/ 32.2<L>  04-26-24 @ 05:00   -   10.3<L>/ 30.8<L>    Platelet Trend  05-07-24 @ 05:50   -  282  05-06-24 @ 22:00   -  293  05-06-24 @ 06:45   -  277    Trend Sodium  05-07-24 @ 05:50   -  134<L>  05-06-24 @ 22:00   -  135  05-06-24 @ 06:45   -  135    Trend Potassium  05-07-24 @ 05:50   -  4.7  05-06-24 @ 22:00   -  4.6  05-06-24 @ 06:45   -  4.1    Trend Bun/Cr  05-07-24 @ 05:50  BUN/CR -  16 / 0.79  05-06-24 @ 22:00  BUN/CR -  16 / 0.95  05-06-24 @ 06:45  BUN/CR -  17 / 0.97    Lactic Acid Trend  05-06-24 @ 22:00   -   1.1    ABG Trend  05-06-24 @ 22:00   - 7.47<H>/35/96/98.3<H>  04-18-24 @ 18:50   - 7.46<H>/32<L>/160<H>/98.3<H>  04-18-24 @ 10:07   - 7.42/37/152<H>/99.1<H>  04-17-24 @ 09:15   - 7.35/37/87/97.4  04-17-24 @ 05:06   - 7.31<L>/39/161<H>/98.9<H>  04-17-24 @ 02:48   - 7.35/33<L>/128<H>/98.7<H>  07-08-21 @ 11:00   - 7.38/36/73<L>/94    Trend AST/ALT/ALK Phos/Bili  05-07-24 @ 05:50   55<H>/39/93/0.9  05-06-24 @ 22:00   65<H>/47<H>/96/1.0  05-06-24 @ 06:45   65<H>/41/88/1.0  05-02-24 @ 05:55   63<H>/61<H>/110/1.8<H>  04-29-24 @ 06:35   75<H>/94<H>/93/2.2<H>  04-26-24 @ 05:00   81<H>/70<H>/70/2.2<H>  04-24-24 @ 06:00   38/39/62/2.4<H>  04-18-24 @ 19:00   --/--/--/0.9  04-18-24 @ 10:41   30/22/62/0.5  04-16-24 @ 19:05   21/19/62/0.3  04-16-24 @ 17:16   33/40/97/0.6  08-13-21 @ 21:39   25/32/117/0.3      Albumin Trend  05-07-24 @ 05:50   -   2.5<L>  05-06-24 @ 22:00   -   2.6<L>  05-06-24 @ 06:45   -   2.5<L>  05-02-24 @ 05:55   -   2.8<L>  04-29-24 @ 06:35   -   2.7<L>  04-26-24 @ 05:00   -   2.5<L>      PTT - PT - INR Trend  04-18-24 @ 07:00   -   25.1 - 11.1 - 1.01  04-16-24 @ 19:29   -   24.5 - 11.1 - 1.06  04-16-24 @ 17:16   -   25.1 - 10.6 - 0.89    Glucose Trend  05-07-24 @ 11:28   -  -- -- 124<H>  05-07-24 @ 06:00   -  -- -- 159<H>  05-07-24 @ 05:50   -  153<H> -- --  05-07-24 @ 00:11   -  -- -- 120<H>  05-06-24 @ 22:00   -  89 -- --  05-06-24 @ 21:55   -  -- -- 101<H>  05-06-24 @ 18:52   -  -- -- 143<H>  05-06-24 @ 17:04   -  -- -- 193<H>  05-06-24 @ 12:06   -  -- -- 114<H>  05-06-24 @ 08:03   -  -- -- 206<H>    A1C with Estimated Average Glucose Result: 8.2 % *H* [4.0 - 5.6] (04-16-24 @ 22:59)      LABS:                        11.3   8.60  )-----------( 282      ( 07 May 2024 05:50 )             34.3     05-07    134<L>  |  100  |  16  ----------------------------<  153<H>  4.7   |  25  |  0.79    Ca    9.1      07 May 2024 05:50  Phos  3.7     05-07  Mg     1.9     05-07    TPro  7.0  /  Alb  2.5<L>  /  TBili  0.9  /  DBili  x   /  AST  55<H>  /  ALT  39  /  AlkPhos  93  05-07        Urinalysis Basic - ( 07 May 2024 05:50 )    Color: x / Appearance: x / SG: x / pH: x  Gluc: 153 mg/dL / Ketone: x  / Bili: x / Urobili: x   Blood: x / Protein: x / Nitrite: x   Leuk Esterase: x / RBC: x / WBC x   Sq Epi: x / Non Sq Epi: x / Bacteria: x      Procalcitonin: 0.16 ng/mL (05-06-24 @ 22:00)        ABG - ( 06 May 2024 22:00 )  pH, Arterial: 7.47  pH, Blood: x     /  pCO2: 35    /  pO2: 96    / HCO3: 26    / Base Excess: 1.8   /  SaO2: 98.3        POCT Blood Glucose.: 124 mg/dL (07 May 2024 11:28)      RADIOLOGY  CT:< from: CT Head No Cont (05.06.24 @ 20:19) >  IMPRESSION:  New small acute infarct involving the left insula. Slight increased   density within the left MCA trifurcation. Correlate with CTA if warranted   and no contraindication  Small acute infarct inferior aspect of the left cerebellar hemisphere   better seen than prior exam.  Stable large subacute right MCA infarct with associated hemorrhage.      < end of copied text >        VITALS:  T(C): 37.3 (05-07-24 @ 15:00), Max: 37.3 (05-07-24 @ 15:00)  T(F): 99.1 (05-07-24 @ 15:00), Max: 99.1 (05-07-24 @ 15:00)  HR: 83 (05-07-24 @ 15:00) (67 - 87)  BP: 98/82 (05-07-24 @ 15:00) (93/75 - 134/83)  BP(mean): 89 (05-07-24 @ 15:00) (61 - 99)  RR: 13 (05-07-24 @ 15:00) (12 - 19)  SpO2: 97% (05-07-24 @ 15:00) (95% - 100%)    Ins and Outs     05-07-24 @ 07:01  -  05-07-24 @ 15:50  --------------------------------------------------------  IN: 200 mL / OUT: 0 mL / NET: 200 mL      Weight (kg): 65.3 (05-07-24 @ 00:00)        I&O's Detail    07 May 2024 07:01  -  07 May 2024 15:50  --------------------------------------------------------  IN:    dextrose 5% + lactated ringers: 200 mL  Total IN: 200 mL    OUT:  Total OUT: 0 mL    Total NET: 200 mL          Physical Examination:  GENERAL:               Alert, nonverbal No acute distress.    HEENT:                   irregularly shaped pupil on left, rightward gaze R pupil pinpoint but reactive   PULM:                     Bilateral air entry, Clear to auscultation bilaterally, no significant sputum production  CVS:                         S1, S2,  No Murmur  ABD:                        Soft, nondistended, nontender, normoactive bowel sounds,   EXT:                         No edema, nontender, No Cyanosis or Clubbing   Vascular:                Warm Extremities, Normal Capillary refill, Normal Distal Pulses  SKIN:                       Warm and well perfused, no rashes noted.   NEURO:                 arousable, but lethargic, some effort against gravity to BL upper and lower ext,  nonverbal,

## 2024-05-07 NOTE — DIETITIAN INITIAL EVALUATION ADULT - OTHER INFO
Initial Nutrition Assessment   79yr Old Male   Previously Denied Food Allergy/Intolerance  SLP to Eval for Chewing/Swallowing Complications - Discussed w/ Speech Therapy   No Pressure Ulcers (as Per Nursing Flow Sheets)  No Edema Noted (as Per Nursing Flow Sheets)

## 2024-05-08 LAB
ANION GAP SERPL CALC-SCNC: 10 MMOL/L — SIGNIFICANT CHANGE UP (ref 5–17)
BUN SERPL-MCNC: 12 MG/DL — SIGNIFICANT CHANGE UP (ref 7–23)
CALCIUM SERPL-MCNC: 9.2 MG/DL — SIGNIFICANT CHANGE UP (ref 8.4–10.5)
CHLORIDE SERPL-SCNC: 99 MMOL/L — SIGNIFICANT CHANGE UP (ref 96–108)
CO2 SERPL-SCNC: 25 MMOL/L — SIGNIFICANT CHANGE UP (ref 22–31)
CREAT SERPL-MCNC: 0.66 MG/DL — SIGNIFICANT CHANGE UP (ref 0.5–1.3)
EGFR: 96 ML/MIN/1.73M2 — SIGNIFICANT CHANGE UP
GLUCOSE BLDC GLUCOMTR-MCNC: 145 MG/DL — HIGH (ref 70–99)
GLUCOSE BLDC GLUCOMTR-MCNC: 181 MG/DL — HIGH (ref 70–99)
GLUCOSE BLDC GLUCOMTR-MCNC: 210 MG/DL — HIGH (ref 70–99)
GLUCOSE SERPL-MCNC: 181 MG/DL — HIGH (ref 70–99)
HCT VFR BLD CALC: 35 % — LOW (ref 39–50)
HGB BLD-MCNC: 11.5 G/DL — LOW (ref 13–17)
MAGNESIUM SERPL-MCNC: 1.7 MG/DL — SIGNIFICANT CHANGE UP (ref 1.6–2.6)
MCHC RBC-ENTMCNC: 29.6 PG — SIGNIFICANT CHANGE UP (ref 27–34)
MCHC RBC-ENTMCNC: 32.9 GM/DL — SIGNIFICANT CHANGE UP (ref 32–36)
MCV RBC AUTO: 90.2 FL — SIGNIFICANT CHANGE UP (ref 80–100)
NRBC # BLD: 0 /100 WBCS — SIGNIFICANT CHANGE UP (ref 0–0)
PHOSPHATE SERPL-MCNC: 3 MG/DL — SIGNIFICANT CHANGE UP (ref 2.5–4.5)
PLATELET # BLD AUTO: 287 K/UL — SIGNIFICANT CHANGE UP (ref 150–400)
POTASSIUM SERPL-MCNC: 4.2 MMOL/L — SIGNIFICANT CHANGE UP (ref 3.5–5.3)
POTASSIUM SERPL-SCNC: 4.2 MMOL/L — SIGNIFICANT CHANGE UP (ref 3.5–5.3)
RBC # BLD: 3.88 M/UL — LOW (ref 4.2–5.8)
RBC # FLD: 16.1 % — HIGH (ref 10.3–14.5)
SODIUM SERPL-SCNC: 134 MMOL/L — LOW (ref 135–145)
WBC # BLD: 6.86 K/UL — SIGNIFICANT CHANGE UP (ref 3.8–10.5)
WBC # FLD AUTO: 6.86 K/UL — SIGNIFICANT CHANGE UP (ref 3.8–10.5)

## 2024-05-08 PROCEDURE — 99292 CRITICAL CARE ADDL 30 MIN: CPT

## 2024-05-08 PROCEDURE — 70450 CT HEAD/BRAIN W/O DYE: CPT | Mod: 26

## 2024-05-08 PROCEDURE — 99291 CRITICAL CARE FIRST HOUR: CPT

## 2024-05-08 PROCEDURE — 99232 SBSQ HOSP IP/OBS MODERATE 35: CPT

## 2024-05-08 RX ORDER — MAGNESIUM SULFATE 500 MG/ML
2 VIAL (ML) INJECTION ONCE
Refills: 0 | Status: COMPLETED | OUTPATIENT
Start: 2024-05-08 | End: 2024-05-08

## 2024-05-08 RX ORDER — ACETAMINOPHEN 500 MG
650 TABLET ORAL ONCE
Refills: 0 | Status: COMPLETED | OUTPATIENT
Start: 2024-05-08 | End: 2024-05-08

## 2024-05-08 RX ORDER — ASPIRIN/CALCIUM CARB/MAGNESIUM 324 MG
81 TABLET ORAL DAILY
Refills: 0 | Status: DISCONTINUED | OUTPATIENT
Start: 2024-05-08 | End: 2024-05-13

## 2024-05-08 RX ORDER — PANTOPRAZOLE SODIUM 20 MG/1
40 TABLET, DELAYED RELEASE ORAL DAILY
Refills: 0 | Status: DISCONTINUED | OUTPATIENT
Start: 2024-05-08 | End: 2024-05-13

## 2024-05-08 RX ORDER — ACETAMINOPHEN 500 MG
650 TABLET ORAL EVERY 6 HOURS
Refills: 0 | Status: DISCONTINUED | OUTPATIENT
Start: 2024-05-08 | End: 2024-05-14

## 2024-05-08 RX ORDER — PANTOPRAZOLE SODIUM 20 MG/1
40 TABLET, DELAYED RELEASE ORAL
Refills: 0 | Status: DISCONTINUED | OUTPATIENT
Start: 2024-05-08 | End: 2024-05-08

## 2024-05-08 RX ADMIN — Medication 1 DROP(S): at 17:13

## 2024-05-08 RX ADMIN — Medication 650 MILLIGRAM(S): at 18:20

## 2024-05-08 RX ADMIN — Medication 12.5 MILLIGRAM(S): at 05:49

## 2024-05-08 RX ADMIN — Medication 1 DROP(S): at 05:50

## 2024-05-08 RX ADMIN — Medication 650 MILLIGRAM(S): at 11:32

## 2024-05-08 RX ADMIN — PANTOPRAZOLE SODIUM 40 MILLIGRAM(S): 20 TABLET, DELAYED RELEASE ORAL at 11:31

## 2024-05-08 RX ADMIN — Medication 1: at 05:49

## 2024-05-08 RX ADMIN — Medication 25 GRAM(S): at 08:48

## 2024-05-08 RX ADMIN — Medication 12.5 MILLIGRAM(S): at 17:13

## 2024-05-08 RX ADMIN — ATORVASTATIN CALCIUM 80 MILLIGRAM(S): 80 TABLET, FILM COATED ORAL at 22:05

## 2024-05-08 RX ADMIN — Medication 650 MILLIGRAM(S): at 12:21

## 2024-05-08 RX ADMIN — Medication 81 MILLIGRAM(S): at 12:21

## 2024-05-08 RX ADMIN — Medication 2: at 17:12

## 2024-05-08 NOTE — PROGRESS NOTE ADULT - SUBJECTIVE AND OBJECTIVE BOX
Progress:  pt opens eyes, not following, non verbal       Review of Systems: [ Unable to obtain due to poor mentation/ non verbal    MEDICATIONS  (STANDING):  aspirin  chewable 81 milliGRAM(s) Oral daily  atorvastatin 80 milliGRAM(s) Oral at bedtime  dextrose 10% Bolus 125 milliLiter(s) IV Bolus once  dextrose 5%. 1000 milliLiter(s) (50 mL/Hr) IV Continuous <Continuous>  dextrose 5%. 1000 milliLiter(s) (100 mL/Hr) IV Continuous <Continuous>  dextrose 50% Injectable 12.5 Gram(s) IV Push once  dextrose 50% Injectable 25 Gram(s) IV Push once  glucagon  Injectable 1 milliGRAM(s) IntraMuscular once  influenza  Vaccine (HIGH DOSE) 0.7 milliLiter(s) IntraMuscular once  insulin lispro (ADMELOG) corrective regimen sliding scale   SubCutaneous every 6 hours  metoprolol tartrate 12.5 milliGRAM(s) Enteral Tube two times a day  pantoprazole   Suspension 40 milliGRAM(s) Oral daily  timolol 0.5% Solution 1 Drop(s) Left EYE two times a day    MEDICATIONS  (PRN):  dextrose Oral Gel 15 Gram(s) Oral once PRN Blood Glucose LESS THAN 70 milliGRAM(s)/deciliter      PHYSICAL EXAM:  Vital Signs Last 24 Hrs  T(C): 37.9 (08 May 2024 10:00), Max: 37.9 (08 May 2024 04:00)  T(F): 100.3 (08 May 2024 10:00), Max: 100.3 (08 May 2024 10:00)  HR: 72 (08 May 2024 14:00) (66 - 98)  BP: 138/60 (08 May 2024 14:00) (98/82 - 154/83)  BP(mean): 82 (08 May 2024 14:00) (74 - 114)  RR: 16 (08 May 2024 14:00) (13 - 20)  SpO2: 98% (08 May 2024 14:00) (94% - 100%)    Parameters below as of 08 May 2024 14:00  Patient On (Oxygen Delivery Method): room air      General: alert, eyes open, non verbal, not in distress       HEENT: n/c, a/t, eyes gave to left , mouth dry , Ng in place     Lungs: few rhonchi dim bases    CV: normal  rate  GI: abd nml    : normal  , incontinent  Musculoskeletal: weakened   Skin: nml, w/d     Neuro: + deficits, non verbal, not following    Oral intake ability:  oral feeding- presently unable   Diet: NPO, Ng feeds     LABS:                          11.5   6.86  )-----------( 287      ( 08 May 2024 05:00 )             35.0     05-08    134<L>  |  99  |  12  ----------------------------<  181<H>  4.2   |  25  |  0.66    Ca    9.2      08 May 2024 05:00  Phos  3.0     05-08  Mg     1.7     05-08    TPro  7.0  /  Alb  2.5<L>  /  TBili  0.9  /  DBili  x   /  AST  55<H>  /  ALT  39  /  AlkPhos  93  05-07    Urinalysis Basic - ( 08 May 2024 05:00 )    Color: x / Appearance: x / SG: x / pH: x  Gluc: 181 mg/dL / Ketone: x  / Bili: x / Urobili: x   Blood: x / Protein: x / Nitrite: x   Leuk Esterase: x / RBC: x / WBC x   Sq Epi: x / Non Sq Epi: x / Bacteria: x        RADIOLOGY & ADDITIONAL STUDIES:   < from: CT Head No Cont (05.08.24 @ 11:06) >  ACC: [id number] EXAM:  CT BRAIN   ORDERED BY: JAMARCUS DE LA TORRE     PROCEDURE DATE:  05/08/2024          INTERPRETATION:  Clinical indication: Follow-up CVA.    Multiple axial sections performed from the base skull to vertex without   contrast enhancement. Coronal and sagittal structures performed    This exam is compared prior head CT performed on May 6, 2024    Abnormal areas of low-attenuation involving the inferior left cerebellum,   right posterior temporal/occipital parietal posterior frontalregion.   Similar findings are seen involving the left temporal and left parietal   region. These findings are compatible with areas of acute infarct. The   area of low-attenuation involving the right posterior temporal/parietal,   right frontal and right parietal region does demonstrate hemorrhagic   transformation which was present on prior study. There is localized mass   effect seen consisting of sulcal effacement. No significant shift or   herniation is    Evaluation of the osseous structures with the appropriate window appears   unremarkable.    Right side NG tube seen    The paranasal sinuses mastoid and middle ear regions appear clear.    IMPRESSION: Acute infarcts involving the posterior fossa and bilateral   supratentorial region are seen some of which again demonstrates   hemorrhagic transformation.        ADVANCE DIRECTIVES:  full code   Advanced Care Planning discussion total time spent:

## 2024-05-08 NOTE — PROGRESS NOTE ADULT - ASSESSMENT
Mr. Jung has new acute ischemic stroke on the left side, and left cerebellar, with stable large subacute right MCA infarct with hemorrhagic transformation.  Etiology of stroke: i think it is from cardiac, he has both sides involvement.  twitching on right chin: concerns for NCSE. Recommend EEG.   continue monitor his vital and neuro exam.   cardiac monitoring. will need LIDA and loop recorder if LIDA negative.  restart Aspirin 81 mg daily. then repeat CT head 24-48 hours.  Prognosis is guarded.   I have discussed patient's condition with his son and daughter at bedside. All questions were answered. Patient is in critical condition, and needed close monitoring. prognosis is guarded.        Mr. Jung has new acute ischemic stroke on the left side, and left cerebellar, with stable large subacute right MCA infarct with hemorrhagic transformation.  Again today CT showed acute ischemic stroke inferior left cerebellum, right posterior temporal/occipital parietal posterior frontalregion. Similar findings are seen involving the left temporal and left parietal region.  EEG did not show epileptiform discharges.   Please contact EP, he has cardiac monitoring to see if any arrhythmia.  keep sBP 140-160mmHg  cardiac monitoring. will need LIDA and loop recorder if LIDA negative.  started Aspirin 81 mg daily.   Prognosis is guarded.   I discussed with Dr. Ricco Johns, vascular neurologist, he agreed as above. No Ac until arrhythmia/afib is found and if it is found, will not start AC now as patient has hemorrhagic transformation. antiplatelet monotherapy for now.   OT/PT  I have discussed patient's condition with his son and daughter at bedside. All questions were answered. Patient is in critical condition, and needed close monitoring. prognosis is guarded.

## 2024-05-08 NOTE — PROGRESS NOTE ADULT - SUBJECTIVE AND OBJECTIVE BOX
Neurology consult    YANNA [id number]    HPI:  80y/o M with PMH of PMH of DM HTN CAD with stenting (on daily ASA) with stenting. Pt  presented for sudden onset left sided weakness to [facility] on 4/16/24 . CT brain showed right MCA occlusion( 4/16). Patient given tenecteplase and was transferred to [facility] on 4/16 for thrombectomy. Underwent thrombectomy but symptoms barely improved, Pt still with left sided weakness facial weakness, dysarthria, left neglect,  right gaze preference and left hemianopsia.  Course complicated by anemia requiring dual Pressor support and 1 unit PRBC and PLT  2/2 to left hip hematoma. Worsening in mental status MRI brain done showing large R MCA infarct with hemorrhagic conversion. Eventually stabilized for transfer to [facility] acute rehab.     5/6/24 patient with worsening mental status and lethargy, CT head showing two new acute infarcts of left insula and left cerebellum. Mental status much worse now. Discussed with family would like intubation if needed, transferred to ICU for closer monitoring.  (07 May 2024 00:10)    I spoke with patient's son and daughter at bedside. they report patient is more drowsy on Sunday but able to call his daughter's name and look for her. Then he became more lethargic over the night.    MEDICATIONS    dextrose 10% Bolus 125 milliLiter(s) IV Bolus once  dextrose 5%. 1000 milliLiter(s) IV Continuous <Continuous>  dextrose 5%. 1000 milliLiter(s) IV Continuous <Continuous>  dextrose 50% Injectable 12.5 Gram(s) IV Push once  dextrose 50% Injectable 25 Gram(s) IV Push once  dextrose Oral Gel 15 Gram(s) Oral once PRN  glucagon  Injectable 1 milliGRAM(s) IntraMuscular once  influenza  Vaccine (HIGH DOSE) 0.7 milliLiter(s) IntraMuscular once  insulin lispro (ADMELOG) corrective regimen sliding scale   SubCutaneous every 6 hours  metoprolol tartrate Injectable 2.5 milliGRAM(s) IV Push every 6 hours  pantoprazole  Injectable 40 milliGRAM(s) IV Push daily         Family history: No history of dementia, strokes, or seizures   FAMILY HISTORY:  FH: myocardial infarction (Sibling)      SOCIAL HISTORY -- No history of tobacco or alcohol use     Allergies    No Known Allergies    Intolerances          Weight (kg): 65.3 (05-07 @ 00:00)    Vital Signs Last 24 Hrs  T(C): 36.7 (07 May 2024 05:00), Max: 36.7 (06 May 2024 20:35)  T(F): 98.1 (07 May 2024 05:00), Max: 98.1 (06 May 2024 22:00)  HR: 77 (07 May 2024 09:00) (67 - 78)  BP: 110/59 (07 May 2024 09:00) (93/75 - 126/67)  BP(mean): 76 (07 May 2024 09:00) (72 - 98)  RR: 18 (07 May 2024 09:00) (12 - 19)  SpO2: 100% (07 May 2024 09:00) (95% - 100%)    Parameters below as of 07 May 2024 09:00  Patient On (Oxygen Delivery Method): room air          REVIEW OF SYSTEMS: unable to obtain as patient is lethargic.     On Neurological Examination:    Mental Status - Patient is lethargic.   no spontaneous eye opening.  Roving eyes. No hippus. No nystagmus.  right chin slow twitch rhythmic repetitively. No other abnormal movement elsewhere.   Not following commands  withdraw to pain all extremities but less on the left side.   Babinski positive left.        LABS:  CBC Full  -  ( 07 May 2024 05:50 )  WBC Count : 8.60 K/uL  RBC Count : 3.81 M/uL  Hemoglobin : 11.3 g/dL  Hematocrit : 34.3 %  Platelet Count - Automated : 282 K/uL  Mean Cell Volume : 90.0 fl  Mean Cell Hemoglobin : 29.7 pg  Mean Cell Hemoglobin Concentration : 32.9 gm/dL  Auto Neutrophil # : 6.68 K/uL  Auto Lymphocyte # : 1.15 K/uL  Auto Monocyte # : 0.55 K/uL  Auto Eosinophil # : 0.16 K/uL  Auto Basophil # : 0.03 K/uL  Auto Neutrophil % : 77.7 %  Auto Lymphocyte % : 13.4 %  Auto Monocyte % : 6.4 %  Auto Eosinophil % : 1.9 %  Auto Basophil % : 0.3 %    05-07    134<L>  |  100  |  16  ----------------------------<  153<H>  4.7   |  25  |  0.79    Ca    9.1      07 May 2024 05:50  Phos  3.7     05-07  Mg     1.9     05-07    TPro  7.0  /  Alb  2.5<L>  /  TBili  0.9  /  DBili  x   /  AST  55<H>  /  ALT  39  /  AlkPhos  93  05-07        ACC: [id number] EXAM:  CT BRAIN   ORDERED BY: KAVON HUSSEIN     PROCEDURE DATE:  05/06/2024          INTERPRETATION:  CT HEAD    CLINICAL HISTORY: AMS    TECHNIQUE:  Noncontrast CT.  Axial Acquisition.  Sagittal and coronal reformations.    COMPARISON:  Compared to study dated 5/5/2024    FINDINGS:  HEMORRHAGE/BRAIN PARENCHYMA:  Large subacute right MCA infarct is stable   with stable areas of superimposed acute hemorrhage. Stable regional mass   effect. There is a new small acute infarct withinthe left insula. Slight   increased density within the left MCA bifurcation. Small infarct in the   coronal medial aspect of the left cerebellar hemisphere better seen than   prior exam.  VENTRICLES / SHIFT:  No hydrocephalus. No midline shift.  EXTRA-AXIAL / BASAL CISTERNS:  No extra-axial mass. Basal cisterns   preserved.  CALVARIUM AND EXTRACRANIAL SOFT TISSUES:  No depressed calvarial fracture.  SINUSES, ORBITS, MASTOIDS:  The visualized paranasal sinuses and mastoid   air cells are well aerated.    IMPRESSION:  New small acute infarct involving the left insula. Slight increased   density within the left MCA trifurcation. Correlate with CTA if warranted   and no contraindication  Small acute infarct inferior aspect of the left cerebellar hemisphere   better seen than prior exam.  Stable large subacute right MCA infarct with associated hemorrhage.    Preliminary findings discussed with HOLLEY Bernard at 9:10 PM on 5/6/2024    --- End of Report ---            JOCELYNE TAPIA MD; Attending Radiologist  This document has been electronically signed. May  6 2024  9:12PM                     Neurology consult    YANNA [id number]    HPI:  78y/o M with PMH of PMH of DM HTN CAD with stenting (on daily ASA) with stenting. Pt  presented for sudden onset left sided weakness to [facility] on 4/16/24 . CT brain showed right MCA occlusion( 4/16). Patient given tenecteplase and was transferred to [facility] on 4/16 for thrombectomy. Underwent thrombectomy but symptoms barely improved, Pt still with left sided weakness facial weakness, dysarthria, left neglect,  right gaze preference and left hemianopsia.  Course complicated by anemia requiring dual Pressor support and 1 unit PRBC and PLT  2/2 to left hip hematoma. Worsening in mental status MRI brain done showing large R MCA infarct with hemorrhagic conversion. Eventually stabilized for transfer to [facility] acute rehab.     5/6/24 patient with worsening mental status and lethargy, CT head showing two new acute infarcts of left insula and left cerebellum. Mental status much worse now. Discussed with family would like intubation if needed, transferred to ICU for closer monitoring.  (07 May 2024 00:10)    I spoke with patient's son and daughter at bedside. they report patient is more drowsy on Sunday but able to call his daughter's name and look for her. Then he became more lethargic over the night.      5/8/24  No acute event overnight. He moved around, do not follow commands. Family at bedside.     MEDICATIONS    dextrose 10% Bolus 125 milliLiter(s) IV Bolus once  dextrose 5%. 1000 milliLiter(s) IV Continuous <Continuous>  dextrose 5%. 1000 milliLiter(s) IV Continuous <Continuous>  dextrose 50% Injectable 12.5 Gram(s) IV Push once  dextrose 50% Injectable 25 Gram(s) IV Push once  dextrose Oral Gel 15 Gram(s) Oral once PRN  glucagon  Injectable 1 milliGRAM(s) IntraMuscular once  influenza  Vaccine (HIGH DOSE) 0.7 milliLiter(s) IntraMuscular once  insulin lispro (ADMELOG) corrective regimen sliding scale   SubCutaneous every 6 hours  metoprolol tartrate Injectable 2.5 milliGRAM(s) IV Push every 6 hours  pantoprazole  Injectable 40 milliGRAM(s) IV Push daily         Family history: No history of dementia, strokes, or seizures   FAMILY HISTORY:  FH: myocardial infarction (Sibling)      SOCIAL HISTORY -- No history of tobacco or alcohol use     Allergies    No Known Allergies    Intolerances        ICU Vital Signs Last 24 Hrs  T(C): 37.9 (08 May 2024 10:00), Max: 37.9 (08 May 2024 04:00)  T(F): 100.3 (08 May 2024 10:00), Max: 100.3 (08 May 2024 10:00)  HR: 80 (08 May 2024 11:00) (66 - 98)  BP: 154/83 (08 May 2024 11:00) (98/82 - 154/83)  BP(mean): 101 (08 May 2024 11:00) (74 - 114)  ABP: --  ABP(mean): --  RR: 15 (08 May 2024 11:00) (13 - 20)  SpO2: 98% (08 May 2024 11:00) (94% - 100%)    O2 Parameters below as of 08 May 2024 11:00  Patient On (Oxygen Delivery Method): room air              REVIEW OF SYSTEMS: unable to obtain as patient is lethargic.     On Neurological Examination:    Mental Status - Patient is lethargic.   spontaneous eye opening but not consistent. Do not follow commands.   Roving eyes. No hippus. No nystagmus.  No abnormal movement elsewhere.   withdraw to pain all extremities but less on the left side.   Babinski positive left.        ACC: [id number] EXAM:  CT BRAIN   ORDERED BY: KAVON HUSSEIN     PROCEDURE DATE:  05/06/2024          INTERPRETATION:  CT HEAD    CLINICAL HISTORY: AMS    TECHNIQUE:  Noncontrast CT.  Axial Acquisition.  Sagittal and coronal reformations.    COMPARISON:  Compared to study dated 5/5/2024    FINDINGS:  HEMORRHAGE/BRAIN PARENCHYMA:  Large subacute right MCA infarct is stable   with stable areas of superimposed acute hemorrhage. Stable regional mass   effect. There is a new small acute infarct withinthe left insula. Slight   increased density within the left MCA bifurcation. Small infarct in the   coronal medial aspect of the left cerebellar hemisphere better seen than   prior exam.  VENTRICLES / SHIFT:  No hydrocephalus. No midline shift.  EXTRA-AXIAL / BASAL CISTERNS:  No extra-axial mass. Basal cisterns   preserved.  CALVARIUM AND EXTRACRANIAL SOFT TISSUES:  No depressed calvarial fracture.  SINUSES, ORBITS, MASTOIDS:  The visualized paranasal sinuses and mastoid   air cells are well aerated.    IMPRESSION:  New small acute infarct involving the left insula. Slight increased   density within the left MCA trifurcation. Correlate with CTA if warranted   and no contraindication  Small acute infarct inferior aspect of the left cerebellar hemisphere   better seen than prior exam.  Stable large subacute right MCA infarct with associated hemorrhage.    Preliminary findings discussed with HOLLEY Bernard at 9:10 PM on 5/6/2024    --- End of Report ---            JOCELYNE TAPIA MD; Attending Radiologist  This document has been electronically signed. May  6 2024  9:12PM        ACC: [id number] EXAM:  CT BRAIN   ORDERED BY: JAMARCUS DE LA TORRE     PROCEDURE DATE:  05/08/2024          INTERPRETATION:  Clinical indication: Follow-up CVA.    Multiple axial sections performed from the base skull to vertex without   contrast enhancement. Coronal and sagittal structures performed    This exam is compared prior head CT performed on May 6, 2024    Abnormal areas of low-attenuation involving the inferior left cerebellum,   right posterior temporal/occipital parietal posterior frontalregion.   Similar findings are seen involving the left temporal and left parietal   region. These findings are compatible with areas of acute infarct. The   area of low-attenuation involving the right posterior temporal/parietal,   right frontal and right parietal region does demonstrate hemorrhagic   transformation which was present on prior study. There is localized mass   effect seen consisting of sulcal effacement. No significant shift or   herniation is    Evaluation of the osseous structures with the appropriate window appears   unremarkable.    Right side NG tube seen    The paranasal sinuses mastoid and middle ear regions appear clear.    IMPRESSION: Acute infarcts involving the posterior fossa and bilateral   supratentorial region are seen some of which again demonstrates   hemorrhagic transformation.    --- End of Report ---            CALEB PATEL MD; Attending Radiologist  This document has been electronically signed. May  8 2024 11:14AM      EEG REPORT:   EEG Report:  · EEG Report    Date: 5/6/24 23:32 - 5/7/24 02:15  Duration: 2 hr 42 min    [employer]  Technical Description:    EEG performed with limited number of EEG electrodes/channels for expedited completion and evaluation for possible seizures.  The [employer] EEG recording device consists of a 10-electrode headband, an EEG recorder with the Brain Stethoscope and Clarity (auto seizure detection) features, and a cloud portal for continuous seizure monitoring, EEG data storing, and remote EEG reviewing. With the Brain Stethoscope and Clarity features, spot-checking and continuous seizure detection were available.    Interpretation:  PDR (Hz): not discerned  Slowing: generalized irregular delta/theta activity  IEDs:  none   Seizures: none  State changes: Drowsiness with attenuation and slowing of the background activity with decrease in muscle artifact. No stage 2 sleep.  Artifacts: Nearly continuous muscle artifact limits interpretation    Impression:  Within the technical limitations of the reduced electrode recording and artifacts:  Moderate generalized slowing indicative of nonspecific diffuse/multifocal cerebral dysfunction.   No seizures.    Consider correlation with CEEG monitoring for further verification of findings if clinically warranted.    Sandi Branch MD  Attending Physician, [facility] Epilepsy [facility] EEG Reading Room Ph#: [phone]  Epilepsy Answering Service after 5PM and before 8:30AM: Ph#: [phone]          Electronic Signatures:  Sandi Branch)  (Signed 07-May-2024 14:41)  	Authored: EEG REPORT      Last Updated: 07-May-2024 14:41 by Sandi Branch)      EEG  EEG Classification / Summary:  Abnormal routine EEG in the awake, drowsy, and asleep states.   -Right hemispheric focal slowing.  -Mild-moderate diffuse slowing.  -No epileptiform abnormalities are captured.   -Artifact limits interpretation.    Clinical Impression:  -Right hemispheric focal cerebral dysfunction can be structural or functional in etiology.  -Mild-moderate diffuse or multifocal cerebral dysfunction is nonspecific in etiology.  -No epileptiform abnormalities.   -Artifact limits interpretation.        -------------------------------------------------------------------------------------------------------    Sandi Branch MD  Attending Physician, [facility]

## 2024-05-08 NOTE — PROGRESS NOTE ADULT - SUBJECTIVE AND OBJECTIVE BOX
HPI:  78y/o M with PMH of PMH of DM HTN CAD with stenting (on daily ASA) with stenting. Pt  presented for sudden onset left sided weakness to [facility] on 4/16/24 . CT brain showed right MCA occlusion( 4/16). Patient given tenecteplase and was transferred to [facility] on 4/16 for thrombectomy. Underwent thrombectomy but symptoms barely improved, Pt still with left sided weakness facial weakness, dysarthria, left neglect,  right gaze preference and left hemianopsia.  Course complicated by anemia requiring dual Pressor support and 1 unit PRBC and PLT  2/2 to left hip hematoma. Worsening in mental status MRI brain done showing large R MCA infarct with hemorrhagic conversion. Eventually stabilized for transfer to [facility] acute rehab.     5/6/24 patient with worsening mental status and lethargy, CT head showing two new acute infarcts of left insula and left cerebellum. Mental status much worse now. Discussed with family would like intubation if needed, transferred to ICU for closer monitoring.  (07 May 2024 00:10)      24 hr events:        ## ROS:  [ ] unable to obtain  CONSTITUTIONAL: No fever, weight loss, or fatigue  EYES: No eye pain, visual disturbances, or discharge  ENMT:  No difficulty hearing, tinnitus, vertigo; No sinus or throat pain  NECK: No pain or stiffness  RESPIRATORY: No cough, wheezing, chills or hemoptysis; No shortness of breath  CARDIOVASCULAR: No chest pain, palpitations, dizziness, or leg swelling  GASTROINTESTINAL: No abdominal or epigastric pain. No nausea, vomiting, or hematemesis; No diarrhea or constipation. No melena or hematochezia.  GENITOURINARY: No dysuria, frequency, hematuria, or incontinence  NEUROLOGICAL: No headaches, memory loss, loss of strength, numbness, or tremors  SKIN: No itching, burning, rashes, or lesions   LYMPH NODES: No enlarged glands  ENDOCRINE: No heat or cold intolerance; No hair loss  MUSCULOSKELETAL: No joint pain or swelling; No muscle, back, or extremity pain  PSYCHIATRIC: No depression, anxiety, mood swings, or difficulty sleeping  HEME/LYMPH: No easy bruising, or bleeding gums  ALLERGY AND IMMUNOLOGIC: No hives or eczema    ## Labs:  CBC:                        11.5   6.86  )-----------( 287      ( 08 May 2024 05:00 )             35.0     Chem:  05-08    134<L>  |  99  |  12  ----------------------------<  181<H>  4.2   |  25  |  0.66    Ca    9.2      08 May 2024 05:00  Phos  3.0     05-08  Mg     1.7     05-08    TPro  7.0  /  Alb  2.5<L>  /  TBili  0.9  /  DBili  x   /  AST  55<H>  /  ALT  39  /  AlkPhos  93  05-07    Coags:          ## Imaging:    ## Medications:    metoprolol tartrate 12.5 milliGRAM(s) Enteral Tube two times a day      atorvastatin 80 milliGRAM(s) Oral at bedtime  dextrose 50% Injectable 12.5 Gram(s) IV Push once  dextrose 50% Injectable 25 Gram(s) IV Push once  dextrose Oral Gel 15 Gram(s) Oral once PRN  glucagon  Injectable 1 milliGRAM(s) IntraMuscular once  insulin lispro (ADMELOG) corrective regimen sliding scale   SubCutaneous every 6 hours    aspirin  chewable 81 milliGRAM(s) Oral daily    pantoprazole   Suspension 40 milliGRAM(s) Oral daily        ## Vitals:  T(C): 37.9 (05-08-24 @ 04:00), Max: 37.9 (05-08-24 @ 04:00)  HR: 75 (05-08-24 @ 09:00) (66 - 98)  BP: 133/78 (05-08-24 @ 09:00) (98/82 - 145/74)  BP(mean): 95 (05-08-24 @ 09:00) (61 - 114)  RR: 17 (05-08-24 @ 09:00) (12 - 20)  SpO2: 99% (05-08-24 @ 09:00) (94% - 100%)  Wt(kg): --  Vent:   ABG: ABG - ( 06 May 2024 22:00 )  pH, Arterial: 7.47  pH, Blood: x     /  pCO2: 35    /  pO2: 96    / HCO3: 26    / Base Excess: 1.8   /  SaO2: 98.3                  05-07 @ 07:01  -  05-08 @ 07:00  --------------------------------------------------------  IN: 490 mL / OUT: 250 mL / NET: 240 mL          ## P/E:  Gen: lying comfortably in bed in no apparent distress  HEENT: PERRL, EOMI  Resp: CTA B/L no c/r/w  CVS: S1S2 no m/r/g  Abd: soft NT/ND +BS  Ext: no c/c/e  Neuro: A&Ox3    CENTRAL LINE: [ ] YES [ ] NO  LOCATION:   DATE INSERTED:  REMOVE: [ ] YES [ ] NO      [place]: [ ] YES [ ] NO    DATE INSERTED:  REMOVE:  [ ] YES [ ] NO      A-LINE:  [ ] YES [ ] NO  LOCATION:   DATE INSERTED:  REMOVE:  [ ] YES [ ] NO  EXPLAIN:    GLOBAL ISSUE/BEST PRACTICE:  Analgesia:  Sedation:  HOB elevation: yes  Stress ulcer prophylaxis:  VTE prophylaxis:  Oral Care:  Glycemic control:  Nutrition:    CODE STATUS: [ ] full code  [ ] DNR  [ ] DNI  [ ] MOLST  Goals of care discussion: [ ] yes  HPI:  78y/o M with PMH of PMH of DM HTN CAD with stenting (on daily ASA) with stenting. Pt  presented for sudden onset left sided weakness to [facility] on 4/16/24 . CT brain showed right MCA occlusion( 4/16). Patient given tenecteplase and was transferred to [facility] on 4/16 for thrombectomy. Underwent thrombectomy but symptoms barely improved, Pt still with left sided weakness facial weakness, dysarthria, left neglect,  right gaze preference and left hemianopsia.  Course complicated by anemia requiring dual Pressor support and 1 unit PRBC and PLT  2/2 to left hip hematoma. Worsening in mental status MRI brain done showing large R MCA infarct with hemorrhagic conversion. Eventually stabilized for transfer to [facility] acute rehab.     5/6/24 patient with worsening mental status and lethargy, CT head showing two new acute infarcts of left insula and left cerebellum. Mental status much worse now. Discussed with family would like intubation if needed, transferred to ICU for closer monitoring.  (07 May 2024 00:10)      24 hr events:  No acute events overnight.     ## ROS:  unable to obtain    ## Labs:  CBC:                        11.5   6.86  )-----------( 287      ( 08 May 2024 05:00 )             35.0     Chem:  05-08    134<L>  |  99  |  12  ----------------------------<  181<H>  4.2   |  25  |  0.66    Ca    9.2      08 May 2024 05:00  Phos  3.0     05-08  Mg     1.7     05-08    TPro  7.0  /  Alb  2.5<L>  /  TBili  0.9  /  DBili  x   /  AST  55<H>  /  ALT  39  /  AlkPhos  93  05-07    Coags:          ## Imaging:     ## Medications:    metoprolol tartrate 12.5 milliGRAM(s) Enteral Tube two times a day      atorvastatin 80 milliGRAM(s) Oral at bedtime  dextrose 50% Injectable 12.5 Gram(s) IV Push once  dextrose 50% Injectable 25 Gram(s) IV Push once  dextrose Oral Gel 15 Gram(s) Oral once PRN  glucagon  Injectable 1 milliGRAM(s) IntraMuscular once  insulin lispro (ADMELOG) corrective regimen sliding scale   SubCutaneous every 6 hours    aspirin  chewable 81 milliGRAM(s) Oral daily    pantoprazole   Suspension 40 milliGRAM(s) Oral daily        ## Vitals:  T(C): 37.9 (05-08-24 @ 04:00), Max: 37.9 (05-08-24 @ 04:00)  HR: 75 (05-08-24 @ 09:00) (66 - 98)  BP: 133/78 (05-08-24 @ 09:00) (98/82 - 145/74)  BP(mean): 95 (05-08-24 @ 09:00) (61 - 114)  RR: 17 (05-08-24 @ 09:00) (12 - 20)  SpO2: 99% (05-08-24 @ 09:00) (94% - 100%)  Wt(kg): --  Vent:   ABG: ABG - ( 06 May 2024 22:00 )  pH, Arterial: 7.47  pH, Blood: x     /  pCO2: 35    /  pO2: 96    / HCO3: 26    / Base Excess: 1.8   /  SaO2: 98.3                  05-07 @ 07:01  -  05-08 @ 07:00  --------------------------------------------------------  IN: 490 mL / OUT: 250 mL / NET: 240 mL          Physical Examination:  GENERAL:               Alert, nonverbal No acute distress.    HEENT:                  dilated pupil on the left,  rightward gaze R pupil pinpoint but reactive   PULM:                     Bilateral air entry, Clear to auscultation bilaterally, no significant sputum production  CVS:                         S1, S2,  No Murmur  ABD:                        Soft, nondistended, nontender, normoactive bowel sounds,   EXT:                         No edema, nontender, No Cyanosis or Clubbing   Vascular:                Warm Extremities, Normal Capillary refill, Normal Distal Pulses  SKIN:                       Warm and well perfused, no rashes noted.   NEURO:                 arousable, but lethargic, some effort against gravity to BL upper and lower ext,  nonverbal    CODE STATUS: full code

## 2024-05-08 NOTE — PROGRESS NOTE ADULT - CRITICAL CARE ATTENDING COMMENT
Pt seen and examined  d/w Neuro  b/L CVA with acute and chronic CVA  poor mental status    Family requesting transfer to [facility], transfer center has been contacted by resident and not acute transfer required at this time  will need to f/u read for afib monitor if noted afib may need a/c  may need LIDA  cardio consult called  will repeat TTE for now  d/w DTR.
Close neurologic monitoring   Permissive hypertension  Rectal aspirin   EEG monitoring   Seizure and aspiration precautions  Gentle hydration  Non chemical DVT prophylaxis  Palliative care consultation

## 2024-05-08 NOTE — CHART NOTE - NSCHARTNOTEFT_GEN_A_CORE
called transfer center for possible transfer back to [facility] for LIDA  transfer center connected neuro Dr. Johns, Dr Johns says can interpret mcot, if with afib : no need for LIDA    spoke with [facility]: says mcot was placed by NP : Sola Villagran, can contact for report or can contact [facility] at [phone]     teams Chanel, pending reply   gave fax number both BIU and 2S : [phone] and [phone]

## 2024-05-08 NOTE — PROGRESS NOTE ADULT - ASSESSMENT
80y/o M with PMH of PMH of DM HTN CAD with stenting (on daily ASA)  right MCA stroke s/p TNK and thrombectomy 4/16 and eventually sent to [facility] now with worsening mental status on 5/6 and  found to have two new acute infarcts of left insula and left cerebellum. Now in ICU for closer monitoring.       Problem List:  1) acute on chronic CVA  2) AMS  3) DM type 2  4) CAD    Prior CVA now with two new left sided infarcts  Continue neuro checks Q 1 hour  Neurology input appreciated  was not a candidate for TNK given unknown last known well as well as hemorraghic conversion of right stroke  aspiration precautions  NPO, not alert enough for formal bedside swallow eval   s/p NG tube placement  Bedside EEG negative for seizure continue keppra per neuro   IV fluids for hydration per family request  c/w aspirin per neuro   plan to repeat head CT     Son at bedside, updated plan and prognosis, all questions and concerns addressed  Full code, guarded prognosis   Palliative care following

## 2024-05-08 NOTE — PROGRESS NOTE ADULT - ASSESSMENT
A/P 78y/o M with PMH of PMH of DM HTN CAD with stenting (on daily ASA) with stenting. Worsening in mental status MRI brain done  in April  showing large R MCA infarct with hemorrhagic conversion. Eventually stabilized for transfer to [facility] acute rehab.     5/6/24  patient with worsening mental status and lethargy, CT head showing two new acute infarcts of left insula and left cerebellum. Mental status  worse .    transferred to ICU for closer monitoring.      Patient with now bilateral strokes, new small acute infarct involving the left insula. Slight increased density within the left MCA trifurcation concerning for new strokes.   Not given Aspirin and TNK due to evidence of hemorrhage Non chemical DVT prophylaxis with SCD        Assessment/Plans;      acute on chronic CVA   AMS   DM type 2   CAD    pt w/ prior CVA , was in our AR, now with two new left sided infarcts  -was not a candidate for TNK given unknown last known well as well as hemorraghic conversion of right stroke  Continue neuro checks as protocol  Neurology consulted / follows -   input appreciated    cont aspiration precautions  NPO, not alert enough for formal bedside swallow eval   s/p NG tube placement    Bedside EEG negative for seizure   -continue keppra per neuro   -c/w aspirin per neuro     Pt Full code, guarded prognosis       Palliative ;   seen as follow up today , chart reviewed, and pt d/w ccu team today . As per CCU team and neurology  , may need another 24-48 hrs to see how pt does and plan to repeat CT head as per protocol.  Met w/ pt daughter today , wife not here today . Daughter says it is  very difficult for her mom to see pt this way. Explained pt was in AR and was making progress and planning for d/c to home when this second stroke happened.   Per family, this is very difficult for family as they say pt was very alert and able to talk w/ them prior to this event.  daughter was updated by ccu and by neuro today .   Daughter having difficulty accepting this , and is searching for help from other physicians at [facility] , as pts daughter wants pt to be transferred.  Pt was formerly a pt  at [facility] prior to coming to Acute rehab.  Family, say pt wife not ready to make decisions  today re [facility],    Family remains extremely hopeful for any recovery.     Much support given, acknowledged how difficult this must be given he was making some recovery  from first stroke .  Much support given, offered spiritual support, family not accepting  at this time.  Can follow clinical course w/ ccu team , all care per ccu , neuro teams.

## 2024-05-09 ENCOUNTER — RESULT REVIEW (OUTPATIENT)
Age: 79
End: 2024-05-09

## 2024-05-09 LAB
ANION GAP SERPL CALC-SCNC: 9 MMOL/L — SIGNIFICANT CHANGE UP (ref 5–17)
APPEARANCE UR: CLEAR — SIGNIFICANT CHANGE UP
BILIRUB UR-MCNC: NEGATIVE — SIGNIFICANT CHANGE UP
BUN SERPL-MCNC: 10 MG/DL — SIGNIFICANT CHANGE UP (ref 7–23)
CALCIUM SERPL-MCNC: 8.8 MG/DL — SIGNIFICANT CHANGE UP (ref 8.4–10.5)
CHLORIDE SERPL-SCNC: 99 MMOL/L — SIGNIFICANT CHANGE UP (ref 96–108)
CO2 SERPL-SCNC: 27 MMOL/L — SIGNIFICANT CHANGE UP (ref 22–31)
COLOR SPEC: YELLOW — SIGNIFICANT CHANGE UP
CREAT SERPL-MCNC: 0.8 MG/DL — SIGNIFICANT CHANGE UP (ref 0.5–1.3)
DIFF PNL FLD: NEGATIVE — SIGNIFICANT CHANGE UP
EGFR: 91 ML/MIN/1.73M2 — SIGNIFICANT CHANGE UP
GLUCOSE BLDC GLUCOMTR-MCNC: 189 MG/DL — HIGH (ref 70–99)
GLUCOSE BLDC GLUCOMTR-MCNC: 237 MG/DL — HIGH (ref 70–99)
GLUCOSE BLDC GLUCOMTR-MCNC: 240 MG/DL — HIGH (ref 70–99)
GLUCOSE BLDC GLUCOMTR-MCNC: 245 MG/DL — HIGH (ref 70–99)
GLUCOSE BLDC GLUCOMTR-MCNC: 270 MG/DL — HIGH (ref 70–99)
GLUCOSE SERPL-MCNC: 175 MG/DL — HIGH (ref 70–99)
GLUCOSE UR QL: 500 MG/DL
HCT VFR BLD CALC: 37.4 % — LOW (ref 39–50)
HGB BLD-MCNC: 12.2 G/DL — LOW (ref 13–17)
KETONES UR-MCNC: NEGATIVE MG/DL — SIGNIFICANT CHANGE UP
LEUKOCYTE ESTERASE UR-ACNC: NEGATIVE — SIGNIFICANT CHANGE UP
MAGNESIUM SERPL-MCNC: 2.1 MG/DL — SIGNIFICANT CHANGE UP (ref 1.6–2.6)
MCHC RBC-ENTMCNC: 29.5 PG — SIGNIFICANT CHANGE UP (ref 27–34)
MCHC RBC-ENTMCNC: 32.6 GM/DL — SIGNIFICANT CHANGE UP (ref 32–36)
MCV RBC AUTO: 90.3 FL — SIGNIFICANT CHANGE UP (ref 80–100)
NITRITE UR-MCNC: NEGATIVE — SIGNIFICANT CHANGE UP
NRBC # BLD: 0 /100 WBCS — SIGNIFICANT CHANGE UP (ref 0–0)
PH UR: 6.5 — SIGNIFICANT CHANGE UP (ref 5–8)
PHOSPHATE SERPL-MCNC: 3.4 MG/DL — SIGNIFICANT CHANGE UP (ref 2.5–4.5)
PLATELET # BLD AUTO: 280 K/UL — SIGNIFICANT CHANGE UP (ref 150–400)
POTASSIUM SERPL-MCNC: 3.9 MMOL/L — SIGNIFICANT CHANGE UP (ref 3.5–5.3)
POTASSIUM SERPL-SCNC: 3.9 MMOL/L — SIGNIFICANT CHANGE UP (ref 3.5–5.3)
PROT UR-MCNC: 30 MG/DL
RBC # BLD: 4.14 M/UL — LOW (ref 4.2–5.8)
RBC # FLD: 15.2 % — HIGH (ref 10.3–14.5)
SODIUM SERPL-SCNC: 135 MMOL/L — SIGNIFICANT CHANGE UP (ref 135–145)
SP GR SPEC: 1.02 — SIGNIFICANT CHANGE UP (ref 1–1.03)
UROBILINOGEN FLD QL: 2 MG/DL (ref 0.2–1)
WBC # BLD: 8.11 K/UL — SIGNIFICANT CHANGE UP (ref 3.8–10.5)
WBC # FLD AUTO: 8.11 K/UL — SIGNIFICANT CHANGE UP (ref 3.8–10.5)

## 2024-05-09 PROCEDURE — 99222 1ST HOSP IP/OBS MODERATE 55: CPT

## 2024-05-09 PROCEDURE — 99233 SBSQ HOSP IP/OBS HIGH 50: CPT

## 2024-05-09 PROCEDURE — 93308 TTE F-UP OR LMTD: CPT | Mod: 26

## 2024-05-09 PROCEDURE — 70450 CT HEAD/BRAIN W/O DYE: CPT | Mod: 26

## 2024-05-09 RX ORDER — METOPROLOL TARTRATE 50 MG
12.5 TABLET ORAL EVERY 12 HOURS
Refills: 0 | Status: DISCONTINUED | OUTPATIENT
Start: 2024-05-09 | End: 2024-05-13

## 2024-05-09 RX ORDER — CEFEPIME 1 G/1
2000 INJECTION, POWDER, FOR SOLUTION INTRAMUSCULAR; INTRAVENOUS EVERY 8 HOURS
Refills: 0 | Status: DISCONTINUED | OUTPATIENT
Start: 2024-05-10 | End: 2024-05-13

## 2024-05-09 RX ORDER — LANOLIN ALCOHOL/MO/W.PET/CERES
6 CREAM (GRAM) TOPICAL AT BEDTIME
Refills: 0 | Status: DISCONTINUED | OUTPATIENT
Start: 2024-05-09 | End: 2024-05-09

## 2024-05-09 RX ORDER — CHLORHEXIDINE GLUCONATE 213 G/1000ML
1 SOLUTION TOPICAL
Refills: 0 | Status: DISCONTINUED | OUTPATIENT
Start: 2024-05-09 | End: 2024-05-10

## 2024-05-09 RX ORDER — LANOLIN ALCOHOL/MO/W.PET/CERES
6 CREAM (GRAM) TOPICAL AT BEDTIME
Refills: 0 | Status: DISCONTINUED | OUTPATIENT
Start: 2024-05-09 | End: 2024-05-14

## 2024-05-09 RX ORDER — ATORVASTATIN CALCIUM 80 MG/1
80 TABLET, FILM COATED ORAL AT BEDTIME
Refills: 0 | Status: DISCONTINUED | OUTPATIENT
Start: 2024-05-09 | End: 2024-05-13

## 2024-05-09 RX ORDER — CEFEPIME 1 G/1
INJECTION, POWDER, FOR SOLUTION INTRAMUSCULAR; INTRAVENOUS
Refills: 0 | Status: DISCONTINUED | OUTPATIENT
Start: 2024-05-09 | End: 2024-05-13

## 2024-05-09 RX ORDER — POLYETHYLENE GLYCOL 3350 17 G/17G
17 POWDER, FOR SOLUTION ORAL DAILY
Refills: 0 | Status: DISCONTINUED | OUTPATIENT
Start: 2024-05-09 | End: 2024-05-13

## 2024-05-09 RX ORDER — CEFEPIME 1 G/1
2000 INJECTION, POWDER, FOR SOLUTION INTRAMUSCULAR; INTRAVENOUS ONCE
Refills: 0 | Status: COMPLETED | OUTPATIENT
Start: 2024-05-09 | End: 2024-05-09

## 2024-05-09 RX ADMIN — Medication 2: at 17:25

## 2024-05-09 RX ADMIN — Medication 1 DROP(S): at 17:24

## 2024-05-09 RX ADMIN — Medication 2: at 11:29

## 2024-05-09 RX ADMIN — CEFEPIME 100 MILLIGRAM(S): 1 INJECTION, POWDER, FOR SOLUTION INTRAMUSCULAR; INTRAVENOUS at 18:57

## 2024-05-09 RX ADMIN — PANTOPRAZOLE SODIUM 40 MILLIGRAM(S): 20 TABLET, DELAYED RELEASE ORAL at 11:30

## 2024-05-09 RX ADMIN — Medication 650 MILLIGRAM(S): at 01:05

## 2024-05-09 RX ADMIN — Medication 2: at 02:10

## 2024-05-09 RX ADMIN — Medication 6 MILLIGRAM(S): at 03:06

## 2024-05-09 RX ADMIN — Medication 12.5 MILLIGRAM(S): at 17:24

## 2024-05-09 RX ADMIN — POLYETHYLENE GLYCOL 3350 17 GRAM(S): 17 POWDER, FOR SOLUTION ORAL at 12:56

## 2024-05-09 RX ADMIN — Medication 6 MILLIGRAM(S): at 21:03

## 2024-05-09 RX ADMIN — Medication 12.5 MILLIGRAM(S): at 05:17

## 2024-05-09 RX ADMIN — Medication 1: at 05:15

## 2024-05-09 RX ADMIN — Medication 650 MILLIGRAM(S): at 17:33

## 2024-05-09 RX ADMIN — Medication 81 MILLIGRAM(S): at 11:30

## 2024-05-09 RX ADMIN — Medication 3: at 23:12

## 2024-05-09 RX ADMIN — Medication 1 DROP(S): at 05:15

## 2024-05-09 RX ADMIN — CHLORHEXIDINE GLUCONATE 1 APPLICATION(S): 213 SOLUTION TOPICAL at 17:42

## 2024-05-09 RX ADMIN — Medication 650 MILLIGRAM(S): at 18:28

## 2024-05-09 RX ADMIN — ATORVASTATIN CALCIUM 80 MILLIGRAM(S): 80 TABLET, FILM COATED ORAL at 21:03

## 2024-05-09 NOTE — PROGRESS NOTE ADULT - SUBJECTIVE AND OBJECTIVE BOX
s: possible transfer via cardiac standpoint noted    Vital Signs Last 24 Hrs  T(C): 37.6 (09 May 2024 02:00), Max: 38 (08 May 2024 16:00)  T(F): 99.7 (09 May 2024 02:00), Max: 100.4 (08 May 2024 16:00)  HR: 73 (09 May 2024 06:00) (63 - 86)  BP: 120/87 (09 May 2024 06:00) (104/66 - 161/56)  BP(mean): 97 (09 May 2024 06:00) (77 - 103)  RR: 20 (09 May 2024 06:00) (12 - 24)  SpO2: 100% (09 May 2024 06:00) (93% - 100%)    Parameters below as of 08 May 2024 21:00  Patient On (Oxygen Delivery Method): room air    pt is somnolent.  does not follow commands  no abnormal movements  increased tone on left side.   less withdrawal to noxious stimuli on left side.    79 M w new acute ischemic stroke on the left side, and left cerebellar, with stable large subacute right MCA infarct with hemorrhagic transformation.  Again today CT showed acute ischemic stroke inferior left cerebellum, right posterior temporal/occipital parietal posterior frontalregion. Similar findings are seen involving the left temporal and left parietal region.  EEG did not show epileptiform discharges.   Please contact EP, he has cardiac monitoring to see if any arrhythmia.  keep sBP 140-160mmHg  cardiac monitoring. will need LIDA and loop recorder if LIDA negative.  started Aspirin 81 mg daily.   Prognosis is guarded.   Dr. Menard discussed with Dr. Ricco Johns, vascular neurologist, he agreed as above. No Ac until arrhythmia/afib is found and if it is found, will not start AC now as patient has hemorrhagic transformation. antiplatelet monotherapy for now.

## 2024-05-09 NOTE — PROGRESS NOTE ADULT - ASSESSMENT
80y/o M with PMH of PMH of DM HTN CAD with stenting (on daily ASA)  right MCA stroke s/p TNK and thrombectomy 4/16 and eventually sent to [facility] now with worsening mental status on 5/6 and  found to have two new acute infarcts of left insula and left cerebellum. Now in ICU for closer monitoring.       Problem List:  1) acute on chronic CVA  2) AMS  3) DM type 2  4) CAD    Prior CVA now with two new left sided infarcts  neuro check Q2  Neurology input appreciated  was not a candidate for TNK given unknown last known well as well as hemorraghic conversion of right stroke  aspiration precautions  NPO, not alert enough for formal bedside swallow eval   s/p NG tube placement  Bedside EEG negative for seizure continue keppra per neuro   IV fluids for hydration per family request  c/w aspirin per neuro   repeat head CT no changes  mcot record obtained, pending cardiology recommendation about stroke workup. Possible transfer for LIDA      Son at bedside, updated plan and prognosis, all questions and concerns addressed  Full code, guarded prognosis   Palliative care following   78y/o M with PMH of PMH of DM HTN CAD with stenting (on daily ASA)  right MCA stroke s/p TNK and thrombectomy 4/16 and eventually sent to [facility] now with worsening mental status on 5/6 and  found to have two new acute infarcts of left insula and left cerebellum. Now in ICU for closer monitoring.       Problem List:  1) acute on chronic CVA  2) AMS  3) DM type 2  4) CAD    Prior CVA now with two new left sided infarcts  Repeat CT head done - no change toay    change neuro checks to q4 hrs  Neurology input appreciated  was not a candidate for TNK given unknown last known well as well as hemorraghic conversion of right stroke  aspiration precautions  NPO, not alert enough for formal bedside swallow eval   s/p NG tube placement  Bedside EEG negative for seizure continue keppra per neuro   IV fluids for hydration per family request  c/w aspirin per neuro   repeat head CT no changes  mcot record obtained, pending cardiology recommendation about stroke workup. Possible transfer for LIDA      Son at bedside, updated plan and prognosis, all questions and concerns addressed  Full code, guarded prognosis   Palliative care following

## 2024-05-09 NOTE — CONSULT NOTE ADULT - ASSESSMENT
79-year-old man now in ICU status postacute CVA (new acute infarcts of the left insula and left cerebellum).  Status post MCA stroke, thrombolysis and thrombectomy April 16, 2024 at outside hospital.  He had hemorrhagic conversion.  He has significant neurologic deficits.  He had ambulatory heart monitoring placed at outside hospital, interrogation of this device apparently demonstrating sinus rhythm.  No arrhythmias noted.  No arrhythmias have been noted during hospitalization here at [facility].  He has prior cardiac history of CAD, status post prior coronary intervention.    Recommendations  -Continue with telemetry monitoring while in hospital.  -Follow-up repeat echocardiogram that was done earlier today.  -Transesophageal echocardiogram to be considered pending his hospital course and if it would potentially .  -Neurology follow-up appreciated.  -Continue aspirin, high intensity statin and metoprolol.  -Daily EKG please.  -Guarded prognosis   -Discussed with patient's son at bedside and with ICU team.

## 2024-05-09 NOTE — PROGRESS NOTE ADULT - SUBJECTIVE AND OBJECTIVE BOX
HPI:  78y/o M with PMH of PMH of DM HTN CAD with stenting (on daily ASA) with stenting. Pt  presented for sudden onset left sided weakness to [facility] on 24 . CT brain showed right MCA occlusion( ). Patient given tenecteplase and was transferred to [facility] on  for thrombectomy. Underwent thrombectomy but symptoms barely improved, Pt still with left sided weakness facial weakness, dysarthria, left neglect,  right gaze preference and left hemianopsia.  Course complicated by anemia requiring dual Pressor support and 1 unit PRBC and PLT  2/2 to left hip hematoma. Worsening in mental status MRI brain done showing large R MCA infarct with hemorrhagic conversion. Eventually stabilized for transfer to [facility] acute rehab.     24 patient with worsening mental status and lethargy, CT head showing two new acute infarcts of left insula and left cerebellum. Mental status much worse now. Discussed with family would like intubation if needed, transferred to ICU for closer monitoring.  (07 May 2024 00:10)      24 hr events:  mcot record obtained, pending cardiology recommendation about stroke workup. Possible transfer for LIDA    ## ROS:  unable to obtain   ## Labs:  CBC:                        12.2   8.11  )-----------( 280      ( 09 May 2024 06:00 )             37.4     Chem:      135  |  99  |  10  ----------------------------<  175<H>  3.9   |  27  |  0.80    Ca    8.8      09 May 2024 06:00  Phos  3.4     05-09  Mg     2.1     -09      Coags:          ## Imaging:    ## Medications:    metoprolol tartrate 12.5 milliGRAM(s) Enteral Tube every 12 hours      atorvastatin 80 milliGRAM(s) Oral at bedtime  dextrose 50% Injectable 12.5 Gram(s) IV Push once  dextrose 50% Injectable 25 Gram(s) IV Push once  insulin lispro (ADMELOG) corrective regimen sliding scale   SubCutaneous every 6 hours    aspirin  chewable 81 milliGRAM(s) Oral daily    pantoprazole   Suspension 40 milliGRAM(s) Oral daily    acetaminophen   Oral Liquid .. 650 milliGRAM(s) Oral every 6 hours PRN  melatonin 6 milliGRAM(s) Oral at bedtime      ## Vitals:  T(C): 37.6 (24 @ 02:00), Max: 38 (24 @ 16:00)  HR: 85 (24 @ 09:00) (63 - 86)  BP: 137/87 (24 @ 09:00) (104/66 - 161/56)  BP(mean): 96 (24 @ 09:00) (77 - 103)  RR: 19 (24 @ 09:00) (12 - 24)  SpO2: 100% (24 @ 09:00) (93% - 100%)  Wt(kg): --  Vent:   AB-08 @ 07:01  -   @ 07:00  --------------------------------------------------------  IN: 685 mL / OUT: 600 mL / NET: 85 mL        Physical Examination:  GENERAL:               Alert, nonverbal No acute distress.    HEENT:                  dilated pupil on the left,  rightward gaze R pupil pinpoint but reactive   PULM:                     Bilateral air entry, Clear to auscultation bilaterally, no significant sputum production  CVS:                         S1, S2,  No Murmur  ABD:                        Soft, nondistended, nontender, normoactive bowel sounds,   EXT:                         No edema, nontender, No Cyanosis or Clubbing   Vascular:                Warm Extremities, Normal Capillary refill, Normal Distal Pulses  SKIN:                       Warm and well perfused, no rashes noted.   NEURO:                 arousable, but lethargic, some effort against gravity to BL upper and lower ext,  nonverbal

## 2024-05-09 NOTE — CHART NOTE - NSCHARTNOTEFT_GEN_A_CORE
Nutrition Follow Up Note  Hospital Course (Per Electronic Medical Record):   Source: Medical Record [X] Family [X] Nursing Staff [X]     Diet: Glucerna 1.5 @ 25 ml/hr advance q 4 hrs to 75ml/hr x 18 hrs (6a-12p) with 250ml free water q 6 hrs via NGT     Patient receiving Glucerna 1.5 currently infusing @ 45ml/hr no signs of intolerance noted  , EN feeds  advancing to 75ml/hr x 18 hrs , Labs reviewed , insulin coverage noted , Family concerned regarding @ goal rate of 75ml/hr , spoke with sarai's son & addressed his concerns , will follow clinical course . Neuro follow up reviewed ,     Current Weight: (5/7) 143.9/65.3kg , no follow up weight     Pertinent Medications: MEDICATIONS  (STANDING):  aspirin  chewable 81 milliGRAM(s) Oral daily  atorvastatin 80 milliGRAM(s) Oral at bedtime  chlorhexidine 2% Cloths 1 Application(s) Topical <User Schedule>  dextrose 10% Bolus 125 milliLiter(s) IV Bolus once  dextrose 5%. 1000 milliLiter(s) (100 mL/Hr) IV Continuous <Continuous>  dextrose 5%. 1000 milliLiter(s) (50 mL/Hr) IV Continuous <Continuous>  dextrose 50% Injectable 12.5 Gram(s) IV Push once  dextrose 50% Injectable 25 Gram(s) IV Push once  influenza  Vaccine (HIGH DOSE) 0.7 milliLiter(s) IntraMuscular once  insulin lispro (ADMELOG) corrective regimen sliding scale   SubCutaneous every 6 hours  melatonin 6 milliGRAM(s) Oral at bedtime  metoprolol tartrate 12.5 milliGRAM(s) Enteral Tube every 12 hours  pantoprazole   Suspension 40 milliGRAM(s) Oral daily  timolol 0.5% Solution 1 Drop(s) Left EYE two times a day    MEDICATIONS  (PRN):  acetaminophen   Oral Liquid .. 650 milliGRAM(s) Oral every 6 hours PRN Temp greater or equal to 38C (100.4F)      Pertinent Labs:  05-09 Na135 mmol/L Glu 175 mg/dL<H> K+ 3.9 mmol/L Cr  0.80 mg/dL BUN 10 mg/dL 05-09 Phos 3.4 mg/dL 05-07 Alb 2.5 g/dL<L> 05-07 Chol 112 mg/dL LDL --    HDL 48 mg/dL Trig 70 mg/dL  Hgb 12.2g/dl, Hct 37.4%   POCT 189,245,210,145    Skin: IAD    Edema: none    Last BM: fecal incontinence     Estimated Needs:   [X] No Change since Previous Assessment      Previous Nutrition Diagnosis: Severe protein calorie malnutrition     Nutrition Diagnosis is [X] Ongoing & addressed with EN feeds        New Nutrition Diagnosis: [X] Not Applicable      Interventions:   1. monitor advancement of EN feeds & tolerance  2, request follow up weight from nursing        Monitoring & Evaluation: will monitor:  [X] Weights   [X] Follow Up (Per Protocol)  [X] Tolerance to Diet Prescription       RD to follow as per Nutrition protocol  Rosalinda Payne RDN

## 2024-05-10 LAB
-  STAPHYLOCOCCUS EPIDERMIDIS, METHICILLIN RESISTANT: SIGNIFICANT CHANGE UP
ALBUMIN SERPL ELPH-MCNC: 2.4 G/DL — LOW (ref 3.3–5)
ALP SERPL-CCNC: 123 U/L — HIGH (ref 40–120)
ALT FLD-CCNC: 43 U/L — SIGNIFICANT CHANGE UP (ref 10–45)
ANION GAP SERPL CALC-SCNC: 10 MMOL/L — SIGNIFICANT CHANGE UP (ref 5–17)
AST SERPL-CCNC: 57 U/L — HIGH (ref 10–40)
BILIRUB SERPL-MCNC: 0.9 MG/DL — SIGNIFICANT CHANGE UP (ref 0.2–1.2)
BUN SERPL-MCNC: 16 MG/DL — SIGNIFICANT CHANGE UP (ref 7–23)
CALCIUM SERPL-MCNC: 9.1 MG/DL — SIGNIFICANT CHANGE UP (ref 8.4–10.5)
CHLORIDE SERPL-SCNC: 100 MMOL/L — SIGNIFICANT CHANGE UP (ref 96–108)
CO2 SERPL-SCNC: 27 MMOL/L — SIGNIFICANT CHANGE UP (ref 22–31)
CREAT SERPL-MCNC: 0.82 MG/DL — SIGNIFICANT CHANGE UP (ref 0.5–1.3)
EGFR: 90 ML/MIN/1.73M2 — SIGNIFICANT CHANGE UP
GLUCOSE BLDC GLUCOMTR-MCNC: 210 MG/DL — HIGH (ref 70–99)
GLUCOSE BLDC GLUCOMTR-MCNC: 213 MG/DL — HIGH (ref 70–99)
GLUCOSE BLDC GLUCOMTR-MCNC: 217 MG/DL — HIGH (ref 70–99)
GLUCOSE SERPL-MCNC: 213 MG/DL — HIGH (ref 70–99)
GRAM STN FLD: ABNORMAL
HCT VFR BLD CALC: 37.4 % — LOW (ref 39–50)
HGB BLD-MCNC: 12.4 G/DL — LOW (ref 13–17)
MAGNESIUM SERPL-MCNC: 1.8 MG/DL — SIGNIFICANT CHANGE UP (ref 1.6–2.6)
MCHC RBC-ENTMCNC: 29.5 PG — SIGNIFICANT CHANGE UP (ref 27–34)
MCHC RBC-ENTMCNC: 33.2 GM/DL — SIGNIFICANT CHANGE UP (ref 32–36)
MCV RBC AUTO: 89 FL — SIGNIFICANT CHANGE UP (ref 80–100)
METHOD TYPE: SIGNIFICANT CHANGE UP
NRBC # BLD: 0 /100 WBCS — SIGNIFICANT CHANGE UP (ref 0–0)
PHOSPHATE SERPL-MCNC: 3.3 MG/DL — SIGNIFICANT CHANGE UP (ref 2.5–4.5)
PLATELET # BLD AUTO: 258 K/UL — SIGNIFICANT CHANGE UP (ref 150–400)
POTASSIUM SERPL-MCNC: 4.5 MMOL/L — SIGNIFICANT CHANGE UP (ref 3.5–5.3)
POTASSIUM SERPL-SCNC: 4.5 MMOL/L — SIGNIFICANT CHANGE UP (ref 3.5–5.3)
PROT SERPL-MCNC: 7.5 G/DL — SIGNIFICANT CHANGE UP (ref 6–8.3)
RBC # BLD: 4.2 M/UL — SIGNIFICANT CHANGE UP (ref 4.2–5.8)
RBC # FLD: 15.5 % — HIGH (ref 10.3–14.5)
SODIUM SERPL-SCNC: 137 MMOL/L — SIGNIFICANT CHANGE UP (ref 135–145)
SPECIMEN SOURCE: SIGNIFICANT CHANGE UP
WBC # BLD: 8.27 K/UL — SIGNIFICANT CHANGE UP (ref 3.8–10.5)
WBC # FLD AUTO: 8.27 K/UL — SIGNIFICANT CHANGE UP (ref 3.8–10.5)

## 2024-05-10 PROCEDURE — 99497 ADVNCD CARE PLAN 30 MIN: CPT

## 2024-05-10 PROCEDURE — 99498 ADVNCD CARE PLAN ADDL 30 MIN: CPT

## 2024-05-10 PROCEDURE — 70450 CT HEAD/BRAIN W/O DYE: CPT | Mod: 26

## 2024-05-10 PROCEDURE — 71045 X-RAY EXAM CHEST 1 VIEW: CPT | Mod: 26,77

## 2024-05-10 PROCEDURE — 99233 SBSQ HOSP IP/OBS HIGH 50: CPT

## 2024-05-10 PROCEDURE — 99232 SBSQ HOSP IP/OBS MODERATE 35: CPT

## 2024-05-10 PROCEDURE — 71045 X-RAY EXAM CHEST 1 VIEW: CPT | Mod: 26

## 2024-05-10 RX ORDER — LACOSAMIDE 50 MG/1
50 TABLET ORAL EVERY 12 HOURS
Refills: 0 | Status: DISCONTINUED | OUTPATIENT
Start: 2024-05-10 | End: 2024-05-14

## 2024-05-10 RX ORDER — LACOSAMIDE 50 MG/1
50 TABLET ORAL EVERY 12 HOURS
Refills: 0 | Status: DISCONTINUED | OUTPATIENT
Start: 2024-05-10 | End: 2024-05-10

## 2024-05-10 RX ADMIN — Medication 12.5 MILLIGRAM(S): at 05:29

## 2024-05-10 RX ADMIN — Medication 12.5 MILLIGRAM(S): at 19:17

## 2024-05-10 RX ADMIN — Medication 2: at 11:08

## 2024-05-10 RX ADMIN — CHLORHEXIDINE GLUCONATE 1 APPLICATION(S): 213 SOLUTION TOPICAL at 05:28

## 2024-05-10 RX ADMIN — CEFEPIME 100 MILLIGRAM(S): 1 INJECTION, POWDER, FOR SOLUTION INTRAMUSCULAR; INTRAVENOUS at 14:28

## 2024-05-10 RX ADMIN — Medication 2: at 05:29

## 2024-05-10 RX ADMIN — Medication 1 DROP(S): at 19:18

## 2024-05-10 RX ADMIN — PANTOPRAZOLE SODIUM 40 MILLIGRAM(S): 20 TABLET, DELAYED RELEASE ORAL at 11:07

## 2024-05-10 RX ADMIN — Medication 6 MILLIGRAM(S): at 22:34

## 2024-05-10 RX ADMIN — Medication 2: at 19:21

## 2024-05-10 RX ADMIN — POLYETHYLENE GLYCOL 3350 17 GRAM(S): 17 POWDER, FOR SOLUTION ORAL at 11:07

## 2024-05-10 RX ADMIN — CEFEPIME 100 MILLIGRAM(S): 1 INJECTION, POWDER, FOR SOLUTION INTRAMUSCULAR; INTRAVENOUS at 22:34

## 2024-05-10 RX ADMIN — Medication 81 MILLIGRAM(S): at 11:07

## 2024-05-10 RX ADMIN — Medication 1 DROP(S): at 05:28

## 2024-05-10 RX ADMIN — ATORVASTATIN CALCIUM 80 MILLIGRAM(S): 80 TABLET, FILM COATED ORAL at 22:35

## 2024-05-10 RX ADMIN — CEFEPIME 100 MILLIGRAM(S): 1 INJECTION, POWDER, FOR SOLUTION INTRAMUSCULAR; INTRAVENOUS at 05:35

## 2024-05-10 NOTE — PROGRESS NOTE ADULT - ASSESSMENT
A/P 80y/o M with PMH of PMH of DM HTN CAD with stenting (on daily ASA)  right MCA stroke s/p TNK and thrombectomy 4/16 and eventually sent to [facility] now with worsening mental status on 5/6 and  found to have two new acute infarcts of left insula and left cerebellum. Now in ICU for closer monitoring.          5/6: Patient with now bilateral strokes, new small acute infarct involving the left insula. Slight increased density within the left MCA trifurcation concerning for new strokes.   Not given Aspirin and TNK due to evidence of hemorrhage Non chemical DVT prophylaxis with SCD        Assessment/Plans;      acute on chronic CVA   AMS   DM type 2   CAD    pt w/ prior CVA , was in our AR, now with two new left sided infarcts  -was not a candidate for TNK given unknown last known well as well as hemorraghic conversion of right stroke  Continue neuro checks as protocol  Neurology consulted / follows -   input appreciated    cont aspiration precautions  NPO, not alert enough for formal bedside swallow eval   s/p NG tube placement    Bedside EEG negative for seizure   -continue keppra per neuro   -c/w aspirin per neuro  A/P 80y/o M with PMH of PMH of DM HTN CAD with stenting (on daily ASA)  right MCA stroke s/p TNK and thrombectomy 4/16 and eventually sent to [facility] now with worsening mental status on 5/6 and  found to have two new acute infarcts of left insula and left cerebellum. Now in ICU for closer monitoring.          5/6: Patient with now bilateral strokes, new small acute infarct involving the left insula. Slight increased density within the left MCA trifurcation concerning for new strokes.   Not given Aspirin and TNK due to evidence of hemorrhage Non chemical DVT prophylaxis with SCD        Assessment/Plans;      acute on chronic CVA   AMS   DM type 2   CAD    pt w/ prior CVA , was in our [place], now with two new left sided infarcts  -was not a candidate for TNK given unknown last known well as well as hemorraghic conversion of right stroke  Continue neuro checks as protocol  Neurology consulted / follows -   input appreciated    cont aspiration precautions  NPO, not alert enough for formal bedside swallow eval   s/p NG tube placement  - family does not want Peg tube placed     Bedside EEG negative for seizure   -continue keppra per neuro   -c/w aspirin per neuro       Palliative :   as a f/u today , case d/w ccu team today and chart reviewed, pt seen bedside . Pt opens eyes, he is non verbal and npo, he does not follow commands, he is intermittently  restless, is calmed by his family being present.  I  had multiple d/w family t/o day today and a family mtg  - see [facility] note above.   Pt family aware prognosis is poor , and they do not wish to place a Peg tube , they wish to bring him home as this is what he would want , they are in agreement for home hospice services.  and are aware they will need additional HHA help .   I also completed Molst w/ wife today - dnr/dni , no Peg tube , ccu team aware, SW aware , hospice consult placed .  cont supportive care .

## 2024-05-10 NOTE — PROGRESS NOTE ADULT - CONVERSATION DETAILS
Met w/ pt daughter this morning, discussed pt condition, no progress, still non verbal, not following commands, unable to take po . Going for another CT head today . daughter says she has been updated by ccu team and neuro team.  Daughter aware prognosis is poor. We did discuss possible next steps regarding comfort/ hospice services. I explained and reviewed what home hospice can offer as well  as inpatient hospice requirements with her today . We also spoke about having a dnr/dni order in place, daughter expressed concern that she and her family have reservations about having that in place, as they feel pts level of care will decline if that is in place. I re assured her that this is not the case, that all treatments and meds are still given, along w/ oxygen if needed, but med team would not intervene only if heart or breathing stopped, but oxygen and other care is administered.  We then discussed having a family meeting when her mother is available as well. Met w/ pt daughter this morning, discussed pt condition, no progress, still non verbal, not following commands, unable to take po . Going for another CT head today . daughter says she has been updated by ccu team and neuro team.  Daughter aware prognosis is poor. We did discuss possible next steps regarding comfort/ hospice services. I explained and reviewed what home hospice can offer as well  as inpatient hospice requirements with her today . We also spoke about having a dnr/dni order in place, daughter expressed concern that she and her family have reservations about having that in place, as they feel pts level of care will decline if that is in place. I re assured her that this is not the case, that all treatments and meds are still given, along w/ oxygen if needed, but med team would not intervene only if heart or breathing stopped, but oxygen and other care is administered.  We then discussed having a family meeting when her mother is available as well.  Met w/ family for [facility] mtg today , discussed pt present condition and plans for pt going forward, We discussed and reviewed home hospice services, as family says pt wish is to be in his home . I did explain they will  need extra HHA help for pt and family says they will  care for pt w/ help of extended family, friends , and neighbors . I also explained that pts ng tube will need to be removed prior to pt going home . I did discuss option of inpatient hospice services, but family was not interested as they wish to take him home.   we also revisited discussion regarding dnr/dni , family and wife in agreement , met w/ wife this afternoon and completed Molst form for Dnr/Dni  - molst completed and in chart .  Med team aware

## 2024-05-10 NOTE — PROGRESS NOTE ADULT - SUBJECTIVE AND OBJECTIVE BOX
HPI:  80y/o M with PMH of PMH of DM HTN CAD with stenting (on daily ASA) with stenting. Pt  presented for sudden onset left sided weakness to [facility] on 24 . CT brain showed right MCA occlusion( ). Patient given tenecteplase and was transferred to [facility] on  for thrombectomy. Underwent thrombectomy but symptoms barely improved, Pt still with left sided weakness facial weakness, dysarthria, left neglect,  right gaze preference and left hemianopsia.  Course complicated by anemia requiring dual Pressor support and 1 unit PRBC and PLT  2/2 to left hip hematoma. Worsening in mental status MRI brain done showing large R MCA infarct with hemorrhagic conversion. Eventually stabilized for transfer to [facility] acute rehab.     24 patient with worsening mental status and lethargy, CT head showing two new acute infarcts of left insula and left cerebellum. Mental status much worse now. Discussed with family would like intubation if needed, transferred to ICU for closer monitoring.  (07 May 2024 00:10)      24 hr events:  No acute event overnight     ## ROS:  unable to obtain     ## Labs:  CBC:                        12.4   8.27  )-----------( 258      ( 10 May 2024 05:00 )             37.4     Chem:  05-10    137  |  100  |  16  ----------------------------<  213<H>  4.5   |  27  |  0.82    Ca    9.1      10 May 2024 05:00  Phos  3.3     05-10  Mg     1.8     05-10    TPro  7.5  /  Alb  2.4<L>  /  TBili  0.9  /  DBili  x   /  AST  57<H>  /  ALT  43  /  AlkPhos  123<H>  05-10      ## Medications:  cefepime   IVPB      cefepime   IVPB 2000 milliGRAM(s) IV Intermittent every 8 hours    metoprolol tartrate 12.5 milliGRAM(s) Enteral Tube every 12 hours      atorvastatin 80 milliGRAM(s) Oral at bedtime  dextrose 50% Injectable 25 Gram(s) IV Push once  dextrose 50% Injectable 12.5 Gram(s) IV Push once  insulin lispro (ADMELOG) corrective regimen sliding scale   SubCutaneous every 6 hours    aspirin  chewable 81 milliGRAM(s) Oral daily    pantoprazole   Suspension 40 milliGRAM(s) Oral daily  polyethylene glycol 3350 17 Gram(s) Oral daily    acetaminophen   Oral Liquid .. 650 milliGRAM(s) Oral every 6 hours PRN  lacosamide Solution 50 milliGRAM(s) Oral every 12 hours  melatonin 6 milliGRAM(s) Oral at bedtime      ## Vitals:  T(C): 37.3 (05-10-24 @ 05:00), Max: 38.9 (24 @ 17:00)  HR: 77 (05-10-24 @ 08:00) (67 - 93)  BP: 128/80 (05-10-24 @ 08:00) (111/69 - 140/85)  BP(mean): 94 (05-10-24 @ 08:00) (79 - 99)  RR: 18 (05-10-24 @ 08:00) (11 - 22)  SpO2: 94% (05-10-24 @ 08:00) (92% - 100%)  Wt(kg): --  Vent:   AB-09 @ 07:01  -  05-10 @ 07:00  --------------------------------------------------------  IN: 1870 mL / OUT: 0 mL / NET: 1870 mL          ## P/E:  GENERAL:               Alert, nonverbal No acute distress.    HEENT:                  dilated pupil on the left,  rightward gaze R pupil pinpoint but reactive   PULM:                     Bilateral air entry, Clear to auscultation bilaterally, no significant sputum production  CVS:                         S1, S2,  No Murmur  ABD:                        Soft, nondistended, nontender, normoactive bowel sounds,   EXT:                         No edema, nontender, No Cyanosis or Clubbing   Vascular:                Warm Extremities, Normal Capillary refill, Normal Distal Pulses  SKIN:                       Warm and well perfused, no rashes noted.   NEURO:                 arousable, but lethargic, some effort against gravity to BL upper and lower ext,  nonverbal

## 2024-05-10 NOTE — PROGRESS NOTE ADULT - ASSESSMENT
78y/o M with PMH of PMH of DM HTN CAD with stenting (on daily ASA)  right MCA stroke s/p TNK and thrombectomy 4/16 and eventually sent to [facility] now with worsening mental status on 5/6 and  found to have two new acute infarcts of left insula and left cerebellum. Now in ICU for closer monitoring.       Problem List:  1) acute on chronic CVA  2) AMS  3) DM type 2  4) CAD    Prior CVA now with two new left sided infarcts  Repeat CT head with no changes  neuro checks q4 hrs  Neurology input appreciated  was not a candidate for TNK given unknown last known well as well as hemorraghic conversion of right stroke  aspiration precautions  NPO, not alert enough for formal bedside swallow eval   decrease tube feed for aspiration precaution   Bedside EEG negative for seizure, vimpat per neuro   c/w aspirin per neuro   TTE : no thrombus/ vegetation seen   cardiology consult appreciated: possible LIDA pending hospital course     repeat CT 5/10 pending read, if no changes, plan to down grade to tele     Daughter at bedside, updated plan and prognosis, all questions and concerns addressed, daughter asked about hospice arrangement, palliative care team following   Full code, guarded prognosis

## 2024-05-10 NOTE — PROGRESS NOTE ADULT - SUBJECTIVE AND OBJECTIVE BOX
s: daughter by bedside    Vital Signs Last 24 Hrs  T(C): 37.3 (10 May 2024 05:00), Max: 38.9 (09 May 2024 17:00)  T(F): 99.2 (10 May 2024 05:00), Max: 102 (09 May 2024 17:00)  HR: 77 (10 May 2024 08:00) (67 - 93)  BP: 128/80 (10 May 2024 08:00) (111/69 - 140/85)  BP(mean): 94 (10 May 2024 08:00) (79 - 99)  RR: 18 (10 May 2024 08:00) (11 - 22)  SpO2: 94% (10 May 2024 08:00) (92% - 100%)    Parameters below as of 10 May 2024 08:00  Patient On (Oxygen Delivery Method): nasal cannula  O2 Flow (L/min): 2    pt moves spontaneously.  not following commands  less withdrawal to noxious stimuli on left side.    79 M w new acute ischemic stroke on the left side, and left cerebellar, with stable large subacute right MCA infarct with hemorrhagic transformation.  Again 5/9 CT showed acute ischemic stroke inferior left cerebellum, right posterior temporal/occipital parietal posterior frontalregion. Similar findings are seen involving the left temporal and left parietal region.  EEG did not show epileptiform discharges.   keep sBP 140-160mmHg  cardiac monitoring. will need LIDA and loop recorder if LIDA negative.  started Aspirin 81 mg daily.   Prognosis is guarded.   Dr. Menard discussed with Dr. Ricco Johns, vascular neurologist, he agreed as above. No Ac until arrhythmia/afib is found and if it is found, will not start AC now as patient has hemorrhagic transformation. antiplatelet monotherapy for now.

## 2024-05-10 NOTE — PROGRESS NOTE ADULT - SUBJECTIVE AND OBJECTIVE BOX
Progress:  sl agitated at time, eyes open, non verbal     Review of Systems: [ Unable to obtain due to poor mentation]    MEDICATIONS  (STANDING):  aspirin  chewable 81 milliGRAM(s) Oral daily  atorvastatin 80 milliGRAM(s) Oral at bedtime  cefepime   IVPB      cefepime   IVPB 2000 milliGRAM(s) IV Intermittent every 8 hours  chlorhexidine 2% Cloths 1 Application(s) Topical <User Schedule>  dextrose 10% Bolus 125 milliLiter(s) IV Bolus once  dextrose 5%. 1000 milliLiter(s) (100 mL/Hr) IV Continuous <Continuous>  dextrose 5%. 1000 milliLiter(s) (50 mL/Hr) IV Continuous <Continuous>  dextrose 50% Injectable 25 Gram(s) IV Push once  dextrose 50% Injectable 12.5 Gram(s) IV Push once  influenza  Vaccine (HIGH DOSE) 0.7 milliLiter(s) IntraMuscular once  insulin lispro (ADMELOG) corrective regimen sliding scale   SubCutaneous every 6 hours  lacosamide Solution 50 milliGRAM(s) Oral every 12 hours  melatonin 6 milliGRAM(s) Oral at bedtime  metoprolol tartrate 12.5 milliGRAM(s) Enteral Tube every 12 hours  pantoprazole   Suspension 40 milliGRAM(s) Oral daily  polyethylene glycol 3350 17 Gram(s) Oral daily  timolol 0.5% Solution 1 Drop(s) Left EYE two times a day    MEDICATIONS  (PRN):  acetaminophen   Oral Liquid .. 650 milliGRAM(s) Oral every 6 hours PRN Temp greater or equal to 38C (100.4F)      PHYSICAL EXAM:  Vital Signs Last 24 Hrs  T(C): 37.3 (10 May 2024 10:00), Max: 38.9 (09 May 2024 17:00)  T(F): 99.1 (10 May 2024 10:00), Max: 102 (09 May 2024 17:00)  HR: 71 (10 May 2024 11:00) (67 - 93)  BP: 116/75 (10 May 2024 11:00) (111/69 - 140/85)  BP(mean): 88 (10 May 2024 11:00) (79 - 99)  RR: 13 (10 May 2024 11:00) (11 - 22)  SpO2: 100% (10 May 2024 11:00) (92% - 100%)    Parameters below as of 10 May 2024 11:00  Patient On (Oxygen Delivery Method): nasal cannula  O2 Flow (L/min): 2    General: alert, eyes open, non verbal not following commands        HEENT: n/c, a/t n/c , lips dry   , Ng   Lungs: ess cl resp non labored    CV: normal  rate  GI: nml, soft     : normal /talbert /cc    Musculoskeletal: weakened    Skin: pale, w/d     Neuro:  deficits , non verbal, not following commands   Oral intake ability:  unable   Diet: NPO, Ng feeds     LABS:                          12.4   8.27  )-----------( 258      ( 10 May 2024 05:00 )             37.4     05-10    137  |  100  |  16  ----------------------------<  213<H>  4.5   |  27  |  0.82    Ca    9.1      10 May 2024 05:00  Phos  3.3     05-10  Mg     1.8     05-10    TPro  7.5  /  Alb  2.4<L>  /  TBili  0.9  /  DBili  x   /  AST  57<H>  /  ALT  43  /  AlkPhos  123<H>  05-10    Urinalysis Basic - ( 10 May 2024 05:00 )    Color: x / Appearance: x / SG: x / pH: x  Gluc: 213 mg/dL / Ketone: x  / Bili: x / Urobili: x   Blood: x / Protein: x / Nitrite: x   Leuk Esterase: x / RBC: x / WBC x   Sq Epi: x / Non Sq Epi: x / Bacteria: x        RADIOLOGY & ADDITIONAL STUDIES: < from: CT Head No Cont (05.10.24 @ 10:32) >  ACC: [id number] EXAM:  CT BRAIN   ORDERED BY: JAMARCUS DE LA TORRE     PROCEDURE DATE:  05/10/2024          INTERPRETATION:  CT HEAD    CLINICAL HISTORY: eval cva/stroke    TECHNIQUE:  Noncontrast CT.  Axial Acquisition.  Sagittal and coronal reformations.    COMPARISON:  Exam is compared to prior study of 5/9/2024    FINDINGS:  Acute infarcts within the bilateral MCA territories appear stable. Stable   associated cortical hemorrhage on the right. Stable regional mass effect   without midline shift orventricular effacement. Preserved basal   cisterns. Stable small acute inferior left cerebellar infarct.    There is no hydrocephalus. No subdural or epidural collection.    Nasogastric tube courses through the right nasal cavity. Visualized   paranasal sinuses and mastoid air cells are well aerated.    IMPRESSION:  Stable infarcts and associated hemorrhage.  No midline shift or hydrocephalus.        ADVANCE DIRECTIVES:  full code   Advanced Care Planning discussion total time spent:

## 2024-05-11 LAB
ALBUMIN SERPL ELPH-MCNC: 2.5 G/DL — LOW (ref 3.3–5)
ALP SERPL-CCNC: 117 U/L — SIGNIFICANT CHANGE UP (ref 40–120)
ALT FLD-CCNC: 47 U/L — HIGH (ref 10–45)
ANION GAP SERPL CALC-SCNC: 12 MMOL/L — SIGNIFICANT CHANGE UP (ref 5–17)
AST SERPL-CCNC: 67 U/L — HIGH (ref 10–40)
BILIRUB SERPL-MCNC: 0.9 MG/DL — SIGNIFICANT CHANGE UP (ref 0.2–1.2)
BUN SERPL-MCNC: 19 MG/DL — SIGNIFICANT CHANGE UP (ref 7–23)
CALCIUM SERPL-MCNC: 9.2 MG/DL — SIGNIFICANT CHANGE UP (ref 8.4–10.5)
CHLORIDE SERPL-SCNC: 99 MMOL/L — SIGNIFICANT CHANGE UP (ref 96–108)
CO2 SERPL-SCNC: 25 MMOL/L — SIGNIFICANT CHANGE UP (ref 22–31)
CREAT SERPL-MCNC: 0.96 MG/DL — SIGNIFICANT CHANGE UP (ref 0.5–1.3)
CULTURE RESULTS: ABNORMAL
EGFR: 81 ML/MIN/1.73M2 — SIGNIFICANT CHANGE UP
GLUCOSE BLDC GLUCOMTR-MCNC: 210 MG/DL — HIGH (ref 70–99)
GLUCOSE BLDC GLUCOMTR-MCNC: 250 MG/DL — HIGH (ref 70–99)
GLUCOSE BLDC GLUCOMTR-MCNC: 256 MG/DL — HIGH (ref 70–99)
GLUCOSE BLDC GLUCOMTR-MCNC: 307 MG/DL — HIGH (ref 70–99)
GLUCOSE SERPL-MCNC: 254 MG/DL — HIGH (ref 70–99)
HCT VFR BLD CALC: 38 % — LOW (ref 39–50)
HGB BLD-MCNC: 12.6 G/DL — LOW (ref 13–17)
MAGNESIUM SERPL-MCNC: 1.9 MG/DL — SIGNIFICANT CHANGE UP (ref 1.6–2.6)
MCHC RBC-ENTMCNC: 29.4 PG — SIGNIFICANT CHANGE UP (ref 27–34)
MCHC RBC-ENTMCNC: 33.2 GM/DL — SIGNIFICANT CHANGE UP (ref 32–36)
MCV RBC AUTO: 88.8 FL — SIGNIFICANT CHANGE UP (ref 80–100)
NRBC # BLD: 0 /100 WBCS — SIGNIFICANT CHANGE UP (ref 0–0)
ORGANISM # SPEC MICROSCOPIC CNT: ABNORMAL
ORGANISM # SPEC MICROSCOPIC CNT: SIGNIFICANT CHANGE UP
PHOSPHATE SERPL-MCNC: 3.8 MG/DL — SIGNIFICANT CHANGE UP (ref 2.5–4.5)
PLATELET # BLD AUTO: 241 K/UL — SIGNIFICANT CHANGE UP (ref 150–400)
POTASSIUM SERPL-MCNC: 4.9 MMOL/L — SIGNIFICANT CHANGE UP (ref 3.5–5.3)
POTASSIUM SERPL-SCNC: 4.9 MMOL/L — SIGNIFICANT CHANGE UP (ref 3.5–5.3)
PROT SERPL-MCNC: 7.7 G/DL — SIGNIFICANT CHANGE UP (ref 6–8.3)
RBC # BLD: 4.28 M/UL — SIGNIFICANT CHANGE UP (ref 4.2–5.8)
RBC # FLD: 15.3 % — HIGH (ref 10.3–14.5)
SODIUM SERPL-SCNC: 136 MMOL/L — SIGNIFICANT CHANGE UP (ref 135–145)
SPECIMEN SOURCE: SIGNIFICANT CHANGE UP
WBC # BLD: 8.98 K/UL — SIGNIFICANT CHANGE UP (ref 3.8–10.5)
WBC # FLD AUTO: 8.98 K/UL — SIGNIFICANT CHANGE UP (ref 3.8–10.5)

## 2024-05-11 PROCEDURE — 99233 SBSQ HOSP IP/OBS HIGH 50: CPT | Mod: GC

## 2024-05-11 PROCEDURE — 71045 X-RAY EXAM CHEST 1 VIEW: CPT | Mod: 26

## 2024-05-11 RX ORDER — INSULIN GLARGINE 100 [IU]/ML
10 INJECTION, SOLUTION SUBCUTANEOUS AT BEDTIME
Refills: 0 | Status: DISCONTINUED | OUTPATIENT
Start: 2024-05-11 | End: 2024-05-13

## 2024-05-11 RX ADMIN — Medication 3: at 13:03

## 2024-05-11 RX ADMIN — Medication 12.5 MILLIGRAM(S): at 05:39

## 2024-05-11 RX ADMIN — Medication 1 DROP(S): at 05:40

## 2024-05-11 RX ADMIN — Medication 12.5 MILLIGRAM(S): at 18:35

## 2024-05-11 RX ADMIN — CEFEPIME 100 MILLIGRAM(S): 1 INJECTION, POWDER, FOR SOLUTION INTRAMUSCULAR; INTRAVENOUS at 17:34

## 2024-05-11 RX ADMIN — Medication 2: at 18:33

## 2024-05-11 RX ADMIN — Medication 2: at 01:20

## 2024-05-11 RX ADMIN — PANTOPRAZOLE SODIUM 40 MILLIGRAM(S): 20 TABLET, DELAYED RELEASE ORAL at 18:32

## 2024-05-11 RX ADMIN — POLYETHYLENE GLYCOL 3350 17 GRAM(S): 17 POWDER, FOR SOLUTION ORAL at 18:32

## 2024-05-11 RX ADMIN — Medication 4: at 08:10

## 2024-05-11 RX ADMIN — ATORVASTATIN CALCIUM 80 MILLIGRAM(S): 80 TABLET, FILM COATED ORAL at 22:35

## 2024-05-11 RX ADMIN — CEFEPIME 100 MILLIGRAM(S): 1 INJECTION, POWDER, FOR SOLUTION INTRAMUSCULAR; INTRAVENOUS at 22:35

## 2024-05-11 RX ADMIN — CEFEPIME 100 MILLIGRAM(S): 1 INJECTION, POWDER, FOR SOLUTION INTRAMUSCULAR; INTRAVENOUS at 05:40

## 2024-05-11 RX ADMIN — Medication 81 MILLIGRAM(S): at 18:32

## 2024-05-11 RX ADMIN — INSULIN GLARGINE 10 UNIT(S): 100 INJECTION, SOLUTION SUBCUTANEOUS at 22:36

## 2024-05-11 RX ADMIN — Medication 6 MILLIGRAM(S): at 22:36

## 2024-05-11 RX ADMIN — Medication 1 DROP(S): at 18:34

## 2024-05-11 NOTE — PROGRESS NOTE ADULT - ASSESSMENT
78y/o M with PMH of PMH of DM HTN CAD with stenting (on daily ASA) with stenting. Pt  presented for sudden onset left sided weakness to [facility] on 4/16/24 . CT brain showed right MCA occlusion( 4/16). Patient given tenecteplase and was transferred to [facility] on 4/16 for thrombectomy. Underwent thrombectomy but symptoms barely improved, Pt still with left sided weakness facial weakness, dysarthria, left neglect,  right gaze preference and left hemianopsia.  Course complicated by anemia requiring dual Pressor support and 1 unit PRBC and PLT  2/2 to left hip hematoma. Worsening in mental status MRI brain done showing large R MCA infarct with hemorrhagic conversion. Eventually stabilized for transfer to [facility] acute rehab.     5/6/24 patient with worsening mental status and lethargy, CT head showing two new acute infarcts of left insula and left cerebellum. Mental status much worse now. Admitted to ICU for closer monitoring, and was downgraded to medicine on 5/10/2024.       #Acute on Chronic CVA  - new acute ischemic stroke on the left side, and left cerebellar, with stable large subacute right MCA infarct with hemorrhagic transformation.  - neuro checks q4hs   - EEG w/o seizures but with nonspecific diffuse/multifocal cerebral dysfunction.   - tube feeds, pending official read of NGT before feeds resume (per family request)  - aspiration precautions   - family does not desire PEG, pt poor candidate  - c/w asa as per neuro   - family desires second opinion from neurologist, Dr. Kuhn will see patient in the AM  - cardiology recs appreciated continue asa, statin, metoprolol, daily EKGs, consider LIDA and loop recorder if LIDA negative; however, prognosis gaurded; avoid AC  - hospice referral pending     #severe protein calorie malnutrition   -NPO with tube feeds  -dietician following     #positive blood culture  - 1 bottle growing staph epidermidis  - questionable contaminant   - currently on cefepime   - ID consult for abx adjustment     #T2DM  - hold oral meds  - ISS, add lantus 10 U, uptitirate as needed       Code status DnR/DNI; palliative care following   Discussed with Son, brother, and daughter at bedside   Prognosis guarded

## 2024-05-11 NOTE — PROGRESS NOTE ADULT - ASSESSMENT
## P/E:  GENERAL:               Alert, nonverbal No acute distress.    HEENT:                  dilated pupil on the left - post sx ,  R pupil pinpoint but reactive   PULM:                    Course Bilateral air entry, Clear to auscultation bilaterally, no significant sputum production  CVS:                         S1, S2,  No Murmur  ABD:                        Soft, nondistended, nontender, normoactive bowel sounds,   EXT:                         No edema, nontender, No Cyanosis or Clubbing   Vascular:                Warm Extremities, Normal Capillary refill, Normal Distal Pulses  SKIN:                       Warm and well perfused, no rashes noted.   NEURO:                 arousable, but lethargic, some effort against gravity to BL upper and lower ext,  nonverbal    Assessment  79y/o M with PMH of PMH of DM HTN CAD with stenting (on daily ASA)  right MCA stroke s/p TNK and thrombectomy 4/16 and eventually sent to Jonathon Cove Rehab now with worsening mental status on 5/6 and  found to have two new acute infarcts of left insula and left cerebellum. Now in ICU for closer monitoring.       Problem List:  1) acute on chronic CVA  2) AMS  3) DM type 2  4) CAD    Plan  neuro checks q4 hrs  Neurology F/u, family questioning need for vimpat    aspiration precautions  NPO, not alert enough for formal bedside swallow eval   Aspiration precautions, cxr yesterday no infilatrate  NGT feeding  family unsure of long term tube feeding    c/w aspirin per neuro     Continue care on medical floor  d/w family  reconsult ICU as needed

## 2024-05-11 NOTE — PROGRESS NOTE ADULT - SUBJECTIVE AND OBJECTIVE BOX
Patient is a 79y old  Male who presents with a chief complaint of Acute CVA (11 May 2024 12:05)      INTERVAL HPI/OVERNIGHT EVENTS: Patient seen and examined at bedside. No overnight events. Son at bedside. Pt Awake, grunting, restless    0.4F)          Vital Signs Last 24 Hrs  T(C): 36.9 (11 May 2024 11:04), Max: 37.5 (10 May 2024 16:00)  T(F): 98.4 (11 May 2024 11:04), Max: 99.5 (10 May 2024 16:00)  HR: 74 (11 May 2024 11:04) (64 - 77)  BP: 119/81 (11 May 2024 11:04) (107/73 - 136/77)  BP(mean): 80 (10 May 2024 16:00) (80 - 96)  RR: 18 (11 May 2024 05:53) (12 - 18)  SpO2: 100% (11 May 2024 11:04) (84% - 100%)    Parameters below as of 11 May 2024 11:04  Patient On (Oxygen Delivery Method): nasal cannula      I&O's Summary    10 May 2024 07:01  -  11 May 2024 07:00  --------------------------------------------------------  IN: 440 mL / OUT: 0 mL / NET: 440 mL          PHYSICAL EXAM:  GENERAL: restless   HEENT:  AT/NC, dry mucous membranes, EOMI, conjunctiva and sclera clear, NG tube in place   CHEST/LUNG:  CTA b/l, no rales, wheezes, or rhonchi,  normal respiratory effort, no intercostal retractions  HEART:  RRR, S1, S2, no murmurs; no pitting edema  ABDOMEN:  BS+, soft, nontender, nondistended; No HSM  MSK/EXTREMITIES: 2+ peripheral pulses, no clubbing or cyanosis  NERVOUS SYSTEM: awake, AAox0, does not follow commands   PSYCH: AAox0      LABS: Personally reviewed  CBC                        12.6   8.98  )-----------( 241      ( 11 May 2024 07:23 )             38.0     CMP  05-11    136  |  99  |  19  ----------------------------<  254  4.9   |  25  |  0.96    Ca    9.2      11 May 2024 07:23  Phos  3.8     05-11  Mg     1.9     05-11    TPro  7.7  /  Alb  2.5  /  TBili  0.9  /  DBili  x   /  AST  67  /  ALT  47  /  AlkPhos  117  05-11 05-07 Chol 112 mg/dL LDL -- HDL 48 mg/dL Trig 70 mg/dL        POCT Blood Glucose.: 256 mg/dL (11 May 2024 13:00)  POCT Blood Glucose.: 307 mg/dL (11 May 2024 08:06)  POCT Blood Glucose.: 250 mg/dL (11 May 2024 01:00)  POCT Blood Glucose.: 213 mg/dL (10 May 2024 19:20)      Urinalysis Basic - ( 11 May 2024 07:23 )    Color: x / Appearance: x / SG: x / pH: x  Gluc: 254 mg/dL / Ketone: x  / Bili: x / Urobili: x   Blood: x / Protein: x / Nitrite: x   Leuk Esterase: x / RBC: x / WBC x   Sq Epi: x / Non Sq Epi: x / Bacteria: x        Culture - Blood (collected 09 May 2024 18:18)  Source: .Blood Blood  Preliminary Report (11 May 2024 01:03):    No growth at 24 hours    Culture - Blood (collected 09 May 2024 18:18)  Source: .Blood Blood  Gram Stain (10 May 2024 19:28):    Growth in anaerobic bottle: Gram Positive Cocci in Clusters  Preliminary Report (10 May 2024 19:28):    Growth in anaerobic bottle: Gram Positive Cocci in Clusters    Direct identification is available within approximately 3-5    hours either by Blood Panel Multiplexed PCR or Direct    MALDI-TOF. Details: [email or web address]  Organism: Blood Culture PCR (10 May 2024 22:44)  Organism: Blood Culture PCR (10 May 2024 22:44)      Method Type: PCR      -  Staphylococcus epidermidis, Methicillin resistant: Detec            Culture - Blood (collected 05-09-24 @ 18:18)  Source: .Blood Blood  Preliminary Report (05-11-24 @ 01:03):    No growth at 24 hours    Culture - Blood (collected 05-09-24 @ 18:18)  Source: .Blood Blood  Gram Stain (05-10-24 @ 19:28):    Growth in anaerobic bottle: Gram Positive Cocci in Clusters  Preliminary Report (05-10-24 @ 19:28):    Growth in anaerobic bottle: Gram Positive Cocci in Clusters    Direct identification is available within approximately 3-5    hours either by Blood Panel Multiplexed PCR or Direct    MALDI-TOF. Details: [email or web address]  Organism: Blood Culture PCR (05-10-24 @ 22:44)      Organism: Blood Culture PCR (05-10-24 @ 22:44)      Method Type: PCR      -  Staphylococcus epidermidis, Methicillin resistant: Detec        RADIOLOGY & ADDITIONAL TESTS: Personally reviewed.     Consultant(s) Notes Reviewed:  [x] YES  [ ] NO   Discussed with SW/ARIADNE, RN      MEDICATIONS  (STANDING):  aspirin  chewable 81 milliGRAM(s) Oral daily  atorvastatin 80 milliGRAM(s) Oral at bedtime  cefepime   IVPB      cefepime   IVPB 2000 milliGRAM(s) IV Intermittent every 8 hours  dextrose 10% Bolus 125 milliLiter(s) IV Bolus once  dextrose 5%. 1000 milliLiter(s) (100 mL/Hr) IV Continuous <Continuous>  dextrose 5%. 1000 milliLiter(s) (50 mL/Hr) IV Continuous <Continuous>  dextrose 50% Injectable 25 Gram(s) IV Push once  dextrose 50% Injectable 12.5 Gram(s) IV Push once  influenza  Vaccine (HIGH DOSE) 0.7 milliLiter(s) IntraMuscular once  insulin lispro (ADMELOG) corrective regimen sliding scale   SubCutaneous every 6 hours  lacosamide Solution 50 milliGRAM(s) Oral every 12 hours  melatonin 6 milliGRAM(s) Oral at bedtime  metoprolol tartrate 12.5 milliGRAM(s) Enteral Tube every 12 hours  pantoprazole   Suspension 40 milliGRAM(s) Oral daily  polyethylene glycol 3350 17 Gram(s) Oral daily  timolol 0.5% Solution 1 Drop(s) Left EYE two times a day    MEDICATIONS  (PRN):  acetaminophen   Oral Liquid .. 650 milliGRAM(s) Oral every 6 hours PRN Temp greater or equal to 38C (100.4F)

## 2024-05-11 NOTE — CHART NOTE - NSCHARTNOTEFT_GEN_A_CORE
Assessment:   Patient seen for:  patient request    Source: [x] medical review, [x] patient, [ ] family member    Factors impacting intake: [ ] none [ ] nausea  [ ] vomiting [ ] diarrhea [ ] constipation  [ ]chewing problems [ ] swallowing issues  [ ] other:     Intake:     Diet Prescription: Diet, NPO with Tube Feed:   Tube Feeding Modality: Nasogastric  Glucerna 1.5 Billy  Total Volume for 24 Hours (mL): 990  Continuous  Starting Tube Feed Rate {mL per Hour}: 55  Increase Tube Feed Rate by (mL): 0     Every 4 hours  Until Goal Tube Feed Rate (mL per Hour): 55  Tube Feed Duration (in Hours): 18  Tube Feed Start Time: 06:00  Tube Feed Stop Time: 23:59  Free Water Flush  Bolus   Total Volume per Flush (mL): 250   Frequency: Every 6 Hours (05-10-24 @ 09:41)      Current Weight: Weight (kg): 65.3 (05-07 @ 00:00)    Weight Change:     Edema: none, as per nursing flowsheet   Skin: WDL     Pertinent Medications: MEDICATIONS  (STANDING):  aspirin  chewable 81 milliGRAM(s) Oral daily  atorvastatin 80 milliGRAM(s) Oral at bedtime  cefepime   IVPB      cefepime   IVPB 2000 milliGRAM(s) IV Intermittent every 8 hours  dextrose 10% Bolus 125 milliLiter(s) IV Bolus once  dextrose 5%. 1000 milliLiter(s) (100 mL/Hr) IV Continuous <Continuous>  dextrose 5%. 1000 milliLiter(s) (50 mL/Hr) IV Continuous <Continuous>  dextrose 50% Injectable 25 Gram(s) IV Push once  dextrose 50% Injectable 12.5 Gram(s) IV Push once  influenza  Vaccine (HIGH DOSE) 0.7 milliLiter(s) IntraMuscular once  insulin lispro (ADMELOG) corrective regimen sliding scale   SubCutaneous every 6 hours  lacosamide Solution 50 milliGRAM(s) Oral every 12 hours  melatonin 6 milliGRAM(s) Oral at bedtime  metoprolol tartrate 12.5 milliGRAM(s) Enteral Tube every 12 hours  pantoprazole   Suspension 40 milliGRAM(s) Oral daily  polyethylene glycol 3350 17 Gram(s) Oral daily  timolol 0.5% Solution 1 Drop(s) Left EYE two times a day    MEDICATIONS  (PRN):  acetaminophen   Oral Liquid .. 650 milliGRAM(s) Oral every 6 hours PRN Temp greater or equal to 38C (100.4F)    Pertinent Labs: 05-11 Na136 mmol/L Glu 254 mg/dL<H> K+ 4.9 mmol/L Cr  0.96 mg/dL BUN 19 mg/dL 05-11 Phos 3.8 mg/dL 05-11 Alb 2.5 g/dL<L> 05-07 Chol 112 mg/dL LDL --    HDL 48 mg/dL Trig 70 mg/dL     CAPILLARY BLOOD GLUCOSE      POCT Blood Glucose.: 256 mg/dL (11 May 2024 13:00)  POCT Blood Glucose.: 307 mg/dL (11 May 2024 08:06)  POCT Blood Glucose.: 250 mg/dL (11 May 2024 01:00)  POCT Blood Glucose.: 213 mg/dL (10 May 2024 19:20)    Estimated Needs:   [x] no change since previous assessment  [ ] recalculated:     Previous Nutrition Diagnosis:   [ ] Inadequate Energy Intake   [ ] Inadequate Oral Intake  [ ] Chewing/Swallowing Difficulties   [ ] Excessive Energy Intake   [ ] Underweight   [ ] Increased Nutrient Needs   [ ] Overweight/Obesity   [ ] Altered GI Function  [ ] Altered Nutrition Labs  [ ] Unintended Weight Loss   [ ] Food & Nutrition Related Knowledge Deficit   [ ] Malnutrition     Nutrition Diagnosis is [x] ongoing  [ ] resolved [ ] not applicable     New Nutrition Diagnosis: [x] not applicable     Interventions:   Recommend  [ ] Change Diet To:  [x] Continue with current diet:   [x] Nutrition Supplement:   [x] Honor pt's food preferences as feasible with prescribed diet.  [x] Obtain and record PO intake and weights daily    Monitoring and Evaluation:   Continue to monitor Nutritional intake, Tolerance to diet prescription, weights, labs, skin integrity.  RD remains available upon request and will follow up per protocol. Assessment:   Patient seen for:  f/u    Source: [x] medical review, [x] patient, [x] RN    Factors impacting intake: [ ] none [ ] nausea  [ ] vomiting [ ] diarrhea [ ] constipation  [ ]chewing problems [ ] swallowing issues  [x] other: NPO    Intake:     Diet Prescription: Diet, NPO with Tube Feed:   Tube Feeding Modality: Nasogastric  Glucerna 1.5 Billy  Total Volume for 24 Hours (mL): 990  Continuous  Starting Tube Feed Rate {mL per Hour}: 55  Increase Tube Feed Rate by (mL): 0     Every 4 hours  Until Goal Tube Feed Rate (mL per Hour): 55  Tube Feed Duration (in Hours): 18  Tube Feed Start Time: 06:00  Tube Feed Stop Time: 23:59  Free Water Flush  Bolus   Total Volume per Flush (mL): 250   Frequency: Every 6 Hours (05-10-24 @ 09:41)      Current Weight: Weight (kg): 65.3 (05-07 @ 00:00)    Weight Change:     Edema: none, as per nursing flowsheet   Skin: WDL     Pertinent Medications: MEDICATIONS  (STANDING):  aspirin  chewable 81 milliGRAM(s) Oral daily  atorvastatin 80 milliGRAM(s) Oral at bedtime  cefepime   IVPB      cefepime   IVPB 2000 milliGRAM(s) IV Intermittent every 8 hours  dextrose 10% Bolus 125 milliLiter(s) IV Bolus once  dextrose 5%. 1000 milliLiter(s) (100 mL/Hr) IV Continuous <Continuous>  dextrose 5%. 1000 milliLiter(s) (50 mL/Hr) IV Continuous <Continuous>  dextrose 50% Injectable 25 Gram(s) IV Push once  dextrose 50% Injectable 12.5 Gram(s) IV Push once  influenza  Vaccine (HIGH DOSE) 0.7 milliLiter(s) IntraMuscular once  insulin lispro (ADMELOG) corrective regimen sliding scale   SubCutaneous every 6 hours  lacosamide Solution 50 milliGRAM(s) Oral every 12 hours  melatonin 6 milliGRAM(s) Oral at bedtime  metoprolol tartrate 12.5 milliGRAM(s) Enteral Tube every 12 hours  pantoprazole   Suspension 40 milliGRAM(s) Oral daily  polyethylene glycol 3350 17 Gram(s) Oral daily  timolol 0.5% Solution 1 Drop(s) Left EYE two times a day    MEDICATIONS  (PRN):  acetaminophen   Oral Liquid .. 650 milliGRAM(s) Oral every 6 hours PRN Temp greater or equal to 38C (100.4F)    Pertinent Labs: 05-11 Na136 mmol/L Glu 254 mg/dL<H> K+ 4.9 mmol/L Cr  0.96 mg/dL BUN 19 mg/dL 05-11 Phos 3.8 mg/dL 05-11 Alb 2.5 g/dL<L> 05-07 Chol 112 mg/dL LDL --    HDL 48 mg/dL Trig 70 mg/dL     CAPILLARY BLOOD GLUCOSE      POCT Blood Glucose.: 256 mg/dL (11 May 2024 13:00)  POCT Blood Glucose.: 307 mg/dL (11 May 2024 08:06)  POCT Blood Glucose.: 250 mg/dL (11 May 2024 01:00)  POCT Blood Glucose.: 213 mg/dL (10 May 2024 19:20)    Estimated Needs:   [x] no change since previous assessment  [ ] recalculated:     Previous Nutrition Diagnosis:   [ ] Inadequate Energy Intake   [ ] Inadequate Oral Intake  [ ] Chewing/Swallowing Difficulties   [ ] Excessive Energy Intake   [ ] Underweight   [ ] Increased Nutrient Needs   [ ] Overweight/Obesity   [ ] Altered GI Function  [ ] Altered Nutrition Labs  [ ] Unintended Weight Loss   [ ] Food & Nutrition Related Knowledge Deficit   [ ] Malnutrition     Nutrition Diagnosis is [x] ongoing  [ ] resolved [ ] not applicable     New Nutrition Diagnosis: [x] not applicable     Interventions:   Recommend  [ ] Change Diet To:  [x] Continue with current diet:   [x] Nutrition Supplement:   [x] Honor pt's food preferences as feasible with prescribed diet.  [x] Obtain and record PO intake and weights daily    Monitoring and Evaluation:   Continue to monitor Nutritional intake, Tolerance to diet prescription, weights, labs, skin integrity.  RD remains available upon request and will follow up per protocol. Assessment:   Patient seen for:  f/u    Source: [x] medical review, [x] patient's daughter, [x] RN    Factors impacting intake: [ ] none [ ] nausea  [ ] vomiting [ ] diarrhea [ ] constipation  [ ]chewing problems [ ] swallowing issues  [x] other: NPO    Intake:     Diet Prescription: Diet, NPO with Tube Feed:   Tube Feeding Modality: Nasogastric  Glucerna 1.5 Billy  Total Volume for 24 Hours (mL): 990  Continuous  Starting Tube Feed Rate {mL per Hour}: 55  Increase Tube Feed Rate by (mL): 0     Every 4 hours  Until Goal Tube Feed Rate (mL per Hour): 55  Tube Feed Duration (in Hours): 18  Tube Feed Start Time: 06:00  Tube Feed Stop Time: 23:59  Free Water Flush  Bolus   Total Volume per Flush (mL): 250   Frequency: Every 6 Hours (05-10-24 @ 09:41)      Current Weight: Weight (kg): 65.3 (05-07 @ 00:00)  143.7 Lbs    Weight Change: no new wts recorded. Requested new weights.     Edema: none, as per nursing flowsheet   Skin: WDL except bruised.    Visited patient at noon. TF was in hold, pt is alert no oriented. As per daughter and RN pt is tolerating TF at full rate without GI distress. Noted last BM 5/06 RN aware, on Miralax since 5/9. Labs 5/11 Glu 254 (H), on Admelog and Lantus. 4/16: A1C 8.2% (H). Discussed w/ RN and daughter high glucose levels may need to be controlled with insuline. Pt is on Glucerna 1.5, formula is appropriate for patient with DM. Water flush 250mL Q 6 hours. Pt is receiving with formula + water flush:  22.7 billy/kg, 1.2 gm pro/kg, 26.8mL water/kg. Mentioned to daughter to consider PEG x patient since nasogastric TF is not suitable for long term.     Pertinent Medications: MEDICATIONS  (STANDING):  aspirin  chewable 81 milliGRAM(s) Oral daily  atorvastatin 80 milliGRAM(s) Oral at bedtime  cefepime   IVPB      cefepime   IVPB 2000 milliGRAM(s) IV Intermittent every 8 hours  dextrose 10% Bolus 125 milliLiter(s) IV Bolus once  dextrose 5%. 1000 milliLiter(s) (100 mL/Hr) IV Continuous <Continuous>  dextrose 5%. 1000 milliLiter(s) (50 mL/Hr) IV Continuous <Continuous>  dextrose 50% Injectable 25 Gram(s) IV Push once  dextrose 50% Injectable 12.5 Gram(s) IV Push once  influenza  Vaccine (HIGH DOSE) 0.7 milliLiter(s) IntraMuscular once  insulin lispro (ADMELOG) corrective regimen sliding scale   SubCutaneous every 6 hours  lacosamide Solution 50 milliGRAM(s) Oral every 12 hours  melatonin 6 milliGRAM(s) Oral at bedtime  metoprolol tartrate 12.5 milliGRAM(s) Enteral Tube every 12 hours  pantoprazole   Suspension 40 milliGRAM(s) Oral daily  polyethylene glycol 3350 17 Gram(s) Oral daily  timolol 0.5% Solution 1 Drop(s) Left EYE two times a day    MEDICATIONS  (PRN):  acetaminophen   Oral Liquid .. 650 milliGRAM(s) Oral every 6 hours PRN Temp greater or equal to 38C (100.4F)    Pertinent Labs: 05-11 Na136 mmol/L Glu 254 mg/dL<H> K+ 4.9 mmol/L Cr  0.96 mg/dL BUN 19 mg/dL 05-11 Phos 3.8 mg/dL 05-11 Alb 2.5 g/dL<L> 05-07 Chol 112 mg/dL LDL --    HDL 48 mg/dL Trig 70 mg/dL     CAPILLARY BLOOD GLUCOSE      POCT Blood Glucose.: 256 mg/dL (11 May 2024 13:00)  POCT Blood Glucose.: 307 mg/dL (11 May 2024 08:06)  POCT Blood Glucose.: 250 mg/dL (11 May 2024 01:00)  POCT Blood Glucose.: 213 mg/dL (10 May 2024 19:20)    Estimated Needs:   [x] no change since previous assessment  [ ] recalculated:     Previous Nutrition Diagnosis:   [ ] Inadequate Energy Intake   [ ] Inadequate Oral Intake  [ ] Chewing/Swallowing Difficulties   [ ] Excessive Energy Intake   [ ] Underweight   [ ] Increased Nutrient Needs   [ ] Overweight/Obesity   [ ] Altered GI Function  [ ] Altered Nutrition Labs  [ ] Unintended Weight Loss   [ ] Food & Nutrition Related Knowledge Deficit   [x] Malnutrition Severe    Nutrition Diagnosis is [x] ongoing  [ ] resolved [ ] not applicable     New Nutrition Diagnosis: [x] not applicable     Interventions:   Recommend  [ ] Change Diet To:  [x] Continue with current diet: Glucerna 1.5 (990mL/day) @ 55mL/hr x 18 hours  [x] Maintain bed 45 degree during and 1 hr after feeding.   [x] Observe tolerance to prescribed formula.   [x] Monitor electrolytes, glucose and weights to adjust feedings if necessary.   [x] Advance diet to PO as per SLP recommendations when medically feasible or consider PEG.   [x] Obtain weights daily.    Monitoring and Evaluation:   Continue to monitor Nutritional intake, Tolerance to diet prescription, weights, labs, skin integrity.  RD remains available upon request and will follow up per protocol.

## 2024-05-11 NOTE — PROGRESS NOTE ADULT - TIME BILLING
care coordination, plan of care discussed with patient face to face, 3E weekend IDR team care coordination, plan of care discussed with patient family at bedside, 3E weekend IDR team

## 2024-05-11 NOTE — PROGRESS NOTE ADULT - SUBJECTIVE AND OBJECTIVE BOX
Follow-up Critical Care Progress Note  Chief Complaint : Cerebrovascular disease    patient seen and examined  lethargic unable to provide history  family bedside updated questions answered       Allergies :No Known Allergies      PAST MEDICAL & SURGICAL HISTORY:  HLD (hyperlipidemia)  T2DM (type 2 diabetes mellitus)  BPH (benign prostatic hyperplasia)  Diabetes mellitus  Hypertension  High cholesterol  CAD (coronary artery disease)  H/O eye surgery  History of cardiac cath times 2  H/O heart artery stent        Medications:  MEDICATIONS  (STANDING):  aspirin  chewable 81 milliGRAM(s) Oral daily  atorvastatin 80 milliGRAM(s) Oral at bedtime  cefepime   IVPB      cefepime   IVPB 2000 milliGRAM(s) IV Intermittent every 8 hours  dextrose 10% Bolus 125 milliLiter(s) IV Bolus once  dextrose 5%. 1000 milliLiter(s) (100 mL/Hr) IV Continuous <Continuous>  dextrose 5%. 1000 milliLiter(s) (50 mL/Hr) IV Continuous <Continuous>  dextrose 50% Injectable 25 Gram(s) IV Push once  dextrose 50% Injectable 12.5 Gram(s) IV Push once  influenza  Vaccine (HIGH DOSE) 0.7 milliLiter(s) IntraMuscular once  insulin lispro (ADMELOG) corrective regimen sliding scale   SubCutaneous every 6 hours  lacosamide Solution 50 milliGRAM(s) Oral every 12 hours  melatonin 6 milliGRAM(s) Oral at bedtime  metoprolol tartrate 12.5 milliGRAM(s) Enteral Tube every 12 hours  pantoprazole   Suspension 40 milliGRAM(s) Oral daily  polyethylene glycol 3350 17 Gram(s) Oral daily  timolol 0.5% Solution 1 Drop(s) Left EYE two times a day    MEDICATIONS  (PRN):  acetaminophen   Oral Liquid .. 650 milliGRAM(s) Oral every 6 hours PRN Temp greater or equal to 38C (100.4F)      Antibiotics History  cefepime   IVPB    , 05-09-24 @ 18:57  cefepime   IVPB 2000 milliGRAM(s) IV Intermittent once, 05-09-24 @ 18:04  cefepime   IVPB 2000 milliGRAM(s) IV Intermittent every 8 hours, 05-10-24 @ 06:00      Heme Medications   aspirin  chewable 81 milliGRAM(s) Oral daily, 05-08-24 @ 08:16      GI Medications  pantoprazole   Suspension 40 milliGRAM(s) Oral daily, 05-08-24 @ 08:28, Routine  polyethylene glycol 3350 17 Gram(s) Oral daily, 05-09-24 @ 11:44, Routine      COVID  04-25-24 @ 23:30  COVID -   NotDetec  07-08-21 @ 08:17  COVID -   NotDetec      COVID Biomarkers    04-18-24 @ 19:00 ESR --  ---  CRP --  ---  DDimer  --   ---   <H>   ---   Ferritin --    04-18-24 @ 10:41 ESR --  ---  CRP --  ---  DDimer  2860<H>   ---      ---   Ferritin --    04-18-24 @ 06:57 ESR --  ---  CRP --  ---  DDimer  --   ---      ---   Ferritin --            Trend Cardiac Enzymes    Trend BNP  08-13-21 @ 21:39   -  1669<H>    Procalcitonin Trend  05-06-24 @ 22:00   -   0.16<H>  04-25-24 @ 06:03   -   0.08    WBC Trend  05-11-24 @ 07:23   -  8.98  05-10-24 @ 05:00   -  8.27  05-09-24 @ 06:00   -  8.11    H/H Trend  05-11-24 @ 07:23   -   12.6<L>/ 38.0<L>  05-10-24 @ 05:00   -   12.4<L>/ 37.4<L>  05-09-24 @ 06:00   -   12.2<L>/ 37.4<L>  05-08-24 @ 05:00   -   11.5<L>/ 35.0<L>  05-07-24 @ 05:50   -   11.3<L>/ 34.3<L>  05-06-24 @ 22:00   -   11.4<L>/ 34.1<L>    Stool Occult Blood    Platelet Trend  05-11-24 @ 07:23   -  241  05-10-24 @ 05:00   -  258  05-09-24 @ 06:00   -  280    Trend Sodium  05-11-24 @ 07:23   -  136  05-10-24 @ 05:00   -  137  05-09-24 @ 06:00   -  135    Trend Potassium  05-11-24 @ 07:23   -  4.9  05-10-24 @ 05:00   -  4.5  05-09-24 @ 06:00   -  3.9    Trend Bun/Cr  05-11-24 @ 07:23  BUN/CR -  19 / 0.96  05-10-24 @ 05:00  BUN/CR -  16 / 0.82  05-09-24 @ 06:00  BUN/CR -  10 / 0.80    Lactic Acid Trend  05-06-24 @ 22:00   -   1.1    ABG Trend  05-06-24 @ 22:00   - 7.47<H>/35/96/98.3<H>  04-18-24 @ 18:50   - 7.46<H>/32<L>/160<H>/98.3<H>  04-18-24 @ 10:07   - 7.42/37/152<H>/99.1<H>  04-17-24 @ 09:15   - 7.35/37/87/97.4  04-17-24 @ 05:06   - 7.31<L>/39/161<H>/98.9<H>  04-17-24 @ 02:48   - 7.35/33<L>/128<H>/98.7<H>  07-08-21 @ 11:00   - 7.38/36/73<L>/94    Trend AST/ALT/ALK Phos/Bili  05-11-24 @ 07:23   67<H>/47<H>/117/0.9  05-10-24 @ 05:00   57<H>/43/123<H>/0.9  05-07-24 @ 05:50   55<H>/39/93/0.9  05-06-24 @ 22:00   65<H>/47<H>/96/1.0  05-06-24 @ 06:45   65<H>/41/88/1.0  05-02-24 @ 05:55   63<H>/61<H>/110/1.8<H>  04-29-24 @ 06:35   75<H>/94<H>/93/2.2<H>  04-26-24 @ 05:00   81<H>/70<H>/70/2.2<H>  04-24-24 @ 06:00   38/39/62/2.4<H>  04-18-24 @ 19:00   --/--/--/0.9  04-18-24 @ 10:41   30/22/62/0.5  04-16-24 @ 19:05   21/19/62/0.3         Albumin Trend  05-11-24 @ 07:23   -   2.5<L>  05-10-24 @ 05:00   -   2.4<L>  05-07-24 @ 05:50   -   2.5<L>  05-06-24 @ 22:00   -   2.6<L>  05-06-24 @ 06:45   -   2.5<L>  05-02-24 @ 05:55   -   2.8<L>      PTT - PT - INR Trend  04-18-24 @ 07:00   -   25.1 - 11.1 - 1.01  04-16-24 @ 19:29   -   24.5 - 11.1 - 1.06  04-16-24 @ 17:16   -   25.1 - 10.6 - 0.89    Glucose Trend  05-11-24 @ 08:06   -  -- -- 307<H>  05-11-24 @ 07:23   -  254<H> -- --  05-11-24 @ 01:00   -  -- -- 250<H>  05-10-24 @ 19:20   -  -- -- 213<H>  05-10-24 @ 11:06   -  -- -- 210<H>  05-10-24 @ 05:26   -  -- -- 217<H>  05-10-24 @ 05:00   -  213<H> -- --  05-09-24 @ 23:11   -  -- -- 270<H>  05-09-24 @ 17:21   -  -- -- 240<H>  05-09-24 @ 11:25   -  -- -- 237<H>    A1C with Estimated Average Glucose Result: 8.2 % *H* [4.0 - 5.6] (04-16-24 @ 22:59)      LABS:                        12.6   8.98  )-----------( 241      ( 11 May 2024 07:23 )             38.0     05-11    136  |  99  |  19  ----------------------------<  254<H>  4.9   |  25  |  0.96    Ca    9.2      11 May 2024 07:23  Phos  3.8     05-11  Mg     1.9     05-11    TPro  7.7  /  Alb  2.5<L>  /  TBili  0.9  /  DBili  x   /  AST  67<H>  /  ALT  47<H>  /  AlkPhos  117  05-11           CULTURES: (if applicable)    Culture - Blood (collected 05-09-24 @ 18:18)  Source: .Blood Blood  Preliminary Report (05-11-24 @ 01:03):    No growth at 24 hours    Culture - Blood (collected 05-09-24 @ 18:18)  Source: .Blood Blood  Gram Stain (05-10-24 @ 19:28):    Growth in anaerobic bottle: Gram Positive Cocci in Clusters  Preliminary Report (05-10-24 @ 19:28):    Growth in anaerobic bottle: Gram Positive Cocci in Clusters    Direct identification is available within approximately 3-5    hours either by Blood Panel Multiplexed PCR or Direct    MALDI-TOF. Details: [email or web address]  Organism: Blood Culture PCR (05-10-24 @ 22:44)  Organism: Blood Culture PCR (05-10-24 @ 22:44)      Method Type: PCR      -  Staphylococcus epidermidis, Methicillin resistant: Detec             VITALS:  T(C): 36.9 (05-11-24 @ 11:04), Max: 37.5 (05-10-24 @ 16:00)  T(F): 98.4 (05-11-24 @ 11:04), Max: 99.5 (05-10-24 @ 16:00)  HR: 74 (05-11-24 @ 11:04) (64 - 86)  BP: 119/81 (05-11-24 @ 11:04) (107/73 - 138/93)  BP(mean): 80 (05-10-24 @ 16:00) (80 - 102)  ABP: --  ABP(mean): --  RR: 18 (05-11-24 @ 05:53) (12 - 18)  SpO2: 100% (05-11-24 @ 11:04) (84% - 100%)  CVP(mm Hg): --  CVP(cm H2O): --    Ins and Outs     05-10-24 @ 07:01  -  05-11-24 @ 07:00  --------------------------------------------------------  IN: 440 mL / OUT: 0 mL / NET: 440 mL                I&O's Detail    10 May 2024 07:01  -  11 May 2024 07:00  --------------------------------------------------------  IN:    Jevity 1.5: 440 mL  Total IN: 440 mL    OUT:  Total OUT: 0 mL    Total NET: 440 mL

## 2024-05-12 LAB
ALBUMIN SERPL ELPH-MCNC: 2.4 G/DL — LOW (ref 3.3–5)
ALP SERPL-CCNC: 124 U/L — HIGH (ref 40–120)
ALT FLD-CCNC: 58 U/L — HIGH (ref 10–45)
ANION GAP SERPL CALC-SCNC: 7 MMOL/L — SIGNIFICANT CHANGE UP (ref 5–17)
AST SERPL-CCNC: 65 U/L — HIGH (ref 10–40)
BILIRUB SERPL-MCNC: 0.8 MG/DL — SIGNIFICANT CHANGE UP (ref 0.2–1.2)
BUN SERPL-MCNC: 18 MG/DL — SIGNIFICANT CHANGE UP (ref 7–23)
CALCIUM SERPL-MCNC: 9.2 MG/DL — SIGNIFICANT CHANGE UP (ref 8.4–10.5)
CHLORIDE SERPL-SCNC: 99 MMOL/L — SIGNIFICANT CHANGE UP (ref 96–108)
CO2 SERPL-SCNC: 28 MMOL/L — SIGNIFICANT CHANGE UP (ref 22–31)
CREAT SERPL-MCNC: 0.87 MG/DL — SIGNIFICANT CHANGE UP (ref 0.5–1.3)
EGFR: 88 ML/MIN/1.73M2 — SIGNIFICANT CHANGE UP
GLUCOSE BLDC GLUCOMTR-MCNC: 215 MG/DL — HIGH (ref 70–99)
GLUCOSE BLDC GLUCOMTR-MCNC: 278 MG/DL — HIGH (ref 70–99)
GLUCOSE BLDC GLUCOMTR-MCNC: 282 MG/DL — HIGH (ref 70–99)
GLUCOSE BLDC GLUCOMTR-MCNC: 300 MG/DL — HIGH (ref 70–99)
GLUCOSE BLDC GLUCOMTR-MCNC: 312 MG/DL — HIGH (ref 70–99)
GLUCOSE SERPL-MCNC: 260 MG/DL — HIGH (ref 70–99)
HCT VFR BLD CALC: 37.6 % — LOW (ref 39–50)
HGB BLD-MCNC: 12.5 G/DL — LOW (ref 13–17)
MCHC RBC-ENTMCNC: 29.2 PG — SIGNIFICANT CHANGE UP (ref 27–34)
MCHC RBC-ENTMCNC: 33.2 GM/DL — SIGNIFICANT CHANGE UP (ref 32–36)
MCV RBC AUTO: 87.9 FL — SIGNIFICANT CHANGE UP (ref 80–100)
NRBC # BLD: 0 /100 WBCS — SIGNIFICANT CHANGE UP (ref 0–0)
PLATELET # BLD AUTO: 254 K/UL — SIGNIFICANT CHANGE UP (ref 150–400)
POTASSIUM SERPL-MCNC: 4.8 MMOL/L — SIGNIFICANT CHANGE UP (ref 3.5–5.3)
POTASSIUM SERPL-SCNC: 4.8 MMOL/L — SIGNIFICANT CHANGE UP (ref 3.5–5.3)
PROT SERPL-MCNC: 7.6 G/DL — SIGNIFICANT CHANGE UP (ref 6–8.3)
RBC # BLD: 4.28 M/UL — SIGNIFICANT CHANGE UP (ref 4.2–5.8)
RBC # FLD: 15.1 % — HIGH (ref 10.3–14.5)
SODIUM SERPL-SCNC: 134 MMOL/L — LOW (ref 135–145)
WBC # BLD: 8.06 K/UL — SIGNIFICANT CHANGE UP (ref 3.8–10.5)
WBC # FLD AUTO: 8.06 K/UL — SIGNIFICANT CHANGE UP (ref 3.8–10.5)

## 2024-05-12 PROCEDURE — 99232 SBSQ HOSP IP/OBS MODERATE 35: CPT

## 2024-05-12 PROCEDURE — 71045 X-RAY EXAM CHEST 1 VIEW: CPT | Mod: 26

## 2024-05-12 PROCEDURE — 99233 SBSQ HOSP IP/OBS HIGH 50: CPT | Mod: GC

## 2024-05-12 RX ORDER — DEXTROSE 50 % IN WATER 50 %
15 SYRINGE (ML) INTRAVENOUS ONCE
Refills: 0 | Status: DISCONTINUED | OUTPATIENT
Start: 2024-05-12 | End: 2024-05-14

## 2024-05-12 RX ORDER — INSULIN LISPRO 100/ML
4 VIAL (ML) SUBCUTANEOUS EVERY 6 HOURS
Refills: 0 | Status: DISCONTINUED | OUTPATIENT
Start: 2024-05-12 | End: 2024-05-13

## 2024-05-12 RX ADMIN — INSULIN GLARGINE 10 UNIT(S): 100 INJECTION, SOLUTION SUBCUTANEOUS at 23:30

## 2024-05-12 RX ADMIN — Medication 2: at 06:30

## 2024-05-12 RX ADMIN — LACOSAMIDE 50 MILLIGRAM(S): 50 TABLET ORAL at 18:45

## 2024-05-12 RX ADMIN — Medication 12.5 MILLIGRAM(S): at 18:45

## 2024-05-12 RX ADMIN — Medication 4 UNIT(S): at 12:24

## 2024-05-12 RX ADMIN — Medication 10 MILLIGRAM(S): at 18:47

## 2024-05-12 RX ADMIN — Medication 12.5 MILLIGRAM(S): at 06:03

## 2024-05-12 RX ADMIN — CEFEPIME 100 MILLIGRAM(S): 1 INJECTION, POWDER, FOR SOLUTION INTRAMUSCULAR; INTRAVENOUS at 06:01

## 2024-05-12 RX ADMIN — Medication 4 UNIT(S): at 18:46

## 2024-05-12 RX ADMIN — CEFEPIME 100 MILLIGRAM(S): 1 INJECTION, POWDER, FOR SOLUTION INTRAMUSCULAR; INTRAVENOUS at 14:04

## 2024-05-12 RX ADMIN — CEFEPIME 100 MILLIGRAM(S): 1 INJECTION, POWDER, FOR SOLUTION INTRAMUSCULAR; INTRAVENOUS at 21:45

## 2024-05-12 RX ADMIN — Medication 1 DROP(S): at 18:45

## 2024-05-12 RX ADMIN — Medication 3: at 18:44

## 2024-05-12 RX ADMIN — ATORVASTATIN CALCIUM 80 MILLIGRAM(S): 80 TABLET, FILM COATED ORAL at 21:43

## 2024-05-12 RX ADMIN — Medication 1 DROP(S): at 06:13

## 2024-05-12 RX ADMIN — PANTOPRAZOLE SODIUM 40 MILLIGRAM(S): 20 TABLET, DELAYED RELEASE ORAL at 13:37

## 2024-05-12 RX ADMIN — Medication 6 MILLIGRAM(S): at 21:43

## 2024-05-12 RX ADMIN — POLYETHYLENE GLYCOL 3350 17 GRAM(S): 17 POWDER, FOR SOLUTION ORAL at 13:37

## 2024-05-12 RX ADMIN — Medication 3: at 00:17

## 2024-05-12 RX ADMIN — Medication 81 MILLIGRAM(S): at 13:36

## 2024-05-12 RX ADMIN — Medication 3: at 12:22

## 2024-05-12 NOTE — PROGRESS NOTE ADULT - SUBJECTIVE AND OBJECTIVE BOX
SUBJ:  Patient is a 79y old  Male who presents with a chief complaint of Acute CVA (12 May 2024 07:34)  Asked by primary team to follow-up on patient now on medical floor.  Family at bedside.  Patient obtunded, not responsive to verbal stimuli, not tracking me when his eyes are open.      PAST MEDICAL & SURGICAL HISTORY:  HLD (hyperlipidemia)      T2DM (type 2 diabetes mellitus)      BPH (benign prostatic hyperplasia)      Diabetes mellitus      Hypertension      High cholesterol      CAD (coronary artery disease)      H/O eye surgery      History of cardiac cath  times 2      H/O heart artery stent          MEDICATIONS  (STANDING):  aspirin  chewable 81 milliGRAM(s) Oral daily  atorvastatin 80 milliGRAM(s) Oral at bedtime  cefepime   IVPB      cefepime   IVPB 2000 milliGRAM(s) IV Intermittent every 8 hours  dextrose 10% Bolus 125 milliLiter(s) IV Bolus once  dextrose 5%. 1000 milliLiter(s) (100 mL/Hr) IV Continuous <Continuous>  dextrose 5%. 1000 milliLiter(s) (50 mL/Hr) IV Continuous <Continuous>  dextrose 50% Injectable 25 Gram(s) IV Push once  dextrose 50% Injectable 12.5 Gram(s) IV Push once  influenza  Vaccine (HIGH DOSE) 0.7 milliLiter(s) IntraMuscular once  insulin glargine Injectable (LANTUS) 10 Unit(s) SubCutaneous at bedtime  insulin lispro (ADMELOG) corrective regimen sliding scale   SubCutaneous every 6 hours  insulin lispro Injectable (ADMELOG) 4 Unit(s) SubCutaneous every 6 hours  lacosamide Solution 50 milliGRAM(s) Oral every 12 hours  melatonin 6 milliGRAM(s) Oral at bedtime  metoprolol tartrate 12.5 milliGRAM(s) Enteral Tube every 12 hours  pantoprazole   Suspension 40 milliGRAM(s) Oral daily  polyethylene glycol 3350 17 Gram(s) Oral daily  timolol 0.5% Solution 1 Drop(s) Left EYE two times a day    MEDICATIONS  (PRN):  acetaminophen   Oral Liquid .. 650 milliGRAM(s) Oral every 6 hours PRN Temp greater or equal to 38C (100.4F)  bisacodyl Suppository 10 milliGRAM(s) Rectal daily PRN Constipation  dextrose Oral Gel 15 Gram(s) Oral once PRN Blood Glucose LESS THAN 70 milliGRAM(s)/deciliter          Vital Signs Last 24 Hrs  T(C): 36.7 (12 May 2024 05:01), Max: 37 (11 May 2024 18:05)  T(F): 98 (12 May 2024 05:01), Max: 98.6 (11 May 2024 18:05)  HR: 86 (12 May 2024 05:01) (70 - 88)  BP: 145/85 (12 May 2024 05:01) (121/69 - 145/85)  BP(mean): --  RR: 19 (12 May 2024 05:01) (19 - 19)  SpO2: 100% (12 May 2024 05:01) (99% - 100%)    Parameters below as of 12 May 2024 05:01  Patient On (Oxygen Delivery Method): nasal cannula  O2 Flow (L/min): 2      REVIEW OF SYSTEMS:  CONSTITUTIONAL: No fever, weight loss, or fatigue  RESPIRATORY: No cough, wheezing, chills or hemoptysis; No shortness of breath  CARDIOVASCULAR: No chest pain or chest pressure.  No shortness of breath or dyspnea on exertion.  No palpitations, dizziness, light headedness, syncope or near syncope.  No edema, no orthopnea.   NEUROLOGICAL: No headaches, memory loss, loss of strength, numbness, or tremors      PHYSICAL EXAM  Constitutional: obtunded   HEENT: normocephalic, atraumatic.  PERRLA. EOMI  Neck : No JVD. no carotid bruits  Lungs:  clear to auscultation bilaterally. no rhonchi. no wheezing  Heart:  S1 and S2. No S3, S4. I/VI systolic murmur.  Abdomen:  soft, non tender.  Extremities: No clubbing, cyanoisis or edema  Nuerologic:  A+O x 3. No focal deficits  Skin:  no rashes        LABS:                        12.5   8.06  )-----------( 254      ( 12 May 2024 07:21 )             37.6     05-12    134<L>  |  99  |  18  ----------------------------<  260<H>  4.8   |  28  |  0.87    Ca    9.2      12 May 2024 07:21  Phos  3.8     05-11  Mg     1.9     05-11    TPro  7.6  /  Alb  2.4<L>  /  TBili  0.8  /  DBili  x   /  AST  65<H>  /  ALT  58<H>  /  AlkPhos  124<H>  05-12    < from: TTE Echo w/ Contrast Follow Up Limited (05.09.24 @ 07:41) >   1. Left ventricular ejection fraction, by visual estimation, is 50 to   55%.   2. Normal global left ventricular systolic function.   3. Multiple left ventricular regional wall motion abnormalities exist.   See wall motion findings.   4. Normal left ventricular internal cavity size.   5. Spectral Doppler shows impaired relaxation pattern of left   ventricular myocardial filling (Grade I diastolic dysfunction).   6. 4mL of definity used with saline. LOT # [id number] EXP 4/2025      No LV thrombus is visualized.   7. Mildly enlarged right ventricle.   8. Thickening of the anterior mitral valve leaflet.   9. Sclerotic aortic valve with normal opening.  10. Endocardial visualization was enhanced with intravenous echo contrast.    < end of copied text >  < from: 12 Lead ECG (05.06.24 @ 21:48) >  Diagnosis Line Normal sinus rhythm  Left axis deviation  No previous ECGs available    < end of copied text >    tele SR

## 2024-05-12 NOTE — PROGRESS NOTE ADULT - ASSESSMENT
79-year-old man status postacute CVA (new acute infarcts of the left insula and left cerebellum).  Status post MCA stroke, thrombolysis and thrombectomy April 16, 2024 at outside hospital.  He had hemorrhagic conversion.  He continues to have significant neurologic deficits.  He had ambulatory heart monitoring placed at outside hospital, interrogation of this device apparently demonstrating sinus rhythm.  No arrhythmias noted.  No atrial fibrillation has thus far been documented   He has prior cardiac history of CAD, status post prior coronary intervention.    Recommendations  -Continue with telemetry monitoring while in hospital.  -Transesophageal echocardiogram to be considered pending his hospital course and if it would potentially .  -ILR can be considered as well, though family had previously declined this at outside hospital.   -Continue aspirin, high intensity statin and metoprolol.  -Guarded prognosis.  Goals of care conservations ongoing.   -Discussed with patient's family  at bedside and with primary team

## 2024-05-12 NOTE — CHART NOTE - NSCHARTNOTEFT_GEN_A_CORE
Neurology Chart Note:    Discussed stroke work up and management with son at length.  Agree with previous recommendations for cardioembolic work up (LIDA if cardiology agrees +/- ILR to monitor for paroxysmal Afib).  Recommend speech and swallow follow up, if patient is unable to swallow safely, may need PEG.  Son feels patient is improving, and is apprehensive about hospice.  If no further complications arise, patient may stabilize with rehab, so more standard optimization of treatment is reasonable, should be discussed with palliative care team.    Yeison Kuhn MD  Neurology Attending Physician

## 2024-05-12 NOTE — PROGRESS NOTE ADULT - SUBJECTIVE AND OBJECTIVE BOX
Patient is a 79-year-old male with PMHx of type 2 diabetes, HTN, CAD with stenting (on daily ASA) admitted for CVA.     Overnight Events: None  Interval HPI: Today is hospital day 5. Patient seen and examined at bedside.     REVIEW OF SYSTEMS:  CONSTITUTIONAL: (-) weakness, (-) fevers, (-) chills  EYES/ENT: (-) visual changes,  (-) vertigo,  (-) throat pain   NECK:  (-) pain, (-) stiffness  RESPIRATORY:  (-) shortness of breath, (-) cough,  (-) wheezing,  (-) hemoptysis   CARDIOVASCULAR:  (-) chest pain, (-) palpitations  GASTROINTESTINAL:  (-) abdominal or epigastric pain, (-) nausea, (-) vomiting, (-) diarrhea, (-) constipation, (-) melena,  (-) hematemesis,  (-) hematochezia  GENITOURINARY: (-) dysuria, (-) frequency, (-) hematuria  NEUROLOGICAL: (-) numbness, (-) weakness  SKIN: (-) itching, (-) rashes, (-) lesions    Vital Signs Last 24 Hrs  T(C): 36.7 (12 May 2024 05:01), Max: 37 (11 May 2024 18:05)  T(F): 98 (12 May 2024 05:01), Max: 98.6 (11 May 2024 18:05)  HR: 86 (12 May 2024 05:01) (70 - 88)  BP: 145/85 (12 May 2024 05:01) (119/81 - 145/85)  BP(mean): --  RR: 19 (12 May 2024 05:01) (19 - 19)  SpO2: 100% (12 May 2024 05:01) (99% - 100%)    Parameters below as of 12 May 2024 05:01  Patient On (Oxygen Delivery Method): nasal cannula  O2 Flow (L/min): 2      PHYSICAL EXAM:  GENERAL: NAD, lying in bed comfortably  HEAD:  Atraumatic, Normocephalic  EYES: EOMI, conjunctiva and sclera clear  ENT: Moist mucous membranes  NECK: Supple, No JVD  CHEST/LUNG: Clear to auscultation bilaterally, good air entry bilaterally; No wheezing, rales, or rhonchi. Unlabored respirations  HEART: Regular rate and rhythm. S1 and S2. No murmurs, rubs, or gallops  ABDOMEN: Soft, Nontender, Nondistended. Bowel sounds present.   EXTREMITIES:  2+ Peripheral Pulses. No clubbing, cyanosis, or edema  NERVOUS SYSTEM:  Alert & Oriented X3, speech clear. No deficits.  MSK: FROM all 4 extremities, full and equal strength  SKIN: No rashes, bruises, or other lesions    LABS:   All Labs Personally Reviewed                         12.6   8.98  )-----------( 241      ( 11 May 2024 07:23 )             38.0     05-11    136  |  99  |  19  ----------------------------<  254<H>  4.9   |  25  |  0.96    Ca    9.2      11 May 2024 07:23  Phos  3.8     05-11  Mg     1.9     05-11    TPro  7.7  /  Alb  2.5<L>  /  TBili  0.9  /  DBili  x   /  AST  67<H>  /  ALT  47<H>  /  AlkPhos  117  05-11          Blood Culture: 05-09 @ 18:18  Organism Blood Culture PCR  Gram Stain Blood -- Gram Stain   Growth in anaerobic bottle: Gram Positive Cocci in Clusters  Specimen Source .Blood Blood  Culture-Blood --      I&O's Summary    CAPILLARY BLOOD GLUCOSE      POCT Blood Glucose.: 215 mg/dL (12 May 2024 06:14)  POCT Blood Glucose.: 278 mg/dL (12 May 2024 00:09)  POCT Blood Glucose.: 210 mg/dL (11 May 2024 18:26)  POCT Blood Glucose.: 256 mg/dL (11 May 2024 13:00)  POCT Blood Glucose.: 307 mg/dL (11 May 2024 08:06)      RADIOLOGY/EKG:  All Imaging and EKGs Personally Reviewed     Xray Chest 1 View- PORTABLE-Urgent (Xray Chest 1 View- PORTABLE-Urgent .) (05.11.24 @ 12:03) >  IMPRESSION:    NG tube tip remains in stomach.      MEDICATIONS:  MEDICATIONS  (STANDING):  aspirin  chewable 81 milliGRAM(s) Oral daily  atorvastatin 80 milliGRAM(s) Oral at bedtime  cefepime   IVPB      cefepime   IVPB 2000 milliGRAM(s) IV Intermittent every 8 hours  dextrose 10% Bolus 125 milliLiter(s) IV Bolus once  dextrose 5%. 1000 milliLiter(s) (100 mL/Hr) IV Continuous <Continuous>  dextrose 5%. 1000 milliLiter(s) (50 mL/Hr) IV Continuous <Continuous>  dextrose 50% Injectable 25 Gram(s) IV Push once  dextrose 50% Injectable 12.5 Gram(s) IV Push once  influenza  Vaccine (HIGH DOSE) 0.7 milliLiter(s) IntraMuscular once  insulin glargine Injectable (LANTUS) 10 Unit(s) SubCutaneous at bedtime  insulin lispro (ADMELOG) corrective regimen sliding scale   SubCutaneous every 6 hours  lacosamide Solution 50 milliGRAM(s) Oral every 12 hours  melatonin 6 milliGRAM(s) Oral at bedtime  metoprolol tartrate 12.5 milliGRAM(s) Enteral Tube every 12 hours  pantoprazole   Suspension 40 milliGRAM(s) Oral daily  polyethylene glycol 3350 17 Gram(s) Oral daily  timolol 0.5% Solution 1 Drop(s) Left EYE two times a day             Patient is a 79-year-old male with PMHx of type 2 diabetes, HTN, CAD with stenting (on daily ASA) admitted for acute on chronic CVA. Downgraded from ICU to medical floor 5/10.     Overnight Events: None  Interval HPI: Today is hospital day 5. Patient seen and examined at bedside with son, aware plan for Cardio consult to discuss possible LIDA, speech and swallow eval. Family seeing significant improvement and do not wish to discuss hospice as an option at this time. No further questions at this time.     Vital Signs Last 24 Hrs  T(C): 36.7 (12 May 2024 05:01), Max: 37 (11 May 2024 18:05)  T(F): 98 (12 May 2024 05:01), Max: 98.6 (11 May 2024 18:05)  HR: 86 (12 May 2024 05:01) (70 - 88)  BP: 145/85 (12 May 2024 05:01) (119/81 - 145/85)  BP(mean): --  RR: 19 (12 May 2024 05:01) (19 - 19)  SpO2: 100% (12 May 2024 05:01) (99% - 100%)    Parameters below as of 12 May 2024 05:01  Patient On (Oxygen Delivery Method): nasal cannula  O2 Flow (L/min): 2      PHYSICAL EXAM:  GENERAL: NAD, lying in bed comfortably w/ legs crossed  HEAD:  Atraumatic, Normocephalic  EYES: EOMI, conjunctiva and sclera clear  ENT: Dry mucous membranes, NG tube in place   NECK: Supple  CHEST/LUNG: Clear to auscultation bilaterally, good air entry bilaterally; No wheezing, rales, or rhonchi. Unlabored respirations  HEART: Regular rate and rhythm. S1 and S2. No murmurs, rubs, or gallops  ABDOMEN: Soft, Nontender, Nondistended. Bowel sounds present.   EXTREMITIES:  2+ Peripheral Pulses. No clubbing, cyanosis, or edema  NERVOUS SYSTEM:  Alert. Moving LEs spontaneously.   SKIN: No rashes, bruises, or other lesions    LABS:   All Labs Personally Reviewed                                        12.5   8.06  )-----------( 254      ( 12 May 2024 07:21 )             37.6     12 May 2024 07:21    134    |  99     |  18     ----------------------------<  260    4.8     |  28     |  0.87     Ca    9.2        12 May 2024 07:21  Phos  3.8       11 May 2024 07:23  Mg     1.9       11 May 2024 07:23    TPro  7.6    /  Alb  2.4    /  TBili  0.8    /  DBili  x      /  AST  65     /  ALT  58     /  AlkPhos  124    12 May 2024 07:21      CAPILLARY BLOOD GLUCOSE      POCT Blood Glucose.: 300 mg/dL (12 May 2024 12:20)  POCT Blood Glucose.: 215 mg/dL (12 May 2024 06:14)  POCT Blood Glucose.: 278 mg/dL (12 May 2024 00:09)  POCT Blood Glucose.: 210 mg/dL (11 May 2024 18:26)  POCT Blood Glucose.: 256 mg/dL (11 May 2024 13:00)    LIVER FUNCTIONS - ( 12 May 2024 07:21 )  Alb: 2.4 g/dL / Pro: 7.6 g/dL / ALK PHOS: 124 U/L / ALT: 58 U/L / AST: 65 U/L / GGT: x           Urinalysis Basic - ( 12 May 2024 07:21 )    Color: x / Appearance: x / SG: x / pH: x  Gluc: 260 mg/dL / Ketone: x  / Bili: x / Urobili: x   Blood: x / Protein: x / Nitrite: x   Leuk Esterase: x / RBC: x / WBC x   Sq Epi: x / Non Sq Epi: x / Bacteria: x      RADIOLOGY/EKG:  All Imaging and EKGs Personally Reviewed     Xray Chest 1 View- PORTABLE-Urgent (Xray Chest 1 View- PORTABLE-Urgent .) (05.11.24 @ 12:03) >  IMPRESSION:    NG tube tip remains in stomach.      MEDICATIONS:  MEDICATIONS  (STANDING):  aspirin  chewable 81 milliGRAM(s) Oral daily  atorvastatin 80 milliGRAM(s) Oral at bedtime  cefepime   IVPB      cefepime   IVPB 2000 milliGRAM(s) IV Intermittent every 8 hours  dextrose 10% Bolus 125 milliLiter(s) IV Bolus once  dextrose 5%. 1000 milliLiter(s) (100 mL/Hr) IV Continuous <Continuous>  dextrose 5%. 1000 milliLiter(s) (50 mL/Hr) IV Continuous <Continuous>  dextrose 50% Injectable 25 Gram(s) IV Push once  dextrose 50% Injectable 12.5 Gram(s) IV Push once  influenza  Vaccine (HIGH DOSE) 0.7 milliLiter(s) IntraMuscular once  insulin glargine Injectable (LANTUS) 10 Unit(s) SubCutaneous at bedtime  insulin lispro (ADMELOG) corrective regimen sliding scale   SubCutaneous every 6 hours  lacosamide Solution 50 milliGRAM(s) Oral every 12 hours  melatonin 6 milliGRAM(s) Oral at bedtime  metoprolol tartrate 12.5 milliGRAM(s) Enteral Tube every 12 hours  pantoprazole   Suspension 40 milliGRAM(s) Oral daily  polyethylene glycol 3350 17 Gram(s) Oral daily  timolol 0.5% Solution 1 Drop(s) Left EYE two times a day

## 2024-05-12 NOTE — PROGRESS NOTE ADULT - ASSESSMENT
Patient is a 79-year-old male with PMH of PMH of DM HTN CAD with stenting (on daily ASA) with stenting. Pt  presented for sudden onset left sided weakness to [facility] on 4/16/24 . CT brain showed right MCA occlusion( 4/16). Patient given tenecteplase and was transferred to [facility] on 4/16 for thrombectomy. Underwent thrombectomy but symptoms barely improved, Pt still with left sided weakness facial weakness, dysarthria, left neglect,  right gaze preference and left hemianopsia.  Course complicated by anemia requiring dual Pressor support and 1 unit PRBC and PLT  2/2 to left hip hematoma. Worsening in mental status MRI brain done showing large R MCA infarct with hemorrhagic conversion. Eventually stabilized for transfer to [facility] acute rehab. Was upgraded to ICU 5/6 for worsening mental status with findings of 2 new infarcts of L insula/cerebellum, downgraded 5/10 to the medical floors.       #Acute on Chronic CVA  - new acute ischemic stroke on the left side, and left cerebellar, with stable large subacute right MCA infarct with hemorrhagic transformation.  - neuro checks q4hs   - EEG w/o seizures but with nonspecific diffuse/multifocal cerebral dysfunction.   - tube feeds, pending official read of NGT before feeds resume (per family request)  - aspiration precautions   - family does not desire PEG, pt poor candidate  - c/w asa as per neuro   - family desires second opinion from neurologist, Dr. Kuhn will see patient in the AM  - cardiology recs appreciated continue asa, statin, metoprolol, daily EKGs, consider LIDA and loop recorder if LIDA negative; however, prognosis gaurded; avoid AC  - hospice referral pending     #severe protein calorie malnutrition   -NPO with tube feeds  -dietician following     #positive blood culture  - 1 bottle growing staph epidermidis  - questionable contaminant   - currently on cefepime   - ID consult for abx adjustment     #T2DM  - hold oral meds  - ISS, add lantus 10 U, uptitirate as needed       #CODE Status: DNR/DNI; palliative care following         Discussed with attending, Dr. Pacheco.   Patient is a 79-year-old male with PMH of PMH of DM HTN CAD with stenting (on daily ASA) with stenting. Pt  presented for sudden onset left sided weakness to [facility] on 4/16/24 . CT brain showed right MCA occlusion( 4/16). Patient given tenecteplase and was transferred to [facility] on 4/16 for thrombectomy. Underwent thrombectomy but symptoms barely improved, Pt still with left sided weakness facial weakness, dysarthria, left neglect,  right gaze preference and left hemianopsia.  Course complicated by anemia requiring dual Pressor support and 1 unit PRBC and PLT  2/2 to left hip hematoma. Worsening in mental status MRI brain done showing large R MCA infarct with hemorrhagic conversion. Eventually stabilized for transfer to [facility] acute rehab. Was upgraded to ICU 5/6 for worsening mental status with findings of 2 new infarcts of L insula/cerebellum, downgraded 5/10 to the medical floors.       #Acute on Chronic CVA  - New acute ischemic stroke on the left side, and left cerebellar, with stable large subacute right MCA infarct with hemorrhagic transformation.  - Neuro checks q4hs   - EEG w/o seizures but with nonspecific diffuse/multifocal cerebral dysfunction.   - Tube feeds for now  - Aspiration precautions   - C/w asa as per neuro   - Neuro recs appreciated: plan for cardioembolic work-up, possible LIDA, ILR?  - Cardiology recs appreciated continue asa, statin, metoprolol, daily EKGs, consider LIDA and loop recorder if LIDA negative; however, prognosis guarded; avoid AC  - Hospice referral pending, family not ready for this option    #Severe protein calorie malnutrition   - NPO with tube feeds  - Dietician following   - Speech reeval appreciated given improved mental status, more alert     #Transaminitis  - Alk phosp 124, AST/ALT 65/58  - Monitor CMP    #Positive blood culture  - 1 bottle growing staph epidermidis  - Questionable contaminant   - Currently on cefepime   - ID consult for abx adjustment     #T2DM  - A1c 8.2   - Hold oral meds  - ISS, lantus 10 U, add lispro 4units Q6hr with feeds  - Accuchecks QAC/HS, hypoglycemic protocol      #CODE Status: DNR/DNI; palliative care following         Discussed with attending, Dr. Pacheco.

## 2024-05-12 NOTE — CHART NOTE - NSCHARTNOTEFT_GEN_A_CORE
Informed by nurse, pt partially pulled out NG tube.    Pt seen and examined with family at beside. Unresponsive to commands.  NGT noted to be approx 2 inches dispalced from marked location. Continuous feedings paused. NGT repositioned. CXR ordered to confirm NGT position. Will restart feeds pending confirmation.    Patient tolerated procedure well. Exam unremarkable. Informed by nurse, pt partially pulled out NG tube.    Pt seen and examined with family at beside. Unresponsive to commands.  NGT noted to be approx 2 inches dispalced from marked location. Continuous feedings paused. NGT repositioned. CXR ordered to confirm NGT position.  Spoke with ICU PA who looked at imaging- NGT below diaphragm, in stomach, states okay to resume tube feeds.

## 2024-05-12 NOTE — PROGRESS NOTE ADULT - TIME BILLING
care coordination, plan of care discussed with patient face to face, 3E weekend IDR team care coordination, plan of care discussed with patient family at bedside, hcp/wife, 3E weekend IDR team

## 2024-05-13 ENCOUNTER — TRANSCRIPTION ENCOUNTER (OUTPATIENT)
Age: 79
End: 2024-05-13

## 2024-05-13 LAB
ALBUMIN SERPL ELPH-MCNC: 2.3 G/DL — LOW (ref 3.3–5)
ALP SERPL-CCNC: 134 U/L — HIGH (ref 40–120)
ALT FLD-CCNC: 47 U/L — HIGH (ref 10–45)
ANION GAP SERPL CALC-SCNC: 6 MMOL/L — SIGNIFICANT CHANGE UP (ref 5–17)
AST SERPL-CCNC: 58 U/L — HIGH (ref 10–40)
BILIRUB SERPL-MCNC: 0.8 MG/DL — SIGNIFICANT CHANGE UP (ref 0.2–1.2)
BUN SERPL-MCNC: 18 MG/DL — SIGNIFICANT CHANGE UP (ref 7–23)
CALCIUM SERPL-MCNC: 9.1 MG/DL — SIGNIFICANT CHANGE UP (ref 8.4–10.5)
CHLORIDE SERPL-SCNC: 99 MMOL/L — SIGNIFICANT CHANGE UP (ref 96–108)
CO2 SERPL-SCNC: 28 MMOL/L — SIGNIFICANT CHANGE UP (ref 22–31)
CREAT SERPL-MCNC: 0.92 MG/DL — SIGNIFICANT CHANGE UP (ref 0.5–1.3)
EGFR: 85 ML/MIN/1.73M2 — SIGNIFICANT CHANGE UP
GLUCOSE BLDC GLUCOMTR-MCNC: 237 MG/DL — HIGH (ref 70–99)
GLUCOSE BLDC GLUCOMTR-MCNC: 249 MG/DL — HIGH (ref 70–99)
GLUCOSE BLDC GLUCOMTR-MCNC: 289 MG/DL — HIGH (ref 70–99)
GLUCOSE BLDC GLUCOMTR-MCNC: 297 MG/DL — HIGH (ref 70–99)
GLUCOSE SERPL-MCNC: 259 MG/DL — HIGH (ref 70–99)
HCT VFR BLD CALC: 36.5 % — LOW (ref 39–50)
HGB BLD-MCNC: 12.2 G/DL — LOW (ref 13–17)
MCHC RBC-ENTMCNC: 29.2 PG — SIGNIFICANT CHANGE UP (ref 27–34)
MCHC RBC-ENTMCNC: 33.4 GM/DL — SIGNIFICANT CHANGE UP (ref 32–36)
MCV RBC AUTO: 87.3 FL — SIGNIFICANT CHANGE UP (ref 80–100)
NRBC # BLD: 0 /100 WBCS — SIGNIFICANT CHANGE UP (ref 0–0)
PLATELET # BLD AUTO: 247 K/UL — SIGNIFICANT CHANGE UP (ref 150–400)
POTASSIUM SERPL-MCNC: 4.6 MMOL/L — SIGNIFICANT CHANGE UP (ref 3.5–5.3)
POTASSIUM SERPL-SCNC: 4.6 MMOL/L — SIGNIFICANT CHANGE UP (ref 3.5–5.3)
PROT SERPL-MCNC: 7.6 G/DL — SIGNIFICANT CHANGE UP (ref 6–8.3)
RBC # BLD: 4.18 M/UL — LOW (ref 4.2–5.8)
RBC # FLD: 14.7 % — HIGH (ref 10.3–14.5)
SODIUM SERPL-SCNC: 133 MMOL/L — LOW (ref 135–145)
WBC # BLD: 8.6 K/UL — SIGNIFICANT CHANGE UP (ref 3.8–10.5)
WBC # FLD AUTO: 8.6 K/UL — SIGNIFICANT CHANGE UP (ref 3.8–10.5)

## 2024-05-13 PROCEDURE — 99233 SBSQ HOSP IP/OBS HIGH 50: CPT | Mod: GC

## 2024-05-13 RX ORDER — INSULIN LISPRO 100/ML
5 VIAL (ML) SUBCUTANEOUS EVERY 6 HOURS
Refills: 0 | Status: DISCONTINUED | OUTPATIENT
Start: 2024-05-13 | End: 2024-05-14

## 2024-05-13 RX ORDER — INSULIN HUMAN 100 [IU]/ML
INJECTION, SOLUTION SUBCUTANEOUS EVERY 6 HOURS
Refills: 0 | Status: DISCONTINUED | OUTPATIENT
Start: 2024-05-13 | End: 2024-05-13

## 2024-05-13 RX ORDER — INSULIN LISPRO 100/ML
VIAL (ML) SUBCUTANEOUS EVERY 6 HOURS
Refills: 0 | Status: DISCONTINUED | OUTPATIENT
Start: 2024-05-13 | End: 2024-05-14

## 2024-05-13 RX ORDER — INSULIN GLARGINE 100 [IU]/ML
12 INJECTION, SOLUTION SUBCUTANEOUS AT BEDTIME
Refills: 0 | Status: DISCONTINUED | OUTPATIENT
Start: 2024-05-13 | End: 2024-05-14

## 2024-05-13 RX ADMIN — Medication 5 UNIT(S): at 12:17

## 2024-05-13 RX ADMIN — Medication 5 UNIT(S): at 18:15

## 2024-05-13 RX ADMIN — Medication 2: at 00:50

## 2024-05-13 RX ADMIN — Medication 6: at 18:16

## 2024-05-13 RX ADMIN — CEFEPIME 100 MILLIGRAM(S): 1 INJECTION, POWDER, FOR SOLUTION INTRAMUSCULAR; INTRAVENOUS at 06:20

## 2024-05-13 RX ADMIN — Medication 1 DROP(S): at 18:15

## 2024-05-13 RX ADMIN — Medication 2: at 07:00

## 2024-05-13 RX ADMIN — INSULIN GLARGINE 12 UNIT(S): 100 INJECTION, SOLUTION SUBCUTANEOUS at 22:44

## 2024-05-13 RX ADMIN — POLYETHYLENE GLYCOL 3350 17 GRAM(S): 17 POWDER, FOR SOLUTION ORAL at 12:18

## 2024-05-13 RX ADMIN — Medication 12.5 MILLIGRAM(S): at 06:22

## 2024-05-13 RX ADMIN — Medication 81 MILLIGRAM(S): at 12:17

## 2024-05-13 RX ADMIN — Medication 1 DROP(S): at 06:20

## 2024-05-13 RX ADMIN — PANTOPRAZOLE SODIUM 40 MILLIGRAM(S): 20 TABLET, DELAYED RELEASE ORAL at 12:18

## 2024-05-13 RX ADMIN — Medication 6: at 12:17

## 2024-05-13 RX ADMIN — LACOSAMIDE 50 MILLIGRAM(S): 50 TABLET ORAL at 06:20

## 2024-05-13 NOTE — SWALLOW BEDSIDE ASSESSMENT ADULT - SWALLOW EVAL: DIAGNOSIS
Pt is lethargic, nonverbal but moaning intermittently. Given oral care with toothette he has minimal oromotor movement in response. He is unable to elicit swallow trigger despite max cues. Eyes opened intermittently. Pt was not offerred PO trials due to high risk of aspiration given presentation at baseline. Recommend continue NPO - consider long term alternate means of nutrition/hydration/medication if within GOC.

## 2024-05-13 NOTE — PROGRESS NOTE ADULT - ASSESSMENT
Patient is a 79-year-old male with PMH of PMH of DM HTN CAD with stenting (on daily ASA) with stenting. Pt  presented for sudden onset left sided weakness to [facility] on 4/16/24 . CT brain showed right MCA occlusion( 4/16). Patient given tenecteplase and was transferred to [facility] on 4/16 for thrombectomy. Underwent thrombectomy but symptoms barely improved, Pt still with left sided weakness facial weakness, dysarthria, left neglect,  right gaze preference and left hemianopsia.  Course complicated by anemia requiring dual Pressor support and 1 unit PRBC and PLT  2/2 to left hip hematoma. Worsening in mental status MRI brain done showing large R MCA infarct with hemorrhagic conversion. Eventually stabilized for transfer to [facility] acute rehab. Was upgraded to ICU 5/6 for worsening mental status with findings of 2 new infarcts of L insula/cerebellum, downgraded 5/10 to the medical floors.       #Acute on Chronic CVA  - New acute ischemic stroke on the left side, and left cerebellar, with stable large subacute right MCA infarct with hemorrhagic transformation.  - EEG w/o seizures but with nonspecific diffuse/multifocal cerebral dysfunction.   - Tube feeds for now  - Aspiration precautions   - C/w asa as per neuro   - Neuro recs appreciated: plan for cardioembolic work-up, possible LIDA, ILR?  - Cardiology recs appreciated: continue asa, statin, metoprolol, can consider LIDA and loop recorder if it would ; however, prognosis guarded  - Hospice referral pending, most of family ready for this, son would like to get outside opinion    #Severe protein calorie malnutrition   - NPO with tube feeds  - Dietician following   - Speech reeval appreciated given improved mental status, more alert     #Transaminitis  - Alk phosp 124, AST/ALT 65/58  - Monitor CMP    #Positive blood culture  - 1 bottle growing staph epidermidis  - Questionable contaminant   - Currently on cefepime   - ID consult for abx adjustment     #T2DM  - A1c 8.2   - Hold oral meds  - ISS, lantus 10 U, lispro 4units Q6hr with feeds  - Accuchecks QAC/HS, hypoglycemic protocol      #CODE Status: DNR/DNI; palliative care following         Discussed with attending, Dr. Pacheco.   Patient is a 79-year-old male with PMH of PMH of DM HTN CAD with stenting (on daily ASA) with stenting. Pt  presented for sudden onset left sided weakness to [facility] on 4/16/24 . CT brain showed right MCA occlusion( 4/16). Patient given tenecteplase and was transferred to [facility] on 4/16 for thrombectomy. Underwent thrombectomy but symptoms barely improved, Pt still with left sided weakness facial weakness, dysarthria, left neglect,  right gaze preference and left hemianopsia.  Course complicated by anemia requiring dual Pressor support and 1 unit PRBC and PLT  2/2 to left hip hematoma. Worsening in mental status MRI brain done showing large R MCA infarct with hemorrhagic conversion. Eventually stabilized for transfer to [facility] acute rehab. Was upgraded to ICU 5/6 for worsening mental status with findings of 2 new infarcts of L insula/cerebellum, downgraded 5/10 to the medical floors.       #Acute on Chronic CVA  - New acute ischemic stroke on the left side, and left cerebellar, with stable large subacute right MCA infarct with hemorrhagic transformation.  - EEG w/o seizures but with nonspecific diffuse/multifocal cerebral dysfunction.   - Neuro recs appreciated: family meeting today, imaging reviewed, plan for hospice   - Hospice referral pending, would like to pursue this now, comfort measures only     #Severe protein calorie malnutrition   - NPO with tube feeds  - Dietician following   - Speech reeval NPO  - Comfort measures only, will discontinue feeds and insulin    #Transaminitis  - Alk phosp 124, AST/ALT 65/58  - Monitor CMP    #Positive blood culture  - 1 bottle growing staph epidermidis  - Questionable contaminant   - Currently on cefepime since 5/10  - ID recs appreciated, likely contam, d/c cefepime    #T2DM  - A1c 8.2   - Hold oral meds  - Comfort measures only, D/C insulin with NGT feeds      #CODE Status: DNR/DNI; comfort measures, hospice pending, palliative care following         Discussed with attending, Dr. Pacheco.

## 2024-05-13 NOTE — DISCHARGE NOTE PROVIDER - NSDCACTIVITY_GEN_ALL_CORE
Activity as tolerated Do not drive or operate machinery/No heavy lifting/straining/Activity as tolerated

## 2024-05-13 NOTE — DISCHARGE NOTE PROVIDER - NSDCMRMEDTOKEN_GEN_ALL_CORE_FT
atorvastatin 20 mg oral tablet: 1 tab(s) orally once a day (at bedtime)  finasteride 5 mg oral tablet: 1 tab(s) orally once a day  Flight excuse: To whom it may concern,     Mr. Angel Jung was admitted to [facility] on 5/6/2024. He is unfit to travel due to his severe medical condition. He is advised to cancel his travel plans and be available to go to his medical facility if needed. Thank you.  gabapentin 300 mg oral capsule: 1 cap(s) orally once a day (at bedtime)  insulin glargine 100 units/mL subcutaneous solution: 16 unit(s) subcutaneous once a day (at bedtime)  methocarbamol 500 mg oral tablet: 1 tab(s) orally every 6 hours As needed musle spasm  metoprolol: 12.5 milligram(s) orally 2 times a day  modafinil: 50 milligram(s) orally 2 times a day  pantoprazole 40 mg oral delayed release tablet: 1 tab(s) orally once a day (before a meal)  tamsulosin 0.4 mg oral capsule: 1 cap(s) orally once a day (at bedtime)  timolol maleate 0.5% ophthalmic solution: 1 drop(s) to each affected eye 2 times a day  To Whom it may concern:: The Above named patient is currently admitted to [facility] at this time  traZODone: 25 milligram(s) orally once a day (at bedtime)   methocarbamol 500 mg oral tablet: 1 tab(s) orally every 6 hours As needed musle spasm  tamsulosin 0.4 mg oral capsule: 1 cap(s) orally once a day (at bedtime)   finasteride 5 mg oral tablet: 1 tab(s) orally once a day  gabapentin 300 mg oral capsule: 1 cap(s) orally once a day (at bedtime)  insulin glargine 100 units/mL subcutaneous solution: 16 unit(s) subcutaneous once a day (at bedtime)  methocarbamol 500 mg oral tablet: 1 tab(s) orally every 6 hours as needed for musle spasm  pantoprazole 40 mg oral delayed release tablet: 1 tab(s) orally once a day (before a meal)  tamsulosin 0.4 mg oral capsule: 1 cap(s) orally once a day (at bedtime)

## 2024-05-13 NOTE — PROGRESS NOTE ADULT - ATTENDING COMMENTS
78y/o M with PMH of PMH of DM HTN CAD with stenting (on daily ASA) with stenting. Pt  presented for sudden onset left sided weakness to [facility] on 4/16/24 . CT brain showed right MCA occlusion( 4/16). Patient given tenecteplase and was transferred to [facility] on 4/16 for thrombectomy. Underwent thrombectomy but symptoms barely improved, Pt still with left sided weakness facial weakness, dysarthria, left neglect,  right gaze preference and left hemianopsia.  Course complicated by anemia requiring dual Pressor support and 1 unit PRBC and PLT  2/2 to left hip hematoma. Worsening in mental status MRI brain done showing large R MCA infarct with hemorrhagic conversion. Eventually stabilized for transfer to [facility] acute rehab.  5/6/24 patient with worsening mental status and lethargy, CT head showing two new acute infarcts of left insula and left cerebellum. Mental status much worse now. Admitted to ICU for closer monitoring, and was downgraded to medicine on 5/10/2024.  Plan: apprec neuro recs-family requesting different neurologist for second opinion from dr villafana, dr abbott to see in am tomorrow, gtube was confirmed placement as family concerned this was dislodged, pt otherwise stable with guarded to poor prognosis due to re-stroke, dc planning once second opinion is sought, apprec ID input on poss contaminant blood cx
at this time CT head unchanged  mental status poor  d/w family and palliative care made dnr/i  no neurological intervention at this time  patient high risk of aspriation, as they don't want peg need decion on when to stop tube feeding.  goal to get him home on home hospice  at this time no active CCM issues can transfer to medical floor
pt seen and examined  d/w Son  no changes on ct  d/w Neuro  d/w cardio - will hold off on LIDA  Repeat TTE shows -     < from: TTE Echo w/ Contrast Follow Up Limited (05.09.24 @ 07:41) >      Summary:   1. Left ventricular ejection fraction, by visual estimation, is 50 to 55%.   2. Normal global left ventricular systolic function.   3. Multiple left ventricular regional wall motion abnormalities exist. See wall motion findings.   4. Normal left ventricular internal cavity size.   5. Spectral Doppler shows impaired relaxation pattern of left ventricular myocardial filling (Grade I diastolic dysfunction).   6. 4mL of definity used with saline. LOT # [id number] EXP 4/2025    No LV thrombus is visualized.   7. Mildly enlarged right ventricle.   8. Thickening of the anterior mitral valve leaflet.   9. Sclerotic aortic valve with normal opening.  10. Endocardial visualization was enhanced with intravenous echo contrast.      < end of copied text >
78y/o M with PMH of PMH of DM HTN CAD with stenting (on daily ASA) with stenting. Pt  presented for sudden onset left sided weakness to [facility] on 4/16/24 . CT brain showed right MCA occlusion( 4/16). Patient given tenecteplase and was transferred to [facility] on 4/16 for thrombectomy. Underwent thrombectomy but symptoms barely improved, Pt still with left sided weakness facial weakness, dysarthria, left neglect,  right gaze preference and left hemianopsia.  Course complicated by anemia requiring dual Pressor support and 1 unit PRBC and PLT  2/2 to left hip hematoma. Worsening in mental status MRI brain done showing large R MCA infarct with hemorrhagic conversion. Eventually stabilized for transfer to [facility] acute rehab.  5/6/24 patient with worsening mental status and lethargy, CT head showing two new acute infarcts of left insula and left cerebellum. Mental status much worse now. Admitted to ICU for closer monitoring, and was downgraded to medicine on 5/10/2024.  Plan: apprec neuro recs-dr abbott, apprec cardio recs, monitor clinical course, guarded prognosis, apprec  s/s eval, conisder palliative if options are limited and for complexity of care
78y/o M with PMH of PMH of DM HTN CAD with stenting (on daily ASA) with stenting. Pt  presented for sudden onset left sided weakness to [facility] on 4/16/24 . CT brain showed right MCA occlusion( 4/16). Patient given tenecteplase and was transferred to [facility] on 4/16 for thrombectomy. Underwent thrombectomy but symptoms barely improved, Pt still with left sided weakness facial weakness, dysarthria, left neglect,  right gaze preference and left hemianopsia.  Course complicated by anemia requiring dual Pressor support and 1 unit PRBC and PLT  2/2 to left hip hematoma. Worsening in mental status MRI brain done showing large R MCA infarct with hemorrhagic conversion. Eventually stabilized for transfer to [facility] acute rehab.  5/6/24 patient with worsening mental status and lethargy, CT head showing two new acute infarcts of left insula and left cerebellum. Mental status much worse now. Admitted to ICU for closer monitoring, and was downgraded to medicine on 5/10/2024.  Pt is now end of life and poor prognosis Plan: family meeting held with neuro Dr Gill, palliative aware but unable to be present at meeting, GOC/Advance care planning was re-discussed as above, pt is now comfort measures and awaiting transition to home hospice wife/hcp agrees, son as well after extensive conversation with neuro in family meeting about medical futility of care and poor prognosis, apprec ID recs, antibx to be dc, dc planning underway, apprec sw/cm dc planning, pt is optimized medically for comfort measures at home for dc

## 2024-05-13 NOTE — DISCHARGE NOTE PROVIDER - CARE PROVIDERS DIRECT ADDRESSES
[email or web address]

## 2024-05-13 NOTE — DISCHARGE NOTE PROVIDER - ATTENDING DISCHARGE PHYSICAL EXAMINATION:
GENERAL: NAD, lying in bed comfortably w/ legs crossed  HEAD:  Atraumatic, Normocephalic  EYES:  conjunctiva and sclera clear  ENT: Dry mucous membranes  NECK: Supple  CHEST/LUNG: Clear to auscultation bilaterally, good air entry bilaterally; No wheezing, rales, or rhonchi. Unlabored respirations  HEART: Regular rate and rhythm. S1 and S2. No murmurs, rubs, or gallops  ABDOMEN: Soft, Nontender, Nondistended. Bowel sounds present.   EXTREMITIES:  2+ Peripheral Pulses. No clubbing, cyanosis, or edema  NERVOUS SYSTEM:  Alert. Moving LEs spontaneously.   SKIN: No rashes, bruises, or other lesions

## 2024-05-13 NOTE — PROGRESS NOTE ADULT - CONVERSATION DETAILS
ADVANCED CARE PLANNING NOTE:  I met with patient and the primary care giver / designated health care proxy.  Discussed in details about current diagnosis of CVA with recurrence, treatment options and prognosis.   Also discussed about Cardiac and respiratory resuscitative measures including CPR, vasopressors and   ventilator support via mechanical intubation.   All risks and benefits discussed. Neurology Dr Gill discussed in depth nature of stroke and permanence of neuro deficits despite standards of care.   Patient wishes and goals of care were addressed inclusive of: quality of life as per wife  What matters most to them regarding their current health status, emotional support and reassurance offered.  Medication management: Home medications reviewed and reconciled; current active were reviewed  All questions and concerns were answered and addressed. Pt decided collectively including son that comfort measures with home hospice would best suit this patient's current status that is end of life. 3E IDR team made aware.     About 45 minutes was spent in advanced Care Planning. ADVANCED CARE PLANNING NOTE:  I met with patient and the primary care giver / designated health care proxy.  Discussed in details about current diagnosis of CVA with recurrence, treatment options and prognosis.   Also discussed about Cardiac and respiratory resuscitative measures including CPR, vasopressors and   ventilator support via mechanical intubation.   All risks and benefits discussed. Neurology Dr Gill discussed in depth nature of stroke and permanence of neuro deficits despite standards of care.   Patient wishes and goals of care were addressed inclusive of: quality of life as per wife  What matters most to them regarding their current health status, emotional support and reassurance offered.  Medication management: Home medications reviewed and reconciled; current active were reviewed  All questions and concerns were answered and addressed. Pt's wife/hcp, daughter, nephew whom is a hospitalist out of state decided collectively including son that comfort measures with home hospice would best suit this patient's current status that is end of life. 3E IDR team made aware.     About 45 minutes was spent in advanced Care Planning.

## 2024-05-13 NOTE — DISCHARGE NOTE PROVIDER - NSDCCAREPROVSEEN_GEN_ALL_CORE_FT
Delroy, Curt Branch, Sandi Campos, Sarah Khan, Evens Mjuica, Sangita Pacheco, David Ibrahim, David Braxton, Sergey Mena, Jose Maria Johns, Lalo Cherry, Ruthy Centeno, Eliud Kuhn, Yeison Curry, Stefania Rapp, Janis Harmon, Je Madrigal, Johny Melton, Homero Menard, Kumar

## 2024-05-13 NOTE — DISCHARGE NOTE PROVIDER - NSDCFUSCHEDAPPT_GEN_ALL_CORE_FT
Louise Wright  [facility]  CARDIOLOGY [address]  Scheduled Appointment: 05/21/2024    Onelia Pantoja  [facility]  OPHTHALM [address]  Scheduled Appointment: 07/25/2024

## 2024-05-13 NOTE — CONSULT NOTE ADULT - SUBJECTIVE AND OBJECTIVE BOX
HPI:   Patient is a 79y male with dm, cad, admitted about a month ago to [facility] via [facility]. Had a thudding fall, found to have left hemiplegia and facial droop, given thrombolysis then sent to [facility]. He required mechanical thrombectomy of right mca for infarct and ultimately had hemorrhagic transformation of the stroke. He  had a hip hematoma from fall and required transfusion. He  had a c and t spine fx but no surgery. He ultimately came to [facility] a couple of weeks ago for rehab. HE then got transferred to medical service first icu then floor for worse neuro function and found to have new strokes. He has an ngt for feeding . He was given cefepime for a week. He had positive blood cx for coag neg staph hence we are called. He is now being transitioned to hospice care. I cannot get any information from the patient.     REVIEW OF SYSTEMS:  All other review of systems negative (Comprehensive ROS)    PAST MEDICAL & SURGICAL HISTORY:  HLD (hyperlipidemia)      T2DM (type 2 diabetes mellitus)      BPH (benign prostatic hyperplasia)      Diabetes mellitus      Hypertension      High cholesterol      CAD (coronary artery disease)      H/O eye surgery      History of cardiac cath  times 2      H/O heart artery stent          Allergies    No Known Allergies    Intolerances        Antimicrobials Day #  :    Other Medications:  acetaminophen   Oral Liquid .. 650 milliGRAM(s) Oral every 6 hours PRN  dextrose 10% Bolus 125 milliLiter(s) IV Bolus once  dextrose 5%. 1000 milliLiter(s) IV Continuous <Continuous>  dextrose 5%. 1000 milliLiter(s) IV Continuous <Continuous>  dextrose 50% Injectable 25 Gram(s) IV Push once  dextrose 50% Injectable 12.5 Gram(s) IV Push once  dextrose Oral Gel 15 Gram(s) Oral once PRN  influenza  Vaccine (HIGH DOSE) 0.7 milliLiter(s) IntraMuscular once  insulin glargine Injectable (LANTUS) 12 Unit(s) SubCutaneous at bedtime  insulin lispro (ADMELOG) corrective regimen sliding scale   SubCutaneous every 6 hours  insulin lispro Injectable (ADMELOG) 5 Unit(s) SubCutaneous every 6 hours  lacosamide Solution 50 milliGRAM(s) Oral every 12 hours  melatonin 6 milliGRAM(s) Oral at bedtime  timolol 0.5% Solution 1 Drop(s) Left EYE two times a day      FAMILY HISTORY:  FH: myocardial infarction (Sibling)        SOCIAL HISTORY:  Smoking: [ ]Yes [ x]No  ETOH: [ ]Yes [ ]xNo  Drug Use: [ ]Yesx [ ]No   [ x] Single[ ]    T(F): 97.9 (05-13-24 @ 13:10), Max: 98.7 (05-12-24 @ 20:10)  HR: 78 (05-13-24 @ 13:10)  BP: 124/72 (05-13-24 @ 13:10)  RR: 16 (05-13-24 @ 13:10)  SpO2: 99% (05-13-24 @ 13:10)  Wt(kg): --    PHYSICAL EXAM:  General:ngt in place  no acute distress  Eyes:  anicteric, no conjunctival injection, no discharge  Oropharynx: no lesions or injection 	  Neck: supple, without adenopathy  Lungs: clear anterior to auscultation  Heart: regular rate and rhythm; no murmur, rubs or gallops  Abdomen: soft, nondistended, nontender, without mass or organomegaly  Skin: no lesions  Extremities: no clubbing, cyanosis, or edema  Neurologic: awake not interactive   LAB RESULTS:                        12.2   8.60  )-----------( 247      ( 13 May 2024 06:21 )             36.5     05-13    133<L>  |  99  |  18  ----------------------------<  259<H>  4.6   |  28  |  0.92    Ca    9.1      13 May 2024 06:21    TPro  7.6  /  Alb  2.3<L>  /  TBili  0.8  /  DBili  x   /  AST  58<H>  /  ALT  47<H>  /  AlkPhos  134<H>  05-13    LIVER FUNCTIONS - ( 13 May 2024 06:21 )  Alb: 2.3 g/dL / Pro: 7.6 g/dL / ALK PHOS: 134 U/L / ALT: 47 U/L / AST: 58 U/L / GGT: x           Urinalysis Basic - ( 13 May 2024 06:21 )    Color: x / Appearance: x / SG: x / pH: x  Gluc: 259 mg/dL / Ketone: x  / Bili: x / Urobili: x   Blood: x / Protein: x / Nitrite: x   Leuk Esterase: x / RBC: x / WBC x   Sq Epi: x / Non Sq Epi: x / Bacteria: x        MICROBIOLOGY:  RECENT CULTURES:  05-09 @ 18:18 .Blood Blood Blood Culture PCR    Growth in anaerobic bottle: Staphylococcus epidermidis  Isolation of Coagulase negative Staphylococcus from single blood culture  sets may represent  contamination. Contact the Microbiology Department at [phone] if  susceptibility testing is  clinically indicated.  Direct identification is available within approximately 3-5  hours either by Blood Panel Multiplexed PCR or Direct  MALDI-TOF. Details: [email or web address]    Growth in anaerobic bottle: Gram Positive Cocci in Clusters          RADIOLOGY REVIEWED:  < from: Xray Chest 1 View- PORTABLE-Urgent (Xray Chest 1 View- PORTABLE-Urgent .) (05.12.24 @ 23:24) >    ACC: [id number] EXAM:  XR CHEST PORTABLE URGENT 1V   ORDERED BY:  MOOSE GILL     PROCEDURE DATE:  05/12/2024          INTERPRETATION:  INDICATION: NG tube    Portable chest 11:03 PM    COMPARISON: 5/11/2024    Patient rotated. Left apex obscured.    FINDINGS:  Heart/Vascular: The heart size, mediastinum, hilum and aorta are within   normal limits for projection. Multiple monitor.  Pulmonary: Midline trachea. There is no focal infiltrate, congestion or   effusion.  Bones: There is no fracture.  Lines and catheter: The distal NG tube is partially folded upon itself   with tip pointed towards GE junction. Suggest repositioning.    Impression:    No acute pulmonary disease.    The distal NG tube is partially folded upon itself with tip pointed   towards GE junction. Suggest repositioning    --- End of Report ---    < end of copied text >              < from: CT Head No Cont (05.10.24 @ 10:32) >  ACC: [id number] EXAM:  CT BRAIN   ORDERED BY: JAMARCUS DE LA TORRE     PROCEDURE DATE:  05/10/2024          INTERPRETATION:  CT HEAD    CLINICAL HISTORY: eval cva/stroke    TECHNIQUE:  Noncontrast CT.  Axial Acquisition.  Sagittal and coronal reformations.    COMPARISON:  Exam is compared to prior study of 5/9/2024    FINDINGS:  Acute infarcts within the bilateral MCA territories appear stable. Stable   associated cortical hemorrhage on the right. Stable regional mass effect   without midline shift orventricular effacement. Preserved basal   cisterns. Stable small acute inferior left cerebellar infarct.    There is no hydrocephalus. No subdural or epidural collection.    Nasogastric tube courses through the right nasal cavity. Visualized   paranasal sinuses and mastoid air cells are well aerated.    IMPRESSION:  Stable infarcts and associated hemorrhage.  No midline shift or hydrocephalus.    --- End of Report ---      < end of copied text >    Impression: patient admitted to [facility] about a month ago with large right mca, had thrombectomy and hemorrhagic transformation, had fall as presentation with resultant c and t spine fx no surgery and hip hematoma s/p transfusion. He was treated for klebsiella pneumonia. He came for rehab and then developed worse neuro function a week ago. HE was given cefepime. CT head showed new strokes. He had blood cx grow 1/4 coag neg staph from the blood . Blood cx is contaminant. He had a course of cefepime but no new infection is apparent. He is now transitioned to hospice care. There is no infection apparent at present and no indication for antibiotics at present.     Recommendations:  for hospice  no indication for antibiotics

## 2024-05-13 NOTE — DISCHARGE NOTE PROVIDER - NSDCCPCAREPLAN_GEN_ALL_CORE_FT
PRINCIPAL DISCHARGE DIAGNOSIS  Diagnosis: CVA (cerebrovascular accident)  Assessment and Plan of Treatment:      PRINCIPAL DISCHARGE DIAGNOSIS  Diagnosis: CVA (cerebrovascular accident)  Assessment and Plan of Treatment: Pt sustained CVA that compremised a significant amount of brain matter. As discussed between family, primary team, and neurologist it is very unlikely pt will return to baseline mental status.   Will be discharged to home on comfort measures with main goal of keeping pt comfortable and in company of family.

## 2024-05-13 NOTE — SWALLOW BEDSIDE ASSESSMENT ADULT - SLP PERTINENT HISTORY OF CURRENT PROBLEM
Patient is a 79-year-old male with PMH of PMH of DM HTN CAD with stenting (on daily ASA) with stenting. Pt  presented for sudden onset left sided weakness to [facility] on 4/16/24 . CT brain showed right MCA occlusion( 4/16). Patient given tenecteplase and was transferred to [facility] on 4/16 for thrombectomy. Underwent thrombectomy but symptoms barely improved, Pt still with left sided weakness facial weakness, dysarthria, left neglect,  right gaze preference and left hemianopsia.  Course complicated by anemia requiring dual Pressor support and 1 unit PRBC and PLT  2/2 to left hip hematoma. Worsening in mental status MRI brain done showing large R MCA infarct with hemorrhagic conversion. Eventually stabilized for transfer to [facility] acute rehab. Was upgraded to ICU 5/6 for worsening mental status with findings of 2 new infarcts of L insula/cerebellum, downgraded 5/10 to the medical floors.

## 2024-05-13 NOTE — DISCHARGE NOTE PROVIDER - CARE PROVIDER_API CALL
Louise Wright  Interventional Cardiology  [address] [place] [id number]  Phone: [phone]  Fax: [phone]  Follow Up Time:

## 2024-05-13 NOTE — SWALLOW BEDSIDE ASSESSMENT ADULT - SLP GENERAL OBSERVATIONS
Received in bed with NG tube in place and O2 via nasal cannula. His wife and his brother were present. He is nonverbal and unable to follow commands.

## 2024-05-13 NOTE — DISCHARGE NOTE PROVIDER - HOSPITAL COURSE
Patient is a 79-year-old male with PMHx of type 2 diabetes, HTN, CAD with stenting on daily aspirin, presented to [facility] 4/16/24, found to have CT brain showing R MCA occlusion. He was administered tenecteplase and transferred to [facility] same day for thrombectomy but with little improvement in symptoms - continued left sided weakness, facial weakness, dysarthria, left neglect, right gaze preference and left hemianopsia. His hospital was complicated by anemia requiring dual pressor support and transfusion 2/2 underlying hip hematoma. Patient was noted to have worsening mental status with MRI completed showing large R MCA infarct now with hemorrhagic conversion. Patient was eventually stabilized and transferred to [facility] acute rehab but noted to have worsening mental status and lethargy on 5/6. A repeat CTH showed two new acute infarcts of the L insula and cerebellum and he was transferred to the ICU for closer monitoring. Patient had repeat CT completed 5/10 which was stable and he was downgraded to the medical floors. Patient remained unable to pass bedside swallow evaluation so NG tube was placed, patient's family not interested in invasive procedures like PEG placement. Patient remained minimally responsive to external stimuli during his time on the medical floors  and was unable to pass swallow eval so the decision was made for home hospice with all family parties aware that this is likely his new baseline after reviewing his medical course and imaging with Neurologist, Dr. Menard during a family meeting. Patient is now medically optimized for discharge home with home hospice.     CONSULTS: Cardiology, Neurology, Speech, Infectious Disease, Hospice, Social Work     Patient is a 79-year-old male with PMHx of type 2 diabetes, HTN, CAD with stenting on daily aspirin, presented to [facility] 4/16/24, found to have CT brain showing R MCA occlusion. He was administered tenecteplase and transferred to [facility] same day for thrombectomy but with little improvement in symptoms - continued left sided weakness, facial weakness, dysarthria, left neglect, right gaze preference and left hemianopsia. His hospital was complicated by anemia requiring dual pressor support and transfusion 2/2 underlying hip hematoma. Patient was noted to have worsening mental status with MRI completed showing large R MCA infarct now with hemorrhagic conversion. Patient was eventually stabilized and transferred to [facility] acute rehab but noted to have worsening mental status and lethargy on 5/6. A repeat CTH showed two new acute infarcts of the L insula and cerebellum and he was transferred to the ICU for closer monitoring. Patient had repeat CT completed 5/10 which was stable and he was downgraded to the medical floors. Patient remained unable to pass bedside swallow evaluation so NG tube was placed, patient's family not interested in invasive procedures like PEG placement. Patient remained minimally responsive to external stimuli during his time on the medical floors  and was unable to pass swallow eval so the decision was made for home hospice with all family parties aware that this is likely his new baseline after reviewing his medical course and imaging with Neurologist, Dr. Menard during a family meeting. Patient is now medically optimized for discharge home with home hospice.     Will only continue Flomax and  muscle relaxant as needed as pt now comfort measures. Rest of medications per hospice.       CONSULTS: Cardiology, Neurology, Speech, Infectious Disease, Hospice, Social Work     Patient is a 79-year-old male with PMHx of type 2 diabetes, HTN, CAD with stenting on daily aspirin, presented to [facility] 4/16/24, found to have CT brain showing R MCA occlusion. He was administered tenecteplase and transferred to [facility] same day for thrombectomy but with little improvement in symptoms - continued left sided weakness, facial weakness, dysarthria, left neglect, right gaze preference and left hemianopsia. His hospital was complicated by anemia requiring dual pressor support and transfusion 2/2 underlying hip hematoma. Patient was noted to have worsening mental status with MRI completed showing large R MCA infarct now with hemorrhagic conversion. Patient was eventually stabilized and transferred to [facility] acute rehab but noted to have worsening mental status and lethargy on 5/6. A repeat CTH showed two new acute infarcts of the L insula and cerebellum and he was transferred to the ICU for closer monitoring. Patient had repeat CT completed 5/10 which was stable and he was downgraded to the medical floors. Patient remained unable to pass bedside swallow evaluation so NG tube was placed, patient's family not interested in invasive procedures like PEG placement. Patient remained minimally responsive to external stimuli during his time on the medical floors  and was unable to pass swallow eval so the decision was made for home hospice with all family parties aware that this is likely his new baseline after reviewing his medical course and imaging with Neurologist, Dr. Menard during a family meeting. Patient is now medically optimized for discharge home with home hospice.     Home medications sent to pharmacy, de escalation per hospice care.       CONSULTS: Cardiology, Neurology, Speech, Infectious Disease, Hospice, Social Work     Patient is a 79-year-old male with PMHx of type 2 diabetes, HTN, CAD with stenting on daily aspirin, presented to [facility] 4/16/24, found to have CT brain showing R MCA occlusion. He was administered tenecteplase and transferred to [facility] same day for thrombectomy but with little improvement in symptoms - continued left sided weakness, facial weakness, dysarthria, left neglect, right gaze preference and left hemianopsia. His hospital was complicated by anemia requiring dual pressor support and transfusion 2/2 underlying hip hematoma. Patient was noted to have worsening mental status with MRI completed showing large R MCA infarct now with hemorrhagic conversion. Patient was eventually stabilized and transferred to [facility] acute rehab but noted to have worsening mental status and lethargy on 5/6. A repeat CTH showed two new acute infarcts of the L insula and cerebellum and he was transferred to the ICU for closer monitoring. Patient had repeat CT completed 5/10 which was stable and he was downgraded to the medical floors. Patient remained unable to pass bedside swallow evaluation so NG tube was placed, patient's family not interested in invasive procedures like PEG placement. Patient remained minimally responsive to external stimuli during his time on the medical floors  and was unable to pass swallow eval so the decision was made for home hospice with all family parties aware that this is likely his new baseline after reviewing his medical course and imaging with Neurologist, Dr. Menard during a family meeting. Patient is now medically optimized for discharge home with home hospice with comfort measures only.      Home medications sent to pharmacy, de escalation per hospice care.       CONSULTS: Cardiology, Neurology, Speech, Infectious Disease, Hospice, Social Work     Patient is a 79-year-old male with PMHx of type 2 diabetes, HTN, CAD with stenting on daily aspirin, presented to [facility] 4/16/24, found to have CT brain showing R MCA occlusion. He was administered tenecteplase and transferred to [facility] same day for thrombectomy but with little improvement in symptoms - continued left sided weakness, facial weakness, dysarthria, left neglect, right gaze preference and left hemianopsia. His hospital was complicated by anemia requiring dual pressor support and transfusion 2/2 underlying hip hematoma. Patient was noted to have worsening mental status with MRI completed showing large R MCA infarct now with hemorrhagic conversion. Patient was eventually stabilized and transferred to [facility] acute rehab but noted to have worsening mental status and lethargy on 5/6. A repeat CTH showed two new acute infarcts of the L insula and cerebellum and he was transferred to the ICU for closer monitoring. Patient had repeat CT completed 5/10 which was stable and he was downgraded to the medical floors. Patient remained unable to pass bedside swallow evaluation so NG tube was placed, patient's family not interested in invasive procedures like PEG placement. Patient remained minimally responsive to external stimuli during his time on the medical floors  and was unable to pass swallow eval so the decision was made for home hospice with all family parties aware that this is likely his new baseline after reviewing his medical course and imaging with Neurologist, Dr. Menard during a family meeting. Patient is now medically optimized for discharge home with home hospice with comfort measures only.      Home medications sent to pharmacy, de escalation per hospice care.       CONSULTS: Cardiology, Neurology, Speech, Infectious Disease, Hospice, Social Work      Vital Signs Last 24 Hrs  T(C): 36.8 (14 May 2024 05:00), Max: 36.8 (14 May 2024 05:00)  T(F): 98.3 (14 May 2024 05:00), Max: 98.3 (14 May 2024 05:00)  HR: 84 (14 May 2024 05:00) (84 - 84)  BP: 103/65 (14 May 2024 05:00) (103/65 - 103/65)  BP(mean): --  RR: 18 (14 May 2024 05:00) (18 - 18)  SpO2: 100% (14 May 2024 05:00) (100% - 100%)    Parameters below as of 14 May 2024 05:00  Patient On (Oxygen Delivery Method): room air      PHYSICAL EXAM:  GENERAL: lying in bed comfortably, non verbal   HEAD:  Atraumatic, Normocephalic  EYES: EOMI, PERRLA  ENT: Moist mucous membranes  NECK: Supple, No JVD  CHEST/LUNG: Clear to auscultation bilaterally  HEART: Regular rate and rhythm.  ABDOMEN: Bowel sounds present; Soft, Nontender, Nondistended.   EXTREMITIES:  2+ Peripheral Pulses, brisk capillary refill. No clubbing, cyanosis, or edema  NERVOUS SYSTEM:  Non verbal. AAOx0.   SKIN: No rashes or lesions   Patient is a 79-year-old male with PMHx of type 2 diabetes, HTN, CAD with stenting on daily aspirin, presented to [facility] 4/16/24, found to have CT brain showing R MCA occlusion. He was administered tenecteplase and transferred to [facility] same day for thrombectomy but with little improvement in symptoms - continued left sided weakness, facial weakness, dysarthria, left neglect, right gaze preference and left hemianopsia. His hospital was complicated by anemia requiring dual pressor support and transfusion 2/2 underlying hip hematoma. Patient was noted to have worsening mental status with MRI completed showing large R MCA infarct now with hemorrhagic conversion. Patient was eventually stabilized and transferred to [facility] acute rehab but noted to have worsening mental status and lethargy on 5/6. A repeat CTH showed two new acute infarcts of the L insula and cerebellum and he was transferred to the ICU for closer monitoring. Patient had repeat CT completed 5/10 which was stable and he was downgraded to the medical floors. Patient remained unable to pass bedside swallow evaluation so NG tube was placed, patient's family not interested in invasive procedures like PEG placement. Patient remained minimally responsive to external stimuli during his time on the medical floors  and was unable to pass swallow eval so the decision was made for home hospice with all family parties aware that this is likely his new baseline after reviewing his medical course and imaging with Neurologist, Dr. Menard during a family meeting. Patient is now medically optimized for discharge home with home hospice with comfort measures only.      Pt was unable to participate in any activity given severe impairment from stroke.     Home medications sent to pharmacy, de escalation per hospice care.       CONSULTS: Cardiology, Neurology, Speech, Infectious Disease, Hospice, Social Work      Vital Signs Last 24 Hrs  T(C): 36.8 (14 May 2024 05:00), Max: 36.8 (14 May 2024 05:00)  T(F): 98.3 (14 May 2024 05:00), Max: 98.3 (14 May 2024 05:00)  HR: 84 (14 May 2024 05:00) (84 - 84)  BP: 103/65 (14 May 2024 05:00) (103/65 - 103/65)  BP(mean): --  RR: 18 (14 May 2024 05:00) (18 - 18)  SpO2: 100% (14 May 2024 05:00) (100% - 100%)    Parameters below as of 14 May 2024 05:00  Patient On (Oxygen Delivery Method): room air      PHYSICAL EXAM:  GENERAL: lying in bed comfortably, non verbal   HEAD:  Atraumatic, Normocephalic  EYES: EOMI, PERRLA  ENT: Moist mucous membranes  NECK: Supple, No JVD  CHEST/LUNG: Clear to auscultation bilaterally  HEART: Regular rate and rhythm.  ABDOMEN: Bowel sounds present; Soft, Nontender, Nondistended.   EXTREMITIES:  2+ Peripheral Pulses, brisk capillary refill. No clubbing, cyanosis, or edema  NERVOUS SYSTEM:  Non verbal. AAOx0.   SKIN: No rashes or lesions

## 2024-05-13 NOTE — PROGRESS NOTE ADULT - CONVERSATION/DISCUSSION
Diagnosis/Prognosis/MOLST Discussed/Hospice Referral/Palliative Care Referral
Diagnosis/Prognosis/MOLST Discussed/Treatment Options/Hospice Referral/Palliative Care Referral

## 2024-05-13 NOTE — SWALLOW BEDSIDE ASSESSMENT ADULT - COMMENTS
WBC: 8.6  XRAY Chest 5/11: IMPRESSION: NG tube tip remains in stomach.  CT Head 5/10: IMPRESSION: Stable infarcts and associated hemorrhage.  No midline shift or hydrocephalus.

## 2024-05-13 NOTE — PROGRESS NOTE ADULT - SUBJECTIVE AND OBJECTIVE BOX
Patient is a 79-year-old male with PMHx of type 2 diabetes, HTN, CAD with stenting (on daily ASA) admitted for acute on chronic CVA. Downgraded from ICU to medical floor 5/10.     Overnight Events: Pulled his NGT out partially, was readvanced   Interval HPI: Today is hospital day 6. Patient seen and examined at bedside.    Per discussion with family and hospice social worker yesterday, most of family agree they are ready for hospice, no further invasive interventions, but son would like to get another opinion from outside Neurologist regarding his father's prognosis. Discussed that they have seen multiple different Neurologists during this stay but son is adamant it must be outside of [facility]. He is in the process of gathering the medical records for review by outside Neurologist, then he states he will be at peace with decision for hospice.     Vital Signs Last 24 Hrs  T(C): 36.9 (13 May 2024 05:01), Max: 37.1 (12 May 2024 20:10)  T(F): 98.4 (13 May 2024 05:01), Max: 98.7 (12 May 2024 20:10)  HR: 115 (13 May 2024 05:01) (87 - 115)  BP: 144/91 (13 May 2024 05:01) (121/73 - 144/91)  BP(mean): --  RR: 19 (13 May 2024 05:01) (19 - 19)  SpO2: 100% (13 May 2024 05:01) (100% - 100%)    Parameters below as of 13 May 2024 05:01  Patient On (Oxygen Delivery Method): nasal cannula  O2 Flow (L/min): 2    PHYSICAL EXAM:  GENERAL: NAD, lying in bed comfortably w/ legs crossed  HEAD:  Atraumatic, Normocephalic  EYES: EOMI, conjunctiva and sclera clear  ENT: Dry mucous membranes, NG tube in place   NECK: Supple  CHEST/LUNG: Clear to auscultation bilaterally, good air entry bilaterally; No wheezing, rales, or rhonchi. Unlabored respirations  HEART: Regular rate and rhythm. S1 and S2. No murmurs, rubs, or gallops  ABDOMEN: Soft, Nontender, Nondistended. Bowel sounds present.   EXTREMITIES:  2+ Peripheral Pulses. No clubbing, cyanosis, or edema  NERVOUS SYSTEM:  Alert. Moving LEs spontaneously.   SKIN: No rashes, bruises, or other lesions    LABS:   All Labs Personally Reviewed                                 12.2   8.60  )-----------( 247      ( 13 May 2024 06:21 )             36.5     12 May 2024 07:21    134    |  99     |  18     ----------------------------<  260    4.8     |  28     |  0.87     Ca    9.2        12 May 2024 07:21    TPro  7.6    /  Alb  2.4    /  TBili  0.8    /  DBili  x      /  AST  65     /  ALT  58     /  AlkPhos  124    12 May 2024 07:21      CAPILLARY BLOOD GLUCOSE      POCT Blood Glucose.: 237 mg/dL (13 May 2024 06:58)  POCT Blood Glucose.: 249 mg/dL (13 May 2024 00:45)  POCT Blood Glucose.: 312 mg/dL (12 May 2024 23:26)  POCT Blood Glucose.: 282 mg/dL (12 May 2024 18:03)  POCT Blood Glucose.: 300 mg/dL (12 May 2024 12:20)    LIVER FUNCTIONS - ( 12 May 2024 07:21 )  Alb: 2.4 g/dL / Pro: 7.6 g/dL / ALK PHOS: 124 U/L / ALT: 58 U/L / AST: 65 U/L / GGT: x           Urinalysis Basic - ( 12 May 2024 07:21 )    Color: x / Appearance: x / SG: x / pH: x  Gluc: 260 mg/dL / Ketone: x  / Bili: x / Urobili: x   Blood: x / Protein: x / Nitrite: x   Leuk Esterase: x / RBC: x / WBC x   Sq Epi: x / Non Sq Epi: x / Bacteria: x        RADIOLOGY/EKG:  All Imaging and EKGs Personally Reviewed     No new imaging to review at this time    MEDICATIONS:  MEDICATIONS  (STANDING):  aspirin  chewable 81 milliGRAM(s) Oral daily  atorvastatin 80 milliGRAM(s) Oral at bedtime  cefepime   IVPB      cefepime   IVPB 2000 milliGRAM(s) IV Intermittent every 8 hours  dextrose 10% Bolus 125 milliLiter(s) IV Bolus once  dextrose 5%. 1000 milliLiter(s) (100 mL/Hr) IV Continuous <Continuous>  dextrose 5%. 1000 milliLiter(s) (50 mL/Hr) IV Continuous <Continuous>  dextrose 50% Injectable 25 Gram(s) IV Push once  dextrose 50% Injectable 12.5 Gram(s) IV Push once  influenza  Vaccine (HIGH DOSE) 0.7 milliLiter(s) IntraMuscular once  insulin glargine Injectable (LANTUS) 10 Unit(s) SubCutaneous at bedtime  insulin lispro (ADMELOG) corrective regimen sliding scale   SubCutaneous every 6 hours  insulin lispro Injectable (ADMELOG) 4 Unit(s) SubCutaneous every 6 hours  lacosamide Solution 50 milliGRAM(s) Oral every 12 hours  melatonin 6 milliGRAM(s) Oral at bedtime  metoprolol tartrate 12.5 milliGRAM(s) Enteral Tube every 12 hours  pantoprazole   Suspension 40 milliGRAM(s) Oral daily  polyethylene glycol 3350 17 Gram(s) Oral daily  timolol 0.5% Solution 1 Drop(s) Left EYE two times a day    MEDICATIONS  (PRN):  acetaminophen   Oral Liquid .. 650 milliGRAM(s) Oral every 6 hours PRN Temp greater or equal to 38C (100.4F)  bisacodyl Suppository 10 milliGRAM(s) Rectal daily PRN Constipation  dextrose Oral Gel 15 Gram(s) Oral once PRN Blood Glucose LESS THAN 70 milliGRAM(s)/deciliter               Patient is a 79-year-old male with PMHx of type 2 diabetes, HTN, CAD with stenting (on daily ASA) admitted for acute on chronic CVA. Downgraded from ICU to medical floor 5/10.     Overnight Events: Pulled his NGT out partially, was readvanced   Interval HPI: Today is hospital day 6. Patient seen and examined at bedside. Unable to obtain ROS per patient mental status.     Per discussion with family, Dr. Menard, Dr. Pacheco, Dr. Madrigal, family now in agreement for patient to go home on hospice.     Vital Signs Last 24 Hrs  T(C): 36.9 (13 May 2024 05:01), Max: 37.1 (12 May 2024 20:10)  T(F): 98.4 (13 May 2024 05:01), Max: 98.7 (12 May 2024 20:10)  HR: 115 (13 May 2024 05:01) (87 - 115)  BP: 144/91 (13 May 2024 05:01) (121/73 - 144/91)  BP(mean): --  RR: 19 (13 May 2024 05:01) (19 - 19)  SpO2: 100% (13 May 2024 05:01) (100% - 100%)    Parameters below as of 13 May 2024 05:01  Patient On (Oxygen Delivery Method): nasal cannula  O2 Flow (L/min): 2    PHYSICAL EXAM:  GENERAL: NAD, lying in bed comfortably w/ eyes closed   HEAD:  Atraumatic, Normocephalic  EYES: Inconsistent eye opening to verbal stimuli   ENT: Dry mucous membranes, NG tube in place   NECK: Supple  CHEST/LUNG: Clear to auscultation bilaterally, good air entry bilaterally; No wheezing, rales, or rhonchi. Unlabored respirations  HEART: Regular rate and rhythm. S1 and S2. No murmurs, rubs, or gallops  ABDOMEN: Soft, Nondistended. Bowel sounds present.   EXTREMITIES:  2+ Peripheral Pulses. No clubbing, cyanosis, or edema  NERVOUS SYSTEM:  A&Ox0. Moving LEs spontaneously.   SKIN: No rashes, bruises, or other lesions    LABS:   All Labs Personally Reviewed                                 12.2   8.60  )-----------( 247      ( 13 May 2024 06:21 )             36.5     12 May 2024 07:21    134    |  99     |  18     ----------------------------<  260    4.8     |  28     |  0.87     Ca    9.2        12 May 2024 07:21    TPro  7.6    /  Alb  2.4    /  TBili  0.8    /  DBili  x      /  AST  65     /  ALT  58     /  AlkPhos  124    12 May 2024 07:21      CAPILLARY BLOOD GLUCOSE      POCT Blood Glucose.: 237 mg/dL (13 May 2024 06:58)  POCT Blood Glucose.: 249 mg/dL (13 May 2024 00:45)  POCT Blood Glucose.: 312 mg/dL (12 May 2024 23:26)  POCT Blood Glucose.: 282 mg/dL (12 May 2024 18:03)  POCT Blood Glucose.: 300 mg/dL (12 May 2024 12:20)    LIVER FUNCTIONS - ( 12 May 2024 07:21 )  Alb: 2.4 g/dL / Pro: 7.6 g/dL / ALK PHOS: 124 U/L / ALT: 58 U/L / AST: 65 U/L / GGT: x           Urinalysis Basic - ( 12 May 2024 07:21 )    Color: x / Appearance: x / SG: x / pH: x  Gluc: 260 mg/dL / Ketone: x  / Bili: x / Urobili: x   Blood: x / Protein: x / Nitrite: x   Leuk Esterase: x / RBC: x / WBC x   Sq Epi: x / Non Sq Epi: x / Bacteria: x      RADIOLOGY/EKG:  All Imaging and EKGs Personally Reviewed     No new imaging to review at this time    MEDICATIONS:  MEDICATIONS  (STANDING):  aspirin  chewable 81 milliGRAM(s) Oral daily  atorvastatin 80 milliGRAM(s) Oral at bedtime  cefepime   IVPB      cefepime   IVPB 2000 milliGRAM(s) IV Intermittent every 8 hours  dextrose 10% Bolus 125 milliLiter(s) IV Bolus once  dextrose 5%. 1000 milliLiter(s) (100 mL/Hr) IV Continuous <Continuous>  dextrose 5%. 1000 milliLiter(s) (50 mL/Hr) IV Continuous <Continuous>  dextrose 50% Injectable 25 Gram(s) IV Push once  dextrose 50% Injectable 12.5 Gram(s) IV Push once  influenza  Vaccine (HIGH DOSE) 0.7 milliLiter(s) IntraMuscular once  insulin glargine Injectable (LANTUS) 10 Unit(s) SubCutaneous at bedtime  insulin lispro (ADMELOG) corrective regimen sliding scale   SubCutaneous every 6 hours  insulin lispro Injectable (ADMELOG) 4 Unit(s) SubCutaneous every 6 hours  lacosamide Solution 50 milliGRAM(s) Oral every 12 hours  melatonin 6 milliGRAM(s) Oral at bedtime  metoprolol tartrate 12.5 milliGRAM(s) Enteral Tube every 12 hours  pantoprazole   Suspension 40 milliGRAM(s) Oral daily  polyethylene glycol 3350 17 Gram(s) Oral daily  timolol 0.5% Solution 1 Drop(s) Left EYE two times a day    MEDICATIONS  (PRN):  acetaminophen   Oral Liquid .. 650 milliGRAM(s) Oral every 6 hours PRN Temp greater or equal to 38C (100.4F)  bisacodyl Suppository 10 milliGRAM(s) Rectal daily PRN Constipation  dextrose Oral Gel 15 Gram(s) Oral once PRN Blood Glucose LESS THAN 70 milliGRAM(s)/deciliter

## 2024-05-14 ENCOUNTER — TRANSCRIPTION ENCOUNTER (OUTPATIENT)
Age: 79
End: 2024-05-14

## 2024-05-14 VITALS
OXYGEN SATURATION: 100 % | RESPIRATION RATE: 18 BRPM | DIASTOLIC BLOOD PRESSURE: 65 MMHG | TEMPERATURE: 98 F | SYSTOLIC BLOOD PRESSURE: 103 MMHG | HEART RATE: 84 BPM

## 2024-05-14 LAB
GLUCOSE BLDC GLUCOMTR-MCNC: 145 MG/DL — HIGH (ref 70–99)
GLUCOSE BLDC GLUCOMTR-MCNC: 329 MG/DL — HIGH (ref 70–99)

## 2024-05-14 PROCEDURE — 80053 COMPREHEN METABOLIC PANEL: CPT

## 2024-05-14 PROCEDURE — 87077 CULTURE AEROBIC IDENTIFY: CPT

## 2024-05-14 PROCEDURE — 81001 URINALYSIS AUTO W/SCOPE: CPT

## 2024-05-14 PROCEDURE — 87040 BLOOD CULTURE FOR BACTERIA: CPT

## 2024-05-14 PROCEDURE — 71045 X-RAY EXAM CHEST 1 VIEW: CPT

## 2024-05-14 PROCEDURE — 83735 ASSAY OF MAGNESIUM: CPT

## 2024-05-14 PROCEDURE — 85027 COMPLETE CBC AUTOMATED: CPT

## 2024-05-14 PROCEDURE — 95812 EEG 41-60 MINUTES: CPT

## 2024-05-14 PROCEDURE — 70450 CT HEAD/BRAIN W/O DYE: CPT | Mod: MC

## 2024-05-14 PROCEDURE — 85025 COMPLETE CBC W/AUTO DIFF WBC: CPT

## 2024-05-14 PROCEDURE — C8924: CPT

## 2024-05-14 PROCEDURE — 36415 COLL VENOUS BLD VENIPUNCTURE: CPT

## 2024-05-14 PROCEDURE — 80061 LIPID PANEL: CPT

## 2024-05-14 PROCEDURE — 80048 BASIC METABOLIC PNL TOTAL CA: CPT

## 2024-05-14 PROCEDURE — 82962 GLUCOSE BLOOD TEST: CPT

## 2024-05-14 PROCEDURE — 87150 DNA/RNA AMPLIFIED PROBE: CPT

## 2024-05-14 PROCEDURE — 99239 HOSP IP/OBS DSCHRG MGMT >30: CPT

## 2024-05-14 PROCEDURE — 84100 ASSAY OF PHOSPHORUS: CPT

## 2024-05-14 RX ORDER — INSULIN GLARGINE 100 [IU]/ML
16 INJECTION, SOLUTION SUBCUTANEOUS
Qty: 1 | Refills: 0
Start: 2024-05-14 | End: 2024-06-12

## 2024-05-14 RX ORDER — PANTOPRAZOLE SODIUM 20 MG/1
1 TABLET, DELAYED RELEASE ORAL
Qty: 30 | Refills: 0
Start: 2024-05-14 | End: 2024-06-12

## 2024-05-14 RX ORDER — GABAPENTIN 400 MG/1
1 CAPSULE ORAL
Qty: 30 | Refills: 0
Start: 2024-05-14 | End: 2024-06-12

## 2024-05-14 RX ORDER — FINASTERIDE 5 MG/1
1 TABLET, FILM COATED ORAL
Qty: 30 | Refills: 0
Start: 2024-05-14 | End: 2024-06-12

## 2024-05-14 RX ORDER — METHOCARBAMOL 500 MG/1
1 TABLET, FILM COATED ORAL
Qty: 120 | Refills: 0
Start: 2024-05-14 | End: 2024-06-12

## 2024-05-14 RX ORDER — TAMSULOSIN HYDROCHLORIDE 0.4 MG/1
1 CAPSULE ORAL
Qty: 30 | Refills: 0
Start: 2024-05-14 | End: 2024-06-12

## 2024-05-14 RX ADMIN — Medication 8: at 00:33

## 2024-05-14 RX ADMIN — Medication 5 UNIT(S): at 06:22

## 2024-05-14 RX ADMIN — Medication 1 DROP(S): at 06:20

## 2024-05-14 RX ADMIN — Medication 5 UNIT(S): at 00:32

## 2024-05-14 NOTE — PROGRESS NOTE ADULT - NUTRITIONAL ASSESSMENT
This patient has been assessed with a concern for Malnutrition and has been determined to have a diagnosis/diagnoses of Severe protein-calorie malnutrition.    This patient is being managed with:   Diet NPO with Tube Feed-  Tube Feeding Modality: Nasogastric  Glucerna 1.5 Billy  Total Volume for 24 Hours (mL): 1350  Continuous  Starting Tube Feed Rate {mL per Hour}: 25  Increase Tube Feed Rate by (mL): 10     Every 4 hours  Until Goal Tube Feed Rate (mL per Hour): 75  Tube Feed Duration (in Hours): 18  Tube Feed Start Time: 06:00  Tube Feed Stop Time: 23:59  Free Water Flush  Bolus   Total Volume per Flush (mL): 250   Frequency: Every 6 Hours  Entered: May  8 2024  9:17AM  
This patient has been assessed with a concern for Malnutrition and has been determined to have a diagnosis/diagnoses of Severe protein-calorie malnutrition.    This patient is being managed with:   Diet NPO with Tube Feed-  Tube Feeding Modality: Nasogastric  Glucerna 1.5 Billy  Total Volume for 24 Hours (mL): 990  Continuous  Starting Tube Feed Rate {mL per Hour}: 55  Increase Tube Feed Rate by (mL): 0     Every 4 hours  Until Goal Tube Feed Rate (mL per Hour): 55  Tube Feed Duration (in Hours): 18  Tube Feed Start Time: 06:00  Tube Feed Stop Time: 23:59  Free Water Flush  Bolus   Total Volume per Flush (mL): 250   Frequency: Every 6 Hours  Entered: May 10 2024  9:40AM  
This patient has been assessed with a concern for Malnutrition and has been determined to have a diagnosis/diagnoses of Severe protein-calorie malnutrition.    This patient is being managed with:   Diet NPO-  Entered: May  6 2024 11:45PM    This patient has been assessed with a concern for Malnutrition and has been determined to have a diagnosis/diagnoses of Severe protein-calorie malnutrition.    This patient is being managed with:   Diet NPO-  Entered: May  6 2024 11:45PM  
This patient has been assessed with a concern for Malnutrition and has been determined to have a diagnosis/diagnoses of Severe protein-calorie malnutrition.    This patient is being managed with:   Diet NPO with Tube Feed-  Tube Feeding Modality: Nasogastric  Glucerna 1.5 Billy  Total Volume for 24 Hours (mL): 990  Continuous  Starting Tube Feed Rate {mL per Hour}: 55  Increase Tube Feed Rate by (mL): 0     Every 4 hours  Until Goal Tube Feed Rate (mL per Hour): 55  Tube Feed Duration (in Hours): 18  Tube Feed Start Time: 06:00  Tube Feed Stop Time: 23:59  Free Water Flush  Bolus   Total Volume per Flush (mL): 250   Frequency: Every 6 Hours  Entered: May 10 2024  9:40AM  
This patient has been assessed with a concern for Malnutrition and has been determined to have a diagnosis/diagnoses of Severe protein-calorie malnutrition.    This patient is being managed with:   Diet NPO with Tube Feed-  Tube Feeding Modality: Nasogastric  Glucerna 1.5 Billy  Total Volume for 24 Hours (mL): 1350  Continuous  Starting Tube Feed Rate {mL per Hour}: 25  Increase Tube Feed Rate by (mL): 10     Every 4 hours  Until Goal Tube Feed Rate (mL per Hour): 75  Tube Feed Duration (in Hours): 18  Tube Feed Start Time: 06:00  Tube Feed Stop Time: 23:59  Free Water Flush  Bolus   Total Volume per Flush (mL): 250   Frequency: Every 6 Hours  Entered: May  8 2024  9:17AM  
This patient has been assessed with a concern for Malnutrition and has been determined to have a diagnosis/diagnoses of Severe protein-calorie malnutrition.    This patient is being managed with:   Diet NPO with Tube Feed-  Tube Feeding Modality: Nasogastric  Glucerna 1.5 Billy  Total Volume for 24 Hours (mL): 1350  Continuous  Starting Tube Feed Rate {mL per Hour}: 25  Increase Tube Feed Rate by (mL): 10     Every 4 hours  Until Goal Tube Feed Rate (mL per Hour): 75  Tube Feed Duration (in Hours): 18  Tube Feed Start Time: 06:00  Tube Feed Stop Time: 23:59  Free Water Flush  Bolus   Total Volume per Flush (mL): 250   Frequency: Every 6 Hours  Entered: May  8 2024  9:17AM    Diet NPO with Tube Feed-  Tube Feeding Modality: Nasogastric  Jevity 1.5 Billy  Total Volume for 24 Hours (mL): 240  Continuous  Starting Tube Feed Rate {mL per Hour}: 10  Until Goal Tube Feed Rate (mL per Hour): 10  Tube Feed Duration (in Hours): 24  Tube Feed Start Time: 23:00  Entered: May  7 2024 10:13PM    The following pending diet order is being considered for treatment of Severe protein-calorie malnutrition:null
This patient has been assessed with a concern for Malnutrition and has been determined to have a diagnosis/diagnoses of Severe protein-calorie malnutrition.    This patient is being managed with:   Diet NPO with Tube Feed-  Tube Feeding Modality: Nasogastric  Glucerna 1.5 Billy  Total Volume for 24 Hours (mL): 990  Continuous  Starting Tube Feed Rate {mL per Hour}: 55  Increase Tube Feed Rate by (mL): 0     Every 4 hours  Until Goal Tube Feed Rate (mL per Hour): 55  Tube Feed Duration (in Hours): 18  Tube Feed Start Time: 06:00  Tube Feed Stop Time: 23:59  Free Water Flush  Bolus   Total Volume per Flush (mL): 250   Frequency: Every 6 Hours  Entered: May 10 2024  9:40AM  
This patient has been assessed with a concern for Malnutrition and has been determined to have a diagnosis/diagnoses of Severe protein-calorie malnutrition.    This patient is being managed with:   Diet NPO with Tube Feed-  Tube Feeding Modality: Nasogastric  Glucerna 1.5 Billy  Total Volume for 24 Hours (mL): 990  Continuous  Starting Tube Feed Rate {mL per Hour}: 55  Increase Tube Feed Rate by (mL): 0     Every 4 hours  Until Goal Tube Feed Rate (mL per Hour): 55  Tube Feed Duration (in Hours): 18  Tube Feed Start Time: 06:00  Tube Feed Stop Time: 23:59  Free Water Flush  Bolus   Total Volume per Flush (mL): 250   Frequency: Every 6 Hours  Entered: May 10 2024  9:40AM    Diet NPO with Tube Feed-  Tube Feeding Modality: Nasogastric  Glucerna 1.5 Billy  Total Volume for 24 Hours (mL): 1350  Continuous  Starting Tube Feed Rate {mL per Hour}: 25  Increase Tube Feed Rate by (mL): 10     Every 4 hours  Until Goal Tube Feed Rate (mL per Hour): 75  Tube Feed Duration (in Hours): 18  Tube Feed Start Time: 06:00  Tube Feed Stop Time: 23:59  Free Water Flush  Bolus   Total Volume per Flush (mL): 250   Frequency: Every 6 Hours  Entered: May  8 2024  9:17AM    The following pending diet order is being considered for treatment of Severe protein-calorie malnutrition:null
This patient has been assessed with a concern for Malnutrition and has been determined to have a diagnosis/diagnoses of Severe protein-calorie malnutrition.    This patient is being managed with:   Diet NPO with Tube Feed-  Tube Feeding Modality: Nasogastric  Glucerna 1.5 Billy  Total Volume for 24 Hours (mL): 990  Continuous  Starting Tube Feed Rate {mL per Hour}: 55  Increase Tube Feed Rate by (mL): 0     Every 4 hours  Until Goal Tube Feed Rate (mL per Hour): 55  Tube Feed Duration (in Hours): 18  Tube Feed Start Time: 06:00  Tube Feed Stop Time: 23:59  Free Water Flush  Bolus   Total Volume per Flush (mL): 250   Frequency: Every 6 Hours  Entered: May 10 2024  9:40AM  
This patient has been assessed with a concern for Malnutrition and has been determined to have a diagnosis/diagnoses of Severe protein-calorie malnutrition.    This patient is being managed with:   Diet NPO with Tube Feed-  Tube Feeding Modality: Nasogastric  Glucerna 1.5 Billy  Total Volume for 24 Hours (mL): 990  Continuous  Starting Tube Feed Rate {mL per Hour}: 55  Increase Tube Feed Rate by (mL): 0     Every 4 hours  Until Goal Tube Feed Rate (mL per Hour): 55  Tube Feed Duration (in Hours): 18  Tube Feed Start Time: 06:00  Tube Feed Stop Time: 23:59  Free Water Flush  Bolus   Total Volume per Flush (mL): 250   Frequency: Every 6 Hours  Entered: May 10 2024  9:40AM

## 2024-05-14 NOTE — PROGRESS NOTE ADULT - ASSESSMENT
Patient is a 79-year-old male with PMH of PMH of DM HTN CAD with stenting (on daily ASA) with stenting. Pt  presented for sudden onset left sided weakness to [facility] on 4/16/24 . CT brain showed right MCA occlusion( 4/16). Patient given tenecteplase and was transferred to [facility] on 4/16 for thrombectomy. Underwent thrombectomy but symptoms barely improved, Pt still with left sided weakness facial weakness, dysarthria, left neglect,  right gaze preference and left hemianopsia.  Course complicated by anemia requiring dual Pressor support and 1 unit PRBC and PLT  2/2 to left hip hematoma. Worsening in mental status MRI brain done showing large R MCA infarct with hemorrhagic conversion. Eventually stabilized for transfer to [facility] acute rehab. Was upgraded to ICU 5/6 for worsening mental status with findings of 2 new infarcts of L insula/cerebellum, downgraded 5/10 to the medical floors, with decision for home hospice made 5/13.      #Acute on Chronic CVA  - New acute ischemic stroke on the left side, and left cerebellar, with stable large subacute right MCA infarct with hemorrhagic transformation.  - EEG w/o seizures but with nonspecific diffuse/multifocal cerebral dysfunction.   - Neuro recs appreciated: family meeting today, imaging reviewed, plan for hospice   - Comfort measures only, NGT to be removed on D/C  - Home with hospice today pending supplies    #Severe protein calorie malnutrition   - NPO with tube feeds  - Dietician following   - Speech reeval rec NPO  - Comfort measures only, will discontinue feeds and insulin on D/C    #T2DM  - A1c 8.2   - Hold oral meds  - Comfort measures only, D/C insulin with NGT feeds      #CODE Status: DNR/DNI; comfort measures, hospice pending, palliative care following         Discussed with attending, Dr. Pacheco.

## 2024-05-14 NOTE — PROGRESS NOTE ADULT - PROVIDER SPECIALTY LIST ADULT
Cardiology
Critical Care
Critical Care
Family Medicine
Critical Care
Critical Care
Neurology
Family Medicine
Neurology
Pulmonology
Family Medicine
Palliative Care
Family Medicine
Neurology
Palliative Care
Palliative Care

## 2024-05-14 NOTE — PROGRESS NOTE ADULT - SUBJECTIVE AND OBJECTIVE BOX
Patient is a 79-year-old male with PMHx of type 2 diabetes, HTN, CAD with stenting (on daily ASA) admitted for acute on chronic CVA. Downgraded from ICU to medical floor 5/10.     Overnight Events: None  Interval HPI: Today is hospital day 7. Patient seen and examined at bedside. Unable to obtain ROS per patient mental status.     Vital Signs Last 24 Hrs  T(C): 36.8 (14 May 2024 05:00), Max: 36.8 (14 May 2024 05:00)  T(F): 98.3 (14 May 2024 05:00), Max: 98.3 (14 May 2024 05:00)  HR: 84 (14 May 2024 05:00) (78 - 84)  BP: 103/65 (14 May 2024 05:00) (103/65 - 124/72)  BP(mean): --  RR: 18 (14 May 2024 05:00) (16 - 18)  SpO2: 100% (14 May 2024 05:00) (99% - 100%)    Parameters below as of 14 May 2024 05:00  Patient On (Oxygen Delivery Method): room air      PHYSICAL EXAM:  GENERAL: NAD, lying in bed comfortably w/ eyes closed   HEAD:  Atraumatic, Normocephalic  EYES: Inconsistent eye opening to verbal stimuli   ENT: Dry mucous membranes, NG tube in place   NECK: Supple  CHEST/LUNG: Clear to auscultation bilaterally, good air entry bilaterally; No wheezing, rales, or rhonchi. Unlabored respirations  HEART: Regular rate and rhythm. S1 and S2. No murmurs, rubs, or gallops  ABDOMEN: Soft, Nondistended. Bowel sounds present.   EXTREMITIES:  2+ Peripheral Pulses. No clubbing, cyanosis, or edema  NERVOUS SYSTEM:  A&Ox0. Moving LEs spontaneously.   SKIN: No rashes, bruises, or other lesions    LABS:   All Labs Personally Reviewed              No new labs to review at this time       RADIOLOGY/EKG:  All Imaging and EKGs Personally Reviewed     No new imaging to review at this time    MEDICATIONS:  MEDICATIONS  (STANDING):  dextrose 10% Bolus 125 milliLiter(s) IV Bolus once  dextrose 5%. 1000 milliLiter(s) (100 mL/Hr) IV Continuous <Continuous>  dextrose 5%. 1000 milliLiter(s) (50 mL/Hr) IV Continuous <Continuous>  dextrose 50% Injectable 12.5 Gram(s) IV Push once  dextrose 50% Injectable 25 Gram(s) IV Push once  influenza  Vaccine (HIGH DOSE) 0.7 milliLiter(s) IntraMuscular once  insulin glargine Injectable (LANTUS) 12 Unit(s) SubCutaneous at bedtime  insulin lispro (ADMELOG) corrective regimen sliding scale   SubCutaneous every 6 hours  insulin lispro Injectable (ADMELOG) 5 Unit(s) SubCutaneous every 6 hours  lacosamide Solution 50 milliGRAM(s) Oral every 12 hours  melatonin 6 milliGRAM(s) Oral at bedtime  timolol 0.5% Solution 1 Drop(s) Left EYE two times a day    MEDICATIONS  (PRN):  acetaminophen   Oral Liquid .. 650 milliGRAM(s) Oral every 6 hours PRN Temp greater or equal to 38C (100.4F)  dextrose Oral Gel 15 Gram(s) Oral once PRN Blood Glucose LESS THAN 70 milliGRAM(s)/deciliter

## 2024-05-14 NOTE — CHART NOTE - NSCHARTNOTEFT_GEN_A_CORE
This is a chart for 5/13/24.  We had a family meeting (wife, son, daughter, and son and cousin over the phone). and primary team.  I showed then CT scan head on multiple dates, explained to them the insult that happened on the scan. He has bilateral ischemic stroke with hemorrhagic transformation on the right side and new stroke on the left MCA. His condition could be worse or worse. Family would like to bring him home. I have explained that his condition could be better, remain the same or better.   All questions answered. This is a chart for 5/13/24.  We had a family meeting (wife, son, daughter, and son and cousin over the phone). and primary team.  I showed then CT scan head on multiple dates, explained to them the insult that happened on the scan. He has bilateral ischemic stroke with hemorrhagic transformation on the right side and new stroke on the left MCA and cerebellum. I explained to them the natural progression of disease. His condition could be worse or same or better. Patient's condition can fluctuate from day to day. He has reflexes such as grasping reflex. Family does not want PEG, and we cant have patient to have NG tube for long period. Family would like to bring him home.   All questions answered.

## 2024-05-14 NOTE — PROGRESS NOTE ADULT - SUBJECTIVE AND OBJECTIVE BOX
Progress: pt in bed, eyes will open not following , but not in distress        Review of Systems: [ Unable to obtain due to poor mentation]    MEDICATIONS  (STANDING):  dextrose 10% Bolus 125 milliLiter(s) IV Bolus once  dextrose 5%. 1000 milliLiter(s) (50 mL/Hr) IV Continuous <Continuous>  dextrose 5%. 1000 milliLiter(s) (100 mL/Hr) IV Continuous <Continuous>  dextrose 50% Injectable 25 Gram(s) IV Push once  dextrose 50% Injectable 12.5 Gram(s) IV Push once  influenza  Vaccine (HIGH DOSE) 0.7 milliLiter(s) IntraMuscular once  insulin glargine Injectable (LANTUS) 12 Unit(s) SubCutaneous at bedtime  insulin lispro (ADMELOG) corrective regimen sliding scale   SubCutaneous every 6 hours  insulin lispro Injectable (ADMELOG) 5 Unit(s) SubCutaneous every 6 hours  lacosamide Solution 50 milliGRAM(s) Oral every 12 hours  melatonin 6 milliGRAM(s) Oral at bedtime  timolol 0.5% Solution 1 Drop(s) Left EYE two times a day    MEDICATIONS  (PRN):  acetaminophen   Oral Liquid .. 650 milliGRAM(s) Oral every 6 hours PRN Temp greater or equal to 38C (100.4F)  dextrose Oral Gel 15 Gram(s) Oral once PRN Blood Glucose LESS THAN 70 milliGRAM(s)/deciliter      PHYSICAL EXAM:  Vital Signs Last 24 Hrs  T(C): 36.8 (14 May 2024 05:00), Max: 36.8 (14 May 2024 05:00)  T(F): 98.3 (14 May 2024 05:00), Max: 98.3 (14 May 2024 05:00)  HR: 84 (14 May 2024 05:00) (78 - 84)  BP: 103/65 (14 May 2024 05:00) (103/65 - 124/72)  BP(mean): --  RR: 18 (14 May 2024 05:00) (16 - 18)  SpO2: 100% (14 May 2024 05:00) (99% - 100%)    Parameters below as of 14 May 2024 05:00  Patient On (Oxygen Delivery Method): room air      General: eyes open, not interactive, non verbal        HEENT: n/c, a/t     Lungs: few coarse    CV: normal  rate  GI: abd soft    : normal    Musculoskeletal: weakened,    Skin: normal    Neuro: + deficits   Oral intake ability:  unable   Diet: NPO,     LABS:                          12.2   8.60  )-----------( 247      ( 13 May 2024 06:21 )             36.5     05-13    133<L>  |  99  |  18  ----------------------------<  259<H>  4.6   |  28  |  0.92    Ca    9.1      13 May 2024 06:21    TPro  7.6  /  Alb  2.3<L>  /  TBili  0.8  /  DBili  x   /  AST  58<H>  /  ALT  47<H>  /  AlkPhos  134<H>  05-13    Urinalysis Basic - ( 13 May 2024 06:21 )    Color: x / Appearance: x / SG: x / pH: x  Gluc: 259 mg/dL / Ketone: x  / Bili: x / Urobili: x   Blood: x / Protein: x / Nitrite: x   Leuk Esterase: x / RBC: x / WBC x   Sq Epi: x / Non Sq Epi: x / Bacteria: x        RADIOLOGY & ADDITIONAL STUDIES:    ADVANCE DIRECTIVES: molst dnr/ dni  no Peg  Advanced Care Planning discussion total time spent:

## 2024-05-14 NOTE — DISCHARGE NOTE NURSING/CASE MANAGEMENT/SOCIAL WORK - NSTRANSFERBELONGINGSRESP_GEN_A_NUR
yes
Depakote  mg oral tablet, extended release: 1 tab(s) orally every other day  JUNEL FE     TAB 1.5/30:   PROzac: 60 milligram(s) orally once a day  Topamax 25 mg oral tablet: 1 tab(s) orally once a day  Topamax 50 mg oral tablet: 1 tab(s) orally once a day (at bedtime), As Needed

## 2024-05-14 NOTE — DISCHARGE NOTE NURSING/CASE MANAGEMENT/SOCIAL WORK - NSPROMEDSBROUGHTTOHOSP_GEN_A_NUR
Subjective:      Patient ID: Shirley Forman is a 42 y.o. female at sister's house    Vitals:  vitals were not taken for this visit.     Chief Complaint: Rash      Visit Type: TELE AUDIOVISUAL    Present with the patient at the time of consultation: TELEMED PRESENT WITH PATIENT: None    Past Medical History:   Diagnosis Date    Asthma     Migraine headache     Nasal polyps     PONV (postoperative nausea and vomiting)      Past Surgical History:   Procedure Laterality Date    CERVICAL BIOPSY  W/ LOOP ELECTRODE EXCISION      FUNCTIONAL ENDOSCOPIC SINUS SURGERY (FESS) USING COMPUTER-ASSISTED NAVIGATION Bilateral 2/9/2021    Procedure: FESS, USING COMPUTER-ASSISTED NAVIGATION;  Surgeon: Sonam Quiles MD;  Location: [facility] MAIN OR;  Service: ENT;  Laterality: Bilateral;  Will need the OLYMPUS INSTACLEAR also.    FUNCTIONAL ENDOSCOPIC SINUS SURGERY (FESS) USING COMPUTER-ASSISTED NAVIGATION Bilateral 1/25/2023    Procedure: Revision full house FESS (reviewed by KG 01/13/23 @ 0850);  Surgeon: Michael Estrada MD;  Location: [facility] MAIN OR;  Service: ENT;  Laterality: Bilateral;    TUBAL LIGATION       Review of patient's allergies indicates:  No Known Allergies  Current Outpatient Medications on File Prior to Visit   Medication Sig Dispense Refill    acetaminophen (TYLENOL) 500 MG tablet Take 500 mg by mouth every 6 (six) hours as needed for Pain.      dupilumab (DUPIXENT PEN) 300 mg/2 mL PnIj Inject 300 mg into the skin every 14 (fourteen) days. 4 mL 11    fluticasone propionate (XHANCE) 93 mcg/actuation AerB 1 Inhalation by Nasal route 2 (two) times daily. 16 mL 11    ibuprofen (ADVIL,MOTRIN) 800 MG tablet Take 1 tablet (800 mg total) by mouth every 8 (eight) hours as needed for Pain (cramping). 30 tablet 3    NEILMED SINUS RINSE COMPLETE pkdv use as directed      norethindrone (AYGESTIN) 5 mg Tab 1 tablet tid for the first 21 days of each month 63 tablet 11    ondansetron (ZOFRAN) 4 MG tablet Take 1 tablet (4 mg total) by  mouth every 6 (six) hours. 12 tablet 0    sod chlor-bicarb-squeez bottle pkdv 1 packet by sinus irrigation route 2 (two) times a day. 100 each 3    sod chlor-bicarb-squeez bottle pkdv USE AS DIRECTED      tranexamic acid (LYSTEDA) 650 mg tablet 2 tablets by mouth three times daily for 5 days.  Start with first day of each menstrual cycle 30 tablet 11     No current facility-administered medications on file prior to visit.     Family History   Problem Relation Age of Onset    Stroke Mother     Thyroid disease Mother     Diabetes Father     Hypertension Father        Medications Ordered                [id number] - [place], [place] - [address] AT SEC OF [facility] ([place] [address]) &   [address] [facility] [facility] [address]    Telephone: [phone]   Fax: [phone]   Hours: Not open 24 hours                         E-Prescribed (2 of 2)              doxycycline (VIBRAMYCIN) 100 MG Cap    Sig: Take 1 capsule (100 mg total) by mouth 2 (two) times daily. for 7 days       Start: 4/8/24     Quantity: 14 capsule Refills: 0                         predniSONE (DELTASONE) 20 MG tablet    Sig: Take 1 tablet (20 mg total) by mouth once daily. for 5 days       Start: 4/8/24     Quantity: 5 tablet Refills: 0                           Ohs Peq Odvv Intake    4/8/2024  3:24 PM CDT - Filed by Patient   What is your current physical address in the event of a medical emergency? [address] [address]   Are you able to take your vital signs? No   Please attach any relevant images or files          Patient states that for the past week she has been having a generalized rash on her facial area. Patient states that she has not changed any soap or known environmental factors. Patient states that the areas around her lip are cracking. Patient denies any fever or other symptoms. Patient states that the area around her lips itches. Patient states that the rash is only on her facial area.          Constitution: Negative.   HENT: Negative.     Neck: neck negative.   Cardiovascular: Negative.    Eyes: Negative.    Respiratory: Negative.     Gastrointestinal: Negative.    Endocrine: negative.   Genitourinary: Negative.    Musculoskeletal: Negative.    Skin:  Positive for rash.   Allergic/Immunologic: Negative.    Neurological: Negative.    Hematologic/Lymphatic: Negative.    Psychiatric/Behavioral: Negative.          Objective:   The physical exam was conducted virtually.  Physical Exam   Constitutional: She is oriented to person, place, and time. normal  HENT:   Head: Normocephalic and atraumatic.   Nose: Nose normal.   Eyes: Conjunctivae are normal. Pupils are equal, round, and reactive to light. Extraocular movement intact   Neck: Neck supple.   Pulmonary/Chest: Effort normal.   Abdominal: Normal appearance.   Musculoskeletal: Normal range of motion.         General: Normal range of motion.   Neurological: no focal deficit. She is alert, oriented to person, place, and time and at baseline.   Skin: Skin is rash.   Psychiatric: Her behavior is normal. Mood, judgment and thought content normal.       Assessment:     1. Allergic contact dermatitis, unspecified trigger    2. Bacterial skin infection                  Plan:       Allergic contact dermatitis, unspecified trigger  -     predniSONE (DELTASONE) 20 MG tablet; Take 1 tablet (20 mg total) by mouth once daily. for 5 days  Dispense: 5 tablet; Refill: 0    Bacterial skin infection  -     doxycycline (VIBRAMYCIN) 100 MG Cap; Take 1 capsule (100 mg total) by mouth 2 (two) times daily. for 7 days  Dispense: 14 capsule; Refill: 0  -     predniSONE (DELTASONE) 20 MG tablet; Take 1 tablet (20 mg total) by mouth once daily. for 5 days  Dispense: 5 tablet; Refill: 0                      no

## 2024-05-14 NOTE — PROGRESS NOTE ADULT - ASSESSMENT
A/p 78y/o M with PMH of PMH of DM HTN CAD with stenting (on daily ASA)  right MCA stroke s/p TNK and thrombectomy 4/16 and eventually sent to [facility] now with worsening mental status on 5/6 and  found to have two new acute infarcts of left insula and left cerebellum. was in ICU for closer monitoring, then downgraded from ICU to medical floor 5/10.           Acute on Chronic CVA  - New acute ischemic stroke on the left side, and left cerebellar, with stable large subacute right MCA infarct with hemorrhagic transformation.  - EEG w/o seizures but with nonspecific diffuse/multifocal cerebral dysfunction  - Neuro following pt here     Dysphagia   - Speech evals -rec NPO  - family wishes Comfort measures only   - Peg not in line w/ pts wishes as per family   - Home with hospice today         Palliative :   f/u , chart reviewed, noted family meeting scheduled by med team, held yesterday 5/13,  w/ med team and neuro.  Plan remains same, for pt to go home w/ home hospice services.  Saw pt bedside today , son in room , pt calm not in distress.   Son aware and in agreement for pt to go home on hospice as discussed.   appreciate CM assist

## 2024-05-14 NOTE — DISCHARGE NOTE NURSING/CASE MANAGEMENT/SOCIAL WORK - PATIENT PORTAL LINK FT
You can access the FollowMyHealth Patient Portal offered by [facility] by registering at the following website: [email or web address]. By joining [facility]’s FollowMyHealth portal, you will also be able to view your health information using other applications (apps) compatible with our system.

## 2024-05-14 NOTE — PROGRESS NOTE ADULT - REASON FOR ADMISSION
Acute CVA

## 2024-05-15 LAB
CULTURE RESULTS: SIGNIFICANT CHANGE UP
SPECIMEN SOURCE: SIGNIFICANT CHANGE UP

## 2024-05-21 ENCOUNTER — APPOINTMENT (OUTPATIENT)
Dept: CARDIOLOGY | Facility: CLINIC | Age: 79
End: 2024-05-21

## 2024-05-21 NOTE — DISCUSSION/SUMMARY
[FreeTextEntry1] : The patient is a 79-year-old gentleman CAD, HLD, HFrEF, DM s/p left eye hemorrhage after cataract. #1 CV- asymptomatic, c/w aspirin alone #2 HFrEF- cannot afford entresto and does not qualify for assistance, c/w losartan and metoprolol #3 HLD- c/w atorvastatin  #4 HTN- c/w losartan  #5 DM- on metformin, toujeo and farxiga , glucose still high #6 BPH- on finasteride #7 General- Continue daily walks and increase pace.  s/p bilateral cataract.

## 2024-05-21 NOTE — REVIEW OF SYSTEMS
[Weight Loss (___ Lbs)] : [unfilled] ~Ulb weight loss [Negative] : Heme/Lymph [SOB] : no shortness of breath [Dyspnea on exertion] : not dyspnea during exertion [Chest Discomfort] : no chest discomfort [Lower Ext Edema] : no extremity edema [Leg Claudication] : no intermittent leg claudication [Palpitations] : no palpitations [Orthopnea] : no orthopnea

## 2024-06-13 PROCEDURE — 93228 REMOTE 30 DAY ECG REV/REPORT: CPT

## 2024-06-15 PROCEDURE — 85025 COMPLETE CBC W/AUTO DIFF WBC: CPT

## 2024-06-15 PROCEDURE — 84100 ASSAY OF PHOSPHORUS: CPT

## 2024-06-15 PROCEDURE — 84145 PROCALCITONIN (PCT): CPT

## 2024-06-15 PROCEDURE — 97165 OT EVAL LOW COMPLEX 30 MIN: CPT | Mod: GO

## 2024-06-15 PROCEDURE — 70450 CT HEAD/BRAIN W/O DYE: CPT | Mod: MC

## 2024-06-15 PROCEDURE — 83735 ASSAY OF MAGNESIUM: CPT

## 2024-06-15 PROCEDURE — 92523 SPEECH SOUND LANG COMPREHEN: CPT | Mod: GN

## 2024-06-15 PROCEDURE — 80053 COMPREHEN METABOLIC PANEL: CPT

## 2024-06-15 PROCEDURE — 97110 THERAPEUTIC EXERCISES: CPT | Mod: GP

## 2024-06-15 PROCEDURE — 97530 THERAPEUTIC ACTIVITIES: CPT | Mod: GP

## 2024-06-15 PROCEDURE — 92610 EVALUATE SWALLOWING FUNCTION: CPT | Mod: GN

## 2024-06-15 PROCEDURE — 85027 COMPLETE CBC AUTOMATED: CPT

## 2024-06-15 PROCEDURE — 97116 GAIT TRAINING THERAPY: CPT | Mod: GP

## 2024-06-15 PROCEDURE — 92526 ORAL FUNCTION THERAPY: CPT | Mod: GN

## 2024-06-15 PROCEDURE — 97161 PT EVAL LOW COMPLEX 20 MIN: CPT | Mod: GP

## 2024-06-15 PROCEDURE — 93005 ELECTROCARDIOGRAM TRACING: CPT

## 2024-06-15 PROCEDURE — 92507 TX SP LANG VOICE COMM INDIV: CPT | Mod: GN

## 2024-06-15 PROCEDURE — 36415 COLL VENOUS BLD VENIPUNCTURE: CPT

## 2024-06-15 PROCEDURE — 83605 ASSAY OF LACTIC ACID: CPT

## 2024-06-15 PROCEDURE — 76700 US EXAM ABDOM COMPLETE: CPT

## 2024-06-15 PROCEDURE — 97535 SELF CARE MNGMENT TRAINING: CPT | Mod: GO

## 2024-06-15 PROCEDURE — 82803 BLOOD GASES ANY COMBINATION: CPT

## 2024-06-15 PROCEDURE — 97112 NEUROMUSCULAR REEDUCATION: CPT | Mod: GO

## 2024-06-15 PROCEDURE — 87637 SARSCOV2&INF A&B&RSV AMP PRB: CPT

## 2024-06-15 PROCEDURE — 82962 GLUCOSE BLOOD TEST: CPT

## 2024-06-15 PROCEDURE — 80074 ACUTE HEPATITIS PANEL: CPT

## 2024-06-19 NOTE — CHART NOTE - NSCHARTNOTEFT_GEN_A_CORE
I have been notified by Mitzi HINTON that patient is no longer withdrawing on TRUPTI extremity at 20:30. Upon examination, pt is alert, oriented x pupil 3mm R, L pupil irregular, nonreactive, surgical. Pt is dysarthric, following commands on the right side, and moving the right side antigravity. 0/5 on LUE, and AG on the LLE. Pt has gone for an MRI, will follow up imaging and examination. Patient/Caregiver provided printed discharge information.

## 2024-07-25 ENCOUNTER — APPOINTMENT (OUTPATIENT)
Dept: OPHTHALMOLOGY | Facility: CLINIC | Age: 79
End: 2024-07-25

## 2024-09-17 ENCOUNTER — APPOINTMENT (OUTPATIENT)
Dept: OPHTHALMOLOGY | Facility: CLINIC | Age: 79
End: 2024-09-17

## 2024-10-02 NOTE — ED ADULT NURSE NOTE - HIV OFFER
Rheumatology follow-up visit note     Frank is a 79 year old male presents today for follow-up.    Antonio was seen today for recheck.    Diagnoses and all orders for this visit:    Panuveitis of left eye    Antineutrophil cytoplasmic antibody (ANCA) positive    High risk medication use    Trigger finger, right ring finger  -     triamcinolone (KENALOG-40) injection 20 mg  -     INJECTION SINGLE TENDON SHEATH/LIGAMENT    Other orders  -     sodium chloride 0.9% BOLUS 250 mL  -     sodium chloride (PF) 0.9% PF flush 3-20 mL  -     heparin lock flush 10 unit/mL injection 5 mL  -     heparin lock flush 100 unit/mL injection 5 mL  -     acetaminophen (TYLENOL) tablet 650 mg  -     diphenhydrAMINE (BENADRYL) capsule 25 mg  -     diphenhydrAMINE (BENADRYL) 25 mg in sodium chloride 0.9 % 50.5 mL intermittent infusion  -     methylPREDNISolone Na Suc (solu-MEDROL) injection 125 mg  -     inFLIXimab (REMICADE) 440 mg in sodium chloride 0.9 % 250 mL infusion  -     diphenhydrAMINE (BENADRYL) injection 50 mg  -     famotidine (PEPCID) injection 20 mg  -     methylPREDNISolone Na Suc (solu-MEDROL) injection 125 mg  -     EPINEPHrine PF (ADRENALIN) injection 0.3 mg  -     sodium chloride 0.9% BOLUS 500 mL  -     albuterol (PROVENTIL HFA/VENTOLIN HFA) inhaler  -     albuterol (PROVENTIL) neb solution 2.5 mg  -     meperidine (DEMEROL) injection 25 mg            After pros and cons were discussed including risk of infection, bleeding, skin changes including thinning, pigmentary alteration and scarring to name a few, the right ring finger flexor tendon sheath injected at the A1 level injected as noted in the orders section. This was done with nontouch technique.  The patient tolerated the procedure well and had a brisk Marcaine effect.  The postinjection care was discussed.      Follow up in 3 months.    HPI    Frank Obando is a 79 year old male is here for follow-up of management of immunosuppression for left panuveitis  for which he is on Remicade infusions.  He just recently followed up with ophthalmology.  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  Will continue as now..  He has worsening pain this is noted in the right hand pointing to the right ring finger which is triggering locking and clicking going on for the past several weeks reminiscent of what he had in the right index and the left middle and fifth digit in the past.  Corticosteroid injection for those were helpful.  He has no residual symptoms there.  Corticosteroid injections were done in the knees which have provided him good relief of pain.He gets Remicade at [facility] every 6 weeks.  T ROS enquiry held for fever, ocular symptoms, rash, headache,  GI issues.  He has not had sinus symptoms, hemoptysis, hematuria, ear nose inflammation signals.       DETAILED EXAMINATION  10/02/24  :    Vitals:    10/02/24 0929   BP: 118/66   BP Location: Right arm   Patient Position: Sitting   Cuff Size: Adult Regular   Pulse: 84   SpO2: 95%   Weight: 87.3 kg (192 lb 8 oz)     Alert oriented. Head including the face is examined for malar rash, heliotropes, scarring, lupus pernio. Eyes examined for redness such as in episcleritis/scleritis, periorbital lesions.   Neck/ Face examined for parotid gland swelling, range of motion of neck.  Left upper and lower and right upper and lower extremities examined for tenderness, swelling, warmth of the appendicular joints, range of motion, edema, rash.  Some of the important findings included: he does not have evidence of synovitis in the palpable joints of the upper extremities.  No significant deformities of the digits.  + Heberden nodes.  Range of motion of the shoulders  show full abduction.  No JLT effusion or warmth of the knees.  he does not have dactylitis of the digits.  Right ring finger triggering with A1 nodularity which is tender.     Patient Active Problem List    Diagnosis Date Noted    Coronary artery disease involving native coronary  artery of native heart without angina pectoris 07/03/2024     Priority: Medium    Rheumatoid arthritis involving multiple sites with positive rheumatoid factor (H) 07/02/2023     Priority: Medium     Sees Dr Casas for inflammatory arthritis. Had uveitis also; on Remicade (July 2023)       Personal history of immunosuppressive therapy 07/02/2023     Priority: Medium     On Remicade for inflammatory arthritis described as rheumatoid.  Previously had been exposed to methotrexate and apparently it may have induced hepatic cirrhosis.      Stage 3b chronic kidney disease (H) 03/24/2023     Priority: Medium    Angiodysplasia of stomach 09/27/2021     Priority: Medium    Secondary esophageal varices without bleeding (H) 09/27/2021     Priority: Medium    Panuveitis of left eye, Dr. Abdi / Castillo 03/19/2019     Priority: Medium    Essential hypertension, benign      Priority: Medium     Created by Conversion  Replacement Utility updated for latest IMO load      Formatting of this note might be different from the original.  Created by Conversion    Replacement Utility updated for latest IMO load      Type 2 diabetes mellitus with complication, without long-term current use of insulin (H)      Priority: Medium     Created by Conversion      Formatting of this note might be different from the original.  Created by Conversion      Dyslipidemia      Priority: Medium    Thrombocytopenia (H)      Priority: Medium    Cirrhosis of liver with ascites, unspecified hepatic cirrhosis type (H)      Priority: Medium     Non ETOH drinker; possibly from Methotrexate for RA RX       History of colonic polyps 05/07/2018     Priority: Medium    Antineutrophil cytoplasmic antibody (ANCA) positive 03/23/2017     Priority: Medium    Diabetic polyneuropathy associated with type 2 diabetes mellitus (H)      Priority: Medium     Created by Conversion  Replacement Utility updated for latest IMO load      Formatting of this note might be different  from the original.  Created by Conversion    Replacement Utility updated for latest IMO load      Right bundle branch block 11/24/2014     Priority: Medium    H/o CVA ~2013      Priority: Medium     Created by Conversion         Past Surgical History:   Procedure Laterality Date    COLONOSCOPY N/A 11/16/2021    Procedure: COLONOSCOPY with polypectomy;  Surgeon: Dex Finch MD;  Location: [facility]    CV CORONARY ANGIOGRAM N/A 4/23/2024    Procedure: Coronary Angiogram;  Surgeon: Mark Jones MD;  Location: [facility] LAB CV    CV INSTANTANEOUS WAVE-FREE RATIO N/A 4/23/2024    Procedure: Instantaneous Wave-Free Ratio;  Surgeon: Mark Jones MD;  Location: [facility] LAB CV    CV LEFT HEART CATH N/A 4/23/2024    Procedure: Left Heart Catheterization;  Surgeon: Mark Jones MD;  Location: [facility] LAB CV    CYSTOSCOPY TUMOR / CONDYLOMATA W/ LASER Left 7/18/2014    ESOPHAGOSCOPY, GASTROSCOPY, DUODENOSCOPY (EGD), COMBINED N/A 11/16/2021    Procedure: ESOPHAGOGASTRODUODENOSCOPY (EGD) with biopsies and argon plasma coagulation;  Surgeon: Dex Finch MD;  Location: [facility]    ESOPHAGOSCOPY, GASTROSCOPY, DUODENOSCOPY (EGD), COMBINED N/A 9/29/2023    Procedure: ESOPHAGOGASTRODUODENOSCOPY with BIOPSY;  Surgeon: Alli Lozada MD;  Location: [facility] OR    HC CYSTOSCOPY,INSERT URETERAL STENT      Description: Cystoscopy With Insertion Of Ureteral Stent Right;  Recorded: 10/14/2009;  Comments: stone    LAPAROSCOPIC CHOLECYSTECTOMY N/A 7/1/2023    Procedure: CHOLECYSTECTOMY, LAPAROSCOPIC;  Surgeon: Jadeil Argueta MD;  Location: [facility] Main OR      Past Medical History:   Diagnosis Date    Anemia     Arthritis     osteoarthritis    Calculus of kidney     Diabetes mellitus (H)     Episcleritis of left eye     Hyperlipidemia     Hypertension     Iron deficiency anemia due to chronic blood loss 11/2/2021    Peripheral neuropathy     Stroke (H)       Allergies   Allergen Reactions    Morphine Nausea and Vomiting    Scopolamine Hbr [Scopolamine] Unknown     Current Outpatient Medications   Medication Sig Dispense Refill    acetaminophen (TYLENOL) 500 MG tablet Take 1 tablet (500 mg) by mouth every 6 hours as needed for mild pain      aspirin 81 MG EC tablet Take 1 tablet (81 mg) by mouth daily Start tomorrow morning. 90 tablet 3    atorvastatin (LIPITOR) 40 MG tablet TAKE 1 TABLET BY MOUTH DAILY 90 tablet 2    blood glucose (ACCU-CHEK JACOB PLUS) test strip 1 strip by In Vitro route 2 times daily 200 strip 1    blood glucose meter (GLUCOMETER) [BLOOD GLUCOSE METER (GLUCOMETER)] Use 1 each As Directed 2 (two) times a day. Dispense glucometer brand per patient's insurance at pharmacy discretion. 1 each 0    blood glucose monitoring (SOFTCLIX) lancets USE 1 EACH TWICE DAILY 200 each 0    cyanocobalamin, vitamin B-12, 2,500 mcg Tab [CYANOCOBALAMIN, VITAMIN B-12, 2,500 MCG TAB] Take 2,500 mcg by mouth daily.      ferrous sulfate (FEROSUL) 325 (65 Fe) MG tablet Take 325 mg by mouth 3 times daily      generic lancets (ACCU-CHEK SOFTCLIX LANCETS) [GENERIC LANCETS (ACCU-CHEK SOFTCLIX LANCETS)] Use 1 each As Directed 2 (two) times a day. Dispense brand per patient's insurance at pharmacy discretion. 100 each 3    inFLIXimab (REMICADE IV) Every 6weeks      lisinopril (ZESTRIL) 10 MG tablet TAKE 1 TABLET(10 MG) BY MOUTH DAILY 90 tablet 3    magnesium oxide 250 mg Tab [MAGNESIUM OXIDE 250 MG TAB] Take 250 mg by mouth 2 (two) times a day.      metFORMIN (GLUCOPHAGE) 500 MG tablet TAKE 2 TABLETS BY MOUTH TWICE DAILY AT 6 AM AND AT 4  tablet 3    omeprazole (PRILOSEC) 40 MG DR capsule Take 1 capsule (40 mg) by mouth daily 90 capsule 2    psyllium (METAMUCIL/KONSYL) capsule Take 3 capsules by mouth 2 times daily      tamsulosin (FLOMAX) 0.4 MG capsule TAKE 1 CAPSULE BY MOUTH EVERY DAY AFTER SUPPER 90 capsule 2     family history includes Coronary Artery Disease in his  mother; Diabetes in his mother; Lung Cancer in his father; No Known Problems in his son.  Social Connections: Not on file          WBC Count   Date Value Ref Range Status   07/31/2024 4.6 4.0 - 11.0 10e3/uL Final     RBC Count   Date Value Ref Range Status   07/31/2024 3.04 (L) 4.40 - 5.90 10e6/uL Final     Hemoglobin   Date Value Ref Range Status   07/31/2024 9.2 (L) 13.3 - 17.7 g/dL Final     Hematocrit   Date Value Ref Range Status   07/31/2024 27.8 (L) 40.0 - 53.0 % Final     MCV   Date Value Ref Range Status   07/31/2024 91 78 - 100 fL Final     MCH   Date Value Ref Range Status   07/31/2024 30.3 26.5 - 33.0 pg Final     Platelet Count   Date Value Ref Range Status   07/31/2024 108 (L) 150 - 450 10e3/uL Final     % Lymphocytes   Date Value Ref Range Status   05/12/2024 21 % Final     AST   Date Value Ref Range Status   09/06/2024 36 0 - 45 U/L Final     ALT   Date Value Ref Range Status   09/06/2024 30 0 - 70 U/L Final     Albumin   Date Value Ref Range Status   09/06/2024 4.3 3.5 - 5.2 g/dL Final   07/02/2023 2.8 (L) 3.5 - 5.0 g/dL Final     Alkaline Phosphatase   Date Value Ref Range Status   09/06/2024 88 40 - 150 U/L Final     Creatinine   Date Value Ref Range Status   09/06/2024 1.55 (H) 0.67 - 1.17 mg/dL Final     GFR Estimate   Date Value Ref Range Status   09/06/2024 46 (L) >60 mL/min/1.73m2 Final     Comment:     eGFR calculated using 2021 CKD-EPI equation.   02/08/2021 48 (L) >60 mL/min/1.73m2 Final     GFR Estimate If Black   Date Value Ref Range Status   02/08/2021 59 (L) >60 mL/min/1.73m2 Final     Creatinine Urine mg/dL   Date Value Ref Range Status   09/06/2024 204.0 mg/dL Final     Comment:     The reference ranges have not been established in urine creatinine. The results should be integrated into the clinical context for interpretation.   09/20/2021 116 mg/dL Final                 Unable to answer due to medical condition/unresponsive/etc...

## 2025-05-21 NOTE — OCCUPATIONAL THERAPY INITIAL EVALUATION ADULT - MD ORDER
Continued Stay SW/CM Assessment/Plan of Care Note       Active Substitute Decision Maker (SDM)       Monet Menardn Surrogate Primary Contact - Spouse   Primary Phone: [phone] (Mobile)  Home Phone: [phone]  Work Phone: [phone]                     Progress note:  SW/CM reviewed chart note to inform for discharge planning/decisions. Per review of notes, family has decided to decline EVD. They have asked for [facility] workup for DCD. SW/CM spoke with [facility] liaison and she is coordinating DCD this date. If pt does not experience cardiac death within timescales, he will be transferred to floor for comfort care. SW/CM will continue to monitor for pt status    See SW/CM flowsheets for other objective data.    Disposition Recommendations:  Preliminary discharge destination: Planned Discharge Destination: Rehabilitation/Skilled Care  SW/CM recommendation for discharge: Intensive therapy program    Destination Pharmacy:        Discharge Plan/Needs:     Continued Care and Services - Admitted Since 5/15/2025    No active coordination exists for this encounter.       Prior To Hospitalization:    Living Situation: Spouse and residing at House    .  Support Systems: [employer] [facility], Family members, Friends, Siblings, Spouse   Home Devices/Equipment: None            Mobility Assist Devices: None   Type of Service Prior to Hospitalization: None               Patient/Family discharge goal (s):  Home, Home therapy, Outpatient therapy     Resources provided:           Prior Function                Current Function  Last Filed Values       None            Therapy Recommendations for Discharge:   PT:      Last Filed Values       None          OT:       Last Filed Values       None          SLP:    Last Filed Values       None            Mobility Equipment Recommended for Discharge:        Barriers to Discharge  Identified Barriers to Discharge/Transition Planning: [facility] work-up vs Comfort care  Occupational therapy to evaluate and treat.  Ccollar on at all times  TLSO for OOB

## (undated) DEVICE — CANNULA MEDONE DUAL BORE SIDEFLO 25G

## (undated) DEVICE — CANNULA .6MM 23G

## (undated) DEVICE — PACK VITRECTOMY  LF

## (undated) DEVICE — SOL IRR SALT BSS PLUS 500ML

## (undated) DEVICE — GLV 7.5 PROTEXIS (WHITE)

## (undated) DEVICE — ILM FORCEP 25G PLUS

## (undated) DEVICE — TIP BACKFLUSH SOFT DISP 23GA

## (undated) DEVICE — APPLICATOR Q TIP 6" WOOD STEM

## (undated) DEVICE — DRAPE MICROSCOPE KNOB COVER SMALL (2 PCS)

## (undated) DEVICE — WARMING BLANKET LOWER ADULT

## (undated) DEVICE — VENODYNE/SCD SLEEVE CALF MEDIUM

## (undated) DEVICE — Device

## (undated) DEVICE — SUT POLYSORB 8-0 5" MV-135-5

## (undated) DEVICE — FILTER SYR 22 MICRONS